# Patient Record
Sex: MALE | Race: BLACK OR AFRICAN AMERICAN | NOT HISPANIC OR LATINO | URBAN - METROPOLITAN AREA
[De-identification: names, ages, dates, MRNs, and addresses within clinical notes are randomized per-mention and may not be internally consistent; named-entity substitution may affect disease eponyms.]

---

## 2017-08-01 ENCOUNTER — OUTPATIENT (OUTPATIENT)
Dept: OUTPATIENT SERVICES | Facility: HOSPITAL | Age: 52
LOS: 1 days | End: 2017-08-01
Payer: MEDICAID

## 2017-08-03 DIAGNOSIS — R69 ILLNESS, UNSPECIFIED: ICD-10-CM

## 2017-10-01 PROCEDURE — G9001: CPT

## 2017-10-31 ENCOUNTER — INPATIENT (INPATIENT)
Facility: HOSPITAL | Age: 52
LOS: 3 days | Discharge: HOME | DRG: 638 | End: 2017-11-04
Attending: INTERNAL MEDICINE | Admitting: INTERNAL MEDICINE
Payer: MEDICAID

## 2017-10-31 VITALS
HEART RATE: 127 BPM | TEMPERATURE: 99 F | DIASTOLIC BLOOD PRESSURE: 79 MMHG | WEIGHT: 315 LBS | OXYGEN SATURATION: 98 % | RESPIRATION RATE: 18 BRPM | SYSTOLIC BLOOD PRESSURE: 161 MMHG

## 2017-10-31 DIAGNOSIS — E11.65 TYPE 2 DIABETES MELLITUS WITH HYPERGLYCEMIA: ICD-10-CM

## 2017-10-31 DIAGNOSIS — N17.9 ACUTE KIDNEY FAILURE, UNSPECIFIED: ICD-10-CM

## 2017-10-31 DIAGNOSIS — Z29.9 ENCOUNTER FOR PROPHYLACTIC MEASURES, UNSPECIFIED: ICD-10-CM

## 2017-10-31 DIAGNOSIS — E87.1 HYPO-OSMOLALITY AND HYPONATREMIA: ICD-10-CM

## 2017-10-31 DIAGNOSIS — Z98.890 OTHER SPECIFIED POSTPROCEDURAL STATES: Chronic | ICD-10-CM

## 2017-10-31 DIAGNOSIS — R00.0 TACHYCARDIA, UNSPECIFIED: ICD-10-CM

## 2017-10-31 DIAGNOSIS — R73.9 HYPERGLYCEMIA, UNSPECIFIED: ICD-10-CM

## 2017-10-31 DIAGNOSIS — R10.9 UNSPECIFIED ABDOMINAL PAIN: ICD-10-CM

## 2017-10-31 DIAGNOSIS — I10 ESSENTIAL (PRIMARY) HYPERTENSION: ICD-10-CM

## 2017-10-31 DIAGNOSIS — M10.9 GOUT, UNSPECIFIED: ICD-10-CM

## 2017-10-31 DIAGNOSIS — S99.919A UNSPECIFIED INJURY OF UNSPECIFIED ANKLE, INITIAL ENCOUNTER: Chronic | ICD-10-CM

## 2017-10-31 LAB
ACETONE SERPL-MCNC: NEGATIVE — SIGNIFICANT CHANGE UP
ALBUMIN SERPL ELPH-MCNC: 3.3 G/DL — SIGNIFICANT CHANGE UP (ref 3.3–5)
ALBUMIN SERPL ELPH-MCNC: 3.3 G/DL — SIGNIFICANT CHANGE UP (ref 3.3–5)
ALBUMIN SERPL ELPH-MCNC: 3.7 G/DL — SIGNIFICANT CHANGE UP (ref 3.3–5)
ALP SERPL-CCNC: 108 U/L — SIGNIFICANT CHANGE UP (ref 40–120)
ALP SERPL-CCNC: 83 U/L — SIGNIFICANT CHANGE UP (ref 40–120)
ALP SERPL-CCNC: 91 U/L — SIGNIFICANT CHANGE UP (ref 40–120)
ALT FLD-CCNC: 43 U/L RC — SIGNIFICANT CHANGE UP (ref 10–45)
ALT FLD-CCNC: 47 U/L RC — HIGH (ref 10–45)
ALT FLD-CCNC: 53 U/L RC — HIGH (ref 10–45)
ANION GAP SERPL CALC-SCNC: 16 MMOL/L — SIGNIFICANT CHANGE UP (ref 5–17)
ANION GAP SERPL CALC-SCNC: 17 MMOL/L — SIGNIFICANT CHANGE UP (ref 5–17)
ANION GAP SERPL CALC-SCNC: 20 MMOL/L — HIGH (ref 5–17)
APPEARANCE UR: CLEAR — SIGNIFICANT CHANGE UP
APTT BLD: 28.8 SEC — SIGNIFICANT CHANGE UP (ref 27.5–37.4)
AST SERPL-CCNC: 45 U/L — HIGH (ref 10–40)
AST SERPL-CCNC: 46 U/L — HIGH (ref 10–40)
AST SERPL-CCNC: 50 U/L — HIGH (ref 10–40)
B-OH-BUTYR SERPL-SCNC: 0.3 MMOL/L — SIGNIFICANT CHANGE UP
BASOPHILS # BLD AUTO: 0 K/UL — SIGNIFICANT CHANGE UP (ref 0–0.2)
BASOPHILS NFR BLD AUTO: 0.2 % — SIGNIFICANT CHANGE UP (ref 0–2)
BILIRUB SERPL-MCNC: 0.3 MG/DL — SIGNIFICANT CHANGE UP (ref 0.2–1.2)
BILIRUB SERPL-MCNC: 0.4 MG/DL — SIGNIFICANT CHANGE UP (ref 0.2–1.2)
BILIRUB SERPL-MCNC: 0.4 MG/DL — SIGNIFICANT CHANGE UP (ref 0.2–1.2)
BILIRUB UR-MCNC: NEGATIVE — SIGNIFICANT CHANGE UP
BUN SERPL-MCNC: 60 MG/DL — HIGH (ref 7–23)
BUN SERPL-MCNC: 62 MG/DL — HIGH (ref 7–23)
BUN SERPL-MCNC: 64 MG/DL — HIGH (ref 7–23)
CALCIUM SERPL-MCNC: 9 MG/DL — SIGNIFICANT CHANGE UP (ref 8.4–10.5)
CALCIUM SERPL-MCNC: 9.3 MG/DL — SIGNIFICANT CHANGE UP (ref 8.4–10.5)
CALCIUM SERPL-MCNC: 9.9 MG/DL — SIGNIFICANT CHANGE UP (ref 8.4–10.5)
CHLORIDE SERPL-SCNC: 85 MMOL/L — LOW (ref 96–108)
CHLORIDE SERPL-SCNC: 91 MMOL/L — LOW (ref 96–108)
CHLORIDE SERPL-SCNC: 94 MMOL/L — LOW (ref 96–108)
CK MB BLD-MCNC: 1.3 % — SIGNIFICANT CHANGE UP (ref 0–3.5)
CK MB CFR SERPL CALC: 2.3 NG/ML — SIGNIFICANT CHANGE UP (ref 0–6.7)
CK MB CFR SERPL CALC: 2.6 NG/ML — SIGNIFICANT CHANGE UP (ref 0–6.7)
CK SERPL-CCNC: 177 U/L — SIGNIFICANT CHANGE UP (ref 30–200)
CO2 SERPL-SCNC: 20 MMOL/L — LOW (ref 22–31)
CO2 SERPL-SCNC: 22 MMOL/L — SIGNIFICANT CHANGE UP (ref 22–31)
CO2 SERPL-SCNC: 23 MMOL/L — SIGNIFICANT CHANGE UP (ref 22–31)
COLOR SPEC: SIGNIFICANT CHANGE UP
CREAT SERPL-MCNC: 3.94 MG/DL — HIGH (ref 0.5–1.3)
CREAT SERPL-MCNC: 4.19 MG/DL — HIGH (ref 0.5–1.3)
CREAT SERPL-MCNC: 4.5 MG/DL — HIGH (ref 0.5–1.3)
DIFF PNL FLD: ABNORMAL
EOSINOPHIL # BLD AUTO: 0.2 K/UL — SIGNIFICANT CHANGE UP (ref 0–0.5)
EOSINOPHIL NFR BLD AUTO: 2.3 % — SIGNIFICANT CHANGE UP (ref 0–6)
GAS PNL BLDV: SIGNIFICANT CHANGE UP
GLUCOSE BLDC GLUCOMTR-MCNC: 454 MG/DL — CRITICAL HIGH (ref 70–99)
GLUCOSE BLDC GLUCOMTR-MCNC: 513 MG/DL — CRITICAL HIGH (ref 70–99)
GLUCOSE SERPL-MCNC: 498 MG/DL — CRITICAL HIGH (ref 70–99)
GLUCOSE SERPL-MCNC: 546 MG/DL — CRITICAL HIGH (ref 70–99)
GLUCOSE SERPL-MCNC: 715 MG/DL — CRITICAL HIGH (ref 70–99)
GLUCOSE UR QL: >1000
HBA1C BLD-MCNC: 15.2 % — HIGH (ref 4–5.6)
HCT VFR BLD CALC: 38 % — LOW (ref 39–50)
HGB BLD-MCNC: 12.5 G/DL — LOW (ref 13–17)
INR BLD: 1.07 RATIO — SIGNIFICANT CHANGE UP (ref 0.88–1.16)
KETONES UR-MCNC: NEGATIVE — SIGNIFICANT CHANGE UP
LEUKOCYTE ESTERASE UR-ACNC: NEGATIVE — SIGNIFICANT CHANGE UP
LIDOCAIN IGE QN: 124 U/L — HIGH (ref 7–60)
LYMPHOCYTES # BLD AUTO: 2.8 K/UL — SIGNIFICANT CHANGE UP (ref 1–3.3)
LYMPHOCYTES # BLD AUTO: 25.7 % — SIGNIFICANT CHANGE UP (ref 13–44)
MAGNESIUM SERPL-MCNC: 2.2 MG/DL — SIGNIFICANT CHANGE UP (ref 1.6–2.6)
MCHC RBC-ENTMCNC: 20.2 PG — LOW (ref 27–34)
MCHC RBC-ENTMCNC: 32.9 GM/DL — SIGNIFICANT CHANGE UP (ref 32–36)
MCV RBC AUTO: 61.4 FL — LOW (ref 80–100)
MONOCYTES # BLD AUTO: 0.7 K/UL — SIGNIFICANT CHANGE UP (ref 0–0.9)
MONOCYTES NFR BLD AUTO: 6.5 % — SIGNIFICANT CHANGE UP (ref 2–14)
NEUTROPHILS # BLD AUTO: 7 K/UL — SIGNIFICANT CHANGE UP (ref 1.8–7.4)
NEUTROPHILS NFR BLD AUTO: 65.3 % — SIGNIFICANT CHANGE UP (ref 43–77)
NITRITE UR-MCNC: NEGATIVE — SIGNIFICANT CHANGE UP
PH UR: 6 — SIGNIFICANT CHANGE UP (ref 5–8)
PHOSPHATE SERPL-MCNC: 4.6 MG/DL — HIGH (ref 2.5–4.5)
PLATELET # BLD AUTO: 273 K/UL — SIGNIFICANT CHANGE UP (ref 150–400)
POTASSIUM SERPL-MCNC: 4 MMOL/L — SIGNIFICANT CHANGE UP (ref 3.5–5.3)
POTASSIUM SERPL-MCNC: 4.3 MMOL/L — SIGNIFICANT CHANGE UP (ref 3.5–5.3)
POTASSIUM SERPL-MCNC: 4.4 MMOL/L — SIGNIFICANT CHANGE UP (ref 3.5–5.3)
POTASSIUM SERPL-SCNC: 4 MMOL/L — SIGNIFICANT CHANGE UP (ref 3.5–5.3)
POTASSIUM SERPL-SCNC: 4.3 MMOL/L — SIGNIFICANT CHANGE UP (ref 3.5–5.3)
POTASSIUM SERPL-SCNC: 4.4 MMOL/L — SIGNIFICANT CHANGE UP (ref 3.5–5.3)
PROT SERPL-MCNC: 6.9 G/DL — SIGNIFICANT CHANGE UP (ref 6–8.3)
PROT SERPL-MCNC: 6.9 G/DL — SIGNIFICANT CHANGE UP (ref 6–8.3)
PROT SERPL-MCNC: 8 G/DL — SIGNIFICANT CHANGE UP (ref 6–8.3)
PROT UR-MCNC: 100 MG/DL
PROTHROM AB SERPL-ACNC: 11.6 SEC — SIGNIFICANT CHANGE UP (ref 9.8–12.7)
RBC # BLD: 6.18 M/UL — HIGH (ref 4.2–5.8)
RBC # FLD: 14 % — SIGNIFICANT CHANGE UP (ref 10.3–14.5)
SODIUM SERPL-SCNC: 125 MMOL/L — LOW (ref 135–145)
SODIUM SERPL-SCNC: 129 MMOL/L — LOW (ref 135–145)
SODIUM SERPL-SCNC: 134 MMOL/L — LOW (ref 135–145)
SP GR SPEC: 1.01 — SIGNIFICANT CHANGE UP (ref 1.01–1.02)
TROPONIN T SERPL-MCNC: <0.01 NG/ML — SIGNIFICANT CHANGE UP (ref 0–0.06)
TROPONIN T SERPL-MCNC: <0.01 NG/ML — SIGNIFICANT CHANGE UP (ref 0–0.06)
TSH SERPL-MCNC: 3.58 UIU/ML — SIGNIFICANT CHANGE UP (ref 0.27–4.2)
UROBILINOGEN FLD QL: NEGATIVE — SIGNIFICANT CHANGE UP
WBC # BLD: 10.7 K/UL — HIGH (ref 3.8–10.5)
WBC # FLD AUTO: 10.7 K/UL — HIGH (ref 3.8–10.5)

## 2017-10-31 PROCEDURE — 99291 CRITICAL CARE FIRST HOUR: CPT | Mod: 25

## 2017-10-31 PROCEDURE — 99223 1ST HOSP IP/OBS HIGH 75: CPT

## 2017-10-31 PROCEDURE — 93010 ELECTROCARDIOGRAM REPORT: CPT

## 2017-10-31 PROCEDURE — 71010: CPT | Mod: 26

## 2017-10-31 RX ORDER — SODIUM CHLORIDE 9 MG/ML
1000 INJECTION, SOLUTION INTRAVENOUS ONCE
Qty: 0 | Refills: 0 | Status: COMPLETED | OUTPATIENT
Start: 2017-10-31 | End: 2017-10-31

## 2017-10-31 RX ORDER — DEXTROSE 50 % IN WATER 50 %
25 SYRINGE (ML) INTRAVENOUS ONCE
Qty: 0 | Refills: 0 | Status: DISCONTINUED | OUTPATIENT
Start: 2017-10-31 | End: 2017-11-04

## 2017-10-31 RX ORDER — SODIUM CHLORIDE 9 MG/ML
1000 INJECTION, SOLUTION INTRAVENOUS
Qty: 0 | Refills: 0 | Status: DISCONTINUED | OUTPATIENT
Start: 2017-10-31 | End: 2017-10-31

## 2017-10-31 RX ORDER — NIFEDIPINE 30 MG
30 TABLET, EXTENDED RELEASE 24 HR ORAL ONCE
Qty: 0 | Refills: 0 | Status: COMPLETED | OUTPATIENT
Start: 2017-10-31 | End: 2017-11-01

## 2017-10-31 RX ORDER — INSULIN GLARGINE 100 [IU]/ML
36 INJECTION, SOLUTION SUBCUTANEOUS ONCE
Qty: 0 | Refills: 0 | Status: COMPLETED | OUTPATIENT
Start: 2017-10-31 | End: 2017-10-31

## 2017-10-31 RX ORDER — HEPARIN SODIUM 5000 [USP'U]/ML
5000 INJECTION INTRAVENOUS; SUBCUTANEOUS EVERY 8 HOURS
Qty: 0 | Refills: 0 | Status: DISCONTINUED | OUTPATIENT
Start: 2017-10-31 | End: 2017-11-04

## 2017-10-31 RX ORDER — INSULIN HUMAN 100 [IU]/ML
8 INJECTION, SOLUTION SUBCUTANEOUS ONCE
Qty: 0 | Refills: 0 | Status: COMPLETED | OUTPATIENT
Start: 2017-10-31 | End: 2017-10-31

## 2017-10-31 RX ORDER — FEBUXOSTAT 40 MG/1
40 TABLET ORAL DAILY
Qty: 0 | Refills: 0 | Status: DISCONTINUED | OUTPATIENT
Start: 2017-10-31 | End: 2017-11-04

## 2017-10-31 RX ORDER — INSULIN LISPRO 100/ML
VIAL (ML) SUBCUTANEOUS AT BEDTIME
Qty: 0 | Refills: 0 | Status: DISCONTINUED | OUTPATIENT
Start: 2017-10-31 | End: 2017-11-01

## 2017-10-31 RX ORDER — SODIUM CHLORIDE 9 MG/ML
1000 INJECTION, SOLUTION INTRAVENOUS
Qty: 0 | Refills: 0 | Status: DISCONTINUED | OUTPATIENT
Start: 2017-10-31 | End: 2017-11-01

## 2017-10-31 RX ORDER — INSULIN LISPRO 100/ML
VIAL (ML) SUBCUTANEOUS
Qty: 0 | Refills: 0 | Status: DISCONTINUED | OUTPATIENT
Start: 2017-10-31 | End: 2017-11-01

## 2017-10-31 RX ORDER — GLUCAGON INJECTION, SOLUTION 0.5 MG/.1ML
1 INJECTION, SOLUTION SUBCUTANEOUS ONCE
Qty: 0 | Refills: 0 | Status: DISCONTINUED | OUTPATIENT
Start: 2017-10-31 | End: 2017-11-04

## 2017-10-31 RX ORDER — SODIUM CHLORIDE 9 MG/ML
1000 INJECTION, SOLUTION INTRAVENOUS
Qty: 0 | Refills: 0 | Status: DISCONTINUED | OUTPATIENT
Start: 2017-10-31 | End: 2017-11-04

## 2017-10-31 RX ORDER — INSULIN LISPRO 100/ML
12 VIAL (ML) SUBCUTANEOUS
Qty: 0 | Refills: 0 | Status: DISCONTINUED | OUTPATIENT
Start: 2017-10-31 | End: 2017-11-02

## 2017-10-31 RX ORDER — CALCITRIOL 0.5 UG/1
0.25 CAPSULE ORAL DAILY
Qty: 0 | Refills: 0 | Status: DISCONTINUED | OUTPATIENT
Start: 2017-10-31 | End: 2017-11-04

## 2017-10-31 RX ORDER — DEXTROSE 50 % IN WATER 50 %
12.5 SYRINGE (ML) INTRAVENOUS ONCE
Qty: 0 | Refills: 0 | Status: DISCONTINUED | OUTPATIENT
Start: 2017-10-31 | End: 2017-11-04

## 2017-10-31 RX ORDER — DEXTROSE 50 % IN WATER 50 %
1 SYRINGE (ML) INTRAVENOUS ONCE
Qty: 0 | Refills: 0 | Status: DISCONTINUED | OUTPATIENT
Start: 2017-10-31 | End: 2017-11-04

## 2017-10-31 RX ORDER — NIFEDIPINE 30 MG
60 TABLET, EXTENDED RELEASE 24 HR ORAL DAILY
Qty: 0 | Refills: 0 | Status: DISCONTINUED | OUTPATIENT
Start: 2017-10-31 | End: 2017-11-01

## 2017-10-31 RX ORDER — INSULIN GLARGINE 100 [IU]/ML
36 INJECTION, SOLUTION SUBCUTANEOUS AT BEDTIME
Qty: 0 | Refills: 0 | Status: DISCONTINUED | OUTPATIENT
Start: 2017-11-01 | End: 2017-11-01

## 2017-10-31 RX ADMIN — Medication 4: at 23:22

## 2017-10-31 RX ADMIN — SODIUM CHLORIDE 4000 MILLILITER(S): 9 INJECTION, SOLUTION INTRAVENOUS at 14:29

## 2017-10-31 RX ADMIN — SODIUM CHLORIDE 125 MILLILITER(S): 9 INJECTION, SOLUTION INTRAVENOUS at 23:22

## 2017-10-31 RX ADMIN — INSULIN GLARGINE 36 UNIT(S): 100 INJECTION, SOLUTION SUBCUTANEOUS at 17:08

## 2017-10-31 RX ADMIN — SODIUM CHLORIDE 4000 MILLILITER(S): 9 INJECTION, SOLUTION INTRAVENOUS at 16:32

## 2017-10-31 RX ADMIN — INSULIN HUMAN 8 UNIT(S): 100 INJECTION, SOLUTION SUBCUTANEOUS at 16:33

## 2017-10-31 RX ADMIN — HEPARIN SODIUM 5000 UNIT(S): 5000 INJECTION INTRAVENOUS; SUBCUTANEOUS at 23:23

## 2017-10-31 NOTE — ED PROVIDER NOTE - OBJECTIVE STATEMENT
52M pmh HTN, CKD p/w 1 month of fatigue, increased thirst, increased urination and abdominal discomfort.  Pt was seen by a cardiologist earlier today due to an elevated HR noticed by his kidney doctor (Dr. Neftaly Holden) last week.  Cardiologist (Dr. Peraza) noticed elevated HR again today in office and told patient to go to ED.  Pt states abdominal discomfort after eating and constantly feeling fatigued.  Denies chest pain, shortness of breath, nausea/vomiting/diarrhea, fever/chills, headache.   PMD Dr. Hoover

## 2017-10-31 NOTE — ED ADULT NURSE REASSESSMENT NOTE - NS ED NURSE REASSESS COMMENT FT1
Pt is in no current distress. Comfort and safety provided. Will continue to monitor.
Pt is in no current distress. Comfort and safety provided. Will continue to monitor.

## 2017-10-31 NOTE — H&P ADULT - NSHPSOCIALHISTORY_GEN_ALL_CORE
Social History:    Marital Status:  (  x )    (   ) Single    (   )    (  )   Occupation: former   Lives with: (  ) alone  ( x ) children   (x  ) spouse   (  ) parents  (  ) other    Substance Use (street drugs): ( x ) never used  (  ) other:  Tobacco Usage:  (   ) never smoked   ( x  ) former smoker   (   ) current smoker  ( 10   ) pack year  (        ) last cigarette date  Alcohol Usage: denies

## 2017-10-31 NOTE — H&P ADULT - PROBLEM SELECTOR PLAN 7
-likely in setting of dehydration from osmotic diuresis  -improved with IVF  -no CP, CE neg x 1, no overt ischemia  -check second set CE now

## 2017-10-31 NOTE — H&P ADULT - PROBLEM SELECTOR PLAN 2
CKD 4 now with likely acutely worsened GFR in setting of osmotic diuresis/dehydration 2/2 severe hyperglycemia  -s/p aggressive IVF rehydration in ER with 3 boluses of LR and now standing 125 cc/hr  -c/w 125 cc/hr LR x 12 hour and reassess in am  -Renal c/s called, awaiting eval/recs (Dr. Jp Desai)  -Avoid nephrotoxins, renally dose meds  -Currently lytes ok, not fluid overloaded, acidemia improved, and not uremic on exam; no emergent indication for HD at this time but will monitor closely.

## 2017-10-31 NOTE — H&P ADULT - PROBLEM SELECTOR PLAN 6
-LIpase mildly elevated but pt denies overt pain; relation to food concerning for ?pancreatitis  -check lipids/triglycerides  -mild tranaminitis otherwise normal LFT  -c/w ivf  -if pain continues consider further imaging once acute illness is addressed with better glycemic control

## 2017-10-31 NOTE — H&P ADULT - ASSESSMENT
52 M PMH HTN, CKD, gout, p/w few weeks of ongoing fatigue/thirst/frequent urination in setting of markedly elevated blood sugars, f/w newly diagnosed diabetes and mild DKA picture vs HONK now improving with insulin.

## 2017-10-31 NOTE — H&P ADULT - NSHPLABSRESULTS_GEN_ALL_CORE
Labs personally reviewed : mild leukocytosis (10.7), chronic stable anemia, serum glucose >600-700, sodium 125, ckd, CE neg x 1, bicarb 20, initial AG 20, lipase 124, lac 3.9, urine with glucose and some protein.     Imaging personally reviewed CXR showed no focal consolidation.    EKG personally reviewed sinus tachy 120s, with QW inf leads.

## 2017-10-31 NOTE — ED PROVIDER NOTE - PHYSICAL EXAMINATION
Attending MD Barclay: A & O x 3, appears dyspneic at times, morbidly obese, EOMI b/l, PERRL b/l; lungs CTAB, heart tachy with reg rhythm without murmur; abdomen soft NTND; extremities with no edema; affect appropriate. neuro exam non focal with no motor or sensory deficits.

## 2017-10-31 NOTE — H&P ADULT - HISTORY OF PRESENT ILLNESS
52 M PMH HTN, CKD, gout, p/w few weeks of ongoing fatigue with little activity.  +increased thirst, +increased urination, +mouth/skin dryness, +HA, +abd discomfort with any PO intake (specifies that he does not have overt pain, denies n/v/d, but states that is simply discomfort with food), also with +early satiety. Pt notes he had usually had good o/p f/u with his nephrologist but hadn't seen him for a few months.  Saw him last week, found with tachycardia to 120s.  Referred to cardiologists after labs drawn. At cards office, sinus tachycardia is confirmed; pt was notified his BS from earlier labs was in 600s and he was referred to ER.  Pt had not yet been able to get appointment with o/p endocrinologist. Saw a GI doctor for his abd "discomfort" but any workup was deferred until his elevated blood sugars was addressed.   He currently denies CP/SOB, f/c, denies n/v/d, denies back pain, denies dysuria/cough/uri sx.     VS:  98.7, 122, 109/86, 20, 100%RA.  Labs: mild leukocytosis (10.7), chronic stable anemia, serum glucose >600-700, sodium 125, ckd, CE neg x 1, bicarb 20, initial AG 20, lipase 124, lac 3.9, urine with glucose and some protein. CXR showed no focal consolidation. Given LR 1L bolus x 3, then 125 cc/hr, lantus 36 u, 8 units reg  insulin x 1.  After fluids, pts tachycardia improved to 90s-100s, BP improved, AG dec to 16, bicarb inc to 22.

## 2017-10-31 NOTE — ED ADULT NURSE NOTE - OBJECTIVE STATEMENT
52 y.o. male presents to the ED c/o high glucose reading and tachycardia at MD office. Dr. Barclay at bedside for evaluation. Pt is in no current distress. Comfort and safety provided. Will continue to monitor. 52 y.o. male presents to the ED c/o high glucose reading and tachycardia at MD office. Pt reports having polyuria and polydipsia. Pt also reports feeling fatigued and having dry mouth. Critically high value noted x2 upon pt's arrival to ED. Denies being diabetic, pain, CP, SOB, N/V/D, urinary/bowel complications, fever/chills. Dr. Barclay at bedside for evaluation. Pt is in no current distress. Comfort and safety provided. Will continue to monitor. 52 y.o. male presents to the ED c/o high glucose reading and tachycardia at MD office. Hx "25% function of kidneys." Pt reports having polyuria and polydipsia. Pt also reports feeling fatigued and having dry mouth. Critically high value noted x2 upon pt's arrival to ED. Denies being diabetic, pain, CP, SOB, N/V/D, urinary/bowel complications, fever/chills. Dr. Barclay at bedside for evaluation. Pt is in no current distress. Comfort and safety provided. Will continue to monitor. 52 y.o. male presents to the ED c/o high glucose reading and tachycardia at MD office. Hx "25% function of kidneys." Pt reports having polyuria and polydipsia. Pt also reports feeling fatigued and having dry mouth. Critically high value on glucose level noted x2 upon pt's arrival to ED. Denies being diabetic, pain, CP, SOB, N/V/D, urinary/bowel complications, fever/chills. Dr. Barclay at bedside for evaluation. Pt is in no current distress. Comfort and safety provided. Will continue to monitor.

## 2017-10-31 NOTE — H&P ADULT - PROBLEM SELECTOR PLAN 3
-prev relative hypotension on admission, now with IVF rehydration pt SBP creeping up slowly most recently in 150s  -c/w nifedipine 60 qd

## 2017-10-31 NOTE — ED PROVIDER NOTE - PROGRESS NOTE DETAILS
Attending MD Barclay: labs consistent with likely mild DKA, no acidemia however. IV fluids infusing, will give insulin reg bolus for now, endocrine consult to see how to proceed further in setting of mild DKA to see if can close AG without insulin gtt Endocrine (Dr. Anguiano) made recommendations based on body weight and kidney function:   36U lantus, 12U humalog with meals, and Low dose sliding scale  Endocrine will see patient tomorrow  - Katerina Coello DO

## 2017-10-31 NOTE — ED PROVIDER NOTE - ATTENDING CONTRIBUTION TO CARE
Attending MD Barclay:  I personally have seen and examined this patient.  Resident note reviewed and agree on plan of care and except where noted.  See HPI, PE, and MDM for details.

## 2017-10-31 NOTE — H&P ADULT - NSHPREVIEWOFSYSTEMS_GEN_ALL_CORE
REVIEW OF SYSTEMS:  CONSTITUTIONAL: +weakness. No fevers. No chills. No weight loss. dec appetite.  EYES: No visual changes. No eye pain.  ENT: No hearing difficulty. No vertigo. No dysphagia. No Sinusitis/rhinorrhea.  NECK: No pain. No stiffness/rigidity.  CARDIAC: No chest pain. No palpitations.  RESPIRATORY: No cough. No SOB. No hemoptysis.  GASTROINTESTINAL: No abdominal pain. No nausea. No vomiting. No hematemesis. No diarrhea. No constipation. No melena. No hematochezia.  GENITOURINARY: No dysuria. +frequency. No hesitancy. No hematuria.  NEUROLOGICAL: No numbness. No focal weakness. No incontinence. No headache.  BACK: No back pain.  EXTREMITIES: No lower extremity edema. Full ROM.  SKIN: No rashes. No itching. No other lesions.  PSYCHIATRIC: No depression. No anxiety. No SI/HI.  ALLERGIC: No lip swelling. No hives.  All other review of systems is negative unless indicated above.

## 2017-10-31 NOTE — ED PROVIDER NOTE - MEDICAL DECISION MAKING DETAILS
Attending MD Barclay: 52M with CKD, HTN presenting with fatigue, polyuria, polydipsia and dyspnea, outpatient labs notable for glucose of 600, concern for DKA. Will obtain labs, start IV fluids, reassess

## 2017-10-31 NOTE — H&P ADULT - NSHPPHYSICALEXAM_GEN_ALL_CORE
PHYSICAL EXAM:  GENERAL: mild distress 2/2 dehydration, well-groomed, well-developed  HEAD:  Atraumatic, Normocephalic  EYES: EOMI, PERRLA, conjunctiva and sclera clear.  Dry conjunctiva  ENMT: No tonsillar erythema, exudates, or enlargement; dry mucous membranes, Good dentition, No lesions  NECK: Supple, No JVD  CHEST/LUNG: Clear to percussion bilaterally upper lobes with some fine crackles at bases b/l ; No rales, rhonchi, wheezing, or rubs  HEART: tachy rate and regular rhythm; No murmurs, rubs, or gallops. no pitting edema b/l le  ABDOMEN: Soft, Nontender, mildly distended. Bowel sounds present  EXTREMITIES:  2+ Peripheral Pulses, No clubbing, cyanosis.   LYMPH: No lymphadenopathy noted  SKIN: No rashes or lesions.  dry skin diffusely.  NERVOUS SYSTEM:  Alert & Oriented X3, Good concentration; Motor Strength 5/5 B/L upper and lower extremities.

## 2017-10-31 NOTE — H&P ADULT - PROBLEM SELECTOR PROBLEM 2
Acute renal failure superimposed on stage 4 chronic kidney disease, unspecified acute renal failure type

## 2017-10-31 NOTE — H&P ADULT - NSHPATTENDINGPLANDISCUSS_GEN_ALL_CORE
ED NP, patient at bedside, Dr. Ascencio via telephone regarding consulting physicians and management.

## 2017-10-31 NOTE — H&P ADULT - ATTENDING COMMENTS
Case endorsed to ED NP    Care to be assumed by Dr. Ascencio in am Case endorsed to ED NP Chambers [phone] at 1030 pm    Care to be assumed by Dr. Ascencio in am

## 2017-10-31 NOTE — H&P ADULT - PROBLEM SELECTOR PLAN 1
-Previously unknown/undiagnosed; now with evidence of mild DKA vs. HHS  -a1c >15%  -Endo c/s called by ER, verbal recommendations documented in ER note; will c/w weight based recs of Lantus 36 qhs + Humalog 12 u premeal, check FSG qac/qhs and start Low dose HISS  -AG improved with fluids/insulin, most recent 16; last BMP ~ 5 pm can check repeat now with additional labs below  -diabetic education  -

## 2017-10-31 NOTE — H&P ADULT - NSHPOUTPATIENTPROVIDERS_GEN_ALL_CORE
PCP: Wilfredo Hoover ([phone])  Cards: Mame Peraza ([phone])  Renal: Neftaly Holden ([phone])

## 2017-11-01 DIAGNOSIS — N25.81 SECONDARY HYPERPARATHYROIDISM OF RENAL ORIGIN: ICD-10-CM

## 2017-11-01 DIAGNOSIS — N17.9 ACUTE KIDNEY FAILURE, UNSPECIFIED: ICD-10-CM

## 2017-11-01 DIAGNOSIS — I12.9 HYPERTENSIVE CHRONIC KIDNEY DISEASE WITH STAGE 1 THROUGH STAGE 4 CHRONIC KIDNEY DISEASE, OR UNSPECIFIED CHRONIC KIDNEY DISEASE: ICD-10-CM

## 2017-11-01 DIAGNOSIS — I10 ESSENTIAL (PRIMARY) HYPERTENSION: ICD-10-CM

## 2017-11-01 DIAGNOSIS — N18.4 CHRONIC KIDNEY DISEASE, STAGE 4 (SEVERE): ICD-10-CM

## 2017-11-01 DIAGNOSIS — E78.5 HYPERLIPIDEMIA, UNSPECIFIED: ICD-10-CM

## 2017-11-01 LAB
ALBUMIN SERPL ELPH-MCNC: 2.7 G/DL — LOW (ref 3.3–5)
ALP SERPL-CCNC: 75 U/L — SIGNIFICANT CHANGE UP (ref 40–120)
ALT FLD-CCNC: 45 U/L — SIGNIFICANT CHANGE UP (ref 10–45)
ANION GAP SERPL CALC-SCNC: 18 MMOL/L — HIGH (ref 5–17)
AST SERPL-CCNC: 57 U/L — HIGH (ref 10–40)
BASOPHILS # BLD AUTO: 0.03 K/UL — SIGNIFICANT CHANGE UP (ref 0–0.2)
BASOPHILS NFR BLD AUTO: 0.4 % — SIGNIFICANT CHANGE UP (ref 0–2)
BILIRUB SERPL-MCNC: 0.4 MG/DL — SIGNIFICANT CHANGE UP (ref 0.2–1.2)
BUN SERPL-MCNC: 60 MG/DL — HIGH (ref 7–23)
CALCIUM SERPL-MCNC: 9.1 MG/DL — SIGNIFICANT CHANGE UP (ref 8.4–10.5)
CHLORIDE SERPL-SCNC: 95 MMOL/L — LOW (ref 96–108)
CHOLEST SERPL-MCNC: 193 MG/DL — SIGNIFICANT CHANGE UP (ref 10–199)
CO2 SERPL-SCNC: 18 MMOL/L — LOW (ref 22–31)
CREAT SERPL-MCNC: 3.74 MG/DL — HIGH (ref 0.5–1.3)
EOSINOPHIL # BLD AUTO: 0.33 K/UL — SIGNIFICANT CHANGE UP (ref 0–0.5)
EOSINOPHIL NFR BLD AUTO: 4.9 % — SIGNIFICANT CHANGE UP (ref 0–6)
GLUCOSE BLDC GLUCOMTR-MCNC: 346 MG/DL — HIGH (ref 70–99)
GLUCOSE BLDC GLUCOMTR-MCNC: 350 MG/DL — HIGH (ref 70–99)
GLUCOSE BLDC GLUCOMTR-MCNC: 365 MG/DL — HIGH (ref 70–99)
GLUCOSE BLDC GLUCOMTR-MCNC: 395 MG/DL — HIGH (ref 70–99)
GLUCOSE BLDC GLUCOMTR-MCNC: 409 MG/DL — HIGH (ref 70–99)
GLUCOSE BLDC GLUCOMTR-MCNC: 432 MG/DL — HIGH (ref 70–99)
GLUCOSE BLDC GLUCOMTR-MCNC: 446 MG/DL — HIGH (ref 70–99)
GLUCOSE BLDC GLUCOMTR-MCNC: 551 MG/DL — CRITICAL HIGH (ref 70–99)
GLUCOSE BLDC GLUCOMTR-MCNC: 560 MG/DL — CRITICAL HIGH (ref 70–99)
GLUCOSE SERPL-MCNC: 458 MG/DL — CRITICAL HIGH (ref 70–99)
HCT VFR BLD CALC: 34.7 % — LOW (ref 39–50)
HDLC SERPL-MCNC: 25 MG/DL — LOW (ref 40–125)
HGB BLD-MCNC: 10.5 G/DL — LOW (ref 13–17)
IMM GRANULOCYTES NFR BLD AUTO: 0.3 % — SIGNIFICANT CHANGE UP (ref 0–1.5)
LIPID PNL WITH DIRECT LDL SERPL: SIGNIFICANT CHANGE UP
LYMPHOCYTES # BLD AUTO: 2.04 K/UL — SIGNIFICANT CHANGE UP (ref 1–3.3)
LYMPHOCYTES # BLD AUTO: 30.2 % — SIGNIFICANT CHANGE UP (ref 13–44)
MAGNESIUM SERPL-MCNC: 2.3 MG/DL — SIGNIFICANT CHANGE UP (ref 1.6–2.6)
MCHC RBC-ENTMCNC: 18.7 PG — LOW (ref 27–34)
MCHC RBC-ENTMCNC: 30.3 GM/DL — LOW (ref 32–36)
MCV RBC AUTO: 61.9 FL — LOW (ref 80–100)
MONOCYTES # BLD AUTO: 0.47 K/UL — SIGNIFICANT CHANGE UP (ref 0–0.9)
MONOCYTES NFR BLD AUTO: 7 % — SIGNIFICANT CHANGE UP (ref 2–14)
NEUTROPHILS # BLD AUTO: 3.86 K/UL — SIGNIFICANT CHANGE UP (ref 1.8–7.4)
NEUTROPHILS NFR BLD AUTO: 57.2 % — SIGNIFICANT CHANGE UP (ref 43–77)
PHOSPHATE SERPL-MCNC: 4.5 MG/DL — SIGNIFICANT CHANGE UP (ref 2.5–4.5)
PLATELET # BLD AUTO: 207 K/UL — SIGNIFICANT CHANGE UP (ref 150–400)
POTASSIUM SERPL-MCNC: 4.3 MMOL/L — SIGNIFICANT CHANGE UP (ref 3.5–5.3)
POTASSIUM SERPL-SCNC: 4.3 MMOL/L — SIGNIFICANT CHANGE UP (ref 3.5–5.3)
PROT SERPL-MCNC: 7.1 G/DL — SIGNIFICANT CHANGE UP (ref 6–8.3)
RBC # BLD: 5.61 M/UL — SIGNIFICANT CHANGE UP (ref 4.2–5.8)
RBC # FLD: 15.2 % — HIGH (ref 10.3–14.5)
SODIUM SERPL-SCNC: 131 MMOL/L — LOW (ref 135–145)
TOTAL CHOLESTEROL/HDL RATIO MEASUREMENT: 7.7 RATIO — SIGNIFICANT CHANGE UP (ref 3.4–9.6)
TRIGL SERPL-MCNC: 464 MG/DL — HIGH (ref 10–149)
WBC # BLD: 6.75 K/UL — SIGNIFICANT CHANGE UP (ref 3.8–10.5)
WBC # FLD AUTO: 6.75 K/UL — SIGNIFICANT CHANGE UP (ref 3.8–10.5)

## 2017-11-01 PROCEDURE — 99223 1ST HOSP IP/OBS HIGH 75: CPT

## 2017-11-01 PROCEDURE — 70450 CT HEAD/BRAIN W/O DYE: CPT | Mod: 26

## 2017-11-01 RX ORDER — ACETAMINOPHEN 500 MG
650 TABLET ORAL ONCE
Qty: 0 | Refills: 0 | Status: DISCONTINUED | OUTPATIENT
Start: 2017-11-01 | End: 2017-11-01

## 2017-11-01 RX ORDER — ACETAMINOPHEN 500 MG
325 TABLET ORAL ONCE
Qty: 0 | Refills: 0 | Status: COMPLETED | OUTPATIENT
Start: 2017-11-01 | End: 2017-11-01

## 2017-11-01 RX ORDER — SODIUM CHLORIDE 9 MG/ML
1000 INJECTION INTRAMUSCULAR; INTRAVENOUS; SUBCUTANEOUS
Qty: 0 | Refills: 0 | Status: DISCONTINUED | OUTPATIENT
Start: 2017-11-01 | End: 2017-11-02

## 2017-11-01 RX ORDER — INSULIN GLARGINE 100 [IU]/ML
40 INJECTION, SOLUTION SUBCUTANEOUS AT BEDTIME
Qty: 0 | Refills: 0 | Status: DISCONTINUED | OUTPATIENT
Start: 2017-11-01 | End: 2017-11-02

## 2017-11-01 RX ORDER — INSULIN LISPRO 100/ML
VIAL (ML) SUBCUTANEOUS AT BEDTIME
Qty: 0 | Refills: 0 | Status: DISCONTINUED | OUTPATIENT
Start: 2017-11-01 | End: 2017-11-04

## 2017-11-01 RX ORDER — INSULIN LISPRO 100/ML
7 VIAL (ML) SUBCUTANEOUS ONCE
Qty: 0 | Refills: 0 | Status: COMPLETED | OUTPATIENT
Start: 2017-11-01 | End: 2017-11-01

## 2017-11-01 RX ORDER — NIFEDIPINE 30 MG
1 TABLET, EXTENDED RELEASE 24 HR ORAL
Qty: 0 | Refills: 0 | COMMUNITY

## 2017-11-01 RX ORDER — FEBUXOSTAT 40 MG/1
1 TABLET ORAL
Qty: 0 | Refills: 0 | COMMUNITY

## 2017-11-01 RX ORDER — INSULIN LISPRO 100/ML
VIAL (ML) SUBCUTANEOUS
Qty: 0 | Refills: 0 | Status: DISCONTINUED | OUTPATIENT
Start: 2017-11-01 | End: 2017-11-04

## 2017-11-01 RX ADMIN — CALCITRIOL 0.25 MICROGRAM(S): 0.5 CAPSULE ORAL at 06:38

## 2017-11-01 RX ADMIN — Medication 12 UNIT(S): at 17:43

## 2017-11-01 RX ADMIN — HEPARIN SODIUM 5000 UNIT(S): 5000 INJECTION INTRAVENOUS; SUBCUTANEOUS at 06:39

## 2017-11-01 RX ADMIN — Medication 12 UNIT(S): at 09:35

## 2017-11-01 RX ADMIN — Medication: at 21:21

## 2017-11-01 RX ADMIN — HEPARIN SODIUM 5000 UNIT(S): 5000 INJECTION INTRAVENOUS; SUBCUTANEOUS at 21:16

## 2017-11-01 RX ADMIN — INSULIN GLARGINE 40 UNIT(S): 100 INJECTION, SOLUTION SUBCUTANEOUS at 21:14

## 2017-11-01 RX ADMIN — Medication 325 MILLIGRAM(S): at 20:50

## 2017-11-01 RX ADMIN — SODIUM CHLORIDE 80 MILLILITER(S): 9 INJECTION INTRAMUSCULAR; INTRAVENOUS; SUBCUTANEOUS at 15:02

## 2017-11-01 RX ADMIN — Medication 6: at 09:34

## 2017-11-01 RX ADMIN — Medication 30 MILLIGRAM(S): at 06:37

## 2017-11-01 RX ADMIN — Medication: at 17:45

## 2017-11-01 RX ADMIN — Medication 325 MILLIGRAM(S): at 01:50

## 2017-11-01 RX ADMIN — Medication 325 MILLIGRAM(S): at 01:22

## 2017-11-01 RX ADMIN — Medication 325 MILLIGRAM(S): at 19:50

## 2017-11-01 RX ADMIN — Medication 7 UNIT(S): at 01:22

## 2017-11-01 RX ADMIN — SODIUM CHLORIDE 80 MILLILITER(S): 9 INJECTION INTRAMUSCULAR; INTRAVENOUS; SUBCUTANEOUS at 21:14

## 2017-11-01 RX ADMIN — HEPARIN SODIUM 5000 UNIT(S): 5000 INJECTION INTRAVENOUS; SUBCUTANEOUS at 13:34

## 2017-11-01 RX ADMIN — SODIUM CHLORIDE 80 MILLILITER(S): 9 INJECTION INTRAMUSCULAR; INTRAVENOUS; SUBCUTANEOUS at 14:59

## 2017-11-01 RX ADMIN — Medication 12 UNIT(S): at 13:35

## 2017-11-01 NOTE — CONSULT NOTE ADULT - PROBLEM SELECTOR RECOMMENDATION 9
Newly dx DM2 w/ Hba1c 15.2.  Now on weight based basal/bolus insulin w/ FS's still uncontrolled.  -Would increase Lantus to 40u qhs  -Would increase Humalog to 14-14-14  -Moderate dose SS  -Cont. to monitor FS's TIDAC/qhs  -Once pt. on medicine floor, please get nutrition/ diabetic education consult.  -Please have the nurses instruct him on how to inject insulin and check FS's.  -Discharge regimen TBD

## 2017-11-01 NOTE — PROVIDER CONTACT NOTE (OTHER) - ASSESSMENT
pt resting in bed, no signs of distress
pt resting in bed. first blood glucose of 551, repeat of 560.

## 2017-11-01 NOTE — PROVIDER CONTACT NOTE (OTHER) - ACTION/TREATMENT ORDERED:
Give sliding scale insulin as ordered, 4 Units as per NP Samm
JOSELO Quijano notified. As per JOSELO Quijano, will order 5 of regular insulin to be given.

## 2017-11-01 NOTE — CONSULT NOTE ADULT - ASSESSMENT
52 year old male with history of hypertension presents with hyperglycemia with new diagnosis of DM. Nephrology consulted for elevated Scr.

## 2017-11-01 NOTE — CONSULT NOTE ADULT - PROBLEM SELECTOR RECOMMENDATION 3
BP controlled. Continue with nifedipine however patient reports taking medication as 30 mg twice daily. Monitor BP.

## 2017-11-01 NOTE — CONSULT NOTE ADULT - PROBLEM SELECTOR RECOMMENDATION 2
Patient with h/o CKD-4 (baseline GFR 25 ml/min) for which he follows with outpatient nephrologist. UA bland with proteinuria. Check spot urine TP/CR to quantify proteinuria. Will obtain records from outpatient nephrologist. Further CKD work up pending discussion with Dr. Holden. Monitor electrolytes.

## 2017-11-01 NOTE — PROGRESS NOTE ADULT - PROBLEM SELECTOR PLAN 1
as per endo, Newly dx DM2 w/ Hba1c 15.2.  Now on weight based basal/bolus insulin w/ FS's still uncontrolled.  -Would increase Lantus to 40u qhs  -Would increase Humalog to 14-14-14  -Moderate dose SS  -Cont. to monitor FS's TIDAC/qhs

## 2017-11-01 NOTE — CONSULT NOTE ADULT - PROBLEM SELECTOR RECOMMENDATION 9
Patient with SKY likely hemodynamically mediated secondary to hypovolemia given osmotic diuresis from hyperglycemia and poor PO intake PTA. Scr improving with IVF. Patient has not received 4L of LR. Would discontinue IVF and restart pending renal panel today (P). Would avoid LR given h/o advanced kidney failure and risk of hyperkalemia. Avoid nephrotoxins.

## 2017-11-01 NOTE — CONSULT NOTE ADULT - ATTENDING COMMENTS
[facility] NEPHROLOG[facility]  Franki Birto M.D.  Jp Bacon D.O.  Bushra Abel M.D.  Helen Marrufo, MSN, ANP-C    Telephone: [phone]  Facsimile: [phone]    [address] [place] [address]

## 2017-11-01 NOTE — CONSULT NOTE ADULT - SUBJECTIVE AND OBJECTIVE BOX
CHIEF COMPLAINT:Patient is a 52y old  Male who presents with a chief complaint of tachycardia, high blood sugar (31 Oct 2017 21:49)      HISTORY OF PRESENT ILLNESS:HPI:  52 M PMH HTN, CKD, gout, p/w few weeks of ongoing fatigue with little activity.  +increased thirst, +increased urination, +mouth/skin dryness, +HA, +abd discomfort with any PO intake (specifies that he does not have overt pain, denies n/v/d, but states that is simply discomfort with food), also with +early satiety. Pt notes he had usually had good o/p f/u with his nephrologist but hadn't seen him for a few months.  Saw him last week, found with tachycardia to 120s.  Referred to cardiologists (my partner Dr Peraza) after labs drawn. At his office, sinus tachycardia is confirmed; pt was notified his BS from earlier labs was in 600s and he was referred to ER.  Pt had not yet been able to get appointment with o/p endocrinologist. Saw a GI doctor for his abd "discomfort" but any workup was deferred until his elevated blood sugars was addressed.   He currently denies CP/SOB, f/c, denies n/v/d, denies back pain, denies dysuria/cough/uri sx.     VS:  98.7, 122, 109/86, 20, 100%RA.  Labs: mild leukocytosis (10.7), chronic stable anemia, serum glucose >600-700, sodium 125, ckd, CE neg x 1, bicarb 20, initial AG 20, lipase 124, lac 3.9, urine with glucose and some protein. CXR showed no focal consolidation. Given LR 1L bolus x 3, then 125 cc/hr, lantus 36 u, 8 units reg  insulin x 1.  After fluids, pts tachycardia improved to 90s-100s, BP improved, AG dec to 16, bicarb inc to 22. (31 Oct 2017 21:49)    Since admission his abd pain has improved significantly and feels better.       PAST MEDICAL & SURGICAL HISTORY:  Gout  Hypertension  Renal disease  H/O hernia repair  Injury of ankle and foot: surgically repaired  H/O shoulder surgery          MEDICATIONS:  heparin  Injectable 5000 Unit(s) SubCutaneous every 8 hours    dextrose 50% Injectable 12.5 Gram(s) IV Push once  dextrose 50% Injectable 25 Gram(s) IV Push once  dextrose 50% Injectable 25 Gram(s) IV Push once  dextrose Gel 1 Dose(s) Oral once PRN  febuxostat 40 milliGRAM(s) Oral daily  glucagon  Injectable 1 milliGRAM(s) IntraMuscular once PRN  insulin glargine Injectable (LANTUS) 40 Unit(s) SubCutaneous at bedtime  insulin lispro (HumaLOG) corrective regimen sliding scale   SubCutaneous three times a day before meals  insulin lispro (HumaLOG) corrective regimen sliding scale   SubCutaneous at bedtime  insulin lispro Injectable (HumaLOG) 12 Unit(s) SubCutaneous three times a day before meals    calcitriol   Capsule 0.25 MICROGram(s) Oral daily  dextrose 5%. 1000 milliLiter(s) IV Continuous <Continuous>  sodium chloride 0.9%. 1000 milliLiter(s) IV Continuous <Continuous>      FAMILY HISTORY:  Family history of MI (myocardial infarction) (Father)      Non-contributory    SOCIAL HISTORY:    not a smoker    Allergies    No Known Allergies    Intolerances    	  Review of Systems: REVIEW OF SYSTEMS:  	CONSTITUTIONAL: +weakness. No fevers. No chills. No weight loss. dec appetite.  	EYES: No visual changes. No eye pain.  	ENT: No hearing difficulty. No vertigo. No dysphagia. No Sinusitis/rhinorrhea.  	NECK: No pain. No stiffness/rigidity.  	CARDIAC: No chest pain. No palpitations.  	RESPIRATORY: No cough. No SOB. No hemoptysis.  	GASTROINTESTINAL: No abdominal pain. No nausea. No vomiting. No hematemesis. No diarrhea. No constipation. No melena. No hematochezia.  	GENITOURINARY: No dysuria. +frequency. No hesitancy. No hematuria.  	NEUROLOGICAL: No numbness. No focal weakness. No incontinence. No headache.  	BACK: No back pain.  	EXTREMITIES: No lower extremity edema. Full ROM.  	SKIN: No rashes. No itching. No other lesions.  	PSYCHIATRIC: No depression. No anxiety. No SI/HI.  	ALLERGIC: No lip swelling. No hives.  All other review of systems is negative unless indicated above.	    All other ROS negative    PHYSICAL EXAM:  T(C): 36.7 (11-01-17 @ 13:01), Max: 37.2 (10-31-17 @ 22:53)  HR: 109 (11-01-17 @ 13:01) (92 - 109)  BP: 141/86 (11-01-17 @ 13:01) (109/75 - 151/80)  RR: 18 (11-01-17 @ 13:01) (18 - 20)  SpO2: 97% (11-01-17 @ 13:01) (96% - 100%)  Wt(kg): --  I&O's Summary      Physical Exam: PHYSICAL EXAM:  	GENERAL: mild distress 2/2 dehydration, well-groomed, well-developed  	HEAD:  Atraumatic, Normocephalic  	EYES: EOMI, PERRLA, conjunctiva and sclera clear.  Dry conjunctiva  	ENMT: No tonsillar erythema, exudates, or enlargement; dry mucous membranes, Good dentition, No lesions  	NECK: Supple, No JVD  	CHEST/LUNG: Clear to percussion bilaterally upper lobes with some fine crackles at bases b/l ; No rales, rhonchi, wheezing, or rubs  	HEART: tachy rate and regular rhythm; No murmurs, rubs, or gallops. no pitting edema b/l le  	ABDOMEN: Soft, Nontender, mildly distended. Bowel sounds present  	EXTREMITIES:  2+ Peripheral Pulses, No clubbing, cyanosis.   	LYMPH: No lymphadenopathy noted  	SKIN: No rashes or lesions.  dry skin diffusely.  NERVOUS SYSTEM:  Alert & Oriented X3, Good concentration; Motor Strength 5/5 B/L upper and lower extremities.  	    ECG:  	Sinus tach, inferior wall q waves  RADIOLOGY: chest xray with clear lungs  	  	  CARDIAC MARKERS:  Troponin T, Serum: <0.01 ng/mL (10-31 @ 22:48)  Troponin T, Serum: <0.01 ng/mL (10-31 @ 14:36)                                  10.5   6.75  )-----------( 207      ( 01 Nov 2017 07:29 )             34.7     11-01    131<L>  |  95<L>  |  60<H>  ----------------------------<  458<HH>  4.3   |  18<L>  |  3.74<H>    Ca    9.1      01 Nov 2017 07:34  Phos  4.5     11-01  Mg     2.3     11-01    TPro  7.1  /  Alb  2.7<L>  /  TBili  0.4  /  DBili  x   /  AST  57<H>  /  ALT  45  /  AlkPhos  75  11-01    proBNP:   Lipid Profile:   HgA1c: Hemoglobin A1C, Whole Blood: 15.2 % (10-31 @ 17:08)    TSH:       Assesment/Plan:   52 M PMH HTN, CKD, gout, p/w few weeks of ongoing fatigue/SOB with little activity.  +increased thirst, +increased urination, +mouth/skin dryness, +HA, +abd discomfort with any PO intake found to have BS >600 with no DKA  1. HTN - Well controlled on Nifedipine 60mg daily (30mg BID)  - would benefit from ACE in the long run given diabetic and renal disease.     2. HLD - Initiate Lipitor 40mg for primary prevention    3. SOB - TTE to evaluate for systolic/diastolic HF  - off diuretics at this time and tolerating IVF well    4. DM - Endo follow up appreciated. Lipitor and ACE when safe from renal standpoint    5. CKD - appears at about baseline      Kevin Isbell DO [facility]  [facility]  [phone]
HPI:  52 M PMH HTN, CKD, gout, p/w few weeks of ongoing fatigue with little activity.  +increased thirst, +increased urination, +mouth/skin dryness, +HA, +abd discomfort with any PO intake (specifies that he does not have overt pain, denies n/v/d, but states that is simply discomfort with food), also with +early satiety. Pt notes he had usually had good o/p f/u with his nephrologist but hadn't seen him for a few months.  Saw him last week, found with tachycardia to 120s.  Referred to cardiologists after labs drawn. At cards office, sinus tachycardia is confirmed; pt was notified his BS from earlier labs was in 600s and he was referred to ER.  Pt had not yet been able to get appointment with o/p endocrinologist. Saw a GI doctor for his abd "discomfort" but any workup was deferred until his elevated blood sugars was addressed.   He currently denies CP/SOB, f/c, denies n/v/d, denies back pain, denies dysuria/cough/uri sx.     VS:  98.7, 122, 109/86, 20, 100%RA.  Labs: mild leukocytosis (10.7), chronic stable anemia, serum glucose >600-700, sodium 125, ckd, CE neg x 1, bicarb 20, initial AG 20, lipase 124, lac 3.9, urine with glucose and some protein. CXR showed no focal consolidation. Given LR 1L bolus x 3, then 125 cc/hr, lantus 36 u, 8 units reg  insulin x 1.  After fluids, pts tachycardia improved to 90s-100s, BP improved, AG dec to 16, bicarb inc to 22. (31 Oct 2017 21:49)    Endocrine history:   Pt. w/ no known h/o preDM. No FH of DM2.   States for the past month he has only been eating 1-2 meals a day because he would get abdominal discomfort whenever he eat. No associated N/V/D. Over the past mth, he also reports feeling more winded than usual.  Reports polyuria, polydipsia, dry mouth, and blurry vision. He had been drinking water, milk, and lemonade. Last saw optho > 1 year ago. He lost 4 lbs over the past few weeks. Over the past few years though he reports continuous weight gain which he had attributed to lack of exercise.   His diet consists of carbohydrates about 2x/ week. He has 4 kids at home so states there is always candy and sweets around.      PAST MEDICAL & SURGICAL HISTORY:  Gout  Hypertension  Renal disease  H/O hernia repair  Injury of ankle and foot: surgically repaired  H/O shoulder surgery      FAMILY HISTORY:  Family history of MI (myocardial infarction) (Father)      Social History:    Outpatient Medications:    MEDICATIONS  (STANDING):  calcitriol   Capsule 0.25 MICROGram(s) Oral daily  dextrose 5%. 1000 milliLiter(s) (50 mL/Hr) IV Continuous <Continuous>  dextrose 50% Injectable 12.5 Gram(s) IV Push once  dextrose 50% Injectable 25 Gram(s) IV Push once  dextrose 50% Injectable 25 Gram(s) IV Push once  febuxostat 40 milliGRAM(s) Oral daily  heparin  Injectable 5000 Unit(s) SubCutaneous every 8 hours  insulin glargine Injectable (LANTUS) 36 Unit(s) SubCutaneous at bedtime  insulin lispro (HumaLOG) corrective regimen sliding scale   SubCutaneous three times a day before meals  insulin lispro (HumaLOG) corrective regimen sliding scale   SubCutaneous at bedtime  insulin lispro Injectable (HumaLOG) 12 Unit(s) SubCutaneous three times a day before meals    MEDICATIONS  (PRN):  dextrose Gel 1 Dose(s) Oral once PRN Blood Glucose LESS THAN 70 milliGRAM(s)/deciliter  glucagon  Injectable 1 milliGRAM(s) IntraMuscular once PRN Glucose LESS THAN 70 milligrams/deciliter      Allergies    No Known Allergies    Intolerances      Review of Systems:  Constitutional: +fatigue  Eyes: + blurry vision  Neuro: No tremors  HEENT: No pain  Cardiovascular: No chest pain, palpitations  Respiratory: No SOB, no cough  GI: No nausea, vomiting, abdominal pain  : No dysuria  Skin: no rash  Psych: no depression  Endocrine: + polyuria, polydipsia  Hem/lymph: no swelling  Osteoporosis: no fractures        PHYSICAL EXAM:  VITALS: T(C): 36.9 (11-01-17 @ 06:33)  T(F): 98.4 (11-01-17 @ 06:33), Max: 98.9 (10-31-17 @ 22:53)  HR: 92 (11-01-17 @ 06:33) (92 - 130)  BP: 142/94 (11-01-17 @ 06:33) (109/75 - 161/79)  RR:  (18 - 20)  SpO2:  (96% - 100%)  Wt(kg): --  GENERAL: NAD, well-groomed, well-developed  EYES: No proptosis,  anicteric  HEENT:  Atraumatic, Normocephalic  RESPIRATORY: Clear to auscultation bilaterally; No rales, rhonchi, wheezing, or rubs  CARDIOVASCULAR: Regular rate and rhythm; no peripheral edema  GI: Soft, nontender, non distended, normal bowel sounds  SKIN: Dry, intact, No rashes or lesions  MUSCULOSKELETAL: Full range of motion,  NEURO: , no tremor  PSYCH: Alert and oriented x 3, normal affect, normal mood    POCT Blood Glucose.: 432 mg/dL (11-01-17 @ 09:23)  POCT Blood Glucose.: 395 mg/dL (11-01-17 @ 06:01)  POCT Blood Glucose.: 560 mg/dL (11-01-17 @ 01:10)  POCT Blood Glucose.: 551 mg/dL (11-01-17 @ 01:01)  POCT Blood Glucose.: 454 mg/dL (10-31-17 @ 23:00)  POCT Blood Glucose.: 513 mg/dL (10-31-17 @ 17:48)  POCT Blood Glucose.: 586 mg/dL (10-31-17 @ 16:26)  POCT Blood Glucose.: >600 mg/dL (10-31-17 @ 14:14)  POCT Blood Glucose.: >600 mg/dL (10-31-17 @ 14:14)  POCT Blood Glucose.: >600 mg/dL (10-31-17 @ 13:57)                            10.5   6.75  )-----------( 207      ( 01 Nov 2017 07:29 )             34.7       10-31    134<L>  |  94<L>  |  60<H>  ----------------------------<  498<HH>  4.3   |  23  |  3.94<H>    EGFR if : 19<L>  EGFR if non : 16<L>    Ca    9.0      10-31  Mg     2.2     10-31  Phos  4.6     10-31    TPro  6.9  /  Alb  3.3  /  TBili  0.4  /  DBili  x   /  AST  45<H>  /  ALT  43  /  AlkPhos  83  10-31      Thyroid Function Tests:  10-31 @ 16:23 TSH 3.58 FreeT4 -- T3 -- Anti TPO -- Anti Thyroglobulin Ab -- TSI --      Hemoglobin A1C, Whole Blood: 15.2 % <H> [4.0 - 5.6] (10-31-17 @ 17:08)      11-01 Chol 193 LDL Unable to calculate LDL Cholesterol --- Interpretive Comment (for adults 18 and over)  Optimal LDL Level may vary based on clinical situation  Below 70                  Ideal for people at very high risk of heart  disease  Below 100                Ideal for people at risk of heart disease  100 - 129                   [place]  130 - 159                   Borderline high  160 - 189                   High  190 and Above          Very high HDL 25<L> Trig 464<H>    Radiology:
[facility] NEPHROLOGY- CONSULTATION NOTE    52 year old male with history of below presents with hyperglycemia with new diagnosis of DM. Nephrology consulted for elevated Scr.    Patient states he has h/o proteinuria for approximately 30 years however has never been evaluated by nephrology until approximately one year ago at which point he began seeing Dr. Guero Holden. Patient states he was never advised to have a kidney biopsy but was told he may need RRT in the near future. Patient was last seen by his nephrologist last week at which point labs revealed hyperglycemia for which patient was advised to go to ER.    Patient states his baseline renal function is approximately 25%. Patient denies any h/o kidney stones, hepatitis B, C, HIV, IVDA, tattoos or prior PRBC transfusions. Patient denies any chronic NSAID use or herbal medication use. Patient denies any family h/o kidney disease.    REVIEW OF SYSTEMS:  Gen: no changes in weight  HEENT: no rhinorrhea, + polydipsia  Neck: no sore throat  Cards: no chest pain  Resp: no dyspnea  GI: no nausea or vomiting or diarrhea  : no dysuria or hematuria, + polyuria  Vascular: no LE edema  Derm: no rashes  Neuro: no numbness/tingling    No Known Allergies      Home Medications Reviewed  Hospital Medications:   MEDICATIONS  (STANDING):  calcitriol   Capsule 0.25 MICROGram(s) Oral daily  dextrose 5%. 1000 milliLiter(s) (50 mL/Hr) IV Continuous <Continuous>  dextrose 50% Injectable 12.5 Gram(s) IV Push once  dextrose 50% Injectable 25 Gram(s) IV Push once  dextrose 50% Injectable 25 Gram(s) IV Push once  febuxostat 40 milliGRAM(s) Oral daily  heparin  Injectable 5000 Unit(s) SubCutaneous every 8 hours  insulin glargine Injectable (LANTUS) 36 Unit(s) SubCutaneous at bedtime  insulin lispro (HumaLOG) corrective regimen sliding scale   SubCutaneous three times a day before meals  insulin lispro (HumaLOG) corrective regimen sliding scale   SubCutaneous at bedtime  insulin lispro Injectable (HumaLOG) 12 Unit(s) SubCutaneous three times a day before meals  NIFEdipine XL 60 milliGRAM(s) Oral daily      PAST MEDICAL & SURGICAL HISTORY:  Gout  Hypertension  Renal disease  H/O hernia repair  Injury of ankle and foot: surgically repaired  H/O shoulder surgery      FAMILY HISTORY:  Family history of MI (myocardial infarction) (Father)      SOCIAL HISTORY:  Denies toxic substance use     VITALS:  T(F): 98.4 (17 @ 06:33), Max: 98.9 (10-31-17 @ 22:53)  HR: 92 (17 @ 06:33)  BP: 142/94 (17 @ 06:33)  RR: 18 (17 @ 06:33)  SpO2: 98% (17 @ 06:33)  Wt(kg): --      Weight (kg): 154.7 (10-31 @ 13:59)    PHYSICAL EXAM:  Gen: NAD, calm  HEENT: MMM  Neck: no JVD  Cards: RRR, +S1/S2, no M/G/R  Resp: CTA B/L  GI: soft, NT/ND, NABS  : no CVA tenderness  Vascular: no LE edema B/L  Derm: no rashes  Neuro: non-focal    LABS:  10-31    134<L>  |  94<L>  |  60<H>  ----------------------------<  498<HH>  4.3   |  23  |  3.94<H>    Ca    9.0      31 Oct 2017 22:48  Phos  4.6     10-31  Mg     2.2     10-31    TPro  6.9  /  Alb  3.3  /  TBili  0.4  /  DBili      /  AST  45<H>  /  ALT  43  /  AlkPhos  83  10-31    Creatinine Trend: 3.94 <--, 4.19 <--, 4.50 <--                        12.5   10.7  )-----------( 273      ( 31 Oct 2017 14:36 )             38.0     Urine Studies:  Urinalysis Basic - ( 31 Oct 2017 17:01 )    Color: PL Yellow / Appearance: Clear / S.013 / pH:   Gluc:  / Ketone: Negative  / Bili: Negative / Urobili: Negative   Blood:  / Protein: 100 mg/dL / Nitrite: Negative   Leuk Esterase: Negative / RBC: 0-2 /HPF / WBC 0-2 /HPF   Sq Epi:  / Non Sq Epi:  / Bacteria

## 2017-11-01 NOTE — PROGRESS NOTE ADULT - SUBJECTIVE AND OBJECTIVE BOX
chief complaint : referred for high blood sugar         SUBJECTIVE / OVERNIGHT EVENTS: pt denies chest pain, shortness of breath, nausea,  v    MEDICATIONS  (STANDING):  calcitriol   Capsule 0.25 MICROGram(s) Oral daily  dextrose 5%. 1000 milliLiter(s) (50 mL/Hr) IV Continuous <Continuous>  dextrose 50% Injectable 12.5 Gram(s) IV Push once  dextrose 50% Injectable 25 Gram(s) IV Push once  dextrose 50% Injectable 25 Gram(s) IV Push once  febuxostat 40 milliGRAM(s) Oral daily  heparin  Injectable 5000 Unit(s) SubCutaneous every 8 hours  insulin glargine Injectable (LANTUS) 40 Unit(s) SubCutaneous at bedtime  insulin lispro (HumaLOG) corrective regimen sliding scale   SubCutaneous three times a day before meals  insulin lispro (HumaLOG) corrective regimen sliding scale   SubCutaneous at bedtime  insulin lispro Injectable (HumaLOG) 12 Unit(s) SubCutaneous three times a day before meals  sodium chloride 0.9%. 1000 milliLiter(s) (80 mL/Hr) IV Continuous <Continuous>    MEDICATIONS  (PRN):  dextrose Gel 1 Dose(s) Oral once PRN Blood Glucose LESS THAN 70 milliGRAM(s)/deciliter  glucagon  Injectable 1 milliGRAM(s) IntraMuscular once PRN Glucose LESS THAN 70 milligrams/deciliter    Vital Signs Last 24 Hrs  T(C): 37 (2017 21:55), Max: 37.3 (2017 17:04)  T(F): 98.6 (2017 21:55), Max: 99.1 (2017 17:04)  HR: 122 (2017 21:55) (86 - 122)  BP: 120/87 (2017 21:55) (109/75 - 164/88)  BP(mean): --  RR: 18 (2017 21:55) (18 - 18)  SpO2: 97% (2017 21:55) (96% - 98%)    Constitutional: No fever, fatigue  Skin: No rash.  Eyes: No recent vision problems or eye pain.  ENT: No congestion, ear pain, or sore throat.  Cardiovascular: No chest pain or palpation.  Respiratory: No cough, shortness of breath, congestion, or wheezing.  Gastrointestinal: No abdominal pain, nausea, vomiting, or diarrhea.  Genitourinary: No dysuria.  Musculoskeletal: No joint swelling.  Neurologic: No headache.    PHYSICAL EXAM:  GENERAL: NAD  EYES: EOMI, PERRLA  NECK: Supple, No JVD  CHEST/LUNG: dec breath sounds at bases   HEART:  S1 , S2 +  ABDOMEN: soft, bs+  EXTREMITIES: edema+   NEUROLOGY:alert awake oriented       LABS:      131<L>  |  95<L>  |  60<H>  ----------------------------<  458<HH>  4.3   |  18<L>  |  3.74<H>    Ca    9.1      2017 07:34  Phos  4.5       Mg     2.3         TPro  7.1  /  Alb  2.7<L>  /  TBili  0.4  /  DBili      /  AST  57<H>  /  ALT  45  /  AlkPhos  75      Creatinine Trend: 3.74 <--, 3.94 <--, 4.19 <--, 4.50 <--                        10.5   6.75  )-----------( 207      ( 2017 07:29 )             34.7     Urine Studies:  Urinalysis Basic - ( 31 Oct 2017 17:01 )    Color: PL Yellow / Appearance: Clear / S.013 / pH:   Gluc:  / Ketone: Negative  / Bili: Negative / Urobili: Negative   Blood:  / Protein: 100 mg/dL / Nitrite: Negative   Leuk Esterase: Negative / RBC: 0-2 /HPF / WBC 0-2 /HPF   Sq Epi:  / Non Sq Epi:  / Bacteria:         CARDIAC MARKERS ( 31 Oct 2017 22:48 )  x     / <0.01 ng/mL / 177 U/L / x     / 2.3 ng/mL  CARDIAC MARKERS ( 31 Oct 2017 14:36 )  x     / <0.01 ng/mL / x     / x     / 2.6 ng/mL        LIVER FUNCTIONS - ( 2017 07:34 )  Alb: 2.7 g/dL / Pro: 7.1 g/dL / ALK PHOS: 75 U/L / ALT: 45 U/L / AST: 57 U/L / GGT: x           PT/INR - ( 31 Oct 2017 14:36 )   PT: 11.6 sec;   INR: 1.07 ratio         PTT - ( 31 Oct 2017 14:36 )  PTT:28.8 sec

## 2017-11-02 LAB
24R-OH-CALCIDIOL SERPL-MCNC: 14.1 NG/ML — LOW (ref 30–80)
ANION GAP SERPL CALC-SCNC: 19 MMOL/L — HIGH (ref 5–17)
BUN SERPL-MCNC: 50 MG/DL — HIGH (ref 7–23)
CALCIUM SERPL-MCNC: 9.1 MG/DL — SIGNIFICANT CHANGE UP (ref 8.4–10.5)
CALCIUM SERPL-MCNC: 9.2 MG/DL — SIGNIFICANT CHANGE UP (ref 8.4–10.5)
CHLORIDE SERPL-SCNC: 100 MMOL/L — SIGNIFICANT CHANGE UP (ref 96–108)
CO2 SERPL-SCNC: 19 MMOL/L — LOW (ref 22–31)
CREAT ?TM UR-MCNC: 111 MG/DL — SIGNIFICANT CHANGE UP
CREAT SERPL-MCNC: 3.33 MG/DL — HIGH (ref 0.5–1.3)
GLUCOSE BLDC GLUCOMTR-MCNC: 207 MG/DL — HIGH (ref 70–99)
GLUCOSE BLDC GLUCOMTR-MCNC: 269 MG/DL — HIGH (ref 70–99)
GLUCOSE BLDC GLUCOMTR-MCNC: 296 MG/DL — HIGH (ref 70–99)
GLUCOSE BLDC GLUCOMTR-MCNC: 297 MG/DL — HIGH (ref 70–99)
GLUCOSE SERPL-MCNC: 271 MG/DL — HIGH (ref 70–99)
HCT VFR BLD CALC: 36 % — LOW (ref 39–50)
HGB BLD-MCNC: 11.4 G/DL — LOW (ref 13–17)
MCHC RBC-ENTMCNC: 19.7 PG — LOW (ref 27–34)
MCHC RBC-ENTMCNC: 31.7 GM/DL — LOW (ref 32–36)
MCV RBC AUTO: 62.1 FL — LOW (ref 80–100)
PHOSPHATE SERPL-MCNC: 3.1 MG/DL — SIGNIFICANT CHANGE UP (ref 2.5–4.5)
PLATELET # BLD AUTO: 228 K/UL — SIGNIFICANT CHANGE UP (ref 150–400)
POTASSIUM SERPL-MCNC: 3.7 MMOL/L — SIGNIFICANT CHANGE UP (ref 3.5–5.3)
POTASSIUM SERPL-SCNC: 3.7 MMOL/L — SIGNIFICANT CHANGE UP (ref 3.5–5.3)
PROT ?TM UR-MCNC: 253 MG/DL — HIGH (ref 0–12)
PROT/CREAT UR-RTO: 2.3 RATIO — HIGH (ref 0–0.2)
PTH-INTACT FLD-MCNC: 110 PG/ML — HIGH (ref 15–65)
RBC # BLD: 5.8 M/UL — SIGNIFICANT CHANGE UP (ref 4.2–5.8)
RBC # FLD: 13.6 % — SIGNIFICANT CHANGE UP (ref 10.3–14.5)
SODIUM SERPL-SCNC: 138 MMOL/L — SIGNIFICANT CHANGE UP (ref 135–145)
WBC # BLD: 8 K/UL — SIGNIFICANT CHANGE UP (ref 3.8–10.5)
WBC # FLD AUTO: 8 K/UL — SIGNIFICANT CHANGE UP (ref 3.8–10.5)

## 2017-11-02 PROCEDURE — 99232 SBSQ HOSP IP/OBS MODERATE 35: CPT | Mod: GC

## 2017-11-02 PROCEDURE — 93923 UPR/LXTR ART STDY 3+ LVLS: CPT | Mod: 26

## 2017-11-02 RX ORDER — INSULIN LISPRO 100/ML
14 VIAL (ML) SUBCUTANEOUS
Qty: 0 | Refills: 0 | Status: DISCONTINUED | OUTPATIENT
Start: 2017-11-02 | End: 2017-11-04

## 2017-11-02 RX ORDER — INSULIN GLARGINE 100 [IU]/ML
50 INJECTION, SOLUTION SUBCUTANEOUS AT BEDTIME
Qty: 0 | Refills: 0 | Status: DISCONTINUED | OUTPATIENT
Start: 2017-11-02 | End: 2017-11-04

## 2017-11-02 RX ORDER — NIFEDIPINE 30 MG
30 TABLET, EXTENDED RELEASE 24 HR ORAL EVERY 12 HOURS
Qty: 0 | Refills: 0 | Status: DISCONTINUED | OUTPATIENT
Start: 2017-11-02 | End: 2017-11-04

## 2017-11-02 RX ORDER — NIFEDIPINE 30 MG
60 TABLET, EXTENDED RELEASE 24 HR ORAL ONCE
Qty: 0 | Refills: 0 | Status: DISCONTINUED | OUTPATIENT
Start: 2017-11-02 | End: 2017-11-02

## 2017-11-02 RX ADMIN — Medication 2: at 12:36

## 2017-11-02 RX ADMIN — Medication 2: at 21:22

## 2017-11-02 RX ADMIN — INSULIN GLARGINE 50 UNIT(S): 100 INJECTION, SOLUTION SUBCUTANEOUS at 21:22

## 2017-11-02 RX ADMIN — Medication 2: at 08:31

## 2017-11-02 RX ADMIN — HEPARIN SODIUM 5000 UNIT(S): 5000 INJECTION INTRAVENOUS; SUBCUTANEOUS at 21:22

## 2017-11-02 RX ADMIN — CALCITRIOL 0.25 MICROGRAM(S): 0.5 CAPSULE ORAL at 12:48

## 2017-11-02 RX ADMIN — Medication 4: at 17:28

## 2017-11-02 RX ADMIN — HEPARIN SODIUM 5000 UNIT(S): 5000 INJECTION INTRAVENOUS; SUBCUTANEOUS at 14:38

## 2017-11-02 RX ADMIN — HEPARIN SODIUM 5000 UNIT(S): 5000 INJECTION INTRAVENOUS; SUBCUTANEOUS at 06:04

## 2017-11-02 RX ADMIN — Medication 12 UNIT(S): at 08:31

## 2017-11-02 RX ADMIN — Medication 14 UNIT(S): at 17:30

## 2017-11-02 RX ADMIN — Medication 12 UNIT(S): at 12:36

## 2017-11-02 RX ADMIN — Medication 30 MILLIGRAM(S): at 17:23

## 2017-11-02 NOTE — PROGRESS NOTE ADULT - PROBLEM SELECTOR PLAN 1
increase Lantus to 50U HS and increase humalog to 14 U TID plus moderate dose humalog correctional scale with meals and low dose correctional at bedtime. Discharge home on basal/bolus with diabetic supplies

## 2017-11-02 NOTE — PROGRESS NOTE ADULT - PROBLEM SELECTOR PLAN 1
While in hospital would increase Lantus to 48U HS and increase humalog to 14 U TID plus moderate dose humalog correctional scale with meals and low dose correctional at bedtime. Discharge home on basal/bolus with diabetic supplies. F/U at 5 [facility] [phone] While in hospital would increase Lantus to 50U HS and increase humalog to 14 U TID plus moderate dose humalog correctional scale with meals and low dose correctional at bedtime. Discharge home on basal/bolus with diabetic supplies. F/U at 5 [facility] [phone]

## 2017-11-02 NOTE — PROGRESS NOTE ADULT - SUBJECTIVE AND OBJECTIVE BOX
Chief Complaint: Evaluating this 52 yr M for newly diagnosed T2 DM    History: Patient continues to be hyperglycemic in 200s.    MEDICATIONS  (STANDING):  calcitriol   Capsule 0.25 MICROGram(s) Oral daily  dextrose 5%. 1000 milliLiter(s) (50 mL/Hr) IV Continuous <Continuous>  dextrose 50% Injectable 12.5 Gram(s) IV Push once  dextrose 50% Injectable 25 Gram(s) IV Push once  dextrose 50% Injectable 25 Gram(s) IV Push once  febuxostat 40 milliGRAM(s) Oral daily  heparin  Injectable 5000 Unit(s) SubCutaneous every 8 hours  insulin glargine Injectable (LANTUS) 40 Unit(s) SubCutaneous at bedtime  insulin lispro (HumaLOG) corrective regimen sliding scale   SubCutaneous three times a day before meals  insulin lispro (HumaLOG) corrective regimen sliding scale   SubCutaneous at bedtime  insulin lispro Injectable (HumaLOG) 12 Unit(s) SubCutaneous three times a day before meals  NIFEdipine XL 60 milliGRAM(s) Oral once    MEDICATIONS  (PRN):  dextrose Gel 1 Dose(s) Oral once PRN Blood Glucose LESS THAN 70 milliGRAM(s)/deciliter  glucagon  Injectable 1 milliGRAM(s) IntraMuscular once PRN Glucose LESS THAN 70 milligrams/deciliter      Allergies    No Known Allergies    Intolerances      Review of Systems:  Constitutional: No fever  Eyes: No blurry vision  Neuro: No tremors  HEENT: No pain  Cardiovascular: No chest pain, palpitations  Respiratory: No SOB, no cough  GI: No nausea, vomiting, abdominal pain  : No dysuria  Skin: no rash  Psych: no depression  Endocrine: +polyuria, polydipsia  Hem/lymph: no swelling  Osteoporosis: no fractures    ALL OTHER SYSTEMS REVIEWED AND NEGATIVE        PHYSICAL EXAM:  VITALS: T(C): 37.1 (11-02-17 @ 15:57)  T(F): 98.8 (11-02-17 @ 15:57), Max: 99.1 (11-01-17 @ 17:04)  HR: 114 (11-02-17 @ 15:57) (86 - 122)  BP: 137/84 (11-02-17 @ 15:57) (120/77 - 164/88)  RR:  (18 - 20)  SpO2:  (96% - 98%)  Wt(kg): --  GENERAL: NAD  EYES: No proptosis, no lid lag, anicteric  HEENT:  Atraumatic, Normocephalic, moist mucous membranes  THYROID: Normal size, no palpable nodules  RESPIRATORY: Clear to auscultation bilaterally; No rales, rhonchi, wheezing, or rubs  CARDIOVASCULAR: Regular rate and rhythm; No murmurs; no peripheral edema  GI: Soft, nontender, non distended, normal bowel sounds  SKIN: Dry, intact, No rashes or lesions  MUSCULOSKELETAL: Full range of motion, normal strength  NEURO: sensation intact, extraocular movements intact, no tremor, normal reflexes  PSYCH: Alert and oriented x 3, normal affect, normal mood      POCT Blood Glucose.: 296 mg/dL (11-02-17 @ 11:58) H12+2  POCT Blood Glucose.: 269 mg/dL (11-02-17 @ 08:03) H12+2  POCT Blood Glucose.: 350 mg/dL (11-01-17 @ 20:55) L40  POCT Blood Glucose.: 365 mg/dL (11-01-17 @ 17:01)H12+10  POCT Blood Glucose.: 346 mg/dL (11-01-17 @ 15:30)  POCT Blood Glucose.: 409 mg/dL (11-01-17 @ 13:12)  POCT Blood Glucose.: 446 mg/dL (11-01-17 @ 12:18)  POCT Blood Glucose.: 432 mg/dL (11-01-17 @ 09:23)  POCT Blood Glucose.: 395 mg/dL (11-01-17 @ 06:01)  POCT Blood Glucose.: 560 mg/dL (11-01-17 @ 01:10)  POCT Blood Glucose.: 551 mg/dL (11-01-17 @ 01:01)  POCT Blood Glucose.: 454 mg/dL (10-31-17 @ 23:00)  POCT Blood Glucose.: 513 mg/dL (10-31-17 @ 17:48)  POCT Blood Glucose.: 586 mg/dL (10-31-17 @ 16:26)  POCT Blood Glucose.: >600 mg/dL (10-31-17 @ 14:14)  POCT Blood Glucose.: >600 mg/dL (10-31-17 @ 14:14)  POCT Blood Glucose.: >600 mg/dL (10-31-17 @ 13:57)      11-02    138  |  100  |  50<H>  ----------------------------<  271<H>  3.7   |  19<L>  |  3.33<H>    EGFR if : 23<L>  EGFR if non : 20<L>    Ca    9.1      11-02  Mg     2.3     11-01  Phos  3.1     11-02    TPro  7.1  /  Alb  2.7<L>  /  TBili  0.4  /  DBili  x   /  AST  57<H>  /  ALT  45  /  AlkPhos  75  11-01          Thyroid Function Tests:  10-31 @ 16:23 TSH 3.58 FreeT4 -- T3 -- Anti TPO -- Anti Thyroglobulin Ab -- TSI --      Hemoglobin A1C, Whole Blood: 15.2 % <H> [4.0 - 5.6] (10-31-17 @ 17:08) Chief Complaint: Evaluating this 52 yr M for newly diagnosed T2 DM    History: Patient continues to be hyperglycemic in 200s.    MEDICATIONS  (STANDING):  calcitriol   Capsule 0.25 MICROGram(s) Oral daily  dextrose 5%. 1000 milliLiter(s) (50 mL/Hr) IV Continuous <Continuous>  dextrose 50% Injectable 12.5 Gram(s) IV Push once  dextrose 50% Injectable 25 Gram(s) IV Push once  dextrose 50% Injectable 25 Gram(s) IV Push once  febuxostat 40 milliGRAM(s) Oral daily  heparin  Injectable 5000 Unit(s) SubCutaneous every 8 hours  insulin glargine Injectable (LANTUS) 40 Unit(s) SubCutaneous at bedtime  insulin lispro (HumaLOG) corrective regimen sliding scale   SubCutaneous three times a day before meals  insulin lispro (HumaLOG) corrective regimen sliding scale   SubCutaneous at bedtime  insulin lispro Injectable (HumaLOG) 12 Unit(s) SubCutaneous three times a day before meals  NIFEdipine XL 60 milliGRAM(s) Oral once    MEDICATIONS  (PRN):  dextrose Gel 1 Dose(s) Oral once PRN Blood Glucose LESS THAN 70 milliGRAM(s)/deciliter  glucagon  Injectable 1 milliGRAM(s) IntraMuscular once PRN Glucose LESS THAN 70 milligrams/deciliter      Allergies    No Known Allergies    Intolerances      Review of Systems:  Constitutional: No fever  Eyes: No blurry vision  Neuro: No tremors  HEENT: No pain  Cardiovascular: No chest pain, palpitations  Respiratory: No SOB, no cough  GI: No nausea, vomiting, abdominal pain  : No dysuria  Skin: no rash  Psych: no depression  Endocrine: +polyuria, polydipsia  Hem/lymph: no swelling  Osteoporosis: no fractures    ALL OTHER SYSTEMS REVIEWED AND NEGATIVE        PHYSICAL EXAM:  VITALS: T(C): 37.1 (11-02-17 @ 15:57)  T(F): 98.8 (11-02-17 @ 15:57), Max: 99.1 (11-01-17 @ 17:04)  HR: 114 (11-02-17 @ 15:57) (86 - 122)  BP: 137/84 (11-02-17 @ 15:57) (120/77 - 164/88)  RR:  (18 - 20)  SpO2:  (96% - 98%)  Wt(kg): --  GENERAL: NAD  EYES: No proptosis, no lid lag, anicteric  HEENT:  Atraumatic, Normocephalic, moist mucous membranes  THYROID: Normal size, no palpable nodules  RESPIRATORY: Clear to auscultation bilaterally; No rales, rhonchi, wheezing, or rubs  CARDIOVASCULAR: Regular rate and rhythm; No murmurs; no peripheral edema  GI: Soft, nontender, non distended, normal bowel sounds  SKIN: Dry, intact, No rashes or lesions  MUSCULOSKELETAL: Full range of motion, normal strength  NEURO: sensation intact, extraocular movements intact, no tremor, normal reflexes  PSYCH: Alert and oriented x 3, normal affect, normal mood      POCT Blood Glucose.: 296 mg/dL (11-02-17 @ 11:58) H12+2  POCT Blood Glucose.: 269 mg/dL (11-02-17 @ 08:03) H12+2  POCT Blood Glucose.: 350 mg/dL (11-01-17 @ 20:55) L40 + H4  POCT Blood Glucose.: 365 mg/dL (11-01-17 @ 17:01)H12+10  POCT Blood Glucose.: 346 mg/dL (11-01-17 @ 15:30)  POCT Blood Glucose.: 409 mg/dL (11-01-17 @ 13:12)  POCT Blood Glucose.: 446 mg/dL (11-01-17 @ 12:18)  POCT Blood Glucose.: 432 mg/dL (11-01-17 @ 09:23)  POCT Blood Glucose.: 395 mg/dL (11-01-17 @ 06:01)  POCT Blood Glucose.: 560 mg/dL (11-01-17 @ 01:10)  POCT Blood Glucose.: 551 mg/dL (11-01-17 @ 01:01)  POCT Blood Glucose.: 454 mg/dL (10-31-17 @ 23:00)  POCT Blood Glucose.: 513 mg/dL (10-31-17 @ 17:48)  POCT Blood Glucose.: 586 mg/dL (10-31-17 @ 16:26)  POCT Blood Glucose.: >600 mg/dL (10-31-17 @ 14:14)  POCT Blood Glucose.: >600 mg/dL (10-31-17 @ 14:14)  POCT Blood Glucose.: >600 mg/dL (10-31-17 @ 13:57)      11-02    138  |  100  |  50<H>  ----------------------------<  271<H>  3.7   |  19<L>  |  3.33<H>    EGFR if : 23<L>  EGFR if non : 20<L>    Ca    9.1      11-02  Mg     2.3     11-01  Phos  3.1     11-02    TPro  7.1  /  Alb  2.7<L>  /  TBili  0.4  /  DBili  x   /  AST  57<H>  /  ALT  45  /  AlkPhos  75  11-01          Thyroid Function Tests:  10-31 @ 16:23 TSH 3.58 FreeT4 -- T3 -- Anti TPO -- Anti Thyroglobulin Ab -- TSI --      Hemoglobin A1C, Whole Blood: 15.2 % <H> [4.0 - 5.6] (10-31-17 @ 17:08)

## 2017-11-02 NOTE — PROGRESS NOTE ADULT - PROBLEM SELECTOR PLAN 1
Patient with SKY likely hemodynamically mediated secondary to hypovolemia given osmotic diuresis from hyperglycemia and poor PO intake PTA. Scr improving with IVF. Discontinue IVF given mild LE edema and elevated blood pressure. Avoid nephrotoxins.

## 2017-11-02 NOTE — PROGRESS NOTE ADULT - SUBJECTIVE AND OBJECTIVE BOX
[facility] NEPHROLOGY- PROGRESS NOTE    52 year old male with history of hypertension presents with hyperglycemia with new diagnosis of DM. Nephrology consulted for elevated Scr.    REVIEW OF SYSTEMS:  Gen: no changes in weight  Cards: no chest pain  Resp: no dyspnea  GI: no nausea or vomiting or diarrhea  Vascular: no LE edema    No Known Allergies      Hospital Medications: Medications reviewed    VITALS:  T(F): 98.8 (17 @ 15:57), Max: 99.1 (17 @ 17:04)  HR: 114 (17 @ 15:57)  BP: 137/84 (17 @ 15:57)  RR: 20 (17 15:57)  SpO2: 98% (17 @ 15:57)  Wt(kg): --  Height (cm): 175.26 (:)  Weight (kg): 157 (:)  BMI (kg/m2): 51.1 (:)  BSA (m2): 2.61 (:01)     @ 07:01  -   @ 07:00  --------------------------------------------------------  IN: 320 mL / OUT: 0 mL / NET: 320 mL     @ 07:01  -   @ 16:47  --------------------------------------------------------  IN: 600 mL / OUT: 0 mL / NET: 600 mL        PHYSICAL EXAM:    Gen: NAD, calm  Cards: RRR, +S1/S2, no M/G/R  Resp: CTA B/L  GI: soft, NT/ND, NABS  Vascular: trace LE edema B/L    LABS:      138  |  100  |  50<H>  ----------------------------<  271<H>  3.7   |  19<L>  |  3.33<H>    Ca    9.1      2017 09:50  Phos  3.1       Mg     2.3         TPro  7.1  /  Alb  2.7<L>  /  TBili  0.4  /  DBili      /  AST  57<H>  /  ALT  45  /  AlkPhos  75      Creatinine Trend: 3.33 <--, 3.74 <--, 3.94 <--, 4.19 <--, 4.50 <--                        11.4   8.0   )-----------( 228      ( 2017 09:50 )             36.0     Urine Studies:  Urinalysis Basic - ( 31 Oct 2017 17:01 )    Color: PL Yellow / Appearance: Clear / S.013 / pH:   Gluc:  / Ketone: Negative  / Bili: Negative / Urobili: Negative   Blood:  / Protein: 100 mg/dL / Nitrite: Negative   Leuk Esterase: Negative / RBC: 0-2 /HPF / WBC 0-2 /HPF   Sq Epi:  / Non Sq Epi:  / Bacteria:       Creatinine, Random Urine: 111 mg/dL ( @ 10:37)  Protein/Creatinine Ratio Calculation: 2.3 Ratio ( @ 10:37)

## 2017-11-02 NOTE — PROGRESS NOTE ADULT - SUBJECTIVE AND OBJECTIVE BOX
Patient is a 52y old  Male who presents with a chief complaint of tachycardia, high blood sugar (31 Oct 2017 21:49)      INTERVAL HISTORY: No chest pain, SOB, paralysis, fevers, vomiting  	  MEDICATIONS:  NIFEdipine XL 60 milliGRAM(s) Oral once        PHYSICAL EXAM:  T(C): 37.1 (11-02-17 @ 08:11), Max: 37.3 (11-01-17 @ 17:04)  HR: 93 (11-02-17 @ 08:11) (86 - 122)  BP: 156/99 (11-02-17 @ 12:40) (120/77 - 164/88)  RR: 20 (11-02-17 @ 08:11) (18 - 20)  SpO2: 96% (11-02-17 @ 08:11) (96% - 97%)  Wt(kg): --  I&O's Summary    01 Nov 2017 07:01  -  02 Nov 2017 07:00  --------------------------------------------------------  IN: 320 mL / OUT: 0 mL / NET: 320 mL    02 Nov 2017 07:01  -  02 Nov 2017 15:52  --------------------------------------------------------  IN: 600 mL / OUT: 0 mL / NET: 600 mL          Appearance: Normal	  HEENT:    PERRL, EOMI	  Lymphatic: No lymphadenopathy  Cardiovascular: Normal S1 S2, No JVD  Respiratory: Lungs clear to auscultation	  Psychiatry: Alert  Gastrointestinal:  Soft, Non-tender, + BS	  Skin: No rashes, No cyanosis  Extremities:  No edema of LE  Vascular: Peripheral pulses palpable  bilaterally      CARDIAC MARKERS:                                  11.4   8.0   )-----------( 228      ( 02 Nov 2017 09:50 )             36.0     11-02    138  |  100  |  50<H>  ----------------------------<  271<H>  3.7   |  19<L>  |  3.33<H>    Ca    9.1      02 Nov 2017 09:50  Phos  3.1     11-02  Mg     2.3     11-01    TPro  7.1  /  Alb  2.7<L>  /  TBili  0.4  /  DBili  x   /  AST  57<H>  /  ALT  45  /  AlkPhos  75  11-01    proBNP:   Lipid Profile:   HgA1c:   TSH:     Labs, imaging and telemetry personally reviewed      ASSESSMENT/PLAN: 	  52 M PMH HTN, CKD, gout, p/w few weeks of ongoing fatigue/SOB with little activity.  +increased thirst, +increased urination, +mouth/skin dryness, +HA, +abd discomfort with any PO intake found to have BS >600 with no DKA  1. HTN -Not well controlled on Nifedipine 60mg daily (30mg BID), will increase to 90mg daily  - would benefit from ACE in the long run given diabetic and renal disease.     2. HLD - Initiate Lipitor 40mg for primary prevention    3. SOB - TTE to evaluate for systolic/diastolic HF  - off diuretics at this time and tolerating IVF well    4. DM - Endo follow up appreciated. Lipitor and ACE when safe from renal standpoint    5. CKD - appears at about baseline    Discussed with primary team.         Kevin Isbell DO [facility]  Cardiovascular Medicine  [phone]

## 2017-11-02 NOTE — PROGRESS NOTE ADULT - SUBJECTIVE AND OBJECTIVE BOX
chief complaint : referred for high blood sugar         SUBJECTIVE / OVERNIGHT EVENTS: pt denies chest pain, shortness of breath, nausea,  v    MEDICATIONS  (STANDING):  calcitriol   Capsule 0.25 MICROGram(s) Oral daily  dextrose 5%. 1000 milliLiter(s) (50 mL/Hr) IV Continuous <Continuous>  dextrose 50% Injectable 12.5 Gram(s) IV Push once  dextrose 50% Injectable 25 Gram(s) IV Push once  dextrose 50% Injectable 25 Gram(s) IV Push once  febuxostat 40 milliGRAM(s) Oral daily  heparin  Injectable 5000 Unit(s) SubCutaneous every 8 hours  insulin glargine Injectable (LANTUS) 50 Unit(s) SubCutaneous at bedtime  insulin lispro (HumaLOG) corrective regimen sliding scale   SubCutaneous three times a day before meals  insulin lispro (HumaLOG) corrective regimen sliding scale   SubCutaneous at bedtime  insulin lispro Injectable (HumaLOG) 14 Unit(s) SubCutaneous three times a day before meals  NIFEdipine XL 30 milliGRAM(s) Oral every 12 hours    MEDICATIONS  (PRN):  dextrose Gel 1 Dose(s) Oral once PRN Blood Glucose LESS THAN 70 milliGRAM(s)/deciliter  glucagon  Injectable 1 milliGRAM(s) IntraMuscular once PRN Glucose LESS THAN 70 milligrams/deciliter      Vital Signs Last 24 Hrs  T(C): 37.1 (2017 21:16), Max: 37.1 (2017 08:11)  T(F): 98.7 (2017 21:16), Max: 98.8 (2017 15:57)  HR: 98 (2017 21:16) (93 - 122)  BP: 127/74 (2017 21:16) (120/87 - 156/99)  BP(mean): --  RR: 20 (2017 21:16) (18 - 20)  SpO2: 96% (2017 21:16) (96% - 98%)    Constitutional: No fever, fatigue  Skin: No rash.  Eyes: No recent vision problems or eye pain.  ENT: No congestion, ear pain, or sore throat.  Cardiovascular: No chest pain or palpation.  Respiratory: No cough, shortness of breath, congestion, or wheezing.  Gastrointestinal: No abdominal pain, nausea, vomiting, or diarrhea.  Genitourinary: No dysuria.  Musculoskeletal: No joint swelling.  Neurologic: No headache.    PHYSICAL EXAM:  GENERAL: NAD  EYES: EOMI, PERRLA  NECK: Supple, No JVD  CHEST/LUNG: dec breath sounds at bases   HEART:  S1 , S2 +  ABDOMEN: soft, bs+  EXTREMITIES: edema+   NEUROLOGY:alert awake oriented          LABS:      138  |  100  |  50<H>  ----------------------------<  271<H>  3.7   |  19<L>  |  3.33<H>    Ca    9.1      2017 09:50  Phos  3.1       Mg     2.3         TPro  7.1  /  Alb  2.7<L>  /  TBili  0.4  /  DBili      /  AST  57<H>  /  ALT  45  /  AlkPhos  75      Creatinine Trend: 3.33 <--, 3.74 <--, 3.94 <--, 4.19 <--, 4.50 <--                        11.4   8.0   )-----------( 228      ( 2017 09:50 )             36.0     Urine Studies:  Urinalysis Basic - ( 31 Oct 2017 17:01 )    Color: PL Yellow / Appearance: Clear / S.013 / pH:   Gluc:  / Ketone: Negative  / Bili: Negative / Urobili: Negative   Blood:  / Protein: 100 mg/dL / Nitrite: Negative   Leuk Esterase: Negative / RBC: 0-2 /HPF / WBC 0-2 /HPF   Sq Epi:  / Non Sq Epi:  / Bacteria:       Creatinine, Random Urine: 111 mg/dL ( @ 10:37)  Protein/Creatinine Ratio Calculation: 2.3 Ratio ( @ 10:37)    CARDIAC MARKERS ( 31 Oct 2017 22:48 )  x     / <0.01 ng/mL / 177 U/L / x     / 2.3 ng/mL        LIVER FUNCTIONS - ( 2017 07:34 )  Alb: 2.7 g/dL / Pro: 7.1 g/dL / ALK PHOS: 75 U/L / ALT: 45 U/L / AST: 57 U/L / GGT: x             PTT - ( 31 Oct 2017 14:36 )  PTT:28.8 sec

## 2017-11-03 ENCOUNTER — TRANSCRIPTION ENCOUNTER (OUTPATIENT)
Age: 52
End: 2017-11-03

## 2017-11-03 DIAGNOSIS — E87.2 ACIDOSIS: ICD-10-CM

## 2017-11-03 PROBLEM — M10.9 GOUT, UNSPECIFIED: Chronic | Status: ACTIVE | Noted: 2017-10-31

## 2017-11-03 PROBLEM — I10 ESSENTIAL (PRIMARY) HYPERTENSION: Chronic | Status: ACTIVE | Noted: 2017-10-31

## 2017-11-03 LAB
ANION GAP SERPL CALC-SCNC: 18 MMOL/L — HIGH (ref 5–17)
BUN SERPL-MCNC: 46 MG/DL — HIGH (ref 7–23)
CALCIUM SERPL-MCNC: 8.9 MG/DL — SIGNIFICANT CHANGE UP (ref 8.4–10.5)
CHLORIDE SERPL-SCNC: 102 MMOL/L — SIGNIFICANT CHANGE UP (ref 96–108)
CO2 SERPL-SCNC: 19 MMOL/L — LOW (ref 22–31)
CREAT SERPL-MCNC: 3.27 MG/DL — HIGH (ref 0.5–1.3)
GLUCOSE BLDC GLUCOMTR-MCNC: 247 MG/DL — HIGH (ref 70–99)
GLUCOSE BLDC GLUCOMTR-MCNC: 258 MG/DL — HIGH (ref 70–99)
GLUCOSE BLDC GLUCOMTR-MCNC: 268 MG/DL — HIGH (ref 70–99)
GLUCOSE BLDC GLUCOMTR-MCNC: 327 MG/DL — HIGH (ref 70–99)
GLUCOSE SERPL-MCNC: 258 MG/DL — HIGH (ref 70–99)
POTASSIUM SERPL-MCNC: 3.9 MMOL/L — SIGNIFICANT CHANGE UP (ref 3.5–5.3)
POTASSIUM SERPL-SCNC: 3.9 MMOL/L — SIGNIFICANT CHANGE UP (ref 3.5–5.3)
SODIUM SERPL-SCNC: 139 MMOL/L — SIGNIFICANT CHANGE UP (ref 135–145)

## 2017-11-03 RX ORDER — ERGOCALCIFEROL 1.25 MG/1
1 CAPSULE ORAL
Qty: 11 | Refills: 0
Start: 2017-11-03 | End: 2017-11-14

## 2017-11-03 RX ORDER — ERGOCALCIFEROL 1.25 MG/1
50000 CAPSULE ORAL
Qty: 0 | Refills: 0 | Status: DISCONTINUED | OUTPATIENT
Start: 2017-11-03 | End: 2017-11-04

## 2017-11-03 RX ORDER — SODIUM BICARBONATE 1 MEQ/ML
1 SYRINGE (ML) INTRAVENOUS
Qty: 60 | Refills: 0
Start: 2017-11-03 | End: 2017-12-03

## 2017-11-03 RX ORDER — INSULIN GLARGINE 100 [IU]/ML
50 INJECTION, SOLUTION SUBCUTANEOUS
Qty: 1 | Refills: 0
Start: 2017-11-03 | End: 2017-12-03

## 2017-11-03 RX ORDER — INSULIN GLULISINE 100 [IU]/ML
18 INJECTION, SOLUTION SUBCUTANEOUS
Qty: 1 | Refills: 0
Start: 2017-11-03 | End: 2017-12-03

## 2017-11-03 RX ORDER — SODIUM BICARBONATE 1 MEQ/ML
650 SYRINGE (ML) INTRAVENOUS
Qty: 0 | Refills: 0 | Status: DISCONTINUED | OUTPATIENT
Start: 2017-11-03 | End: 2017-11-04

## 2017-11-03 RX ORDER — INSULIN LISPRO 100/ML
18 VIAL (ML) SUBCUTANEOUS
Qty: 1 | Refills: 0 | OUTPATIENT
Start: 2017-11-03

## 2017-11-03 RX ADMIN — ERGOCALCIFEROL 50000 UNIT(S): 1.25 CAPSULE ORAL at 13:01

## 2017-11-03 RX ADMIN — Medication 6: at 17:21

## 2017-11-03 RX ADMIN — Medication 14 UNIT(S): at 08:40

## 2017-11-03 RX ADMIN — Medication 14 UNIT(S): at 17:21

## 2017-11-03 RX ADMIN — Medication 8: at 12:58

## 2017-11-03 RX ADMIN — Medication 30 MILLIGRAM(S): at 05:38

## 2017-11-03 RX ADMIN — HEPARIN SODIUM 5000 UNIT(S): 5000 INJECTION INTRAVENOUS; SUBCUTANEOUS at 13:02

## 2017-11-03 RX ADMIN — HEPARIN SODIUM 5000 UNIT(S): 5000 INJECTION INTRAVENOUS; SUBCUTANEOUS at 05:38

## 2017-11-03 RX ADMIN — Medication 650 MILLIGRAM(S): at 17:21

## 2017-11-03 RX ADMIN — FEBUXOSTAT 40 MILLIGRAM(S): 40 TABLET ORAL at 13:01

## 2017-11-03 RX ADMIN — INSULIN GLARGINE 50 UNIT(S): 100 INJECTION, SOLUTION SUBCUTANEOUS at 22:49

## 2017-11-03 RX ADMIN — CALCITRIOL 0.25 MICROGRAM(S): 0.5 CAPSULE ORAL at 13:01

## 2017-11-03 RX ADMIN — HEPARIN SODIUM 5000 UNIT(S): 5000 INJECTION INTRAVENOUS; SUBCUTANEOUS at 22:50

## 2017-11-03 RX ADMIN — Medication 2: at 22:50

## 2017-11-03 RX ADMIN — Medication 14 UNIT(S): at 12:58

## 2017-11-03 RX ADMIN — Medication 4: at 08:41

## 2017-11-03 RX ADMIN — Medication 30 MILLIGRAM(S): at 17:21

## 2017-11-03 NOTE — DISCHARGE NOTE ADULT - SECONDARY DIAGNOSIS.
Acute renal failure superimposed on stage 4 chronic kidney disease, unspecified acute renal failure type Essential hypertension Gout, unspecified cause, unspecified chronicity, unspecified site Hyperlipidemia, unspecified hyperlipidemia type

## 2017-11-03 NOTE — DIETITIAN INITIAL EVALUATION ADULT. - NS AS NUTRI INTERV ED CONTENT
Discussed diet education at length, w/ emphasis on which foods contain carbohydrates, portion sizes, portion control especially of carbohydrate rich foods, combining protein and carbohydrates at meal and snack times, and need for consistency in carbohydrate intake. Encouraged checking blood glucose levels and checking randomly at times 2 hours after the meal time to determine how different foods affect blood glucose levels. Discussed how to modify previous diet to better control blood glucose levels. Provided written material on: Type 2 DM Nutrition Therapy, Eating Out, Snacks, Low Blood Glucose Levels & Staying Healthy w/ DM. Also discussed Potassium and Phosphorus Content List. Discussed diet education at length, w/ emphasis on which foods contain carbohydrates, portion sizes, portion control especially of carbohydrate rich foods, combining protein and carbohydrates at meal and snack times, and need for consistency in carbohydrate intake. Encouraged checking blood glucose levels and checking randomly at times 2 hours after the meal time to determine how different foods affect blood glucose levels. Discussed how to modify previous diet to better control blood glucose levels. Provided written material on: Type 2 DM Nutrition Therapy, Eating Out, Snacks, Low Blood Glucose Levels & Staying Healthy w/ DM. Also discussed Potassium and Phosphorus Content List. Discussed Heart Healthy Nutrition Therapy as well.

## 2017-11-03 NOTE — PROGRESS NOTE ADULT - PROBLEM SELECTOR PLAN 1
Patient with SKY likely hemodynamically mediated secondary to hypovolemia given osmotic diuresis from hyperglycemia and poor PO intake PTA. Scr overall improved and likely near baseline. Avoid nephrotoxins.

## 2017-11-03 NOTE — DIETITIAN INITIAL EVALUATION ADULT. - OTHER INFO
pt seen for consult for education. pt reports he weighed about 341 pounds PTA and on admission was about 345 pounds. Reports NKFA. States no micronutrient coverage at home. pt reports not having any previous knowledge about how diet affects DM and never having diagnosis of pre-DM either. pt open to diet education, preferred for mostly written education. Able to verbalize some points and stated he will use the written material as his guide for when he is home.

## 2017-11-03 NOTE — PROGRESS NOTE ADULT - SUBJECTIVE AND OBJECTIVE BOX
chief complaint : referred for high blood sugar         SUBJECTIVE / OVERNIGHT EVENTS: pt denies chest pain, shortness of breath, nausea,  v    MEDICATIONS  (STANDING):  calcitriol   Capsule 0.25 MICROGram(s) Oral daily  dextrose 5%. 1000 milliLiter(s) (50 mL/Hr) IV Continuous <Continuous>  dextrose 50% Injectable 12.5 Gram(s) IV Push once  dextrose 50% Injectable 25 Gram(s) IV Push once  dextrose 50% Injectable 25 Gram(s) IV Push once  ergocalciferol 40303 Unit(s) Oral every week  febuxostat 40 milliGRAM(s) Oral daily  heparin  Injectable 5000 Unit(s) SubCutaneous every 8 hours  insulin glargine Injectable (LANTUS) 50 Unit(s) SubCutaneous at bedtime  insulin lispro (HumaLOG) corrective regimen sliding scale   SubCutaneous three times a day before meals  insulin lispro (HumaLOG) corrective regimen sliding scale   SubCutaneous at bedtime  insulin lispro Injectable (HumaLOG) 14 Unit(s) SubCutaneous three times a day before meals  NIFEdipine XL 30 milliGRAM(s) Oral every 12 hours  sodium bicarbonate 650 milliGRAM(s) Oral two times a day    MEDICATIONS  (PRN):  dextrose Gel 1 Dose(s) Oral once PRN Blood Glucose LESS THAN 70 milliGRAM(s)/deciliter  glucagon  Injectable 1 milliGRAM(s) IntraMuscular once PRN Glucose LESS THAN 70 milligrams/deciliter      Vital Signs Last 24 Hrs  T(C): 37.1 (2017 16:40), Max: 37.1 (2017 21:16)  T(F): 98.7 (2017 16:40), Max: 98.7 (2017 21:16)  HR: 95 (2017 16:40) (95 - 107)  BP: 129/89 (2017 16:40) (127/74 - 138/93)  BP(mean): --  RR: 18 (2017 16:40) (18 - 20)  SpO2: 98% (2017 16:40) (96% - 98%)    Constitutional: No fever, fatigue  Skin: No rash.  Eyes: No recent vision problems or eye pain.  ENT: No congestion, ear pain, or sore throat.  Cardiovascular: No chest pain or palpation.  Respiratory: No cough, shortness of breath, congestion, or wheezing.  Gastrointestinal: No abdominal pain, nausea, vomiting, or diarrhea.  Genitourinary: No dysuria.  Musculoskeletal: No joint swelling.  Neurologic: No headache.    PHYSICAL EXAM:  GENERAL: NAD  EYES: EOMI, PERRLA  NECK: Supple, No JVD  CHEST/LUNG: dec breath sounds at bases   HEART:  S1 , S2 +  ABDOMEN: soft, bs+  EXTREMITIES: edema+   NEUROLOGY:alert awake oriented     LABS:      139  |  102  |  46<H>  ----------------------------<  258<H>  3.9   |  19<L>  |  3.27<H>    Ca    8.9      2017 08:51  Phos  3.1           Creatinine Trend: 3.27 <--, 3.33 <--, 3.74 <--, 3.94 <--, 4.19 <--, 4.50 <--                        11.4   8.0   )-----------( 228      ( 2017 09:50 )             36.0     Urine Studies:  Urinalysis Basic - ( 31 Oct 2017 17:01 )    Color: PL Yellow / Appearance: Clear / S.013 / pH:   Gluc:  / Ketone: Negative  / Bili: Negative / Urobili: Negative   Blood:  / Protein: 100 mg/dL / Nitrite: Negative   Leuk Esterase: Negative / RBC: 0-2 /HPF / WBC 0-2 /HPF   Sq Epi:  / Non Sq Epi:  / Bacteria:       Creatinine, Random Urine: 111 mg/dL ( @ 10:37)  Protein/Creatinine Ratio Calculation: 2.3 Ratio ( @ 10:37)                LIVER FUNCTIONS - ( 2017 07:34 )  Alb: 2.7 g/dL / Pro: 7.1 g/dL / ALK PHOS: 75 U/L / ALT: 45 U/L / AST: 57 U/L / GGT: x             PTT - ( 31 Oct 2017 14:36 )  PTT:28.8 sec

## 2017-11-03 NOTE — DISCHARGE NOTE ADULT - INSTRUCTIONS
consistent carbohydrates, low sodium and cholesterol pt given discharge instructions diabetic teaching verbalises understanding of the same

## 2017-11-03 NOTE — DIETITIAN INITIAL EVALUATION ADULT. - NS FNS WEIGHT USED FOR CALC
346.1 pounds; IBW for prot needs/admission admission/346.1 pounds; IBW for prot needs; defer fluid needs to team at this time

## 2017-11-03 NOTE — DIETITIAN INITIAL EVALUATION ADULT. - ORAL INTAKE PTA
pt reports he was getting uncomfortable after eating for the last one month so he was not eating as much; reports prior to the last month he usually consumes 2-3 meals daily; usual intake: breakfast: eggs and cereal, at times toast and eggs; lunch: could be a sandwich or left overs; dinner: protein, vegetables, starch; reports he usually consumes water, milk and tea c meals, avoids sodas, drinks juice occasionally/fair

## 2017-11-03 NOTE — DIETITIAN INITIAL EVALUATION ADULT. - DIET TYPE
renal replacement pts:no protein restr,no conc K & phos, low sodium/consistent carbohydrate (no snacks)/DASH/TLC (sodium and cholesterol restricted diet)

## 2017-11-03 NOTE — PROGRESS NOTE ADULT - PROBLEM SELECTOR PLAN 7
-improving , likely sec to intravascular volume depletion

## 2017-11-03 NOTE — DISCHARGE NOTE ADULT - ADDITIONAL INSTRUCTIONS
follow up with your PMD  follow up with the endocrinologist at [address] [place], [place] [phone]- You have an appointment with Dr. Kenya Hoover on November 16th at 10:15am , If you are unable to make this appointment , please call to reschedule.   follow up with your Nephrologist

## 2017-11-03 NOTE — DISCHARGE NOTE ADULT - CARE PLAN
Principal Discharge DX:	Type 2 diabetes mellitus with hyperglycemia, without long-term current use of insulin  Goal:	hyperglycemia improving  Instructions for follow-up, activity and diet:	new diagnosis -   HgA1C this admission. HgbA1C- 15.2- 10/31/17   Make sure you get your HgA1c checked every three months.  If you take oral diabetes medications, check your blood glucose two times a day.  If you take insulin, check your blood glucose before meals and at bedtime.  It's important not to skip any meals.  Keep a log of your blood glucose results and always take it with you to your doctor appointments.  Keep a list of your current medications including injectables and over the counter medications and bring this medication list with you to all your doctor appointments.  If you have not seen your ophthalmologist this year call for appointment.  Check your feet daily for redness, sores, or openings. Do not self treat. If no improvement in two days call your primary care physician for an appointment.  Low blood sugar (hypoglycemia) is a blood sugar below 70mg/dl. Check your blood sugar if you feel signs/symptoms of hypoglycemia. If your blood sugar is below 70 take 15 grams of carbohydrates (ex 4 oz of apple juice, 3-4 glucose tablets, or 4-6 oz of regular soda) wait 15 minutes and repeat blood sugar to make sure it comes up above 70.  If your blood sugar is above 70 and you are due for a meal, have a meal.  If you are not due for a meal have a snack.  This snack helps keeps your blood sugar at a safe range.  Secondary Diagnosis:	Acute renal failure superimposed on stage 4 chronic kidney disease, unspecified acute renal failure type  Instructions for follow-up, activity and diet:	follow up with your Nephrologist and monitor your Cr  Secondary Diagnosis:	Essential hypertension  Instructions for follow-up, activity and diet:	Low salt diet  Activity as tolerated.  Take all medication as prescribed.  Follow up with your medical doctor for routine blood pressure monitoring at your next visit.  Notify your doctor if you have any of the following symptoms:   Dizziness, Lightheadedness, Blurry vision, Headache, Chest pain, Shortness of breath  Secondary Diagnosis:	Gout, unspecified cause, unspecified chronicity, unspecified site  Instructions for follow-up, activity and diet:	continue to take medications as prescribe  Secondary Diagnosis:	Hyperlipidemia, unspecified hyperlipidemia type  Instructions for follow-up, activity and diet:	Continue with your cholesterol medications. Eat a heart healthy diet that is low in saturated fats and salt, and includes whole grains, fruits, vegetables and lean protein; exercise regularly (consult with your physician or cardiologist first); maintain a heart healthy weight; if you smoke - quit (A resource to help you stop smoking is the [facility] Tobacco [facility] – phone number [phone].). Continue to follow with your primary physician or cardiologist.  Seek medical help for dizziness, Lightheadedness, Blurry vision, Headache, Chest pain, Shortness of breath

## 2017-11-03 NOTE — DIETITIAN INITIAL EVALUATION ADULT. - ADHERENCE
low sodium diet- reports he uses very little salt in cooking at home; does not eat out often; reports he has been told in the past by his Nephrologist to avoid foods such as bananas/fair

## 2017-11-03 NOTE — DISCHARGE NOTE ADULT - MEDICATION SUMMARY - MEDICATIONS TO STOP TAKING
I will STOP taking the medications listed below when I get home from the hospital:    Nifedical XL 60 mg oral tablet, extended release  -- 1 tab(s) by mouth once a day

## 2017-11-03 NOTE — DISCHARGE NOTE ADULT - PATIENT PORTAL LINK FT
“You can access the [facility] Patient Portal, offered by [facility], by registering with the following website: [email or web address]”

## 2017-11-03 NOTE — DIETITIAN INITIAL EVALUATION ADULT. - PERTINENT LABORATORY DATA
POCT glucose 11/2-3: 207-350; 11/1: Cholesterol 193,  HDL 25, 10/31: A1C 15.2%; 11/2: vitamin D-25-hydroxy 14.1, Cr 3.33

## 2017-11-03 NOTE — DISCHARGE NOTE ADULT - PLAN OF CARE
hyperglycemia improving new diagnosis -   HgA1C this admission. HgbA1C- 15.2- 10/31/17   Make sure you get your HgA1c checked every three months.  If you take oral diabetes medications, check your blood glucose two times a day.  If you take insulin, check your blood glucose before meals and at bedtime.  It's important not to skip any meals.  Keep a log of your blood glucose results and always take it with you to your doctor appointments.  Keep a list of your current medications including injectables and over the counter medications and bring this medication list with you to all your doctor appointments.  If you have not seen your ophthalmologist this year call for appointment.  Check your feet daily for redness, sores, or openings. Do not self treat. If no improvement in two days call your primary care physician for an appointment.  Low blood sugar (hypoglycemia) is a blood sugar below 70mg/dl. Check your blood sugar if you feel signs/symptoms of hypoglycemia. If your blood sugar is below 70 take 15 grams of carbohydrates (ex 4 oz of apple juice, 3-4 glucose tablets, or 4-6 oz of regular soda) wait 15 minutes and repeat blood sugar to make sure it comes up above 70.  If your blood sugar is above 70 and you are due for a meal, have a meal.  If you are not due for a meal have a snack.  This snack helps keeps your blood sugar at a safe range. follow up with your Nephrologist and monitor your Cr Low salt diet  Activity as tolerated.  Take all medication as prescribed.  Follow up with your medical doctor for routine blood pressure monitoring at your next visit.  Notify your doctor if you have any of the following symptoms:   Dizziness, Lightheadedness, Blurry vision, Headache, Chest pain, Shortness of breath continue to take medications as prescribe Continue with your cholesterol medications. Eat a heart healthy diet that is low in saturated fats and salt, and includes whole grains, fruits, vegetables and lean protein; exercise regularly (consult with your physician or cardiologist first); maintain a heart healthy weight; if you smoke - quit (A resource to help you stop smoking is the [facility] for Tobacco [facility] – phone number 990-899-9176.). Continue to follow with your primary physician or cardiologist.  Seek medical help for dizziness, Lightheadedness, Blurry vision, Headache, Chest pain, Shortness of breath

## 2017-11-03 NOTE — DISCHARGE NOTE ADULT - CARE PROVIDER_API CALL
Neftaly Holden (MD), Internal Medicine; Nephrology  [address] [address]  Phone: [phone]  Fax: [phone]    Sneha Waggoner,   Phone: [phone]  Fax: (   )    -    Kevin Isbell (DO), Cardiovascular Disease; Internal Medicine; Nuclear Cardiology  [address] [address]  Phone: [phone]  Fax: [phone]

## 2017-11-03 NOTE — DIETITIAN INITIAL EVALUATION ADULT. - FACTORS AFF FOOD INTAKE
pt reports in house he has been eating very well and has not experienced any "uncomfortableness" after eating; reports no chewing/swallowing issues

## 2017-11-03 NOTE — DISCHARGE NOTE ADULT - HOSPITAL COURSE
52 M PMH HTN, CKD, gout, p/w few weeks of ongoing fatigue/SOB with little activity.  + increased  thirst, +increased urination, +mouth/skin dryness, +HA, +abd discomfort with any PO intake found to have BS >600 with no DKA. bolus/basal insulin initiated  for correction of hyperglycemia   1. DM - Endo follow up appreciated. Lipitor and ACE when safe from renal standpoint  2.HTN -Not well controlled on Nifedipine 60mg daily (30mg BID), will increase to 90mg daily  3. HLD - Initiate Lipitor 40mg for primary prevention  4. SOB - TTE to evaluate for systolic/diastolic HF  - off diuretics at this time and tolerating IVF well, will f/u with Echo as outpatient   Pt stable for discharge home , will follow up with PMD , Endo, and Cards.       5. CKD - Patient with SKY likely hemodynamically mediated secondary to hypovolemia given osmotic diuresis from hyperglycemia and poor PO intake PTA. Scr improving with IVF. Discontinue IVF given mild LE edema and elevated blood pressure

## 2017-11-03 NOTE — PROGRESS NOTE ADULT - SUBJECTIVE AND OBJECTIVE BOX
Patient is a 52y old  Male who presents with a chief complaint of tachycardia, high blood sugar (03 Nov 2017 10:55)      INTERVAL HISTORY: No chest pain, SOB, paralysis, fevers, vomiting  	  MEDICATIONS:  NIFEdipine XL 30 milliGRAM(s) Oral every 12 hours        PHYSICAL EXAM:  T(C): 37.1 (11-03-17 @ 16:40), Max: 37.1 (11-02-17 @ 21:16)  HR: 95 (11-03-17 @ 16:40) (95 - 107)  BP: 129/89 (11-03-17 @ 16:40) (127/74 - 138/93)  RR: 18 (11-03-17 @ 16:40) (18 - 20)  SpO2: 98% (11-03-17 @ 16:40) (96% - 98%)  Wt(kg): --  I&O's Summary    02 Nov 2017 07:01  -  03 Nov 2017 07:00  --------------------------------------------------------  IN: 1040 mL / OUT: 0 mL / NET: 1040 mL    03 Nov 2017 07:01  -  03 Nov 2017 18:06  --------------------------------------------------------  IN: 1200 mL / OUT: 0 mL / NET: 1200 mL          Appearance: Normal	  HEENT:    PERRL, EOMI	  Lymphatic: No lymphadenopathy  Cardiovascular: Normal S1 S2, No JVD  Respiratory: Lungs clear to auscultation	  Psychiatry: Alert  Gastrointestinal:  Soft, Non-tender, + BS	  Skin: No rashes, No cyanosis  Extremities:  No edema of LE  Vascular: Peripheral pulses palpable  bilaterally      CARDIAC MARKERS:                                  11.4   8.0   )-----------( 228      ( 02 Nov 2017 09:50 )             36.0     11-03    139  |  102  |  46<H>  ----------------------------<  258<H>  3.9   |  19<L>  |  3.27<H>    Ca    8.9      03 Nov 2017 08:51  Phos  3.1     11-02      proBNP:   Lipid Profile:   HgA1c:   TSH:     Labs, imaging and telemetry personally reviewed      ASSESSMENT/PLAN: 	  52 M PMH HTN, CKD, gout, p/w few weeks of ongoing fatigue/SOB with little activity.  +increased thirst, +increased urination, +mouth/skin dryness, +HA, +abd discomfort with any PO intake found to have BS >600 with no DKA  1. HTN - Well controlled on Nifedipine 60mg (30mg BID),   - would benefit from ACE in the long run given diabetic and renal disease.     2. HLD - Initiate Lipitor 40mg for primary prevention    3. SOB - TTE to evaluate for systolic/diastolic HF - may be done as outpt  - off diuretics at this time and tolerating IVF well    4. DM - Endo follow up appreciated. Lipitor and ACE when safe from renal standpoint  - awaiting insurance approval for Lantus etc..     5. CKD - appears at about baseline    Discussed with primary team.         Kevin Isbell DO [facility]  Cardiovascular Medicine  [phone]

## 2017-11-03 NOTE — DISCHARGE NOTE ADULT - MEDICATION SUMMARY - MEDICATIONS TO TAKE
I will START or STAY ON the medications listed below when I get home from the hospital:    glucometer   -- Indication: For Type 2 diabetes mellitus with hyperglycemia, without long-term current use of insulin    glucose meter lancets   -- Indication: For Type 2 diabetes mellitus with hyperglycemia, without long-term current use of insulin    glucose test strips   -- Indication: For Type 2 diabetes mellitus with hyperglycemia, without long-term current use of insulin    alcohol pads   -- Indication: For Type 2 diabetes mellitus with hyperglycemia, without long-term current use of insulin    predniSONE 10 mg oral tablet  -- 1 tab(s) by mouth once a day, As Needed for gout flare-ups  -- Indication: For Gout, unspecified cause, unspecified chronicity, unspecified site    sodium bicarbonate 650 mg oral tablet  -- 1 tab(s) by mouth 2 times a day  -- Indication: For Metabolic acidosis    insulin glargine 100 units/mL subcutaneous solution  -- 50 unit(s) subcutaneous once a day (at bedtime)   -- Do not drink alcoholic beverages when taking this medication.  It is very important that you take or use this exactly as directed.  Do not skip doses or discontinue unless directed by your doctor.  Keep in refrigerator.  Do not freeze.    -- Indication: For Type 2 diabetes mellitus with hyperglycemia, without long-term current use of insulin    insulin lispro 100 units/mL subcutaneous solution  -- 18 unit(s) subcutaneous 3 times a day (before meals)   -- Indication: For Type 2 diabetes mellitus with hyperglycemia, without long-term current use of insulin    Uloric 80 mg oral tablet  -- 1 tab(s) by mouth once a day  -- Indication: For Gout, unspecified cause, unspecified chronicity, unspecified site    Nifedical XL 60 mg oral tablet, extended release  -- 0.5 tab(s) by mouth 2 times a day  -- Indication: For Hypertension, unspecified type    calcitriol 0.25 mcg oral capsule  -- 1 cap(s) by mouth once a day  -- Indication: For Supplement     Drisdol 50,000 intl units (1.25 mg) oral capsule  -- 1 cap(s) by mouth once a week  -- Indication: For Supplement I will START or STAY ON the medications listed below when I get home from the hospital:    glucometer   -- Indication: For Type 2 diabetes mellitus with hyperglycemia, without long-term current use of insulin    glucose meter lancets   -- Indication: For Type 2 diabetes mellitus with hyperglycemia, without long-term current use of insulin    glucose test strips   -- Indication: For Type 2 diabetes mellitus with hyperglycemia, without long-term current use of insulin    alcohol pads   -- Indication: For Type 2 diabetes mellitus with hyperglycemia, without long-term current use of insulin    BD NEEDLES  -- Indication: For Type 2 diabetes mellitus with hyperglycemia, without long-term current use of insulin    predniSONE 10 mg oral tablet  -- 1 tab(s) by mouth once a day, As Needed for gout flare-ups  -- Indication: For Gout, unspecified cause, unspecified chronicity, unspecified site    sodium bicarbonate 650 mg oral tablet  -- 1 tab(s) by mouth 2 times a day  -- Indication: For Metabolic acidosis    insulin glargine 100 units/mL subcutaneous solution  -- 50 unit(s) subcutaneous once a day (at bedtime)   -- Do not drink alcoholic beverages when taking this medication.  It is very important that you take or use this exactly as directed.  Do not skip doses or discontinue unless directed by your doctor.  Keep in refrigerator.  Do not freeze.    -- Indication: For Type 2 diabetes mellitus with hyperglycemia, without long-term current use of insulin    Apidra SoloStar Pen 100 units/mL subcutaneous solution  -- 18 unit(s) subcutaneous 3 times a day (before meals)   -- Check with your doctor before becoming pregnant.  Do not drink alcoholic beverages when taking this medication.  Keep in refrigerator.  Do not freeze.  Obtain medical advice before taking any non-prescription drugs as some may affect the action of this medication.    -- Indication: For Type 2 diabetes mellitus with hyperglycemia, without long-term current use of insulin    Uloric 80 mg oral tablet  -- 1 tab(s) by mouth once a day  -- Indication: For Gout, unspecified cause, unspecified chronicity, unspecified site    Nifedical XL 60 mg oral tablet, extended release  -- 0.5 tab(s) by mouth 2 times a day  -- Indication: For Hypertension, unspecified type    calcitriol 0.25 mcg oral capsule  -- 1 cap(s) by mouth once a day  -- Indication: For Supplement     Drisdol 50,000 intl units (1.25 mg) oral capsule  -- 1 cap(s) by mouth once a week  -- Indication: For Supplement I will START or STAY ON the medications listed below when I get home from the hospital:    glucometer   -- Indication: For Type 2 diabetes mellitus with hyperglycemia, without long-term current use of insulin    glucose meter lancets   -- Indication: For Type 2 diabetes mellitus with hyperglycemia, without long-term current use of insulin    glucose test strips   -- Indication: For Type 2 diabetes mellitus with hyperglycemia, without long-term current use of insulin    alcohol pads   -- Indication: For Type 2 diabetes mellitus with hyperglycemia, without long-term current use of insulin    BD NEEDLES  -- Indication: For Type 2 diabetes mellitus with hyperglycemia, without long-term current use of insulin    sodium bicarbonate 650 mg oral tablet  -- 1 tab(s) by mouth 2 times a day  -- Indication: For Metabolic acidosis    insulin glargine 100 units/mL subcutaneous solution  -- 50 unit(s) subcutaneous once a day (at bedtime)   -- Do not drink alcoholic beverages when taking this medication.  It is very important that you take or use this exactly as directed.  Do not skip doses or discontinue unless directed by your doctor.  Keep in refrigerator.  Do not freeze.    -- Indication: For Type 2 diabetes mellitus with hyperglycemia, without long-term current use of insulin    Apidra SoloStar Pen 100 units/mL subcutaneous solution  -- 18 unit(s) subcutaneous 3 times a day (before meals)   -- Check with your doctor before becoming pregnant.  Do not drink alcoholic beverages when taking this medication.  Keep in refrigerator.  Do not freeze.  Obtain medical advice before taking any non-prescription drugs as some may affect the action of this medication.    -- Indication: For Type 2 diabetes mellitus with hyperglycemia, without long-term current use of insulin    Uloric 80 mg oral tablet  -- 1 tab(s) by mouth once a day  -- Indication: For Gout, unspecified cause, unspecified chronicity, unspecified site    Nifedical XL 60 mg oral tablet, extended release  -- 0.5 tab(s) by mouth 2 times a day  -- Indication: For Hypertension, unspecified type    calcitriol 0.25 mcg oral capsule  -- 1 cap(s) by mouth once a day  -- Indication: For Supplement     Drisdol 50,000 intl units (1.25 mg) oral capsule  -- 1 cap(s) by mouth once a week  -- Indication: For Supplement I will START or STAY ON the medications listed below when I get home from the hospital:    glucometer   -- Indication: For Type 2 diabetes mellitus with hyperglycemia, without long-term current use of insulin    glucose meter lancets   -- Indication: For Type 2 diabetes mellitus with hyperglycemia, without long-term current use of insulin    glucose test strips   -- Indication: For Type 2 diabetes mellitus with hyperglycemia, without long-term current use of insulin    alcohol pads   -- Indication: For Type 2 diabetes mellitus with hyperglycemia, without long-term current use of insulin    BD NEEDLES  -- Indication: For Type 2 diabetes mellitus with hyperglycemia, without long-term current use of insulin    sodium bicarbonate 650 mg oral tablet  -- 1 tab(s) by mouth 2 times a day  -- Indication: For Metabolic acidosis    insulin glargine 100 units/mL subcutaneous solution  -- 50 unit(s) subcutaneous once a day (at bedtime)   -- Do not drink alcoholic beverages when taking this medication.  It is very important that you take or use this exactly as directed.  Do not skip doses or discontinue unless directed by your doctor.  Keep in refrigerator.  Do not freeze.    -- Indication: For Type 2 diabetes mellitus with hyperglycemia, without long-term current use of insulin    Apidra SoloStar Pen 100 units/mL subcutaneous solution  -- 18 unit(s) subcutaneous 3 times a day (before meals)   -- Check with your doctor before becoming pregnant.  Do not drink alcoholic beverages when taking this medication.  Keep in refrigerator.  Do not freeze.  Obtain medical advice before taking any non-prescription drugs as some may affect the action of this medication.    -- Indication: For Type 2 diabetes mellitus with hyperglycemia, without long-term current use of insulin    Uloric 80 mg oral tablet  -- 1 tab(s) by mouth once a day  -- Indication: For Gout, unspecified cause, unspecified chronicity, unspecified site    NIFEdipine 60 mg oral tablet, extended release  -- 1 tab(s) by mouth 2 times a day ( hold if BP<100, HR<60)  -- Indication: For Htn/tachycardia     calcitriol 0.25 mcg oral capsule  -- 1 cap(s) by mouth once a day  -- Indication: For Supplement     Drisdol 50,000 intl units (1.25 mg) oral capsule  -- 1 cap(s) by mouth once a week  -- Indication: For Supplement

## 2017-11-03 NOTE — PROGRESS NOTE ADULT - SUBJECTIVE AND OBJECTIVE BOX
[facility] NEPHROLOGY- PROGRESS NOTE    52 year old male with history of hypertension presents with hyperglycemia with new diagnosis of DM. Nephrology consulted for elevated Scr.    REVIEW OF SYSTEMS:  Gen: no changes in weight  Cards: no chest pain  Resp: no dyspnea  GI: no nausea or vomiting or diarrhea  Vascular: no LE edema    No Known Allergies      Hospital Medications: Medications reviewed      VITALS:  T(F): 98.6 (11-03-17 @ 08:13), Max: 98.8 (11-02-17 @ 15:57)  HR: 99 (11-03-17 @ 08:13)  BP: 138/93 (11-03-17 @ 08:13)  RR: 18 (11-03-17 @ 08:13)  SpO2: 97% (11-03-17 @ 08:13)  Wt(kg): --    11-02 @ 07:01  -  11-03 @ 07:00  --------------------------------------------------------  IN: 1040 mL / OUT: 0 mL / NET: 1040 mL      PHYSICAL EXAM:    Gen: NAD, calm  Cards: RRR, +S1/S2, no M/G/R  Resp: CTA B/L  GI: soft, NT/ND, NABS  Vascular: trace LE edema B/L    LABS:  11-03    139  |  102  |  46<H>  ----------------------------<  258<H>  3.9   |  19<L>  |  3.27<H>    Ca    8.9      03 Nov 2017 08:51  Phos  3.1     11-02      Creatinine Trend: 3.27 <--, 3.33 <--, 3.74 <--, 3.94 <--, 4.19 <--, 4.50 <--                        11.4   8.0   )-----------( 228      ( 02 Nov 2017 09:50 )             36.0

## 2017-11-03 NOTE — DISCHARGE NOTE ADULT - PROVIDER TOKENS
TOKEN:'[id number]',FREE:[LAST:[Sneha Waggoner],PHONE:[phone]],FAX:[(   )    -]],TOKEN:'[id number]'

## 2017-11-03 NOTE — DIETITIAN INITIAL EVALUATION ADULT. - ENERGY NEEDS
ht: 5'9", wt: 346.1pounds, BMI:51.1 kg/m2, IBW:160 pounds +/- 10%     pt admitted c tachycardia and elevated blood sugar, newly diagnosed c T2DM, Endocrine following.

## 2017-11-04 VITALS
RESPIRATION RATE: 18 BRPM | HEART RATE: 113 BPM | OXYGEN SATURATION: 97 % | SYSTOLIC BLOOD PRESSURE: 146 MMHG | DIASTOLIC BLOOD PRESSURE: 98 MMHG | TEMPERATURE: 99 F

## 2017-11-04 LAB
ANION GAP SERPL CALC-SCNC: 19 MMOL/L — HIGH (ref 5–17)
BUN SERPL-MCNC: 42 MG/DL — HIGH (ref 7–23)
CALCIUM SERPL-MCNC: 9.5 MG/DL — SIGNIFICANT CHANGE UP (ref 8.4–10.5)
CHLORIDE SERPL-SCNC: 106 MMOL/L — SIGNIFICANT CHANGE UP (ref 96–108)
CO2 SERPL-SCNC: 18 MMOL/L — LOW (ref 22–31)
CREAT SERPL-MCNC: 2.97 MG/DL — HIGH (ref 0.5–1.3)
GLUCOSE BLDC GLUCOMTR-MCNC: 216 MG/DL — HIGH (ref 70–99)
GLUCOSE BLDC GLUCOMTR-MCNC: 280 MG/DL — HIGH (ref 70–99)
GLUCOSE SERPL-MCNC: 201 MG/DL — HIGH (ref 70–99)
POTASSIUM SERPL-MCNC: 4.1 MMOL/L — SIGNIFICANT CHANGE UP (ref 3.5–5.3)
POTASSIUM SERPL-SCNC: 4.1 MMOL/L — SIGNIFICANT CHANGE UP (ref 3.5–5.3)
SODIUM SERPL-SCNC: 143 MMOL/L — SIGNIFICANT CHANGE UP (ref 135–145)

## 2017-11-04 PROCEDURE — 82553 CREATINE MB FRACTION: CPT

## 2017-11-04 PROCEDURE — 84443 ASSAY THYROID STIM HORMONE: CPT

## 2017-11-04 PROCEDURE — 70450 CT HEAD/BRAIN W/O DYE: CPT

## 2017-11-04 PROCEDURE — 96374 THER/PROPH/DIAG INJ IV PUSH: CPT

## 2017-11-04 PROCEDURE — 80053 COMPREHEN METABOLIC PANEL: CPT

## 2017-11-04 PROCEDURE — 93010 ELECTROCARDIOGRAM REPORT: CPT

## 2017-11-04 PROCEDURE — 84484 ASSAY OF TROPONIN QUANT: CPT

## 2017-11-04 PROCEDURE — 93923 UPR/LXTR ART STDY 3+ LVLS: CPT

## 2017-11-04 PROCEDURE — 83605 ASSAY OF LACTIC ACID: CPT

## 2017-11-04 PROCEDURE — 82009 KETONE BODYS QUAL: CPT

## 2017-11-04 PROCEDURE — 85027 COMPLETE CBC AUTOMATED: CPT

## 2017-11-04 PROCEDURE — 85730 THROMBOPLASTIN TIME PARTIAL: CPT

## 2017-11-04 PROCEDURE — 83036 HEMOGLOBIN GLYCOSYLATED A1C: CPT

## 2017-11-04 PROCEDURE — 80048 BASIC METABOLIC PNL TOTAL CA: CPT

## 2017-11-04 PROCEDURE — 85014 HEMATOCRIT: CPT

## 2017-11-04 PROCEDURE — 82550 ASSAY OF CK (CPK): CPT

## 2017-11-04 PROCEDURE — 83735 ASSAY OF MAGNESIUM: CPT

## 2017-11-04 PROCEDURE — 82306 VITAMIN D 25 HYDROXY: CPT

## 2017-11-04 PROCEDURE — 84295 ASSAY OF SERUM SODIUM: CPT

## 2017-11-04 PROCEDURE — 84156 ASSAY OF PROTEIN URINE: CPT

## 2017-11-04 PROCEDURE — 82803 BLOOD GASES ANY COMBINATION: CPT

## 2017-11-04 PROCEDURE — 84100 ASSAY OF PHOSPHORUS: CPT

## 2017-11-04 PROCEDURE — 85610 PROTHROMBIN TIME: CPT

## 2017-11-04 PROCEDURE — 83690 ASSAY OF LIPASE: CPT

## 2017-11-04 PROCEDURE — 82330 ASSAY OF CALCIUM: CPT

## 2017-11-04 PROCEDURE — 82962 GLUCOSE BLOOD TEST: CPT

## 2017-11-04 PROCEDURE — 93005 ELECTROCARDIOGRAM TRACING: CPT

## 2017-11-04 PROCEDURE — 99285 EMERGENCY DEPT VISIT HI MDM: CPT | Mod: 25

## 2017-11-04 PROCEDURE — 82947 ASSAY GLUCOSE BLOOD QUANT: CPT

## 2017-11-04 PROCEDURE — 82310 ASSAY OF CALCIUM: CPT

## 2017-11-04 PROCEDURE — 82435 ASSAY OF BLOOD CHLORIDE: CPT

## 2017-11-04 PROCEDURE — 84132 ASSAY OF SERUM POTASSIUM: CPT

## 2017-11-04 PROCEDURE — 81001 URINALYSIS AUTO W/SCOPE: CPT

## 2017-11-04 PROCEDURE — 80061 LIPID PANEL: CPT

## 2017-11-04 PROCEDURE — 83970 ASSAY OF PARATHORMONE: CPT

## 2017-11-04 PROCEDURE — 82010 KETONE BODYS QUAN: CPT

## 2017-11-04 PROCEDURE — 82565 ASSAY OF CREATININE: CPT

## 2017-11-04 PROCEDURE — 71045 X-RAY EXAM CHEST 1 VIEW: CPT

## 2017-11-04 RX ORDER — NIFEDIPINE 30 MG
0.5 TABLET, EXTENDED RELEASE 24 HR ORAL
Qty: 0 | Refills: 0 | COMMUNITY

## 2017-11-04 RX ORDER — NIFEDIPINE 30 MG
60 TABLET, EXTENDED RELEASE 24 HR ORAL
Qty: 0 | Refills: 0 | Status: DISCONTINUED | OUTPATIENT
Start: 2017-11-04 | End: 2017-11-04

## 2017-11-04 RX ORDER — NIFEDIPINE 30 MG
1 TABLET, EXTENDED RELEASE 24 HR ORAL
Qty: 60 | Refills: 0
Start: 2017-11-04 | End: 2017-12-04

## 2017-11-04 RX ORDER — NIFEDIPINE 30 MG
30 TABLET, EXTENDED RELEASE 24 HR ORAL ONCE
Qty: 0 | Refills: 0 | Status: COMPLETED | OUTPATIENT
Start: 2017-11-04 | End: 2017-11-04

## 2017-11-04 RX ADMIN — CALCITRIOL 0.25 MICROGRAM(S): 0.5 CAPSULE ORAL at 12:25

## 2017-11-04 RX ADMIN — Medication 650 MILLIGRAM(S): at 05:28

## 2017-11-04 RX ADMIN — FEBUXOSTAT 40 MILLIGRAM(S): 40 TABLET ORAL at 12:25

## 2017-11-04 RX ADMIN — Medication 30 MILLIGRAM(S): at 05:28

## 2017-11-04 RX ADMIN — HEPARIN SODIUM 5000 UNIT(S): 5000 INJECTION INTRAVENOUS; SUBCUTANEOUS at 14:42

## 2017-11-04 RX ADMIN — HEPARIN SODIUM 5000 UNIT(S): 5000 INJECTION INTRAVENOUS; SUBCUTANEOUS at 05:28

## 2017-11-04 RX ADMIN — Medication 4: at 08:30

## 2017-11-04 RX ADMIN — Medication 14 UNIT(S): at 08:31

## 2017-11-04 RX ADMIN — Medication 6: at 12:24

## 2017-11-04 RX ADMIN — Medication 14 UNIT(S): at 12:25

## 2017-11-04 RX ADMIN — Medication 30 MILLIGRAM(S): at 14:41

## 2017-11-04 NOTE — PROGRESS NOTE ADULT - PROBLEM SELECTOR PLAN 2
as per renal, CKD (chronic kidney disease), stage IV.  Recommendation: Patient with h/o CKD-4 (baseline GFR 25 ml/min) for which he follows with outpatient nephrologist. UA bland with proteinuria. Check spot urine TP/CR to quantify proteinuria. Will obtain records from outpatient nephrologist
Continue Nifedipine to maintain BP<140/90. Consider ACE if ok with nephrology given that kidney function is stable
Patient with h/o CKD-4 (baseline GFR 25 ml/min) for which he follows with outpatient nephrologist. UA bland with sub-nephrotic range proteinuria (spot urine TP/CR 2.1). Will consider low dose ACE-I once SKY resolved. Monitor electrolytes.
Patient with h/o CKD-4 (baseline GFR 25 ml/min) for which he follows with outpatient nephrologist. UA bland with sub-nephrotic range proteinuria (spot urine TP/CR 2.1). Would defer starting low dose ACE-ARB to outpatient nephrologist given advanced renal failure. Monitor electrolytes.
Patient with h/o CKD-4 (baseline GFR 25 ml/min) for which he follows with outpatient nephrologist. UA bland with sub-nephrotic range proteinuria (spot urine TP/CR 2.1). Would defer starting low dose ACE-ARB to outpatient nephrologist given advanced renal failure. Monitor electrolytes.
as per renal, CKD (chronic kidney disease), stage IV.  Recommendation: Patient with h/o CKD-4 (baseline GFR 25 ml/min) for which he follows with outpatient nephrologist. UA bland with proteinuria. Check spot urine TP/CR to quantify proteinuria. Will obtain records from outpatient nephrologist
as per renal, CKD (chronic kidney disease), stage IV.  Recommendation: Patient with h/o CKD-4 (baseline GFR 25 ml/min) for which he follows with outpatient nephrologist. UA bland with proteinuria. Check spot urine TP/CR to quantify proteinuria. Will obtain records from outpatient nephrologist

## 2017-11-04 NOTE — PROGRESS NOTE ADULT - SUBJECTIVE AND OBJECTIVE BOX
Patient is a 52y old  Male who presents with a chief complaint of tachycardia, high blood sugar (03 Nov 2017 10:55)      INTERVAL HISTORY: No chest pain, SOB, paralysis, fevers, vomiting  	  MEDICATIONS:  NIFEdipine XL 30 milliGRAM(s) Oral two times a day        PHYSICAL EXAM:  T(C): 37.1 (11-04-17 @ 15:16), Max: 37.2 (11-04-17 @ 14:18)  HR: 113 (11-04-17 @ 15:16) (86 - 121)  BP: 146/98 (11-04-17 @ 15:16) (134/88 - 146/98)  RR: 18 (11-04-17 @ 15:16) (18 - 18)  SpO2: 97% (11-04-17 @ 15:16) (95% - 97%)  Wt(kg): --  I&O's Summary    03 Nov 2017 07:01  -  04 Nov 2017 07:00  --------------------------------------------------------  IN: 1520 mL / OUT: 0 mL / NET: 1520 mL    04 Nov 2017 08:01  -  04 Nov 2017 22:55  --------------------------------------------------------  IN: 800 mL / OUT: 0 mL / NET: 800 mL          Appearance: Normal	  HEENT:    PERRL, EOMI	  Lymphatic: No lymphadenopathy  Cardiovascular: Normal S1 S2, No JVD  Respiratory: Lungs clear to auscultation	  Psychiatry: Alert  Gastrointestinal:  Soft, Non-tender, + BS	  Skin: No rashes, No cyanosis  Extremities:  No edema of LE  Vascular: Peripheral pulses palpable  bilaterally      CARDIAC MARKERS:      11-04    143  |  106  |  42<H>  ----------------------------<  201<H>  4.1   |  18<L>  |  2.97<H>    Ca    9.5      04 Nov 2017 08:44      Labs, imaging and telemetry personally reviewed      ASSESSMENT/PLAN: 	  52 M PMH HTN, CKD, gout, p/w few weeks of ongoing fatigue/SOB with little activity.  +increased thirst, +increased urination, +mouth/skin dryness, +HA, +abd discomfort with any PO intake found to have BS >600 with no DKA  1. HTN - Well controlled on Nifedipine 60mg (30mg BID),   - would benefit from ACE in the long run given diabetic and renal disease.     2. HLD - Initiate Lipitor 40mg for primary prevention    3. SOB - TTE to evaluate for systolic/diastolic HF - may be done as outpt with Dr Peraza  - off diuretics at this time and tolerating IVF well    4. DM - Endo follow up appreciated. Lipitor and ACE when safe from renal standpoint  - awaiting insurance approval for Lantus etc..     5. CKD - appears at about baseline    6. Outpt follow up with Dr Peraza in one week    Discussed with primary team.         Kevin Isbell DO [facility]  Cardiovascular Medicine  [phone]

## 2017-11-04 NOTE — PROGRESS NOTE ADULT - PROVIDER SPECIALTY LIST ADULT
Cardiology
Cardiology
Endocrinology
Internal Medicine
Internal Medicine
Nephrology
Cardiology
Internal Medicine

## 2017-11-04 NOTE — PROGRESS NOTE ADULT - PROBLEM SELECTOR PROBLEM 4
Gout, unspecified cause, unspecified chronicity, unspecified site
Secondary hyperparathyroidism of renal origin

## 2017-11-04 NOTE — PROGRESS NOTE ADULT - ASSESSMENT
52 M PMH HTN, CKD, gout, here w/ newly dx DM2 found to have A1C 15.2
52 M PMH HTN, CKD, gout, p/w few weeks of ongoing fatigue/thirst/frequent urination in setting of markedly elevated blood sugars, f/w newly diagnosed diabetes and mild DKA picture vs HONK now improving with insulin.
52 M PMH HTN, CKD, gout, p/w few weeks of ongoing fatigue/thirst/frequent urination in setting of markedly elevated blood sugars, f/w newly diagnosed diabetes and mild DKA picture vs HONK now improving with insulin.
52 year old male with history of hypertension presents with hyperglycemia with new diagnosis of DM. Nephrology consulted for elevated Scr.
52 M PMH HTN, CKD, gout, p/w few weeks of ongoing fatigue/thirst/frequent urination in setting of markedly elevated blood sugars, f/w newly diagnosed diabetes and mild DKA picture vs HONK now improving with insulin.

## 2017-11-04 NOTE — PROGRESS NOTE ADULT - PROBLEM SELECTOR PLAN 5
-Psuedohyponatremia in setting of hyperglycemia; corrected Na 140  -Monitor for now
Likely secondary to renal failure. Start sodium bicarbonate 650 mg twice daily to slow progression of CKD. Monitor serum CO2.
Likely secondary to renal failure. c/w sodium bicarbonate 650 mg twice daily to slow progression of CKD. Monitor serum CO2.
Secondary to pseudohyponatremia from hyperglycemia now resolved. Monitor serum sodium.

## 2017-11-04 NOTE — CHART NOTE - NSCHARTNOTEFT_GEN_A_CORE
51 y/o male with a/w newly dx DM2 now with tachycardia 135/81, sd076---MTK showed sinus tachy 112-- procardia 30mg x1 given and procardia increased to 60mg bid. However patient refused procardia dose increased until follow-up appointment Monday 11/6/17 with Nephrologist and Cardiologist. The above discussed with Dr. Ascencio.

## 2017-11-04 NOTE — PROGRESS NOTE ADULT - SUBJECTIVE AND OBJECTIVE BOX
[facility] NEPHROLOGY- PROGRESS NOTE    52 year old male with history of hypertension presents with hyperglycemia with new diagnosis of DM. Nephrology consulted for elevated Scr.    REVIEW OF SYSTEMS:  Gen: no changes in weight  Cards: no chest pain  Resp: no dyspnea  GI: no nausea or vomiting or diarrhea  Vascular: no LE edema    No Known Allergies      Hospital Medications: Medications reviewed    VITALS:  T(F): 98.9 (17 @ 14:18), Max: 98.9 (17 @ 14:18)  HR: 121 (17 @ 14:18)  BP: 135/81 (17 @ 14:18)  RR: 18 (17 @ 14:18)  SpO2: 95% (17 @ 14:18)  Wt(kg): --     @ 07:01  -   @ 07:00  --------------------------------------------------------  IN: 1520 mL / OUT: 0 mL / NET: 1520 mL     @ 08:01  -   @ 14:24  --------------------------------------------------------  IN: 800 mL / OUT: 0 mL / NET: 800 mL      PHYSICAL EXAM:    Gen: NAD, calm  Cards: RRR, +S1/S2, no M/G/R  Resp: CTA B/L  GI: soft, NT/ND, NABS  Vascular: trace LE edema B/L      LABS:      143  |  106  |  42<H>  ----------------------------<  201<H>  4.1   |  18<L>  |  2.97<H>    Ca    9.5      2017 08:44      Creatinine Trend: 2.97 <--, 3.27 <--, 3.33 <--, 3.74 <--, 3.94 <--, 4.19 <--, 4.50 <--    Urine Studies:  Urinalysis Basic - ( 31 Oct 2017 17:01 )    Color: PL Yellow / Appearance: Clear / S.013 / pH:   Gluc:  / Ketone: Negative  / Bili: Negative / Urobili: Negative   Blood:  / Protein: 100 mg/dL / Nitrite: Negative   Leuk Esterase: Negative / RBC: 0-2 /HPF / WBC 0-2 /HPF   Sq Epi:  / Non Sq Epi:  / Bacteria:       Creatinine, Random Urine: 111 mg/dL ( @ 10:37)  Protein/Creatinine Ratio Calculation: 2.3 Ratio ( @ 10:37)

## 2017-11-04 NOTE — PROGRESS NOTE ADULT - PROBLEM SELECTOR PROBLEM 3
Essential hypertension
Hyperlipidemia, unspecified hyperlipidemia type
Hypertensive kidney disease with chronic kidney disease, stage 1 through stage 4 or unspecified 

## 2017-11-04 NOTE — PROGRESS NOTE ADULT - PROBLEM SELECTOR PROBLEM 1
Acute kidney injury
Type 2 diabetes mellitus with hyperglycemia, without long-term current use of insulin

## 2017-11-04 NOTE — PROGRESS NOTE ADULT - PROBLEM SELECTOR PROBLEM 2
Acute renal failure superimposed on stage 4 chronic kidney disease, unspecified acute renal failure type
CKD (chronic kidney disease), stage IV
Hypertension, unspecified type

## 2017-11-04 NOTE — PROGRESS NOTE ADULT - PROBLEM SELECTOR PLAN 4
-c/w  uloric 40 qd which is renally dosed for gfr <30
Continue with calcitriol. F/U iPTH.
Secondary to low vitamin D levels. Ok to continue with calcitriol but would start ergocalciferol weekly. Monitor serum calcium and phosphorus.
Secondary to low vitamin D levels. c/w calcitriol & weekly ergocalciferol. Monitor serum calcium and phosphorus.

## 2017-11-04 NOTE — PROGRESS NOTE ADULT - ATTENDING COMMENTS
Agree with assessment and plan as above by Dr. Grewal. Reviewed all pertinent labs, glucose values, and imaging studies. Modifications made as indicated above.     Tyler Hope D.O  [phone]
[facility] NEPHROLOGY  Franki Brito M.D.  Jp Bacon D.O.  Bushra Abel M.D.  Helen Marrufo, MSN, ANP-C    Telephone: [phone]  Facsimile: [phone]    [address] [place] [address]
[facility] NEPHROLOGY  Franki Brito M.D.  Jp Bacon D.O.  Bushra Abel M.D.  Helen Marrufo, MSN, ANP-C    Telephone: [phone]  Facsimile: [phone]    [address] [place] [address]
[facility] NEPHROLOGY  Franki Brito M.D.  Jp Bacon D.O.  Bushra Abel M.D.  Helen Marrufo, MSN, ANP-C    Telephone: [phone]  Facsimile: [phone]    [address] [place] [address]

## 2017-11-16 ENCOUNTER — APPOINTMENT (OUTPATIENT)
Dept: ENDOCRINOLOGY | Facility: CLINIC | Age: 52
End: 2017-11-16
Payer: MEDICAID

## 2017-11-16 VITALS
WEIGHT: 315 LBS | HEIGHT: 69.5 IN | BODY MASS INDEX: 45.61 KG/M2 | SYSTOLIC BLOOD PRESSURE: 138 MMHG | OXYGEN SATURATION: 98 % | DIASTOLIC BLOOD PRESSURE: 80 MMHG | HEART RATE: 110 BPM

## 2017-11-16 DIAGNOSIS — Z80.9 FAMILY HISTORY OF MALIGNANT NEOPLASM, UNSPECIFIED: ICD-10-CM

## 2017-11-16 DIAGNOSIS — Z72.3 LACK OF PHYSICAL EXERCISE: ICD-10-CM

## 2017-11-16 DIAGNOSIS — Z84.89 FAMILY HISTORY OF OTHER SPECIFIED CONDITIONS: ICD-10-CM

## 2017-11-16 DIAGNOSIS — Z87.891 PERSONAL HISTORY OF NICOTINE DEPENDENCE: ICD-10-CM

## 2017-11-16 DIAGNOSIS — Z82.49 FAMILY HISTORY OF ISCHEMIC HEART DISEASE AND OTHER DISEASES OF THE CIRCULATORY SYSTEM: ICD-10-CM

## 2017-11-16 DIAGNOSIS — Z56.0 UNEMPLOYMENT, UNSPECIFIED: ICD-10-CM

## 2017-11-16 PROCEDURE — 99215 OFFICE O/P EST HI 40 MIN: CPT

## 2017-11-16 RX ORDER — INSULIN GLARGINE 100 [IU]/ML
100 INJECTION, SOLUTION SUBCUTANEOUS
Qty: 4 | Refills: 4 | Status: DISCONTINUED | COMMUNITY
End: 2017-11-16

## 2017-11-16 RX ORDER — CALCITRIOL 0.25 UG/1
0.25 CAPSULE, LIQUID FILLED ORAL
Refills: 0 | Status: ACTIVE | COMMUNITY

## 2017-11-16 RX ORDER — INSULIN GLULISINE 100 [IU]/ML
100 INJECTION, SOLUTION SUBCUTANEOUS
Refills: 0 | Status: DISCONTINUED | COMMUNITY
End: 2017-11-16

## 2017-11-16 SDOH — ECONOMIC STABILITY - INCOME SECURITY: UNEMPLOYMENT, UNSPECIFIED: Z56.0

## 2017-11-29 ENCOUNTER — APPOINTMENT (OUTPATIENT)
Dept: ENDOCRINOLOGY | Facility: CLINIC | Age: 52
End: 2017-11-29

## 2017-11-30 ENCOUNTER — RESULT CHARGE (OUTPATIENT)
Age: 52
End: 2017-11-30

## 2017-11-30 ENCOUNTER — APPOINTMENT (OUTPATIENT)
Dept: ENDOCRINOLOGY | Facility: CLINIC | Age: 52
End: 2017-11-30
Payer: MEDICAID

## 2017-11-30 VITALS — BODY MASS INDEX: 51.09 KG/M2 | WEIGHT: 315 LBS

## 2017-11-30 LAB — GLUCOSE BLDC GLUCOMTR-MCNC: 153

## 2017-11-30 PROCEDURE — 82962 GLUCOSE BLOOD TEST: CPT

## 2017-11-30 PROCEDURE — G0108 DIAB MANAGE TRN  PER INDIV: CPT

## 2017-11-30 RX ORDER — LANCETS 30 GAUGE
EACH MISCELLANEOUS
Qty: 1 | Refills: 3 | Status: ACTIVE | COMMUNITY
Start: 2017-11-30 | End: 1900-01-01

## 2017-11-30 RX ORDER — INSULIN GLARGINE 100 [IU]/ML
100 INJECTION, SOLUTION SUBCUTANEOUS
Qty: 1 | Refills: 5 | Status: DISCONTINUED | COMMUNITY
Start: 2017-11-16 | End: 2017-11-30

## 2017-12-01 ENCOUNTER — RX RENEWAL (OUTPATIENT)
Age: 52
End: 2017-12-01

## 2018-02-20 ENCOUNTER — RX RENEWAL (OUTPATIENT)
Age: 53
End: 2018-02-20

## 2018-03-01 ENCOUNTER — APPOINTMENT (OUTPATIENT)
Dept: ENDOCRINOLOGY | Facility: CLINIC | Age: 53
End: 2018-03-01

## 2018-05-29 ENCOUNTER — RX RENEWAL (OUTPATIENT)
Age: 53
End: 2018-05-29

## 2018-07-25 ENCOUNTER — RX RENEWAL (OUTPATIENT)
Age: 53
End: 2018-07-25

## 2018-10-15 ENCOUNTER — RX RENEWAL (OUTPATIENT)
Age: 53
End: 2018-10-15

## 2019-03-10 ENCOUNTER — RX RENEWAL (OUTPATIENT)
Age: 54
End: 2019-03-10

## 2019-03-20 ENCOUNTER — APPOINTMENT (OUTPATIENT)
Dept: CARDIOLOGY | Facility: CLINIC | Age: 54
End: 2019-03-20
Payer: MEDICAID

## 2019-03-20 VITALS
HEART RATE: 97 BPM | HEIGHT: 69 IN | SYSTOLIC BLOOD PRESSURE: 132 MMHG | DIASTOLIC BLOOD PRESSURE: 86 MMHG | WEIGHT: 315 LBS | BODY MASS INDEX: 46.65 KG/M2 | RESPIRATION RATE: 16 BRPM

## 2019-03-20 PROCEDURE — 93000 ELECTROCARDIOGRAM COMPLETE: CPT

## 2019-03-20 PROCEDURE — 99205 OFFICE O/P NEW HI 60 MIN: CPT

## 2019-03-20 NOTE — DISCUSSION/SUMMARY
[FreeTextEntry1] : 53 M HTN hyperlipidemia CKD Cr 4.4 DM obesity with SOB on exertion.\par CHECK ECHOCARDIOGRAM.\par CHECK NST TO ASSESS PERFUSION (limited ET).\par Continue meds for HTN.\par Continue statin.\par Condition deteriorating, see me after tests.

## 2019-03-20 NOTE — REASON FOR VISIT
[Initial Evaluation] : an initial evaluation of [Hyperlipidemia] : hyperlipidemia [Hypertension] : hypertension [FreeTextEntry1] : 53 M HTN hyperlipidemia obesity DM with SOB.

## 2019-03-20 NOTE — PHYSICAL EXAM
[General Appearance - Well Developed] : well developed [Well Groomed] : well groomed [Normal Appearance] : normal appearance [General Appearance - Well Nourished] : well nourished [General Appearance - In No Acute Distress] : no acute distress [No Deformities] : no deformities [Normal Conjunctiva] : the conjunctiva exhibited no abnormalities [No Oral Pallor] : no oral pallor [Eyelids - No Xanthelasma] : the eyelids demonstrated no xanthelasmas [Normal Oral Mucosa] : normal oral mucosa [No Oral Cyanosis] : no oral cyanosis [Normal Jugular Venous A Waves Present] : normal jugular venous A waves present [Normal Jugular Venous V Waves Present] : normal jugular venous V waves present [No Jugular Venous Oconnell A Waves] : no jugular venous oconnell A waves [Heart Sounds] : normal S1 and S2 [Heart Rate And Rhythm] : heart rate and rhythm were normal [Murmurs] : no murmurs present [Respiration, Rhythm And Depth] : normal respiratory rhythm and effort [Auscultation Breath Sounds / Voice Sounds] : lungs were clear to auscultation bilaterally [Exaggerated Use Of Accessory Muscles For Inspiration] : no accessory muscle use [Abdomen Soft] : soft [Abdomen Tenderness] : non-tender [Abdomen Mass (___ Cm)] : no abdominal mass palpated [Nail Clubbing] : no clubbing of the fingernails [Cyanosis, Localized] : no localized cyanosis [Petechial Hemorrhages (___cm)] : no petechial hemorrhages [Skin Color & Pigmentation] : normal skin color and pigmentation [] : no rash [No Venous Stasis] : no venous stasis [Skin Lesions] : no skin lesions [No Skin Ulcers] : no skin ulcer [No Xanthoma] : no  xanthoma was observed [Oriented To Time, Place, And Person] : oriented to person, place, and time [Affect] : the affect was normal [Mood] : the mood was normal [No Anxiety] : not feeling anxious

## 2019-03-20 NOTE — HISTORY OF PRESENT ILLNESS
[FreeTextEntry1] : 1. HTN: on medications, controlled. \par 2. Hyperlipidemia: on statin.\par 3. DM: on insulin.\par 4. SOB: progressive, moderately severe, exertional, relieved with rest. Symptoms have been ongoing for several months. SOB lasts a few minutes and is relieved with rest. He also has intermittent palpitations with dizziness especially with exertion. No Cp. ET is limited by foot pain, leg pain and decreased ET.

## 2019-03-20 NOTE — REVIEW OF SYSTEMS
[Shortness Of Breath] : shortness of breath [Dyspnea on exertion] : dyspnea during exertion [Palpitations] : palpitations [Chest Pain] : no chest pain [Negative] : Heme/Lymph

## 2019-04-15 ENCOUNTER — RX RENEWAL (OUTPATIENT)
Age: 54
End: 2019-04-15

## 2019-04-26 ENCOUNTER — APPOINTMENT (OUTPATIENT)
Dept: CARDIOLOGY | Facility: CLINIC | Age: 54
End: 2019-04-26

## 2019-05-14 ENCOUNTER — NON-APPOINTMENT (OUTPATIENT)
Age: 54
End: 2019-05-14

## 2019-05-14 ENCOUNTER — APPOINTMENT (OUTPATIENT)
Dept: OPHTHALMOLOGY | Facility: CLINIC | Age: 54
End: 2019-05-14
Payer: MEDICAID

## 2019-05-14 PROCEDURE — 92015 DETERMINE REFRACTIVE STATE: CPT

## 2019-05-14 PROCEDURE — 92004 COMPRE OPH EXAM NEW PT 1/>: CPT

## 2019-05-31 ENCOUNTER — APPOINTMENT (OUTPATIENT)
Dept: CV DIAGNOSITCS | Facility: HOSPITAL | Age: 54
End: 2019-05-31

## 2019-05-31 ENCOUNTER — OUTPATIENT (OUTPATIENT)
Dept: OUTPATIENT SERVICES | Facility: HOSPITAL | Age: 54
LOS: 1 days | End: 2019-05-31
Payer: COMMERCIAL

## 2019-05-31 DIAGNOSIS — S99.919A UNSPECIFIED INJURY OF UNSPECIFIED ANKLE, INITIAL ENCOUNTER: Chronic | ICD-10-CM

## 2019-05-31 DIAGNOSIS — I25.10 ATHEROSCLEROTIC HEART DISEASE OF NATIVE CORONARY ARTERY WITHOUT ANGINA PECTORIS: ICD-10-CM

## 2019-05-31 DIAGNOSIS — Z98.890 OTHER SPECIFIED POSTPROCEDURAL STATES: Chronic | ICD-10-CM

## 2019-05-31 PROCEDURE — 93306 TTE W/DOPPLER COMPLETE: CPT

## 2019-05-31 PROCEDURE — 93306 TTE W/DOPPLER COMPLETE: CPT | Mod: 26

## 2019-06-05 ENCOUNTER — APPOINTMENT (OUTPATIENT)
Dept: CV DIAGNOSITCS | Facility: HOSPITAL | Age: 54
End: 2019-06-05

## 2019-06-05 ENCOUNTER — APPOINTMENT (OUTPATIENT)
Dept: CARDIOLOGY | Facility: CLINIC | Age: 54
End: 2019-06-05
Payer: COMMERCIAL

## 2019-06-05 ENCOUNTER — APPOINTMENT (OUTPATIENT)
Dept: CV DIAGNOSTICS | Facility: HOSPITAL | Age: 54
End: 2019-06-05

## 2019-06-05 VITALS
BODY MASS INDEX: 46.65 KG/M2 | HEART RATE: 92 BPM | WEIGHT: 315 LBS | DIASTOLIC BLOOD PRESSURE: 60 MMHG | SYSTOLIC BLOOD PRESSURE: 98 MMHG | HEIGHT: 69 IN | RESPIRATION RATE: 12 BRPM

## 2019-06-05 DIAGNOSIS — Z87.898 PERSONAL HISTORY OF OTHER SPECIFIED CONDITIONS: ICD-10-CM

## 2019-06-05 PROCEDURE — 99214 OFFICE O/P EST MOD 30 MIN: CPT

## 2019-06-05 NOTE — PHYSICAL EXAM
[General Appearance - Well Developed] : well developed [Well Groomed] : well groomed [Normal Appearance] : normal appearance [General Appearance - Well Nourished] : well nourished [General Appearance - In No Acute Distress] : no acute distress [Normal Conjunctiva] : the conjunctiva exhibited no abnormalities [No Deformities] : no deformities [Eyelids - No Xanthelasma] : the eyelids demonstrated no xanthelasmas [Normal Oral Mucosa] : normal oral mucosa [No Oral Pallor] : no oral pallor [Normal Jugular Venous A Waves Present] : normal jugular venous A waves present [No Oral Cyanosis] : no oral cyanosis [Normal Jugular Venous V Waves Present] : normal jugular venous V waves present [No Jugular Venous Oconnell A Waves] : no jugular venous oconnell A waves [Respiration, Rhythm And Depth] : normal respiratory rhythm and effort [Auscultation Breath Sounds / Voice Sounds] : lungs were clear to auscultation bilaterally [Exaggerated Use Of Accessory Muscles For Inspiration] : no accessory muscle use [Heart Rate And Rhythm] : heart rate and rhythm were normal [Heart Sounds] : normal S1 and S2 [Abdomen Soft] : soft [Murmurs] : no murmurs present [Abdomen Tenderness] : non-tender [Abdomen Mass (___ Cm)] : no abdominal mass palpated [Nail Clubbing] : no clubbing of the fingernails [Cyanosis, Localized] : no localized cyanosis [Petechial Hemorrhages (___cm)] : no petechial hemorrhages [] : no ischemic changes [Skin Color & Pigmentation] : normal skin color and pigmentation [No Skin Ulcers] : no skin ulcer [Skin Lesions] : no skin lesions [No Venous Stasis] : no venous stasis [Oriented To Time, Place, And Person] : oriented to person, place, and time [No Xanthoma] : no  xanthoma was observed [Mood] : the mood was normal [Affect] : the affect was normal [No Anxiety] : not feeling anxious

## 2019-06-05 NOTE — DISCUSSION/SUMMARY
[FreeTextEntry1] : 54 M HTN hyperlipidemia DM obesity for f/u.\par Echocardiogram shows normal LV function and wall motion.\par EKG shows normal sinus rhythm.\par Continue medications for HTN and hyperlipidemia. \par Exercise and diet. \par FROM A CARDIOVASCULAR POINT OF VIEW, PATIENT IS CLEARED FOR WORK. ATTACHED ARE ECHOCARDIOGRAM AND EKG RESULTS.

## 2019-06-05 NOTE — REVIEW OF SYSTEMS
[Shortness Of Breath] : shortness of breath [Dyspnea on exertion] : dyspnea during exertion [Chest Pain] : no chest pain [Palpitations] : palpitations [Negative] : Heme/Lymph

## 2019-06-05 NOTE — HISTORY OF PRESENT ILLNESS
[FreeTextEntry1] : 1. HTN: on medications, controlled. \par 2. Hyperlipidemia: on statin.\par 3. DM: on insulin.\par 4. Echo: patient had echo in late 5/2019. Patient denies CP or SOB.

## 2019-09-14 NOTE — REASON FOR VISIT
chest pain/abdominal pain [Follow-Up - Clinic] : a clinic follow-up of [Dyspnea] : dyspnea [Hyperlipidemia] : hyperlipidemia [Hypertension] : hypertension [FreeTextEntry1] : 54 M HTN hyperlipidemia obesity DM with SOB.

## 2019-10-03 ENCOUNTER — RX RENEWAL (OUTPATIENT)
Age: 54
End: 2019-10-03

## 2019-10-31 ENCOUNTER — OUTPATIENT (OUTPATIENT)
Dept: OUTPATIENT SERVICES | Facility: HOSPITAL | Age: 54
LOS: 1 days | End: 2019-10-31

## 2019-10-31 VITALS
HEART RATE: 97 BPM | TEMPERATURE: 97 F | RESPIRATION RATE: 16 BRPM | SYSTOLIC BLOOD PRESSURE: 124 MMHG | DIASTOLIC BLOOD PRESSURE: 80 MMHG | HEIGHT: 69 IN | WEIGHT: 315 LBS | OXYGEN SATURATION: 98 %

## 2019-10-31 DIAGNOSIS — N18.6 END STAGE RENAL DISEASE: ICD-10-CM

## 2019-10-31 DIAGNOSIS — Z98.890 OTHER SPECIFIED POSTPROCEDURAL STATES: Chronic | ICD-10-CM

## 2019-10-31 DIAGNOSIS — N28.9 DISORDER OF KIDNEY AND URETER, UNSPECIFIED: ICD-10-CM

## 2019-10-31 DIAGNOSIS — S99.919A UNSPECIFIED INJURY OF UNSPECIFIED ANKLE, INITIAL ENCOUNTER: Chronic | ICD-10-CM

## 2019-10-31 DIAGNOSIS — E11.9 TYPE 2 DIABETES MELLITUS WITHOUT COMPLICATIONS: ICD-10-CM

## 2019-10-31 DIAGNOSIS — Z01.818 ENCOUNTER FOR OTHER PREPROCEDURAL EXAMINATION: ICD-10-CM

## 2019-10-31 DIAGNOSIS — G47.33 OBSTRUCTIVE SLEEP APNEA (ADULT) (PEDIATRIC): ICD-10-CM

## 2019-10-31 RX ORDER — CALCITRIOL 0.5 UG/1
1 CAPSULE ORAL
Qty: 0 | Refills: 0 | DISCHARGE

## 2019-10-31 RX ORDER — FEBUXOSTAT 40 MG/1
1 TABLET ORAL
Qty: 0 | Refills: 0 | DISCHARGE

## 2019-10-31 NOTE — H&P PST ADULT - NSICDXPASTMEDICALHX_GEN_ALL_CORE_FT
PAST MEDICAL HISTORY:  Diabetes mellitus     Gout     HLD (hyperlipidemia)     Hypertension     Obesity     BENNIE on CPAP     Renal disease ESRD

## 2019-10-31 NOTE — H&P PST ADULT - ASSESSMENT
53 yo male with history of morbid obesity, BENNIE, DM2 HTN, HLD, gout, ESRD presents to PST unit scheduled for laparoscopic placement of peritoneal dialysis catheter on 11/13/2019.

## 2019-10-31 NOTE — H&P PST ADULT - ATTENDING COMMENTS
placement of PD catheter for dialysis.  Patient explained that due to adhesions some times catheter may not be deployable.

## 2019-10-31 NOTE — H&P PST ADULT - NSICDXPROBLEM_GEN_ALL_CORE_FT
PROBLEM DIAGNOSES  Problem: Renal disease  Assessment and Plan: scheduled for laparoscopic placement of peritoneal dialysis catheter on 11/13/2019.  Verbal and written pre-op instructions provided to patient. Patient verbalized understanding.   patient given verbal and written instruction with teach back on chlorhexidine shampoo, and the patient verbalized understanding with return demonstration.   Patient will obtain medical clearance as per surgeons request-copy requested.     Problem: Type 2 diabetes mellitus  Assessment and Plan: Pt. instructed to reduce Levemir to 48 units night prior procedure. Accucheck DOS. OR booking notified of DM2     Problem: BENNIE on CPAP  Assessment and Plan: BENNIE-on CPAP -OR booking notified

## 2019-10-31 NOTE — H&P PST ADULT - NSICDXPASTSURGICALHX_GEN_ALL_CORE_FT
PAST SURGICAL HISTORY:  H/O hernia repair 30 years ago    H/O shoulder surgery     Injury of ankle and foot surgically repaired, 10 years ago

## 2019-10-31 NOTE — H&P PST ADULT - HISTORY OF PRESENT ILLNESS
53 yo male with history of morbid obesity, BENNIE, DM2 HTN, HLD, gout, ESRD presents to PST unit scheduled for laparoscopic placement of peritoneal dialysis catheter on 11/13/2019. He states he is preparing to receive dialysis at home.

## 2019-11-12 ENCOUNTER — TRANSCRIPTION ENCOUNTER (OUTPATIENT)
Age: 54
End: 2019-11-12

## 2019-11-12 RX ORDER — SODIUM CHLORIDE 9 MG/ML
1000 INJECTION INTRAMUSCULAR; INTRAVENOUS; SUBCUTANEOUS
Refills: 0 | Status: DISCONTINUED | OUTPATIENT
Start: 2019-11-13 | End: 2019-11-28

## 2019-11-12 NOTE — ASU PATIENT PROFILE, ADULT - PMH
Diabetes mellitus    Gout    HLD (hyperlipidemia)    Hypertension    Obesity    BENNIE on CPAP    Renal disease  ESRD

## 2019-11-12 NOTE — ASU PATIENT PROFILE, ADULT - PSH
H/O hernia repair  30 years ago  H/O shoulder surgery    Injury of ankle and foot  surgically repaired, 10 years ago

## 2019-11-13 ENCOUNTER — OUTPATIENT (OUTPATIENT)
Dept: OUTPATIENT SERVICES | Facility: HOSPITAL | Age: 54
LOS: 1 days | Discharge: HOME | End: 2019-11-13

## 2019-11-13 VITALS
HEART RATE: 82 BPM | OXYGEN SATURATION: 96 % | RESPIRATION RATE: 15 BRPM | TEMPERATURE: 99 F | WEIGHT: 315 LBS | DIASTOLIC BLOOD PRESSURE: 82 MMHG | SYSTOLIC BLOOD PRESSURE: 119 MMHG | HEIGHT: 70 IN

## 2019-11-13 VITALS
OXYGEN SATURATION: 98 % | HEART RATE: 88 BPM | SYSTOLIC BLOOD PRESSURE: 120 MMHG | DIASTOLIC BLOOD PRESSURE: 76 MMHG | RESPIRATION RATE: 13 BRPM

## 2019-11-13 DIAGNOSIS — N18.6 END STAGE RENAL DISEASE: ICD-10-CM

## 2019-11-13 DIAGNOSIS — Z98.890 OTHER SPECIFIED POSTPROCEDURAL STATES: Chronic | ICD-10-CM

## 2019-11-13 DIAGNOSIS — S99.919A UNSPECIFIED INJURY OF UNSPECIFIED ANKLE, INITIAL ENCOUNTER: Chronic | ICD-10-CM

## 2019-11-13 DIAGNOSIS — R09.89 OTHER SPECIFIED SYMPTOMS AND SIGNS INVOLVING THE CIRCULATORY AND RESPIRATORY SYSTEMS: ICD-10-CM

## 2019-11-13 RX ORDER — OXYCODONE HYDROCHLORIDE 5 MG/1
1 TABLET ORAL
Qty: 10 | Refills: 0
Start: 2019-11-13

## 2019-11-13 RX ORDER — OXYCODONE HYDROCHLORIDE 5 MG/1
5 TABLET ORAL EVERY 6 HOURS
Refills: 0 | Status: DISCONTINUED | OUTPATIENT
Start: 2019-11-13 | End: 2019-11-13

## 2019-11-13 NOTE — ASU DISCHARGE PLAN (ADULT/PEDIATRIC) - NURSING INSTRUCTIONS
You were given intravenous TORADOL for pain management at _2.10 pm. Please DO NOT take ibuprofen containing products such as MOTRIN or ADVIL, or any other NSAIDs (Non-Steroidal Anti-Inflammatory Drugs) such as ALEVE for the next 6 hours (until 8.10 pm You were given intravenous TYLENOL for pain management at 2.10 pm. Please DO NOT take any products containing TYLENOL or ACETAMINOPHEN, such as VICODIN, PERCOCET, EXCEDRIN, and any over-the-counter cold medication for the next 6 hours (until 8.10 pm). DO NOT TAKE MORE THAN 3000 MG OF TYLENOL in a 24 hour period.

## 2019-11-13 NOTE — ASU DISCHARGE PLAN (ADULT/PEDIATRIC) - ASU DC SPECIAL INSTRUCTIONSFT
Please follow up with Dr. Merritt in 2 weeks and make an appointment with your nephrologist to initiate peritoneal dialysis

## 2019-11-13 NOTE — BRIEF OPERATIVE NOTE - NSICDXBRIEFPROCEDURE_GEN_ALL_CORE_FT
PROCEDURES:  Laparoscopic insertion of peritoneal dialysis catheter 13-Nov-2019 14:37:45  Liliana Reza

## 2019-11-13 NOTE — ASU DISCHARGE PLAN (ADULT/PEDIATRIC) - CARE PROVIDER_API CALL
Daryl Merritt (MD)  Surgery  [address], [address]  [place] [address]  Phone: [phone]  Fax: [phone]  Follow Up Time:

## 2019-11-13 NOTE — ASU DISCHARGE PLAN (ADULT/PEDIATRIC) - CALL YOUR DOCTOR IF YOU HAVE ANY OF THE FOLLOWING:
Fever greater than (need to indicate Fahrenheit or Celsius)/Swelling that gets worse/Wound/Surgical Site with redness, or foul smelling discharge or pus/Pain not relieved by Medications

## 2019-12-10 ENCOUNTER — TRANSCRIPTION ENCOUNTER (OUTPATIENT)
Age: 54
End: 2019-12-10

## 2019-12-10 ENCOUNTER — OUTPATIENT (OUTPATIENT)
Dept: OUTPATIENT SERVICES | Facility: HOSPITAL | Age: 54
LOS: 1 days | End: 2019-12-10

## 2019-12-10 VITALS
RESPIRATION RATE: 14 BRPM | OXYGEN SATURATION: 98 % | HEART RATE: 84 BPM | TEMPERATURE: 98 F | SYSTOLIC BLOOD PRESSURE: 122 MMHG | DIASTOLIC BLOOD PRESSURE: 80 MMHG | HEIGHT: 70 IN | WEIGHT: 315 LBS

## 2019-12-10 DIAGNOSIS — N18.6 END STAGE RENAL DISEASE: ICD-10-CM

## 2019-12-10 DIAGNOSIS — S99.919A UNSPECIFIED INJURY OF UNSPECIFIED ANKLE, INITIAL ENCOUNTER: Chronic | ICD-10-CM

## 2019-12-10 DIAGNOSIS — I10 ESSENTIAL (PRIMARY) HYPERTENSION: ICD-10-CM

## 2019-12-10 DIAGNOSIS — Z98.890 OTHER SPECIFIED POSTPROCEDURAL STATES: Chronic | ICD-10-CM

## 2019-12-10 DIAGNOSIS — E11.9 TYPE 2 DIABETES MELLITUS WITHOUT COMPLICATIONS: ICD-10-CM

## 2019-12-10 DIAGNOSIS — G47.33 OBSTRUCTIVE SLEEP APNEA (ADULT) (PEDIATRIC): ICD-10-CM

## 2019-12-10 PROBLEM — E78.5 HYPERLIPIDEMIA, UNSPECIFIED: Chronic | Status: ACTIVE | Noted: 2019-10-31

## 2019-12-10 PROBLEM — E66.9 OBESITY, UNSPECIFIED: Chronic | Status: ACTIVE | Noted: 2019-10-31

## 2019-12-10 PROBLEM — N28.9 DISORDER OF KIDNEY AND URETER, UNSPECIFIED: Chronic | Status: ACTIVE | Noted: 2017-10-31

## 2019-12-10 LAB
ANION GAP SERPL CALC-SCNC: 15 MMO/L — HIGH (ref 7–14)
BUN SERPL-MCNC: 84 MG/DL — HIGH (ref 7–23)
CALCIUM SERPL-MCNC: 9.5 MG/DL — SIGNIFICANT CHANGE UP (ref 8.4–10.5)
CHLORIDE SERPL-SCNC: 109 MMOL/L — HIGH (ref 98–107)
CO2 SERPL-SCNC: 16 MMOL/L — LOW (ref 22–31)
CREAT SERPL-MCNC: 5.8 MG/DL — HIGH (ref 0.5–1.3)
GLUCOSE SERPL-MCNC: 95 MG/DL — SIGNIFICANT CHANGE UP (ref 70–99)
HBA1C BLD-MCNC: 6.4 % — HIGH (ref 4–5.6)
HCT VFR BLD CALC: 33.3 % — LOW (ref 39–50)
HGB BLD-MCNC: 9.9 G/DL — LOW (ref 13–17)
MCHC RBC-ENTMCNC: 17.9 PG — LOW (ref 27–34)
MCHC RBC-ENTMCNC: 29.7 % — LOW (ref 32–36)
MCV RBC AUTO: 60.3 FL — LOW (ref 80–100)
NRBC # FLD: 0 K/UL — SIGNIFICANT CHANGE UP (ref 0–0)
PLATELET # BLD AUTO: 409 K/UL — HIGH (ref 150–400)
PMV BLD: 9.9 FL — SIGNIFICANT CHANGE UP (ref 7–13)
POTASSIUM SERPL-MCNC: 4.5 MMOL/L — SIGNIFICANT CHANGE UP (ref 3.5–5.3)
POTASSIUM SERPL-SCNC: 4.5 MMOL/L — SIGNIFICANT CHANGE UP (ref 3.5–5.3)
RBC # BLD: 5.52 M/UL — SIGNIFICANT CHANGE UP (ref 4.2–5.8)
RBC # FLD: 18.2 % — HIGH (ref 10.3–14.5)
SODIUM SERPL-SCNC: 140 MMOL/L — SIGNIFICANT CHANGE UP (ref 135–145)
WBC # BLD: 14.19 K/UL — HIGH (ref 3.8–10.5)
WBC # FLD AUTO: 14.19 K/UL — HIGH (ref 3.8–10.5)

## 2019-12-10 RX ORDER — SODIUM CHLORIDE 9 MG/ML
1000 INJECTION INTRAMUSCULAR; INTRAVENOUS; SUBCUTANEOUS
Refills: 0 | Status: DISCONTINUED | OUTPATIENT
Start: 2019-12-11 | End: 2019-12-11

## 2019-12-10 RX ORDER — NIFEDIPINE 30 MG
1 TABLET, EXTENDED RELEASE 24 HR ORAL
Qty: 0 | Refills: 0 | DISCHARGE

## 2019-12-10 RX ORDER — CHOLECALCIFEROL (VITAMIN D3) 125 MCG
1 CAPSULE ORAL
Qty: 0 | Refills: 0 | DISCHARGE

## 2019-12-10 RX ORDER — INSULIN DETEMIR 100/ML (3)
60 INSULIN PEN (ML) SUBCUTANEOUS
Qty: 0 | Refills: 0 | DISCHARGE

## 2019-12-10 RX ORDER — ATORVASTATIN CALCIUM 80 MG/1
1 TABLET, FILM COATED ORAL
Qty: 0 | Refills: 0 | DISCHARGE

## 2019-12-10 RX ORDER — ASPIRIN/CALCIUM CARB/MAGNESIUM 324 MG
1 TABLET ORAL
Qty: 0 | Refills: 0 | DISCHARGE

## 2019-12-10 RX ORDER — INSULIN LISPRO 100/ML
18 VIAL (ML) SUBCUTANEOUS
Qty: 0 | Refills: 0 | DISCHARGE

## 2019-12-10 RX ORDER — CALCITRIOL 0.5 UG/1
1 CAPSULE ORAL
Qty: 0 | Refills: 0 | DISCHARGE

## 2019-12-10 RX ORDER — FEBUXOSTAT 40 MG/1
1 TABLET ORAL
Qty: 0 | Refills: 0 | DISCHARGE

## 2019-12-10 NOTE — H&P PST ADULT - NSICDXPROBLEM_GEN_ALL_CORE_FT
PROBLEM DIAGNOSES  Problem: End stage renal disease  Assessment and Plan: scheduled laparoscopic removal of old peritoneal diaylsis catheter, placement of new peritoneal dialysis catheter on 12/11/2019  Written & verbal preop instructions, gi prophylaxis & surgical soap given  Pt verbalized good understanding.  Teach back done on surgical soap instructions.  copy of recent echo in chart  K+on admit  case discussed with Dr Wade       Problem: Diabetes mellitus, type II  Assessment and Plan: Fs on admit  pt instructed to take levemir 48units on 12/10/19 and no humalog on morning of surgery    Problem: Obstructive sleep apnea  Assessment and Plan: Compliant with CPAP  OR booking notified     Problem: Hypertension  Assessment and Plan: continue medication per routine schedule PROBLEM DIAGNOSES  Problem: End stage renal disease  Assessment and Plan: scheduled laparoscopic removal of old peritoneal diaylsis catheter, placement of new peritoneal dialysis catheter on 12/11/2019  Written & verbal preop instructions, gi prophylaxis & surgical soap given  Pt verbalized good understanding.  Teach back done on surgical soap instructions.  copy of recent echo in chart  K+on admit  case discussed with Dr Wade       Problem: Diabetes mellitus, type II  Assessment and Plan: Fs on admit  pt instructed to take levemir 48units on 12/10/19 and no humalog on morning of surgery    Problem: Obstructive sleep apnea  Assessment and Plan: Compliant with CPAP  OR booking notified, spoke with Nydia    Problem: Hypertension  Assessment and Plan: continue medication per routine schedule

## 2019-12-10 NOTE — H&P PST ADULT - LYMPHATIC
posterior cervical L/anterior cervical L/anterior cervical R/supraclavicular R/supraclavicular L/posterior cervical R

## 2019-12-10 NOTE — H&P PST ADULT - HISTORY OF PRESENT ILLNESS
53 yo male with history of morbid obesity, BENNIE, DM2 HTN, HLD, gout, ESRD presents to PST unit scheduled for laparoscopic placement of peritoneal dialysis catheter on 11/13/2019. He states he is preparing to receive dialysis at home. 53 yo male with history of morbid obesity, BENNIE on CPAP, DM2 HTN, HLD, gout, ESRD presents to PST unit scheduled for laparoscopic placement of peritoneal dialysis catheter on 11/13/2019. He states he is preparing to receive dialysis at home.   Pt presents today for preop eval for scheduled laparoscopic removal of old peritoneal diaylsis catheter, placement of new peritoneal dialysis catheter on 12/11/2019.  Per pt catheter not functioning.

## 2019-12-10 NOTE — H&P PST ADULT - ATTENDING COMMENTS
Patient had placement of PD catheter on Nov 13th but Catheter is not funtioning and has white fluid comeing out of iy.  Contrast study shows a small pocket without any dispersion of contrast.  Plan for removal of left side PD and Placement of new one right side.  patient explained procedure including risks of bleeding and infection.  he understands and agrees for procedure.  If any infection then only removal and no new placement.

## 2019-12-11 ENCOUNTER — OUTPATIENT (OUTPATIENT)
Dept: OUTPATIENT SERVICES | Facility: HOSPITAL | Age: 54
LOS: 1 days | Discharge: HOME | End: 2019-12-11
Payer: COMMERCIAL

## 2019-12-11 VITALS
OXYGEN SATURATION: 96 % | WEIGHT: 315 LBS | DIASTOLIC BLOOD PRESSURE: 82 MMHG | TEMPERATURE: 99 F | SYSTOLIC BLOOD PRESSURE: 147 MMHG | HEIGHT: 70 IN | HEART RATE: 85 BPM | RESPIRATION RATE: 14 BRPM

## 2019-12-11 DIAGNOSIS — Z98.890 OTHER SPECIFIED POSTPROCEDURAL STATES: Chronic | ICD-10-CM

## 2019-12-11 DIAGNOSIS — S99.919A UNSPECIFIED INJURY OF UNSPECIFIED ANKLE, INITIAL ENCOUNTER: Chronic | ICD-10-CM

## 2019-12-11 DIAGNOSIS — N18.6 END STAGE RENAL DISEASE: ICD-10-CM

## 2019-12-11 LAB
ANION GAP SERPL CALC-SCNC: 17 MMO/L — HIGH (ref 7–14)
BUN SERPL-MCNC: 87 MG/DL — HIGH (ref 7–23)
CALCIUM SERPL-MCNC: 9.1 MG/DL — SIGNIFICANT CHANGE UP (ref 8.4–10.5)
CHLORIDE SERPL-SCNC: 109 MMOL/L — HIGH (ref 98–107)
CO2 SERPL-SCNC: 14 MMOL/L — LOW (ref 22–31)
CREAT SERPL-MCNC: 5.8 MG/DL — HIGH (ref 0.5–1.3)
GLUCOSE BLDC GLUCOMTR-MCNC: 123 MG/DL — HIGH (ref 70–99)
GLUCOSE BLDV-MCNC: 117 MG/DL — HIGH (ref 70–99)
GLUCOSE SERPL-MCNC: 165 MG/DL — HIGH (ref 70–99)
HCT VFR BLD CALC: 33.5 % — LOW (ref 39–50)
HGB BLD-MCNC: 10 G/DL — LOW (ref 13–17)
MAGNESIUM SERPL-MCNC: 1.9 MG/DL — SIGNIFICANT CHANGE UP (ref 1.6–2.6)
MCHC RBC-ENTMCNC: 18 PG — LOW (ref 27–34)
MCHC RBC-ENTMCNC: 29.9 % — LOW (ref 32–36)
MCV RBC AUTO: 60.3 FL — LOW (ref 80–100)
NRBC # FLD: 0 K/UL — SIGNIFICANT CHANGE UP (ref 0–0)
PHOSPHATE SERPL-MCNC: 6 MG/DL — HIGH (ref 2.5–4.5)
PLATELET # BLD AUTO: 402 K/UL — HIGH (ref 150–400)
PMV BLD: 9.7 FL — SIGNIFICANT CHANGE UP (ref 7–13)
POTASSIUM BLDV-SCNC: 4.3 MMOL/L — SIGNIFICANT CHANGE UP (ref 3.4–4.5)
POTASSIUM SERPL-MCNC: 4.9 MMOL/L — SIGNIFICANT CHANGE UP (ref 3.5–5.3)
POTASSIUM SERPL-SCNC: 4.9 MMOL/L — SIGNIFICANT CHANGE UP (ref 3.5–5.3)
RBC # BLD: 5.56 M/UL — SIGNIFICANT CHANGE UP (ref 4.2–5.8)
RBC # FLD: 18.5 % — HIGH (ref 10.3–14.5)
SODIUM SERPL-SCNC: 140 MMOL/L — SIGNIFICANT CHANGE UP (ref 135–145)
WBC # BLD: 21.98 K/UL — HIGH (ref 3.8–10.5)
WBC # FLD AUTO: 21.98 K/UL — HIGH (ref 3.8–10.5)

## 2019-12-11 PROCEDURE — 49320 DIAG LAPARO SEPARATE PROC: CPT

## 2019-12-11 RX ORDER — OXYCODONE HYDROCHLORIDE 5 MG/1
5 TABLET ORAL EVERY 4 HOURS
Refills: 0 | Status: DISCONTINUED | OUTPATIENT
Start: 2019-12-11 | End: 2019-12-11

## 2019-12-11 RX ORDER — ONDANSETRON 8 MG/1
4 TABLET, FILM COATED ORAL ONCE
Refills: 0 | Status: DISCONTINUED | OUTPATIENT
Start: 2019-12-11 | End: 2019-12-12

## 2019-12-11 RX ORDER — ACETAMINOPHEN 500 MG
650 TABLET ORAL EVERY 6 HOURS
Refills: 0 | Status: DISCONTINUED | OUTPATIENT
Start: 2019-12-11 | End: 2019-12-11

## 2019-12-11 RX ORDER — ACETAMINOPHEN 500 MG
650 TABLET ORAL EVERY 6 HOURS
Refills: 0 | Status: DISCONTINUED | OUTPATIENT
Start: 2019-12-11 | End: 2019-12-12

## 2019-12-11 RX ORDER — GLUCAGON INJECTION, SOLUTION 0.5 MG/.1ML
1 INJECTION, SOLUTION SUBCUTANEOUS ONCE
Refills: 0 | Status: DISCONTINUED | OUTPATIENT
Start: 2019-12-11 | End: 2019-12-11

## 2019-12-11 RX ORDER — ENOXAPARIN SODIUM 100 MG/ML
40 INJECTION SUBCUTANEOUS DAILY
Refills: 0 | Status: DISCONTINUED | OUTPATIENT
Start: 2019-12-11 | End: 2019-12-11

## 2019-12-11 RX ORDER — OXYCODONE HYDROCHLORIDE 5 MG/1
10 TABLET ORAL EVERY 4 HOURS
Refills: 0 | Status: DISCONTINUED | OUTPATIENT
Start: 2019-12-11 | End: 2019-12-11

## 2019-12-11 RX ORDER — OXYCODONE HYDROCHLORIDE 5 MG/1
10 TABLET ORAL EVERY 4 HOURS
Refills: 0 | Status: DISCONTINUED | OUTPATIENT
Start: 2019-12-11 | End: 2019-12-13

## 2019-12-11 RX ORDER — DEXTROSE 50 % IN WATER 50 %
25 SYRINGE (ML) INTRAVENOUS ONCE
Refills: 0 | Status: DISCONTINUED | OUTPATIENT
Start: 2019-12-11 | End: 2019-12-11

## 2019-12-11 RX ORDER — HYDROMORPHONE HYDROCHLORIDE 2 MG/ML
0.5 INJECTION INTRAMUSCULAR; INTRAVENOUS; SUBCUTANEOUS
Refills: 0 | Status: DISCONTINUED | OUTPATIENT
Start: 2019-12-11 | End: 2019-12-11

## 2019-12-11 RX ORDER — INSULIN LISPRO 100/ML
VIAL (ML) SUBCUTANEOUS
Refills: 0 | Status: DISCONTINUED | OUTPATIENT
Start: 2019-12-11 | End: 2019-12-28

## 2019-12-11 RX ORDER — ONDANSETRON 8 MG/1
4 TABLET, FILM COATED ORAL ONCE
Refills: 0 | Status: DISCONTINUED | OUTPATIENT
Start: 2019-12-11 | End: 2019-12-11

## 2019-12-11 RX ORDER — HYDROMORPHONE HYDROCHLORIDE 2 MG/ML
0.5 INJECTION INTRAMUSCULAR; INTRAVENOUS; SUBCUTANEOUS
Refills: 0 | Status: DISCONTINUED | OUTPATIENT
Start: 2019-12-11 | End: 2019-12-12

## 2019-12-11 RX ORDER — DEXTROSE 50 % IN WATER 50 %
15 SYRINGE (ML) INTRAVENOUS ONCE
Refills: 0 | Status: DISCONTINUED | OUTPATIENT
Start: 2019-12-11 | End: 2019-12-11

## 2019-12-11 RX ORDER — DEXTROSE 50 % IN WATER 50 %
12.5 SYRINGE (ML) INTRAVENOUS ONCE
Refills: 0 | Status: DISCONTINUED | OUTPATIENT
Start: 2019-12-11 | End: 2019-12-28

## 2019-12-11 RX ORDER — CEFAZOLIN SODIUM 1 G
1000 VIAL (EA) INJECTION EVERY 8 HOURS
Refills: 0 | Status: DISCONTINUED | OUTPATIENT
Start: 2019-12-11 | End: 2019-12-12

## 2019-12-11 RX ORDER — HYDROMORPHONE HYDROCHLORIDE 2 MG/ML
1 INJECTION INTRAMUSCULAR; INTRAVENOUS; SUBCUTANEOUS
Refills: 0 | Status: DISCONTINUED | OUTPATIENT
Start: 2019-12-11 | End: 2019-12-11

## 2019-12-11 RX ORDER — INFLUENZA VIRUS VACCINE 15; 15; 15; 15 UG/.5ML; UG/.5ML; UG/.5ML; UG/.5ML
0.5 SUSPENSION INTRAMUSCULAR ONCE
Refills: 0 | Status: DISCONTINUED | OUTPATIENT
Start: 2019-12-11 | End: 2019-12-28

## 2019-12-11 RX ORDER — SODIUM CHLORIDE 9 MG/ML
1000 INJECTION INTRAMUSCULAR; INTRAVENOUS; SUBCUTANEOUS
Refills: 0 | Status: DISCONTINUED | OUTPATIENT
Start: 2019-12-11 | End: 2019-12-12

## 2019-12-11 RX ORDER — SODIUM CHLORIDE 9 MG/ML
1000 INJECTION, SOLUTION INTRAVENOUS
Refills: 0 | Status: DISCONTINUED | OUTPATIENT
Start: 2019-12-11 | End: 2019-12-11

## 2019-12-11 RX ORDER — DEXTROSE 50 % IN WATER 50 %
12.5 SYRINGE (ML) INTRAVENOUS ONCE
Refills: 0 | Status: DISCONTINUED | OUTPATIENT
Start: 2019-12-11 | End: 2019-12-11

## 2019-12-11 RX ORDER — DEXTROSE 50 % IN WATER 50 %
25 SYRINGE (ML) INTRAVENOUS ONCE
Refills: 0 | Status: DISCONTINUED | OUTPATIENT
Start: 2019-12-11 | End: 2019-12-28

## 2019-12-11 RX ORDER — CEFAZOLIN SODIUM 1 G
1000 VIAL (EA) INJECTION EVERY 8 HOURS
Refills: 0 | Status: DISCONTINUED | OUTPATIENT
Start: 2019-12-11 | End: 2019-12-11

## 2019-12-11 RX ORDER — DEXTROSE 50 % IN WATER 50 %
15 SYRINGE (ML) INTRAVENOUS ONCE
Refills: 0 | Status: DISCONTINUED | OUTPATIENT
Start: 2019-12-11 | End: 2019-12-28

## 2019-12-11 RX ORDER — ENOXAPARIN SODIUM 100 MG/ML
30 INJECTION SUBCUTANEOUS DAILY
Refills: 0 | Status: DISCONTINUED | OUTPATIENT
Start: 2019-12-11 | End: 2019-12-12

## 2019-12-11 RX ORDER — SODIUM CHLORIDE 9 MG/ML
1000 INJECTION, SOLUTION INTRAVENOUS
Refills: 0 | Status: DISCONTINUED | OUTPATIENT
Start: 2019-12-11 | End: 2019-12-12

## 2019-12-11 RX ORDER — INSULIN LISPRO 100/ML
VIAL (ML) SUBCUTANEOUS
Refills: 0 | Status: DISCONTINUED | OUTPATIENT
Start: 2019-12-11 | End: 2019-12-11

## 2019-12-11 RX ORDER — HYDROMORPHONE HYDROCHLORIDE 2 MG/ML
1 INJECTION INTRAMUSCULAR; INTRAVENOUS; SUBCUTANEOUS
Refills: 0 | Status: DISCONTINUED | OUTPATIENT
Start: 2019-12-11 | End: 2019-12-12

## 2019-12-11 RX ORDER — GLUCAGON INJECTION, SOLUTION 0.5 MG/.1ML
1 INJECTION, SOLUTION SUBCUTANEOUS ONCE
Refills: 0 | Status: DISCONTINUED | OUTPATIENT
Start: 2019-12-11 | End: 2019-12-28

## 2019-12-11 RX ADMIN — SODIUM CHLORIDE 30 MILLILITER(S): 9 INJECTION INTRAMUSCULAR; INTRAVENOUS; SUBCUTANEOUS at 21:55

## 2019-12-11 RX ADMIN — HYDROMORPHONE HYDROCHLORIDE 1 MILLIGRAM(S): 2 INJECTION INTRAMUSCULAR; INTRAVENOUS; SUBCUTANEOUS at 17:12

## 2019-12-11 RX ADMIN — HYDROMORPHONE HYDROCHLORIDE 0.5 MILLIGRAM(S): 2 INJECTION INTRAMUSCULAR; INTRAVENOUS; SUBCUTANEOUS at 17:17

## 2019-12-11 RX ADMIN — HYDROMORPHONE HYDROCHLORIDE 1 MILLIGRAM(S): 2 INJECTION INTRAMUSCULAR; INTRAVENOUS; SUBCUTANEOUS at 16:55

## 2019-12-11 RX ADMIN — HYDROMORPHONE HYDROCHLORIDE 1 MILLIGRAM(S): 2 INJECTION INTRAMUSCULAR; INTRAVENOUS; SUBCUTANEOUS at 21:51

## 2019-12-11 RX ADMIN — HYDROMORPHONE HYDROCHLORIDE 1 MILLIGRAM(S): 2 INJECTION INTRAMUSCULAR; INTRAVENOUS; SUBCUTANEOUS at 22:16

## 2019-12-11 RX ADMIN — Medication 100 MILLIGRAM(S): at 21:55

## 2019-12-11 RX ADMIN — HYDROMORPHONE HYDROCHLORIDE 0.5 MILLIGRAM(S): 2 INJECTION INTRAMUSCULAR; INTRAVENOUS; SUBCUTANEOUS at 17:32

## 2019-12-11 NOTE — H&P ADULT - NSHPPHYSICALEXAM_GEN_ALL_CORE
Physical Exam:  Gen: NAD  LS: nml respiratory effort  Card: pulse regularly present  GI: abd soft, appropriately tender, port sites with dressings c/d/i, left abd PD catheter in place  Ext: warm

## 2019-12-11 NOTE — BRIEF OPERATIVE NOTE - NSICDXBRIEFPOSTOP_GEN_ALL_CORE_FT
POST-OP DIAGNOSIS:  Peritoneal adhesions without obstruction 11-Dec-2019 16:56:18  [place]  Phlegmon 11-Dec-2019 16:55:37 at peritoneal dialysis catheter tip site [place]  Disorder of peritoneal dialysis catheter 11-Dec-2019 16:55:17  [place]

## 2019-12-11 NOTE — H&P ADULT - HISTORY OF PRESENT ILLNESS
53yo M with hx of morbid obesity, BENNIE on CPAP, DM2 HTN, HLD, gout, ESRD on PD (nonfunctioning) presented 12/11 for scheduled laparoscopic removal of old peritoneal dialysis catheter and placement of new peritoneal dialysis catheter. Patient underwent laparoscopy and was found to have extensive adhesions requiring adhesiolysis. The PD catheter tip was found to be encased in phlegmon, was freed, and flushed freely afterward. Patient also had repair on enterotomy and was admitted for observation. 53yo M with hx of morbid obesity, BENNIE on CPAP, DM2 HTN, HLD, gout, ESRD on PD (nonfunctioning) presented 12/11 for scheduled laparoscopic removal of old peritoneal dialysis catheter and placement of new peritoneal dialysis catheter. Patient underwent laparoscopy and was found to have extensive adhesions requiring adhesiolysis. The PD catheter tip was found to be encased in phlegmon, was freed, and flushed freely afterward. Patient also had repair on desarosalization and was admitted for observation.

## 2019-12-11 NOTE — CHART NOTE - NSCHARTNOTEFT_GEN_A_CORE
SURGERY POST OP CHECK    Patient seen and examined in PACU s/p revision of peritoneal dialysis catheter and JOSHUA. No acute events since surgery. Pain well controlled. Denies N/V/D/F/C. No flatus or BM since surgery. AVSS. Abdominal exam: soft, appropriately tender, distended; dressings are clean, dry, intact and catheter is in place. Plan is to admit for overnight observation, continue lovenox for dvt prophylaxis, ancef, ISS, and multimodal pain control.    Surgery B team  [id number]

## 2019-12-11 NOTE — H&P ADULT - NSICDXPASTMEDICALHX_GEN_ALL_CORE_FT
H/O partial thyroidectomy  1997  H/O: hysterectomy
PAST MEDICAL HISTORY:  Diabetes mellitus     Gout     HLD (hyperlipidemia)     Hypertension     Obesity     BENNIE on CPAP     Renal disease ESRD

## 2019-12-11 NOTE — H&P ADULT - NSHPLABSRESULTS_GEN_ALL_CORE
12-10    140  |  109<H>  |  84<H>  ----------------------------<  95  4.5   |  16<L>  |  5.80<H>    Ca    9.5      10 Dec 2019 15:30            RECENT CULTURES:

## 2019-12-11 NOTE — BRIEF OPERATIVE NOTE - NSICDXBRIEFPROCEDURE_GEN_ALL_CORE_FT
PROCEDURES:  Repair, enterotomy 11-Dec-2019 16:54:45  [place]  Revision or removal, catheter, peritoneal 11-Dec-2019 16:54:07  [place]  Lysis of adhesions of peritoneum 11-Dec-2019 16:53:45  [place] PROCEDURES:  Revision or removal, catheter, peritoneal 11-Dec-2019 16:54:07  [place]  Lysis of adhesions of peritoneum 11-Dec-2019 16:53:45  [place]

## 2019-12-11 NOTE — BRIEF OPERATIVE NOTE - OPERATION/FINDINGS
Extensive adhesions encountered upon entering peritoneal cavity requiring extensive lysis of adhesions. PD catheter tip found to be encased in phlegmon against peritoneal sidewall. intraperitoneal portion of catheter freed and repair of small bowel enterotomy performed. The PD catheter was subsequently repositioned and found to flush freely. Extensive adhesions encountered upon entering peritoneal cavity requiring extensive lysis of adhesions. PD catheter tip found to be encased in phlegmon against peritoneal sidewall. intraperitoneal portion of catheter freed and repair of small bowel desarosalization performed. The PD catheter was subsequently repositioned and found to flush freely.

## 2019-12-11 NOTE — H&P ADULT - ASSESSMENT
55yo M with hx of morbid obesity, BENNIE on CPAP, DM2 HTN, HLD, gout, ESRD on PD (nonfunctioning) presented 12/11 for scheduled laparoscopic removal of old peritoneal dialysis catheter and placement of new peritoneal dialysis catheter. Now s/p laparoscopy, extensive lysis of adhesions, repair of enterotomy, and repositioning of PD catheter tip.     Plan  - Admit to Dr. Maher  - Pain control: acetaminophen 650mg PO q6h PRN, Oxy 5&10mg PO q4h PRN  - Consistent carb diet  - F/u with patient's wife for updated medication list and dosages  - DVT ppx: Lovenox  - Dispo: likely discharge 12/12 if tolerated diet and recovering well  - Discussed with Dr. Merritt and Dr. Maher 53yo M with hx of morbid obesity, BENNIE on CPAP, DM2 HTN, HLD, gout, ESRD on PD (nonfunctioning) presented 12/11 for scheduled laparoscopic removal of old peritoneal dialysis catheter and placement of new peritoneal dialysis catheter. Now s/p laparoscopy, extensive lysis of adhesions, repair of enterotomy, and repositioning of PD catheter tip.     Plan  - Admit to Dr. Maher  - Pain control: acetaminophen 650mg PO q6h PRN, Oxy 5&10mg PO q4h PRN  - Consistent carb diet  - Ancef IV antibiotic  - F/u with patient's wife for updated medication list and dosages  - DVT ppx: Lovenox  - Dispo: likely discharge 12/12 if tolerated diet and recovering well  - Discussed with Dr. Merritt and Dr. Maher 55yo M with hx of morbid obesity, BENNIE on CPAP, DM2 HTN, HLD, gout, ESRD on PD (nonfunctioning) presented 12/11 for scheduled laparoscopic removal of old peritoneal dialysis catheter and placement of new peritoneal dialysis catheter. Now s/p laparoscopy, extensive lysis of adhesions, repair of desarosalization, and repositioning of PD catheter tip.     Plan  - Admit to Dr. Maher  - Pain control: acetaminophen 650mg PO q6h PRN, Oxy 5&10mg PO q4h PRN  - Consistent carb diet  - Ancef IV antibiotic  - F/u with patient's wife for updated medication list and dosages  - DVT ppx: Lovenox  - Dispo: likely discharge 12/12 if tolerated diet and recovering well  - Discussed with Dr. Merritt and Dr. Maher

## 2019-12-12 ENCOUNTER — TRANSCRIPTION ENCOUNTER (OUTPATIENT)
Age: 54
End: 2019-12-12

## 2019-12-12 LAB
GLUCOSE BLDC GLUCOMTR-MCNC: 112 MG/DL — HIGH (ref 70–99)
GLUCOSE BLDC GLUCOMTR-MCNC: 114 MG/DL — HIGH (ref 70–99)
GLUCOSE BLDC GLUCOMTR-MCNC: 118 MG/DL — HIGH (ref 70–99)
GLUCOSE BLDC GLUCOMTR-MCNC: 119 MG/DL — HIGH (ref 70–99)

## 2019-12-12 RX ORDER — CALCITRIOL 0.5 UG/1
0.25 CAPSULE ORAL DAILY
Refills: 0 | Status: DISCONTINUED | OUTPATIENT
Start: 2019-12-12 | End: 2019-12-28

## 2019-12-12 RX ORDER — ATORVASTATIN CALCIUM 80 MG/1
40 TABLET, FILM COATED ORAL AT BEDTIME
Refills: 0 | Status: DISCONTINUED | OUTPATIENT
Start: 2019-12-12 | End: 2019-12-28

## 2019-12-12 RX ORDER — SODIUM BICARBONATE 1 MEQ/ML
1300 SYRINGE (ML) INTRAVENOUS
Refills: 0 | Status: DISCONTINUED | OUTPATIENT
Start: 2019-12-12 | End: 2019-12-13

## 2019-12-12 RX ORDER — NIFEDIPINE 30 MG
60 TABLET, EXTENDED RELEASE 24 HR ORAL DAILY
Refills: 0 | Status: DISCONTINUED | OUTPATIENT
Start: 2019-12-12 | End: 2019-12-28

## 2019-12-12 RX ORDER — CEFAZOLIN SODIUM 1 G
1000 VIAL (EA) INJECTION EVERY 24 HOURS
Refills: 0 | Status: DISCONTINUED | OUTPATIENT
Start: 2019-12-13 | End: 2019-12-28

## 2019-12-12 RX ORDER — HEPARIN SODIUM 5000 [USP'U]/ML
5000 INJECTION INTRAVENOUS; SUBCUTANEOUS EVERY 8 HOURS
Refills: 0 | Status: DISCONTINUED | OUTPATIENT
Start: 2019-12-12 | End: 2019-12-28

## 2019-12-12 RX ORDER — INSULIN LISPRO 100/ML
VIAL (ML) SUBCUTANEOUS AT BEDTIME
Refills: 0 | Status: DISCONTINUED | OUTPATIENT
Start: 2019-12-12 | End: 2019-12-28

## 2019-12-12 RX ORDER — HYDROMORPHONE HYDROCHLORIDE 2 MG/ML
0.5 INJECTION INTRAMUSCULAR; INTRAVENOUS; SUBCUTANEOUS ONCE
Refills: 0 | Status: DISCONTINUED | OUTPATIENT
Start: 2019-12-12 | End: 2019-12-12

## 2019-12-12 RX ORDER — ACETAMINOPHEN 500 MG
975 TABLET ORAL EVERY 6 HOURS
Refills: 0 | Status: DISCONTINUED | OUTPATIENT
Start: 2019-12-12 | End: 2019-12-28

## 2019-12-12 RX ORDER — ENOXAPARIN SODIUM 100 MG/ML
40 INJECTION SUBCUTANEOUS DAILY
Refills: 0 | Status: DISCONTINUED | OUTPATIENT
Start: 2019-12-12 | End: 2019-12-12

## 2019-12-12 RX ORDER — ACETAMINOPHEN 500 MG
2 TABLET ORAL
Qty: 0 | Refills: 0 | DISCHARGE
Start: 2019-12-12

## 2019-12-12 RX ORDER — NIFEDIPINE 30 MG
60 TABLET, EXTENDED RELEASE 24 HR ORAL DAILY
Refills: 0 | Status: DISCONTINUED | OUTPATIENT
Start: 2019-12-12 | End: 2019-12-12

## 2019-12-12 RX ORDER — ACETAMINOPHEN 500 MG
975 TABLET ORAL EVERY 6 HOURS
Refills: 0 | Status: DISCONTINUED | OUTPATIENT
Start: 2019-12-12 | End: 2019-12-12

## 2019-12-12 RX ADMIN — HYDROMORPHONE HYDROCHLORIDE 0.5 MILLIGRAM(S): 2 INJECTION INTRAMUSCULAR; INTRAVENOUS; SUBCUTANEOUS at 11:00

## 2019-12-12 RX ADMIN — OXYCODONE HYDROCHLORIDE 10 MILLIGRAM(S): 5 TABLET ORAL at 08:56

## 2019-12-12 RX ADMIN — OXYCODONE HYDROCHLORIDE 10 MILLIGRAM(S): 5 TABLET ORAL at 21:45

## 2019-12-12 RX ADMIN — OXYCODONE HYDROCHLORIDE 10 MILLIGRAM(S): 5 TABLET ORAL at 09:00

## 2019-12-12 RX ADMIN — OXYCODONE HYDROCHLORIDE 10 MILLIGRAM(S): 5 TABLET ORAL at 21:09

## 2019-12-12 RX ADMIN — OXYCODONE HYDROCHLORIDE 10 MILLIGRAM(S): 5 TABLET ORAL at 04:55

## 2019-12-12 RX ADMIN — OXYCODONE HYDROCHLORIDE 10 MILLIGRAM(S): 5 TABLET ORAL at 00:13

## 2019-12-12 RX ADMIN — OXYCODONE HYDROCHLORIDE 10 MILLIGRAM(S): 5 TABLET ORAL at 05:55

## 2019-12-12 RX ADMIN — Medication 975 MILLIGRAM(S): at 18:19

## 2019-12-12 RX ADMIN — Medication 100 MILLIGRAM(S): at 05:32

## 2019-12-12 RX ADMIN — OXYCODONE HYDROCHLORIDE 10 MILLIGRAM(S): 5 TABLET ORAL at 01:13

## 2019-12-12 RX ADMIN — HEPARIN SODIUM 5000 UNIT(S): 5000 INJECTION INTRAVENOUS; SUBCUTANEOUS at 13:34

## 2019-12-12 RX ADMIN — HYDROMORPHONE HYDROCHLORIDE 0.5 MILLIGRAM(S): 2 INJECTION INTRAMUSCULAR; INTRAVENOUS; SUBCUTANEOUS at 10:56

## 2019-12-12 RX ADMIN — Medication 1300 MILLIGRAM(S): at 18:42

## 2019-12-12 RX ADMIN — HEPARIN SODIUM 5000 UNIT(S): 5000 INJECTION INTRAVENOUS; SUBCUTANEOUS at 21:13

## 2019-12-12 NOTE — DISCHARGE NOTE PROVIDER - PROVIDER TOKENS
PROVIDER:[TOKEN:[[id number]]],PROVIDER:[TOKEN:[[id number]]] PROVIDER:[TOKEN:[[id number]]],PROVIDER:[TOKEN:[[id number]]],PROVIDER:[TOKEN:[[id number]]]

## 2019-12-12 NOTE — DISCHARGE NOTE PROVIDER - NSDCFUADDAPPT_GEN_ALL_CORE_FT
- Follow-up with your Dialysis Center to begin training/education for managing your new catheter.     - Follow-up with Dr. Merritt (vascular surgeon) within 1 -2 weeks following discharge.   - Follow-up with Dr. Casiano (general surgeon) within 1 -2 weeks following discharge. - Follow-up with Dr. Neftaly Holden (nephrologist) on 12/17/19 at [place] dialysis center where PD catheter will be flushed as per weekly protocol.    - Follow-up with your Dialysis Center to begin training/education for managing your new catheter.     - Follow-up with Dr. Merritt (vascular surgeon) within 1 -2 weeks following discharge.   - Follow-up with Dr. Casiano (general surgeon) within 1 -2 weeks following discharge.

## 2019-12-12 NOTE — DISCHARGE NOTE PROVIDER - CARE PROVIDER_API CALL
Daryl Merritt (MD)  Surgery  [address], [address]  [place] [address]  Phone: [phone]  Fax: [phone]  Follow Up Time:     Yasmani Maher)  Surgery; Surgical Critical Care  [address]  [place] [address]  Phone: [phone]  Fax: [phone]  Follow Up Time: Daryl Merritt (MD)  Surgery  [address], [address]  [place] [address]  Phone: [phone]  Fax: [phone]  Follow Up Time:     Yasmani Maher)  Surgery; Surgical Critical Care  [address]  [place] [address]  Phone: [phone]  Fax: [phone]  Follow Up Time:     Neftaly Holden)  Internal Medicine; Nephrology  [address] [place] [address]  Phone: [phone]  Fax: [phone]  Follow Up Time:

## 2019-12-12 NOTE — CONSULT NOTE ADULT - ASSESSMENT
54 y Male with history of below presents with non-functioning PD catheter. Nephrology consulted for h/o CKD-5.    1) CKD-5: With PD catheter repositioning as per surgery. I had a conversation today with patient's outpatient nephrologist (Dr. Guero Holden) who states he will see patient on 12/17/19 at [facility] where PD catheter will be flushed as per weekly protocol. Patient to be initiated on PD as per outpatient nephrologist within the next few weeks. No renal objection to discharge (hospital papers faxed to [facility] by HD ).    2) HTN with CKD: BP controlled. Monitor off of medications.    3) Anemia of renal disease: Hb acceptable. Outpatient MORALES.    4) Hyperphosphatemia: Phosphorus uncontrolled. Change diet to renal diet. Monitor serum calcium and phosphorus.    5) Metabolic acidosis: Would start sodium bicarbonate 1300 mg twice daily and continue on discharge with plans to discontinue medication after PD initiated. Monitor serum CO2.

## 2019-12-12 NOTE — DISCHARGE NOTE PROVIDER - NSDCCPCAREPLAN_GEN_ALL_CORE_FT
PRINCIPAL DISCHARGE DIAGNOSIS  Diagnosis: Peritoneal dialysis catheter dysfunction  Assessment and Plan of Treatment: WOUND CARE:  Please keep incisions clean and dry. Please do not Scrub or rub incisions. Do not use lotion or powder on incisions.   BATHING: Please do not submerge wound underwater. You may shower and/or sponge bathe.  ACTIVITY: No heavy lifting or straining. Otherwise, you may return to your usual level of physical activity. If you are taking narcotic pain medication (such as Percocet) DO NOT drive a car, operate machinery or make important decisions.  DIET: Return to your usual diet.  NOTIFY YOUR SURGEON IF: You have any bleeding that does not stop, any pus draining from your wound(s), any fever (over 100.4 F) or chills, persistent nausea/vomiting, persistent diarrhea, or if your pain is not controlled on your discharge pain medications.  FOLLOW-UP: Please follow up with your primary care physician in one week regarding your hospitalization. Please follow-up with your surgeon, within 7 days following discharge- please call to schedule an appointment. PRINCIPAL DISCHARGE DIAGNOSIS  Diagnosis: Peritoneal dialysis catheter dysfunction  Assessment and Plan of Treatment: WOUND CARE:  Please keep incisions clean and dry. Please do not Scrub or rub incisions. Do not use lotion or powder on incisions.   BATHING: Please do not submerge wound underwater. You may shower and/or sponge bathe.  ACTIVITY: No heavy lifting or straining. Otherwise, you may return to your usual level of physical activity. If you are taking narcotic pain medication (such as Percocet) DO NOT drive a car, operate machinery or make important decisions.  DIET: Return to your usual diet.  NOTIFY YOUR SURGEON IF: You have any bleeding that does not stop, any pus draining from your wound(s), any fever (over 100.4 F) or chills, persistent nausea/vomiting, persistent diarrhea, or if your pain is not controlled on your discharge pain medications.  FOLLOW-UP: Please follow up with your primary care physician in one week regarding your hospitalization. Please follow-up with your surgeon, within 7 days following discharge- please call to schedule an appointment.      SECONDARY DISCHARGE DIAGNOSES  Diagnosis: Metabolic acidosis  Assessment and Plan of Treatment: - Continue taking your new medication (sodium bicarbonate) until your nephrologist follow-up appointment.

## 2019-12-12 NOTE — DISCHARGE NOTE PROVIDER - HOSPITAL COURSE
53yo M with hx of morbid obesity, BENNIE on CPAP, DM2 HTN, HLD, gout, ESRD on PD (nonfunctioning) presented 12/11 for scheduled laparoscopic removal of old peritoneal dialysis catheter and placement of new peritoneal dialysis catheter. Patient underwent laparoscopy and was found to have extensive adhesions requiring adhesiolysis. The PD catheter tip was found to be encased in phlegmon, was freed, and flushed freely afterward. Patient also had repair of bowel enterotomy and was admitted for observation.         Pt currently ambulating, voiding, tolerating a regular diet, with pain well controlled on PO pain meds. Patient is stable for discharge home to follow up as an outpatient, instructed to call to schedule appointment. 55yo M with hx of morbid obesity, BENNIE on CPAP, DM2 HTN, HLD, gout, ESRD on PD (nonfunctioning) presented 12/11 for scheduled laparoscopic removal of old peritoneal dialysis catheter and placement of new peritoneal dialysis catheter. Patient underwent laparoscopy and was found to have extensive adhesions requiring adhesiolysis. The PD catheter tip was found to be encased in phlegmon, was freed, and flushed freely afterward. Patient also had repair of bowel enterotomy and was admitted for observation.         Nephrology was consulted while admitted; recommended starting bicarbonate until dialysis begins.         Pt currently ambulating, voiding, tolerating a regular diet, with pain well controlled on PO pain meds. Patient is stable for discharge home to follow up as an outpatient, instructed to call to schedule appointment.             istop #: [id number]

## 2019-12-12 NOTE — CONSULT NOTE ADULT - SUBJECTIVE AND OBJECTIVE BOX
[facility] NEPHROLOGY- CONSULTATION NOTE    54 y Male with history of below presents with non-functioning PD catheter. Nephrology consulted for h/o CKD-5.    Patient follows with Dr. Guero Holden as an outpatient for h/o CKD-5 for which PD catheter was placed last month with plans to initiate PD this month. PD catheter however was non-functional for which patient was referred back to surgery and underwent PD catheter repositioning as PD catheter tip was found to be covered by a phlegmon.     REVIEW OF SYSTEMS:  Gen: no changes in weight, + fever  HEENT: no rhinorrhea  Neck: no sore throat  Cards: no chest pain  Resp: no dyspnea  GI: no nausea or vomiting or diarrhea  : no dysuria or hematuria  Vascular: no LE edema  Derm: no rashes  Neuro: no numbness/tingling    No Known Allergies      Home Medications Reviewed  Hospital Medications:   MEDICATIONS  (STANDING):  acetaminophen   Tablet .. 975 milliGRAM(s) Oral every 6 hours  dextrose 50% Injectable 12.5 Gram(s) IV Push once  dextrose 50% Injectable 25 Gram(s) IV Push once  dextrose 50% Injectable 25 Gram(s) IV Push once  heparin  Injectable 5000 Unit(s) SubCutaneous every 8 hours  influenza   Vaccine 0.5 milliLiter(s) IntraMuscular once  insulin lispro (HumaLOG) corrective regimen sliding scale   SubCutaneous three times a day before meals      PAST MEDICAL & SURGICAL HISTORY:  BENNIE on CPAP  Obesity  HLD (hyperlipidemia)  Diabetes mellitus  Gout  Hypertension  Renal disease: ESRD  H/O hernia repair: 30 years ago  Injury of ankle and foot: surgically repaired, 10 years ago  H/O shoulder surgery      FAMILY HISTORY:  Family history of MI (myocardial infarction) (Father)      SOCIAL HISTORY:  Denies toxic substance use     VITALS:  T(F): 101 (12-12-19 @ 14:00), Max: 101 (12-12-19 @ 14:00)  HR: 77 (12-12-19 @ 14:00)  BP: 146/46 (12-12-19 @ 14:00)  RR: 18 (12-12-19 @ 14:00)  SpO2: 97% (12-12-19 @ 14:00)  Wt(kg): --    12-11 @ 07:01  -  12-12 @ 07:00  --------------------------------------------------------  IN: 975 mL / OUT: 1300 mL / NET: -325 mL    12-12 @ 07:01  -  12-12 @ 17:24  --------------------------------------------------------  IN: 500 mL / OUT: 350 mL / NET: 150 mL      Height (cm): 177.8 (12-11 @ 22:14)  Weight (kg): 146.5 (12-11 @ 22:14)  BMI (kg/m2): 46.3 (12-11 @ 22:14)  BSA (m2): 2.56 (12-11 @ 22:14)    PHYSICAL EXAM:  Gen: NAD, calm, Obese  HEENT: MMM  Neck: no JVD  Cards: RRR, +S1/S2, no M/G/R  Resp: CTA B/L  GI: soft, NT/ND, NABS, + PD catheter with blood soaked guaze   : no CVA tenderness  Vascular: no LE edema B/L  Derm: no rashes  Neuro: non-focal    LABS:  12-11    140  |  109<H>  |  87<H>  ----------------------------<  165<H>  4.9   |  14<L>  |  5.80<H>    Ca    9.1      11 Dec 2019 17:25  Phos  6.0     12-11  Mg     1.9     12-11      Creatinine Trend: 5.80 <--, 5.80 <--                        10.0   21.98 )-----------( 402      ( 11 Dec 2019 17:25 )             33.5

## 2019-12-12 NOTE — DISCHARGE NOTE PROVIDER - NSDCMRMEDTOKEN_GEN_ALL_CORE_FT
acetaminophen 500 mg oral capsule: 2 cap(s) orally every 6 hours  aspirin 81 mg oral tablet: 1 tab(s) orally once a day  atorvastatin 40 mg oral tablet: 1 tab(s) orally once a day  calcitriol 0.25 mcg oral capsule: 1 cap(s) orally once a day  febuxostat 40 mg oral tablet: 1 tab(s) orally once a day  HumaLOG 100 units/mL subcutaneous solution: 18 unit(s) subcutaneous 3 times a day  Levemir 100 units/mL subcutaneous solution: 60 unit(s) subcutaneous once a day (at bedtime)  NIFEdipine 60 mg oral tablet, extended release: 1 tab(s) orally once a day  Vascepa: 2 gram(s) orally once a day acetaminophen 500 mg oral capsule: 2 cap(s) orally every 6 hours  aspirin 81 mg oral tablet: 1 tab(s) orally once a day  atorvastatin 40 mg oral tablet: 1 tab(s) orally once a day  calcitriol 0.25 mcg oral capsule: 1 cap(s) orally once a day  febuxostat 40 mg oral tablet: 1 tab(s) orally once a day  HumaLOG 100 units/mL subcutaneous solution: 18 unit(s) subcutaneous 3 times a day  Levemir 100 units/mL subcutaneous solution: 60 unit(s) subcutaneous once a day (at bedtime)  NIFEdipine 60 mg oral tablet, extended release: 1 tab(s) orally once a day  oxyCODONE 5 mg oral tablet: 1 tab(s) orally every 4 hours, As needed, Moderate Pain (4 - 6) MDD:6  sodium bicarbonate 650 mg oral tablet: 2 tab(s) orally every 12 hours  Vascepa: 2 gram(s) orally once a day

## 2019-12-12 NOTE — DISCHARGE NOTE PROVIDER - CARE PROVIDERS DIRECT ADDRESSES
,DirectAddress_Unknown,[email or web address] ,DirectAddress_Unknown,[email or web address],DirectAddress_Unknown

## 2019-12-12 NOTE — PROGRESS NOTE ADULT - ASSESSMENT
Pt is a 55 yo M s/p PD catheter revision and JOSHUA on 12/11, kept overnight for monitoring     - will ensure nephrology f/u is in place  - pain control  - ADAT  - DVT ppx    #[id number]

## 2019-12-12 NOTE — PROGRESS NOTE ADULT - SUBJECTIVE AND OBJECTIVE BOX
B Team Surgery Progress Note    Subjective: seen on rounds, tolerated procedure without issue, overnight with some pain concerns but well-controlled on medications    Exam:    Neuro: Alert  GA: well-appearing  Pulm: breathing comfortably  GI: non-distended, soft, appropriately ttp, incisions c/d/i, catheter in place    Vital Signs Last 24 Hrs  T(C): 36.8 (12 Dec 2019 04:56), Max: 36.8 (11 Dec 2019 22:14)  T(F): 98.2 (12 Dec 2019 04:56), Max: 98.2 (11 Dec 2019 22:14)  HR: 95 (12 Dec 2019 04:56) (73 - 98)  BP: 144/75 (12 Dec 2019 04:56) (116/80 - 144/75)  BP(mean): 94 (11 Dec 2019 20:00) (81 - 94)  RR: 14 (12 Dec 2019 04:56) (12 - 23)  SpO2: 98% (12 Dec 2019 04:56) (96% - 100%)    I&O's Detail    11 Dec 2019 07:01  -  12 Dec 2019 07:00  --------------------------------------------------------  IN:    Oral Fluid: 840 mL    sodium chloride 0.9%: 90 mL    sodium chloride 0.9%: 45 mL  Total IN: 975 mL    OUT:    Voided: 1300 mL  Total OUT: 1300 mL    Total NET: -325 mL      MEDICATIONS  (STANDING):  acetaminophen    Suspension .. 975 milliGRAM(s) Oral every 6 hours  ceFAZolin   IVPB 1000 milliGRAM(s) IV Intermittent every 8 hours  dextrose 50% Injectable 12.5 Gram(s) IV Push once  dextrose 50% Injectable 25 Gram(s) IV Push once  dextrose 50% Injectable 25 Gram(s) IV Push once  heparin  Injectable 5000 Unit(s) SubCutaneous every 8 hours  influenza   Vaccine 0.5 milliLiter(s) IntraMuscular once  insulin lispro (HumaLOG) corrective regimen sliding scale   SubCutaneous three times a day before meals    MEDICATIONS  (PRN):  dextrose 40% Gel 15 Gram(s) Oral once PRN Blood Glucose LESS THAN 70 milliGRAM(s)/deciliter  glucagon  Injectable 1 milliGRAM(s) IntraMuscular once PRN Glucose LESS THAN 70 milligrams/deciliter  oxyCODONE    IR 5 milliGRAM(s) Oral every 4 hours PRN Moderate Pain (4 - 6)  oxyCODONE    IR 10 milliGRAM(s) Oral every 4 hours PRN Severe Pain (7 - 10)      LABS:                        10.0   21.98 )-----------( 402      ( 11 Dec 2019 17:25 )             33.5     12-11    140  |  109<H>  |  87<H>  ----------------------------<  165<H>  4.9   |  14<L>  |  5.80<H>    Ca    9.1      11 Dec 2019 17:25  Phos  6.0     12-11  Mg     1.9     12-11

## 2019-12-13 ENCOUNTER — TRANSCRIPTION ENCOUNTER (OUTPATIENT)
Age: 54
End: 2019-12-13

## 2019-12-13 VITALS
DIASTOLIC BLOOD PRESSURE: 99 MMHG | TEMPERATURE: 99 F | OXYGEN SATURATION: 96 % | HEART RATE: 104 BPM | RESPIRATION RATE: 18 BRPM | SYSTOLIC BLOOD PRESSURE: 155 MMHG

## 2019-12-13 LAB — GLUCOSE BLDC GLUCOMTR-MCNC: 107 MG/DL — HIGH (ref 70–99)

## 2019-12-13 RX ORDER — SODIUM BICARBONATE 1 MEQ/ML
2 SYRINGE (ML) INTRAVENOUS
Qty: 56 | Refills: 0
Start: 2019-12-13 | End: 2019-12-26

## 2019-12-13 RX ORDER — OXYCODONE HYDROCHLORIDE 5 MG/1
1 TABLET ORAL
Qty: 12 | Refills: 0
Start: 2019-12-13 | End: 2019-12-14

## 2019-12-13 RX ORDER — SODIUM BICARBONATE 1 MEQ/ML
1300 SYRINGE (ML) INTRAVENOUS EVERY 12 HOURS
Refills: 0 | Status: DISCONTINUED | OUTPATIENT
Start: 2019-12-13 | End: 2019-12-28

## 2019-12-13 RX ADMIN — Medication 100 MILLIGRAM(S): at 06:46

## 2019-12-13 RX ADMIN — Medication 975 MILLIGRAM(S): at 06:51

## 2019-12-13 RX ADMIN — Medication 60 MILLIGRAM(S): at 06:51

## 2019-12-13 RX ADMIN — Medication 1300 MILLIGRAM(S): at 06:51

## 2019-12-13 RX ADMIN — OXYCODONE HYDROCHLORIDE 10 MILLIGRAM(S): 5 TABLET ORAL at 10:11

## 2019-12-13 RX ADMIN — Medication 975 MILLIGRAM(S): at 00:26

## 2019-12-13 RX ADMIN — Medication 975 MILLIGRAM(S): at 07:15

## 2019-12-13 RX ADMIN — HEPARIN SODIUM 5000 UNIT(S): 5000 INJECTION INTRAVENOUS; SUBCUTANEOUS at 06:51

## 2019-12-13 RX ADMIN — Medication 975 MILLIGRAM(S): at 01:15

## 2019-12-13 NOTE — DISCHARGE NOTE NURSING/CASE MANAGEMENT/SOCIAL WORK - PATIENT PORTAL LINK FT
You can access the FollowMyHealth Patient Portal offered by [facility] by registering at the following website: [email or web address]. By joining [facility]’s FollowMyHealth portal, you will also be able to view your health information using other applications (apps) compatible with our system.

## 2019-12-13 NOTE — PROGRESS NOTE ADULT - SUBJECTIVE AND OBJECTIVE BOX
B Team Surgery Progress Note    Subjective: seen on rounds, overnight with pain well-controlled on medications, febrile yesterday afternoon but attributed to manipulation of phlegmon during surgery, tolerating diet and wants to go home.     Exam:    Neuro: Alert  GA: well-appearing  Pulm: breathing comfortably  GI: non-distended, soft, appropriately minimally ttp, incisions c/d/i, catheter in place    Vital Signs Last 24 Hrs  T(C): 37.3 (13 Dec 2019 06:44), Max: 38.3 (12 Dec 2019 14:00)  T(F): 99.2 (13 Dec 2019 06:44), Max: 101 (12 Dec 2019 14:00)  HR: 94 (13 Dec 2019 06:44) (77 - 110)  BP: 152/89 (13 Dec 2019 06:44) (119/69 - 152/89)  BP(mean): --  RR: 18 (13 Dec 2019 06:44) (17 - 20)  SpO2: 100% (13 Dec 2019 06:44) (96% - 100%)    I&O's Detail    12 Dec 2019 07:01  -  13 Dec 2019 07:00  --------------------------------------------------------  IN:    Oral Fluid: 700 mL  Total IN: 700 mL    OUT:    Voided: 550 mL  Total OUT: 550 mL    Total NET: 150 mL      MEDICATIONS  (STANDING):  acetaminophen   Tablet .. 975 milliGRAM(s) Oral every 6 hours  atorvastatin 40 milliGRAM(s) Oral at bedtime  calcitriol   Capsule 0.25 MICROGram(s) Oral daily  ceFAZolin   IVPB 1000 milliGRAM(s) IV Intermittent every 24 hours  dextrose 50% Injectable 12.5 Gram(s) IV Push once  dextrose 50% Injectable 25 Gram(s) IV Push once  dextrose 50% Injectable 25 Gram(s) IV Push once  heparin  Injectable 5000 Unit(s) SubCutaneous every 8 hours  influenza   Vaccine 0.5 milliLiter(s) IntraMuscular once  insulin lispro (HumaLOG) corrective regimen sliding scale   SubCutaneous three times a day before meals  insulin lispro (HumaLOG) corrective regimen sliding scale   SubCutaneous at bedtime  NIFEdipine XL 60 milliGRAM(s) Oral daily  sodium bicarbonate 1300 milliGRAM(s) Oral two times a day    MEDICATIONS  (PRN):  dextrose 40% Gel 15 Gram(s) Oral once PRN Blood Glucose LESS THAN 70 milliGRAM(s)/deciliter  glucagon  Injectable 1 milliGRAM(s) IntraMuscular once PRN Glucose LESS THAN 70 milligrams/deciliter  oxyCODONE    IR 5 milliGRAM(s) Oral every 4 hours PRN Moderate Pain (4 - 6)  oxyCODONE    IR 10 milliGRAM(s) Oral every 4 hours PRN Severe Pain (7 - 10)      LABS:                        10.0   21.98 )-----------( 402      ( 11 Dec 2019 17:25 )             33.5     12-11    140  |  109<H>  |  87<H>  ----------------------------<  165<H>  4.9   |  14<L>  |  5.80<H>    Ca    9.1      11 Dec 2019 17:25  Phos  6.0     12-11  Mg     1.9     12-11

## 2019-12-13 NOTE — PROGRESS NOTE ADULT - ATTENDING COMMENTS
[facility] NEPHROLOGY  Franki Brito M.D.  Jp Bacon D.O.  Bushra Abel M.D.  Helen Marrufo, MSN, ANP-C    Telephone: [phone]  Facsimile: [phone]    [address] [place] [address]

## 2019-12-13 NOTE — PROGRESS NOTE ADULT - SUBJECTIVE AND OBJECTIVE BOX
[facility] NEPHROLOGY- PROGRESS NOTE    54 y Male with history of below presents with non-functioning PD catheter. Nephrology consulted for h/o CKD-5.    REVIEW OF SYSTEMS:  Gen: no changes in weight, no further fevers  Cards: no chest pain  Resp: no dyspnea  GI: no nausea or vomiting or diarrhea  Vascular: no LE edema    No Known Allergies      Hospital Medications: Medications reviewed    VITALS:  T(F): 98.7 (12-13-19 @ 10:27), Max: 101 (12-12-19 @ 14:00)  HR: 104 (12-13-19 @ 10:27)  BP: 155/99 (12-13-19 @ 10:27)  RR: 18 (12-13-19 @ 10:27)  SpO2: 96% (12-13-19 @ 10:27)  Wt(kg): --  Height (cm): 177.8 (12-11 @ 22:14), 177.8 (12-10 @ 16:01)  Weight (kg): 146.5 (12-11 @ 22:14), 146.5 (12-10 @ 16:01)  BMI (kg/m2): 46.3 (12-11 @ 22:14), 46.3 (12-10 @ 16:01)  BSA (m2): 2.56 (12-11 @ 22:14), 2.56 (12-10 @ 16:01)    12-12 @ 07:01  -  12-13 @ 07:00  --------------------------------------------------------  IN: 700 mL / OUT: 550 mL / NET: 150 mL    12-13 @ 07:01  -  12-13 @ 13:21  --------------------------------------------------------  IN: 250 mL / OUT: 0 mL / NET: 250 mL        PHYSICAL EXAM:    Gen: NAD, calm, obese  Cards: RRR, +S1/S2, no M/G/R  Resp: CTA B/L  GI: soft, NT/ND, NABS  Vascular: no LE edema B/L, + PD catheter intact    LABS:  12-11    140  |  109<H>  |  87<H>  ----------------------------<  165<H>  4.9   |  14<L>  |  5.80<H>    Ca    9.1      11 Dec 2019 17:25  Phos  6.0     12-11  Mg     1.9     12-11      Creatinine Trend: 5.80 <--, 5.80 <--                        10.0   21.98 )-----------( 402      ( 11 Dec 2019 17:25 )             33.5     Urine Studies:        RADIOLOGY & ADDITIONAL STUDIES:

## 2019-12-13 NOTE — PROGRESS NOTE ADULT - REASON FOR ADMISSION
Observation s/p lysis of adhesions

## 2019-12-13 NOTE — PROGRESS NOTE ADULT - ASSESSMENT
Pt is a 53 yo M s/p PD catheter revision and JOSHUA on 12/11, kept for monitoring and postop fever, doing well    - will ensure nephrology f/u is in place  - pain control  - regular diet  - DVT ppx  - dispo home    #[id number]

## 2019-12-13 NOTE — DISCHARGE NOTE NURSING/CASE MANAGEMENT/SOCIAL WORK - NSDCFUADDAPPT_GEN_ALL_CORE_FT
- Follow-up with Dr. Neftaly Holden (nephrologist) on 12/17/19 at [place] dialysis center where PD catheter will be flushed as per weekly protocol.    - Follow-up with your Dialysis Center to begin training/education for managing your new catheter.     - Follow-up with Dr. Merritt (vascular surgeon) within 1 -2 weeks following discharge.   - Follow-up with Dr. Casiano (general surgeon) within 1 -2 weeks following discharge.

## 2020-01-03 ENCOUNTER — TRANSCRIPTION ENCOUNTER (OUTPATIENT)
Age: 55
End: 2020-01-03

## 2020-01-03 ENCOUNTER — INPATIENT (INPATIENT)
Facility: HOSPITAL | Age: 55
LOS: 2 days | Discharge: HOME | End: 2020-01-06
Attending: INTERNAL MEDICINE | Admitting: INTERNAL MEDICINE
Payer: COMMERCIAL

## 2020-01-03 VITALS
HEART RATE: 130 BPM | SYSTOLIC BLOOD PRESSURE: 143 MMHG | RESPIRATION RATE: 16 BRPM | TEMPERATURE: 100 F | OXYGEN SATURATION: 100 % | DIASTOLIC BLOOD PRESSURE: 78 MMHG

## 2020-01-03 DIAGNOSIS — T85.71XA INFECTION AND INFLAMMATORY REACTION DUE TO PERITONEAL DIALYSIS CATHETER, INITIAL ENCOUNTER: ICD-10-CM

## 2020-01-03 DIAGNOSIS — Z98.890 OTHER SPECIFIED POSTPROCEDURAL STATES: Chronic | ICD-10-CM

## 2020-01-03 DIAGNOSIS — S99.919A UNSPECIFIED INJURY OF UNSPECIFIED ANKLE, INITIAL ENCOUNTER: Chronic | ICD-10-CM

## 2020-01-03 LAB
ALBUMIN SERPL ELPH-MCNC: 3.5 G/DL — SIGNIFICANT CHANGE UP (ref 3.3–5)
ALP SERPL-CCNC: 64 U/L — SIGNIFICANT CHANGE UP (ref 40–120)
ALT FLD-CCNC: 25 U/L — SIGNIFICANT CHANGE UP (ref 4–41)
ANION GAP SERPL CALC-SCNC: 16 MMO/L — HIGH (ref 7–14)
APPEARANCE UR: SIGNIFICANT CHANGE UP
APTT BLD: 30.8 SEC — SIGNIFICANT CHANGE UP (ref 27.5–36.3)
AST SERPL-CCNC: 39 U/L — SIGNIFICANT CHANGE UP (ref 4–40)
BACTERIA # UR AUTO: SIGNIFICANT CHANGE UP
BASE EXCESS BLDV CALC-SCNC: -2.4 MMOL/L — SIGNIFICANT CHANGE UP
BASE EXCESS BLDV CALC-SCNC: -3 MMOL/L — SIGNIFICANT CHANGE UP
BASOPHILS # BLD AUTO: 0.03 K/UL — SIGNIFICANT CHANGE UP (ref 0–0.2)
BASOPHILS NFR BLD AUTO: 0.2 % — SIGNIFICANT CHANGE UP (ref 0–2)
BILIRUB SERPL-MCNC: 0.5 MG/DL — SIGNIFICANT CHANGE UP (ref 0.2–1.2)
BILIRUB UR-MCNC: NEGATIVE — SIGNIFICANT CHANGE UP
BLD GP AB SCN SERPL QL: NEGATIVE — SIGNIFICANT CHANGE UP
BLOOD GAS VENOUS - CREATININE: 5.29 MG/DL — HIGH (ref 0.5–1.3)
BLOOD GAS VENOUS - CREATININE: 5.35 MG/DL — HIGH (ref 0.5–1.3)
BLOOD GAS VENOUS - FIO2: 21 — SIGNIFICANT CHANGE UP
BLOOD UR QL VISUAL: NEGATIVE — SIGNIFICANT CHANGE UP
BUN SERPL-MCNC: 61 MG/DL — HIGH (ref 7–23)
CALCIUM SERPL-MCNC: 9.5 MG/DL — SIGNIFICANT CHANGE UP (ref 8.4–10.5)
CHLORIDE BLDV-SCNC: 109 MMOL/L — HIGH (ref 96–108)
CHLORIDE BLDV-SCNC: 109 MMOL/L — HIGH (ref 96–108)
CHLORIDE SERPL-SCNC: 102 MMOL/L — SIGNIFICANT CHANGE UP (ref 98–107)
CO2 SERPL-SCNC: 19 MMOL/L — LOW (ref 22–31)
COLOR SPEC: YELLOW — SIGNIFICANT CHANGE UP
CREAT SERPL-MCNC: 5.58 MG/DL — HIGH (ref 0.5–1.3)
EOSINOPHIL # BLD AUTO: 0.21 K/UL — SIGNIFICANT CHANGE UP (ref 0–0.5)
EOSINOPHIL NFR BLD AUTO: 1.3 % — SIGNIFICANT CHANGE UP (ref 0–6)
GAS PNL BLDV: 136 MMOL/L — SIGNIFICANT CHANGE UP (ref 136–146)
GAS PNL BLDV: 139 MMOL/L — SIGNIFICANT CHANGE UP (ref 136–146)
GLUCOSE BLDV-MCNC: 112 MG/DL — HIGH (ref 70–99)
GLUCOSE BLDV-MCNC: 112 MG/DL — HIGH (ref 70–99)
GLUCOSE SERPL-MCNC: 109 MG/DL — HIGH (ref 70–99)
GLUCOSE UR-MCNC: NEGATIVE — SIGNIFICANT CHANGE UP
HCO3 BLDV-SCNC: 21 MMOL/L — SIGNIFICANT CHANGE UP (ref 20–27)
HCO3 BLDV-SCNC: 23 MMOL/L — SIGNIFICANT CHANGE UP (ref 20–27)
HCT VFR BLD CALC: 31 % — LOW (ref 39–50)
HCT VFR BLDV CALC: 26.9 % — LOW (ref 39–51)
HCT VFR BLDV CALC: 29.4 % — LOW (ref 39–51)
HGB BLD-MCNC: 9 G/DL — LOW (ref 13–17)
HGB BLDV-MCNC: 8.7 G/DL — LOW (ref 13–17)
HGB BLDV-MCNC: 9.5 G/DL — LOW (ref 13–17)
HYALINE CASTS # UR AUTO: NEGATIVE — SIGNIFICANT CHANGE UP
IMM GRANULOCYTES NFR BLD AUTO: 0.6 % — SIGNIFICANT CHANGE UP (ref 0–1.5)
INR BLD: 1.18 — HIGH (ref 0.88–1.17)
KETONES UR-MCNC: NEGATIVE — SIGNIFICANT CHANGE UP
LACTATE BLDV-MCNC: 1.1 MMOL/L — SIGNIFICANT CHANGE UP (ref 0.5–2)
LACTATE BLDV-MCNC: 2.6 MMOL/L — HIGH (ref 0.5–2)
LEUKOCYTE ESTERASE UR-ACNC: NEGATIVE — SIGNIFICANT CHANGE UP
LYMPHOCYTES # BLD AUTO: 1.53 K/UL — SIGNIFICANT CHANGE UP (ref 1–3.3)
LYMPHOCYTES # BLD AUTO: 9.7 % — LOW (ref 13–44)
MCHC RBC-ENTMCNC: 17.2 PG — LOW (ref 27–34)
MCHC RBC-ENTMCNC: 29 % — LOW (ref 32–36)
MCV RBC AUTO: 59.3 FL — LOW (ref 80–100)
MONOCYTES # BLD AUTO: 1.31 K/UL — HIGH (ref 0–0.9)
MONOCYTES NFR BLD AUTO: 8.3 % — SIGNIFICANT CHANGE UP (ref 2–14)
NEUTROPHILS # BLD AUTO: 12.58 K/UL — HIGH (ref 1.8–7.4)
NEUTROPHILS NFR BLD AUTO: 79.9 % — HIGH (ref 43–77)
NITRITE UR-MCNC: NEGATIVE — SIGNIFICANT CHANGE UP
NRBC # FLD: 0 K/UL — SIGNIFICANT CHANGE UP (ref 0–0)
PCO2 BLDV: 31 MMHG — LOW (ref 41–51)
PCO2 BLDV: 33 MMHG — LOW (ref 41–51)
PH BLDV: 7.42 PH — SIGNIFICANT CHANGE UP (ref 7.32–7.43)
PH BLDV: 7.45 PH — HIGH (ref 7.32–7.43)
PH UR: 6 — SIGNIFICANT CHANGE UP (ref 5–8)
PLATELET # BLD AUTO: 368 K/UL — SIGNIFICANT CHANGE UP (ref 150–400)
PMV BLD: 11.2 FL — SIGNIFICANT CHANGE UP (ref 7–13)
PO2 BLDV: 28 MMHG — LOW (ref 35–40)
PO2 BLDV: 60 MMHG — HIGH (ref 35–40)
POTASSIUM BLDV-SCNC: 4.7 MMOL/L — HIGH (ref 3.4–4.5)
POTASSIUM BLDV-SCNC: 5.2 MMOL/L — HIGH (ref 3.4–4.5)
POTASSIUM SERPL-MCNC: 4.4 MMOL/L — SIGNIFICANT CHANGE UP (ref 3.5–5.3)
POTASSIUM SERPL-SCNC: 4.4 MMOL/L — SIGNIFICANT CHANGE UP (ref 3.5–5.3)
PROT SERPL-MCNC: 8.5 G/DL — HIGH (ref 6–8.3)
PROT UR-MCNC: 300 — HIGH
PROTHROM AB SERPL-ACNC: 13.5 SEC — HIGH (ref 9.8–13.1)
RBC # BLD: 5.23 M/UL — SIGNIFICANT CHANGE UP (ref 4.2–5.8)
RBC # FLD: 16.9 % — HIGH (ref 10.3–14.5)
RBC CASTS # UR COMP ASSIST: SIGNIFICANT CHANGE UP (ref 0–?)
RH IG SCN BLD-IMP: POSITIVE — SIGNIFICANT CHANGE UP
SAO2 % BLDV: 45.1 % — LOW (ref 60–85)
SAO2 % BLDV: 90.2 % — HIGH (ref 60–85)
SODIUM SERPL-SCNC: 137 MMOL/L — SIGNIFICANT CHANGE UP (ref 135–145)
SP GR SPEC: 1.02 — SIGNIFICANT CHANGE UP (ref 1–1.04)
SQUAMOUS # UR AUTO: SIGNIFICANT CHANGE UP
UROBILINOGEN FLD QL: NORMAL — SIGNIFICANT CHANGE UP
WBC # BLD: 15.75 K/UL — HIGH (ref 3.8–10.5)
WBC # FLD AUTO: 15.75 K/UL — HIGH (ref 3.8–10.5)
WBC UR QL: SIGNIFICANT CHANGE UP (ref 0–?)

## 2020-01-03 PROCEDURE — 74176 CT ABD & PELVIS W/O CONTRAST: CPT | Mod: 26

## 2020-01-03 PROCEDURE — 74018 RADEX ABDOMEN 1 VIEW: CPT | Mod: 26

## 2020-01-03 PROCEDURE — 99221 1ST HOSP IP/OBS SF/LOW 40: CPT | Mod: GC,57

## 2020-01-03 PROCEDURE — 71045 X-RAY EXAM CHEST 1 VIEW: CPT | Mod: 26

## 2020-01-03 RX ORDER — DEXTROSE 50 % IN WATER 50 %
15 SYRINGE (ML) INTRAVENOUS ONCE
Refills: 0 | Status: DISCONTINUED | OUTPATIENT
Start: 2020-01-03 | End: 2020-01-04

## 2020-01-03 RX ORDER — OXYCODONE HYDROCHLORIDE 5 MG/1
5 TABLET ORAL ONCE
Refills: 0 | Status: DISCONTINUED | OUTPATIENT
Start: 2020-01-03 | End: 2020-01-03

## 2020-01-03 RX ORDER — NIFEDIPINE 30 MG
60 TABLET, EXTENDED RELEASE 24 HR ORAL DAILY
Refills: 0 | Status: DISCONTINUED | OUTPATIENT
Start: 2020-01-03 | End: 2020-01-03

## 2020-01-03 RX ORDER — INSULIN DETEMIR 100/ML (3)
30 INSULIN PEN (ML) SUBCUTANEOUS ONCE
Refills: 0 | Status: DISCONTINUED | OUTPATIENT
Start: 2020-01-03 | End: 2020-01-04

## 2020-01-03 RX ORDER — CALCITRIOL 0.5 UG/1
0.25 CAPSULE ORAL DAILY
Refills: 0 | Status: DISCONTINUED | OUTPATIENT
Start: 2020-01-03 | End: 2020-01-06

## 2020-01-03 RX ORDER — HEPARIN SODIUM 5000 [USP'U]/ML
5000 INJECTION INTRAVENOUS; SUBCUTANEOUS EVERY 8 HOURS
Refills: 0 | Status: DISCONTINUED | OUTPATIENT
Start: 2020-01-03 | End: 2020-01-06

## 2020-01-03 RX ORDER — DEXTROSE 50 % IN WATER 50 %
25 SYRINGE (ML) INTRAVENOUS ONCE
Refills: 0 | Status: DISCONTINUED | OUTPATIENT
Start: 2020-01-03 | End: 2020-01-04

## 2020-01-03 RX ORDER — PIPERACILLIN AND TAZOBACTAM 4; .5 G/20ML; G/20ML
3.38 INJECTION, POWDER, LYOPHILIZED, FOR SOLUTION INTRAVENOUS EVERY 12 HOURS
Refills: 0 | Status: COMPLETED | OUTPATIENT
Start: 2020-01-03 | End: 2020-01-05

## 2020-01-03 RX ORDER — DEXTROSE 50 % IN WATER 50 %
12.5 SYRINGE (ML) INTRAVENOUS ONCE
Refills: 0 | Status: DISCONTINUED | OUTPATIENT
Start: 2020-01-03 | End: 2020-01-06

## 2020-01-03 RX ORDER — ASPIRIN/CALCIUM CARB/MAGNESIUM 324 MG
81 TABLET ORAL DAILY
Refills: 0 | Status: DISCONTINUED | OUTPATIENT
Start: 2020-01-03 | End: 2020-01-06

## 2020-01-03 RX ORDER — SODIUM CHLORIDE 9 MG/ML
1000 INJECTION, SOLUTION INTRAVENOUS
Refills: 0 | Status: DISCONTINUED | OUTPATIENT
Start: 2020-01-03 | End: 2020-01-04

## 2020-01-03 RX ORDER — GLUCAGON INJECTION, SOLUTION 0.5 MG/.1ML
1 INJECTION, SOLUTION SUBCUTANEOUS ONCE
Refills: 0 | Status: DISCONTINUED | OUTPATIENT
Start: 2020-01-03 | End: 2020-01-06

## 2020-01-03 RX ORDER — SODIUM BICARBONATE 1 MEQ/ML
650 SYRINGE (ML) INTRAVENOUS EVERY 12 HOURS
Refills: 0 | Status: DISCONTINUED | OUTPATIENT
Start: 2020-01-03 | End: 2020-01-06

## 2020-01-03 RX ORDER — ATORVASTATIN CALCIUM 80 MG/1
40 TABLET, FILM COATED ORAL AT BEDTIME
Refills: 0 | Status: DISCONTINUED | OUTPATIENT
Start: 2020-01-03 | End: 2020-01-06

## 2020-01-03 RX ORDER — PIPERACILLIN AND TAZOBACTAM 4; .5 G/20ML; G/20ML
3.38 INJECTION, POWDER, LYOPHILIZED, FOR SOLUTION INTRAVENOUS ONCE
Refills: 0 | Status: COMPLETED | OUTPATIENT
Start: 2020-01-03 | End: 2020-01-03

## 2020-01-03 RX ORDER — SODIUM CHLORIDE 9 MG/ML
1000 INJECTION, SOLUTION INTRAVENOUS ONCE
Refills: 0 | Status: COMPLETED | OUTPATIENT
Start: 2020-01-03 | End: 2020-01-03

## 2020-01-03 RX ORDER — NIFEDIPINE 30 MG
60 TABLET, EXTENDED RELEASE 24 HR ORAL DAILY
Refills: 0 | Status: DISCONTINUED | OUTPATIENT
Start: 2020-01-03 | End: 2020-01-06

## 2020-01-03 RX ORDER — ACETAMINOPHEN 500 MG
650 TABLET ORAL ONCE
Refills: 0 | Status: COMPLETED | OUTPATIENT
Start: 2020-01-03 | End: 2020-01-03

## 2020-01-03 RX ORDER — SODIUM CHLORIDE 9 MG/ML
1000 INJECTION, SOLUTION INTRAVENOUS
Refills: 0 | Status: DISCONTINUED | OUTPATIENT
Start: 2020-01-03 | End: 2020-01-06

## 2020-01-03 RX ORDER — VANCOMYCIN HCL 1 G
1000 VIAL (EA) INTRAVENOUS ONCE
Refills: 0 | Status: DISCONTINUED | OUTPATIENT
Start: 2020-01-03 | End: 2020-01-03

## 2020-01-03 RX ORDER — ACETAMINOPHEN 500 MG
975 TABLET ORAL ONCE
Refills: 0 | Status: COMPLETED | OUTPATIENT
Start: 2020-01-03 | End: 2020-01-03

## 2020-01-03 RX ORDER — PIPERACILLIN AND TAZOBACTAM 4; .5 G/20ML; G/20ML
3.38 INJECTION, POWDER, LYOPHILIZED, FOR SOLUTION INTRAVENOUS ONCE
Refills: 0 | Status: DISCONTINUED | OUTPATIENT
Start: 2020-01-03 | End: 2020-01-03

## 2020-01-03 RX ORDER — ATORVASTATIN CALCIUM 80 MG/1
40 TABLET, FILM COATED ORAL DAILY
Refills: 0 | Status: DISCONTINUED | OUTPATIENT
Start: 2020-01-03 | End: 2020-01-03

## 2020-01-03 RX ADMIN — OXYCODONE HYDROCHLORIDE 5 MILLIGRAM(S): 5 TABLET ORAL at 21:45

## 2020-01-03 RX ADMIN — Medication 650 MILLIGRAM(S): at 23:16

## 2020-01-03 RX ADMIN — OXYCODONE HYDROCHLORIDE 5 MILLIGRAM(S): 5 TABLET ORAL at 16:51

## 2020-01-03 RX ADMIN — Medication 975 MILLIGRAM(S): at 21:45

## 2020-01-03 RX ADMIN — SODIUM CHLORIDE 1000 MILLILITER(S): 9 INJECTION, SOLUTION INTRAVENOUS at 16:41

## 2020-01-03 RX ADMIN — Medication 975 MILLIGRAM(S): at 16:52

## 2020-01-03 RX ADMIN — PIPERACILLIN AND TAZOBACTAM 200 GRAM(S): 4; .5 INJECTION, POWDER, LYOPHILIZED, FOR SOLUTION INTRAVENOUS at 22:41

## 2020-01-03 RX ADMIN — SODIUM CHLORIDE 125 MILLILITER(S): 9 INJECTION, SOLUTION INTRAVENOUS at 23:17

## 2020-01-03 NOTE — ED ADULT TRIAGE NOTE - CHIEF COMPLAINT QUOTE
A&OX3 c/o abdominal pain and fever, sent in by MD Merritt for possible peritoneal cathter infection, pt sent to have catheter removed, pt reports pus coming out of site, PMH BENNIE, hld,

## 2020-01-03 NOTE — H&P ADULT - ATTENDING COMMENTS
I saw and examined the patient. I was physically present for the key portions of the evaluation and management (E/M) service provided.  I agree with the above history, physical, and plan which I have reviewed and edited where appropriate.    Will need removal of PD catheter in OR  Discussed with Dr. René Abbott MD  Acute and Critical Care Surgery    The Acute Care Surgery (B Team) Attending Group Practice:  Dr. Norma Moise, Dr. Yasmani Maher, Dr. Georgi Abbott, and Dr. Julian Kidd    Urgent issues - spectra [id number] or [id number]  Nonurgent issues - [phone]  Patient appointments or after hours - [phone]

## 2020-01-03 NOTE — H&P ADULT - HISTORY OF PRESENT ILLNESS
Patient is a 54y old  Male who presents with a chief complaint of "need peritoneal dialysis catheter removed" (03 Jan 2020 23:23)      HPI: 53yo M with hx of morbid obesity, BENNIE on CPAP, DM2 HTN, HLD, gout, ESRD on PD (nonfunctioning) presented from clinic for removal of peritoneal dialysis catheter which has been irritated, tender, and erythematous with concern for infection. Patient had PT catheter placed with Dr. Merritt ~1 sachi ago and has issue with irritation and abdominal phlegmon since. It has been used twice but the drainage appears pustulous. Patient is extremely tender at site of catheter and it is most irritating when he moves.    Patient denies fevers/chills, denies lightheadedness/dizziness, denies SOB/chest pain, denies nausea/vomiting, denies constipation/diarrhea.

## 2020-01-03 NOTE — ED PROVIDER NOTE - NS ED ROS FT
CONSTITUTIONAL: +FC  Eyes: No vision changes  Gastrointestinal: refer to HPI  Genitourinary: no dysuria, no hematuria  SKIN: no rashes.  NEURO: no headache, no weakness or numbness  PSYCHIATRIC: no known mental health issues.

## 2020-01-03 NOTE — ED ADULT NURSE NOTE - OBJECTIVE STATEMENT
Pt presents to room 19, A&Ox3, ambulatory at baseline without assistance, here for evaluation of weakness, fever, and tachycardia Sent in for r/o infection to peritoneal dialysis site. denies any chest pain, sob, dizziness, nausea, or vomiting. IV established in right forearm with a 20g, labs drawn and sent, call bell in reach, side rails up, bed in locked position, md evaluation in progress, will continue to monitor.

## 2020-01-03 NOTE — CONSULT NOTE ADULT - SUBJECTIVE AND OBJECTIVE BOX
NEPHROLOGY - NSN    Patient seen and examined.    HPI:  54 y Male with history of below presents with non-functioning PD catheter. Nephrology consulted for h/o CKD-5.    Patient follows with Dr. Guero Holden as an outpatient for h/o CKD-5 for which PD catheter was placed last month with plans to initiate PD this month. PD catheter however was non-functional for which patient was referred back to surgery.  He went and evidently there was pus at the site.  In the ER there was no visible pus noted.  He states that he has had one liter of PD infusion in the past and every time the pt has a syringe full of fluid infused, it causes intense pain  The patient has never had a renal biopsy in the past.  He states that his blood sugars is better now and he is losing wt (on purpose).  Hx of HTN as well         PAST MEDICAL & SURGICAL HISTORY:  BENNIE on CPAP  Obesity  HLD (hyperlipidemia)  Diabetes mellitus  Gout  Hypertension  Renal disease: ESRD  H/O hernia repair: 30 years ago  Injury of ankle and foot: surgically repaired, 10 years ago  H/O shoulder surgery      MEDICATIONS  (STANDING):      Allergies    No Known Allergies    Intolerances        SOCIAL HISTORY:  Denies alcohol abuse, drug abuse or tobacco usage.     FAMILY HISTORY:  Family history of MI (myocardial infarction)      VITALS:  T(C): 38.7 (01-03-20 @ 16:52), Max: 38.7 (01-03-20 @ 16:52)  HR: 111 (01-03-20 @ 16:39) (111 - 130)  BP: 126/90 (01-03-20 @ 16:39) (126/90 - 143/78)  RR: 18 (01-03-20 @ 16:39) (16 - 18)  SpO2: 100% (01-03-20 @ 16:39) (100% - 100%)    REVIEW OF SYSTEMS:Denies chest pain, SOB, focal weakness, hematuria or dysuria.  All other pertinent systems are reviewed and are negative.    PHYSICAL EXAM:  Constitutional: NAD; obese  HEENT: EOMI  Neck:  No JVD, supple   Respiratory: reduced breath sounds   Cardiovascular: S1 and S2, RRR  Gastrointestinal: + BS, soft, NT, ND + PD   Extremities: No peripheral edema, + peripheral pulses  Neurological: A/O x 3, CN2-12 intact  Psychiatric: Normal mood, normal affect  : No Figueroa  Skin: No rashes, C/D/I  Access: Not applicable    I and O's:        LABS:                        9.0    15.75 )-----------( 368      ( 03 Jan 2020 15:55 )             31.0     01-03    137  |  102  |  61<H>  ----------------------------<  109<H>  4.4   |  19<L>  |  5.58<H>    Ca    9.5      03 Jan 2020 15:55    TPro  8.5<H>  /  Alb  3.5  /  TBili  0.5  /  DBili  x   /  AST  39  /  ALT  25  /  AlkPhos  64  01-03         RADIOLOGY & ADDITIONAL STUDIES:  < from: Xray Abdomen 1 View PORTABLE -Urgent (01.03.20 @ 17:28) >    ******PRELIMINARY REPORT******    ******PRELIMINARY REPORT******            EXAM:  XR ABDOMEN PORTABLE URGENT 1V        PROCEDURE DATE:  Mike  3 2020     ******PRELIMINARY REPORT******    ******PRELIMINARY REPORT******            INTERPRETATION:  unremarkable bowel gas pattern            ******PRELIMINARY REPORT******    ******PRELIMINARY REPORT******          SAM AUGUSTINE M.D., RADIOLOGY RESIDENT                        < end of copied text >    ASSESSMENT    RECOMMEND    Lala Nunez NP  [facility]  [phone]

## 2020-01-03 NOTE — H&P ADULT - PROBLEM SELECTOR PLAN 1
- Patient consented and booked for OR   - NPO p MN, IV fluid  - DVT PPx: subQ heparin/venodynes  - IV Abx: Zosyn until surgery  - Blood culture and PD cather culture prior to Abx  - BGlucose q 6 hrs when NPO, 1/2 dose of long-acting tonight  - Patient seen and discussed with Attending Dr. Abbott

## 2020-01-03 NOTE — ED PROVIDER NOTE - OBJECTIVE STATEMENT
55yo M with hx of morbid obesity, BENNIE on CPAP, DM2 HTN, HLD, gout, ESRD now attempting to start PD sent in by Nephrology due to concern for infected PD    PD placed on 12/11, went to o/p nephrology appt today and was sent to ED due to concern for infected PD catheter. Has not started dialysis yet due to issues with PD. +Subjective fevers at home, +abd pain (3 days of pain making it "Excruciating to move"  Still uraintes    PCP: Dr. Guero Merritt Nephrologist 55yo M with hx of morbid obesity, BENNIE on CPAP, DM2 HTN, HLD, gout, ESRD now attempting to start PD sent in by Nephrology due to concern for infected PD    PD placed on 12/11, went to o/p nephrology appt today and was sent to ED due to concern for infected PD catheter. Has not started dialysis yet due to issues with PD. +Subjective fevers at home, +abd pain (3 days of pain making it "Excruciating to move"  Still urinates    Nephrologist: Dr. Guero Nielson @ [place]   PCP: Dr. Merritt

## 2020-01-03 NOTE — ED ADULT NURSE REASSESSMENT NOTE - NS ED NURSE REASSESS COMMENT FT1
patient still waiting for dialysis nurse to come to ED to get fluid cultures done. called HD and was told that the nurse will notify ED when they are on their way to ED to obtain PD catheter fluid cultures. surgery called and notified about delay in administering antibiotics. patient made aware and explained the delay .

## 2020-01-03 NOTE — ED PROVIDER NOTE - PROGRESS NOTE DETAILS
Reji PGY-2:  Advised to hold off on antibiotics until culture is obtained from PD , dr. Palmer states will be here shortly to draw cultures Reji PGY-2:  Dr. Palmer states will be here in 15 minutes

## 2020-01-03 NOTE — H&P ADULT - NSHPPHYSICALEXAM_GEN_ALL_CORE
NAD  EOMI  airway patent  +S1S2 no mrg  CTA b/l  soft, obese, diffusely tender, worse over PD catheter site, minimal erythema  no le edema  normal mood and affect  spont moving all extremities

## 2020-01-03 NOTE — ED ADULT NURSE REASSESSMENT NOTE - NS ED NURSE REASSESS COMMENT FT1
patient alert ox3 came in c/o abdominal pain . h/o peritoneal catheter infection. patient now awaiting on dialysis nurse to obtain peritoneal catheter fluid cultures . as per MD patient need peritoneal fluid culture prior to administering antibiotics. will continue to monitor.

## 2020-01-03 NOTE — CONSULT NOTE ADULT - ASSESSMENT
54 yo male c hx of morbid obesity HTN DM CKD stage 4-5 pw pain and pus that was elicited from the PD cathater.  He has not started HD as of yet   Personally he has had some many issues with the catheter and his body habitus will make PD quite challanging    1 Renal -SP 1 liter of LR.  He still urinates.  Not in heart failure     2ID-Broad specturm abx but cx and gram stain of the PD fluid firist;  ID consult please; The PD nurse was called earlier to access     3Surgery-Surgeon to assess the catheter.  Again I think PD will be challanging for him     Will speak with Dr El Holden

## 2020-01-03 NOTE — H&P ADULT - NSHPLABSRESULTS_GEN_ALL_CORE
CBC (01-03 @ 15:55)                              9.0<L>                         15.75<H>  )----------------(  368        79.9<H>% Neutrophils, 9.7<L>% Lymphocytes, ANC: 12.58<H>                              31.0<L>                BMP (01-03 @ 15:55)             137     |  102     |  61<H> 		Ca++ --      Ca 9.5                ---------------------------------( 109<H>		Mg --                 4.4     |  19<L>   |  5.58<H>			Ph --        LFTs (01-03 @ 15:55)      TPro 8.5<H> / Alb 3.5 / TBili 0.5 / DBili -- / AST 39 / ALT 25 / AlkPhos 64    Coags (01-03 @ 15:55)  aPTT 30.8 / INR 1.18<H> / PT 13.5<H>    ABG (01-03 @ 18:40)      /  /  /  /  / %     Lactate:   1.1  ABG (01-03 @ 15:30)      /  /  /  /  / %     Lactate:   2.6<H>    VBG (01-03 @ 18:40)     7.45<H> / 31<L> / 60<H> / 23 / -2.4 / 90.2<H>%  VBG (01-03 @ 15:30)     7.42 / 33<L> / 28<L> / 21 / -3.0 / 45.1<L>%

## 2020-01-03 NOTE — ED PROVIDER NOTE - PHYSICAL EXAMINATION
General: NAD  HEENT: pupils equal and reactive, normal external ears bilaterally   Cardiac: RRR, no MRG appreciated  Resp: lungs clear to auscultation bilaterally, symmetric chest wall rise  Abd: Abdomen diffusely TTP , PD tubing in place, no erythema around PD exit site, no puss appreciated   : no CVA tenderness  Neuro: Moving all extremities  Skin:  normal color for race

## 2020-01-03 NOTE — ED PROVIDER NOTE - ATTENDING CONTRIBUTION TO CARE
I performed a face-to-face evaluation of the patient and performed a history and physical examination. I agree with the history and physical examination.    Candelario: Infected peritoneal dialysis catheter infection. Febrile. Cultures. Abx. Admit.

## 2020-01-03 NOTE — H&P ADULT - ASSESSMENT
55yo M with hx of morbid obesity, BENNIE on CPAP, DM2 HTN, HLD, gout, ESRD on PD (nonfunctioning) presented from clinic for removal of peritoneal dialysis catheter.

## 2020-01-04 ENCOUNTER — RESULT REVIEW (OUTPATIENT)
Age: 55
End: 2020-01-04

## 2020-01-04 LAB
ANION GAP SERPL CALC-SCNC: 16 MMO/L — HIGH (ref 7–14)
APTT BLD: 31.2 SEC — SIGNIFICANT CHANGE UP (ref 27.5–36.3)
BLD GP AB SCN SERPL QL: NEGATIVE — SIGNIFICANT CHANGE UP
BUN SERPL-MCNC: 65 MG/DL — HIGH (ref 7–23)
CALCIUM SERPL-MCNC: 9.1 MG/DL — SIGNIFICANT CHANGE UP (ref 8.4–10.5)
CHLORIDE SERPL-SCNC: 104 MMOL/L — SIGNIFICANT CHANGE UP (ref 98–107)
CO2 SERPL-SCNC: 17 MMOL/L — LOW (ref 22–31)
CREAT SERPL-MCNC: 5.54 MG/DL — HIGH (ref 0.5–1.3)
GLUCOSE BLDC GLUCOMTR-MCNC: 123 MG/DL — HIGH (ref 70–99)
GLUCOSE BLDC GLUCOMTR-MCNC: 127 MG/DL — HIGH (ref 70–99)
GLUCOSE BLDC GLUCOMTR-MCNC: 128 MG/DL — HIGH (ref 70–99)
GLUCOSE BLDC GLUCOMTR-MCNC: 98 MG/DL — SIGNIFICANT CHANGE UP (ref 70–99)
GLUCOSE SERPL-MCNC: 112 MG/DL — HIGH (ref 70–99)
GRAM STN FLD: SIGNIFICANT CHANGE UP
GRAM STN WND: SIGNIFICANT CHANGE UP
HBA1C BLD-MCNC: 6.1 % — HIGH (ref 4–5.6)
HBV SURFACE AG SER-ACNC: NEGATIVE — SIGNIFICANT CHANGE UP
HCT VFR BLD CALC: 27.7 % — LOW (ref 39–50)
HCV AB S/CO SERPL IA: 0.19 S/CO — SIGNIFICANT CHANGE UP (ref 0–0.99)
HCV AB SERPL-IMP: SIGNIFICANT CHANGE UP
HGB BLD-MCNC: 8.2 G/DL — LOW (ref 13–17)
INR BLD: 1.18 — HIGH (ref 0.88–1.17)
MAGNESIUM SERPL-MCNC: 1.9 MG/DL — SIGNIFICANT CHANGE UP (ref 1.6–2.6)
MCHC RBC-ENTMCNC: 17.4 PG — LOW (ref 27–34)
MCHC RBC-ENTMCNC: 29.6 % — LOW (ref 32–36)
MCV RBC AUTO: 58.7 FL — LOW (ref 80–100)
NRBC # FLD: 0 K/UL — SIGNIFICANT CHANGE UP (ref 0–0)
PHOSPHATE SERPL-MCNC: 4.6 MG/DL — HIGH (ref 2.5–4.5)
PLATELET # BLD AUTO: 298 K/UL — SIGNIFICANT CHANGE UP (ref 150–400)
PMV BLD: 11 FL — SIGNIFICANT CHANGE UP (ref 7–13)
POTASSIUM SERPL-MCNC: 4.7 MMOL/L — SIGNIFICANT CHANGE UP (ref 3.5–5.3)
POTASSIUM SERPL-SCNC: 4.7 MMOL/L — SIGNIFICANT CHANGE UP (ref 3.5–5.3)
PROTHROM AB SERPL-ACNC: 13.5 SEC — HIGH (ref 9.8–13.1)
RBC # BLD: 4.72 M/UL — SIGNIFICANT CHANGE UP (ref 4.2–5.8)
RBC # FLD: 16.7 % — HIGH (ref 10.3–14.5)
RH IG SCN BLD-IMP: POSITIVE — SIGNIFICANT CHANGE UP
SODIUM SERPL-SCNC: 137 MMOL/L — SIGNIFICANT CHANGE UP (ref 135–145)
SPECIMEN SOURCE: SIGNIFICANT CHANGE UP
WBC # BLD: 12.75 K/UL — HIGH (ref 3.8–10.5)
WBC # FLD AUTO: 12.75 K/UL — HIGH (ref 3.8–10.5)

## 2020-01-04 PROCEDURE — 49422 REMOVE TUNNELED IP CATH: CPT | Mod: GC

## 2020-01-04 PROCEDURE — 88300 SURGICAL PATH GROSS: CPT | Mod: 26

## 2020-01-04 PROCEDURE — 99232 SBSQ HOSP IP/OBS MODERATE 35: CPT | Mod: GC,57

## 2020-01-04 PROCEDURE — 93010 ELECTROCARDIOGRAM REPORT: CPT

## 2020-01-04 PROCEDURE — 71045 X-RAY EXAM CHEST 1 VIEW: CPT | Mod: 26

## 2020-01-04 RX ORDER — ACETAMINOPHEN 500 MG
1000 TABLET ORAL ONCE
Refills: 0 | Status: DISCONTINUED | OUTPATIENT
Start: 2020-01-04 | End: 2020-01-04

## 2020-01-04 RX ORDER — ACETAMINOPHEN 500 MG
1000 TABLET ORAL ONCE
Refills: 0 | Status: COMPLETED | OUTPATIENT
Start: 2020-01-04 | End: 2020-01-04

## 2020-01-04 RX ORDER — INSULIN DETEMIR 100/ML (3)
60 INSULIN PEN (ML) SUBCUTANEOUS AT BEDTIME
Refills: 0 | Status: DISCONTINUED | OUTPATIENT
Start: 2020-01-04 | End: 2020-01-06

## 2020-01-04 RX ORDER — SODIUM CHLORIDE 9 MG/ML
1000 INJECTION, SOLUTION INTRAVENOUS
Refills: 0 | Status: DISCONTINUED | OUTPATIENT
Start: 2020-01-04 | End: 2020-01-05

## 2020-01-04 RX ORDER — ACETAMINOPHEN 500 MG
650 TABLET ORAL EVERY 6 HOURS
Refills: 0 | Status: DISCONTINUED | OUTPATIENT
Start: 2020-01-04 | End: 2020-01-06

## 2020-01-04 RX ORDER — BENZOCAINE AND MENTHOL 5; 1 G/100ML; G/100ML
1 LIQUID ORAL EVERY 6 HOURS
Refills: 0 | Status: DISCONTINUED | OUTPATIENT
Start: 2020-01-04 | End: 2020-01-06

## 2020-01-04 RX ORDER — ACETAMINOPHEN 500 MG
650 TABLET ORAL ONCE
Refills: 0 | Status: COMPLETED | OUTPATIENT
Start: 2020-01-04 | End: 2020-01-04

## 2020-01-04 RX ORDER — OXYCODONE HYDROCHLORIDE 5 MG/1
5 TABLET ORAL EVERY 4 HOURS
Refills: 0 | Status: DISCONTINUED | OUTPATIENT
Start: 2020-01-04 | End: 2020-01-06

## 2020-01-04 RX ORDER — ACETAMINOPHEN 500 MG
1000 TABLET ORAL ONCE
Refills: 0 | Status: COMPLETED | OUTPATIENT
Start: 2020-01-05 | End: 2020-01-05

## 2020-01-04 RX ORDER — INSULIN LISPRO 100/ML
VIAL (ML) SUBCUTANEOUS EVERY 6 HOURS
Refills: 0 | Status: DISCONTINUED | OUTPATIENT
Start: 2020-01-04 | End: 2020-01-04

## 2020-01-04 RX ORDER — ACETAMINOPHEN 500 MG
325 TABLET ORAL ONCE
Refills: 0 | Status: COMPLETED | OUTPATIENT
Start: 2020-01-04 | End: 2020-01-04

## 2020-01-04 RX ORDER — INSULIN LISPRO 100/ML
18 VIAL (ML) SUBCUTANEOUS
Refills: 0 | Status: DISCONTINUED | OUTPATIENT
Start: 2020-01-04 | End: 2020-01-06

## 2020-01-04 RX ADMIN — Medication 260 MILLIGRAM(S): at 05:22

## 2020-01-04 RX ADMIN — Medication 18 UNIT(S): at 12:33

## 2020-01-04 RX ADMIN — SODIUM CHLORIDE 50 MILLILITER(S): 9 INJECTION, SOLUTION INTRAVENOUS at 12:34

## 2020-01-04 RX ADMIN — OXYCODONE HYDROCHLORIDE 5 MILLIGRAM(S): 5 TABLET ORAL at 21:38

## 2020-01-04 RX ADMIN — HEPARIN SODIUM 5000 UNIT(S): 5000 INJECTION INTRAVENOUS; SUBCUTANEOUS at 13:47

## 2020-01-04 RX ADMIN — PIPERACILLIN AND TAZOBACTAM 25 GRAM(S): 4; .5 INJECTION, POWDER, LYOPHILIZED, FOR SOLUTION INTRAVENOUS at 17:36

## 2020-01-04 RX ADMIN — HEPARIN SODIUM 5000 UNIT(S): 5000 INJECTION INTRAVENOUS; SUBCUTANEOUS at 05:19

## 2020-01-04 RX ADMIN — Medication 81 MILLIGRAM(S): at 13:47

## 2020-01-04 RX ADMIN — CALCITRIOL 0.25 MICROGRAM(S): 0.5 CAPSULE ORAL at 13:48

## 2020-01-04 RX ADMIN — Medication 18 UNIT(S): at 17:36

## 2020-01-04 RX ADMIN — ATORVASTATIN CALCIUM 40 MILLIGRAM(S): 80 TABLET, FILM COATED ORAL at 21:38

## 2020-01-04 RX ADMIN — Medication 325 MILLIGRAM(S): at 17:35

## 2020-01-04 RX ADMIN — Medication 400 MILLIGRAM(S): at 23:16

## 2020-01-04 RX ADMIN — HEPARIN SODIUM 5000 UNIT(S): 5000 INJECTION INTRAVENOUS; SUBCUTANEOUS at 21:38

## 2020-01-04 RX ADMIN — OXYCODONE HYDROCHLORIDE 5 MILLIGRAM(S): 5 TABLET ORAL at 22:30

## 2020-01-04 RX ADMIN — Medication 650 MILLIGRAM(S): at 05:19

## 2020-01-04 RX ADMIN — Medication 325 MILLIGRAM(S): at 18:05

## 2020-01-04 RX ADMIN — Medication 60 UNIT(S): at 21:38

## 2020-01-04 RX ADMIN — SODIUM CHLORIDE 50 MILLILITER(S): 9 INJECTION, SOLUTION INTRAVENOUS at 21:47

## 2020-01-04 RX ADMIN — Medication 650 MILLIGRAM(S): at 05:52

## 2020-01-04 RX ADMIN — PIPERACILLIN AND TAZOBACTAM 25 GRAM(S): 4; .5 INJECTION, POWDER, LYOPHILIZED, FOR SOLUTION INTRAVENOUS at 05:19

## 2020-01-04 RX ADMIN — Medication 650 MILLIGRAM(S): at 17:36

## 2020-01-04 RX ADMIN — Medication 60 MILLIGRAM(S): at 05:19

## 2020-01-04 NOTE — CONSULT NOTE ADULT - SUBJECTIVE AND OBJECTIVE BOX
GENERAL SURGERY CONSULT NOTE  --------------------------------------------------------------------------------------------    HPI:  Patient is a 54y old  Male who presents with a chief complaint of "need peritoneal dialysis catheter removed" (2020 23:23)      HPI: 53yo M with hx of morbid obesity, BENNIE on CPAP, DM2 HTN, HLD, gout, ESRD on PD (nonfunctioning) presented from Dr. Merritt's clinic for removal of peritoneal dialysis catheter which has been irritated, tender, and erythematous with concern for infection. Patient had PT catheter placed with Dr. Merritt ~1 sachi ago and has issue with irritation and abdominal phlegmon since. It has been used twice but the drainage appears pustulous. Patient is extremely tender at site of catheter and it is most irritating when he moves.    Patient denies fevers/chills, denies lightheadedness/dizziness, denies SOB/chest pain, denies nausea/vomiting, denies constipation/diarrhea. (2020 23:23)    ROS: 10-system review is otherwise negative except HPI above.      PAST MEDICAL & SURGICAL HISTORY:  BENNIE on CPAP  Obesity  HLD (hyperlipidemia)  Diabetes mellitus  Gout  Hypertension  Renal disease: ESRD  H/O hernia repair: 30 years ago  Injury of ankle and foot: surgically repaired, 10 years ago  H/O shoulder surgery    FAMILY HISTORY:  Family history of MI (myocardial infarction)  [] Family history not pertinent as reviewed with the patient and family      CURRENT MEDICATIONS  MEDICATIONS (STANDING): aspirin enteric coated 81 milliGRAM(s) Oral daily  atorvastatin 40 milliGRAM(s) Oral at bedtime  calcitriol   Capsule 0.25 MICROGram(s) Oral daily  dextrose 5%. 1000 milliLiter(s) IV Continuous <Continuous>  dextrose 50% Injectable 12.5 Gram(s) IV Push once  heparin  Injectable 5000 Unit(s) SubCutaneous every 8 hours  insulin detemir injectable (LEVEMIR) 60 Unit(s) SubCutaneous at bedtime  insulin lispro (HumaLOG) corrective regimen sliding scale   SubCutaneous three times a day before meals  insulin lispro Injectable (HumaLOG) 18 Unit(s) SubCutaneous three times a day with meals  NIFEdipine XL 60 milliGRAM(s) Oral daily  piperacillin/tazobactam IVPB.. 3.375 Gram(s) IV Intermittent every 12 hours  sodium bicarbonate 650 milliGRAM(s) Oral every 12 hours    MEDICATIONS (PRN):acetaminophen   Tablet .. 650 milliGRAM(s) Oral every 6 hours PRN Mild Pain (1 - 3)  glucagon  Injectable 1 milliGRAM(s) IntraMuscular once PRN Glucose LESS THAN 70 milligrams/deciliter  oxyCODONE    IR 5 milliGRAM(s) Oral every 4 hours PRN Moderate Pain (4 - 6)    --------------------------------------------------------------------------------------------    Vitals: Within Normal LImits    Height (cm): 177.8 ( @ 08:11)  Weight (kg): 149.2 ( @ 08:11)  BMI (kg/m2): 47.2 ( @ 08:11)  BSA (m2): 2.58 ( @ 08:11)    PHYSICAL EXAM:  General: NAD, Lying in bed with mild discomfort  Neuro: A+Ox3  HEENT: NC/AT, EOMI  Neck: Soft, supple  Cardio: RRR, nml S1/S2  Resp: Good effort, CTA b/l  Thorax: No chest wall tenderness  Breast: No lesions/masses, no drainage  GI/Abd: Soft, obese, diffusely tender worse over PD cath site, ND, no rebound/guarding, no masses palpated, minimal erythema around cath site, old surgical scars  Vascular: All 4 extremities warm.  Skin: Intact, no breakdown  Lymphatic/Nodes: No palpable lymphadenopathy  Musculoskeletal: All 4 extremities moving spontaneously, no limitations  --------------------------------------------------------------------------------------------    LABS    Pending:      --------------------------------------------------------------------------------------------    MICROBIOLOGY  Urinalysis ( @ 19:15):     Color: YELLOW / Appearance: Lt TURBID / S.018 / pH: 6.0 / Gluc: NEGATIVE / Ketones: NEGATIVE / Bili: NEGATIVE / Urobili: NORMAL / Protein :300<H> / Nitrites: NEGATIVE / Leuk.Est: NEGATIVE / RBC: 0-2 / WBC: 3-5 / Sq Epi: OCC / Non Sq Epi:  / Bacteria SMALL       -> ABDOMEN Culture ( @ 09:39)       GPCCL^Gram Pos Cocci In Clusters  QUANTITY OF BACTERIA SEEN: MANY (4+)  WBC^White Blood Cells  QNTY CELLS IN GRAM STAIN: MANY (4+)      MODERATE  STAU^Staphylococcus aureus  NG    -> ASCITES FLUID Culture ( @ 22:53)       WBC^White Blood Cells  QNTY CELLS IN GRAM STAIN: FEW (2+)  GPCPR^Gram Pos Cocci in Pairs  QUANTITY OF BACTERIA SEEN: MANY (4+)      MANY  STAU^Staphylococcus aureus  GNRID^Gram Neg Gregory To Be Identified  NG    -> URINE MIDSTREAM Culture ( @ 20:45)     NG    NG  NG    -> BLOOD PERIPHERAL Culture ( @ 17:57)     NG    NG  NG      --------------------------------------------------------------------------------------------    ASSESSMENT: Patient is a 54y old m with ***    PLAN:   - admit patient to Dr. Abbott for removal of PD catheter in OR tomorrow  - blood cultures, PD cath culture  - IV abx  - BG monitoring  - DVT ppx  - NPO after midnight  - Patient seen/examined with attending.  - Plan discussed with Attending, Dr. Merritt

## 2020-01-04 NOTE — PATIENT PROFILE ADULT - SURGICAL SITE DESCRIPTION
old healed abdominal lap sites x4, steri strips still visible on lap site x1, LLQ Dialysis catheter incision site old

## 2020-01-04 NOTE — PRE-OP CHECKLIST - VERIFY SURGICAL SITE/SIDE WITH PATIENT
Post Operative Visit    [facility] - [facility]  [address] and Vascular Surgery   Mellisa Castro MD    [address], [address]  [facility]  Phone: [phone]  Fax: [phone]    Ashlee Clemente   YOB: 1955    Date of Visit:  12/31/2018    Bradley Dunham MD    Subjective:     Ashlee Clemente presents for a six week follow-up s/p laparoscopic right hemicolectomy for a T1N0 ascending colon CA. Overall she has been doing well since her operation. She has been eating/drinking without difficulty. She has not had any nausea and vomiting. Her bowels are becoming much more solid, and will rarely have diarrhea. There has been complete resolution of her pain. She denies any fevers or chills. Pain Score:   0 - No pain    Allergies   Allergen Reactions    Pcn [Penicillins] Shortness Of Breath    Spironolactone Rash    Coreg [Carvedilol]      Side effects:  SOB, palpitations    Lisinopril Other (See Comments)     cough     Outpatient Prescriptions Marked as Taking for the 12/31/18 encounter (Office Visit) with Shayan Rossi MD   Medication Sig Dispense Refill    glipiZIDE (GLUCOTROL) 5 MG tablet Take 1 tablet by mouth 2 times daily (before meals) 180 tablet 0    potassium chloride (KLOR-CON M) 20 MEQ TBCR extended release tablet Take 1 tablet by mouth daily Take one tablet po daily 30 tablet 3    furosemide (LASIX) 40 MG tablet Take 1 tablet by mouth 2 times daily 60 tablet 3    cetirizine (ZYRTEC ALLERGY) 10 MG tablet Take 10 mg by mouth daily      metFORMIN (GLUCOPHAGE) 1000 MG tablet TAKE 1 TABLET BY MOUTH TWO TIMES A DAY WITH MEALS 180 tablet 0    atorvastatin (LIPITOR) 20 MG tablet TAKE 1 TABLET BY MOUTH ONE TIME A DAY  90 tablet 0    metolazone (ZAROXOLYN) 2.5 MG tablet Take 2.5mg po  weekly 30 tablet 0       Vitals:    12/31/18 0859   BP: (!) 177/98   Pulse: 109   Weight: 252 lb (114.3 kg)     Body mass index is 44.64 kg/m².
done
done/abdomen area

## 2020-01-04 NOTE — BRIEF OPERATIVE NOTE - NSICDXBRIEFPREOP_GEN_ALL_CORE_FT
PRE-OP DIAGNOSIS:  Peritoneal dialysis catheter dysfunction 04-Jan-2020 09:55:43 infected Jovanni Gifford

## 2020-01-04 NOTE — CHART NOTE - NSCHARTNOTEFT_GEN_A_CORE
Surgery Preop Note    Patient is a 54y old  Male who presents with a chief complaint of removal of peritoneal dialysis catheter removal (2020 23:23)    Diagnosis:  Infection associated with peritoneal dialysis catheter  Procedure: Removal of Peritoneal Dialysis catheter  Surgeon: Dr. Marshall                          9.0    15.75 )-----------( 368      ( 2020 15:55 )             31.0         137  |  102  |  61<H>  ----------------------------<  109<H>  4.4   |  19<L>  |  5.58<H>    Ca    9.5      2020 15:55    TPro  8.5<H>  /  Alb  3.5  /  TBili  0.5  /  DBili  x   /  AST  39  /  ALT  25  /  AlkPhos  64      PT/INR - ( 2020 15:55 )   PT: 13.5 SEC;   INR: 1.18          PTT - ( 2020 15:55 )  PTT:30.8 SEC  ABO Interpretation: O ( @ 15:55)    Urinalysis Basic - ( 2020 19:15 )    Color: YELLOW / Appearance: Lt TURBID / S.018 / pH: 6.0  Gluc: NEGATIVE / Ketone: NEGATIVE  / Bili: NEGATIVE / Urobili: NORMAL   Blood: NEGATIVE / Protein: 300 / Nitrite: NEGATIVE   Leuk Esterase: NEGATIVE / RBC: 0-2 / WBC 3-5   Sq Epi: OCC / Non Sq Epi: x / Bacteria: SMALL      Chest X RAY: Left basilar lobe opacity may suggest atelectasis  EKG: Pending     MEDICATIONS  (STANDING):  aspirin enteric coated 81 milliGRAM(s) Oral daily  atorvastatin 40 milliGRAM(s) Oral at bedtime  calcitriol   Capsule 0.25 MICROGram(s) Oral daily  dextrose 5%. 1000 milliLiter(s) (50 mL/Hr) IV Continuous <Continuous>  dextrose 50% Injectable 12.5 Gram(s) IV Push once  dextrose 50% Injectable 25 Gram(s) IV Push once  dextrose 50% Injectable 25 Gram(s) IV Push once  heparin  Injectable 5000 Unit(s) SubCutaneous every 8 hours  insulin detemir injectable (LEVEMIR) 30 Unit(s) SubCutaneous once  lactated ringers. 1000 milliLiter(s) (125 mL/Hr) IV Continuous <Continuous>  NIFEdipine XL 60 milliGRAM(s) Oral daily  piperacillin/tazobactam IVPB.. 3.375 Gram(s) IV Intermittent every 12 hours  sodium bicarbonate 650 milliGRAM(s) Oral every 12 hours    MEDICATIONS  (PRN):  dextrose 40% Gel 15 Gram(s) Oral once PRN Blood Glucose LESS THAN 70 milliGRAM(s)/deciliter  glucagon  Injectable 1 milliGRAM(s) IntraMuscular once PRN Glucose LESS THAN 70 milligrams/deciliter      Assessment & Plan:  54y Male with   NPO, IVF  Pain Control  DVT prophylaxis    Megan Frye DO PGY1

## 2020-01-04 NOTE — PROGRESS NOTE ADULT - ATTENDING COMMENTS
Mr. Holley has signs and symptoms of infected PD catheter, and I suspect that the infection is advanced. I have discussed the options with the patient and reviewed both non-operative and operative treatment methods.  I have reviewed the risks and benefits of both approaches, and have discussed the possible complications associated with the surgical procedure.  He is aware that there is a risk of infection or abscess formation after surgery and that there may be the need for additional surgery in the future.  I have recommended that we proceed with removal of PD catheter. He has given consent to proceed with the procedure.

## 2020-01-04 NOTE — PROGRESS NOTE ADULT - SUBJECTIVE AND OBJECTIVE BOX
Patient seen and examined in bed; no new events.  NAD.  S/p PD catheter.      REVIEW OF SYSTEMS:  As per HPI, otherwise 8 full 10 ROS were unremarkable    MEDICATIONS  (STANDING):  acetaminophen   Tablet .. 325 milliGRAM(s) Oral every 4 hours  aspirin enteric coated 81 milliGRAM(s) Oral daily  atorvastatin 40 milliGRAM(s) Oral at bedtime  calcitriol   Capsule 0.25 MICROGram(s) Oral daily  dextrose 5% + sodium chloride 0.45%. 1000 milliLiter(s) (50 mL/Hr) IV Continuous <Continuous>  dextrose 5%. 1000 milliLiter(s) (50 mL/Hr) IV Continuous <Continuous>  dextrose 50% Injectable 12.5 Gram(s) IV Push once  heparin  Injectable 5000 Unit(s) SubCutaneous every 8 hours  insulin detemir injectable (LEVEMIR) 60 Unit(s) SubCutaneous at bedtime  insulin lispro Injectable (HumaLOG) 18 Unit(s) SubCutaneous three times a day with meals  NIFEdipine XL 60 milliGRAM(s) Oral daily  piperacillin/tazobactam IVPB.. 3.375 Gram(s) IV Intermittent every 12 hours  sodium bicarbonate 650 milliGRAM(s) Oral every 12 hours      VITAL:  T(C): , Max: 38.7 (20 @ 16:52)  T(F): , Max: 101.7 (20 @ 16:52)  HR: 98 (20 @ 15:20)  BP: 141/77 (20 @ 15:20)  BP(mean): 89 (20 @ 10:15)  RR: 17 (20 @ 15:20)  SpO2: 96% (20 @ 15:20)  Wt(kg): --    I and O's:     07:01  -   @ 07:00  --------------------------------------------------------  IN: 865 mL / OUT: 0 mL / NET: 865 mL     07:01  -   @ 16:16  --------------------------------------------------------  IN: 520 mL / OUT: 300 mL / NET: 220 mL      Height (cm): 177.8 ( @ 08:11)  Weight (kg): 149.2 ( @ 08:11)  BMI (kg/m2): 47.2 ( @ 08:11)  BSA (m2): 2.58 ( @ 08:11)    PHYSICAL EXAM:    Constitutional: NAD  HEENT: PERRLA, EOMI,  MMM  Neck: No LAD, No JVD  Respiratory: CTAB  Cardiovascular: S1 and S2  Gastrointestinal: BS+, soft, NT/ND  Extremities: No peripheral edema  Neurological: A/O x 3, no focal deficits  Psychiatric: Normal mood, normal affect  : No Figueroa  Skin: No rashes  Access: Not applicable    LABS:                        8.2    12.75 )-----------( 298      ( 2020 05:50 )             27.7     01-    137  |  104  |  65<H>  ----------------------------<  112<H>  4.7   |  17<L>  |  5.54<H>    Ca    9.1      2020 05:50  Phos  4.6     -04  Mg     1.9     -    TPro  8.5<H>  /  Alb  3.5  /  TBili  0.5  /  DBili  x   /  AST  39  /  ALT  25  /  AlkPhos  64  01-03      Urine Studies:  Urinalysis Basic - ( 2020 19:15 )    Color: YELLOW / Appearance: Lt TURBID / S.018 / pH: 6.0  Gluc: NEGATIVE / Ketone: NEGATIVE  / Bili: NEGATIVE / Urobili: NORMAL   Blood: NEGATIVE / Protein: 300 / Nitrite: NEGATIVE   Leuk Esterase: NEGATIVE / RBC: 0-2 / WBC 3-5   Sq Epi: OCC / Non Sq Epi: x / Bacteria: SMALL        Assessment and Recommendation:   · Assessment	  52 yo male c hx of morbid obesity HTN DM CKD stage 4-5 pw pain and pus that was elicited from the PD cathater.  He has not started HD as of yet   Personally he has had some many issues with the catheter and his body habitus will make PD quite challanging    1 Renal -No objection to continue current IVFs for now; would look to d/c once appetite improves.  He still urinates.  Not in heart failure.  Azotemia same from yesterday; overall improved     2ID-remains on IV Zosyn    3Surgery-S/p catheter removal POD 0

## 2020-01-04 NOTE — PROGRESS NOTE ADULT - ASSESSMENT
5yo M with hx of morbid obesity, BENNIE on CPAP, DM2 HTN, HLD, gout, ESRD on PD (nonfunctioning) presented from clinic for removal of peritoneal dialysis catheter which has been irritated, tender, and erythematous with concern for infection.    - OR for removal of PD catheter  - Zosyn  - Pain control  - IVF

## 2020-01-04 NOTE — BRIEF OPERATIVE NOTE - OPERATION/FINDINGS
removal of peritoneal dialysis catheter     findings: infected tract, copious amount of pus in the tract, sent for culture

## 2020-01-04 NOTE — CHART NOTE - NSCHARTNOTEFT_GEN_A_CORE
STATUS POST:  removal of PD catheter     POST OPERATIVE DAY #: 0    SUBJECTIVE: Pt seen at bedside, patient states has pain at incision site, but that is only complaints. denies N/V, chest pain or SOB      Vital Signs Last 24 Hrs  T(C): 36.7 (2020 09:35), Max: 38.7 (2020 16:52)  T(F): 98.1 (2020 09:35), Max: 101.7 (2020 16:52)  HR: 93 (2020 10:15) (73 - 130)  BP: 130/78 (2020 10:15) (118/89 - 159/95)  BP(mean): 89 (2020 10:15) (83 - 95)  RR: 16 (2020 10:15) (12 - 21)  SpO2: 91% (2020 10:15) (91% - 100%)  I&O's Summary    2020 07:  -  2020 07:00  --------------------------------------------------------  IN: 865 mL / OUT: 0 mL / NET: 865 mL      -  2020 13:32  --------------------------------------------------------  IN: 200 mL / OUT: 0 mL / NET: 200 mL      I&O's Detail    2020 07:  -  2020 07:00  --------------------------------------------------------  IN:    dextrose 5% + sodium chloride 0.45%.: 200 mL    IV PiggyBack: 165 mL    lactated ringers.: 500 mL  Total IN: 865 mL    OUT:  Total OUT: 0 mL    Total NET: 865 mL      2020 07:01  -  2020 13:32  --------------------------------------------------------  IN:    dextrose 5% + sodium chloride 0.45%.: 200 mL  Total IN: 200 mL    OUT:  Total OUT: 0 mL    Total NET: 200 mL          MEDICATIONS  (STANDING):  aspirin enteric coated 81 milliGRAM(s) Oral daily  atorvastatin 40 milliGRAM(s) Oral at bedtime  calcitriol   Capsule 0.25 MICROGram(s) Oral daily  dextrose 5% + sodium chloride 0.45%. 1000 milliLiter(s) (50 mL/Hr) IV Continuous <Continuous>  dextrose 5%. 1000 milliLiter(s) (50 mL/Hr) IV Continuous <Continuous>  dextrose 50% Injectable 12.5 Gram(s) IV Push once  heparin  Injectable 5000 Unit(s) SubCutaneous every 8 hours  insulin detemir injectable (LEVEMIR) 60 Unit(s) SubCutaneous at bedtime  insulin lispro Injectable (HumaLOG) 18 Unit(s) SubCutaneous three times a day with meals  NIFEdipine XL 60 milliGRAM(s) Oral daily  piperacillin/tazobactam IVPB.. 3.375 Gram(s) IV Intermittent every 12 hours  sodium bicarbonate 650 milliGRAM(s) Oral every 12 hours    MEDICATIONS  (PRN):  acetaminophen   Tablet .. 650 milliGRAM(s) Oral every 6 hours PRN Mild Pain (1 - 3)  benzocaine 15 mG/menthol 3.6 mG (Sugar-Free) Lozenge 1 Lozenge Oral every 6 hours PRN Sore Throat  glucagon  Injectable 1 milliGRAM(s) IntraMuscular once PRN Glucose LESS THAN 70 milligrams/deciliter      LABS:                        8.2    12.75 )-----------( 298      ( 2020 05:50 )             27.7     01-04    137  |  104  |  65<H>  ----------------------------<  112<H>  4.7   |  17<L>  |  5.54<H>    Ca    9.1      2020 05:50  Phos  4.6     01-04  Mg     1.9     01-04    TPro  8.5<H>  /  Alb  3.5  /  TBili  0.5  /  DBili  x   /  AST  39  /  ALT  25  /  AlkPhos  64  -03    PT/INR - ( 2020 05:50 )   PT: 13.5 SEC;   INR: 1.18          PTT - ( 2020 05:50 )  PTT:31.2 SEC  Urinalysis Basic - ( 2020 19:15 )    Color: YELLOW / Appearance: Lt TURBID / S.018 / pH: 6.0  Gluc: NEGATIVE / Ketone: NEGATIVE  / Bili: NEGATIVE / Urobili: NORMAL   Blood: NEGATIVE / Protein: 300 / Nitrite: NEGATIVE   Leuk Esterase: NEGATIVE / RBC: 0-2 / WBC 3-5   Sq Epi: OCC / Non Sq Epi: x / Bacteria: SMALL        RADIOLOGY & ADDITIONAL STUDIES:    Physical Exam:  General: NAD, resting comfortably  HEENT: NC/AT, EOMI, normal hearing, no oral lesions, no LAD, neck supple  Pulmonary: normal resp effort, CTA-B  Cardiovascular: NSR, no murmurs  Abdominal: soft, non distended, dressing tinged with serous fluid,   Extremities: WWP, normal strength, no clubbing/cyanosis/edema  Neuro: A/O x 3, CNs II-XII grossly intact, normal sensation, no focal deficits  Pulses: palpable distal pulses    A/P: 54y Male s/p   - Regular diet  - follow up cultures from OR  - OOB  - Pain medication  - DVT ppx

## 2020-01-04 NOTE — PROGRESS NOTE ADULT - SUBJECTIVE AND OBJECTIVE BOX
SURGERY DAILY PROGRESS NOTE:       SUBJECTIVE/ROS: Patient examined at bedside. Dialysis catheter has only been used twice, patient is extremely tender at site of catheter   Denies nausea, vomiting, chest pain, shortness of breath         MEDICATIONS  (STANDING):  aspirin enteric coated 81 milliGRAM(s) Oral daily  atorvastatin 40 milliGRAM(s) Oral at bedtime  calcitriol   Capsule 0.25 MICROGram(s) Oral daily  dextrose 5% + sodium chloride 0.45%. 1000 milliLiter(s) (50 mL/Hr) IV Continuous <Continuous>  dextrose 5%. 1000 milliLiter(s) (50 mL/Hr) IV Continuous <Continuous>  dextrose 50% Injectable 12.5 Gram(s) IV Push once  dextrose 50% Injectable 25 Gram(s) IV Push once  dextrose 50% Injectable 25 Gram(s) IV Push once  heparin  Injectable 5000 Unit(s) SubCutaneous every 8 hours  insulin detemir injectable (LEVEMIR) 30 Unit(s) SubCutaneous once  insulin lispro (HumaLOG) corrective regimen sliding scale   SubCutaneous every 6 hours  NIFEdipine XL 60 milliGRAM(s) Oral daily  piperacillin/tazobactam IVPB.. 3.375 Gram(s) IV Intermittent every 12 hours  sodium bicarbonate 650 milliGRAM(s) Oral every 12 hours    MEDICATIONS  (PRN):  dextrose 40% Gel 15 Gram(s) Oral once PRN Blood Glucose LESS THAN 70 milliGRAM(s)/deciliter  glucagon  Injectable 1 milliGRAM(s) IntraMuscular once PRN Glucose LESS THAN 70 milligrams/deciliter      OBJECTIVE:    Vital Signs Last 24 Hrs  T(C): 36.9 (2020 08:11), Max: 38.7 (2020 16:52)  T(F): 98.4 (2020 08:11), Max: 101.7 (2020 16:52)  HR: 78 (2020 08:11) (73 - 130)  BP: 134/80 (2020 08:11) (124/70 - 159/95)  BP(mean): --  RR: 16 (2020 08:11) (16 - 21)  SpO2: 97% (2020 08:11) (97% - 100%)        I&O's Detail    2020 07:01  -  2020 07:00  --------------------------------------------------------  IN:    dextrose 5% + sodium chloride 0.45%.: 200 mL    IV PiggyBack: 165 mL    lactated ringers.: 500 mL  Total IN: 865 mL    OUT:  Total OUT: 0 mL    Total NET: 865 mL          Daily Height in cm: 177.8 (2020 08:11)    Daily Weight in k (2020 01:23)    LABS:                        8.2    12.75 )-----------( 298      ( 2020 05:50 )             27.7     01-04    137  |  104  |  65<H>  ----------------------------<  112<H>  4.7   |  17<L>  |  5.54<H>    Ca    9.1      2020 05:50  Phos  4.6     01-04  Mg     1.9     01-04    TPro  8.5<H>  /  Alb  3.5  /  TBili  0.5  /  DBili  x   /  AST  39  /  ALT  25  /  AlkPhos  64  01-03    PT/INR - ( 2020 05:50 )   PT: 13.5 SEC;   INR: 1.18          PTT - ( 2020 05:50 )  PTT:31.2 SEC  Urinalysis Basic - ( 2020 19:15 )    Color: YELLOW / Appearance: Lt TURBID / S.018 / pH: 6.0  Gluc: NEGATIVE / Ketone: NEGATIVE  / Bili: NEGATIVE / Urobili: NORMAL   Blood: NEGATIVE / Protein: 300 / Nitrite: NEGATIVE   Leuk Esterase: NEGATIVE / RBC: 0-2 / WBC 3-5   Sq Epi: OCC / Non Sq Epi: x / Bacteria: SMALL                    PHYSICAL EXAM:  Constitutional: well developed, well nourished, NAD  Eyes: anicteric  ENMT: normal facies, symmetric  Respiratory: Breathing comfortably    Gastrointestinal: abdomen soft, diffusely tender over PD cath site, minimal erythema    Extremities: FROM, warm  Neurological: intact, non-focal  Psychiatric: oriented x 3; appropriate

## 2020-01-05 LAB
ANION GAP SERPL CALC-SCNC: 15 MMO/L — HIGH (ref 7–14)
BACTERIA UR CULT: SIGNIFICANT CHANGE UP
BUN SERPL-MCNC: 58 MG/DL — HIGH (ref 7–23)
CALCIUM SERPL-MCNC: 9.2 MG/DL — SIGNIFICANT CHANGE UP (ref 8.4–10.5)
CHLORIDE SERPL-SCNC: 103 MMOL/L — SIGNIFICANT CHANGE UP (ref 98–107)
CO2 SERPL-SCNC: 20 MMOL/L — LOW (ref 22–31)
CREAT SERPL-MCNC: 5.71 MG/DL — HIGH (ref 0.5–1.3)
GLUCOSE BLDC GLUCOMTR-MCNC: 100 MG/DL — HIGH (ref 70–99)
GLUCOSE BLDC GLUCOMTR-MCNC: 108 MG/DL — HIGH (ref 70–99)
GLUCOSE BLDC GLUCOMTR-MCNC: 124 MG/DL — HIGH (ref 70–99)
GLUCOSE BLDC GLUCOMTR-MCNC: 140 MG/DL — HIGH (ref 70–99)
GLUCOSE SERPL-MCNC: 111 MG/DL — HIGH (ref 70–99)
HCT VFR BLD CALC: 27.9 % — LOW (ref 39–50)
HGB BLD-MCNC: 8.2 G/DL — LOW (ref 13–17)
MAGNESIUM SERPL-MCNC: 2 MG/DL — SIGNIFICANT CHANGE UP (ref 1.6–2.6)
MCHC RBC-ENTMCNC: 17.5 PG — LOW (ref 27–34)
MCHC RBC-ENTMCNC: 29.4 % — LOW (ref 32–36)
MCV RBC AUTO: 59.5 FL — LOW (ref 80–100)
NRBC # FLD: 0 K/UL — SIGNIFICANT CHANGE UP (ref 0–0)
PHOSPHATE SERPL-MCNC: 4.3 MG/DL — SIGNIFICANT CHANGE UP (ref 2.5–4.5)
PLATELET # BLD AUTO: 323 K/UL — SIGNIFICANT CHANGE UP (ref 150–400)
PMV BLD: 11.4 FL — SIGNIFICANT CHANGE UP (ref 7–13)
POTASSIUM SERPL-MCNC: 4.1 MMOL/L — SIGNIFICANT CHANGE UP (ref 3.5–5.3)
POTASSIUM SERPL-SCNC: 4.1 MMOL/L — SIGNIFICANT CHANGE UP (ref 3.5–5.3)
RBC # BLD: 4.69 M/UL — SIGNIFICANT CHANGE UP (ref 4.2–5.8)
RBC # FLD: 16.8 % — HIGH (ref 10.3–14.5)
SODIUM SERPL-SCNC: 138 MMOL/L — SIGNIFICANT CHANGE UP (ref 135–145)
SPECIMEN SOURCE: SIGNIFICANT CHANGE UP
WBC # BLD: 13.24 K/UL — HIGH (ref 3.8–10.5)
WBC # FLD AUTO: 13.24 K/UL — HIGH (ref 3.8–10.5)

## 2020-01-05 PROCEDURE — 99232 SBSQ HOSP IP/OBS MODERATE 35: CPT | Mod: GC

## 2020-01-05 RX ORDER — PIPERACILLIN AND TAZOBACTAM 4; .5 G/20ML; G/20ML
3.38 INJECTION, POWDER, LYOPHILIZED, FOR SOLUTION INTRAVENOUS EVERY 12 HOURS
Refills: 0 | Status: DISCONTINUED | OUTPATIENT
Start: 2020-01-06 | End: 2020-01-06

## 2020-01-05 RX ORDER — ACETAMINOPHEN 500 MG
1000 TABLET ORAL ONCE
Refills: 0 | Status: COMPLETED | OUTPATIENT
Start: 2020-01-05 | End: 2020-01-05

## 2020-01-05 RX ORDER — INSULIN LISPRO 100/ML
VIAL (ML) SUBCUTANEOUS
Refills: 0 | Status: DISCONTINUED | OUTPATIENT
Start: 2020-01-05 | End: 2020-01-06

## 2020-01-05 RX ADMIN — Medication 400 MILLIGRAM(S): at 13:49

## 2020-01-05 RX ADMIN — CALCITRIOL 0.25 MICROGRAM(S): 0.5 CAPSULE ORAL at 11:13

## 2020-01-05 RX ADMIN — Medication 81 MILLIGRAM(S): at 11:13

## 2020-01-05 RX ADMIN — BENZOCAINE AND MENTHOL 1 LOZENGE: 5; 1 LIQUID ORAL at 11:13

## 2020-01-05 RX ADMIN — Medication 60 MILLIGRAM(S): at 06:34

## 2020-01-05 RX ADMIN — HEPARIN SODIUM 5000 UNIT(S): 5000 INJECTION INTRAVENOUS; SUBCUTANEOUS at 22:48

## 2020-01-05 RX ADMIN — Medication 650 MILLIGRAM(S): at 17:48

## 2020-01-05 RX ADMIN — Medication 400 MILLIGRAM(S): at 07:35

## 2020-01-05 RX ADMIN — Medication 1000 MILLIGRAM(S): at 14:19

## 2020-01-05 RX ADMIN — HEPARIN SODIUM 5000 UNIT(S): 5000 INJECTION INTRAVENOUS; SUBCUTANEOUS at 13:48

## 2020-01-05 RX ADMIN — PIPERACILLIN AND TAZOBACTAM 25 GRAM(S): 4; .5 INJECTION, POWDER, LYOPHILIZED, FOR SOLUTION INTRAVENOUS at 17:48

## 2020-01-05 RX ADMIN — OXYCODONE HYDROCHLORIDE 5 MILLIGRAM(S): 5 TABLET ORAL at 06:35

## 2020-01-05 RX ADMIN — HEPARIN SODIUM 5000 UNIT(S): 5000 INJECTION INTRAVENOUS; SUBCUTANEOUS at 06:34

## 2020-01-05 RX ADMIN — Medication 60 UNIT(S): at 22:48

## 2020-01-05 RX ADMIN — PIPERACILLIN AND TAZOBACTAM 25 GRAM(S): 4; .5 INJECTION, POWDER, LYOPHILIZED, FOR SOLUTION INTRAVENOUS at 07:36

## 2020-01-05 RX ADMIN — Medication 650 MILLIGRAM(S): at 06:34

## 2020-01-05 RX ADMIN — ATORVASTATIN CALCIUM 40 MILLIGRAM(S): 80 TABLET, FILM COATED ORAL at 22:48

## 2020-01-05 RX ADMIN — SODIUM CHLORIDE 50 MILLILITER(S): 9 INJECTION, SOLUTION INTRAVENOUS at 11:14

## 2020-01-05 NOTE — PROGRESS NOTE ADULT - ATTENDING COMMENTS
Mr. Holley was examined on am rounds. Overall pain improved. Febrile overnight. abdominal wound packing replaced. Minimal drainage. Plan to remain on antibiotics until four days post-operative. Will plan for daily dressing changes, follow-up fever work-up and OR cultures. VNS for home dressing changes.

## 2020-01-05 NOTE — PROGRESS NOTE ADULT - SUBJECTIVE AND OBJECTIVE BOX
SURGERY DAILY PROGRESS NOTE:       SUBJECTIVE/ROS: Patient examined at bedside. Claims to have abdominal pain at the side of the PD catheter. Awaiting results of blood cultures  Denies nausea, vomiting, chest pain, shortness of breath         MEDICATIONS  (STANDING):  acetaminophen  IVPB .. 1000 milliGRAM(s) IV Intermittent once  aspirin enteric coated 81 milliGRAM(s) Oral daily  atorvastatin 40 milliGRAM(s) Oral at bedtime  calcitriol   Capsule 0.25 MICROGram(s) Oral daily  dextrose 5% + sodium chloride 0.45%. 1000 milliLiter(s) (50 mL/Hr) IV Continuous <Continuous>  dextrose 5%. 1000 milliLiter(s) (50 mL/Hr) IV Continuous <Continuous>  dextrose 50% Injectable 12.5 Gram(s) IV Push once  heparin  Injectable 5000 Unit(s) SubCutaneous every 8 hours  insulin detemir injectable (LEVEMIR) 60 Unit(s) SubCutaneous at bedtime  insulin lispro (HumaLOG) corrective regimen sliding scale   SubCutaneous three times a day before meals  insulin lispro Injectable (HumaLOG) 18 Unit(s) SubCutaneous three times a day with meals  NIFEdipine XL 60 milliGRAM(s) Oral daily  piperacillin/tazobactam IVPB.. 3.375 Gram(s) IV Intermittent every 12 hours  sodium bicarbonate 650 milliGRAM(s) Oral every 12 hours    MEDICATIONS  (PRN):  acetaminophen   Tablet .. 650 milliGRAM(s) Oral every 6 hours PRN Mild Pain (1 - 3)  benzocaine 15 mG/menthol 3.6 mG (Sugar-Free) Lozenge 1 Lozenge Oral every 6 hours PRN Sore Throat  glucagon  Injectable 1 milliGRAM(s) IntraMuscular once PRN Glucose LESS THAN 70 milligrams/deciliter  oxyCODONE    IR 5 milliGRAM(s) Oral every 4 hours PRN Moderate Pain (4 - 6)      OBJECTIVE:    Vital Signs Last 24 Hrs  T(C): 36.9 (2020 09:37), Max: 38.8 (2020 19:09)  T(F): 98.4 (2020 09:37), Max: 101.9 (2020 19:09)  HR: 98 (2020 09:37) (86 - 99)  BP: 124/83 (2020 09:37) (99/63 - 141/77)  BP(mean): 91 (2020 10:30) (88 - 91)  RR: 18 (2020 09:37) (11 - 19)  SpO2: 97% (2020 09:37) (90% - 98%)        I&O's Detail    2020 07:01  -  2020 07:00  --------------------------------------------------------  IN:    dextrose 5% + sodium chloride 0.45%.: 1100 mL    IV PiggyBack: 300 mL    Oral Fluid: 480 mL  Total IN: 1880 mL    OUT:    Voided: 1275 mL  Total OUT: 1275 mL    Total NET: 605 mL          Daily     Daily     LABS:                        8.2    13.24 )-----------( 323      ( 2020 07:25 )             27.9     01-05    138  |  103  |  58<H>  ----------------------------<  111<H>  4.1   |  20<L>  |  5.71<H>    Ca    9.2      2020 07:25  Phos  4.3     01-05  Mg     2.0     01-05    TPro  8.5<H>  /  Alb  3.5  /  TBili  0.5  /  DBili  x   /  AST  39  /  ALT  25  /  AlkPhos  64  01-03    PT/INR - ( 2020 05:50 )   PT: 13.5 SEC;   INR: 1.18          PTT - ( 2020 05:50 )  PTT:31.2 SEC  Urinalysis Basic - ( 2020 19:15 )    Color: YELLOW / Appearance: Lt TURBID / S.018 / pH: 6.0  Gluc: NEGATIVE / Ketone: NEGATIVE  / Bili: NEGATIVE / Urobili: NORMAL   Blood: NEGATIVE / Protein: 300 / Nitrite: NEGATIVE   Leuk Esterase: NEGATIVE / RBC: 0-2 / WBC 3-5   Sq Epi: OCC / Non Sq Epi: x / Bacteria: SMALL                    PHYSICAL EXAM:  Constitutional: well developed, well nourished, NAD  Eyes: anicteric  ENMT: normal facies, symmetric  Respiratory: Breathing comfortably    Gastrointestinal: abdomen soft, tender to palpation around incision site, dressing saturated with serosanguinous fluid  Extremities: FROM, warm  Neurological: intact, non-focal  Psychiatric: oriented x 3; appropriate

## 2020-01-05 NOTE — PROGRESS NOTE ADULT - ASSESSMENT
53yo M with hx of morbid obesity, BENNIE on CPAP, DM2 HTN, HLD, gout, ESRD on PD (nonfunctioning) presented from clinic for removal of peritoneal dialysis catheter which has been irritated, tender, and erythematous with concern for infection.    - F/ u blood culture results  - Daily dressing changes   - Zosyn  - Pain control  - IVF

## 2020-01-05 NOTE — PROGRESS NOTE ADULT - SUBJECTIVE AND OBJECTIVE BOX
Patient seen and examined in bed; no new events overnight.  NAD.    REVIEW OF SYSTEMS:  As per HPI, otherwise 8 full 10 ROS were unremarkable    MEDICATIONS  (STANDING):  aspirin enteric coated 81 milliGRAM(s) Oral daily  atorvastatin 40 milliGRAM(s) Oral at bedtime  calcitriol   Capsule 0.25 MICROGram(s) Oral daily  dextrose 5%. 1000 milliLiter(s) (50 mL/Hr) IV Continuous <Continuous>  dextrose 50% Injectable 12.5 Gram(s) IV Push once  heparin  Injectable 5000 Unit(s) SubCutaneous every 8 hours  insulin detemir injectable (LEVEMIR) 60 Unit(s) SubCutaneous at bedtime  insulin lispro (HumaLOG) corrective regimen sliding scale   SubCutaneous three times a day before meals  insulin lispro Injectable (HumaLOG) 18 Unit(s) SubCutaneous three times a day with meals  NIFEdipine XL 60 milliGRAM(s) Oral daily  piperacillin/tazobactam IVPB.. 3.375 Gram(s) IV Intermittent every 12 hours  sodium bicarbonate 650 milliGRAM(s) Oral every 12 hours      VITAL:  T(C): , Max: 38.8 (20 @ 19:09)  T(F): , Max: 101.9 (20 @ 19:09)  HR: 97 (20 @ 13:27)  BP: 116/74 (20 @ 13:27)  BP(mean): --  RR: 16 (20 @ 13:27)  SpO2: 98% (20 @ 13:27)  Wt(kg): --    I and O's:     @ 07:  -   @ 07:00  --------------------------------------------------------  IN: 1880 mL / OUT: 1275 mL / NET: 605 mL     @ 07:01  -   @ 15:24  --------------------------------------------------------  IN: 700 mL / OUT: 800 mL / NET: -100 mL          PHYSICAL EXAM:    Constitutional: NAD  HEENT: PERRLA, EOMI,  MMM  Neck: No LAD, No JVD  Respiratory: CTAB  Cardiovascular: S1 and S2  Gastrointestinal: BS+, soft, NT/ND  Extremities: + peripheral edema  Neurological: A/O x 3, no focal deficits  Psychiatric: Normal mood, normal affect  : No Figueroa  Skin: No rashes  Access: Not applicable    LABS:                        8.2    13.24 )-----------( 323      ( 2020 07:25 )             27.9     01-05    138  |  103  |  58<H>  ----------------------------<  111<H>  4.1   |  20<L>  |  5.71<H>    Ca    9.2      2020 07:25  Phos  4.3     01-05  Mg     2.0     01-05    TPro  8.5<H>  /  Alb  3.5  /  TBili  0.5  /  DBili  x   /  AST  39  /  ALT  25  /  AlkPhos  64  01-03      Urine Studies:  Urinalysis Basic - ( 2020 19:15 )    Color: YELLOW / Appearance: Lt TURBID / S.018 / pH: 6.0  Gluc: NEGATIVE / Ketone: NEGATIVE  / Bili: NEGATIVE / Urobili: NORMAL   Blood: NEGATIVE / Protein: 300 / Nitrite: NEGATIVE   Leuk Esterase: NEGATIVE / RBC: 0-2 / WBC 3-5   Sq Epi: OCC / Non Sq Epi: x / Bacteria: SMALL      Assessment and Recommendation:   · Assessment	  54 yo male c hx of morbid obesity HTN DM CKD stage 4-5 pw pain and pus that was elicited from the PD cathater.  He has not started HD as of yet   Personally he has had some many issues with the catheter and his body habitus will make PD quite challanging.  He is now s/p PD catheter removal    1 Renal -Azotemia slowly rising; lytes acceptable.  Patient still urinating.  Patient is not in heart failure.  Patient reports he wants to discuss with his PCP who is also his primary nephrologist to obtain his opinion regarding moving forward with HD vs. PD.    2ID-remains on IV Zosyn    3Surgery-S/p catheter removal POD 1; pain management per primary team

## 2020-01-06 ENCOUNTER — TRANSCRIPTION ENCOUNTER (OUTPATIENT)
Age: 55
End: 2020-01-06

## 2020-01-06 VITALS
TEMPERATURE: 98 F | HEART RATE: 93 BPM | SYSTOLIC BLOOD PRESSURE: 123 MMHG | DIASTOLIC BLOOD PRESSURE: 76 MMHG | RESPIRATION RATE: 18 BRPM | OXYGEN SATURATION: 97 %

## 2020-01-06 LAB
-  AMIKACIN: SIGNIFICANT CHANGE UP
-  AMPICILLIN/SULBACTAM: SIGNIFICANT CHANGE UP
-  AMPICILLIN: SIGNIFICANT CHANGE UP
-  AZTREONAM: SIGNIFICANT CHANGE UP
-  CEFAZOLIN: SIGNIFICANT CHANGE UP
-  CEFAZOLIN: SIGNIFICANT CHANGE UP
-  CEFEPIME: SIGNIFICANT CHANGE UP
-  CEFOXITIN: SIGNIFICANT CHANGE UP
-  CEFTAZIDIME: SIGNIFICANT CHANGE UP
-  CEFTRIAXONE: SIGNIFICANT CHANGE UP
-  CIPROFLOXACIN: SIGNIFICANT CHANGE UP
-  CIPROFLOXACIN: SIGNIFICANT CHANGE UP
-  CLINDAMYCIN: SIGNIFICANT CHANGE UP
-  CLINDAMYCIN: SIGNIFICANT CHANGE UP
-  ERTAPENEM: SIGNIFICANT CHANGE UP
-  ERYTHROMYCIN: SIGNIFICANT CHANGE UP
-  ERYTHROMYCIN: SIGNIFICANT CHANGE UP
-  GENTAMICIN: SIGNIFICANT CHANGE UP
-  IMIPENEM: SIGNIFICANT CHANGE UP
-  LEVOFLOXACIN: SIGNIFICANT CHANGE UP
-  MEROPENEM: SIGNIFICANT CHANGE UP
-  MOXIFLOXACIN(AEROBIC): SIGNIFICANT CHANGE UP
-  MOXIFLOXACIN(AEROBIC): SIGNIFICANT CHANGE UP
-  OXACILLIN: SIGNIFICANT CHANGE UP
-  OXACILLIN: SIGNIFICANT CHANGE UP
-  PENICILLIN: SIGNIFICANT CHANGE UP
-  PENICILLIN: SIGNIFICANT CHANGE UP
-  PIPERACILLIN/TAZOBACTAM: SIGNIFICANT CHANGE UP
-  RIFAMPIN.: SIGNIFICANT CHANGE UP
-  RIFAMPIN.: SIGNIFICANT CHANGE UP
-  TETRACYCLINE: SIGNIFICANT CHANGE UP
-  TETRACYCLINE: SIGNIFICANT CHANGE UP
-  TIGECYCLINE: SIGNIFICANT CHANGE UP
-  TOBRAMYCIN: SIGNIFICANT CHANGE UP
-  TRIMETHOPRIM/SULFAMETHOXAZOLE: SIGNIFICANT CHANGE UP
-  VANCOMYCIN: SIGNIFICANT CHANGE UP
-  VANCOMYCIN: SIGNIFICANT CHANGE UP
ANION GAP SERPL CALC-SCNC: 14 MMO/L — SIGNIFICANT CHANGE UP (ref 7–14)
BACTERIA FLD CULT: SIGNIFICANT CHANGE UP
BUN SERPL-MCNC: 59 MG/DL — HIGH (ref 7–23)
CALCIUM SERPL-MCNC: 9.3 MG/DL — SIGNIFICANT CHANGE UP (ref 8.4–10.5)
CHLORIDE SERPL-SCNC: 105 MMOL/L — SIGNIFICANT CHANGE UP (ref 98–107)
CO2 SERPL-SCNC: 19 MMOL/L — LOW (ref 22–31)
CREAT SERPL-MCNC: 6.18 MG/DL — HIGH (ref 0.5–1.3)
CULTURE - SURGICAL SITE: SIGNIFICANT CHANGE UP
GLUCOSE BLDC GLUCOMTR-MCNC: 106 MG/DL — HIGH (ref 70–99)
GLUCOSE BLDC GLUCOMTR-MCNC: 247 MG/DL — HIGH (ref 70–99)
GLUCOSE BLDC GLUCOMTR-MCNC: 98 MG/DL — SIGNIFICANT CHANGE UP (ref 70–99)
GLUCOSE SERPL-MCNC: 89 MG/DL — SIGNIFICANT CHANGE UP (ref 70–99)
GRAM STN FLD: SIGNIFICANT CHANGE UP
GRAM STN WND: SIGNIFICANT CHANGE UP
HCT VFR BLD CALC: 27.3 % — LOW (ref 39–50)
HGB BLD-MCNC: 8 G/DL — LOW (ref 13–17)
MAGNESIUM SERPL-MCNC: 2 MG/DL — SIGNIFICANT CHANGE UP (ref 1.6–2.6)
MCHC RBC-ENTMCNC: 17.6 PG — LOW (ref 27–34)
MCHC RBC-ENTMCNC: 29.3 % — LOW (ref 32–36)
MCV RBC AUTO: 60 FL — LOW (ref 80–100)
METHOD TYPE: SIGNIFICANT CHANGE UP
NRBC # FLD: 0 K/UL — SIGNIFICANT CHANGE UP (ref 0–0)
ORGANISM # SPEC MICROSCOPIC CNT: SIGNIFICANT CHANGE UP
PHOSPHATE SERPL-MCNC: 4.7 MG/DL — HIGH (ref 2.5–4.5)
PLATELET # BLD AUTO: 290 K/UL — SIGNIFICANT CHANGE UP (ref 150–400)
PMV BLD: SIGNIFICANT CHANGE UP FL (ref 7–13)
POTASSIUM SERPL-MCNC: 4.7 MMOL/L — SIGNIFICANT CHANGE UP (ref 3.5–5.3)
POTASSIUM SERPL-SCNC: 4.7 MMOL/L — SIGNIFICANT CHANGE UP (ref 3.5–5.3)
RBC # BLD: 4.55 M/UL — SIGNIFICANT CHANGE UP (ref 4.2–5.8)
RBC # FLD: 16.8 % — HIGH (ref 10.3–14.5)
SODIUM SERPL-SCNC: 138 MMOL/L — SIGNIFICANT CHANGE UP (ref 135–145)
WBC # BLD: 11.57 K/UL — HIGH (ref 3.8–10.5)
WBC # FLD AUTO: 11.57 K/UL — HIGH (ref 3.8–10.5)

## 2020-01-06 PROCEDURE — 99238 HOSP IP/OBS DSCHRG MGMT 30/<: CPT

## 2020-01-06 PROCEDURE — 99222 1ST HOSP IP/OBS MODERATE 55: CPT | Mod: GC

## 2020-01-06 RX ORDER — CEPHALEXIN 500 MG
1 CAPSULE ORAL
Qty: 12 | Refills: 0
Start: 2020-01-06 | End: 2020-01-17

## 2020-01-06 RX ORDER — OXYCODONE HYDROCHLORIDE 5 MG/1
1 TABLET ORAL
Qty: 12 | Refills: 0
Start: 2020-01-06 | End: 2020-01-07

## 2020-01-06 RX ADMIN — Medication 18 UNIT(S): at 08:56

## 2020-01-06 RX ADMIN — PIPERACILLIN AND TAZOBACTAM 25 GRAM(S): 4; .5 INJECTION, POWDER, LYOPHILIZED, FOR SOLUTION INTRAVENOUS at 18:12

## 2020-01-06 RX ADMIN — OXYCODONE HYDROCHLORIDE 5 MILLIGRAM(S): 5 TABLET ORAL at 06:40

## 2020-01-06 RX ADMIN — HEPARIN SODIUM 5000 UNIT(S): 5000 INJECTION INTRAVENOUS; SUBCUTANEOUS at 06:10

## 2020-01-06 RX ADMIN — OXYCODONE HYDROCHLORIDE 5 MILLIGRAM(S): 5 TABLET ORAL at 06:10

## 2020-01-06 RX ADMIN — Medication 2: at 08:56

## 2020-01-06 RX ADMIN — Medication 60 MILLIGRAM(S): at 06:10

## 2020-01-06 RX ADMIN — Medication 650 MILLIGRAM(S): at 18:11

## 2020-01-06 RX ADMIN — Medication 18 UNIT(S): at 13:05

## 2020-01-06 RX ADMIN — Medication 18 UNIT(S): at 18:11

## 2020-01-06 RX ADMIN — CALCITRIOL 0.25 MICROGRAM(S): 0.5 CAPSULE ORAL at 13:05

## 2020-01-06 RX ADMIN — HEPARIN SODIUM 5000 UNIT(S): 5000 INJECTION INTRAVENOUS; SUBCUTANEOUS at 13:04

## 2020-01-06 RX ADMIN — PIPERACILLIN AND TAZOBACTAM 25 GRAM(S): 4; .5 INJECTION, POWDER, LYOPHILIZED, FOR SOLUTION INTRAVENOUS at 06:10

## 2020-01-06 RX ADMIN — Medication 650 MILLIGRAM(S): at 06:12

## 2020-01-06 RX ADMIN — Medication 81 MILLIGRAM(S): at 13:05

## 2020-01-06 NOTE — DISCHARGE NOTE PROVIDER - NSDCMRMEDTOKEN_GEN_ALL_CORE_FT
acetaminophen 500 mg oral capsule: 2 cap(s) orally every 6 hours  aspirin 81 mg oral tablet: 1 tab(s) orally once a day  atorvastatin 40 mg oral tablet: 1 tab(s) orally once a day  calcitriol 0.25 mcg oral capsule: 1 cap(s) orally once a day  febuxostat 40 mg oral tablet: 1 tab(s) orally once a day  HumaLOG 100 units/mL subcutaneous solution: 18 unit(s) subcutaneous 3 times a day  Levemir 100 units/mL subcutaneous solution: 60 unit(s) subcutaneous once a day (at bedtime)  NIFEdipine 60 mg oral tablet, extended release: 1 tab(s) orally once a day  oxyCODONE 5 mg oral tablet: 1 tab(s) orally every 4 hours, As needed, Moderate Pain (4 - 6) MDD:6  sodium bicarbonate 650 mg oral tablet: 2 tab(s) orally every 12 hours  Vascepa: 2 gram(s) orally once a day acetaminophen 500 mg oral capsule: 2 cap(s) orally every 6 hours  aspirin 81 mg oral tablet: 1 tab(s) orally once a day  atorvastatin 40 mg oral tablet: 1 tab(s) orally once a day  calcitriol 0.25 mcg oral capsule: 1 cap(s) orally once a day  febuxostat 40 mg oral tablet: 1 tab(s) orally once a day  HumaLOG 100 units/mL subcutaneous solution: 18 unit(s) subcutaneous 3 times a day  Keflex 250 mg oral capsule: 1 cap(s) orally once a day MDD:1  Levemir 100 units/mL subcutaneous solution: 60 unit(s) subcutaneous once a day (at bedtime)  NIFEdipine 60 mg oral tablet, extended release: 1 tab(s) orally once a day  oxyCODONE 5 mg oral tablet: 1 tab(s) orally every 4 hours, As needed, Moderate Pain (4 - 6) MDD:6  sodium bicarbonate 650 mg oral tablet: 2 tab(s) orally every 12 hours  Vascepa: 2 gram(s) orally once a day

## 2020-01-06 NOTE — DISCHARGE NOTE NURSING/CASE MANAGEMENT/SOCIAL WORK - PATIENT PORTAL LINK FT
You can access the FollowMyHealth Patient Portal offered by [facility] by registering at the following website: [email or web address]. By joining [employer]’s FollowMyHealth portal, you will also be able to view your health information using other applications (apps) compatible with our system.

## 2020-01-06 NOTE — CONSULT NOTE ADULT - ATTENDING COMMENTS
54< with infected PD catheter with polymicrobial infection, s/p removal  -MSSA and klebsiella in cx  -better  -keflex 250 mg po daily x 12 more days  -on zosyn x 2 days postop already  -local wound care per surgery team   -advised flu shot on DC    Damian Vera  Attending Physician   Division of Infectious Disease  Pager #[phone]  After 5pm/weekend or no response, call #[phone]

## 2020-01-06 NOTE — PROGRESS NOTE ADULT - ASSESSMENT
54 yo male c hx of morbid obesity HTN DM CKD stage 4-5 pw pain and pus that was elicited from the PD cathater.  He has not started HD as of yet    He is now s/p PD catheter removal    1 Renal -Azotemia slowly rising; lytes acceptable.  Patient still urinating.  Patient is not in heart failure.  Patient reports he wants to discuss with his PCP who is also his primary nephrologist to obtain his opinion regarding moving forward with HD vs. PD.    2ID-remains on IV Zosyn and switch to PO abx     3Surgery-S/p catheter removal POD 3; pain management per primary team      Will dw Dr Neftaly Holden

## 2020-01-06 NOTE — PROGRESS NOTE ADULT - REASON FOR ADMISSION
removal of peritoneal dialysis catheter removal

## 2020-01-06 NOTE — DISCHARGE NOTE PROVIDER - NSDCACTIVITY_GEN_ALL_CORE
No heavy lifting/straining/Return to Work/School allowed/Showering allowed/Stairs allowed/Walking - Outdoors allowed/Walking - Indoors allowed

## 2020-01-06 NOTE — CONSULT NOTE ADULT - ASSESSMENT
55 y/o M with hx of morbid obesity, BENNIE on CPAP, DM2 HTN, HLD, gout, ESRD on PD (nonfunctioning) presented from clinic for removal of peritoneal dialysis catheter, s/p removal on 1/4. ID c/s for antibiotic regimen.    #Infected PD catheter  Now s/p removal on 1/4. Pt w/ localized pain at site of catheter   Fever last 101.5 on 1/4 21:00, downtrending leukocytosis   On Zosyn 3.375 q8 since 1/3  BCx from 1/3 NGTD  Ascites 1/3 ---> Staph aureus, GNR to be determined   Wound Cx from OR 1/4 ---> Staph aureus     Recommendations:       NOTE INCOMPLETE, TO BE D/W ID ATTENDING DR. CLARK 53 y/o M with hx of morbid obesity, BENNIE on CPAP, DM2 HTN, HLD, gout, ESRD on PD (nonfunctioning) presented from clinic for removal of peritoneal dialysis catheter, s/p removal on 1/4. ID c/s for antibiotic regimen.    #Infected PD catheter  Now s/p removal on 1/4. Pt w/ localized pain at site of catheter   Fever last 101.5 on 1/4 21:00, downtrending leukocytosis   On Zosyn 3.375 q8 since 1/3  BCx from 1/3 NGTD  Ascites 1/3 ---> Staph aureus, GNR to be determined   Wound Cx from OR 1/4 ---> Staph aureus     Recommendations:   Can switch to Keflex 250 q24 for 12 more days to complete 2 weeks of abx    D/w Surgery Team B resident

## 2020-01-06 NOTE — PROGRESS NOTE ADULT - SUBJECTIVE AND OBJECTIVE BOX
NEPHROLOGY-[facility] [phone]        Patient seen and examined in bed.   He was in good spirits         MEDICATIONS  (STANDING):  aspirin enteric coated 81 milliGRAM(s) Oral daily  atorvastatin 40 milliGRAM(s) Oral at bedtime  calcitriol   Capsule 0.25 MICROGram(s) Oral daily  dextrose 5%. 1000 milliLiter(s) (50 mL/Hr) IV Continuous <Continuous>  dextrose 50% Injectable 12.5 Gram(s) IV Push once  heparin  Injectable 5000 Unit(s) SubCutaneous every 8 hours  insulin detemir injectable (LEVEMIR) 60 Unit(s) SubCutaneous at bedtime  insulin lispro (HumaLOG) corrective regimen sliding scale   SubCutaneous three times a day before meals  insulin lispro Injectable (HumaLOG) 18 Unit(s) SubCutaneous three times a day with meals  NIFEdipine XL 60 milliGRAM(s) Oral daily  piperacillin/tazobactam IVPB.. 3.375 Gram(s) IV Intermittent every 12 hours  sodium bicarbonate 650 milliGRAM(s) Oral every 12 hours      VITAL:  T(C): , Max: 37.7 (01-05-20 @ 22:50)  T(F): , Max: 99.8 (01-05-20 @ 22:50)  HR: 93 (01-06-20 @ 14:15)  BP: 123/76 (01-06-20 @ 14:15)  BP(mean): --  RR: 18 (01-06-20 @ 14:15)  SpO2: 97% (01-06-20 @ 14:15)  Wt(kg): --    I and O's:    01-05 @ 07:01  -  01-06 @ 07:00  --------------------------------------------------------  IN: 2150 mL / OUT: 2300 mL / NET: -150 mL    01-06 @ 07:01  -  01-06 @ 17:14  --------------------------------------------------------  IN: 0 mL / OUT: 400 mL / NET: -400 mL          PHYSICAL EXAM:    Constitutional: NAD  Neck:  No JVD  Respiratory: CTAB/L  Cardiovascular: S1 and S2  Gastrointestinal: BS+, soft, NT/ND  Extremities: No peripheral edema  Neurological: A/O x 3, no focal deficits  Psychiatric: Normal mood, normal affect  : No Figueroa  Skin: No rashes  Access: Not applicable    LABS:                        8.0    11.57 )-----------( 290      ( 06 Jan 2020 05:40 )             27.3     01-06    138  |  105  |  59<H>  ----------------------------<  89  4.7   |  19<L>  |  6.18<H>    Ca    9.3      06 Jan 2020 05:40  Phos  4.7     01-06  Mg     2.0     01-06            Urine Studies:          RADIOLOGY & ADDITIONAL STUDIES:

## 2020-01-06 NOTE — DISCHARGE NOTE NURSING/CASE MANAGEMENT/SOCIAL WORK - NSSCNAMETXT_GEN_ALL_CORE
[facility]. Initial visit will be day after discharge home. A nurse will call prior to home visit

## 2020-01-06 NOTE — DISCHARGE NOTE NURSING/CASE MANAGEMENT/SOCIAL WORK - NSDCPEWEB_GEN_ALL_CORE
[facility] website --- [email or web address] website --- [email or web address]

## 2020-01-06 NOTE — PROGRESS NOTE ADULT - SUBJECTIVE AND OBJECTIVE BOX
S: Patient doing well. Eager to be discharged and discussion future dialysis options with his PCP. No issues overnight, still running fevers.    O:  T(C): 37.1 (01-06-20 @ 06:33), Max: 37.7 (01-05-20 @ 22:50)  HR: 93 (01-06-20 @ 07:09) (88 - 119)  BP: 153/89 (01-06-20 @ 06:33) (116/70 - 153/89)  RR: 19 (01-06-20 @ 06:33) (16 - 22)  SpO2: 98% (01-06-20 @ 07:09) (95% - 99%)  CAPILLARY BLOOD GLUCOSE      POCT Blood Glucose.: 140 mg/dL (05 Jan 2020 22:35)  POCT Blood Glucose.: 108 mg/dL (05 Jan 2020 17:47)  POCT Blood Glucose.: 100 mg/dL (05 Jan 2020 12:20)  POCT Blood Glucose.: 124 mg/dL (05 Jan 2020 08:39)    NAD  EOMI  airway patent  +S1S2 no mrg  CTA b/l  soft, appropriately tender, incisions covered and serosanguinous drainage noted  no le edema  normal mood and affect  spont moving all extremities    A:  Patient is PO#2 PD catheter removal, remains febrile with culture sensitivities pending. Patient continues to pee well equilibrate electrolytes w/ innate kidney function    P:  -f/u cultures, tailor antibiotic regimen  -tylenol prn for fevers  -patient can follow-up outpatient with his pcp/nephrologist for future dialysis planning. No need to see Dr. Merritt unless instructed to for more dialysis access options.   -pain control  -care per primary team  -reconsult vascular (Dr. Merritt) as needed  #[id number]

## 2020-01-06 NOTE — DISCHARGE NOTE PROVIDER - HOSPITAL COURSE
55yo M with hx of morbid obesity, BENNIE on CPAP, DM2 HTN, HLD, gout, ESRD on PD (nonfunctioning) presented to ED from clinic for removal of peritoneal dialysis catheter which has been irritated, tender, and erythematous with concern for infection. Patient had PD catheter placed with Dr. Merritt ~1 month ago and has issue with irritation and abdominal phlegmon since. It has been used twice but the drainage appears purulent.Pt was started on IV abx         Pt went to OR on 1/4 for removal of peritoneal dialysis catheter. Pt tolerated procedure well. His diet was advanced as tolerated.             *******    At this time, pt is tolerating a regular diet, ambulating and voiding.  Pt has been deemed stable for discharge at this time by attending 55yo M with hx of morbid obesity, BENNIE on CPAP, DM2 HTN, HLD, gout, ESRD on PD (nonfunctioning) presented to ED from clinic for removal of peritoneal dialysis catheter which has been irritated, tender, and erythematous with concern for infection. Patient had PD catheter placed with Dr. Merritt ~1 month ago and has issue with irritation and abdominal phlegmon since. It has been used twice but the drainage appears purulent.Pt was started on IV abx         Pt went to OR on 1/4 for removal of peritoneal dialysis catheter. Pt tolerated procedure well. His diet was advanced as tolerated.     On 1/6 patient's cultures grew S. aureus that was sensitive to Keflex.             At this time, pt is tolerating a regular diet, ambulating and voiding.  Pt has been deemed stable for discharge with 12 days of Keflex at this time by attending

## 2020-01-06 NOTE — PROGRESS NOTE ADULT - SUBJECTIVE AND OBJECTIVE BOX
SURGERY DAILY PROGRESS NOTE:       SUBJECTIVE/ROS: Patient examined at bedside. Continues to complain of pain at incision. Denies abdominal pain, N/V, fever, chills, SOB, chest pain.       MEDICATIONS  (STANDING):  acetaminophen  IVPB .. 1000 milliGRAM(s) IV Intermittent once  aspirin enteric coated 81 milliGRAM(s) Oral daily  atorvastatin 40 milliGRAM(s) Oral at bedtime  calcitriol   Capsule 0.25 MICROGram(s) Oral daily  dextrose 5% + sodium chloride 0.45%. 1000 milliLiter(s) (50 mL/Hr) IV Continuous <Continuous>  dextrose 5%. 1000 milliLiter(s) (50 mL/Hr) IV Continuous <Continuous>  dextrose 50% Injectable 12.5 Gram(s) IV Push once  heparin  Injectable 5000 Unit(s) SubCutaneous every 8 hours  insulin detemir injectable (LEVEMIR) 60 Unit(s) SubCutaneous at bedtime  insulin lispro (HumaLOG) corrective regimen sliding scale   SubCutaneous three times a day before meals  insulin lispro Injectable (HumaLOG) 18 Unit(s) SubCutaneous three times a day with meals  NIFEdipine XL 60 milliGRAM(s) Oral daily  piperacillin/tazobactam IVPB.. 3.375 Gram(s) IV Intermittent every 12 hours  sodium bicarbonate 650 milliGRAM(s) Oral every 12 hours    MEDICATIONS  (PRN):  acetaminophen   Tablet .. 650 milliGRAM(s) Oral every 6 hours PRN Mild Pain (1 - 3)  benzocaine 15 mG/menthol 3.6 mG (Sugar-Free) Lozenge 1 Lozenge Oral every 6 hours PRN Sore Throat  glucagon  Injectable 1 milliGRAM(s) IntraMuscular once PRN Glucose LESS THAN 70 milligrams/deciliter  oxyCODONE    IR 5 milliGRAM(s) Oral every 4 hours PRN Moderate Pain (4 - 6)      OBJECTIVE:    Vital Signs Last 24 Hrs  T(C): 37.1 (01-06-20 @ 06:33), Max: 37.7 (01-05-20 @ 22:50)  HR: 93 (01-06-20 @ 07:09) (88 - 119)  BP: 153/89 (01-06-20 @ 06:33) (116/70 - 153/89)  ABP: --  ABP(mean): --  RR: 19 (01-06-20 @ 06:33) (16 - 22)  SpO2: 98% (01-06-20 @ 07:09) (95% - 99%)  Wt(kg): --  CVP(mm Hg): --      01-05 @ 07:01  -  01-06 @ 07:00  --------------------------------------------------------  IN:    dextrose 5% + sodium chloride 0.45%.: 200 mL    IV PiggyBack: 200 mL    Oral Fluid: 1750 mL  Total IN: 2150 mL    OUT:    Voided: 2300 mL  Total OUT: 2300 mL    Total NET: -150 mL        PHYSICAL EXAM:  Constitutional: well developed, well nourished, NAD  Neuro: A&O x3, non focal   Eyes: anicteric  Respiratory: Breathing comfortably    Abdomen: obese, soft, tender to palpation around incision site, incision packed with 1/2 inch packing.   Extremities: FROM, warm          LABS:    CBC (01-06 @ 05:40)                              8.0<L>                         11.57<H>  )----------------(  290        --    % Neutrophils, --    % Lymphocytes, ANC: --                                  27.3<L>  CBC (01-05 @ 07:25)                              8.2<L>                         13.24<H>  )----------------(  323        --    % Neutrophils, --    % Lymphocytes, ANC: --                                  27.9<L>    BMP (01-06 @ 05:40)             138     |  105     |  59<H> 		Ca++ --      Ca 9.3                ---------------------------------( 89    		Mg 2.0                4.7     |  19<L>   |  6.18<H>			Ph 4.7<H>  BMP (01-05 @ 07:25)             138     |  103     |  58<H> 		Ca++ --      Ca 9.2                ---------------------------------( 111<H>		Mg 2.0                4.1     |  20<L>   |  5.71<H>			Ph 4.3

## 2020-01-06 NOTE — DISCHARGE NOTE PROVIDER - CARE PROVIDER_API CALL
Silvio [place] (MD)  Surgery  Critical Care  [address], [address] [place] [address]  Phone: [phone]  Fax: [phone]  Follow Up Time:

## 2020-01-06 NOTE — DISCHARGE NOTE NURSING/CASE MANAGEMENT/SOCIAL WORK - NSDCPEEMAIL_GEN_ALL_CORE
[facility] email [email or web address]

## 2020-01-06 NOTE — DISCHARGE NOTE PROVIDER - NSDCFUADDINST_GEN_ALL_CORE_FT
Please return to ED or visit Dr. [place] earlier if you experience persistent nausea/vomiting/ abdominal pain not controlled with medication/ bleeding/purulent drainage.    Daily packing with .5in sterile packing. Cover with dry gauze.    No heavy lifting (no more than 2-10lbs) or contact sports for 2 weeks.    Patient can shower- let soapy water run on incision- pat dry don't scrub. Please return to ED or visit Dr. [place] earlier if you experience persistent nausea/vomiting/ abdominal pain not controlled with medication/ bleeding/purulent drainage.    Daily packing with .5in sterile packing. Cover with dry gauze and paper tape.    No heavy lifting (no more than 2-10lbs) or contact sports for 2 weeks.    Patient can shower- let soapy water run on incision- pat dry don't scrub.

## 2020-01-06 NOTE — PROGRESS NOTE ADULT - ASSESSMENT
55yo M with hx of morbid obesity, BENNIE on CPAP, DM2 HTN, HLD, gout, ESRD on PD (nonfunctioning) presented from clinic for infected  peritoneal dialysis catheter s/p removal on 1/4    -continue regular diet  -afebrile x24hrs  -OR cultures from 1/4 prelim w/ staph aureus--> await final/sensitivities   -Continue Abx with Zosyn   - Daily dressing changes   - Pain control    [id number]

## 2020-01-06 NOTE — DISCHARGE NOTE PROVIDER - NSDCCPCAREPLAN_GEN_ALL_CORE_FT
PRINCIPAL DISCHARGE DIAGNOSIS  Diagnosis: Infection associated with peritoneal dialysis catheter, initial encounter  Assessment and Plan of Treatment: WOUND CARE:  Please keep incisions clean and dry. Please do not Scrub or rub incisions. Do not use lotion or powder on incisions.   BATHING: You may shower and/or sponge bathe. You may use warm soapy water in the shower and rinse, pat dry.  ACTIVITY: No heavy lifting or straining. Otherwise, you may return to your usual level of physical activity. If you are taking narcotic pain medication DO NOT drive a car, operate machinery or make important decisions.  DIET: Return to your usual diet.  NOTIFY YOUR SURGEON IF: You have any bleeding that does not stop, any pus draining from your wound(s), increased pain at surgical site, any fever (over 100.4 F) persistent nausea/vomiting, or if your pain is not controlled on your discharge pain medications.  Please follow up with your primary care physician in 1-2 weeks regarding your hospitalization.

## 2020-01-06 NOTE — CONSULT NOTE ADULT - SUBJECTIVE AND OBJECTIVE BOX
ID Initial Consult Note   Resident: Glen Abel, PGY-1  Pager: [phone]    HPI:  Patient is a 54y old  Male who presents with a chief complaint of "need peritoneal dialysis catheter removed" (03 Jan 2020 23:23)      HPI: 53yo M with hx of morbid obesity, BENNIE on CPAP, DM2 HTN, HLD, gout, ESRD on PD (nonfunctioning) presented from clinic for removal of peritoneal dialysis catheter which has been irritated, tender, and erythematous with concern for infection. Patient had PT catheter placed with Dr. Merritt ~1 sachi ago and has issue with irritation and abdominal phlegmon since. It has been used twice but the drainage appears pustulous. Patient is extremely tender at site of catheter and it is most irritating when he moves.    Patient denies fevers/chills, denies lightheadedness/dizziness, denies SOB/chest pain, denies nausea/vomiting, denies constipation/diarrhea. (03 Jan 2020 23:23)      PAST MEDICAL & SURGICAL HISTORY:  BENNIE on CPAP  Obesity  HLD (hyperlipidemia)  Diabetes mellitus  Gout  Hypertension  Renal disease: ESRD  H/O hernia repair: 30 years ago  Injury of ankle and foot: surgically repaired, 10 years ago  H/O shoulder surgery      Allergies  No Known Allergies        ANTIMICROBIALS:  piperacillin/tazobactam IVPB.. 3.375 every 12 hours      OTHER MEDS: MEDICATIONS  (STANDING):  acetaminophen   Tablet .. 650 every 6 hours PRN  aspirin enteric coated 81 daily  atorvastatin 40 at bedtime  dextrose 50% Injectable 12.5 once  glucagon  Injectable 1 once PRN  heparin  Injectable 5000 every 8 hours  insulin detemir injectable (LEVEMIR) 60 at bedtime  insulin lispro (HumaLOG) corrective regimen sliding scale  three times a day before meals  insulin lispro Injectable (HumaLOG) 18 three times a day with meals  NIFEdipine XL 60 daily  oxyCODONE    IR 5 every 4 hours PRN      SOCIAL HISTORY: [ ] etoh [ ] tobacco [ ] former smoker [ ] IVDU    FAMILY HISTORY:  Family history of MI (myocardial infarction)      REVIEW OF SYSTEMS  [  ] ROS unobtainable because:    [  ] All other systems negative except as noted below:	    Constitutional:  [ ] fever [ ] chills  [ ] weight loss  [ ] weakness  Skin:  [ ] rash [ ] phlebitis	  Eyes: [ ] icterus [ ] pain  [ ] discharge	  ENMT: [ ] sore throat  [ ] thrush [ ] ulcers [ ] exudates  Respiratory: [ ] dyspnea [ ] hemoptysis [ ] cough [ ] sputum	  Cardiovascular:  [ ] chest pain [ ] palpitations [ ] edema	  Gastrointestinal:  [ ] nausea [ ] vomiting [ ] diarrhea [ ] constipation [ ] pain	  Genitourinary:  [ ] dysuria [ ] frequency [ ] hematuria [ ] discharge [ ] flank pain  [ ] incontinence  Musculoskeletal:  [ ] myalgias [ ] arthralgias [ ] arthritis  [ ] back pain  Neurological:  [ ] headache [ ] seizures  [ ] confusion/altered mental status  Psychiatric:  [ ] anxiety [ ] depression	  Hematology/Lymphatics:  [ ] lymphadenopathy  Endocrine:  [ ] adrenal [ ] thyroid  Allergic/Immunologic:	 [ ] transplant [ ] seasonal    Vital Signs Last 24 Hrs  T(F): 98.3 (01-06-20 @ 10:22), Max: 101.9 (01-04-20 @ 19:09)    Vital Signs Last 24 Hrs  HR: 100 (01-06-20 @ 10:22) (88 - 119)  BP: 111/65 (01-06-20 @ 10:22) (111/65 - 153/89)  RR: 19 (01-06-20 @ 10:22)  SpO2: 97% (01-06-20 @ 10:22) (95% - 99%)  Wt(kg): --    PHYSICAL EXAM:  General: [ ] non-toxic  HEAD/EYES: [ ] PERRL [ ] white sclera [ ] icterus  ENT:  [ ] normal [ ] supple [ ] thrush [ ] pharyngeal exudate  Cardiovascular:   [ ] murmur [ ] normal [ ] PPM/AICD  Respiratory:  [ ] clear to ausculation bilaterally  GI:  [ ] soft, non-tender, normal bowel sounds  :  [ ] hyde [ ] no CVA tenderness   Musculoskeletal:  [ ] no synovitis  Neurologic:  [ ] non-focal exam   Skin:  [ ] no rash  Lymph: [ ] no lymphadenopathy  Psychiatric:  [ ] appropriate affect [ ] alert & oriented  Lines:  [ ] no phlebitis [ ] central line                                8.0    11.57 )-----------( 290      ( 06 Jan 2020 05:40 )             27.3       01-06    138  |  105  |  59<H>  ----------------------------<  89  4.7   |  19<L>  |  6.18<H>    Ca    9.3      06 Jan 2020 05:40  Phos  4.7     01-06  Mg     2.0     01-06            MICROBIOLOGY:  ABDOMEN  01-04-20 --  --  --      ASCITES FLUID  01-03-20 --  --    WBC^White Blood Cells  QNTY CELLS IN GRAM STAIN: FEW (2+)  GPCPR^Gram Pos Cocci in Pairs  QUANTITY OF BACTERIA SEEN: MANY (4+)      URINE MIDSTREAM  01-03-20 --  --  --      BLOOD PERIPHERAL  01-03-20 --  --  --              v            RADIOLOGY:    Pager: [phone] ID Initial Consult Note   Resident: Glen [place], PGY-3  Pager: [phone]    HPI:  Patient is a 54y old  Male who presents with a chief complaint of "need peritoneal dialysis catheter removed" (03 Jan 2020 23:23)      HPI: 55yo M with hx of morbid obesity, BENNIE on CPAP, DM2 HTN, HLD, gout, ESRD on PD (nonfunctioning) presented from clinic for removal of peritoneal dialysis catheter which has been irritated, tender, and erythematous with concern for infection. Patient had PT catheter placed with Dr. Merritt ~1 sachi ago and has issue with irritation and abdominal phlegmon since. It has been used twice but the drainage appears pustulous. Patient is extremely tender at site of catheter and it is most irritating when he moves.    Patient denies fevers/chills, denies lightheadedness/dizziness, denies SOB/chest pain, denies nausea/vomiting, denies constipation/diarrhea. (03 Jan 2020 23:23    Pt started on Zosyn, PD catheter removed on 1/4. ID consulted for antibiotic regimen.       PAST MEDICAL & SURGICAL HISTORY:  BENNIE on CPAP  Obesity  HLD (hyperlipidemia)  Diabetes mellitus  Gout  Hypertension  Renal disease: ESRD  H/O hernia repair: 30 years ago  Injury of ankle and foot: surgically repaired, 10 years ago  H/O shoulder surgery      Allergies  No Known Allergies        ANTIMICROBIALS:  piperacillin/tazobactam IVPB.. 3.375 every 12 hours      OTHER MEDS: MEDICATIONS  (STANDING):  acetaminophen   Tablet .. 650 every 6 hours PRN  aspirin enteric coated 81 daily  atorvastatin 40 at bedtime  dextrose 50% Injectable 12.5 once  glucagon  Injectable 1 once PRN  heparin  Injectable 5000 every 8 hours  insulin detemir injectable (LEVEMIR) 60 at bedtime  insulin lispro (HumaLOG) corrective regimen sliding scale  three times a day before meals  insulin lispro Injectable (HumaLOG) 18 three times a day with meals  NIFEdipine XL 60 daily  oxyCODONE    IR 5 every 4 hours PRN      SOCIAL HISTORY: [ ] etoh [ ] tobacco [ ] former smoker [ ] IVDU    FAMILY HISTORY:  Family history of MI (myocardial infarction)      REVIEW OF SYSTEMS  [  ] ROS unobtainable because:    [X] All other systems negative except as noted below:	    Constitutional:  [ ] fever [ ] chills  [ ] weight loss  [ ] weakness  Skin:  [ ] rash [ ] phlebitis	  Eyes: [ ] icterus [ ] pain  [ ] discharge	  ENMT: [ ] sore throat  [ ] thrush [ ] ulcers [ ] exudates  Respiratory: [ ] dyspnea [ ] hemoptysis [ ] cough [ ] sputum	  Cardiovascular:  [ ] chest pain [ ] palpitations [ ] edema	  Gastrointestinal:  [ ] nausea [ ] vomiting [ ] diarrhea [ ] constipation [ ] pain [X] abdominal pain	  Genitourinary:  [ ] dysuria [ ] frequency [ ] hematuria [ ] discharge [ ] flank pain  [ ] incontinence  Musculoskeletal:  [ ] myalgias [ ] arthralgias [ ] arthritis  [ ] back pain  Neurological:  [ ] headache [ ] seizures  [ ] confusion/altered mental status  Psychiatric:  [ ] anxiety [ ] depression	  Hematology/Lymphatics:  [ ] lymphadenopathy  Endocrine:  [ ] adrenal [ ] thyroid  Allergic/Immunologic:	 [ ] transplant [ ] seasonal    Vital Signs Last 24 Hrs  T(F): 98.3 (01-06-20 @ 10:22), Max: 101.9 (01-04-20 @ 19:09)    Vital Signs Last 24 Hrs  HR: 100 (01-06-20 @ 10:22) (88 - 119)  BP: 111/65 (01-06-20 @ 10:22) (111/65 - 153/89)  RR: 19 (01-06-20 @ 10:22)  SpO2: 97% (01-06-20 @ 10:22) (95% - 99%)  Wt(kg): --    PHYSICAL EXAM:  GENERAL: NAD, well-developed  HEAD:  Atraumatic, Normocephalic  EYES: EOMI, PERRLA, conjunctiva and sclera clear  NECK: Supple, No JVD  CHEST/LUNG: Clear to auscultation bilaterally; No wheeze  HEART: Regular rate and rhythm; No murmurs, rubs, or gallops  ABDOMEN:  Large, soft, nondistended, LLQ tenderness directly over site of prior PD cath. dressing c/d/i.   EXTREMITIES:  2+ Peripheral Pulses, No clubbing, cyanosis, or edema  PSYCH: AAOx3  NEUROLOGY: non-focal  SKIN: No rashes or lesions                                8.0    11.57 )-----------( 290      ( 06 Jan 2020 05:40 )             27.3       01-06    138  |  105  |  59<H>  ----------------------------<  89  4.7   |  19<L>  |  6.18<H>    Ca    9.3      06 Jan 2020 05:40  Phos  4.7     01-06  Mg     2.0     01-06            MICROBIOLOGY:  ABDOMEN  01-04-20 --  --  --      ASCITES FLUID  01-03-20 --  --    WBC^White Blood Cells  QNTY CELLS IN GRAM STAIN: FEW (2+)  GPCPR^Gram Pos Cocci in Pairs  QUANTITY OF BACTERIA SEEN: MANY (4+)      URINE MIDSTREAM  01-03-20 --  --  --      BLOOD PERIPHERAL  01-03-20 --  --  --              v            RADIOLOGY:    Pager: [phone]

## 2020-01-07 LAB — SPECIMEN SOURCE: SIGNIFICANT CHANGE UP

## 2020-01-08 LAB
BACTERIA BLD CULT: SIGNIFICANT CHANGE UP
BACTERIA BLD CULT: SIGNIFICANT CHANGE UP

## 2020-02-05 LAB — FUNGUS SPEC QL CULT: SIGNIFICANT CHANGE UP

## 2020-06-03 NOTE — ASU PATIENT PROFILE, ADULT - BLOOD TRANSFUSION, PREVIOUS, PROFILE
[facility] -   Obstetrics & Gynecology  Progress Note    Patient Name: Roger Juarez  MRN: [id number]  Admission Date: 6/2/2020  Primary Care Provider: Primary Doctor No  Principal Problem: Pre-eclampsia, postpartum    Subjective:     HPI:  Sent from office for elevated blood pressure    Interval History:   Pt doing well.  Reports no issues this AM.  Mild headache is resolving.  Denies vision changes.    Scheduled Meds:   ibuprofen  600 mg Oral Q6H    NIFEdipine  30 mg Oral Daily     Continuous Infusions:   lactated ringers 75 mL/hr at 06/02/20 1734    magnesium sulfate in water 2 g/hr (06/02/20 1800)     PRN Meds:butalbital-acetaminophen-caffeine -40 mg, calcium gluconate, hydrALAZINE, HYDROcodone-acetaminophen, labetalol, labetalol, labetalol    Review of patient's allergies indicates:  No Known Allergies    Objective:     Vital Signs (Most Recent):  Temp: 98 °F (36.7 °C) (06/03/20 0800)  Pulse: 90 (06/03/20 0800)  Resp: 20 (06/03/20 0800)  BP: (!) 148/84 (06/03/20 0907)  SpO2: 100 % (06/03/20 0800) Vital Signs (24h Range):  Temp:  [98 °F (36.7 °C)-98.3 °F (36.8 °C)] 98 °F (36.7 °C)  Pulse:  [67-97] 90  Resp:  [18-20] 20  SpO2:  [98 %-100 %] 100 %  BP: (100-165)/(60-99) 148/84        There is no height or weight on file to calculate BMI.  Patient's last menstrual period was 09/15/2019 (exact date).    I&O (Last 24H):    Intake/Output Summary (Last 24 hours) at 6/3/2020 0909  Last data filed at 6/3/2020 0800  Gross per 24 hour   Intake 250 ml   Output 4100 ml   Net -3850 ml       Physical Exam:   Constitutional: She is oriented to person, place, and time. She appears well-developed and well-nourished. No distress.       Cardiovascular: Normal rate, regular rhythm and normal heart sounds.     Pulmonary/Chest: Effort normal and breath sounds normal.        Abdominal: Soft. Bowel sounds are normal. She exhibits abdominal incision (dressing in place). She exhibits no distension. There is  no tenderness.             Musculoskeletal: Normal range of motion and moves all extremeties. She exhibits no edema or tenderness.       Neurological: She is alert and oriented to person, place, and time.    Skin: Skin is warm and dry.    Psychiatric: She has a normal mood and affect. Her behavior is normal. Thought content normal.       Laboratory:  I have personallly reviewed all pertinent lab results from the last 24 hours.    Diagnostic Results:  Labs: Reviewed    Assessment/Plan:     * Pre-eclampsia, postpartum  HD # 2  Pt doing well.  BP remain mildly elevated on Procardia but stable.  Plan to complete Mg today and observe BP.  Will consider discharge tomorrow AM if BP remains well controlled.        Juan José Felix MD  Obstetrics & Gynecology  [facility] -    no

## 2020-08-12 ENCOUNTER — INPATIENT (INPATIENT)
Facility: HOSPITAL | Age: 55
LOS: 1 days | Discharge: HOME | End: 2020-08-14
Attending: HOSPITALIST | Admitting: HOSPITALIST
Payer: COMMERCIAL

## 2020-08-12 VITALS
SYSTOLIC BLOOD PRESSURE: 135 MMHG | RESPIRATION RATE: 20 BRPM | DIASTOLIC BLOOD PRESSURE: 74 MMHG | HEART RATE: 113 BPM | OXYGEN SATURATION: 97 % | TEMPERATURE: 99 F

## 2020-08-12 DIAGNOSIS — Z98.890 OTHER SPECIFIED POSTPROCEDURAL STATES: Chronic | ICD-10-CM

## 2020-08-12 DIAGNOSIS — S99.919A UNSPECIFIED INJURY OF UNSPECIFIED ANKLE, INITIAL ENCOUNTER: Chronic | ICD-10-CM

## 2020-08-12 LAB
ALBUMIN SERPL ELPH-MCNC: 3.5 G/DL — SIGNIFICANT CHANGE UP (ref 3.3–5)
ALP SERPL-CCNC: 57 U/L — SIGNIFICANT CHANGE UP (ref 40–120)
ALT FLD-CCNC: 10 U/L — SIGNIFICANT CHANGE UP (ref 4–41)
ANION GAP SERPL CALC-SCNC: 17 MMO/L — HIGH (ref 7–14)
ANISOCYTOSIS BLD QL: SIGNIFICANT CHANGE UP
AST SERPL-CCNC: 16 U/L — SIGNIFICANT CHANGE UP (ref 4–40)
BASOPHILS # BLD AUTO: 0.05 K/UL — SIGNIFICANT CHANGE UP (ref 0–0.2)
BASOPHILS NFR BLD AUTO: 0.4 % — SIGNIFICANT CHANGE UP (ref 0–2)
BASOPHILS NFR SPEC: 0.9 % — SIGNIFICANT CHANGE UP (ref 0–2)
BILIRUB SERPL-MCNC: 0.2 MG/DL — SIGNIFICANT CHANGE UP (ref 0.2–1.2)
BLASTS # FLD: 0 % — SIGNIFICANT CHANGE UP (ref 0–0)
BUN SERPL-MCNC: 60 MG/DL — HIGH (ref 7–23)
CALCIUM SERPL-MCNC: 9.3 MG/DL — SIGNIFICANT CHANGE UP (ref 8.4–10.5)
CHLORIDE SERPL-SCNC: 103 MMOL/L — SIGNIFICANT CHANGE UP (ref 98–107)
CO2 SERPL-SCNC: 21 MMOL/L — LOW (ref 22–31)
CREAT SERPL-MCNC: 6.14 MG/DL — HIGH (ref 0.5–1.3)
DACRYOCYTES BLD QL SMEAR: SLIGHT — SIGNIFICANT CHANGE UP
ELLIPTOCYTES BLD QL SMEAR: SIGNIFICANT CHANGE UP
EOSINOPHIL # BLD AUTO: 0.32 K/UL — SIGNIFICANT CHANGE UP (ref 0–0.5)
EOSINOPHIL NFR BLD AUTO: 2.5 % — SIGNIFICANT CHANGE UP (ref 0–6)
EOSINOPHIL NFR FLD: 2.6 % — SIGNIFICANT CHANGE UP (ref 0–6)
GIANT PLATELETS BLD QL SMEAR: PRESENT — SIGNIFICANT CHANGE UP
GLUCOSE SERPL-MCNC: 113 MG/DL — HIGH (ref 70–99)
HCT VFR BLD CALC: 33.1 % — LOW (ref 39–50)
HGB BLD-MCNC: 9.4 G/DL — LOW (ref 13–17)
HYPOCHROMIA BLD QL: SIGNIFICANT CHANGE UP
IMM GRANULOCYTES NFR BLD AUTO: 0.5 % — SIGNIFICANT CHANGE UP (ref 0–1.5)
LYMPHOCYTES # BLD AUTO: 16.5 % — SIGNIFICANT CHANGE UP (ref 13–44)
LYMPHOCYTES # BLD AUTO: 2.09 K/UL — SIGNIFICANT CHANGE UP (ref 1–3.3)
LYMPHOCYTES NFR SPEC AUTO: 16.5 % — SIGNIFICANT CHANGE UP (ref 13–44)
MCHC RBC-ENTMCNC: 17.6 PG — LOW (ref 27–34)
MCHC RBC-ENTMCNC: 28.4 % — LOW (ref 32–36)
MCV RBC AUTO: 62 FL — LOW (ref 80–100)
METAMYELOCYTES # FLD: 0 % — SIGNIFICANT CHANGE UP (ref 0–1)
MICROCYTES BLD QL: SIGNIFICANT CHANGE UP
MONOCYTES # BLD AUTO: 0.93 K/UL — HIGH (ref 0–0.9)
MONOCYTES NFR BLD AUTO: 7.4 % — SIGNIFICANT CHANGE UP (ref 2–14)
MONOCYTES NFR BLD: 7 % — SIGNIFICANT CHANGE UP (ref 2–9)
MYELOCYTES NFR BLD: 0 % — SIGNIFICANT CHANGE UP (ref 0–0)
NEUTROPHIL AB SER-ACNC: 71.3 % — SIGNIFICANT CHANGE UP (ref 43–77)
NEUTROPHILS # BLD AUTO: 9.18 K/UL — HIGH (ref 1.8–7.4)
NEUTROPHILS NFR BLD AUTO: 72.7 % — SIGNIFICANT CHANGE UP (ref 43–77)
NEUTS BAND # BLD: 0 % — SIGNIFICANT CHANGE UP (ref 0–6)
NRBC # FLD: 0 K/UL — SIGNIFICANT CHANGE UP (ref 0–0)
OTHER - HEMATOLOGY %: 0 — SIGNIFICANT CHANGE UP
OVALOCYTES BLD QL SMEAR: SLIGHT — SIGNIFICANT CHANGE UP
PLATELET # BLD AUTO: 263 K/UL — SIGNIFICANT CHANGE UP (ref 150–400)
PLATELET COUNT - ESTIMATE: NORMAL — SIGNIFICANT CHANGE UP
PMV BLD: 10.1 FL — SIGNIFICANT CHANGE UP (ref 7–13)
POIKILOCYTOSIS BLD QL AUTO: SIGNIFICANT CHANGE UP
POLYCHROMASIA BLD QL SMEAR: SIGNIFICANT CHANGE UP
POTASSIUM SERPL-MCNC: 4.8 MMOL/L — SIGNIFICANT CHANGE UP (ref 3.5–5.3)
POTASSIUM SERPL-SCNC: 4.8 MMOL/L — SIGNIFICANT CHANGE UP (ref 3.5–5.3)
PROMYELOCYTES # FLD: 0 % — SIGNIFICANT CHANGE UP (ref 0–0)
PROT SERPL-MCNC: 7.3 G/DL — SIGNIFICANT CHANGE UP (ref 6–8.3)
RBC # BLD: 5.34 M/UL — SIGNIFICANT CHANGE UP (ref 4.2–5.8)
RBC # FLD: 17.1 % — HIGH (ref 10.3–14.5)
SARS-COV-2 RNA SPEC QL NAA+PROBE: SIGNIFICANT CHANGE UP
SCHISTOCYTES BLD QL AUTO: SLIGHT — SIGNIFICANT CHANGE UP
SODIUM SERPL-SCNC: 141 MMOL/L — SIGNIFICANT CHANGE UP (ref 135–145)
VARIANT LYMPHS # BLD: 1.7 % — SIGNIFICANT CHANGE UP
WBC # BLD: 12.63 K/UL — HIGH (ref 3.8–10.5)
WBC # FLD AUTO: 12.63 K/UL — HIGH (ref 3.8–10.5)

## 2020-08-12 PROCEDURE — 36410 VNPNXR 3YR/> PHY/QHP DX/THER: CPT

## 2020-08-12 PROCEDURE — 99220: CPT | Mod: 25

## 2020-08-12 PROCEDURE — 76937 US GUIDE VASCULAR ACCESS: CPT | Mod: 26

## 2020-08-12 RX ORDER — INSULIN DETEMIR 100/ML (3)
60 INSULIN PEN (ML) SUBCUTANEOUS AT BEDTIME
Refills: 0 | Status: DISCONTINUED | OUTPATIENT
Start: 2020-08-12 | End: 2020-08-14

## 2020-08-12 RX ORDER — INSULIN LISPRO 100/ML
18 VIAL (ML) SUBCUTANEOUS
Refills: 0 | Status: DISCONTINUED | OUTPATIENT
Start: 2020-08-13 | End: 2020-08-14

## 2020-08-12 RX ORDER — ATORVASTATIN CALCIUM 80 MG/1
40 TABLET, FILM COATED ORAL DAILY
Refills: 0 | Status: DISCONTINUED | OUTPATIENT
Start: 2020-08-13 | End: 2020-08-13

## 2020-08-12 RX ORDER — AMPICILLIN SODIUM AND SULBACTAM SODIUM 250; 125 MG/ML; MG/ML
INJECTION, POWDER, FOR SUSPENSION INTRAMUSCULAR; INTRAVENOUS
Refills: 0 | Status: DISCONTINUED | OUTPATIENT
Start: 2020-08-12 | End: 2020-08-12

## 2020-08-12 RX ORDER — CHOLECALCIFEROL (VITAMIN D3) 125 MCG
2000 CAPSULE ORAL DAILY
Refills: 0 | Status: DISCONTINUED | OUTPATIENT
Start: 2020-08-13 | End: 2020-08-14

## 2020-08-12 RX ORDER — DEXTROSE 50 % IN WATER 50 %
12.5 SYRINGE (ML) INTRAVENOUS ONCE
Refills: 0 | Status: DISCONTINUED | OUTPATIENT
Start: 2020-08-12 | End: 2020-08-14

## 2020-08-12 RX ORDER — INSULIN LISPRO 100/ML
18 VIAL (ML) SUBCUTANEOUS
Refills: 0 | Status: DISCONTINUED | OUTPATIENT
Start: 2020-08-12 | End: 2020-08-14

## 2020-08-12 RX ORDER — DEXTROSE 50 % IN WATER 50 %
25 SYRINGE (ML) INTRAVENOUS ONCE
Refills: 0 | Status: DISCONTINUED | OUTPATIENT
Start: 2020-08-12 | End: 2020-08-14

## 2020-08-12 RX ORDER — SODIUM CHLORIDE 9 MG/ML
1000 INJECTION, SOLUTION INTRAVENOUS
Refills: 0 | Status: DISCONTINUED | OUTPATIENT
Start: 2020-08-12 | End: 2020-08-13

## 2020-08-12 RX ORDER — DEXTROSE 50 % IN WATER 50 %
15 SYRINGE (ML) INTRAVENOUS ONCE
Refills: 0 | Status: DISCONTINUED | OUTPATIENT
Start: 2020-08-12 | End: 2020-08-14

## 2020-08-12 RX ORDER — AMPICILLIN SODIUM AND SULBACTAM SODIUM 250; 125 MG/ML; MG/ML
3 INJECTION, POWDER, FOR SUSPENSION INTRAMUSCULAR; INTRAVENOUS ONCE
Refills: 0 | Status: COMPLETED | OUTPATIENT
Start: 2020-08-12 | End: 2020-08-12

## 2020-08-12 RX ORDER — GLUCAGON INJECTION, SOLUTION 0.5 MG/.1ML
1 INJECTION, SOLUTION SUBCUTANEOUS ONCE
Refills: 0 | Status: DISCONTINUED | OUTPATIENT
Start: 2020-08-12 | End: 2020-08-13

## 2020-08-12 RX ORDER — CALCITRIOL 0.5 UG/1
0.25 CAPSULE ORAL DAILY
Refills: 0 | Status: DISCONTINUED | OUTPATIENT
Start: 2020-08-12 | End: 2020-08-14

## 2020-08-12 RX ORDER — NIFEDIPINE 30 MG
60 TABLET, EXTENDED RELEASE 24 HR ORAL DAILY
Refills: 0 | Status: DISCONTINUED | OUTPATIENT
Start: 2020-08-13 | End: 2020-08-14

## 2020-08-12 RX ORDER — AMPICILLIN SODIUM AND SULBACTAM SODIUM 250; 125 MG/ML; MG/ML
3 INJECTION, POWDER, FOR SUSPENSION INTRAMUSCULAR; INTRAVENOUS EVERY 12 HOURS
Refills: 0 | Status: DISCONTINUED | OUTPATIENT
Start: 2020-08-12 | End: 2020-08-12

## 2020-08-12 RX ADMIN — Medication 18 UNIT(S): at 18:22

## 2020-08-12 RX ADMIN — Medication 100 MILLIGRAM(S): at 17:51

## 2020-08-12 RX ADMIN — Medication 60 UNIT(S): at 22:05

## 2020-08-12 RX ADMIN — AMPICILLIN SODIUM AND SULBACTAM SODIUM 200 GRAM(S): 250; 125 INJECTION, POWDER, FOR SUSPENSION INTRAMUSCULAR; INTRAVENOUS at 13:53

## 2020-08-12 NOTE — ED CDU PROVIDER INITIAL DAY NOTE - MEDICAL DECISION MAKING DETAILS
55 year old male with PMH of ESRD(not yet on HD), DM and HTN presents to the ED complaining of right lower molar pain for 3 weeks. Pt states he had been on Amoxicillin for one week, the infection persisted, then was placed on Clindamycin which he completed a full course and infection persisted. Plan is CDU for Iv antibiotics and dental/OMFS eval. Pt is stable, no stridor, plan is AM dental re-eval and overnight abx. Pt is currently stable, no fever or systemic sx.

## 2020-08-12 NOTE — PROGRESS NOTE ADULT - SUBJECTIVE AND OBJECTIVE BOX
Patient is a 55y old  Male who presents with a chief complaint of right side facial swelling with pain.     HPI: Patient reports that right sided pain started three weeks ago. Patient went to the dentist two weeks ago and was informed that he had an infection and needed to get a tooth removed. Patient was unable to eat and had difficulty swallowing. Patient was given amoxicillin and noted that swelling did not improve. Patient went to the oral surgeon one week ago and got #32 removed and was prescribed clindamycin 300 mg q6h because infection did not respond to amoxicillin. Patient reported that the pain decreased and he was able to eat after extraction. Patient noted that the swelling increased for 3 days after extraction and was "traveling towards the middle of his neck". Patient was informed to take clindamycin 300 mg q3h. Patient reported no improvement and returned to the oral surgeon yesterday. Oral surgeon was able to express purulence and informed the patient to go to the emergency department. Patient reports that last night he had a sore throat. Patient reports that the swelling has not increased since 3 days ago. Patient reports no difficulty swallowing, breathing or vomiting.       PAST MEDICAL & SURGICAL HISTORY:  BENNIE on CPAP  Obesity  HLD (hyperlipidemia)  Diabetes mellitus  Gout  Hypertension  Renal disease: ESRD  H/O hernia repair: 30 years ago  Injury of ankle and foot: surgically repaired, 10 years ago  H/O shoulder surgery      MEDICATIONS  (STANDING):  ampicillin/sulbactam  IVPB      ampicillin/sulbactam  IVPB 3 Gram(s) IV Intermittent once  ampicillin/sulbactam  IVPB 3 Gram(s) IV Intermittent every 12 hours    MEDICATIONS  (PRN):      Allergies    No Known Allergies    Intolerances        FAMILY HISTORY:  Family history of MI (myocardial infarction)    *Last Dental Visit: 8/11/20 for follow up with oral surgeon.     Vital Signs Last 24 Hrs  T(C): 37.1 (12 Aug 2020 08:33), Max: 37.1 (12 Aug 2020 08:33)  T(F): 98.8 (12 Aug 2020 08:33), Max: 98.8 (12 Aug 2020 08:33)  HR: 113 (12 Aug 2020 08:33) (113 - 113)  BP: 135/74 (12 Aug 2020 08:33) (135/74 - 135/74)  BP(mean): --  RR: 20 (12 Aug 2020 08:33) (20 - 20)  SpO2: 97% (12 Aug 2020 08:33) (97% - 97%)    EOE:  TMJ ( -  ) clicks                    ( -   ) pops                    ( -   ) crepitus             Mandible FROM             Facial bones and MOM grossly intact             ( -  ) trismus             ( +  ) LAD: right sided submandibular and submental LAD             ( +  ) swelling: right sided submandibular and submental swelling             ( +  ) asymmetry: due to right sided swelling              ( +  ) palpation: sensitivity to palpation in areas of swelling             ( -  ) SOB             ( -  ) dysphagia             ( -  ) LOC  Body of mandible not palpable in area of #28,#29,#30    IOE:  Secondary dentition with multiple missing teeth, grossly intact. Patient has maxillary partial denture.   #32 socket healing well.            hard/soft palate:  ( -  ) palatal torus           tongue/FOM WNL           labial/buccal mucosa WNL           ( -  ) percussion           ( +  ) palpation: sensitivity to swelling in area of #32. Consistent with 1 week post-extraction healing.            ( -  ) swelling   No purulence appreciated.   Dentition present: Secondary dentition with multiple missing teeth, grossly intact. Patient has maxillary partial denture.   Mobility: -     Radiographs: Panoramic radiograph taken and evaluated. Noted multiple missing teeth, fixed prosthesis and caries present. Extraction socket in area of #32.     ASSESSMENT: Right sided facial and neck swelling following dental extraction #32    PROCEDURE:  Limited oral evaluation. Panoramic radiograph taken and evaluated. Noted multiple missing teeth, fixed prosthesis and caries present. Extraction socket in area of #32.  Extraction socket healing well. Consulted with OMFS.  Informed patient that swelling is cellulitic in nature and that patient should be placed on IV antibiotics and be monitored overnight. Informed patient that due to patient's history of kidney failure, patient should not recieve CT with contrast. Answered questions. Patient understands.     RECOMMENDATIONS:   1) Recommended IV Unasyn and be monitored overnight.  2) Dental F/U tomorrow morning and outpatient follow up.   3) If any difficulty swallowing/breathing, fever occur, page dental.     Gretchen Ward DDS #[id number]  Oral surgeon consulted: Dr. Babatunde DDS

## 2020-08-12 NOTE — ED PROVIDER NOTE - CLINICAL SUMMARY MEDICAL DECISION MAKING FREE TEXT BOX
55 year old male with PMH of ESRD(not yet on HD), DM and HTN presents to the ED complaining of right lower molar pain for 3 weeks. HD stable, afebrile, no clinical evidence of respiratory compromise. Impression: Dental abscess, failed outpatient antibiotic therapy, no clinical evidence suggestive of Irving angina at this time. Plan for dental consult, monitor and reassess.

## 2020-08-12 NOTE — ED PROVIDER NOTE - ATTENDING CONTRIBUTION TO CARE
I performed a history and physical exam of the patient and discussed their management with the PA. I reviewed the PA's note and agree with the documented findings and plan of care. I have edited as appropriate. My medical decision making and observations are found above. I performed a history and physical exam of the patient and discussed their management with the PA. I reviewed the PA's note and agree with the documented findings and plan of care. I have edited as appropriate. My medical decision making and observations are found above. to be observed in CDU for iv abx, further eval by oomfs, no airway compromise. pt hd stable.

## 2020-08-12 NOTE — ED CDU PROVIDER INITIAL DAY NOTE - OBJECTIVE STATEMENT
55 year old male with PMH of ESRD(not yet on HD), DM and HTN presents to the ED complaining of right lower molar pain for 3 weeks. Pt states he had been on Amoxicillin for one week, the infection persisted, then was placed on Clindamycin which he completed a full course and infection persisted. He saw an oral surgeon yesterday who told him to come to the ED for further eval. Pt denies stridor, muffled voice, dysphonia, neck pain, chest pain, dyspnea, fever and chills. Pt denies any other complaints. Plan while in CDU is to give IV ABX and have AM dental/OMFS reassessment. Pt denies acute pain, no change in voice or difficulty swallowing. Pt denies any other sx or acute complaints.

## 2020-08-12 NOTE — ED PROVIDER NOTE - OBJECTIVE STATEMENT
55 year old male with PMH of ESRD(not yet on HD), DM and HTN presents to the ED complaining of right lower molar pain for 3 weeks. Pt states he had been on Amoxicillin for one week, the infection persisted, then was placed on Clindamycin which he completed a full course and infection persisted. He saw an oral surgeon yesterday who told him to come to the ED for further eval. Pt denies stridor, muffled voice, dysphonia, neck pain, chest pain, dyspnea, fever and chills. Pt denies any other complaints.

## 2020-08-12 NOTE — ED PROVIDER NOTE - ENMT NEGATIVE STATEMENT, MLM
Ears: no ear pain and no hearing problems.Nose: no nasal congestion and no nasal drainage.Mouth/Throat:+dental pain, no dysphagia, no hoarseness and no throat pain.Neck: no lumps, no pain, no stiffness and no swollen glands.

## 2020-08-12 NOTE — ED ADULT TRIAGE NOTE - CHIEF COMPLAINT QUOTE
pt w hx of HTN, DM, ESRD ( right AV fistula inserted in January 2020, has not began dialysis yet) reports right sided tooth pain x 3 weeks. pt was seen by dentist and started on ABX 2 weeks ago. pt has had swelling to right mandible x 1 week. pt states dentist sent pt to ED for nonresolving abscess.

## 2020-08-13 DIAGNOSIS — E66.9 OBESITY, UNSPECIFIED: ICD-10-CM

## 2020-08-13 DIAGNOSIS — Z29.9 ENCOUNTER FOR PROPHYLACTIC MEASURES, UNSPECIFIED: ICD-10-CM

## 2020-08-13 DIAGNOSIS — S31.109A UNSPECIFIED OPEN WOUND OF ABDOMINAL WALL, UNSPECIFIED QUADRANT WITHOUT PENETRATION INTO PERITONEAL CAVITY, INITIAL ENCOUNTER: ICD-10-CM

## 2020-08-13 DIAGNOSIS — Z02.9 ENCOUNTER FOR ADMINISTRATIVE EXAMINATIONS, UNSPECIFIED: ICD-10-CM

## 2020-08-13 DIAGNOSIS — K04.7 PERIAPICAL ABSCESS WITHOUT SINUS: ICD-10-CM

## 2020-08-13 DIAGNOSIS — E11.9 TYPE 2 DIABETES MELLITUS WITHOUT COMPLICATIONS: ICD-10-CM

## 2020-08-13 DIAGNOSIS — I10 ESSENTIAL (PRIMARY) HYPERTENSION: ICD-10-CM

## 2020-08-13 DIAGNOSIS — N18.6 END STAGE RENAL DISEASE: ICD-10-CM

## 2020-08-13 LAB — HBA1C BLD-MCNC: 6.5 % — HIGH (ref 4–5.6)

## 2020-08-13 PROCEDURE — 99217: CPT

## 2020-08-13 PROCEDURE — 99223 1ST HOSP IP/OBS HIGH 75: CPT | Mod: GC

## 2020-08-13 RX ORDER — ASPIRIN/CALCIUM CARB/MAGNESIUM 324 MG
1 TABLET ORAL
Qty: 0 | Refills: 0 | DISCHARGE

## 2020-08-13 RX ORDER — SODIUM ZIRCONIUM CYCLOSILICATE 10 G/10G
10 POWDER, FOR SUSPENSION ORAL DAILY
Refills: 0 | Status: DISCONTINUED | OUTPATIENT
Start: 2020-08-13 | End: 2020-08-14

## 2020-08-13 RX ORDER — CHLORHEXIDINE GLUCONATE 213 G/1000ML
1 SOLUTION TOPICAL
Refills: 0 | Status: DISCONTINUED | OUTPATIENT
Start: 2020-08-13 | End: 2020-08-14

## 2020-08-13 RX ORDER — ACETAMINOPHEN 500 MG
975 TABLET ORAL EVERY 6 HOURS
Refills: 0 | Status: DISCONTINUED | OUTPATIENT
Start: 2020-08-13 | End: 2020-08-14

## 2020-08-13 RX ORDER — HEPARIN SODIUM 5000 [USP'U]/ML
5000 INJECTION INTRAVENOUS; SUBCUTANEOUS EVERY 8 HOURS
Refills: 0 | Status: DISCONTINUED | OUTPATIENT
Start: 2020-08-13 | End: 2020-08-14

## 2020-08-13 RX ORDER — ICOSAPENT ETHYL 500 MG/1
2 CAPSULE, LIQUID FILLED ORAL
Qty: 0 | Refills: 0 | DISCHARGE

## 2020-08-13 RX ORDER — CHLORHEXIDINE GLUCONATE 213 G/1000ML
1 SOLUTION TOPICAL DAILY
Refills: 0 | Status: DISCONTINUED | OUTPATIENT
Start: 2020-08-13 | End: 2020-08-13

## 2020-08-13 RX ORDER — OXYCODONE HYDROCHLORIDE 5 MG/1
5 TABLET ORAL ONCE
Refills: 0 | Status: DISCONTINUED | OUTPATIENT
Start: 2020-08-13 | End: 2020-08-13

## 2020-08-13 RX ORDER — ATORVASTATIN CALCIUM 80 MG/1
40 TABLET, FILM COATED ORAL AT BEDTIME
Refills: 0 | Status: DISCONTINUED | OUTPATIENT
Start: 2020-08-13 | End: 2020-08-14

## 2020-08-13 RX ORDER — OXYCODONE AND ACETAMINOPHEN 5; 325 MG/1; MG/1
1 TABLET ORAL ONCE
Refills: 0 | Status: DISCONTINUED | OUTPATIENT
Start: 2020-08-13 | End: 2020-08-13

## 2020-08-13 RX ADMIN — CALCITRIOL 0.25 MICROGRAM(S): 0.5 CAPSULE ORAL at 12:38

## 2020-08-13 RX ADMIN — Medication 975 MILLIGRAM(S): at 04:48

## 2020-08-13 RX ADMIN — HEPARIN SODIUM 5000 UNIT(S): 5000 INJECTION INTRAVENOUS; SUBCUTANEOUS at 21:23

## 2020-08-13 RX ADMIN — SODIUM ZIRCONIUM CYCLOSILICATE 10 GRAM(S): 10 POWDER, FOR SUSPENSION ORAL at 14:59

## 2020-08-13 RX ADMIN — CHLORHEXIDINE GLUCONATE 1 APPLICATION(S): 213 SOLUTION TOPICAL at 21:23

## 2020-08-13 RX ADMIN — Medication 18 UNIT(S): at 12:44

## 2020-08-13 RX ADMIN — Medication 18 UNIT(S): at 18:37

## 2020-08-13 RX ADMIN — Medication 60 UNIT(S): at 22:24

## 2020-08-13 RX ADMIN — Medication 18 UNIT(S): at 09:30

## 2020-08-13 RX ADMIN — Medication 60 MILLIGRAM(S): at 12:37

## 2020-08-13 RX ADMIN — Medication 100 MILLIGRAM(S): at 10:09

## 2020-08-13 RX ADMIN — OXYCODONE HYDROCHLORIDE 5 MILLIGRAM(S): 5 TABLET ORAL at 20:12

## 2020-08-13 RX ADMIN — ATORVASTATIN CALCIUM 40 MILLIGRAM(S): 80 TABLET, FILM COATED ORAL at 21:23

## 2020-08-13 RX ADMIN — Medication 100 MILLIGRAM(S): at 01:43

## 2020-08-13 RX ADMIN — OXYCODONE AND ACETAMINOPHEN 1 TABLET(S): 5; 325 TABLET ORAL at 10:30

## 2020-08-13 RX ADMIN — Medication 100 MILLIGRAM(S): at 19:28

## 2020-08-13 RX ADMIN — OXYCODONE HYDROCHLORIDE 5 MILLIGRAM(S): 5 TABLET ORAL at 21:00

## 2020-08-13 RX ADMIN — Medication 975 MILLIGRAM(S): at 03:48

## 2020-08-13 RX ADMIN — Medication 2000 UNIT(S): at 12:37

## 2020-08-13 RX ADMIN — OXYCODONE AND ACETAMINOPHEN 1 TABLET(S): 5; 325 TABLET ORAL at 09:31

## 2020-08-13 RX ADMIN — HEPARIN SODIUM 5000 UNIT(S): 5000 INJECTION INTRAVENOUS; SUBCUTANEOUS at 14:58

## 2020-08-13 NOTE — ED CDU PROVIDER SUBSEQUENT DAY NOTE - ATTENDING CONTRIBUTION TO CARE
Federico: I have seen and examined the patient face to face, have reviewed and addended the HPI, PE and a/p as necessary.    55 year old male with PMH of ESRD(not yet on HD), DM and HTN a/w dental pain s/p extraction and antibiotics x 2 weeks now here with worsening pain and swelling.  Pt noted to have persistent pain on r posterior molar. Pain noted to be 4/10.      GEN - NAD; well appearing; A+O x3; non-toxic appearing. HEENT no fluctuance, tender to palpation overlying posterior R lower molars. No trismus.  CARD -s1s2, RRR, no M,G,R; PULM - CTA b/l, symmetric breath sounds; ABD -  +BS, ND, NT, soft, no guarding, no rebound, no masses; BACK - no CVA tenderness, Normal  spine; EXT - symmetric pulses, 2+ dp, capillary refill < 2 seconds, no cyanosis, no edema; NEURO - no focal neuro deficits, no slurred speech    54 yo M a/w dental pain, likely facial cellulitis, will re-eval with dental.  Possible admission for IV antibiotics.

## 2020-08-13 NOTE — ED CDU PROVIDER DISPOSITION NOTE - ATTENDING CONTRIBUTION TO CARE
Federico: I have seen and examined the patient face to face, have reviewed and addended the HPI, PE and a/p as necessary.

## 2020-08-13 NOTE — PHARMACOTHERAPY INTERVENTION NOTE - COMMENTS
8/13/2020: MEDHX-   MEDS VERIFIED WITH [facility]  AND [facility]  PER [facility] PATIENT'S DENTIST STATED PATIENT KEEPS ASKING FOR PERCOCET AND REFUSES TO WRITE RX FOR PATIENT/PHARMACY DID NOT DISPENSED PERCOCET

## 2020-08-13 NOTE — H&P ADULT - PROBLEM SELECTOR PLAN 5
PD insertion site still open, but non-draining. Patient performs packing and dressing changes himself.   - Wound care consult  - daily dressing changes

## 2020-08-13 NOTE — ED CDU PROVIDER SUBSEQUENT DAY NOTE - HISTORY
55 year old male with PMH of ESRD(not yet on HD), DM and HTN presents to the ED complaining of right lower molar pain for 3 weeks. Pt states he had been on Amoxicillin for one week, the infection persisted, then was placed on Clindamycin which he completed a full course and infection persisted. Plan is CDU for Iv antibiotics and dental/OMFS eval. Pt is stable, no stridor, plan is AM dental re-eval and overnight abx. Pt is well appearing. no acute complaints. Pain controlled. Awaiting dental and OMFS evaluation. Continue IV abx.

## 2020-08-13 NOTE — H&P ADULT - PROBLEM SELECTOR PLAN 1
- Failed PO amoxicillin and clindamycin with worsening symptoms and purulent drainage.  - s/p dental extraction and drainage  - As per OMFS, no additional drainage needed at this time.  - c/w IV clindamycin  - Tylenol prn for pain, oxycodone for severe pain

## 2020-08-13 NOTE — ED CDU PROVIDER DISPOSITION NOTE - CLINICAL COURSE
56 Y/O M w/ PMHX of ESRD, HTN, DM presents to ER for dental pain. PT evaluated by dental, given course of unasyn and clindamycin. No improvement, recommend additional abx and pain management. Will admit to Medicine

## 2020-08-13 NOTE — CHART NOTE - NSCHARTNOTEFT_GEN_A_CORE
This report was requested by: Supriya Sterling | Reference #: [id number]    Patient Name: Jhonny Gomez Date: 1965  Address: [address] [place] [address] [place], [place] [address]Hzg: Male  Rx Written	Rx Dispensed	Drug	Quantity	Days Supply	Prescriber Name	Payment Method	Dispenser  08/06/2020	08/07/2020	oxycodone-acetaminophen 5-325 mg tablet	10	2	Bienvenido Cantor DDS	Insurance	[facility] #[id number]  07/28/2020	07/28/2020	oxycodone-acetaminophen 5-325 mg tablet	10	2	Bienvenido Cantor DDS	Insurance	[facility] #[id number]  03/17/2020	03/17/2020	oxycodone-acetaminophen 5-325 mg tablet	15	5	[facility]	Insurance	[facility] #[id number]  01/06/2020	01/08/2020	oxycodone hcl 5 mg tablet	12	2	[facility]	Other	[facility] #[id number]  12/13/2019	12/13/2019	oxycodone hcl 5 mg tablet	12	2	Adilia Bond	Other	[facility] #[id number]  11/29/2019	12/03/2019	oxycodone-acetaminophen 2.5-325 mg tablet	12	3	Daryl Merritt MD	Other	[facility] #[id number]  11/13/2019	11/13/2019	oxycodone hcl 5 mg tablet	10	2	[facility]	Other	[facility] #[id number]

## 2020-08-13 NOTE — H&P ADULT - NSHPLABSRESULTS_GEN_ALL_CORE
08-12    141  |  103  |  60<H>  ----------------------------<  113<H>  4.8   |  21<L>  |  6.14<H>    Ca    9.3      12 Aug 2020 13:45    TPro  7.3  /  Alb  3.5  /  TBili  0.2  /  DBili  x   /  AST  16  /  ALT  10  /  AlkPhos  57  08-12                          9.4    12.63 )-----------( 263      ( 12 Aug 2020 13:45 )             33.1     CAPILLARY BLOOD GLUCOSE      POCT Blood Glucose.: 93 mg/dL (13 Aug 2020 12:26)  POCT Blood Glucose.: 132 mg/dL (13 Aug 2020 09:10)  POCT Blood Glucose.: 137 mg/dL (12 Aug 2020 22:03)  POCT Blood Glucose.: 122 mg/dL (12 Aug 2020 17:36) 08-12    141  |  103  |  60<H>  ----------------------------<  113<H>  4.8   |  21<L>  |  6.14<H>    Ca    9.3      12 Aug 2020 13:45    TPro  7.3  /  Alb  3.5  /  TBili  0.2  /  DBili  x   /  AST  16  /  ALT  10  /  AlkPhos  57  08-12                          9.4    12.63 )-----------( 263      ( 12 Aug 2020 13:45 )             33.1     CAPILLARY BLOOD GLUCOSE  POCT Blood Glucose.: 93 mg/dL (13 Aug 2020 12:26)  POCT Blood Glucose.: 132 mg/dL (13 Aug 2020 09:10)  POCT Blood Glucose.: 137 mg/dL (12 Aug 2020 22:03)  POCT Blood Glucose.: 122 mg/dL (12 Aug 2020 17:36)

## 2020-08-13 NOTE — H&P ADULT - HISTORY OF PRESENT ILLNESS
55 y M with hx of ESRD (not on dialysis yet, AVF in R upper arm done in Dec 2019), DM, and HTN presenting with R sided facial swelling and pain. Patient stated that he started having a R sided toothache starting around 3 weeks ago. Pain progressively worsened and around 1 week after initial onset, patient visited his dentist and received an Xray which revealed an infection in his tooth. Dentist sent patient home with amoxicillin to take for 1 week. A week later, patient returned to dentist where his bridge and infected tooth was removed. He was started on clindamycin for 1 week. 2 days after extraction, patient experienced increasing swelling which spread to his mandible and neck, patient's clindamycin dose was doubled. Patient was found to have an abscess which was drained 6 days ago. Patient continued to have persistent symptoms and was still draining pus, which prompted his visit to the ED. In the ED, patient was given IV Unasyn, but was switched over to IV clindamycin due to his CKD. Pain is controlled with tylenol + codeine and Percocet, as per patient. 55 y M with hx of ESRD (not on dialysis yet but received peritoneal dialysis in Dec 2019, AVF in R upper arm done in Jan 2019), DM, and HTN presenting with R sided facial swelling and pain. Patient stated that he started having a R sided toothache starting around 3 weeks ago. Pain progressively worsened and around 1 week after initial onset, patient visited his dentist and received an Xray which revealed an infection in his tooth. Dentist sent patient home with amoxicillin to take for 1 week. A week later, patient returned to dentist where his bridge and infected tooth was removed. He was started on clindamycin for 1 week. 2 days after extraction, patient experienced increasing swelling which spread to his mandible and neck, patient's clindamycin dose was doubled. Patient was found to have an abscess which was drained 6 days ago. Patient continued to have persistent symptoms and was still draining pus, which prompted his visit to the ED. In the ED, patient was given IV Unasyn, but was switched over to IV clindamycin due to his CKD. Pain is controlled with tylenol + codeine and Percocet, as per patient. Patient was scheduled for his first dialysis session tomorrow outpatient. Patient denies any fevers, chills, nausea, vomiting, abdominal pain or constipation. Patient endorses some pain in his neck and pain when swallowing. Patient has a wound on his abdomen from his previous peritoneal dialysis site that he manages and performs dressing changes.

## 2020-08-13 NOTE — H&P ADULT - ATTENDING COMMENTS
Patient seen and examined, care d/w residents.     56 yo M morbidly obese with DM2 on insulin, HTN, CKD 5 not yet on HD (fxnal LUE AVF) presented s/p 3 weeks ago with molar pain tooth was infected s/p 1 week of amoxicillin and then extraction #32 changed to clinda then progressive pain and facial swelling, now s/p dental eval monitored on IV abx x 24 hours and dental recommending admission for further abx.   Pt states swelling is about the same, tolerating diet, pain is overall better (reports improved 4d prior to admission).    - c/w IV abx, await dental f/u re: when they think site is improved enough for dc home as there is no plan for further I&D per their notes, initially had recommended OMFS eval but then retracted that recommendation   - c/w insulin for DM2, monitor closely may need lower doses given diet here may vary from home  - for CKD5 labs appear stable from Jan 2020, no hyperK and no s/s of volume overload, still making urine, reports was planned to start HD in coming days

## 2020-08-13 NOTE — CHART NOTE - NSCHARTNOTEFT_GEN_A_CORE
Patient is scheduled for 60Units Levemir. BG is running low for this dose. Lets give 20 units tonight instead. Please Advise for symptoms of hypoglycemia.     Provider to RN order given.     Available for any concerns thank you for paging.    Caroline

## 2020-08-13 NOTE — PROGRESS NOTE ADULT - SUBJECTIVE AND OBJECTIVE BOX
Patient is a 55y old  Male who presents with a chief complaint of right side facial swelling with pain.     Patient was given IV antibiotics over night and monitored in CDU.     HPI: Patient reports that right sided pain started three weeks ago. Patient went to the dentist two weeks ago and was informed that he had an infection and needed to get a tooth removed. Patient was unable to eat and had difficulty swallowing. Patient was given amoxicillin and noted that swelling did not improve. Patient went to the oral surgeon one week ago and got #32 removed and was prescribed clindamycin 300 mg q6h because infection did not respond to amoxicillin. Patient reported that the pain decreased and he was able to eat after extraction. Patient noted that the swelling increased for 3 days after extraction and was "traveling towards the middle of his neck". Patient was informed to take clindamycin 300 mg q3h. Patient reported no improvement and returned to the oral surgeon yesterday. Oral surgeon was able to express purulence and informed the patient to go to the emergency department. Patient reports that last night he had a sore throat. Patient reports that the swelling has not increased since 3 days ago. Patient reports no difficulty swallowing, breathing or vomiting. (copied from progress note from 8/12/20)      PAST MEDICAL & SURGICAL HISTORY:  BENNIE on CPAP  Obesity  HLD (hyperlipidemia)  Diabetes mellitus  Gout  Hypertension  Renal disease: ESRD  H/O hernia repair: 30 years ago  Injury of ankle and foot: surgically repaired, 10 years ago  H/O shoulder surgery      MEDICATIONS  (STANDING):  ampicillin/sulbactam  IVPB      ampicillin/sulbactam  IVPB 3 Gram(s) IV Intermittent once  ampicillin/sulbactam  IVPB 3 Gram(s) IV Intermittent every 12 hours    MEDICATIONS  (PRN):      Allergies    No Known Allergies    Intolerances        FAMILY HISTORY:  Family history of MI (myocardial infarction)    Last Dental Visit: 8/11/20 for follow up with oral surgeon.     Vital Signs Last 24 Hrs  T(C): 37.1 (12 Aug 2020 08:33), Max: 37.1 (12 Aug 2020 08:33)  T(F): 98.8 (12 Aug 2020 08:33), Max: 98.8 (12 Aug 2020 08:33)  HR: 113 (12 Aug 2020 08:33) (113 - 113)  BP: 135/74 (12 Aug 2020 08:33) (135/74 - 135/74)  BP(mean): --  RR: 20 (12 Aug 2020 08:33) (20 - 20)  SpO2: 97% (12 Aug 2020 08:33) (97% - 97%)    EOE:  TMJ ( -  ) clicks                    ( -   ) pops                    ( -   ) crepitus             Mandible FROM             Facial bones and MOM grossly intact             ( -  ) trismus             ( +  ) LAD: right sided submandibular and submental LAD             ( +  ) swelling: right sided submandibular and submental swelling             ( +  ) asymmetry: due to right sided swelling              ( +  ) palpation: reduced sensitivity to palpation on submandibular and submental area              ( -  ) SOB             ( -  ) dysphagia             ( -  ) LOC  Body of mandible not palpable in area of #28,#29,#30  Reduced swelling and decrease in tenderness in right sided facial swelling.     IOE:  Secondary dentition with multiple missing teeth, grossly intact. Patient has maxillary partial denture.   #32 socket healing well.            hard/soft palate:  ( -  ) palatal torus           tongue/FOM WNL           labial/buccal mucosa WNL           ( -  ) percussion           ( - ) palpation           ( -  ) swelling   No purulence appreciated.   Dentition present: Secondary dentition with multiple missing teeth, grossly intact. Patient has maxillary partial denture.   Mobility: -     Radiographs: Panoramic radiograph taken and evaluated. Noted multiple missing teeth, fixed prosthesis and caries present. Extraction socket in area of #32.     ASSESSMENT: Right sided facial and neck swelling following dental extraction #32. Right sided submandibular and submental swelling has improved.     PROCEDURE:  Limited oral evaluation. Will consult with Oral Surgery to evaluate.    RECOMMENDATIONS:   1) Consult with Oral Surgery to evaluate   2) If any difficulty swallowing/breathing, fever occur, page dental.     Gretchen Ward DDS #[id number]  Oral surgeon consulted: Patient is a 55y old  Male who presents with a chief complaint of right side facial swelling with pain.     Patient was given IV antibiotics over night and monitored in CDU.     HPI: Patient reports that right sided pain started three weeks ago. Patient went to the dentist two weeks ago and was informed that he had an infection and needed to get a tooth removed. Patient was unable to eat and had difficulty swallowing. Patient was given amoxicillin and noted that swelling did not improve. Patient went to the oral surgeon one week ago and got #32 removed and was prescribed clindamycin 300 mg q6h because infection did not respond to amoxicillin. Patient reported that the pain decreased and he was able to eat after extraction. Patient noted that the swelling increased for 3 days after extraction and was "traveling towards the middle of his neck". Patient was informed to take clindamycin 300 mg q3h. Patient reported no improvement and returned to the oral surgeon yesterday. Oral surgeon was able to express purulence and informed the patient to go to the emergency department. Patient reports that last night he had a sore throat. Patient reports that the swelling has not increased since 3 days ago. Patient reports no difficulty swallowing, breathing or vomiting. (copied from progress note from 8/12/20)      PAST MEDICAL & SURGICAL HISTORY:  BENNIE on CPAP  Obesity  HLD (hyperlipidemia)  Diabetes mellitus  Gout  Hypertension  Renal disease: ESRD  H/O hernia repair: 30 years ago  Injury of ankle and foot: surgically repaired, 10 years ago  H/O shoulder surgery      MEDICATIONS  (STANDING):  ampicillin/sulbactam  IVPB      ampicillin/sulbactam  IVPB 3 Gram(s) IV Intermittent once  ampicillin/sulbactam  IVPB 3 Gram(s) IV Intermittent every 12 hours    MEDICATIONS  (PRN):      Allergies    No Known Allergies    Intolerances        FAMILY HISTORY:  Family history of MI (myocardial infarction)    Last Dental Visit: 8/11/20 for follow up with oral surgeon.     Vital Signs Last 24 Hrs  T(C): 37.1 (12 Aug 2020 08:33), Max: 37.1 (12 Aug 2020 08:33)  T(F): 98.8 (12 Aug 2020 08:33), Max: 98.8 (12 Aug 2020 08:33)  HR: 113 (12 Aug 2020 08:33) (113 - 113)  BP: 135/74 (12 Aug 2020 08:33) (135/74 - 135/74)  BP(mean): --  RR: 20 (12 Aug 2020 08:33) (20 - 20)  SpO2: 97% (12 Aug 2020 08:33) (97% - 97%)    EOE:  TMJ ( -  ) clicks                    ( -   ) pops                    ( -   ) crepitus             Mandible FROM             Facial bones and MOM grossly intact             ( -  ) trismus             ( +  ) LAD: right sided submandibular and submental LAD             ( +  ) swelling: right sided submandibular and submental swelling             ( +  ) asymmetry: due to right sided swelling              ( +  ) palpation: reduced sensitivity to palpation on submandibular and submental area              ( -  ) SOB             ( -  ) dysphagia             ( -  ) LOC  Body of mandible not palpable in area of #28,#29,#30  Reduced swelling and decrease in tenderness in right sided facial swelling.     IOE:  Secondary dentition with multiple missing teeth, grossly intact. Patient has maxillary partial denture.   #32 socket healing well.            hard/soft palate:  ( -  ) palatal torus           tongue/FOM WNL           labial/buccal mucosa WNL           ( -  ) percussion           ( - ) palpation           ( -  ) swelling   No purulence appreciated.   Dentition present: Secondary dentition with multiple missing teeth, grossly intact. Patient has maxillary partial denture.   Mobility: -     Radiographs: Panoramic radiograph taken and evaluated. Noted multiple missing teeth, fixed prosthesis and caries present. Extraction socket in area of #32.     ASSESSMENT: Right sided facial and neck swelling following dental extraction #32. Right sided submandibular and submental swelling has improved.   Source of infection was removed prior to admission to ED.     PROCEDURE:  Limited oral evaluation. Noted slight right sided improvement. Spoke with oral surgery and verified plan to continue I/V antibiotics and re-eval tomorrow morning.     RECOMMENDATIONS:   1)  Continue I/V antibiotics and re-eval with Dental team tomorrow morning.   2) If any difficulty swallowing/breathing, fever occur, page dental.     Gretchen Ward DDS #[id number]

## 2020-08-13 NOTE — CHART NOTE - NSCHARTNOTEFT_GEN_A_CORE
Given oxy 5 IR for pain. Tylenol not managing pain per RN and patient received a dental procedure today.    Chlorhexidine switched from once daily to BID.    Patient does not need MRSA swab as he is not getting inpatient dialysis. Discussed with Adis Novoa MD day team and shared decision making used with RN team for management.     [place]

## 2020-08-13 NOTE — H&P ADULT - NSHPPHYSICALEXAM_GEN_ALL_CORE
PHYSICAL EXAM:  GENERAL: NAD, well-groomed, well-developed  HEAD:  Atraumatic, Normocephalic  EYES: EOMI, PERRLA, conjunctiva and sclera clear  ENMT: R facial and mandibular swelling, pain on palpation. Moist mucus membranes  NECK: Supple, No JVD, Normal thyroid  CHEST/LUNG: Clear to percussion bilaterally; No rales, rhonchi, wheezing, or rubs  HEART: Regular rate and rhythm; No murmurs, rubs, or gallops  ABDOMEN: Soft, Nontender, Nondistended; Bowel sounds present  VASCULAR:  2+ Peripheral Pulses, No clubbing, cyanosis, or edema  LYMPH: No lymphadenopathy noted  SKIN: No rashes or lesions  NERVOUS SYSTEM:  Alert & Oriented X3, Good concentration; Motor Strength 5/5 B/L upper and lower extremities; DTRs 2+ intact and symmetric T(C): 36.9 (08-13-20 @ 10:14), Max: 37.2 (08-12-20 @ 21:25)  HR: 90 (08-13-20 @ 10:14) (72 - 98)  BP: 145/79 (08-13-20 @ 10:14) (126/83 - 148/85)  RR: 18 (08-13-20 @ 10:14) (16 - 18)  SpO2: 99% (08-13-20 @ 10:14) (97% - 100%)    PHYSICAL EXAM:  GENERAL: NAD, well-groomed, well-developed  HEAD:  Atraumatic, Normocephalic  EYES: EOMI, PERRLA, conjunctiva and sclera clear  ENMT: R facial and mandibular swelling, pain on palpation. Moist mucus membranes  NECK: Supple, No JVD, Normal thyroid  CHEST/LUNG: Clear to percussion bilaterally; No rales, rhonchi, wheezing, or rubs  HEART: Regular rate and rhythm; No murmurs, rubs, or gallops  ABDOMEN: Soft, Nontender, Nondistended; Bowel sounds present  VASCULAR:  2+ Peripheral Pulses, No clubbing, cyanosis, or edema  LYMPH: No lymphadenopathy noted  SKIN: No rashes or lesions  NERVOUS SYSTEM:  Alert & Oriented X3, Good concentration; Motor Strength 5/5 B/L upper and lower extremities; DTRs 2+ intact and symmetric

## 2020-08-13 NOTE — H&P ADULT - ASSESSMENT
55 y M with hx of ESRD (not on dialysis yet but received peritoneal dialysis in Dec 2019, AVF in R upper arm done in Jan 2019), DM, and HTN presenting with R sided facial swelling and pain. Patient is s/p tooth extraction and PO amoxicillin and clindamycin. Patient still experiences swelling, pain, and purulent drainage from tooth extraction site. WBC elevated. Now on IV clindamycin. 55 y M with CKD V (not yet on dialysis, s/p PD cath Dec 2019 removed 2/2 infection, AVF in R upper arm done in Jan 2019), DM 2 on insulin, and HTN presenting with R sided facial swelling and pain. Patient is s/p tooth extraction and PO amoxicillin and clindamycin as OP and presented with continued facial swelling s/p dental eval recommending continued IV abx.

## 2020-08-13 NOTE — H&P ADULT - PROBLEM SELECTOR PLAN 2
- Patient received peritoneal dialysis in Dec 2019 but catheter site became infected and catheter was removed. AVF was done in Jan 2019 and patient to start dialysis outpatient tomorrow.  - Contacted patient's nephrologist Dr. Holden. Plan was to start dialysis tomorrow due to patient's rising K levels. Suggested to start patient on Lokelma and will f/u with plan to start dialysis sometime next week.   - Trend K levels, BUN, Cr.  - c/w cholecalciferol and calcitriol  - start Lokelma 10g PO daily

## 2020-08-13 NOTE — H&P ADULT - NSHPREVIEWOFSYSTEMS_GEN_ALL_CORE
REVIEW OF SYSTEMS:  CONSTITUTIONAL: No fever, chills, night sweats, or fatigue  EYES: No eye pain, visual disturbances, or discharge  ENMT:  R sided facial pain and swelling. No difficulty hearing, tinnitus, vertigo.  NECK: R sided neck pain and pain with swallowing.  RESPIRATORY: No cough, wheezing, or hemoptysis; No shortness of breath  CARDIOVASCULAR: No chest pain, palpitations, dizziness, or leg swelling  GASTROINTESTINAL: No abdominal or epigastric pain. No nausea, vomiting, or hematemesis; No diarrhea or constipation. No melena or hematochezia.  GENITOURINARY: No dysuria, frequency, hematuria, or incontinence  NEUROLOGICAL: No headaches, memory loss, loss of strength, numbness, or tremors  SKIN: No itching, burning, rashes, or lesions   LYMPH NODES: No enlarged glands  ENDOCRINE: No heat or cold intolerance; No hair loss  MUSCULOSKELETAL: No joint pain or swelling; No muscle, back, or extremity pain  PSYCHIATRIC: No depression, anxiety, mood swings, or difficulty sleeping  HEME/LYMPH: No easy bruising, or bleeding gums  ALLERY AND IMMUNOLOGIC: No hives or eczema

## 2020-08-14 ENCOUNTER — TRANSCRIPTION ENCOUNTER (OUTPATIENT)
Age: 55
End: 2020-08-14

## 2020-08-14 VITALS — WEIGHT: 165.79 LBS

## 2020-08-14 LAB
ALBUMIN SERPL ELPH-MCNC: 3.3 G/DL — SIGNIFICANT CHANGE UP (ref 3.3–5)
ALP SERPL-CCNC: 54 U/L — SIGNIFICANT CHANGE UP (ref 40–120)
ALT FLD-CCNC: 9 U/L — SIGNIFICANT CHANGE UP (ref 4–41)
ANION GAP SERPL CALC-SCNC: 18 MMO/L — HIGH (ref 7–14)
AST SERPL-CCNC: 14 U/L — SIGNIFICANT CHANGE UP (ref 4–40)
BASOPHILS # BLD AUTO: 0.04 K/UL — SIGNIFICANT CHANGE UP (ref 0–0.2)
BASOPHILS NFR BLD AUTO: 0.5 % — SIGNIFICANT CHANGE UP (ref 0–2)
BILIRUB SERPL-MCNC: 0.2 MG/DL — SIGNIFICANT CHANGE UP (ref 0.2–1.2)
BUN SERPL-MCNC: 69 MG/DL — HIGH (ref 7–23)
CALCIUM SERPL-MCNC: 8.9 MG/DL — SIGNIFICANT CHANGE UP (ref 8.4–10.5)
CHLORIDE SERPL-SCNC: 104 MMOL/L — SIGNIFICANT CHANGE UP (ref 98–107)
CO2 SERPL-SCNC: 20 MMOL/L — LOW (ref 22–31)
CREAT SERPL-MCNC: 5.84 MG/DL — HIGH (ref 0.5–1.3)
EOSINOPHIL # BLD AUTO: 0.54 K/UL — HIGH (ref 0–0.5)
EOSINOPHIL NFR BLD AUTO: 6.4 % — HIGH (ref 0–6)
GLUCOSE BLDC GLUCOMTR-MCNC: 123 MG/DL — HIGH (ref 70–99)
GLUCOSE SERPL-MCNC: 96 MG/DL — SIGNIFICANT CHANGE UP (ref 70–99)
HCT VFR BLD CALC: 29.8 % — LOW (ref 39–50)
HGB BLD-MCNC: 9 G/DL — LOW (ref 13–17)
IMM GRANULOCYTES NFR BLD AUTO: 0.4 % — SIGNIFICANT CHANGE UP (ref 0–1.5)
LYMPHOCYTES # BLD AUTO: 2.49 K/UL — SIGNIFICANT CHANGE UP (ref 1–3.3)
LYMPHOCYTES # BLD AUTO: 29.3 % — SIGNIFICANT CHANGE UP (ref 13–44)
MAGNESIUM SERPL-MCNC: 2.3 MG/DL — SIGNIFICANT CHANGE UP (ref 1.6–2.6)
MCHC RBC-ENTMCNC: 18.5 PG — LOW (ref 27–34)
MCHC RBC-ENTMCNC: 30.2 % — LOW (ref 32–36)
MCV RBC AUTO: 61.3 FL — LOW (ref 80–100)
MONOCYTES # BLD AUTO: 0.76 K/UL — SIGNIFICANT CHANGE UP (ref 0–0.9)
MONOCYTES NFR BLD AUTO: 8.9 % — SIGNIFICANT CHANGE UP (ref 2–14)
NEUTROPHILS # BLD AUTO: 4.64 K/UL — SIGNIFICANT CHANGE UP (ref 1.8–7.4)
NEUTROPHILS NFR BLD AUTO: 54.5 % — SIGNIFICANT CHANGE UP (ref 43–77)
NRBC # FLD: 0 K/UL — SIGNIFICANT CHANGE UP (ref 0–0)
PHOSPHATE SERPL-MCNC: 6 MG/DL — HIGH (ref 2.5–4.5)
PLATELET # BLD AUTO: 272 K/UL — SIGNIFICANT CHANGE UP (ref 150–400)
PMV BLD: 10.5 FL — SIGNIFICANT CHANGE UP (ref 7–13)
POTASSIUM SERPL-MCNC: 4.4 MMOL/L — SIGNIFICANT CHANGE UP (ref 3.5–5.3)
POTASSIUM SERPL-SCNC: 4.4 MMOL/L — SIGNIFICANT CHANGE UP (ref 3.5–5.3)
PROT SERPL-MCNC: 6.7 G/DL — SIGNIFICANT CHANGE UP (ref 6–8.3)
RBC # BLD: 4.86 M/UL — SIGNIFICANT CHANGE UP (ref 4.2–5.8)
RBC # FLD: 16.9 % — HIGH (ref 10.3–14.5)
SARS-COV-2 IGG SERPL QL IA: NEGATIVE — SIGNIFICANT CHANGE UP
SARS-COV-2 IGM SERPL IA-ACNC: <0.1 INDEX — SIGNIFICANT CHANGE UP
SODIUM SERPL-SCNC: 142 MMOL/L — SIGNIFICANT CHANGE UP (ref 135–145)
WBC # BLD: 8.5 K/UL — SIGNIFICANT CHANGE UP (ref 3.8–10.5)
WBC # FLD AUTO: 8.5 K/UL — SIGNIFICANT CHANGE UP (ref 3.8–10.5)

## 2020-08-14 PROCEDURE — 99239 HOSP IP/OBS DSCHRG MGMT >30: CPT | Mod: GC

## 2020-08-14 RX ORDER — CHOLECALCIFEROL (VITAMIN D3) 125 MCG
2000 CAPSULE ORAL
Qty: 0 | Refills: 0 | DISCHARGE
Start: 2020-08-14

## 2020-08-14 RX ADMIN — Medication 18 UNIT(S): at 13:10

## 2020-08-14 RX ADMIN — Medication 18 UNIT(S): at 08:59

## 2020-08-14 RX ADMIN — Medication 100 MILLIGRAM(S): at 01:59

## 2020-08-14 RX ADMIN — Medication 100 MILLIGRAM(S): at 10:33

## 2020-08-14 RX ADMIN — SODIUM ZIRCONIUM CYCLOSILICATE 10 GRAM(S): 10 POWDER, FOR SUSPENSION ORAL at 13:13

## 2020-08-14 RX ADMIN — Medication 2000 UNIT(S): at 12:11

## 2020-08-14 RX ADMIN — Medication 975 MILLIGRAM(S): at 00:00

## 2020-08-14 RX ADMIN — Medication 60 MILLIGRAM(S): at 06:04

## 2020-08-14 RX ADMIN — Medication 975 MILLIGRAM(S): at 01:59

## 2020-08-14 RX ADMIN — CHLORHEXIDINE GLUCONATE 1 APPLICATION(S): 213 SOLUTION TOPICAL at 06:04

## 2020-08-14 RX ADMIN — CALCITRIOL 0.25 MICROGRAM(S): 0.5 CAPSULE ORAL at 12:11

## 2020-08-14 NOTE — DISCHARGE NOTE PROVIDER - NSDCMRMEDTOKEN_GEN_ALL_CORE_FT
Aspirin Enteric Coated 81 mg oral delayed release tablet: 1 tab(s) orally once a day  atorvastatin 40 mg oral tablet: 1 tab(s) orally once a day  calcitriol 0.25 mcg oral capsule: 1 cap(s) orally once a day  cholecalciferol oral tablet: 2000 unit(s) orally once a day  febuxostat 40 mg oral tablet: 1 tab(s) orally once a day  HumaLOG 100 units/mL subcutaneous solution: 18 unit(s) subcutaneous 3 times a day  Levemir 100 units/mL subcutaneous solution: 60 unit(s) subcutaneous once a day (at bedtime)  NIFEdipine 60 mg oral tablet, extended release: 1 tab(s) orally once a day  Vascepa 1 g oral capsule: 2 cap(s) orally once a day Aspirin Enteric Coated 81 mg oral delayed release tablet: 1 tab(s) orally once a day  atorvastatin 40 mg oral tablet: 1 tab(s) orally once a day  calcitriol 0.25 mcg oral capsule: 1 cap(s) orally once a day  cholecalciferol oral tablet: 2000 unit(s) orally once a day  clindamycin 300 mg oral capsule: 1 cap(s) orally every 8 hours   febuxostat 40 mg oral tablet: 1 tab(s) orally once a day  HumaLOG 100 units/mL subcutaneous solution: 18 unit(s) subcutaneous 3 times a day  Levemir 100 units/mL subcutaneous solution: 60 unit(s) subcutaneous once a day (at bedtime)  NIFEdipine 60 mg oral tablet, extended release: 1 tab(s) orally once a day  Vascepa 1 g oral capsule: 2 cap(s) orally once a day

## 2020-08-14 NOTE — DIETITIAN INITIAL EVALUATION ADULT. - PERTINENT MEDS FT
MEDICATIONS  (STANDING):  atorvastatin 40 milliGRAM(s) Oral at bedtime  calcitriol   Capsule 0.25 MICROGram(s) Oral daily  chlorhexidine 4% Liquid 1 Application(s) Topical two times a day  cholecalciferol 2000 Unit(s) Oral daily  clindamycin IVPB 900 milliGRAM(s) IV Intermittent every 8 hours  clindamycin IVPB      dextrose 50% Injectable 12.5 Gram(s) IV Push once  dextrose 50% Injectable 25 Gram(s) IV Push once  dextrose 50% Injectable 25 Gram(s) IV Push once  heparin   Injectable 5000 Unit(s) SubCutaneous every 8 hours  insulin detemir injectable (LEVEMIR) 60 Unit(s) SubCutaneous at bedtime  insulin lispro Injectable (HumaLOG) 18 Unit(s) SubCutaneous before dinner  insulin lispro Injectable (HumaLOG) 18 Unit(s) SubCutaneous before breakfast  insulin lispro Injectable (HumaLOG) 18 Unit(s) SubCutaneous before lunch  NIFEdipine XL 60 milliGRAM(s) Oral daily  sodium zirconium cyclosilicate 10 Gram(s) Oral daily

## 2020-08-14 NOTE — DIETITIAN INITIAL EVALUATION ADULT. - PHYSICAL APPEARANCE
obese/other (specify) No noted edema or pressure injuries  Per chart, +LUQ wound (from previous peritoneal dialysis site).

## 2020-08-14 NOTE — ADVANCED PRACTICE NURSE CONSULT - ASSESSMENT
A&Ox4 ,OOB ad aissatou.    Left abdomen: nonhealing wound s/p peritoneal dialysis catheter removal: opening measures 0.3cmx0.3cmx3.5cm: unable to visualize wound base secondary to narrow opening. The wound base is friable with gentle assessment of depth and cleansing. Wound edges are rolled with maceration. Periwound skin with no induration, no increased warmth, no erythema. Moderate amount of serosanguinous drainage, no odor. Unable to express drainage with palpation in periwound skin. Goal of care: fill depth, wick moisture, decrease/control bioburden of chronic wound, protect periwound skin.  Discussed management with patient and follow up care recommendations. Patient reports self care at home and verbalized understanding of topical recommendations.

## 2020-08-14 NOTE — PROGRESS NOTE ADULT - SUBJECTIVE AND OBJECTIVE BOX
PROGRESS NOTE:   Authored by Adis Novoa MD  PGY-1, Internal Medicine  Pager [facility] [phone], [id number]     Patient is a 55y old  Male who presents with a chief complaint of Right sided facial swelling and pain (13 Aug 2020 12:39)      SUBJECTIVE / OVERNIGHT EVENTS: Patient's fingersticks have been low 100s overnight, 20U of levemir given instead of 60U. Patient refused heparin subq because it was "giving him headache and stomach ache." Patient received oxycodone overnight for pain. Pain was well controlled and pt had no complaints this AM.     ADDITIONAL REVIEW OF SYSTEMS: Denies fevers, chills, n/v.    MEDICATIONS  (STANDING):  atorvastatin 40 milliGRAM(s) Oral at bedtime  calcitriol   Capsule 0.25 MICROGram(s) Oral daily  chlorhexidine 4% Liquid 1 Application(s) Topical two times a day  cholecalciferol 2000 Unit(s) Oral daily  clindamycin IVPB 900 milliGRAM(s) IV Intermittent every 8 hours  clindamycin IVPB      dextrose 50% Injectable 12.5 Gram(s) IV Push once  dextrose 50% Injectable 25 Gram(s) IV Push once  dextrose 50% Injectable 25 Gram(s) IV Push once  heparin   Injectable 5000 Unit(s) SubCutaneous every 8 hours  insulin detemir injectable (LEVEMIR) 60 Unit(s) SubCutaneous at bedtime  insulin lispro Injectable (HumaLOG) 18 Unit(s) SubCutaneous before dinner  insulin lispro Injectable (HumaLOG) 18 Unit(s) SubCutaneous before breakfast  insulin lispro Injectable (HumaLOG) 18 Unit(s) SubCutaneous before lunch  NIFEdipine XL 60 milliGRAM(s) Oral daily  sodium zirconium cyclosilicate 10 Gram(s) Oral daily    MEDICATIONS  (PRN):  acetaminophen   Tablet .. 975 milliGRAM(s) Oral every 6 hours PRN Moderate Pain (4 - 6)  dextrose 40% Gel 15 Gram(s) Oral once PRN Blood Glucose LESS THAN 70 milliGRAM(s)/deciliter      CAPILLARY BLOOD GLUCOSE      POCT Blood Glucose.: 117 mg/dL (14 Aug 2020 08:54)  POCT Blood Glucose.: 105 mg/dL (14 Aug 2020 02:00)  POCT Blood Glucose.: 96 mg/dL (13 Aug 2020 22:05)  POCT Blood Glucose.: 104 mg/dL (13 Aug 2020 18:08)  POCT Blood Glucose.: 93 mg/dL (13 Aug 2020 12:26)    I&O's Summary      PHYSICAL EXAM:  Vital Signs Last 24 Hrs  T(C): 37.2 (14 Aug 2020 06:02), Max: 37.2 (14 Aug 2020 06:02)  T(F): 98.9 (14 Aug 2020 06:02), Max: 98.9 (14 Aug 2020 06:02)  HR: 91 (14 Aug 2020 06:02) (86 - 95)  BP: 141/89 (14 Aug 2020 06:02) (127/81 - 146/87)  BP(mean): --  RR: 18 (14 Aug 2020 06:02) (16 - 18)  SpO2: 98% (14 Aug 2020 06:02) (95% - 99%)    CONSTITUTIONAL: NAD, lying in bed comfortably  RESPIRATORY: Normal respiratory effort; CTABL  CARDIOVASCULAR: Regular rate and rhythm, normal S1 and S2, no murmur/rub/gallop  ABDOMEN: Soft, nondistended, nontender to palpation, normoactive bowel sounds, no rebound/guarding  MUSCLOSKELETAL: no joint swelling or tenderness to palpation, FROM all extremities  NEURO: AAOx3 to person, place, and time, full and equal strength all extremities   EXTREMITIES: no pedal edema    LABS:                        9.0    8.50  )-----------( 272      ( 14 Aug 2020 06:45 )             29.8     08-14    142  |  104  |  69<H>  ----------------------------<  96  4.4   |  20<L>  |  5.84<H>    Ca    8.9      14 Aug 2020 06:45  Phos  6.0     08-14  Mg     2.3     08-14    TPro  6.7  /  Alb  3.3  /  TBili  0.2  /  DBili  x   /  AST  14  /  ALT  9   /  AlkPhos  54  08-14

## 2020-08-14 NOTE — DIETITIAN INITIAL EVALUATION ADULT. - PERTINENT LABORATORY DATA
08-14 Na142 mmol/L Glu 96 mg/dL K+ 4.4 mmol/L Cr  5.84 mg/dL<H> BUN 69 mg/dL<H> 08-14 Phos 6.0 mg/dL<H> 08-14 Alb 3.3 g/dL  HbA1c 6.5%    CAPILLARY BLOOD GLUCOSE      POCT Blood Glucose.: 117 mg/dL (14 Aug 2020 08:54)  POCT Blood Glucose.: 105 mg/dL (14 Aug 2020 02:00)  POCT Blood Glucose.: 96 mg/dL (13 Aug 2020 22:05)  POCT Blood Glucose.: 104 mg/dL (13 Aug 2020 18:08)  POCT Blood Glucose.: 93 mg/dL (13 Aug 2020 12:26)

## 2020-08-14 NOTE — DIETITIAN INITIAL EVALUATION ADULT. - OTHER INFO
Pt. observed with 100% PO intake of breakfast & he endorses good appetite.  Denies food allergies, nausea/vomiting/diarrhea/constipation, or issues with chewing/swallowing.  Also denies any significant weight changes PTA, however Pt. would not specify usual body weight despite RDN inquiring.     Pt. pending initiation of HD.  Is able to teach back limited prior knowledge of suggested renal diet modifications, specifically potassium restriction.  RDN provided verbal & printed renal diet education as it relates to HD.  Emphasized K+, phosphorus, sodium and fluid restrictions, as well as importance of increasing protein intake.  Informed Pt. outpatient dialysis RDN will likely follow with him to review/reinforce therapeutic diet.  Pt. does not voice any questions @ this time and seems somewhat disinterested with nutrition instruction.

## 2020-08-14 NOTE — DISCHARGE NOTE PROVIDER - HOSPITAL COURSE
55 year old male with hx of ESRD, (not on dialysis yet but received peritoneal dialysis in Dec 2019, AVF in R upper arm done in Jan 2019), DM, and HTN presenting with R sided facial swelling and pain on 8/13/20. Patient has been seeing his dentist and had recently had his molar #32 extracted. Patient began experiencing increased swelling and purulent discharge from dental extraction site despite being on PO amoxicillin and clindamycin. Patient was admitted for possible dental abscess and was seen and evaluated by the dental service. IV clindamycin was started but no drainage was recommended at the time. Patient's symptoms improved with antibiotics and pain management. Patient was re-evaluated by the dental service who recommended switching to PO antibiotics and outpatient follow up. Patient was medically optimized for discharge on 8/14/20 with a course of PO clindamycin ______. Patient to follow up with his dentist for further management. 55 year old male with hx of ESRD, (not on dialysis yet but received peritoneal dialysis in Dec 2019, AVF in R upper arm done in Jan 2019), DM, and HTN presenting with R sided facial swelling and pain on 8/13/20. Patient has been seeing his dentist and had recently had his molar #32 extracted. Patient began experiencing increased swelling and purulent discharge from dental extraction site despite being on PO amoxicillin and clindamycin. Patient was admitted for possible dental abscess and was seen and evaluated by the dental service. IV clindamycin was started but no drainage was recommended at the time. Patient's symptoms improved with antibiotics and pain management. Patient was re-evaluated by the dental service who recommended switching to PO antibiotics and outpatient follow up. Patient was medically optimized for discharge on 8/14/20 with a course of PO clindamycin 300mg TID. Patient to follow up with his dentist for further management of dental infection. Patient to also follow up with nephrologist in regards to starting dialysis. 55 year old male with hx of ESRD, (not on dialysis yet) w/ AVF in R upper arm done in Jan 2019), DM, and HTN who p/w R sided facial swelling and pain on 8/13/20 2/2 dental infection. Pt was experiencing swelling and purulent discharge from dental extraction site despite being on PO amoxicillin and clindamycin outpatient. He was seen by dental medicine and received IV clindamycin w/ no drainage recommended at this time. Patient's symptoms improved with antibiotics and pain management. Patient was re-evaluated by the dental service who recommended switching to PO antibiotics and outpatient follow up.         Patient was medically optimized for discharge on 8/14/20 with a course of PO clindamycin 300mg TID for total 7 day course. Pt to follow up with his dentist for further management of dental infection. Dr. Cooley was notified of pt's admission and will follow outpatient. Patient to also follow up with nephrologist in regards to starting dialysis next week w/ Dr. Holden.

## 2020-08-14 NOTE — ADVANCED PRACTICE NURSE CONSULT - RECOMMEDATIONS
Recommend follow up care at [facility] ([phone], [address] [place] [address]).     Abdomen: Cleanse with NS, pat dry. Apply Liquid barrier film to periwound skin. Lightly pack depth with Aquacel AG hydrofiber leaving 2 inches above skin level to wick moisture, cover with foam with border. Change daily.    Please call wound care service line is further assistance is needed ([phone]).

## 2020-08-14 NOTE — DISCHARGE NOTE PROVIDER - CARE PROVIDER_API CALL
Neftaly Holden)  Internal Medicine; Nephrology  [address] [address]  Phone: [phone]  Fax: [phone]  Established Patient  Follow Up Time: 1 week    KRIS LEAVITT  [id number]  [address] [facility] [address]  [place] [address]  Phone: [phone]  Fax: [phone]  Established Patient  Follow Up Time: 1 week

## 2020-08-14 NOTE — DIETITIAN INITIAL EVALUATION ADULT. - ETIOLOGY
[facility] Smokers Quitline [phone] ([phone]) Lack of prior in depth nutrition/diet education as it relates to renal dysfunction, now with requirement for HD. Prolonged excessive energy intake and physical inactivity.

## 2020-08-14 NOTE — PROGRESS NOTE ADULT - REASON FOR ADMISSION
Right sided facial swelling and pain
Right Side Facial Swelling
Right Sided facial swelling
Right sided facial swelling and pain

## 2020-08-14 NOTE — PROGRESS NOTE ADULT - ASSESSMENT
55 y M with CKD V (not yet on dialysis, s/p PD cath Dec 2019 removed 2/2 infection, AVF in R upper arm done in Jan 2019), DM 2 on insulin, and HTN presenting with R sided facial swelling and pain. Patient is s/p tooth extraction and PO amoxicillin and clindamycin as OP and presented with continued facial swelling s/p dental eval recommending continued IV abx.

## 2020-08-14 NOTE — DISCHARGE NOTE PROVIDER - NSDCCPCAREPLAN_GEN_ALL_CORE_FT
PRINCIPAL DISCHARGE DIAGNOSIS  Diagnosis: Dental infection  Assessment and Plan of Treatment: You came to the hospital because you were experiencing facial swelling and pain. You were found to have a dental infection after your dental extraction. You were admitted to the hospital for intravenous antibiotics because oral antibiotics have failed to control your symptoms. In the hospital, you were started on intravenous clindamycin to help with your dental infection. Your symptoms improved and this antibiotic and there was no indication for drainage at this time. You are being discharged on oral clindamycin to continue for ___days. Please follow up with your dentist for further management. Please come back to the emergency room if you experience increased swelling, pain, or persistent fevers. Please also come back if you start experiencing persistent diarrhea, since the antibiotics can cause this. PRINCIPAL DISCHARGE DIAGNOSIS  Diagnosis: Dental infection  Assessment and Plan of Treatment: You came to the hospital because you were experiencing facial swelling and pain. You were found to have a dental infection after your dental extraction. You were admitted to the hospital for intravenous antibiotics because oral antibiotics have failed to control your symptoms. In the hospital, you were started on intravenous clindamycin to help with your dental infection. Your symptoms improved and this antibiotic and there was no indication for drainage at this time. You are being discharged on oral clindamycin 300mg, three times a day, to continue for 6 days. Please follow up with your dentist for further management. Please come back to the emergency room if you experience increased swelling, pain, or persistent fevers. Please also come back if you start experiencing persistent diarrhea, since the antibiotics can cause this. PRINCIPAL DISCHARGE DIAGNOSIS  Diagnosis: Dental infection  Assessment and Plan of Treatment: You came to the hospital because you were experiencing facial swelling and pain. You were found to have a dental infection after your dental extraction. You were admitted to the hospital for intravenous antibiotics because oral antibiotics have failed to control your symptoms. In the hospital, you were started on intravenous clindamycin to help with your dental infection. Your symptoms improved and this antibiotic and there was no indication for drainage at this time. You are being discharged on oral clindamycin 300mg, three times a day, to continue for 5 days. Please follow up with your dentist for further management. Please come back to the emergency room if you experience increased swelling, pain, or persistent fevers. Please also come back if you start experiencing persistent diarrhea, since the antibiotics can cause this. PRINCIPAL DISCHARGE DIAGNOSIS  Diagnosis: Dental infection  Assessment and Plan of Treatment: You came to the hospital because you were experiencing facial swelling and pain. You were found to have a dental infection after your dental extraction. You were admitted to the hospital for intravenous antibiotics because oral antibiotics have failed to control your symptoms. In the hospital, you were started on intravenous clindamycin to help with your dental infection. Your symptoms improved and this antibiotic and there was no indication for drainage at this time. You are being discharged on oral clindamycin 300mg, three times a day, to continue for 5 days (08/19/20). Please follow up with your dentist for further management. Please come back to the emergency room if you experience increased swelling, pain, or persistent fevers. Please also come back if you start experiencing persistent diarrhea, since the antibiotics can cause this.

## 2020-08-14 NOTE — DISCHARGE NOTE PROVIDER - PROVIDER TOKENS
PROVIDER:[TOKEN:[[id number]],FOLLOWUP:[1 week],ESTABLISHEDPATIENT:[T]],PROVIDER:[TOKEN:[[id number]],FOLLOWUP:[1 week],ESTABLISHEDPATIENT:[T]]

## 2020-08-14 NOTE — DISCHARGE NOTE NURSING/CASE MANAGEMENT/SOCIAL WORK - PATIENT PORTAL LINK FT
You can access the FollowMyHealth Patient Portal offered by [facility] by registering at the following website: [email or web address]. By joining [employer]’s FollowMyHealth portal, you will also be able to view your health information using other applications (apps) compatible with our system.

## 2020-08-14 NOTE — PROGRESS NOTE ADULT - SUBJECTIVE AND OBJECTIVE BOX
Patient is a 55y old  Male who presents with a chief complaint of right side facial swelling with pain.     Patient was admitted and given IV antibiotics over night and monitored. Beside evaluation done by dental team.     HPI: Patient reports that right sided pain started three weeks ago. Patient went to the dentist two weeks ago and was informed that he had an infection and needed to get a tooth removed. Patient was unable to eat and had difficulty swallowing. Patient was given amoxicillin and noted that swelling did not improve. Patient went to the oral surgeon one week ago and got #32 removed and was prescribed clindamycin 300 mg q6h because infection did not respond to amoxicillin. Patient reported that the pain decreased and he was able to eat after extraction. Patient noted that the swelling increased for 3 days after extraction and was "traveling towards the middle of his neck". Patient was informed to take clindamycin 300 mg q3h. Patient reported no improvement and returned to the oral surgeon yesterday. Oral surgeon was able to express purulence and informed the patient to go to the emergency department. Patient reports that last night he had a sore throat. Patient reports that the swelling has not increased since 3 days ago. Patient reports no difficulty swallowing, breathing or vomiting. (copied from progress note from 8/12/20)      PAST MEDICAL & SURGICAL HISTORY:  BENNIE on CPAP  Obesity  HLD (hyperlipidemia)  Diabetes mellitus  Gout  Hypertension  Renal disease: ESRD  H/O hernia repair: 30 years ago  Injury of ankle and foot: surgically repaired, 10 years ago  H/O shoulder surgery      MEDICATIONS  (STANDING):  ampicillin/sulbactam  IVPB      ampicillin/sulbactam  IVPB 3 Gram(s) IV Intermittent once  ampicillin/sulbactam  IVPB 3 Gram(s) IV Intermittent every 12 hours    MEDICATIONS  (PRN):      Allergies    No Known Allergies    Intolerances        FAMILY HISTORY:  Family history of MI (myocardial infarction)    Last Dental Visit: 8/11/20 for follow up with oral surgeon.     Vital Signs Last 24 Hrs  T(C): 37.1 (12 Aug 2020 08:33), Max: 37.1 (12 Aug 2020 08:33)  T(F): 98.8 (12 Aug 2020 08:33), Max: 98.8 (12 Aug 2020 08:33)  HR: 113 (12 Aug 2020 08:33) (113 - 113)  BP: 135/74 (12 Aug 2020 08:33) (135/74 - 135/74)  BP(mean): --  RR: 20 (12 Aug 2020 08:33) (20 - 20)  SpO2: 97% (12 Aug 2020 08:33) (97% - 97%)    EOE:  TMJ ( -  ) clicks                    ( -   ) pops                    ( -   ) crepitus             Mandible FROM             Facial bones and MOM grossly intact             ( -  ) trismus             ( +  ) LAD: right sided submandibular              ( +  ) swelling: right sided submandibular              ( +  ) asymmetry: due to right sided swelling              ( +  ) palpation: reduced sensitivity to palpation on submandibular region              ( -  ) SOB             ( -  ) dysphagia             ( -  ) LOC  Body of mandible not palpable in area of #28,#29,#30  Reduced swelling and decrease in tenderness in right sided facial swelling.     IOE:  Secondary dentition with multiple missing teeth, grossly intact. Patient has maxillary partial denture.   #32 socket healing well.            hard/soft palate:  ( -  ) palatal torus           tongue/FOM WNL           labial/buccal mucosa WNL           ( -  ) percussion           ( + ) palpation: sensitivity upon palpation of extraction socket in area or #32 consistent with 1 week post-op.            ( -  ) swelling   No purulence appreciated.   Dentition present: Secondary dentition with multiple missing teeth, grossly intact. Patient has maxillary partial denture.   Mobility: -    ASSESSMENT: Right sided facial and neck swelling following dental extraction #32. Right sided submandibular swelling has improved since two days ago. Decreased tenderness upon palpation   Source of infection was removed prior to admission to ED.     PROCEDURE:  Limited oral evaluation. Noted right sided swelling improvement. Patient reports slight discomfort but reduced pain on the right side. No discomfort swallowing or breathing. Patient reports that he no longer has a sore throat.     RECOMMENDATIONS:   1) Switch to PO antibiotics as per Med team.   2) Follow up with outpatient oral surgeon or [facility] for evaluation 1 week post discharge.   2) If any difficulty swallowing/breathing, fever occur, page dental.     Gretchen Ward DDS #[id number] Patient is a 55y old  Male who presents with a chief complaint of right side facial swelling with pain.     Patient was admitted and given IV antibiotics over night and monitored. Beside evaluation done by dental team.     HPI: Patient reports that right sided pain started three weeks ago. Patient went to the dentist two weeks ago and was informed that he had an infection and needed to get a tooth removed. Patient was unable to eat and had difficulty swallowing. Patient was given amoxicillin and noted that swelling did not improve. Patient went to the oral surgeon one week ago and got #32 removed and was prescribed clindamycin 300 mg q6h because infection did not respond to amoxicillin. Patient reported that the pain decreased and he was able to eat after extraction. Patient noted that the swelling increased for 3 days after extraction and was "traveling towards the middle of his neck". Patient was informed to take clindamycin 300 mg q3h. Patient reported no improvement and returned to the oral surgeon yesterday. Oral surgeon was able to express purulence and informed the patient to go to the emergency department. Patient reports that last night he had a sore throat. Patient reports that the swelling has not increased since 3 days ago. Patient reports no difficulty swallowing, breathing or vomiting. (copied from progress note from 8/12/20)      PAST MEDICAL & SURGICAL HISTORY:  BENNIE on CPAP  Obesity  HLD (hyperlipidemia)  Diabetes mellitus  Gout  Hypertension  Renal disease: ESRD  H/O hernia repair: 30 years ago  Injury of ankle and foot: surgically repaired, 10 years ago  H/O shoulder surgery      MEDICATIONS  (STANDING):  ampicillin/sulbactam  IVPB      ampicillin/sulbactam  IVPB 3 Gram(s) IV Intermittent once  ampicillin/sulbactam  IVPB 3 Gram(s) IV Intermittent every 12 hours    MEDICATIONS  (PRN):      Allergies    No Known Allergies    Intolerances        FAMILY HISTORY:  Family history of MI (myocardial infarction)    Last Dental Visit: 8/11/20 for follow up with oral surgeon.     Vital Signs Last 24 Hrs  T(C): 37.1 (12 Aug 2020 08:33), Max: 37.1 (12 Aug 2020 08:33)  T(F): 98.8 (12 Aug 2020 08:33), Max: 98.8 (12 Aug 2020 08:33)  HR: 113 (12 Aug 2020 08:33) (113 - 113)  BP: 135/74 (12 Aug 2020 08:33) (135/74 - 135/74)  BP(mean): --  RR: 20 (12 Aug 2020 08:33) (20 - 20)  SpO2: 97% (12 Aug 2020 08:33) (97% - 97%)    EOE:  TMJ ( -  ) clicks                    ( -   ) pops                    ( -   ) crepitus             Mandible FROM             Facial bones and MOM grossly intact             ( -  ) trismus             ( +  ) LAD: right sided submandibular              ( +  ) swelling: right sided submandibular              ( +  ) asymmetry: due to right sided swelling              ( +  ) palpation: reduced sensitivity to palpation on submandibular region              ( -  ) SOB             ( -  ) dysphagia             ( -  ) LOC  Body of mandible not palpable in area of #28,#29,#30  Reduced swelling and decrease in tenderness in right sided facial swelling.     IOE:  Secondary dentition with multiple missing teeth, grossly intact. Patient has maxillary partial denture.   #32 socket healing well.            hard/soft palate:  ( -  ) palatal torus           tongue/FOM WNL           labial/buccal mucosa WNL           ( -  ) percussion           ( + ) palpation: sensitivity upon palpation of extraction socket in area or #32 consistent with 1 week post-op.            ( -  ) swelling   No purulence appreciated.   Dentition present: Secondary dentition with multiple missing teeth, grossly intact. Patient has maxillary partial denture.   Mobility: -    ASSESSMENT: Right sided facial and neck swelling following dental extraction #32. Right sided submandibular swelling has improved since two days ago. Decreased tenderness upon palpation   Source of infection was removed prior to admission to ED.     PROCEDURE:  Limited oral evaluation. Noted right sided swelling improvement. Patient reports slight discomfort but reduced pain on the right side. No discomfort swallowing or breathing. Patient reports that he no longer has a sore throat.     RECOMMENDATIONS:   1) Switch to PO antibiotics as per Med team.   2) Follow up with outpatient oral surgeon 1 week post discharge.   2) If any difficulty swallowing/breathing, fever occur, page dental.     Gretchen Ward DDS #[id number]

## 2020-08-14 NOTE — PROGRESS NOTE ADULT - ATTENDING COMMENTS
Patient seen and examined, care d/w residents.     56 yo M morbidly obese with DM2 on insulin, HTN, CKD 5 not yet on HD (fxnal LUE AVF) presented s/p 3 weeks ago with molar pain tooth was infected s/p 1 week of amoxicillin and then extraction #32 changed to clinda then progressive pain and facial swelling, now s/p dental eval monitored on IV abx x 24 hours and dental recommending admission for further abx.   Pt states swelling is about the same, tolerating diet, pain is overall better (reports improved 4d prior to admission).      # Tooth infection s/p extraction: s/p IV abx, dental notes improvement in eval since initial presentation, pt also feels swelling is less, pain is minimal, tolerating PO, to c/w clinda for 5 more days on dc  # DM2: c/w insulin for DM2, monitor closely may need lower doses given diet here may vary from home  # CKD5: labs appear stable from Jan 2020, no hyperK and no s/s of volume overload, still making urine, reports was planned to start HD in coming days, will f/u with OP MD  # PD catheter wound: since 12/2019, no s/s/ of infxn, c/w packing and local wound care, OP wound care f/u    Dispo home, daughter will   dispo time 32 min

## 2020-08-14 NOTE — DIETITIAN INITIAL EVALUATION ADULT. - SIGNS/SYMPTOMS
Pt. unable to teach back previous understanding of therapeutic [renal] diet modifications. Physical appearance with BMI=50.1 kg/m^2 (class III/extreme obesity)

## 2020-08-14 NOTE — DISCHARGE NOTE PROVIDER - NSDCFUADDAPPT_GEN_ALL_CORE_FT
- Please follow up and schedule an appointment with Dr. Holden within a week to start dialysis.   - Please schedule an appointment with your dentist for further management of your dental infection.

## 2020-08-14 NOTE — ADVANCED PRACTICE NURSE CONSULT - REASON FOR CONSULT
Patient seen on skin care rounds after wound care referral received for assessment of skin impairment and recommendations of topical management. Chart reviewed: Serum WBC 12.63, Micheal 21, BMI 50.1kg/m2, patient interviewed: reports wound on abdomen since December, he provides self care at home with 1/4 inch packing, covered with gauze and secure with paper tape and changes it once a day. He reports that the wound has not improved and he was going to his PMD to be referred to a wound care specialist next week.  Patient H/O ESRD (not on dialysis yet but received peritoneal dialysis in Dec 2019, AVF in R upper arm done in Jan 2019), DM, and HTN presenting with R sided facial swelling and pain. Patient stated that he started having a R sided toothache starting around 3 weeks ago. Pain progressively worsened and around 1 week after initial onset, patient visited his dentist and received an Xray which revealed an infection in his tooth. Dentist sent patient home with amoxicillin to take for 1 week. A week later, patient returned to dentist where his bridge and infected tooth was removed. He was started on clindamycin for 1 week. 2 days after extraction, patient experienced increasing swelling which spread to his mandible and neck, patient's clindamycin dose was doubled. Patient was found to have an abscess which was drained 6 days ago. Patient continued to have persistent symptoms and was still draining pus, which prompted his visit to the ED. In the ED, patient was given IV Unasyn, but was switched over to IV clindamycin due to his CKD.

## 2021-01-27 NOTE — DIETITIAN INITIAL EVALUATION ADULT. - CONTINUE CURRENT NUTRITION CARE PLAN
Detail Level: Detailed
Quality 110: Preventive Care And Screening: Influenza Immunization: Influenza Immunization Administered during Influenza season
Renal, consistent carbohydrate w evening snack diet./yes

## 2021-04-06 ENCOUNTER — OUTPATIENT (OUTPATIENT)
Dept: OUTPATIENT SERVICES | Facility: HOSPITAL | Age: 56
LOS: 1 days | End: 2021-04-06
Payer: COMMERCIAL

## 2021-04-06 ENCOUNTER — RESULT REVIEW (OUTPATIENT)
Age: 56
End: 2021-04-06

## 2021-04-06 VITALS
RESPIRATION RATE: 18 BRPM | HEART RATE: 104 BPM | HEIGHT: 68 IN | TEMPERATURE: 99 F | SYSTOLIC BLOOD PRESSURE: 141 MMHG | OXYGEN SATURATION: 98 % | WEIGHT: 315 LBS | DIASTOLIC BLOOD PRESSURE: 82 MMHG

## 2021-04-06 DIAGNOSIS — N18.6 END STAGE RENAL DISEASE: ICD-10-CM

## 2021-04-06 DIAGNOSIS — Z98.890 OTHER SPECIFIED POSTPROCEDURAL STATES: Chronic | ICD-10-CM

## 2021-04-06 DIAGNOSIS — Z99.2 DEPENDENCE ON RENAL DIALYSIS: Chronic | ICD-10-CM

## 2021-04-06 DIAGNOSIS — S99.919A UNSPECIFIED INJURY OF UNSPECIFIED ANKLE, INITIAL ENCOUNTER: Chronic | ICD-10-CM

## 2021-04-06 DIAGNOSIS — I10 ESSENTIAL (PRIMARY) HYPERTENSION: ICD-10-CM

## 2021-04-06 DIAGNOSIS — R00.0 TACHYCARDIA, UNSPECIFIED: ICD-10-CM

## 2021-04-06 DIAGNOSIS — Z01.818 ENCOUNTER FOR OTHER PREPROCEDURAL EXAMINATION: ICD-10-CM

## 2021-04-06 DIAGNOSIS — Z29.9 ENCOUNTER FOR PROPHYLACTIC MEASURES, UNSPECIFIED: ICD-10-CM

## 2021-04-06 LAB
ANION GAP SERPL CALC-SCNC: 13 MMOL/L — SIGNIFICANT CHANGE UP (ref 5–17)
ANISOCYTOSIS BLD QL: SLIGHT — SIGNIFICANT CHANGE UP
BUN SERPL-MCNC: 35 MG/DL — HIGH (ref 7–18)
CALCIUM SERPL-MCNC: 9.6 MG/DL — SIGNIFICANT CHANGE UP (ref 8.4–10.5)
CHLORIDE SERPL-SCNC: 103 MMOL/L — SIGNIFICANT CHANGE UP (ref 96–108)
CO2 SERPL-SCNC: 24 MMOL/L — SIGNIFICANT CHANGE UP (ref 22–31)
CREAT SERPL-MCNC: 6.82 MG/DL — HIGH (ref 0.5–1.3)
ELLIPTOCYTES BLD QL SMEAR: SLIGHT — SIGNIFICANT CHANGE UP
GLUCOSE SERPL-MCNC: 105 MG/DL — HIGH (ref 70–99)
HCT VFR BLD CALC: 35.3 % — LOW (ref 39–50)
HGB BLD-MCNC: 10.5 G/DL — LOW (ref 13–17)
HYPOCHROMIA BLD QL: SLIGHT — SIGNIFICANT CHANGE UP
MANUAL SMEAR VERIFICATION: SIGNIFICANT CHANGE UP
MCHC RBC-ENTMCNC: 20.2 PG — LOW (ref 27–34)
MCHC RBC-ENTMCNC: 29.7 GM/DL — LOW (ref 32–36)
MCV RBC AUTO: 67.9 FL — LOW (ref 80–100)
MICROCYTES BLD QL: SLIGHT — SIGNIFICANT CHANGE UP
NRBC # BLD: 0 /100 WBCS — SIGNIFICANT CHANGE UP (ref 0–0)
OVALOCYTES BLD QL SMEAR: SLIGHT — SIGNIFICANT CHANGE UP
PLAT MORPH BLD: NORMAL — SIGNIFICANT CHANGE UP
PLATELET # BLD AUTO: 256 K/UL — SIGNIFICANT CHANGE UP (ref 150–400)
POIKILOCYTOSIS BLD QL AUTO: SLIGHT — SIGNIFICANT CHANGE UP
POTASSIUM SERPL-MCNC: 4.2 MMOL/L — SIGNIFICANT CHANGE UP (ref 3.5–5.3)
POTASSIUM SERPL-SCNC: 4.2 MMOL/L — SIGNIFICANT CHANGE UP (ref 3.5–5.3)
RBC # BLD: 5.2 M/UL — SIGNIFICANT CHANGE UP (ref 4.2–5.8)
RBC # FLD: 19.2 % — HIGH (ref 10.3–14.5)
RBC BLD AUTO: ABNORMAL
SCHISTOCYTES BLD QL AUTO: SLIGHT — SIGNIFICANT CHANGE UP
SICKLE CELLS BLD QL SMEAR: SLIGHT — SIGNIFICANT CHANGE UP
SODIUM SERPL-SCNC: 140 MMOL/L — SIGNIFICANT CHANGE UP (ref 135–145)
SPHEROCYTES BLD QL SMEAR: SLIGHT — SIGNIFICANT CHANGE UP
TARGETS BLD QL SMEAR: SLIGHT — SIGNIFICANT CHANGE UP
WBC # BLD: 7.7 K/UL — SIGNIFICANT CHANGE UP (ref 3.8–10.5)
WBC # FLD AUTO: 7.7 K/UL — SIGNIFICANT CHANGE UP (ref 3.8–10.5)

## 2021-04-06 PROCEDURE — 71045 X-RAY EXAM CHEST 1 VIEW: CPT | Mod: 26

## 2021-04-06 PROCEDURE — 93005 ELECTROCARDIOGRAM TRACING: CPT

## 2021-04-06 PROCEDURE — 71045 X-RAY EXAM CHEST 1 VIEW: CPT

## 2021-04-06 RX ORDER — ICOSAPENT ETHYL 500 MG/1
2 CAPSULE, LIQUID FILLED ORAL
Qty: 0 | Refills: 0 | DISCHARGE

## 2021-04-06 RX ORDER — INSULIN LISPRO 100/ML
18 VIAL (ML) SUBCUTANEOUS
Qty: 0 | Refills: 0 | DISCHARGE

## 2021-04-06 NOTE — H&P PST ADULT - NSICDXPASTMEDICALHX_GEN_ALL_CORE_FT
PAST MEDICAL HISTORY:  Diabetes mellitus     Gout     Heart rate fast     HLD (hyperlipidemia)     Hypertension     Obesity     BENNIE on CPAP     Renal disease ESRD

## 2021-04-06 NOTE — PROVIDER CONTACT NOTE (CRITICAL VALUE NOTIFICATION) - BACKGROUND
CXR done for ESRD patient, as per protocol  Patient has history of Sleep apnea, denies h/o tobacco, denies other lung diseases

## 2021-04-06 NOTE — H&P PST ADULT - HISTORY OF PRESENT ILLNESS
57 yo male with history of BENNIE on CPAP, DM, HTN, HLD, Obesity, ESRD on HD M/W/F via left permacath, Gout, reports the above.  He is scheduled for : Right Arm Arteriovenous Graft Placement, on 4/13/21

## 2021-04-06 NOTE — H&P PST ADULT - NSICDXPASTSURGICALHX_GEN_ALL_CORE_FT
PAST SURGICAL HISTORY:  Arteriovenous graft removed Now there is a wound suction in left arm    H/O hernia repair 30 years ago    H/O shoulder surgery     Injury of ankle and foot surgically repaired, 10 years ago    Peritoneal dialysis catheter in situ Was inserted, and removed

## 2021-04-06 NOTE — PROVIDER CONTACT NOTE (CRITICAL VALUE NOTIFICATION) - ACTION/TREATMENT ORDERED:
CXR report faxed to patient provider, followed by a phone call.   is not working today. Will call back tomorrow

## 2021-04-07 PROBLEM — R00.0 TACHYCARDIA, UNSPECIFIED: Chronic | Status: ACTIVE | Noted: 2021-04-06

## 2021-04-07 LAB
MRSA PCR RESULT.: SIGNIFICANT CHANGE UP
S AUREUS DNA NOSE QL NAA+PROBE: SIGNIFICANT CHANGE UP

## 2021-04-10 ENCOUNTER — APPOINTMENT (OUTPATIENT)
Dept: DISASTER EMERGENCY | Facility: CLINIC | Age: 56
End: 2021-04-10

## 2021-04-11 LAB — SARS-COV-2 N GENE NPH QL NAA+PROBE: NOT DETECTED

## 2021-04-12 ENCOUNTER — TRANSCRIPTION ENCOUNTER (OUTPATIENT)
Age: 56
End: 2021-04-12

## 2021-04-13 ENCOUNTER — OUTPATIENT (OUTPATIENT)
Dept: OUTPATIENT SERVICES | Facility: HOSPITAL | Age: 56
LOS: 1 days | End: 2021-04-13
Payer: COMMERCIAL

## 2021-04-13 VITALS
TEMPERATURE: 98 F | HEART RATE: 92 BPM | RESPIRATION RATE: 16 BRPM | DIASTOLIC BLOOD PRESSURE: 68 MMHG | SYSTOLIC BLOOD PRESSURE: 106 MMHG | OXYGEN SATURATION: 99 %

## 2021-04-13 VITALS
SYSTOLIC BLOOD PRESSURE: 108 MMHG | DIASTOLIC BLOOD PRESSURE: 70 MMHG | HEART RATE: 105 BPM | WEIGHT: 315 LBS | RESPIRATION RATE: 18 BRPM | HEIGHT: 68 IN | OXYGEN SATURATION: 99 % | TEMPERATURE: 99 F

## 2021-04-13 DIAGNOSIS — Z98.890 OTHER SPECIFIED POSTPROCEDURAL STATES: Chronic | ICD-10-CM

## 2021-04-13 DIAGNOSIS — S99.919A UNSPECIFIED INJURY OF UNSPECIFIED ANKLE, INITIAL ENCOUNTER: Chronic | ICD-10-CM

## 2021-04-13 DIAGNOSIS — Z99.2 DEPENDENCE ON RENAL DIALYSIS: Chronic | ICD-10-CM

## 2021-04-13 DIAGNOSIS — N18.6 END STAGE RENAL DISEASE: ICD-10-CM

## 2021-04-13 DIAGNOSIS — Z01.818 ENCOUNTER FOR OTHER PREPROCEDURAL EXAMINATION: ICD-10-CM

## 2021-04-13 LAB
ANION GAP SERPL CALC-SCNC: 12 MMOL/L — SIGNIFICANT CHANGE UP (ref 5–17)
BUN SERPL-MCNC: 37 MG/DL — HIGH (ref 7–18)
CALCIUM SERPL-MCNC: 9.4 MG/DL — SIGNIFICANT CHANGE UP (ref 8.4–10.5)
CHLORIDE SERPL-SCNC: 104 MMOL/L — SIGNIFICANT CHANGE UP (ref 96–108)
CO2 SERPL-SCNC: 23 MMOL/L — SIGNIFICANT CHANGE UP (ref 22–31)
CREAT SERPL-MCNC: 8.01 MG/DL — HIGH (ref 0.5–1.3)
GLUCOSE BLDC GLUCOMTR-MCNC: 130 MG/DL — HIGH (ref 70–99)
GLUCOSE BLDC GLUCOMTR-MCNC: 97 MG/DL — SIGNIFICANT CHANGE UP (ref 70–99)
GLUCOSE SERPL-MCNC: 118 MG/DL — HIGH (ref 70–99)
POTASSIUM SERPL-MCNC: 4.1 MMOL/L — SIGNIFICANT CHANGE UP (ref 3.5–5.3)
POTASSIUM SERPL-SCNC: 4.1 MMOL/L — SIGNIFICANT CHANGE UP (ref 3.5–5.3)
SODIUM SERPL-SCNC: 139 MMOL/L — SIGNIFICANT CHANGE UP (ref 135–145)

## 2021-04-13 PROCEDURE — 36830 ARTERY-VEIN NONAUTOGRAFT: CPT

## 2021-04-13 PROCEDURE — 80048 BASIC METABOLIC PNL TOTAL CA: CPT

## 2021-04-13 PROCEDURE — C1889: CPT

## 2021-04-13 PROCEDURE — 36415 COLL VENOUS BLD VENIPUNCTURE: CPT

## 2021-04-13 PROCEDURE — 82962 GLUCOSE BLOOD TEST: CPT

## 2021-04-13 PROCEDURE — C1768: CPT

## 2021-04-13 RX ORDER — ONDANSETRON 8 MG/1
4 TABLET, FILM COATED ORAL ONCE
Refills: 0 | Status: DISCONTINUED | OUTPATIENT
Start: 2021-04-13 | End: 2021-04-13

## 2021-04-13 RX ORDER — HYDROMORPHONE HYDROCHLORIDE 2 MG/ML
0.5 INJECTION INTRAMUSCULAR; INTRAVENOUS; SUBCUTANEOUS
Refills: 0 | Status: DISCONTINUED | OUTPATIENT
Start: 2021-04-13 | End: 2021-04-13

## 2021-04-13 RX ORDER — CHLORHEXIDINE GLUCONATE 213 G/1000ML
1 SOLUTION TOPICAL ONCE
Refills: 0 | Status: COMPLETED | OUTPATIENT
Start: 2021-04-13 | End: 2021-04-13

## 2021-04-13 RX ORDER — SODIUM CHLORIDE 9 MG/ML
3 INJECTION INTRAMUSCULAR; INTRAVENOUS; SUBCUTANEOUS EVERY 8 HOURS
Refills: 0 | Status: DISCONTINUED | OUTPATIENT
Start: 2021-04-13 | End: 2021-04-13

## 2021-04-13 RX ADMIN — CHLORHEXIDINE GLUCONATE 1 APPLICATION(S): 213 SOLUTION TOPICAL at 11:14

## 2021-04-13 RX ADMIN — SODIUM CHLORIDE 3 MILLILITER(S): 9 INJECTION INTRAMUSCULAR; INTRAVENOUS; SUBCUTANEOUS at 11:14

## 2021-04-13 NOTE — ASU DISCHARGE PLAN (ADULT/PEDIATRIC) - ASU DC SPECIAL INSTRUCTIONSFT
-  Use Percocet ( 1 tab every 6 hours ) as needed for moderate-sever pain . DO NOT use Tylenol while using percocet   - Schedule follow up appointment as indicated below  - see above for dressing instruction

## 2021-04-13 NOTE — CONSULT NOTE ADULT - TIME BILLING
- Review of records, telemetry, vital signs and daily labs.   - General and cardiovascular physical examination.  - Generation of cardiovascular treatment plan.  - Coordination of care.    Patient was seen and examined by me on 04/13/2021,interim events noted,labs and radiology studies reviewed.  True Romero MD,FACC.  [address] [place], [place] [id number].  [phone]

## 2021-04-13 NOTE — CONSULT NOTE ADULT - SUBJECTIVE AND OBJECTIVE BOX
DATE OF SERVICE:  2021  Patient was seen,examined and evaluated  by me.ER evaluation, Labs and Hospital course was reviewed,  HPI/CHIEF COMPLAINT:55 yo male with history of BENNIE on CPAP, DM, HTN, HLD, Obesity, ESRD on HD M/W/F via left permacath, Gout, reports the above.  He is scheduled for : Right Arm Arteriovenous Graft Placement, on 21,no exertional chest pain though refers to exertional dyspnea    PAST MEDICAL & SURGICAL HISTORY: Renal disease ESRD  Hypertension  Gout  Diabetes mellitus  HLD (hyperlipidemia)  Obesity  BENNIE on CPAP  Heart rate fast  H/O shoulder surgery  Injury of ankle and foot surgically repaired, 10 years ago  H/O hernia repair 30 years ago  Peritoneal dialysis catheter in situ Was inserted, and removed  Arteriovenous graft removed Now there is a wound suction in left arm    MEDICATIONS  (STANDING): · 	cholecalciferol oral tablet: Last Dose Taken:  , 2000 unit(s) orally once a day · 	febuxostat 40 mg oral tablet: Last Dose Taken:  , 1 tab(s) orally once a day · 	calcitriol 0.25 mcg oral capsule: Last Dose Taken:  , 1 cap(s) orally once a day · 	NIFEdipine 60 mg oral tablet, extended release: Last Dose Taken:  , 1 tab(s) orally once a day · 	atorvastatin 40 mg oral tablet: Last Dose Taken:  , 1 tab(s) orally once a day · 	Levemir 100 units/mL subcutaneous solution: Last Dose Taken:  , 60 unit(s) subcutaneous once a day (at bedtime) · 	Aspirin Enteric Coated 81 mg oral delayed release tablet: Last Dose Taken:  , 1 tab(s) orally once a day · 	Vascepa 1 g oral capsule: 2 cap(s) orally once (at bedtime) · 	clopidogrel 75 mg oral tablet: 1 tab(s) orally once a day · 	HumaLOG KwikPen 100 units/mL injectable solution: 15 unit(s) injectable 3 times a day · 	Levemir FlexPen 100 units/mL subcutaneous solution: 60 unit(s) subcutaneous once (at bedtime) · 	Multiple Vitamins oral tablet: 1 tab(s) orally once a day   FAMILY HISTORY: Family history of hypertension , in  mother   No family history of premature coronary artery disease or sudden cardiac death  SOCIAL HISTORY: Smoking-[ ] Active  [x ] Former [ ] Non Smoker Alcohol-[x ] Denies [ ] Social [ ] Daily Ilicit Drug use-[x ] Denies [ ] Active user  REVIEW OF SYSTEMS: Constitutional: [ ] fever, [ ]weight loss, [x ]fatigue  Activity [ ] Bedbound,[x ] Ambulates [x ] Unassisted[ ] Cane/Walker [ ] Assistence. Effort tolerance:[ ] Excellent [ ] Good [x ] Fair [ ] Poor [ ] Eyes: [ ] visual changes Respiratory: [x ]shortness of breath;  [ ] cough, [ ]wheezing, [ ]chills, [ ]hemoptysis Cardiovascular: [ ] chest pain, [ ]palpitations, [ ]dizziness,  [ ]leg swelling[ ]orthopnea [ ]PND Gastrointestinal: [ ] abdominal pain, [ ]nausea, [ ]vomiting,  [ ]diarrhea,[ ]constipation Genitourinary: [ ] dysuria, [ ] hematuria Neurologic: [ ] headaches [ ] tremors[ ] weakness Skin: [ ] itching, [ ]burning, [ ] rashes Endocrine: [ ] heat or cold intolerance Musculoskeletal: [ ] joint pain or swelling; [ ] muscle, back, or extremity pain Psychiatric: [ ] depression, [ ]anxiety, [ ]mood swings, or [ ]difficulty sleeping Hematologic: [ ] easy bruising, [ ] bleeding gums    [ x] All others negative	 [ ] Unable to obtain  Vital Signs Last 24 Hrs · Temp (F)	98.6 Degrees F · Temp (C)	37 Degrees C · Heart Rate	  104 /min · Respiration Rate (breaths/min)	18 /min · BP Systolic	141 mm Hg · BP Diastolic	82 mm Hg · Blood Pressure - Method	electronic · BP Noninvasive Mean	101 mm Hg · SpO2 (%)	98 % · O2 Delivery/Oxygen Delivery Method	room air   PHYSICAL EXAM: General: No acute distress BMI-30 HEENT: EOMI, PERRL[ ] Icteric Neck: Supple, No JVD Lungs: Equal air entry bilaterally; [ ] Rales [ ] Rhonchi [ ] Wheezing Heart: Regular rate and rhythm;[x ] Murmurs-   2/6 [x ] Systolic [ ] Diastolic [ ] Radiation,No rubs, or gallops Abdomen: Nontender, bowel sounds present Extremities: No clubbing, cyanosis, or edema[ ] Calf tenderness Nervous system:  Alert & Oriented X3, no focal deficits Psychiatric: Normal affect Skin: No rashes or lesions   LABS:  Complete Blood Count (21 @ 10:15)  Nucleated RBC: 0 /100 WBCs  WBC Count: 7.70 K/uL  RBC Count: 5.20 M/uL  Hemoglobin: 10.5 g/dL  Hematocrit: 35.3 %  Platelet Count - Automated: 256 K/uL   Basic Metabolic Panel (21 @ 10:15)  Sodium, Serum: 140 mmol/L  Potassium, Serum: 4.2 mmol/L  Chloride, Serum: 103 mmol/L  Carbon Dioxide, Serum: 24 mmol/L  Anion Gap, Serum: 13 mmol/L  Blood Urea Nitrogen, Serum: 35 mg/dL  Creatinine, Serum: 6.82 mg/dL   Creatinine Trend: 6.82<--    RADIOLOGY:  XR CHEST AP OR PA 1V  FINDINGS: Heart size appears within normal limits. No hilar or superior mediastinal abnormalities are identified. A dual-lumen central venous catheter is identified, the distal tip overlying the expected region of the right atrium. There is a 1.5 cm nodule identified within the right perihilar region; further evaluation with noncontrast CT examination of the chest recommended. No evidence for focal infiltrate or lobar consolidation. No pleural effusion. No pneumothorax. No mediastinal shift. Stent graft material is identified overlying the left brachial/left axillary region.  IMPRESSION: 1.5 cm nodule identified within the right perihilar region. CT chest evaluation recommended.    ECG [my interpretation]:Sinus rhythm at 95 BPm No St T wave abnormalities  ECHO:Normal LV Systolic Function EF 64% Mild LVH No significant valvular abnormlities

## 2021-04-13 NOTE — ASU PATIENT PROFILE, ADULT - HEALTHCARE INFORMATION NEEDED, PROFILE
patient encouraged to ask questions and verbalize fears/concerns as patient encouraged to ask questions and verbalize fears/concerns as needed.  taught verbal pain scale

## 2021-04-13 NOTE — ASU PREOP CHECKLIST - 1.
Patient has left upper arm, inner aspect skin breakdown/ulcer, stage 2 covered buy gauze dressing Patient has left upper arm, inner aspect skin breakdown/ulcer, stage 2 covered buy gauze dressing with suture say open to air at side of dressing.

## 2021-04-13 NOTE — ASU DISCHARGE PLAN (ADULT/PEDIATRIC) - CARE PROVIDER_API CALL
Joseph Cole S  SURGERY  [address] [facility], [address]  [place] [address]  Phone: [phone]  Fax: [phone]  Follow Up Time: 2 weeks

## 2021-04-13 NOTE — BRIEF OPERATIVE NOTE - OPERATION/FINDINGS
to be completed Left brachio-axillary fistula with graft interposition . good thrill and dooplerable distal pulse. Left brachio-axillary fistula with graft interposition . good thrill and dooplerable distal pulse.  Removal of non absorbable stiches from the LUE

## 2021-04-13 NOTE — CONSULT NOTE ADULT - ASSESSMENT
55 yo male with history of BENNIE on CPAP, DM, HTN, HLD, Obesity, ESRD on HD M/W/F via left permacath, Gout, reports the above.  He is scheduled for : Right Arm Arteriovenous Graft Placement, on 4/13/21    PROBLEM DIAGNOSES  Problem: End stage renal disease  Assessment and Plan: Right Arm Arteriovenous Graft Placement  Patient's Revised Cardiac Risk Index (RCRI) score is 1 (3.9 % risk) . Patient is at intermediate  risk of  adverse perioperative cardiac events undergoing intermediate  risk surgery. No further cardiac testing is needed prior to patient's surgery.     Problem: Hypertension  Stable

## 2021-10-31 ENCOUNTER — INPATIENT (INPATIENT)
Facility: HOSPITAL | Age: 56
LOS: 2 days | Discharge: HOME | DRG: 915 | End: 2021-11-03
Attending: INTERNAL MEDICINE | Admitting: INTERNAL MEDICINE
Payer: COMMERCIAL

## 2021-10-31 VITALS
OXYGEN SATURATION: 97 % | RESPIRATION RATE: 20 BRPM | DIASTOLIC BLOOD PRESSURE: 67 MMHG | WEIGHT: 315 LBS | TEMPERATURE: 100 F | HEIGHT: 68 IN | HEART RATE: 120 BPM | SYSTOLIC BLOOD PRESSURE: 104 MMHG

## 2021-10-31 DIAGNOSIS — T78.3XXA ANGIONEUROTIC EDEMA, INITIAL ENCOUNTER: ICD-10-CM

## 2021-10-31 DIAGNOSIS — Z98.890 OTHER SPECIFIED POSTPROCEDURAL STATES: Chronic | ICD-10-CM

## 2021-10-31 DIAGNOSIS — S99.919A UNSPECIFIED INJURY OF UNSPECIFIED ANKLE, INITIAL ENCOUNTER: Chronic | ICD-10-CM

## 2021-10-31 DIAGNOSIS — Z99.2 DEPENDENCE ON RENAL DIALYSIS: Chronic | ICD-10-CM

## 2021-10-31 LAB
ALBUMIN SERPL ELPH-MCNC: 4.1 G/DL — SIGNIFICANT CHANGE UP (ref 3.3–5)
ALP SERPL-CCNC: 51 U/L — SIGNIFICANT CHANGE UP (ref 40–120)
ALT FLD-CCNC: 10 U/L — SIGNIFICANT CHANGE UP (ref 10–45)
ANION GAP SERPL CALC-SCNC: 18 MMOL/L — HIGH (ref 5–17)
ANISOCYTOSIS BLD QL: SIGNIFICANT CHANGE UP
APPEARANCE UR: CLEAR — SIGNIFICANT CHANGE UP
AST SERPL-CCNC: 23 U/L — SIGNIFICANT CHANGE UP (ref 10–40)
BACTERIA # UR AUTO: NEGATIVE — SIGNIFICANT CHANGE UP
BASE EXCESS BLDV CALC-SCNC: 5.5 MMOL/L — HIGH (ref -2–2)
BASOPHILS # BLD AUTO: 0 K/UL — SIGNIFICANT CHANGE UP (ref 0–0.2)
BASOPHILS NFR BLD AUTO: 0 % — SIGNIFICANT CHANGE UP (ref 0–2)
BILIRUB SERPL-MCNC: 0.5 MG/DL — SIGNIFICANT CHANGE UP (ref 0.2–1.2)
BILIRUB UR-MCNC: NEGATIVE — SIGNIFICANT CHANGE UP
BUN SERPL-MCNC: 48 MG/DL — HIGH (ref 7–23)
CA-I SERPL-SCNC: 1.19 MMOL/L — SIGNIFICANT CHANGE UP (ref 1.15–1.33)
CALCIUM SERPL-MCNC: 9.6 MG/DL — SIGNIFICANT CHANGE UP (ref 8.4–10.5)
CHLORIDE BLDV-SCNC: 100 MMOL/L — SIGNIFICANT CHANGE UP (ref 96–108)
CHLORIDE SERPL-SCNC: 97 MMOL/L — SIGNIFICANT CHANGE UP (ref 96–108)
CO2 BLDV-SCNC: 33 MMOL/L — HIGH (ref 22–26)
CO2 SERPL-SCNC: 25 MMOL/L — SIGNIFICANT CHANGE UP (ref 22–31)
COLOR SPEC: YELLOW — SIGNIFICANT CHANGE UP
CREAT SERPL-MCNC: 10.17 MG/DL — HIGH (ref 0.5–1.3)
DIFF PNL FLD: NEGATIVE — SIGNIFICANT CHANGE UP
EOSINOPHIL # BLD AUTO: 1.14 K/UL — HIGH (ref 0–0.5)
EOSINOPHIL NFR BLD AUTO: 10.5 % — HIGH (ref 0–6)
EPI CELLS # UR: 1 /HPF — SIGNIFICANT CHANGE UP
GAS PNL BLDV: 138 MMOL/L — SIGNIFICANT CHANGE UP (ref 136–145)
GAS PNL BLDV: SIGNIFICANT CHANGE UP
GAS PNL BLDV: SIGNIFICANT CHANGE UP
GLUCOSE BLDC GLUCOMTR-MCNC: 184 MG/DL — HIGH (ref 70–99)
GLUCOSE BLDV-MCNC: 94 MG/DL — SIGNIFICANT CHANGE UP (ref 70–99)
GLUCOSE SERPL-MCNC: 110 MG/DL — HIGH (ref 70–99)
GLUCOSE UR QL: NEGATIVE — SIGNIFICANT CHANGE UP
HCO3 BLDV-SCNC: 31 MMOL/L — HIGH (ref 22–29)
HCT VFR BLD CALC: 34.1 % — LOW (ref 39–50)
HCT VFR BLDA CALC: 34 % — LOW (ref 39–51)
HGB BLD CALC-MCNC: 11.3 G/DL — LOW (ref 12.6–17.4)
HGB BLD-MCNC: 10.5 G/DL — LOW (ref 13–17)
HYALINE CASTS # UR AUTO: 0 /LPF — SIGNIFICANT CHANGE UP (ref 0–2)
HYPOCHROMIA BLD QL: SLIGHT — SIGNIFICANT CHANGE UP
KETONES UR-MCNC: NEGATIVE — SIGNIFICANT CHANGE UP
LACTATE BLDV-MCNC: 1.5 MMOL/L — SIGNIFICANT CHANGE UP (ref 0.7–2)
LEUKOCYTE ESTERASE UR-ACNC: NEGATIVE — SIGNIFICANT CHANGE UP
LYMPHOCYTES # BLD AUTO: 1.81 K/UL — SIGNIFICANT CHANGE UP (ref 1–3.3)
LYMPHOCYTES # BLD AUTO: 16.7 % — SIGNIFICANT CHANGE UP (ref 13–44)
MACROCYTES BLD QL: SLIGHT — SIGNIFICANT CHANGE UP
MANUAL SMEAR VERIFICATION: SIGNIFICANT CHANGE UP
MCHC RBC-ENTMCNC: 20.1 PG — LOW (ref 27–34)
MCHC RBC-ENTMCNC: 30.8 GM/DL — LOW (ref 32–36)
MCV RBC AUTO: 65.2 FL — LOW (ref 80–100)
MICROCYTES BLD QL: SLIGHT — SIGNIFICANT CHANGE UP
MONOCYTES # BLD AUTO: 0.86 K/UL — SIGNIFICANT CHANGE UP (ref 0–0.9)
MONOCYTES NFR BLD AUTO: 7.9 % — SIGNIFICANT CHANGE UP (ref 2–14)
NEUTROPHILS # BLD AUTO: 7.03 K/UL — SIGNIFICANT CHANGE UP (ref 1.8–7.4)
NEUTROPHILS NFR BLD AUTO: 64.9 % — SIGNIFICANT CHANGE UP (ref 43–77)
NITRITE UR-MCNC: NEGATIVE — SIGNIFICANT CHANGE UP
NRBC # BLD: 2 /100 — HIGH (ref 0–0)
PCO2 BLDV: 49 MMHG — SIGNIFICANT CHANGE UP (ref 42–55)
PH BLDV: 7.41 — SIGNIFICANT CHANGE UP (ref 7.32–7.43)
PH UR: 8.5 — HIGH (ref 5–8)
PLAT MORPH BLD: NORMAL — SIGNIFICANT CHANGE UP
PLATELET # BLD AUTO: 184 K/UL — SIGNIFICANT CHANGE UP (ref 150–400)
PO2 BLDV: 25 MMHG — SIGNIFICANT CHANGE UP (ref 25–45)
POIKILOCYTOSIS BLD QL AUTO: SLIGHT — SIGNIFICANT CHANGE UP
POLYCHROMASIA BLD QL SMEAR: SLIGHT — SIGNIFICANT CHANGE UP
POTASSIUM BLDV-SCNC: 4.4 MMOL/L — SIGNIFICANT CHANGE UP (ref 3.5–5.1)
POTASSIUM SERPL-MCNC: 4 MMOL/L — SIGNIFICANT CHANGE UP (ref 3.5–5.3)
POTASSIUM SERPL-SCNC: 4 MMOL/L — SIGNIFICANT CHANGE UP (ref 3.5–5.3)
PROT SERPL-MCNC: 7.4 G/DL — SIGNIFICANT CHANGE UP (ref 6–8.3)
PROT UR-MCNC: ABNORMAL
RAPID RVP RESULT: SIGNIFICANT CHANGE UP
RBC # BLD: 5.23 M/UL — SIGNIFICANT CHANGE UP (ref 4.2–5.8)
RBC # FLD: 19.1 % — HIGH (ref 10.3–14.5)
RBC BLD AUTO: ABNORMAL
RBC CASTS # UR COMP ASSIST: 0 /HPF — SIGNIFICANT CHANGE UP (ref 0–4)
SAO2 % BLDV: 33.8 % — LOW (ref 67–88)
SARS-COV-2 RNA SPEC QL NAA+PROBE: SIGNIFICANT CHANGE UP
SCHISTOCYTES BLD QL AUTO: SLIGHT — SIGNIFICANT CHANGE UP
SODIUM SERPL-SCNC: 140 MMOL/L — SIGNIFICANT CHANGE UP (ref 135–145)
SP GR SPEC: 1.02 — SIGNIFICANT CHANGE UP (ref 1.01–1.02)
SPHEROCYTES BLD QL SMEAR: SLIGHT — SIGNIFICANT CHANGE UP
TARGETS BLD QL SMEAR: SIGNIFICANT CHANGE UP
UROBILINOGEN FLD QL: NEGATIVE — SIGNIFICANT CHANGE UP
WBC # BLD: 10.83 K/UL — HIGH (ref 3.8–10.5)
WBC # FLD AUTO: 10.83 K/UL — HIGH (ref 3.8–10.5)
WBC UR QL: 0 /HPF — SIGNIFICANT CHANGE UP (ref 0–5)

## 2021-10-31 PROCEDURE — 99285 EMERGENCY DEPT VISIT HI MDM: CPT | Mod: GC

## 2021-10-31 PROCEDURE — 71045 X-RAY EXAM CHEST 1 VIEW: CPT | Mod: 26

## 2021-10-31 PROCEDURE — 93010 ELECTROCARDIOGRAM REPORT: CPT | Mod: GC

## 2021-10-31 RX ORDER — CLOPIDOGREL BISULFATE 75 MG/1
75 TABLET, FILM COATED ORAL DAILY
Refills: 0 | Status: DISCONTINUED | OUTPATIENT
Start: 2021-10-31 | End: 2021-11-03

## 2021-10-31 RX ORDER — ATORVASTATIN CALCIUM 80 MG/1
40 TABLET, FILM COATED ORAL AT BEDTIME
Refills: 0 | Status: DISCONTINUED | OUTPATIENT
Start: 2021-10-31 | End: 2021-11-03

## 2021-10-31 RX ORDER — PIPERACILLIN AND TAZOBACTAM 4; .5 G/20ML; G/20ML
3.38 INJECTION, POWDER, LYOPHILIZED, FOR SOLUTION INTRAVENOUS EVERY 12 HOURS
Refills: 0 | Status: DISCONTINUED | OUTPATIENT
Start: 2021-10-31 | End: 2021-11-01

## 2021-10-31 RX ORDER — ACETAMINOPHEN 500 MG
650 TABLET ORAL ONCE
Refills: 0 | Status: COMPLETED | OUTPATIENT
Start: 2021-10-31 | End: 2021-10-31

## 2021-10-31 RX ORDER — ASPIRIN/CALCIUM CARB/MAGNESIUM 324 MG
81 TABLET ORAL DAILY
Refills: 0 | Status: DISCONTINUED | OUTPATIENT
Start: 2021-10-31 | End: 2021-11-03

## 2021-10-31 RX ORDER — DEXTROSE 50 % IN WATER 50 %
25 SYRINGE (ML) INTRAVENOUS ONCE
Refills: 0 | Status: DISCONTINUED | OUTPATIENT
Start: 2021-10-31 | End: 2021-11-03

## 2021-10-31 RX ORDER — NIFEDIPINE 30 MG
1 TABLET, EXTENDED RELEASE 24 HR ORAL
Qty: 0 | Refills: 0 | DISCHARGE

## 2021-10-31 RX ORDER — INSULIN LISPRO 100/ML
VIAL (ML) SUBCUTANEOUS
Refills: 0 | Status: DISCONTINUED | OUTPATIENT
Start: 2021-10-31 | End: 2021-11-03

## 2021-10-31 RX ORDER — GLUCAGON INJECTION, SOLUTION 0.5 MG/.1ML
1 INJECTION, SOLUTION SUBCUTANEOUS ONCE
Refills: 0 | Status: DISCONTINUED | OUTPATIENT
Start: 2021-10-31 | End: 2021-11-03

## 2021-10-31 RX ORDER — SODIUM CHLORIDE 9 MG/ML
1000 INJECTION, SOLUTION INTRAVENOUS
Refills: 0 | Status: DISCONTINUED | OUTPATIENT
Start: 2021-10-31 | End: 2021-11-03

## 2021-10-31 RX ORDER — INSULIN LISPRO 100/ML
VIAL (ML) SUBCUTANEOUS AT BEDTIME
Refills: 0 | Status: DISCONTINUED | OUTPATIENT
Start: 2021-10-31 | End: 2021-11-03

## 2021-10-31 RX ORDER — DEXTROSE 50 % IN WATER 50 %
15 SYRINGE (ML) INTRAVENOUS ONCE
Refills: 0 | Status: DISCONTINUED | OUTPATIENT
Start: 2021-10-31 | End: 2021-11-03

## 2021-10-31 RX ORDER — DEXAMETHASONE 0.5 MG/5ML
4 ELIXIR ORAL ONCE
Refills: 0 | Status: COMPLETED | OUTPATIENT
Start: 2021-10-31 | End: 2021-10-31

## 2021-10-31 RX ORDER — SODIUM CHLORIDE 9 MG/ML
500 INJECTION, SOLUTION INTRAVENOUS ONCE
Refills: 0 | Status: COMPLETED | OUTPATIENT
Start: 2021-10-31 | End: 2021-10-31

## 2021-10-31 RX ORDER — ACETAMINOPHEN 500 MG
325 TABLET ORAL ONCE
Refills: 0 | Status: COMPLETED | OUTPATIENT
Start: 2021-10-31 | End: 2021-10-31

## 2021-10-31 RX ORDER — INSULIN GLARGINE 100 [IU]/ML
50 INJECTION, SOLUTION SUBCUTANEOUS AT BEDTIME
Refills: 0 | Status: DISCONTINUED | OUTPATIENT
Start: 2021-10-31 | End: 2021-11-03

## 2021-10-31 RX ORDER — ACETAMINOPHEN 500 MG
375 TABLET ORAL ONCE
Refills: 0 | Status: DISCONTINUED | OUTPATIENT
Start: 2021-10-31 | End: 2021-10-31

## 2021-10-31 RX ORDER — FAMOTIDINE 10 MG/ML
20 INJECTION INTRAVENOUS ONCE
Refills: 0 | Status: COMPLETED | OUTPATIENT
Start: 2021-10-31 | End: 2021-10-31

## 2021-10-31 RX ORDER — PIPERACILLIN AND TAZOBACTAM 4; .5 G/20ML; G/20ML
3.38 INJECTION, POWDER, LYOPHILIZED, FOR SOLUTION INTRAVENOUS ONCE
Refills: 0 | Status: COMPLETED | OUTPATIENT
Start: 2021-10-31 | End: 2021-10-31

## 2021-10-31 RX ORDER — DIPHENHYDRAMINE HCL 50 MG
25 CAPSULE ORAL ONCE
Refills: 0 | Status: COMPLETED | OUTPATIENT
Start: 2021-10-31 | End: 2021-10-31

## 2021-10-31 RX ORDER — INSULIN LISPRO 100/ML
10 VIAL (ML) SUBCUTANEOUS
Refills: 0 | Status: DISCONTINUED | OUTPATIENT
Start: 2021-10-31 | End: 2021-11-03

## 2021-10-31 RX ORDER — INSULIN DETEMIR 100/ML (3)
60 INSULIN PEN (ML) SUBCUTANEOUS
Qty: 0 | Refills: 0 | DISCHARGE

## 2021-10-31 RX ORDER — DEXTROSE 50 % IN WATER 50 %
12.5 SYRINGE (ML) INTRAVENOUS ONCE
Refills: 0 | Status: DISCONTINUED | OUTPATIENT
Start: 2021-10-31 | End: 2021-11-03

## 2021-10-31 RX ORDER — DIPHENHYDRAMINE HCL 50 MG
25 CAPSULE ORAL EVERY 6 HOURS
Refills: 0 | Status: DISCONTINUED | OUTPATIENT
Start: 2021-10-31 | End: 2021-11-03

## 2021-10-31 RX ADMIN — Medication 4 MILLIGRAM(S): at 10:37

## 2021-10-31 RX ADMIN — Medication 650 MILLIGRAM(S): at 12:35

## 2021-10-31 RX ADMIN — Medication 25 MILLIGRAM(S): at 23:50

## 2021-10-31 RX ADMIN — PIPERACILLIN AND TAZOBACTAM 200 GRAM(S): 4; .5 INJECTION, POWDER, LYOPHILIZED, FOR SOLUTION INTRAVENOUS at 15:19

## 2021-10-31 RX ADMIN — SODIUM CHLORIDE 500 MILLILITER(S): 9 INJECTION, SOLUTION INTRAVENOUS at 12:36

## 2021-10-31 RX ADMIN — FAMOTIDINE 20 MILLIGRAM(S): 10 INJECTION INTRAVENOUS at 10:38

## 2021-10-31 RX ADMIN — INSULIN GLARGINE 50 UNIT(S): 100 INJECTION, SOLUTION SUBCUTANEOUS at 21:59

## 2021-10-31 RX ADMIN — Medication 25 MILLIGRAM(S): at 10:37

## 2021-10-31 RX ADMIN — PIPERACILLIN AND TAZOBACTAM 25 GRAM(S): 4; .5 INJECTION, POWDER, LYOPHILIZED, FOR SOLUTION INTRAVENOUS at 23:50

## 2021-10-31 RX ADMIN — ATORVASTATIN CALCIUM 40 MILLIGRAM(S): 80 TABLET, FILM COATED ORAL at 21:59

## 2021-10-31 RX ADMIN — Medication 325 MILLIGRAM(S): at 14:15

## 2021-10-31 NOTE — ED PROVIDER NOTE - CLINICAL SUMMARY MEDICAL DECISION MAKING FREE TEXT BOX
56M p/w angioedema.  Not on ACE-i, no sxs of anaphylaxis.  VSS.  Will obtain labs, administer pepcid, benadryl, steroids, reassess, and dispo pending results.

## 2021-10-31 NOTE — H&P ADULT - ASSESSMENT
55 yo male esrd on hd, dm, htn, chol, obesity, jamil, here with angioedema, and fever    angioedema  prednisone 30 daily  benadryl bid  monitor resp status  allergy eval    fever  empiric abx  f/u cult  id consult    esrd  hd as per renal    dm  iss  monitor fs    cont other home meds      dvt ppx      Advanced care planning was discussed with patient and family.  Advanced care planning forms were reviewed and discussed as appropriate.  Differential diagnosis and plan of care discussed with patient after the evaluation.   Pain assessed and judicious use of narcotics when appropriate was discussed.  Importance of Fall prevention discussed.  Counseling on Smoking and Alcohol cessation was offered when appropriate.  Counseling on Diet, exercise, and medication compliance was done.       Approx 60 minutes spent.

## 2021-10-31 NOTE — ED ADULT NURSE NOTE - NSIMPLEMENTINTERV_GEN_ALL_ED
Implemented All Universal Safety Interventions:  [place] to call system. Call bell, personal items and telephone within reach. Instruct patient to call for assistance. Room bathroom lighting operational. Non-slip footwear when patient is off stretcher. Physically safe environment: no spills, clutter or unnecessary equipment. Stretcher in lowest position, wheels locked, appropriate side rails in place.

## 2021-10-31 NOTE — ED PROVIDER NOTE - PROGRESS NOTE DETAILS
[facility] PGY3 - Rectal temp 100.4.  CBC significant for leukocytosis and eosinophilia.  Will obtain sepsis w/u, administer tylenol.  Will admit given recent graft manipulation.

## 2021-10-31 NOTE — ED PROVIDER NOTE - ATTENDING CONTRIBUTION TO CARE
Agree with above except noted:     ESRD (MWF), p.w perioral swelling s/p IV contrast injection 3days ago at outpatient vascular office for clotted graft. swelling not improved no tongue swelling or new medication. denied diarrhea, rash, chest pain or shortness of breath. last dialysis 3 days ago and due tomorrow. good thrill at fistula site. no active bleeding, no tongue swelling on exam and tolerating secretion. no voice changes. possible allergic reaction from contrast, no sign airway compromise at this time. will treat empirically. patient is tachycardiac but denied fever at home or any obvious infection symptoms. will r.o infection due to comorbidity. judicious ivf. rectal temp. if febrile will admit due to fever in the setting of recent fistula repair.

## 2021-10-31 NOTE — H&P ADULT - NSHPPHYSICALEXAM_GEN_ALL_CORE
Vital Signs Last 24 Hrs  T(C): 37.1 (31 Oct 2021 18:02), Max: 38 (31 Oct 2021 11:40)  T(F): 98.7 (31 Oct 2021 18:02), Max: 100.4 (31 Oct 2021 11:40)  HR: 80 (31 Oct 2021 20:15) (80 - 120)  BP: 110/73 (31 Oct 2021 20:15) (103/53 - 125/57)  BP(mean): 86 (31 Oct 2021 20:15) (65 - 94)  RR: 22 (31 Oct 2021 20:15) (16 - 22)  SpO2: 98% (31 Oct 2021 20:15) (96% - 100%)    PHYSICAL EXAM:  GENERAL: NAD, well-developed  HEAD:  Atraumatic, Normocephalic. +swollen lips  EYES: EOMI, PERRLA, conjunctiva and sclera clear  NECK: Supple, No JVD  CHEST/LUNG: Clear to auscultation bilaterally; No wheeze  HEART: Regular rate and rhythm; No murmurs, rubs, or gallops  ABDOMEN: Soft, Nontender, Nondistended; Bowel sounds present  EXTREMITIES:  2+ Peripheral Pulses, No clubbing, cyanosis, or edema  PSYCH: AAOx3  NEUROLOGY: non-focal  SKIN: No rashes or lesions

## 2021-10-31 NOTE — ED PROVIDER NOTE - CARE PLAN
1 Principal Discharge DX:	Angioedema  Secondary Diagnosis:	Systemic inflammatory response syndrome (SIRS)

## 2021-10-31 NOTE — ED ADULT TRIAGE NOTE - CHIEF COMPLAINT QUOTE
swelling of mouth/throat after graft surgery on rt arm on friday, denies difficulty breathing, sob, has been taking benadryl without relief

## 2021-10-31 NOTE — ED ADULT NURSE REASSESSMENT NOTE - NS ED NURSE REASSESS COMMENT FT1
Report received from Cecilia GERMAN RN. Pt AAOx4, NAD, resp nonlabored, skin warm/dry, resting comfortably in bed with call bell at bedside. Pt states he is not having difficulty breathing at this time. Swelling noted to pt lips, no drooling or swelling noted to throat. Pt speaking in complete sentences. Pt denies headache, dizziness, chest pain, palpitations, SOB, abd pain, n/v/d, urinary symptoms, chills at this time. Pt awaiting bed. Safety maintained.

## 2021-10-31 NOTE — ED PROVIDER NOTE - SEPSIS ADVANCED STAGE HEART KIDNEY FAILURE
----- Message from Ivonne Gill sent at 6/19/2020 12:37 PM EDT -----  Regarding: Dr. Elisabeth Gore Message/Vendor Calls    Caller's first and last name: Kimberley Gonzalez      Reason for call: Pt stating she has not received the paperwork to have her labs done. Pt would like forms mailed to her PO Box Address on file.   Pt also had questions regarding if labs could be taken at her PCP office or if it needed to be completed at [facility] required yes/no and why: Yes      Best contact number(s): [phone]      Details to clarify the request:      Ivonne Gill Advanced stage kidney disease GFR < 30 ml/min

## 2021-10-31 NOTE — ED ADULT TRIAGE NOTE - ARRIVAL FROM
RN reviewed discharge instructions with patient. Patient verbalized understanding. Time allotted for questions. A&O at time of discharge. VSS. Patient ambulatory off unit.
Home

## 2021-10-31 NOTE — ED PROVIDER NOTE - NS ED ROS FT
General: denies fever, chills, weight loss/weight gain.  HENT: +lip swelling, throat closing; denies nasal congestion, sore throat, rhinorrhea, ear pain.  Eyes: denies visual changes, blurred vision, eye discharge, eye redness.  Neck: denies neck pain, neck swelling.  CV: denies chest pain, palpitations.  Resp: denies difficulty breathing, cough.  Abdominal: denies nausea, vomiting, diarrhea, abdominal pain, blood in stool, dark stool.  MSK: denies muscle aches, bony pain, leg pain, leg swelling.  Neuro: denies headaches, numbness, tingling, dizziness, lightheadedness.  Skin: denies rashes, cuts, bruises.  Hematologic: denies unexplained bruises.

## 2021-10-31 NOTE — H&P ADULT - HISTORY OF PRESENT ILLNESS
56M w/ PMHx of ESRD on HD (M, W, F), HTN p/w lip swelling.  PT. had HD on Friday and noticed that his dialysis graft was clotted.  Went to his vascular surgeon later that day and the clot was removed.  Started noticing lip swelling w/ throat closing since.  No CP, SOB, abd pain, n/v/d, pruritus, rash, hives.  PT. has been taking ATC benadryl w/o relief.

## 2021-10-31 NOTE — ED PROVIDER NOTE - OBJECTIVE STATEMENT
56M w/ PMHx of ESRD on HD (M, W, F), HTN p/w lip swelling.  PT. had HD on Friday and noticed that his dialysis graft was clotted.  Went to his vascular surgeon later that day and the clot was removed.  Started noticing lip swelling w/ throat closing since.  No CP, SOB, abd pain, n/v/d, pruritus, rash, hives.  PT. has been taking ATC benadryl w/o relief. 56M w/ PMHx of ESRD on HD (M, W, F), HTN p/w lip swelling.  PT. had HD on Friday and noticed that his dialysis graft was clotted.  Went to his vascular surgeon later that day and the clot was removed.  Started noticing lip swelling w/ throat closing since.  No CP, SOB, abd pain, n/v/d, pruritus, rash, hives.  PT. has been taking ATC benadryl w/o relief.    PCP - Dr. Holden

## 2021-10-31 NOTE — ED PROVIDER NOTE - PHYSICAL EXAMINATION
GENERAL: Patient awake alert NAD.  HEENT: NC/AT, Moist mucous membranes, no tongue swelling, lip swelling noted.  LUNGS: CTAB, no wheezes or crackles.  CARDIAC: tachycardia, no m/r/g.  ABDOMEN: Soft, NT, ND, No rebound, guarding.  EXT: No edema. No calf tenderness.  MSK: No pain with movement, no deformities.  NEURO: A&Ox3. Moving all extremities.  SKIN: Warm and dry. No rash.  PSYCH: Normal affect.

## 2021-10-31 NOTE — ED ADULT NURSE NOTE - OBJECTIVE STATEMENT
Pt is a 56 year old male who presents to ED with swelling of mouth/throat after graft surgery on rt arm on friday, denies difficulty breathing or sob, has been taking benadryl without relief.    angioedema noted. ASttending at bedside.  PT VSS.  Report given to [place] area

## 2021-11-01 DIAGNOSIS — T78.3XXA ANGIONEUROTIC EDEMA, INITIAL ENCOUNTER: ICD-10-CM

## 2021-11-01 LAB
A1C WITH ESTIMATED AVERAGE GLUCOSE RESULT: 5.9 % — HIGH (ref 4–5.6)
ANION GAP SERPL CALC-SCNC: 23 MMOL/L — HIGH (ref 5–17)
BUN SERPL-MCNC: 65 MG/DL — HIGH (ref 7–23)
CALCIUM SERPL-MCNC: 9.8 MG/DL — SIGNIFICANT CHANGE UP (ref 8.4–10.5)
CHLORIDE SERPL-SCNC: 97 MMOL/L — SIGNIFICANT CHANGE UP (ref 96–108)
CO2 SERPL-SCNC: 20 MMOL/L — LOW (ref 22–31)
COVID-19 SPIKE DOMAIN AB INTERP: POSITIVE
COVID-19 SPIKE DOMAIN ANTIBODY RESULT: >250 U/ML — HIGH
CREAT SERPL-MCNC: 11.63 MG/DL — HIGH (ref 0.5–1.3)
CULTURE RESULTS: SIGNIFICANT CHANGE UP
ESTIMATED AVERAGE GLUCOSE: 123 MG/DL — HIGH (ref 68–114)
GLUCOSE BLDC GLUCOMTR-MCNC: 102 MG/DL — HIGH (ref 70–99)
GLUCOSE BLDC GLUCOMTR-MCNC: 108 MG/DL — HIGH (ref 70–99)
GLUCOSE BLDC GLUCOMTR-MCNC: 119 MG/DL — HIGH (ref 70–99)
GLUCOSE BLDC GLUCOMTR-MCNC: 148 MG/DL — HIGH (ref 70–99)
GLUCOSE SERPL-MCNC: 105 MG/DL — HIGH (ref 70–99)
HCT VFR BLD CALC: 33.1 % — LOW (ref 39–50)
HGB BLD-MCNC: 10.2 G/DL — LOW (ref 13–17)
MCHC RBC-ENTMCNC: 20.2 PG — LOW (ref 27–34)
MCHC RBC-ENTMCNC: 30.8 GM/DL — LOW (ref 32–36)
MCV RBC AUTO: 65.5 FL — LOW (ref 80–100)
NRBC # BLD: 0 /100 WBCS — SIGNIFICANT CHANGE UP (ref 0–0)
PLATELET # BLD AUTO: 247 K/UL — SIGNIFICANT CHANGE UP (ref 150–400)
POTASSIUM SERPL-MCNC: 4.7 MMOL/L — SIGNIFICANT CHANGE UP (ref 3.5–5.3)
POTASSIUM SERPL-SCNC: 4.7 MMOL/L — SIGNIFICANT CHANGE UP (ref 3.5–5.3)
RBC # BLD: 5.05 M/UL — SIGNIFICANT CHANGE UP (ref 4.2–5.8)
RBC # FLD: 19.3 % — HIGH (ref 10.3–14.5)
SARS-COV-2 IGG+IGM SERPL QL IA: >250 U/ML — HIGH
SARS-COV-2 IGG+IGM SERPL QL IA: POSITIVE
SODIUM SERPL-SCNC: 140 MMOL/L — SIGNIFICANT CHANGE UP (ref 135–145)
SPECIMEN SOURCE: SIGNIFICANT CHANGE UP
WBC # BLD: 12.59 K/UL — HIGH (ref 3.8–10.5)
WBC # FLD AUTO: 12.59 K/UL — HIGH (ref 3.8–10.5)

## 2021-11-01 PROCEDURE — 99222 1ST HOSP IP/OBS MODERATE 55: CPT

## 2021-11-01 PROCEDURE — 99223 1ST HOSP IP/OBS HIGH 75: CPT

## 2021-11-01 RX ORDER — ACETAMINOPHEN 500 MG
650 TABLET ORAL ONCE
Refills: 0 | Status: COMPLETED | OUTPATIENT
Start: 2021-11-01 | End: 2021-11-01

## 2021-11-01 RX ORDER — HYDROMORPHONE HYDROCHLORIDE 2 MG/ML
0.5 INJECTION INTRAMUSCULAR; INTRAVENOUS; SUBCUTANEOUS ONCE
Refills: 0 | Status: DISCONTINUED | OUTPATIENT
Start: 2021-11-01 | End: 2021-11-01

## 2021-11-01 RX ORDER — HEPARIN SODIUM 5000 [USP'U]/ML
5000 INJECTION INTRAVENOUS; SUBCUTANEOUS ONCE
Refills: 0 | Status: COMPLETED | OUTPATIENT
Start: 2021-11-01 | End: 2021-11-01

## 2021-11-01 RX ORDER — DIPHENHYDRAMINE HCL 50 MG
50 CAPSULE ORAL ONCE
Refills: 0 | Status: COMPLETED | OUTPATIENT
Start: 2021-11-02 | End: 2021-11-02

## 2021-11-01 RX ORDER — ACETAMINOPHEN 500 MG
1000 TABLET ORAL ONCE
Refills: 0 | Status: DISCONTINUED | OUTPATIENT
Start: 2021-11-01 | End: 2021-11-01

## 2021-11-01 RX ORDER — HEPARIN SODIUM 5000 [USP'U]/ML
2500 INJECTION INTRAVENOUS; SUBCUTANEOUS ONCE
Refills: 0 | Status: COMPLETED | OUTPATIENT
Start: 2021-11-01 | End: 2021-11-01

## 2021-11-01 RX ORDER — MORPHINE SULFATE 50 MG/1
1 CAPSULE, EXTENDED RELEASE ORAL ONCE
Refills: 0 | Status: DISCONTINUED | OUTPATIENT
Start: 2021-11-01 | End: 2021-11-01

## 2021-11-01 RX ADMIN — HEPARIN SODIUM 2500 UNIT(S): 5000 INJECTION INTRAVENOUS; SUBCUTANEOUS at 15:51

## 2021-11-01 RX ADMIN — Medication 32 MILLIGRAM(S): at 22:17

## 2021-11-01 RX ADMIN — Medication 650 MILLIGRAM(S): at 00:33

## 2021-11-01 RX ADMIN — MORPHINE SULFATE 1 MILLIGRAM(S): 50 CAPSULE, EXTENDED RELEASE ORAL at 04:53

## 2021-11-01 RX ADMIN — Medication 10 UNIT(S): at 12:17

## 2021-11-01 RX ADMIN — ATORVASTATIN CALCIUM 40 MILLIGRAM(S): 80 TABLET, FILM COATED ORAL at 22:17

## 2021-11-01 RX ADMIN — Medication 650 MILLIGRAM(S): at 01:03

## 2021-11-01 RX ADMIN — MORPHINE SULFATE 1 MILLIGRAM(S): 50 CAPSULE, EXTENDED RELEASE ORAL at 05:23

## 2021-11-01 RX ADMIN — HYDROMORPHONE HYDROCHLORIDE 0.5 MILLIGRAM(S): 2 INJECTION INTRAMUSCULAR; INTRAVENOUS; SUBCUTANEOUS at 09:29

## 2021-11-01 RX ADMIN — Medication 10 UNIT(S): at 19:42

## 2021-11-01 RX ADMIN — Medication 25 MILLIGRAM(S): at 22:17

## 2021-11-01 RX ADMIN — HEPARIN SODIUM 5000 UNIT(S): 5000 INJECTION INTRAVENOUS; SUBCUTANEOUS at 14:30

## 2021-11-01 RX ADMIN — INSULIN GLARGINE 50 UNIT(S): 100 INJECTION, SOLUTION SUBCUTANEOUS at 22:17

## 2021-11-01 RX ADMIN — CLOPIDOGREL BISULFATE 75 MILLIGRAM(S): 75 TABLET, FILM COATED ORAL at 12:18

## 2021-11-01 RX ADMIN — Medication 81 MILLIGRAM(S): at 12:18

## 2021-11-01 NOTE — CONSULT NOTE ADULT - SUBJECTIVE AND OBJECTIVE BOX
Corrie Hoover - [phone]    Patient is a 56y old  Male who presents with a chief complaint of rash    HPI:   56M with HTN, Obesity, chol, BENNIE on CPAP, ESRD on HD (M, W, F) initially via LUE AVG that clotted and complicated by infection.  Excised 3/2021.  Now on HD via RUE AVG that after HD, hypotension 10/29.  Clotted.  Went for clot removal on 10/29.  Received contrast dye and antibiotics.  During the procedure, he developed lip itching.  By the time he got home, it was worse.  Told to take benadryl.  Did not help.  Came to [facility] yesterday.  Minimally elevated WBC with considerable eosinophilia.  Febrile 100.4  Started on antibiotics.      ID asked to help management.    prior hospital charts reviewed [  ]  primary team notes reviewed [x  ]  other consultant notes reviewed [  ]    PAST MEDICAL & SURGICAL HISTORY:  ESRD on HD for 14 months  AVG LUE infected and excised 3/2021  New AVG RUE  Hypertension  Gout  Diabetes mellitus  HLD (hyperlipidemia)  Obesity  BENNIE on CPAP  H/O shoulder surgery  Injury of ankle and foot surgically repaired, 10 years ago  H/O hernia repair 30 years ago    Allergies  No Known Allergies    ANTIMICROBIALS:  piperacillin/tazobactam IVPB.. 3.375 every 12 hours (10/31-)    MEDICATIONS  (STANDING):  aspirin enteric coated 81 daily  atorvastatin 40 at bedtime  clopidogrel Tablet 75 daily  insulin glargine Injectable (LANTUS) 50 at bedtime  insulin lispro (ADMELOG) corrective regimen sliding scale  three times a day before meals  insulin lispro (ADMELOG) corrective regimen sliding scale  at bedtime  insulin lispro Injectable (ADMELOG) 10 three times a day before meals  predniSONE   Tablet 30 daily    SOCIAL HISTORY:  denies tobacco, etoh    FAMILY HISTORY:  Family history of hypertension mother    REVIEW OF SYSTEMS  [  ] ROS unobtainable because:    [x  ] All other systems negative except as noted below:	    Constitutional:  [x ] fever [ ] chills  [ ] weight loss  [ ] weakness  Skin:  [ x] rash [ ] phlebitis	  Eyes: [ ] icterus [ ] pain  [ ] discharge	  ENMT: [ ] sore throat  [ ] thrush [ ] ulcers [ ] exudates  Respiratory: [ ] dyspnea [ ] hemoptysis [ ] cough [ ] sputum	  Cardiovascular:  [ ] chest pain [ ] palpitations [ ] edema	  Gastrointestinal:  [ ] nausea [ ] vomiting [ ] diarrhea [ ] constipation [ ] pain	  Genitourinary:  [ ] dysuria [ ] frequency [ ] hematuria [ ] discharge [ ] flank pain  [ ] incontinence  Musculoskeletal:  [ ] myalgias [ ] arthralgias [ ] arthritis  [ ] back pain  Neurological:  [ ] headache [ ] seizures  [ ] confusion/altered mental status  Psychiatric:  [ ] anxiety [ ] depression	  Hematology/Lymphatics:  [ ] lymphadenopathy  Endocrine:  [ ] adrenal [ ] thyroid  Allergic/Immunologic:	 [ ] transplant [ ] seasonal    Vital Signs Last 24 Hrs  T(F): 98.8 (21 @ 10:16), Max: 100.4 (10-31-21 @ 11:40)  Vital Signs Last 24 Hrs  HR: 96 (21 @ 10:16) (80 - 110)  BP: 146/84 (21 @ 10:16) (103/53 - 146/84)  RR: 18 (21 @ 10:16)  SpO2: 98% (21 @ 10:16) (96% - 100%)  Wt(kg): --    PHYSICAL EXAM:  Constitutional: non-toxic  HEAD/EYES: anicteric, no conjunctival injection  ENT:  supple, no thrush  Cardiovascular:   normal S1, S2, distant heart sounds  Respiratory:  clear BS bilaterally  GI:  soft, non-tender, normal bowel sounds  :  no hyde  Musculoskeletal:  no synovitis  Neurologic: awake and alert, normal strength, no focal findings  Skin:  oral uler and lip involvement, rash b/l elbow extensor surface  Heme/Onc: no lymphadenopathy   Psychiatric:  awake, alert, appropriate mood    Auto Eosinophil %: 10.5 % (10-31-21 @ 09:50)  Auto Eosinophil %: 6.4 % (20 @ 06:45)  Auto Eosinophil %: 2.5 % (20 @ 13:45)  Auto Eosinophil %: 1.3 % (20 @ 15:55)                        10.2   12.59 )-----------( 247      ( 2021 06:04 )             33.1    140  |  97  |  65<H>  ----------------------------<  105<H>  4.7   |  20<L>  |  11.63<H>    Ca    9.8      2021 06:05    TPro  7.4  /  Alb  4.1  /  TBili  0.5  /  DBili  x   /  AST  23  /  ALT  10  /  AlkPhos  51  10-31    Urinalysis Basic - ( 31 Oct 2021 13:23 )  Color: Yellow / Appearance: Clear / S.018 / pH: x  Gluc: x / Ketone: Negative  / Bili: Negative / Urobili: Negative   Blood: x / Protein: 100 mg/dL / Nitrite: Negative   Leuk Esterase: Negative / RBC: 0 /hpf / WBC 0 /HPF   Sq Epi: x / Non Sq Epi: 1 /hpf / Bacteria: Negative    MICROBIOLOGY:  BC and UC pending    Rapid RVP Result: NotDetec (10-31 @ 12:43)    RADIOLOGY:  imaging below personally reviewed and agree with findings    Xray Chest 1 View- PORTABLE-Urgent (Xray Chest 1 View- PORTABLE-Urgent .) (10.31.21 @ 14:41) >  IMPRESSION:  Clear lungs.     Corrie Hoover - [phone]    Patient is a 56y old  Male who presents with a chief complaint of rash    HPI:   56M with HTN, Obesity, chol, BENNIE on CPAP, ESRD on HD (M, W, F) initially via LUE AVG that clotted and complicated by infection.  Excised 3/2021.  Now on HD via RUE AVG that after HD, hypotension 10/29.  Clotted.  Went for clot removal on 10/29.  Received contrast dye and antibiotics (versed, fentanyl, cefazolin, heparin and contrast).  During the procedure, he developed lip itching.  By the time he got home, it was worse.  Told to take benadryl.  Did not help.  Came to [facility] yesterday.  Minimally elevated WBC with considerable eosinophilia.  Febrile 100.4  Started on antibiotics.      ID asked to help management.    prior hospital charts reviewed [  ]  primary team notes reviewed [x  ]  other consultant notes reviewed [  ]    PAST MEDICAL & SURGICAL HISTORY:  ESRD on HD for 14 months  AVG LUE infected and excised 3/2021  New AVG RUE  Hypertension  Gout  Diabetes mellitus  HLD (hyperlipidemia)  Obesity  BENNIE on CPAP  H/O shoulder surgery  Injury of ankle and foot surgically repaired, 10 years ago  H/O hernia repair 30 years ago    Allergies  No Known Allergies    ANTIMICROBIALS:  piperacillin/tazobactam IVPB.. 3.375 every 12 hours (10/31-)    MEDICATIONS  (STANDING):  aspirin enteric coated 81 daily  atorvastatin 40 at bedtime  clopidogrel Tablet 75 daily  insulin glargine Injectable (LANTUS) 50 at bedtime  insulin lispro (ADMELOG) corrective regimen sliding scale  three times a day before meals  insulin lispro (ADMELOG) corrective regimen sliding scale  at bedtime  insulin lispro Injectable (ADMELOG) 10 three times a day before meals  predniSONE   Tablet 30 daily    SOCIAL HISTORY:  denies tobacco, etoh    FAMILY HISTORY:  Family history of hypertension mother    REVIEW OF SYSTEMS  [  ] ROS unobtainable because:    [x  ] All other systems negative except as noted below:	    Constitutional:  [x ] fever [ ] chills  [ ] weight loss  [ ] weakness  Skin:  [ x] rash [ ] phlebitis	  Eyes: [ ] icterus [ ] pain  [ ] discharge	  ENMT: [ ] sore throat  [ ] thrush [ ] ulcers [ ] exudates  Respiratory: [ ] dyspnea [ ] hemoptysis [ ] cough [ ] sputum	  Cardiovascular:  [ ] chest pain [ ] palpitations [ ] edema	  Gastrointestinal:  [ ] nausea [ ] vomiting [ ] diarrhea [ ] constipation [ ] pain	  Genitourinary:  [ ] dysuria [ ] frequency [ ] hematuria [ ] discharge [ ] flank pain  [ ] incontinence  Musculoskeletal:  [ ] myalgias [ ] arthralgias [ ] arthritis  [ ] back pain  Neurological:  [ ] headache [ ] seizures  [ ] confusion/altered mental status  Psychiatric:  [ ] anxiety [ ] depression	  Hematology/Lymphatics:  [ ] lymphadenopathy  Endocrine:  [ ] adrenal [ ] thyroid  Allergic/Immunologic:	 [ ] transplant [ ] seasonal    Vital Signs Last 24 Hrs  T(F): 98.8 (21 @ 10:16), Max: 100.4 (10-31-21 @ 11:40)  Vital Signs Last 24 Hrs  HR: 96 (21 @ 10:16) (80 - 110)  BP: 146/84 (21 @ 10:16) (103/53 - 146/84)  RR: 18 (21 @ 10:16)  SpO2: 98% (21 @ 10:16) (96% - 100%)  Wt(kg): --    PHYSICAL EXAM:  Constitutional: non-toxic  HEAD/EYES: anicteric, no conjunctival injection  ENT:  supple, no thrush  Cardiovascular:   normal S1, S2, distant heart sounds  Respiratory:  clear BS bilaterally  GI:  soft, non-tender, normal bowel sounds  :  no hyde  Musculoskeletal:  no synovitis  Neurologic: awake and alert, normal strength, no focal findings  Skin:  oral uler and lip involvement, rash b/l elbow extensor surface  Heme/Onc: no lymphadenopathy   Psychiatric:  awake, alert, appropriate mood    Auto Eosinophil %: 10.5 % (10-31-21 @ 09:50)  Auto Eosinophil %: 6.4 % (20 @ 06:45)  Auto Eosinophil %: 2.5 % (20 @ 13:45)  Auto Eosinophil %: 1.3 % (20 @ 15:55)                        10.2   12.59 )-----------( 247      ( 2021 06:04 )             33.1    140  |  97  |  65<H>  ----------------------------<  105<H>  4.7   |  20<L>  |  11.63<H>    Ca    9.8      2021 06:05    TPro  7.4  /  Alb  4.1  /  TBili  0.5  /  DBili  x   /  AST  23  /  ALT  10  /  AlkPhos  51  10-31    Urinalysis Basic - ( 31 Oct 2021 13:23 )  Color: Yellow / Appearance: Clear / S.018 / pH: x  Gluc: x / Ketone: Negative  / Bili: Negative / Urobili: Negative   Blood: x / Protein: 100 mg/dL / Nitrite: Negative   Leuk Esterase: Negative / RBC: 0 /hpf / WBC 0 /HPF   Sq Epi: x / Non Sq Epi: 1 /hpf / Bacteria: Negative    MICROBIOLOGY:  BC and UC pending    Rapid RVP Result: NotDetec (10-31 @ 12:43)    RADIOLOGY:  imaging below personally reviewed and agree with findings    Xray Chest 1 View- PORTABLE-Urgent (Xray Chest 1 View- PORTABLE-Urgent .) (10.31.21 @ 14:41) >  IMPRESSION:  Clear lungs.

## 2021-11-01 NOTE — CONSULT NOTE ADULT - SUBJECTIVE AND OBJECTIVE BOX
HPI:  57 y/o man with HTN, Obesity, BENNIE on CPAP, ESRD on HD, admitted with lip and tongue swelling with concern for angioedema.     Dermatology consulted for lip and throat swelling x 3 days. Patient reportedly with clot in AV graft on 10/29 requiring thrombectomy. Chelo-operatively received contrast dye as well as medications, including Versed, fentanyl, Keflex, heparin. Patient describes lip itching after procedure as well as rectal pruritus. Currently endorsing pain inside of mouth. Denies any history of herpes. +Sore throat.     PAST MEDICAL & SURGICAL HISTORY:  Renal disease  ESRD    Hypertension    Gout    Diabetes mellitus    HLD (hyperlipidemia)    Obesity    BENNIE on CPAP    Heart rate fast    H/O shoulder surgery    Injury of ankle and foot  surgically repaired, 10 years ago    H/O hernia repair  30 years ago    Peritoneal dialysis catheter in situ  Was inserted, and removed    Arteriovenous graft removed  Now there is a wound suction in left arm    REVIEW OF SYSTEMS:  General: no fevers/chills; no lethargy  Skin/Breast: see HPI  Ophthalmologic: no eye pain or changes in vision  ENT: no changes in hearing, +odynophagia  Respiratory: no SOB or cough  Cardiovascular: no palpitations or chest pain  Gastrointestinal: no abdominal pain or blood in stool  Genitourinary: no dysuria or frequency  Musculoskeletal: no joint pains or weakness  Neurological: no weakness, numbness, or tingling    MEDICATIONS  (STANDING):  aspirin enteric coated 81 milliGRAM(s) Oral daily  atorvastatin 40 milliGRAM(s) Oral at bedtime  clopidogrel Tablet 75 milliGRAM(s) Oral daily  dextrose 40% Gel 15 Gram(s) Oral once  dextrose 5%. 1000 milliLiter(s) (50 mL/Hr) IV Continuous <Continuous>  dextrose 5%. 1000 milliLiter(s) (100 mL/Hr) IV Continuous <Continuous>  dextrose 50% Injectable 25 Gram(s) IV Push once  dextrose 50% Injectable 12.5 Gram(s) IV Push once  dextrose 50% Injectable 25 Gram(s) IV Push once  glucagon  Injectable 1 milliGRAM(s) IntraMuscular once  insulin glargine Injectable (LANTUS) 50 Unit(s) SubCutaneous at bedtime  insulin lispro (ADMELOG) corrective regimen sliding scale   SubCutaneous three times a day before meals  insulin lispro (ADMELOG) corrective regimen sliding scale   SubCutaneous at bedtime  insulin lispro Injectable (ADMELOG) 10 Unit(s) SubCutaneous three times a day before meals  methylPREDNISolone 32 milliGRAM(s) Oral once  predniSONE   Tablet 30 milliGRAM(s) Oral daily    MEDICATIONS  (PRN):  diphenhydrAMINE 25 milliGRAM(s) Oral every 6 hours PRN Rash and/or Itching      Allergies    No Known Allergies    Intolerances        SOCIAL HISTORY:  Denies relevant SH    FAMILY HISTORY:  Family history of hypertension  , in  mother      Vital Signs Last 24 Hrs  T(C): 36.8 (2021 17:22), Max: 37.1 (2021 10:16)  T(F): 98.2 (2021 17:22), Max: 98.8 (2021 10:16)  HR: 110 (:) (80 - 110)  BP: 138/78 (:) (105/62 - 146/84)  BP(mean): 86 (31 Oct 2021 20:15) (65 - 86)  RR: 18 (:) (18 - 22)  SpO2: 100% (:) (96% - 100%)    PHYSICAL EXAM:  The patient was AOx3, well nourished, and in NAD.  There was no visible LAD.  Conjunctiva were non-injected.  There was no clubbing or edema of extremities.   The scalp, hair, face, eyebrows, lips, OP, neck, chest, back, extremities x4, and nails were examined.  There was no hyperhidrosis or bromhidrosis.     Of note on skin exam:   - white discrete oral plaques on dorsum of tongue, underneath tongue, buccal mucosa  - crusted ulcer on left lower vermillion lip       LABS:                        10.2   12.59 )-----------( 247      ( 2021 06:04 )             33.1     11-    140  |  97  |  65<H>  ----------------------------<  105<H>  4.7   |  20<L>  |  11.63<H>    Ca    9.8      2021 06:05    TPro  7.4  /  Alb  4.1  /  TBili  0.5  /  DBili  x   /  AST  23  /  ALT  10  /  AlkPhos  51  10      Urinalysis Basic - ( 31 Oct 2021 13:23 )    Color: Yellow / Appearance: Clear / S.018 / pH: x  Gluc: x / Ketone: Negative  / Bili: Negative / Urobili: Negative   Blood: x / Protein: 100 mg/dL / Nitrite: Negative   Leuk Esterase: Negative / RBC: 0 /hpf / WBC 0 /HPF   Sq Epi: x / Non Sq Epi: 1 /hpf / Bacteria: Negative

## 2021-11-01 NOTE — CONSULT NOTE ADULT - PROBLEM SELECTOR RECOMMENDATION 9
- FOE: Airway patent, no foreign body visualized. No glottic/supraglottic edema. Vocal cords mobile with good contact b/l.   - Pain meds prn.   - PPI.   - Consider CT of Neck with soft Tissues.   - Call ENT with questions.     # [id number]  ENT PA.

## 2021-11-01 NOTE — CONSULT NOTE ADULT - SUBJECTIVE AND OBJECTIVE BOX
CC: Angioedema, throat closing sensation.    HPI:  55 YO M with PMH of ESRD on HD (M, W, F), HTN p/w lip swelling.  PT. had HD on Friday and noticed that his dialysis graft was clotted.  Went to his vascular surgeon later that day and the clot was removed.  Started noticing lip swelling w/ throat closing since. ENT was consulted for evaluation. Per RN, pt was c/o oral pain and tongue feeling heavy. During evaluation, pt was c/o pain around the neck anteriorly. Denies having an episode like this in the past. No allergies to medications/ foods.  Pt denies SOB, Stridor, Hoarseness, SpO2> 94% on RA. No drooling, muffled sounds, hoarseness, able to handle secretions w/o difficulty. Also denies fever/chills, N/V, Rash, hives, pruritus, URI, cough, dysphagia/Odynophagia, recent travel/sick contacts.    PAST MEDICAL & SURGICAL HISTORY:  Renal disease  ESRD  Hypertension  Gout  Diabetes mellitus  HLD (hyperlipidemia)  Obesity  BENNIE on CPAP  Heart rate fast  H/O shoulder surgery  Injury of ankle and foot surgically repaired, 10 years ago  H/O hernia repair 30 years ago  Peritoneal dialysis catheter in situ Was inserted, and removed  Arteriovenous graft removed Now there is a wound suction in left arm    Allergies  No Known Allergies    Intolerances    MEDICATIONS  (STANDING):  aspirin enteric coated 81 milliGRAM(s) Oral daily  atorvastatin 40 milliGRAM(s) Oral at bedtime  clopidogrel Tablet 75 milliGRAM(s) Oral daily  dextrose 40% Gel 15 Gram(s) Oral once  dextrose 5%. 1000 milliLiter(s) (50 mL/Hr) IV Continuous <Continuous>  dextrose 5%. 1000 milliLiter(s) (100 mL/Hr) IV Continuous <Continuous>  dextrose 50% Injectable 25 Gram(s) IV Push once  dextrose 50% Injectable 12.5 Gram(s) IV Push once  dextrose 50% Injectable 25 Gram(s) IV Push once  glucagon  Injectable 1 milliGRAM(s) IntraMuscular once  insulin glargine Injectable (LANTUS) 50 Unit(s) SubCutaneous at bedtime  insulin lispro (ADMELOG) corrective regimen sliding scale   SubCutaneous three times a day before meals  insulin lispro (ADMELOG) corrective regimen sliding scale   SubCutaneous at bedtime  insulin lispro Injectable (ADMELOG) 10 Unit(s) SubCutaneous three times a day before meals  piperacillin/tazobactam IVPB.. 3.375 Gram(s) IV Intermittent every 12 hours  predniSONE   Tablet 30 milliGRAM(s) Oral daily    MEDICATIONS  (PRN):  diphenhydrAMINE 25 milliGRAM(s) Oral every 6 hours PRN Rash and/or Itching    Social History:   non smoker (31 Oct 2021 20:54)    Family history:   Family history of hypertension  , in  mother    ROS:   ENT: all negative except as noted in HPI.  CV: denies palpitations.  Pulm: denies SOB, cough, hemoptysis.  GI: denies change in appetite indigestion, n/v.  : denies pertinent urinary symptoms, urgency.  Neuro: denies numbness/tingling, loss of sensation.  Psych: denies anxiety.  MS: denies muscle weakness, instability.  Heme: denies easy bruising or bleeding.  Endo: denies heat/cold intolerance, excessive sweating.  Vascular: denies LE edema.    Vital Signs Last 24 Hrs  T(C): 36.7 (2021 04:49), Max: 38 (31 Oct 2021 11:40)  T(F): 98.1 (2021 04:49), Max: 100.4 (31 Oct 2021 11:40)  HR: 87 (2021 04:49) (80 - 120)  BP: 113/76 (2021 04:49) (103/53 - 125/57)  BP(mean): 86 (31 Oct 2021 20:15) (65 - 94)  RR: 20 (2021 04:49) (16 - 22)  SpO2: 97% (2021 04:49) (96% - 100%).                        10.5   10.83 )-----------( 184      ( 31 Oct 2021 09:50 )             34.1    10  140  |  97  |  48<H>  ----------------------------<  110<H>  4.0   |  25  |  10.17<H>    Ca    9.6      31 Oct 2021 09:50  TPro  7.4  /  Alb  4.1  /  TBili  0.5  /  DBili  x   /  AST  23  /  ALT  10  /  AlkPhos  51  10-31     PHYSICAL EXAM:  Gen: NAD, On RA.   Skin: No rashes, bruises, or lesions  Head: Normocephalic, Atraumatic  Face: no edema, erythema, or fluctuance. Parotid glands soft without mass  Eyes: no scleral injection  Nose: Nares bilaterally patent, no discharge  Mouth: B/L Lip swelling, No Stridor / Drooling / Trismus.  Mucosa moist, tongue/uvula midline, oropharynx clear.   Neck: Flat, supple, no lymphadenopathy, trachea midline, no masses.  Lymphatic: No lymphadenopathy.  Resp: breathing easily, no stridor.  CV: no peripheral edema/cyanosis.  GI: nondistended .  Peripheral vascular: no JVD or edema.  Neuro: facial nerve intact, no facial droop.    Fiberoptic Indirect laryngoscopy:  (Scope #2 used)  Reason for Laryngoscopy: Angioedema.   Nasopharynx, oropharynx, and hypopharynx clear, no bleeding. Tongue base, posterior pharyngeal wall, vallecula, epiglottis, and subglottis appear normal. No erythema, edema, pooling of secretions, masses or lesions. Airway patent, no foreign body visualized. No glottic/supraglottic edema. True vocal cords, arytenoids, vestibular folds, ventricles, pyriform sinuses, and aryepiglottic folds appear normal bilaterally. Vocal cords mobile with good contact b/l.     IMAGING/ADDITIONAL STUDIES:   CXR:  FINDINGS:   The lungs are clear. No pleural effusion or pneumothorax.  Heart size is poorly evaluated on this view. Partially imaged left axillary stent.  No acute bony pathology.    IMPRESSION:   Clear lungs.

## 2021-11-01 NOTE — CONSULT NOTE ADULT - ASSESSMENT
Mr. Holley is a 56 year old man with HTN, Obesity, BENNIE on CPAP, ESRD on HD, admitted with angioedema with allergy consulted for concern angioedema vs drug reaction. Mucositis rash with eosinphilia is concerning for drug induced rash and eosinophilia (DRESS). Eosinophilia was documented in Aug 2020 as well, but there are no lab results in the interim to determine its progress.  Mr. Holley is a 56 year old man with HTN, Obesity, BENNIE on CPAP, ESRD on HD, admitted with angioedema with allergy consulted for concern angioedema vs drug reaction. Mucositis rash with eosinophilia is concerning for drug induced rash and eosinophilia (DRESS). Eosinophilia was documented in Aug 2020 as well, but there are no lab results in the interim to determine its progress, so it is unclear whether or not the eosinophilia is long-standing or acute. This could be     Need to obtain     Recommendations:  - RVP  -  Mr. Holley is a 56 year old man with HTN, Obesity, BENNIE on CPAP, ESRD on HD, admitted with angioedema with allergy consulted for concern angioedema vs drug reaction. However, upon examination of the patient, he has extensive mucositis present on lips, tongue, hard palate, and buccal mucosa. Lip and perioral swelling are likely secondary to the mucositis. Underlying cause is, however, unclear; the differential diagnosis is very broad. Would recommend ruling out infectious causes first, including mycoplasma and herpetic infections. Mckeon-Gurvinder syndrome typically starts with a febrile prodrome and involves blistering skin. Mucositis can be the first symptoms, and is seen in about 90% of patients. Eosinophilia, which this patient has, is not commonly seen in SJS. The timeline is also not consistent with SJS- onset of symptoms within 3 hours, when SJS typically takes 48 hours - 2 weeks to present.    This patient has eosinophilia, with eosinophils at 1140. History of lab values shows eosinophilia dating to Aug 2020 (540), however, we do not have time points in between, and it is unclear whether this is two acute episodes, or if this is chronic. Either way, it is difficult to tie the eosinophilia in with the rash, especially as this is not consistent with drug reaction with eosinophilia and systemic symptoms (DRESS). In DRESS, reactions usually start 2 weeks after initiation of causative agent, there is evidence of liver injury, which this patient does not have. As mentioned in the HPI, the only medication change was the addition of a blood thinner (clopidogrel?). While this medication has been associated with SJS and DRESS, the picture is not clear. If other testing is negative and there is no improvement, can consider discontinuing clopidogrel and substituting an alternative. Although this is not consistent with an allergic reaction, if desired, can pursue allergy testing as outpatient.    Recommendations:  - Can consider oral pathology to biopsy the lesion  - P    Thank you for this consult. Please do not hesitate to reach out to our service if you have any further questions or concerns.  *Note  not finalized until attending signature attached  Bernie Aburto MD, MPH  Fellow, Division of Allergy and Immunology  [facility] at [address], [address]  [place] [address]  Tel: [phone] -  Mr. Holley is a 56 year old man with HTN, Obesity, BENNIE on CPAP, ESRD on HD, admitted with angioedema with allergy consulted for concern angioedema vs drug reaction. However, upon examination of the patient, he has extensive mucositis present on lips, tongue, hard palate, and buccal mucosa. Lip and perioral swelling are likely secondary to the mucositis. Underlying cause is, however, unclear; the differential diagnosis is very broad. Would recommend ruling out infectious causes first, including mycoplasma and herpetic infections. Mckeon-Gurvinder syndrome typically starts with a febrile prodrome and involves blistering skin. Mucositis can be the first symptoms, and is seen in about 90% of patients. Eosinophilia, which this patient has, is not commonly seen in SJS. The timeline is also not consistent with SJS- onset of symptoms within 3 hours, when SJS typically takes 48 hours - 2 weeks to present.    This patient has eosinophilia, with eosinophils at 1140. History of lab values shows eosinophilia dating to Aug 2020 (540), however, we do not have time points in between, and it is unclear whether this is two acute episodes, or if this is chronic. Either way, it is difficult to tie the eosinophilia in with the rash, especially as this is not consistent with drug reaction with eosinophilia and systemic symptoms (DRESS). In DRESS, reactions usually start 2 weeks after initiation of causative agent, there is evidence of liver injury, which this patient does not have. As mentioned in the HPI, the only medication change was the addition of a blood thinner (clopidogrel?). While this medication has been associated with SJS and DRESS, the picture is not clear. If other testing is negative and there is no improvement, can consider discontinuing clopidogrel and substituting an alternative. Although this is not consistent with an allergic reaction, if desired, can pursue allergy testing as outpatient.    Recommendations:  - Continue supportive care, including topical morphine or magic mouthwash (benadryl, maalox, and lidocaine)  - Consider oral pathology to biopsy the lesion  - RVP to look for viral causes      Thank you for this consult. Please do not hesitate to reach out to our service if you have any further questions or concerns.  *Note  not finalized until attending signature attached  Bernie Aburto MD, MPH  Fellow, Division of Allergy and Immunology  [facility] at [facility]/[address]  [place] [address]  Tel: [phone] -  Mr. Holley is a 56 year old man with HTN, Obesity, BENNIE on CPAP, ESRD on HD, admitted with angioedema with allergy consulted for concern angioedema vs drug reaction. However, upon examination of the patient, he has extensive mucositis present on lips, tongue, hard palate, and buccal mucosa. Lip and perioral swelling are likely secondary to the mucositis. Underlying cause is, however, unclear; the differential diagnosis is very broad. Would recommend ruling out infectious causes first, including mycoplasma and herpetic infections. Mckeon-Gurvinder syndrome typically starts with a febrile prodrome and involves blistering skin. Mucositis can be the first symptoms, and is seen in about 90% of patients. Eosinophilia, which this patient has, is not commonly seen in SJS. The timeline is also not consistent with SJS- onset of symptoms within 3 hours, when SJS typically takes 48 hours - 2 weeks to present.    This patient has eosinophilia, with eosinophils at 1140. History of lab values shows eosinophilia dating to Aug 2020 (540), however, we do not have time points in between, and it is unclear whether this is two acute episodes, or if this is chronic. Either way, it is difficult to tie the eosinophilia in with the rash, especially as this is not consistent with drug reaction with eosinophilia and systemic symptoms (DRESS). In DRESS, reactions usually start 2 weeks after initiation of causative agent, there is evidence of liver injury, which this patient does not have. As mentioned in the HPI, the only medication change was the addition of a blood thinner (clopidogrel?). While this medication has been associated with SJS and DRESS, the picture is not clear. If other testing is negative and there is no improvement, can consider discontinuing clopidogrel and substituting an alternative. Although this is not consistent with an allergic reaction, if desired, can pursue allergy testing as outpatient.    Recommendations:  - Continue supportive care, including topical morphine or magic mouthwash (benadryl, maalox, and lidocaine)  - Recommend oral pathology evaluation  - RVP to look for viral causes  - Mycoplasma IgG, IgM; will need repeat convalescent titers in 4 weeks to support causality  - obtain records of recent CBC with dif to determine the onset of eosinophilia  - need records of medications used during clot removal procedure 10/29/21  - monitor peripheral  eosinophilia      Thank you for this consult. Please do not hesitate to reach out to our service if you have any further questions or concerns.  *Note  not finalized until attending signature attached  Bernie Aburto MD, MPH  Fellow, Division of Allergy and Immunology  [facility] at [address], [address]  [place] [address]  Tel: [phone] -

## 2021-11-01 NOTE — CONSULT NOTE ADULT - ATTENDING COMMENTS
56 year old male with HTN, Obesity, BENNIE on CPAP, ESRD on HD, presents with mucositis, eosinophilia low grade -fever (100.4). Infectious etiology (mycoplasma, Herpes, VZV0 is on the top of dif. diagnosis for mucositis but patient reports the onset of symptoms during clot removal from the graft on 10/2921 when he felt lip tingling and subsequently developed mouth pain that same day. There are no cutaneous lesions and, although noted in the chart, patient denies rectal pruritis or any other mucosal lesions.  Mucositis is typically not an IgE-mediated process, therefore relationship to the procedure and medications used during the procedure is unclear.   Duration and acuity  of eosinophilia is unclear: the only available records show mild peripheral eosinophilia 8/14/20 with AEC- 540; 8/12/20- 320.  - Agree with supportive care and plan as above.  - Isolated mucositis is an unusual adverse drug effect and is not common with clopidogrel, but since there is temporary relationship between starting the new drug and mucostis, consider stopping it especially if no infectious ethology is identified.

## 2021-11-01 NOTE — PROGRESS NOTE ADULT - SUBJECTIVE AND OBJECTIVE BOX
LORRAINE RUTH  56y Male  MRN:[id number]    Patient is a 56y old  Male who presents with a chief complaint of fever, rash (2021 11:15)    HPI:   56M w/ PMHx of ESRD on HD (M, W, F), HTN p/w lip swelling.  PT. had HD on Friday and noticed that his dialysis graft was clotted.  Went to his vascular surgeon later that day and the clot was removed.  Started noticing lip swelling w/ throat closing since.  No CP, SOB, abd pain, n/v/d, pruritus, rash, hives.  PT. has been taking ATC benadryl w/o relief. (31 Oct 2021 20:54)      Patient seen and evaluated at bedside. No acute events overnight except as noted.    Interval HPI: c/o throat pain     PAST MEDICAL & SURGICAL HISTORY:  Renal disease  ESRD    Hypertension    Gout    Diabetes mellitus    HLD (hyperlipidemia)    Obesity    BENNIE on CPAP    Heart rate fast    H/O shoulder surgery    Injury of ankle and foot  surgically repaired, 10 years ago    H/O hernia repair  30 years ago    Peritoneal dialysis catheter in situ  Was inserted, and removed    Arteriovenous graft removed  Now there is a wound suction in left arm        REVIEW OF SYSTEMS:  as per hpi     VITALS:  Vital Signs Last 24 Hrs  T(C): 37.1 (2021 10:16), Max: 37.1 (31 Oct 2021 18:02)  T(F): 98.8 (2021 10:16), Max: 98.8 (2021 10:16)  HR: 96 (2021 10:16) (80 - 110)  BP: 146/84 (2021 10:16) (105/62 - 146/84)  BP(mean): 86 (31 Oct 2021 20:15) (65 - 94)  RR: 18 (2021 10:16) (18 - 22)  SpO2: 98% (2021 10:16) (96% - 100%)  CAPILLARY BLOOD GLUCOSE      POCT Blood Glucose.: 148 mg/dL (2021 11:56)  POCT Blood Glucose.: 108 mg/dL (2021 08:53)  POCT Blood Glucose.: 184 mg/dL (31 Oct 2021 21:09)    I&O's Summary      PHYSICAL EXAM:  GENERAL: NAD, well-developed  HEAD:  Atraumatic, Normocephalic  EYES: EOMI, PERRLA, conjunctiva and sclera clear  NECK: Supple, No JVD  CHEST/LUNG: Clear to auscultation bilaterally; No wheeze  HEART: S1, S2; No murmurs, rubs, or gallops  ABDOMEN: Soft, Nontender, Nondistended; Bowel sounds present  EXTREMITIES:  2+ Peripheral Pulses, No clubbing, cyanosis, or edema  PSYCH: Normal affect  NEUROLOGY: AAOX3; non-focal  SKIN: No rashes or lesions    Consultant(s) Notes Reviewed:  [x ] YES  [ ] NO  Care Discussed with Consultants/Other Providers [ x] YES  [ ] NO    MEDS:  MEDICATIONS  (STANDING):  aspirin enteric coated 81 milliGRAM(s) Oral daily  atorvastatin 40 milliGRAM(s) Oral at bedtime  clopidogrel Tablet 75 milliGRAM(s) Oral daily  dextrose 40% Gel 15 Gram(s) Oral once  dextrose 5%. 1000 milliLiter(s) (50 mL/Hr) IV Continuous <Continuous>  dextrose 5%. 1000 milliLiter(s) (100 mL/Hr) IV Continuous <Continuous>  dextrose 50% Injectable 25 Gram(s) IV Push once  dextrose 50% Injectable 12.5 Gram(s) IV Push once  dextrose 50% Injectable 25 Gram(s) IV Push once  glucagon  Injectable 1 milliGRAM(s) IntraMuscular once  heparin   Injectable 5000 Unit(s) IV Push once  heparin   Injectable 2500 Unit(s) Dialysis. once  insulin glargine Injectable (LANTUS) 50 Unit(s) SubCutaneous at bedtime  insulin lispro (ADMELOG) corrective regimen sliding scale   SubCutaneous three times a day before meals  insulin lispro (ADMELOG) corrective regimen sliding scale   SubCutaneous at bedtime  insulin lispro Injectable (ADMELOG) 10 Unit(s) SubCutaneous three times a day before meals  methylPREDNISolone 32 milliGRAM(s) Oral once  predniSONE   Tablet 30 milliGRAM(s) Oral daily    MEDICATIONS  (PRN):  diphenhydrAMINE 25 milliGRAM(s) Oral every 6 hours PRN Rash and/or Itching    ALLERGIES:  No Known Allergies      LABS:                        10.2   12.59 )-----------( 247      ( 2021 06:04 )             33.1     11-01    140  |  97  |  65<H>  ----------------------------<  105<H>  4.7   |  20<L>  |  11.63<H>    Ca    9.8      2021 06:05    TPro  7.4  /  Alb  4.1  /  TBili  0.5  /  DBili  x   /  AST  23  /  ALT  10  /  AlkPhos  51  10-31          LIVER FUNCTIONS - ( 31 Oct 2021 09:50 )  Alb: 4.1 g/dL / Pro: 7.4 g/dL / ALK PHOS: 51 U/L / ALT: 10 U/L / AST: 23 U/L / GGT: x           Urinalysis Basic - ( 31 Oct 2021 13:23 )    Color: Yellow / Appearance: Clear / S.018 / pH: x  Gluc: x / Ketone: Negative  / Bili: Negative / Urobili: Negative   Blood: x / Protein: 100 mg/dL / Nitrite: Negative   Leuk Esterase: Negative / RBC: 0 /hpf / WBC 0 /HPF   Sq Epi: x / Non Sq Epi: 1 /hpf / Bacteria: Negative         Lipid panel:   cultures: Culture Results:   <10,000 CFU/mL Normal Urogenital Corrie (10-31 @ 16:59)  Culture Results:   No growth to date. (10-31 @ 14:29)  Culture Results:   No growth to date. (10-31 @ 14:29)

## 2021-11-01 NOTE — CONSULT NOTE ADULT - ASSESSMENT
55 YO M with PMH of ESRD on HD (M, W, F), HTN p/w lip swelling.  PT. had HD on Friday and noticed that his dialysis graft was clotted.  Went to his vascular surgeon later that day and the clot was removed.  Started noticing lip swelling w/ throat closing since.  Pt denies SOB, Stridor, Hoarseness, SpO2> 94% on RA. No drooling, muffled sounds, hoarseness, able to handle secretions w/o difficulty. FOE at bedside showed, Airway patent, no foreign body visualized. No glottic/supraglottic edema. Vocal cords mobile with good contact b/l.

## 2021-11-01 NOTE — CHART NOTE - NSCHARTNOTEFT_GEN_A_CORE
Notified by RN that patient continues to c/o mouth pain despite receiving Tylenol earlier  Patient seen and evaluated at bedside c/o of tongue feeling heavier and mouth pain  Patient without respiratory distress,  speaking in complete sentences ,no c/o SOB, chest pain, palpitations    Vital Signs Last 24 Hrs  T(C): 36.7 (31 Oct 2021 21:00), Max: 38 (31 Oct 2021 11:40)  T(F): 98 (31 Oct 2021 21:00), Max: 100.4 (31 Oct 2021 11:40)  HR: 96 (31 Oct 2021 21:00) (80 - 120)  BP: 105/62 (31 Oct 2021 21:00) (103/53 - 125/57)  BP(mean): 86 (31 Oct 2021 20:15) (65 - 94)  RR: 20 (31 Oct 2021 21:00) (16 - 22)  SpO2: 97% (31 Oct 2021 21:00) (96% - 100%)    Patient admitted with angioedema, now with c/o mouth pain and tongue feeling heavy with swelling  ENT consult called for further evaluation  Morphine 1 mg IV x1 for mouth pain  F/u with Attending in [facility]  Department of Medicine  [id number] Notified by RN that patient continues to c/o mouth pain despite receiving Tylenol earlier  Patient seen and evaluated at bedside c/o of tongue feeling heavier and mouth pain  Patient without respiratory distress,  speaking in complete sentences ,no c/o SOB, chest pain, palpitations    Vital Signs Last 24 Hrs  T(C): 36.7 (31 Oct 2021 21:00), Max: 38 (31 Oct 2021 11:40)  T(F): 98 (31 Oct 2021 21:00), Max: 100.4 (31 Oct 2021 11:40)  HR: 96 (31 Oct 2021 21:00) (80 - 120)  BP: 105/62 (31 Oct 2021 21:00) (103/53 - 125/57)  BP(mean): 86 (31 Oct 2021 20:15) (65 - 94)  RR: 20 (31 Oct 2021 21:00) (16 - 22)  SpO2: 97% (31 Oct 2021 21:00) (96% - 100%)    Patient admitted with angioedema, now with c/o mouth pain and tongue feeling heavy with swelling  ENT consult called for further evaluation  Morphine 1 mg IV x1 for mouth pain  Continue with Prednisone, Benadryl and Pepcid   F/u with Attending in [facility] Hurtado [facility]  Department of Medicine  [id number] Notified by RN that patient continues to c/o mouth pain despite receiving Tylenol earlier  Patient seen and evaluated at bedside c/o of tongue feeling heavier and mouth pain  Patient without respiratory distress,  speaking in complete sentences ,no c/o SOB, chest pain, palpitations    Vital Signs Last 24 Hrs  T(C): 36.7 (31 Oct 2021 21:00), Max: 38 (31 Oct 2021 11:40)  T(F): 98 (31 Oct 2021 21:00), Max: 100.4 (31 Oct 2021 11:40)  HR: 96 (31 Oct 2021 21:00) (80 - 120)  BP: 105/62 (31 Oct 2021 21:00) (103/53 - 125/57)  BP(mean): 86 (31 Oct 2021 20:15) (65 - 94)  RR: 20 (31 Oct 2021 21:00) (16 - 22)  SpO2: 97% (31 Oct 2021 21:00) (96% - 100%)    PHYSICAL EXAM:  GENERAL; NAD  MOUTH: noted bilateral lip swelling. No tongue swelling noted   NECK: Supple, No JVD  CHEST/LUNG: Clear to auscultation bilaterally; No wheeze  HEART: Regular rate and rhythm; No murmurs, rubs, or gallops      HPI:      56M w/ PMHx of ESRD on HD (M, W, F), HTN p/w lip swelling.  PT. had HD on Friday and noticed that his dialysis graft was clotted.  Went to his vascular surgeon later that day and the clot was removed.  Started noticing lip swelling w/ throat closing since. Patient admitted with angioedema,  currently on steroid and benadryl .Now with c/o mouth pain and tongue feeling heavy     Impression: Angioedema   >ENT consult called for further evaluation  >Morphine 1 mg IV x1 for mouth pain  >Continue with Prednisone, Benadryl and Pepcid   > Continue to monitor airay  >F/u with Attending in [facility] Tinsley-C  Department of Medicine  [id number] Notified by RN that patient continues to c/o mouth pain despite receiving Tylenol earlier  Patient seen and evaluated at bedside c/o of tongue feeling heavier and mouth pain  Patient without respiratory distress,  speaking in complete sentences ,no c/o SOB, chest pain, palpitations    Vital Signs Last 24 Hrs  T(C): 36.7 (31 Oct 2021 21:00), Max: 38 (31 Oct 2021 11:40)  T(F): 98 (31 Oct 2021 21:00), Max: 100.4 (31 Oct 2021 11:40)  HR: 96 (31 Oct 2021 21:00) (80 - 120)  BP: 105/62 (31 Oct 2021 21:00) (103/53 - 125/57)  BP(mean): 86 (31 Oct 2021 20:15) (65 - 94)  RR: 20 (31 Oct 2021 21:00) (16 - 22)  SpO2: 97% (31 Oct 2021 21:00) (96% - 100%)    PHYSICAL EXAM:  GENERAL; NAD  MOUTH: noted bilateral lip swelling. No tongue swelling noted   NECK: Supple, No JVD  CHEST/LUNG: Clear to auscultation bilaterally; No wheeze  HEART: Regular rate and rhythm; No murmurs, rubs, or gallops      HPI:      56M w/ PMHx of ESRD on HD (M, W, F), HTN p/w lip swelling.  PT. had HD on Friday and noticed that his dialysis graft was clotted.  Went to his vascular surgeon later that day and the clot was removed.  Started noticing lip swelling w/ throat closing since. Patient admitted with angioedema,  currently on steroid and benadryl .Now with c/o mouth pain and tongue feeling heavy     Impression: Angioedema   >ENT consult called for further evaluation  >Morphine 1 mg IV x1 for mouth pain  >Continue with Prednisone, Benadryl and Pepcid   > Continue to monitor airway and respiratory status   >F/u with Attending in [facility] TinsleyC  Department of Medicine  [id number]

## 2021-11-01 NOTE — CONSULT NOTE ADULT - CONSULT REASON
fever
Angioedema, throat closing sensation.
Concern allergic reaction
Dialysis evaluation
lip/tongue swelling

## 2021-11-01 NOTE — CONSULT NOTE ADULT - ASSESSMENT
56M with HTN, Obesity, chol, BENNIE on CPAP, ESRD on HD (M, W, F) initially via LUE AVG that clotted and complicated by infection requiring excision 3/2021.  Now on HD via RUE AVG that after HD, hypotension 10/29.  Clotted.  Went for clot removal on 10/29.  Received contrast dye and antibiotics (versed, fentanyl, cefazolin, heparin and contrast).  During the procedure, he developed lip itching.  By the time he got home, it was worse.  Told to take benadryl.  Did not help.  Came to [facility] yesterday.  Exam with oral lesions and rectal pruritis.  Concerning for SJS.  No clear infection    Fever, eosinophilia  - all suggestive of allergic reaction  - given concern for SJS, would consult either allergy/immunology or dermatology  - would also stop zosyn    I have discussed plan of care as detailed above with NP [id number]

## 2021-11-01 NOTE — CONSULT NOTE ADULT - ATTENDING COMMENTS
Exam notable for erosions involving hard/soft palate, buccal mucosa and lip-- findings that are not consistent with angioedema or type I hypersensitivity response. Presence of erosions on vermillion lip raises suspicion for HSV infection (primary gingivostomatitis)-- however HSV PCR is negative. Could also consider a reactive infectious mucocutaneous eruption, however rapid RVP was negative ruling out common culprits such as mycoplasma and parainfluenza. Could consider obtaining CMV and EBV serologies to definitively rule this out. Other possible diagnoses include erythema multiforme (most commonly triggered by HSV)-- therefore despite negative HSV testing could still consider empirically treating with valtrex (renally dosed). In the absence of obvious infection ok to continue with steroids. Although onset of erosions occurred shortly after receiving IV contrast, this is unlikely to be related, although would consider obtaining urine and serum iodine levels and mucocutaneous iododerma has rarely been associated with oral lesions. Moreover, drug hypersensitivity rash such as DIHS/DRESS (to clopidogrel) also seems unlikely in the absence of fever, systemic symptoms and rash on the body. Although the patient had eosinophilia on admission, as per AI he has demonstrated eosinophilia in the past. May be worthwhile to trend CBC with diff. Exam notable for erosions involving hard/soft palate, buccal mucosa and lip-- findings that are not consistent with angioedema or type I hypersensitivity response. Presence of erosions on vermillion lip raises suspicion for HSV infection (primary gingivostomatitis)-- however HSV PCR is negative. Could also consider a reactive infectious mucocutaneous eruption, however rapid RVP was negative ruling out common culprits such as mycoplasma and parainfluenza. Could consider obtaining CMV and EBV serologies to definitively rule this out. Other possible diagnoses include erythema multiforme (most commonly triggered by HSV)-- therefore despite negative HSV testing could still consider empirically treating with valtrex (renally dosed). In the absence of obvious infection ok to continue with steroids. Although onset of erosions occurred shortly after receiving IV contrast, this is unlikely to be related, although would consider obtaining urine and serum iodine levels and mucocutaneous iododerma has rarely been associated with oral lesions. Moreover, drug hypersensitivity rash such as DIHS/DRESS (to clopidogrel) also seems unlikely in the absence of  systemic symptoms and rash elsewhere on the body, although worth consideration if workup otherwise negative given presence of fever on presentation. Although the patient had eosinophilia on admission, as per AI he has demonstrated eosinophilia in the past. Would trend CBC with diff and CMP while admitted.

## 2021-11-01 NOTE — CONSULT NOTE ADULT - ASSESSMENT
Painful oral mucosal ulcers, most likely due to primary herpetic gingivostomatitis.  Orofacial herpes simplex virus (HSV) infections are common and can present with intraoral vesicles, erosions, or ulcers. It is spread by direct contact with the lesions. Primary herpetic gingivostomatitis is an acute infection of the oral mucous membranes by HSV that results from initial exposure to the virus. Most primary exposures (approximately 90%) are subclinical and asymptomatic. Patients experience a flu-like illness with fever, loss of appetite, malaise, and lymphadenopathy. Painful mouth sores and a sore throat develop, and difficulty eating and swallowing places the patients at risk for dehydration. The systemic and oral signs and symptoms develop within days of each other.  - fu HSV PCR swab   - recommend empiric treatment for primary herpetic gingivostomatitis (renal dosing). However, patient would like to defer treatment until results from HSV PCR swab have returned    The patient's chart was reviewed in addition to being seen and examined at bedside with the dermatology attending Dr. Eva Churchill.  Please page [phone] for further related questions (please leave 10 digit call back number because we cover several facilities).    Samantha Taylor MD  Resident Physician, PGY3  [facility]

## 2021-11-01 NOTE — CONSULT NOTE ADULT - SUBJECTIVE AND OBJECTIVE BOX
Patient is a 56y old  Male who presents with a chief complaint of fever, rash (2021 11:15)    HPI:   56M w/ PMHx of ESRD on HD (M, W, F), HTN p/w lip swelling.  PT. had HD on 10/29/21 and noticed that his dialysis graft was clotted.  Went to his vascular surgeon later that day and the clot was removed. Received contrast dye and antibiotics (versed, fentanyl, cefazolin, heparin and contrast). During procedure, he noticed lip litching, which worsened over the course of the day. At home he also developed sensation of throat closing. Tried to manage with benadryl, but eventually came to ED.  No CP, SOB, abd pain, n/v/d, pruritus, rash, hives.  He was admitted for angioedema.    Symptoms managed with benadryl 25mg q6h prn, prednisone 30mg daily.       Allergies    No Known Allergies    Intolerances      MEDICATIONS  (STANDING):  aspirin enteric coated 81 milliGRAM(s) Oral daily  atorvastatin 40 milliGRAM(s) Oral at bedtime  clopidogrel Tablet 75 milliGRAM(s) Oral daily  dextrose 40% Gel 15 Gram(s) Oral once  dextrose 5%. 1000 milliLiter(s) (50 mL/Hr) IV Continuous <Continuous>  dextrose 5%. 1000 milliLiter(s) (100 mL/Hr) IV Continuous <Continuous>  dextrose 50% Injectable 25 Gram(s) IV Push once  dextrose 50% Injectable 12.5 Gram(s) IV Push once  dextrose 50% Injectable 25 Gram(s) IV Push once  glucagon  Injectable 1 milliGRAM(s) IntraMuscular once  heparin   Injectable 5000 Unit(s) IV Push once  heparin   Injectable 2500 Unit(s) Dialysis. once  insulin glargine Injectable (LANTUS) 50 Unit(s) SubCutaneous at bedtime  insulin lispro (ADMELOG) corrective regimen sliding scale   SubCutaneous three times a day before meals  insulin lispro (ADMELOG) corrective regimen sliding scale   SubCutaneous at bedtime  insulin lispro Injectable (ADMELOG) 10 Unit(s) SubCutaneous three times a day before meals  methylPREDNISolone 32 milliGRAM(s) Oral once  predniSONE   Tablet 30 milliGRAM(s) Oral daily    MEDICATIONS  (PRN):  diphenhydrAMINE 25 milliGRAM(s) Oral every 6 hours PRN Rash and/or Itching      PAST MEDICAL & SURGICAL HISTORY:  Renal disease  ESRD    Hypertension    Gout    Diabetes mellitus    HLD (hyperlipidemia)    Obesity    BENNIE on CPAP    Heart rate fast    H/O shoulder surgery    Injury of ankle and foot  surgically repaired, 10 years ago    H/O hernia repair  30 years ago    Peritoneal dialysis catheter in situ  Was inserted, and removed    Arteriovenous graft removed  Now there is a wound suction in left arm        REVIEW OF SYSTEMS  All review of systems negative, except for those marked:  General:		  Eyes:			  ENT:			  Pulmonary:		  Cardiac:		  Gastrointestinal:	  Renal/Urologic:		  Musculoskeletal:	  Skin:		  Neurologic:		  Psychiatric:		  Endocrine:		  Hematologic:		  Allergy/Immune:	    SOCIAL/ENVIRONMENTAL HISTORY:  Family Lives:  Education Level:  Tobacco	[] No	[] Yes:  Alcohol		[] No	[] Yes:    FAMILY HISTORY:  Family history of hypertension  , in  mother      Allergies		[] No	[] Yes:   Miscarriages		[] No	[] Yes:   Autoimmune		[] No	[] Yes:   Asthma			[] No	[] Yes:  Still Births		[] No	[] Yes:  Sinusitis		[] No	[] Yes:   Fetal Demise		[] No	[] Yes:   Immune Deficiency	[] No	[] Yes:   Other:			[] No	[] Yes:    Vital Signs Last 24 Hrs  T(C): 37.1 (2021 10:16), Max: 37.1 (31 Oct 2021 18:02)  T(F): 98.8 (2021 10:16), Max: 98.8 (2021 10:16)  HR: 96 (2021 10:16) (80 - 110)  BP: 146/84 (2021 10:16) (105/62 - 146/84)  BP(mean): 86 (31 Oct 2021 20:15) (65 - 94)  RR: 18 (2021 10:16) (18 - 22)  SpO2: 98% (2021 10:16) (96% - 100%)    PHYSICAL EXAM  All physical exam findings normal, except for those marked:  General:	alert, well developed/well nourished, no acute distress  Eyes      no conjunctival injection, no discharge, no photophobia, intact                 extraocular movements, sclera not icteric, no suborbital bogginess  ENT:    normal tympanic membranes; external ear normal, normal nasal mucosa  .		and turbinates without discharge, no pharyngeal erythema or exudates, no  .		cobblestoning, normal tongue and lips, normal tonsils   Neck    supple, full range of motion  Lymph Nodes	normal size and consistency, non-tender  Cardiovascular	regular rate and rhythm; Normal S1, S2; No murmur  Respiratory	good air movement bilaterally, no wheezing or crackles,  no retractions  Abdominal	soft; ND, NT, no hepatosplenomegaly, + bowel sounds  		normal external genitalia, no rash  Skin		skin intact and not indurated; no rash, no desquamation  Neurologic	alert, appropriate for age, cranial nerves II-XII grossly normal  Musculoskeletal	 no joint swelling, erythema, or tenderness; full range of motion  .			with no contractures; no muscle tenderness; no clubbing; no cyanosis;  .			no edema    Lab Results:                        10.2   12.59 )-----------( 247      ( 2021 06:04 )             33.1     11    140  |  97  |  65<H>  ----------------------------<  105<H>  4.7   |  20<L>  |  11.63<H>    Ca    9.8      2021 06:05    TPro  7.4  /  Alb  4.1  /  TBili  0.5  /  DBili  x   /  AST  23  /  ALT  10  /  AlkPhos  51  10-31    LIVER FUNCTIONS - ( 31 Oct 2021 09:50 )  Alb: 4.1 g/dL / Pro: 7.4 g/dL / ALK PHOS: 51 U/L / ALT: 10 U/L / AST: 23 U/L / GGT: x             Urinalysis Basic - ( 31 Oct 2021 13:23 )    Color: Yellow / Appearance: Clear / S.018 / pH: x  Gluc: x / Ketone: Negative  / Bili: Negative / Urobili: Negative   Blood: x / Protein: 100 mg/dL / Nitrite: Negative   Leuk Esterase: Negative / RBC: 0 /hpf / WBC 0 /HPF   Sq Epi: x / Non Sq Epi: 1 /hpf / Bacteria: Negative        IMAGING STUDIES:      Patient is a 56y old  Male who presents with a chief complaint of fever, rash (2021 11:15)    HPI:   56M w/ PMHx of ESRD on HD (M, W, F), HTN p/w lip swelling.  PT. had HD on 10/29/21 and noticed that his dialysis graft was clotted.  Went to his vascular surgeon later that day and the clot was removed around 3:30pm. Received contrast dye and antibiotics (versed, fentanyl, cefazolin, heparin and contrast). During procedure, he noticed lip itching, which worsened over the course of the day. When he arrived home, around 5-6pm, symptoms worsened, he developed sensation of throat closing, and lip swelling. Lips started to peel and he had mouth pain that made it difficult to eat. Tried to manage with benadryl, but eventually came to ED.  No CP, SOB, abd pain, n/v/d, pruritus, rash, hives.  He was admitted for angioedema.     Since admission, he has received benadryl 25mg q6h prn, prednisone, and one dose of pepcid. He says his symptoms have not improved.     He denies any prior episodes of facial swelling, no episodes of hives or flushing.     Allergies    No Known Allergies    Intolerances      MEDICATIONS  (STANDING):  aspirin enteric coated 81 milliGRAM(s) Oral daily  atorvastatin 40 milliGRAM(s) Oral at bedtime  clopidogrel Tablet 75 milliGRAM(s) Oral daily  dextrose 40% Gel 15 Gram(s) Oral once  dextrose 5%. 1000 milliLiter(s) (50 mL/Hr) IV Continuous <Continuous>  dextrose 5%. 1000 milliLiter(s) (100 mL/Hr) IV Continuous <Continuous>  dextrose 50% Injectable 25 Gram(s) IV Push once  dextrose 50% Injectable 12.5 Gram(s) IV Push once  dextrose 50% Injectable 25 Gram(s) IV Push once  glucagon  Injectable 1 milliGRAM(s) IntraMuscular once  heparin   Injectable 5000 Unit(s) IV Push once  heparin   Injectable 2500 Unit(s) Dialysis. once  insulin glargine Injectable (LANTUS) 50 Unit(s) SubCutaneous at bedtime  insulin lispro (ADMELOG) corrective regimen sliding scale   SubCutaneous three times a day before meals  insulin lispro (ADMELOG) corrective regimen sliding scale   SubCutaneous at bedtime  insulin lispro Injectable (ADMELOG) 10 Unit(s) SubCutaneous three times a day before meals  methylPREDNISolone 32 milliGRAM(s) Oral once  predniSONE   Tablet 30 milliGRAM(s) Oral daily    MEDICATIONS  (PRN):  diphenhydrAMINE 25 milliGRAM(s) Oral every 6 hours PRN Rash and/or Itching      PAST MEDICAL & SURGICAL HISTORY:  Renal disease  ESRD    Hypertension    Gout    Diabetes mellitus    HLD (hyperlipidemia)    Obesity    BENNIE on CPAP    Heart rate fast    H/O shoulder surgery    Injury of ankle and foot  surgically repaired, 10 years ago    H/O hernia repair  30 years ago    Peritoneal dialysis catheter in situ  Was inserted, and removed    Arteriovenous graft removed  Now there is a wound suction in left arm        REVIEW OF SYSTEMS  All review of systems negative, except for those marked:  General:		  Eyes:			  ENT:			  Pulmonary:		  Cardiac:		  Gastrointestinal:	  Renal/Urologic:		  Musculoskeletal:	  Skin:		  Neurologic:		  Psychiatric:		  Endocrine:		  Hematologic:		  Allergy/Immune:	        FAMILY HISTORY:  Family history of hypertension  , in  mother      Allergies		[x] No	[] Yes:   Asthma			[x] No	[] Yes:  Immune Deficiency	[x] No	[] Yes:   Other:			[] No	[] Yes:    Vital Signs Last 24 Hrs  T(C): 37.1 (2021 10:16), Max: 37.1 (31 Oct 2021 18:02)  T(F): 98.8 (2021 10:16), Max: 98.8 (2021 10:16)  HR: 96 (2021 10:16) (80 - 110)  BP: 146/84 (2021 10:16) (105/62 - 146/84)  BP(mean): 86 (31 Oct 2021 20:15) (65 - 94)  RR: 18 (2021 10:16) (18 - 22)  SpO2: 98% (2021 10:16) (96% - 100%)    PHYSICAL EXAM  All physical exam findings normal, except for those marked:  General:	alert, well developed/well nourished, no acute distress  Eyes      no conjunctival injection, no discharge, no photophobia, intact                 extraocular movements, sclera not icteric, no suborbital bogginess  ENT:     Perioral fullness, peeling and desquamating oral mucosa on hard palate and cheeks  Neck    supple, full range of motion  Cardiovascular	regular rate and rhythm; Normal S1, S2; No murmur  Respiratory	good air movement bilaterally, no wheezing or crackles,  no retractions  Abdominal	soft; ND, NT    Skin		skin intact and not indurated; no rash, no desquamation  Neurologic	alert, appropriate for age, cranial nerves II-XII grossly normal  Musculoskeletal	 no joint swelling, erythema, or tenderness; full range of motion  .			with no contractures; no muscle tenderness; no clubbing; no cyanosis;  .			no edema    Lab Results:                        10.2   12.59 )-----------( 247      ( 2021 06:04 )             33.1         140  |  97  |  65<H>  ----------------------------<  105<H>  4.7   |  20<L>  |  11.63<H>    Ca    9.8      2021 06:05    TPro  7.4  /  Alb  4.1  /  TBili  0.5  /  DBili  x   /  AST  23  /  ALT  10  /  AlkPhos  51  10-31    LIVER FUNCTIONS - ( 31 Oct 2021 09:50 )  Alb: 4.1 g/dL / Pro: 7.4 g/dL / ALK PHOS: 51 U/L / ALT: 10 U/L / AST: 23 U/L / GGT: x             Urinalysis Basic - ( 31 Oct 2021 13:23 )    Color: Yellow / Appearance: Clear / S.018 / pH: x  Gluc: x / Ketone: Negative  / Bili: Negative / Urobili: Negative   Blood: x / Protein: 100 mg/dL / Nitrite: Negative   Leuk Esterase: Negative / RBC: 0 /hpf / WBC 0 /HPF   Sq Epi: x / Non Sq Epi: 1 /hpf / Bacteria: Negative        IMAGING STUDIES:      Patient is a 56y old  Male who presents with a chief complaint of fever, rash (2021 11:15)    HPI:   56M w/ PMHx of ESRD on HD (M, W, F), HTN p/w lip swelling.  PT. had HD on 10/29/21 and noticed that his dialysis graft was clotted.  Went to his vascular surgeon later that day and the clot was removed around 3:30pm. Received contrast dye and antibiotics (versed, fentanyl, cefazolin, heparin and contrast). During procedure, he noticed lip itching, which worsened over the course of the day. When he arrived home, around 5-6pm, symptoms worsened, he developed sensation of throat closing, and lip swelling. Lips started to peel and he had mouth pain that made it difficult to eat. Tried to manage with benadryl, but eventually came to ED.  No CP, SOB, abd pain, n/v/d, pruritus, rash, hives.  He was admitted for angioedema. Only medication change in the past 6 weeks include addition of a blood thinner (clopidogrel?).    Since admission, he has received benadryl 25mg q6h prn, prednisone, and one dose of pepcid. He says his symptoms have not improved.     He denies any prior episodes of facial swelling, no episodes of hives or flushing.     Allergies    No Known Allergies    Intolerances      MEDICATIONS  (STANDING):  aspirin enteric coated 81 milliGRAM(s) Oral daily  atorvastatin 40 milliGRAM(s) Oral at bedtime  clopidogrel Tablet 75 milliGRAM(s) Oral daily  dextrose 40% Gel 15 Gram(s) Oral once  dextrose 5%. 1000 milliLiter(s) (50 mL/Hr) IV Continuous <Continuous>  dextrose 5%. 1000 milliLiter(s) (100 mL/Hr) IV Continuous <Continuous>  dextrose 50% Injectable 25 Gram(s) IV Push once  dextrose 50% Injectable 12.5 Gram(s) IV Push once  dextrose 50% Injectable 25 Gram(s) IV Push once  glucagon  Injectable 1 milliGRAM(s) IntraMuscular once  heparin   Injectable 5000 Unit(s) IV Push once  heparin   Injectable 2500 Unit(s) Dialysis. once  insulin glargine Injectable (LANTUS) 50 Unit(s) SubCutaneous at bedtime  insulin lispro (ADMELOG) corrective regimen sliding scale   SubCutaneous three times a day before meals  insulin lispro (ADMELOG) corrective regimen sliding scale   SubCutaneous at bedtime  insulin lispro Injectable (ADMELOG) 10 Unit(s) SubCutaneous three times a day before meals  methylPREDNISolone 32 milliGRAM(s) Oral once  predniSONE   Tablet 30 milliGRAM(s) Oral daily    MEDICATIONS  (PRN):  diphenhydrAMINE 25 milliGRAM(s) Oral every 6 hours PRN Rash and/or Itching      PAST MEDICAL & SURGICAL HISTORY:  Renal disease  ESRD    Hypertension    Gout    Diabetes mellitus    HLD (hyperlipidemia)    Obesity    BENNIE on CPAP    Heart rate fast    H/O shoulder surgery    Injury of ankle and foot  surgically repaired, 10 years ago    H/O hernia repair  30 years ago    Peritoneal dialysis catheter in situ  Was inserted, and removed    Arteriovenous graft removed  Now there is a wound suction in left arm        REVIEW OF SYSTEMS  All review of systems negative, except for those marked:  General:		  Eyes:			  ENT:			  Pulmonary:		  Cardiac:		  Gastrointestinal:	  Renal/Urologic:		  Musculoskeletal:	  Skin:		  Neurologic:		  Psychiatric:		  Endocrine:		  Hematologic:		  Allergy/Immune:	        FAMILY HISTORY:  Family history of hypertension  , in  mother      Allergies		[x] No	[] Yes:   Asthma			[x] No	[] Yes:  Immune Deficiency	[x] No	[] Yes:   Other:			[] No	[] Yes:    Vital Signs Last 24 Hrs  T(C): 37.1 (2021 10:16), Max: 37.1 (31 Oct 2021 18:02)  T(F): 98.8 (2021 10:16), Max: 98.8 (2021 10:16)  HR: 96 (2021 10:16) (80 - 110)  BP: 146/84 (2021 10:16) (105/62 - 146/84)  BP(mean): 86 (31 Oct 2021 20:15) (65 - 94)  RR: 18 (2021 10:16) (18 - 22)  SpO2: 98% (2021 10:16) (96% - 100%)    PHYSICAL EXAM  All physical exam findings normal, except for those marked:  General:	alert, well developed/well nourished, no acute distress  Eyes      no conjunctival injection, no discharge, no photophobia, intact                 extraocular movements, sclera not icteric  ENT:     Perioral fullness, desquamation on oral mucosa on hard palate, buccal mucosa, and lower lip, with white plaques on tongue  Neck    supple, full range of motion  Cardiovascular	regular rate and rhythm; Normal S1, S2; No murmur  Respiratory	good air movement bilaterally, no wheezing or crackles,  no retractions  Abdominal	soft; ND, NT    Skin		skin intact and not indurated; no rash, desquamation on lower lip  Neurologic	alert, appropriate for age, cranial nerves II-XII grossly normal  Musculoskeletal	 no joint swelling, erythema, or tenderness; full range of motion  .			with no contractures; no muscle tenderness; no clubbing; no cyanosis;  .			no edema    Lab Results:                        10.2   12.59 )-----------( 247      ( 2021 06:04 )             33.1     11    140  |  97  |  65<H>  ----------------------------<  105<H>  4.7   |  20<L>  |  11.63<H>    Ca    9.8      2021 06:05    TPro  7.4  /  Alb  4.1  /  TBili  0.5  /  DBili  x   /  AST  23  /  ALT  10  /  AlkPhos  51  10-    LIVER FUNCTIONS - ( 31 Oct 2021 09:50 )  Alb: 4.1 g/dL / Pro: 7.4 g/dL / ALK PHOS: 51 U/L / ALT: 10 U/L / AST: 23 U/L / GGT: x             Urinalysis Basic - ( 31 Oct 2021 13:23 )    Color: Yellow / Appearance: Clear / S.018 / pH: x  Gluc: x / Ketone: Negative  / Bili: Negative / Urobili: Negative   Blood: x / Protein: 100 mg/dL / Nitrite: Negative   Leuk Esterase: Negative / RBC: 0 /hpf / WBC 0 /HPF   Sq Epi: x / Non Sq Epi: 1 /hpf / Bacteria: Negative        IMAGING STUDIES:

## 2021-11-02 ENCOUNTER — NON-APPOINTMENT (OUTPATIENT)
Age: 56
End: 2021-11-02

## 2021-11-02 LAB
ANION GAP SERPL CALC-SCNC: 18 MMOL/L — HIGH (ref 5–17)
BUN SERPL-MCNC: 53 MG/DL — HIGH (ref 7–23)
CALCIUM SERPL-MCNC: 9.6 MG/DL — SIGNIFICANT CHANGE UP (ref 8.4–10.5)
CHLORIDE SERPL-SCNC: 95 MMOL/L — LOW (ref 96–108)
CO2 SERPL-SCNC: 22 MMOL/L — SIGNIFICANT CHANGE UP (ref 22–31)
CREAT SERPL-MCNC: 9.73 MG/DL — HIGH (ref 0.5–1.3)
GLUCOSE BLDC GLUCOMTR-MCNC: 131 MG/DL — HIGH (ref 70–99)
GLUCOSE BLDC GLUCOMTR-MCNC: 143 MG/DL — HIGH (ref 70–99)
GLUCOSE BLDC GLUCOMTR-MCNC: 161 MG/DL — HIGH (ref 70–99)
GLUCOSE BLDC GLUCOMTR-MCNC: 196 MG/DL — HIGH (ref 70–99)
GLUCOSE BLDC GLUCOMTR-MCNC: 207 MG/DL — HIGH (ref 70–99)
GLUCOSE SERPL-MCNC: 151 MG/DL — HIGH (ref 70–99)
HCT VFR BLD CALC: 34.7 % — LOW (ref 39–50)
HGB BLD-MCNC: 10.7 G/DL — LOW (ref 13–17)
HSV+VZV DNA SPEC QL NAA+PROBE: SIGNIFICANT CHANGE UP
MCHC RBC-ENTMCNC: 20.2 PG — LOW (ref 27–34)
MCHC RBC-ENTMCNC: 30.8 GM/DL — LOW (ref 32–36)
MCV RBC AUTO: 65.5 FL — LOW (ref 80–100)
NRBC # BLD: 0 /100 WBCS — SIGNIFICANT CHANGE UP (ref 0–0)
PLATELET # BLD AUTO: 229 K/UL — SIGNIFICANT CHANGE UP (ref 150–400)
POTASSIUM SERPL-MCNC: 4.8 MMOL/L — SIGNIFICANT CHANGE UP (ref 3.5–5.3)
POTASSIUM SERPL-SCNC: 4.8 MMOL/L — SIGNIFICANT CHANGE UP (ref 3.5–5.3)
RBC # BLD: 5.3 M/UL — SIGNIFICANT CHANGE UP (ref 4.2–5.8)
RBC # FLD: 19.5 % — HIGH (ref 10.3–14.5)
SODIUM SERPL-SCNC: 135 MMOL/L — SIGNIFICANT CHANGE UP (ref 135–145)
SPECIMEN SOURCE: SIGNIFICANT CHANGE UP
WBC # BLD: 10.77 K/UL — HIGH (ref 3.8–10.5)
WBC # FLD AUTO: 10.77 K/UL — HIGH (ref 3.8–10.5)

## 2021-11-02 PROCEDURE — 99232 SBSQ HOSP IP/OBS MODERATE 35: CPT

## 2021-11-02 PROCEDURE — 70491 CT SOFT TISSUE NECK W/DYE: CPT | Mod: 26

## 2021-11-02 RX ORDER — HEPARIN SODIUM 5000 [USP'U]/ML
2500 INJECTION INTRAVENOUS; SUBCUTANEOUS ONCE
Refills: 0 | Status: COMPLETED | OUTPATIENT
Start: 2021-11-03 | End: 2021-11-03

## 2021-11-02 RX ORDER — DIPHENHYDRAMINE HYDROCHLORIDE AND LIDOCAINE HYDROCHLORIDE AND ALUMINUM HYDROXIDE AND MAGNESIUM HYDRO
30 KIT ONCE
Refills: 0 | Status: COMPLETED | OUTPATIENT
Start: 2021-11-02 | End: 2021-11-02

## 2021-11-02 RX ORDER — HEPARIN SODIUM 5000 [USP'U]/ML
5000 INJECTION INTRAVENOUS; SUBCUTANEOUS ONCE
Refills: 0 | Status: COMPLETED | OUTPATIENT
Start: 2021-11-03 | End: 2021-11-03

## 2021-11-02 RX ADMIN — Medication 50 MILLIGRAM(S): at 07:13

## 2021-11-02 RX ADMIN — Medication 10 UNIT(S): at 08:58

## 2021-11-02 RX ADMIN — Medication 10 UNIT(S): at 17:27

## 2021-11-02 RX ADMIN — Medication 30 MILLIGRAM(S): at 06:09

## 2021-11-02 RX ADMIN — DIPHENHYDRAMINE HYDROCHLORIDE AND LIDOCAINE HYDROCHLORIDE AND ALUMINUM HYDROXIDE AND MAGNESIUM HYDRO 30 MILLILITER(S): KIT at 22:15

## 2021-11-02 RX ADMIN — INSULIN GLARGINE 50 UNIT(S): 100 INJECTION, SOLUTION SUBCUTANEOUS at 21:35

## 2021-11-02 RX ADMIN — Medication 81 MILLIGRAM(S): at 12:12

## 2021-11-02 RX ADMIN — Medication 10 UNIT(S): at 12:36

## 2021-11-02 RX ADMIN — CLOPIDOGREL BISULFATE 75 MILLIGRAM(S): 75 TABLET, FILM COATED ORAL at 12:12

## 2021-11-02 RX ADMIN — ATORVASTATIN CALCIUM 40 MILLIGRAM(S): 80 TABLET, FILM COATED ORAL at 21:35

## 2021-11-02 RX ADMIN — Medication 2: at 17:27

## 2021-11-02 RX ADMIN — Medication 32 MILLIGRAM(S): at 06:09

## 2021-11-02 NOTE — PROGRESS NOTE ADULT - SUBJECTIVE AND OBJECTIVE BOX
LORRAINE RUTH  56y Male  MRN:[id number]    Patient is a 56y old  Male who presents with a chief complaint of fever, rash (01 Nov 2021 11:15)    HPI:   56M w/ PMHx of ESRD on HD (M, W, F), HTN p/w lip swelling.  PT. had HD on Friday and noticed that his dialysis graft was clotted.  Went to his vascular surgeon later that day and the clot was removed.  Started noticing lip swelling w/ throat closing since.  No CP, SOB, abd pain, n/v/d, pruritus, rash, hives.  PT. has been taking ATC benadryl w/o relief. (31 Oct 2021 20:54)      Patient seen and evaluated at bedside. No acute events overnight except as noted.    Interval HPI: feels better     PAST MEDICAL & SURGICAL HISTORY:  Renal disease  ESRD    Hypertension    Gout    Diabetes mellitus    HLD (hyperlipidemia)    Obesity    BENNIE on CPAP    Heart rate fast    H/O shoulder surgery    Injury of ankle and foot  surgically repaired, 10 years ago    H/O hernia repair  30 years ago    Peritoneal dialysis catheter in situ  Was inserted, and removed    Arteriovenous graft removed  Now there is a wound suction in left arm        REVIEW OF SYSTEMS:  as per hpi     VITALS:   Vital Signs Last 24 Hrs  T(C): 36.8 (02 Nov 2021 12:42), Max: 37.1 (01 Nov 2021 19:47)  T(F): 98.2 (02 Nov 2021 12:42), Max: 98.8 (01 Nov 2021 19:47)  HR: 96 (02 Nov 2021 12:42) (96 - 117)  BP: 122/76 (02 Nov 2021 12:42) (108/77 - 138/78)  BP(mean): --  RR: 18 (02 Nov 2021 12:42) (18 - 18)  SpO2: 94% (02 Nov 2021 12:42) (94% - 100%)      PHYSICAL EXAM:  GENERAL: NAD, well-developed  HEAD:  Atraumatic, Normocephalic  EYES: EOMI, PERRLA, conjunctiva and sclera clear  NECK: Supple, No JVD  CHEST/LUNG: Clear to auscultation bilaterally; No wheeze  HEART: S1, S2; No murmurs, rubs, or gallops  ABDOMEN: Soft, Nontender, Nondistended; Bowel sounds present  EXTREMITIES:  2+ Peripheral Pulses, No clubbing, cyanosis, or edema  PSYCH: Normal affect  NEUROLOGY: AAOX3; non-focal  SKIN: No rashes or lesions    Consultant(s) Notes Reviewed:  [x ] YES  [ ] NO  Care Discussed with Consultants/Other Providers [ x] YES  [ ] NO    MEDS:   MEDICATIONS  (STANDING):  aspirin enteric coated 81 milliGRAM(s) Oral daily  atorvastatin 40 milliGRAM(s) Oral at bedtime  clopidogrel Tablet 75 milliGRAM(s) Oral daily  dextrose 40% Gel 15 Gram(s) Oral once  dextrose 5%. 1000 milliLiter(s) (50 mL/Hr) IV Continuous <Continuous>  dextrose 5%. 1000 milliLiter(s) (100 mL/Hr) IV Continuous <Continuous>  dextrose 50% Injectable 25 Gram(s) IV Push once  dextrose 50% Injectable 12.5 Gram(s) IV Push once  dextrose 50% Injectable 25 Gram(s) IV Push once  glucagon  Injectable 1 milliGRAM(s) IntraMuscular once  insulin glargine Injectable (LANTUS) 50 Unit(s) SubCutaneous at bedtime  insulin lispro (ADMELOG) corrective regimen sliding scale   SubCutaneous three times a day before meals  insulin lispro (ADMELOG) corrective regimen sliding scale   SubCutaneous at bedtime  insulin lispro Injectable (ADMELOG) 10 Unit(s) SubCutaneous three times a day before meals  predniSONE   Tablet 30 milliGRAM(s) Oral daily    MEDICATIONS  (PRN):  diphenhydrAMINE 25 milliGRAM(s) Oral every 6 hours PRN Rash and/or Itching      ALLERGIES:  No Known Allergies      LABS:                                                 10.7   10.77 )-----------( 229      ( 02 Nov 2021 10:02 )             34.7   11-02    135  |  95<L>  |  53<H>  ----------------------------<  151<H>  4.8   |  22  |  9.73<H>    Ca    9.6      02 Nov 2021 10:02           Lipid panel:   cultures: Culture Results:   <10,000 CFU/mL Normal Urogenital Corrie (10-31 @ 16:59)  Culture Results:   No growth to date. (10-31 @ 14:29)  Culture Results:   No growth to date. (10-31 @ 14:29)       < from: CT Neck Soft Tissue w/ IV Cont (11.02.21 @ 08:25) >  IMPRESSION:    Mild enlargement of the lips with adjacent inflammatory changes consistent with residual edema. A tunnel uvula and epiglottis are normal. There is no airway compression. No enlarged cervical lymph nodes.      < end of copied text >

## 2021-11-02 NOTE — PROGRESS NOTE ADULT - SUBJECTIVE AND OBJECTIVE BOX
Patient is a 56y old  Male who presents with a chief complaint of rash    f/u rash    Interval History/ROS:  no fever.  rash about the same though was bleeding this morning.  throat pain.  no n/v/d.  seen by A/I and dermatology.  Remainder of ROS otherwise negative.    PAST MEDICAL & SURGICAL HISTORY:  ESRD on HD for 14 months  AVG LUE infected and excised 3/2021  New AVG RUE  Hypertension  Gout  Diabetes mellitus  HLD (hyperlipidemia)  Obesity  BENNIE on CPAP  H/O shoulder surgery  Injury of ankle and foot surgically repaired, 10 years ago  H/O hernia repair 30 years ago    Allergies  No Known Allergies    ANTIMICROBIALS:  piperacillin/tazobactam IVPB.. 3.375 every 12 hours (10/31-)  none active    MEDICATIONS  (STANDING):  aspirin enteric coated 81 daily  atorvastatin 40 at bedtime  clopidogrel Tablet 75 daily  insulin glargine Injectable (LANTUS) 50 at bedtime  insulin lispro (ADMELOG) corrective regimen sliding scale  three times a day before meals  insulin lispro (ADMELOG) corrective regimen sliding scale  at bedtime  insulin lispro Injectable (ADMELOG) 10 three times a day before meals  predniSONE   Tablet 30 daily    Vital Signs Last 24 Hrs  T(F): 98.4 (21 @ 04:57), Max: 98.8 (21 @ 19:47)  HR: 108 (21 @ 04:57)  BP: 125/86 (21 @ 04:57)  RR: 18 (21 @ 04:57)  SpO2: 96% (21 @ 04:57) (96% - 100%)  Wt(kg): --    PHYSICAL EXAM:  Constitutional: non-toxic  HEAD/EYES: anicteric, no conjunctival injection  ENT:  supple, no thrush but oral ulceration/sloughing and lips  Cardiovascular:   normal S1, S2, distant heart sounds  Respiratory:  clear BS bilaterally  GI:  soft, non-tender, normal bowel sounds  :  no hyde  Musculoskeletal:  no synovitis  Neurologic: awake and alert, normal strength, no focal findings  Skin:  oral uler and lip involvement, rash b/l elbow extensor surface  Psychiatric:  awake, alert, appropriate mood                        10.2   12.59 )-----------( 247      ( 2021 06:04 )             33.1     140  |  97  |  65  ----------------------------<  105  4.7   |  20  |  11.63  Ca    9.8      2021 06:05    Auto Eosinophil %: 10.5 % (10-31-21 @ 09:50)  Auto Eosinophil %: 6.4 % (20 @ 06:45)  Auto Eosinophil %: 2.5 % (20 @ 13:45)  Auto Eosinophil %: 1.3 % (20 @ 15:55)    Urinalysis Basic - ( 31 Oct 2021 13:23 )  Color: Yellow / Appearance: Clear / S.018 / pH: x  Gluc: x / Ketone: Negative  / Bili: Negative / Urobili: Negative   Blood: x / Protein: 100 mg/dL / Nitrite: Negative   Leuk Esterase: Negative / RBC: 0 /hpf / WBC 0 /HPF   Sq Epi: x / Non Sq Epi: 1 /hpf / Bacteria: Negative    MICROBIOLOGY:  Culture - Urine (collected 10-31-21 @ 16:59)  Source: Clean Catch Clean Catch (Midstream)  Final Report (21 @ 13:31):    <10,000 CFU/mL Normal Urogenital Corrie    Culture - Blood (collected 10-31-21 @ 14:29)  Source: .Blood Blood-Peripheral  Preliminary Report (21 @ 15:02):    No growth to date.    Culture - Blood (collected 10-31-21 @ 14:29)  Source: .Blood Blood-Peripheral  Preliminary Report (21 @ 15:02):    No growth to date.  SARS-CoV-2: [facility]: This Respiratory Panel uses polymerase chain reaction (PCR) to detect for adenovirus; coronavirus (HKU1, NL63, 229E, OC43); human metapneumovirus (hMPV); human enterovirus/rhinovirus (Entero/RV); influenza A; influenza A/H1; influenza A/H3; influenza A/H1-2009; influenza B; parainfluenza viruses 1, 2, 3, 4; respiratory syncytial virus; Mycoplasma pneumoniae; Chlamydophila pneumoniae; and SARS-CoV-2.   RADIOLOGY:  imaging below personally reviewed and agree with findings    Xray Chest 1 View- PORTABLE-Urgent (Xray Chest 1 View- PORTABLE-Urgent .) (10.31.21 @ 14:41) >  IMPRESSION:  Clear lungs.

## 2021-11-03 ENCOUNTER — TRANSCRIPTION ENCOUNTER (OUTPATIENT)
Age: 56
End: 2021-11-03

## 2021-11-03 VITALS
OXYGEN SATURATION: 95 % | TEMPERATURE: 99 F | DIASTOLIC BLOOD PRESSURE: 71 MMHG | RESPIRATION RATE: 18 BRPM | HEART RATE: 126 BPM | SYSTOLIC BLOOD PRESSURE: 110 MMHG

## 2021-11-03 LAB
ANION GAP SERPL CALC-SCNC: 20 MMOL/L — HIGH (ref 5–17)
BUN SERPL-MCNC: 82 MG/DL — HIGH (ref 7–23)
CALCIUM SERPL-MCNC: 9.2 MG/DL — SIGNIFICANT CHANGE UP (ref 8.4–10.5)
CHLORIDE SERPL-SCNC: 98 MMOL/L — SIGNIFICANT CHANGE UP (ref 96–108)
CO2 SERPL-SCNC: 20 MMOL/L — LOW (ref 22–31)
CREAT SERPL-MCNC: 11.59 MG/DL — HIGH (ref 0.5–1.3)
GLUCOSE BLDC GLUCOMTR-MCNC: 145 MG/DL — HIGH (ref 70–99)
GLUCOSE BLDC GLUCOMTR-MCNC: 159 MG/DL — HIGH (ref 70–99)
GLUCOSE SERPL-MCNC: 103 MG/DL — HIGH (ref 70–99)
HCT VFR BLD CALC: 35.2 % — LOW (ref 39–50)
HGB BLD-MCNC: 10.6 G/DL — LOW (ref 13–17)
MCHC RBC-ENTMCNC: 20 PG — LOW (ref 27–34)
MCHC RBC-ENTMCNC: 30.1 GM/DL — LOW (ref 32–36)
MCV RBC AUTO: 66.4 FL — LOW (ref 80–100)
NRBC # BLD: 0 /100 WBCS — SIGNIFICANT CHANGE UP (ref 0–0)
PLATELET # BLD AUTO: 239 K/UL — SIGNIFICANT CHANGE UP (ref 150–400)
POTASSIUM SERPL-MCNC: 4.1 MMOL/L — SIGNIFICANT CHANGE UP (ref 3.5–5.3)
POTASSIUM SERPL-SCNC: 4.1 MMOL/L — SIGNIFICANT CHANGE UP (ref 3.5–5.3)
RBC # BLD: 5.3 M/UL — SIGNIFICANT CHANGE UP (ref 4.2–5.8)
RBC # FLD: 19.5 % — HIGH (ref 10.3–14.5)
SODIUM SERPL-SCNC: 138 MMOL/L — SIGNIFICANT CHANGE UP (ref 135–145)
WBC # BLD: 12.93 K/UL — HIGH (ref 3.8–10.5)
WBC # FLD AUTO: 12.93 K/UL — HIGH (ref 3.8–10.5)

## 2021-11-03 PROCEDURE — 85027 COMPLETE CBC AUTOMATED: CPT

## 2021-11-03 PROCEDURE — 96374 THER/PROPH/DIAG INJ IV PUSH: CPT

## 2021-11-03 PROCEDURE — 87529 HSV DNA AMP PROBE: CPT

## 2021-11-03 PROCEDURE — 85018 HEMOGLOBIN: CPT

## 2021-11-03 PROCEDURE — 99231 SBSQ HOSP IP/OBS SF/LOW 25: CPT

## 2021-11-03 PROCEDURE — 83036 HEMOGLOBIN GLYCOSYLATED A1C: CPT

## 2021-11-03 PROCEDURE — 87086 URINE CULTURE/COLONY COUNT: CPT

## 2021-11-03 PROCEDURE — 93005 ELECTROCARDIOGRAM TRACING: CPT

## 2021-11-03 PROCEDURE — 82962 GLUCOSE BLOOD TEST: CPT

## 2021-11-03 PROCEDURE — 84295 ASSAY OF SERUM SODIUM: CPT

## 2021-11-03 PROCEDURE — 82947 ASSAY GLUCOSE BLOOD QUANT: CPT

## 2021-11-03 PROCEDURE — 36415 COLL VENOUS BLD VENIPUNCTURE: CPT

## 2021-11-03 PROCEDURE — 87040 BLOOD CULTURE FOR BACTERIA: CPT

## 2021-11-03 PROCEDURE — 85014 HEMATOCRIT: CPT

## 2021-11-03 PROCEDURE — 81001 URINALYSIS AUTO W/SCOPE: CPT

## 2021-11-03 PROCEDURE — 86769 SARS-COV-2 COVID-19 ANTIBODY: CPT

## 2021-11-03 PROCEDURE — 80053 COMPREHEN METABOLIC PANEL: CPT

## 2021-11-03 PROCEDURE — 96375 TX/PRO/DX INJ NEW DRUG ADDON: CPT

## 2021-11-03 PROCEDURE — 82435 ASSAY OF BLOOD CHLORIDE: CPT

## 2021-11-03 PROCEDURE — 82565 ASSAY OF CREATININE: CPT

## 2021-11-03 PROCEDURE — 71045 X-RAY EXAM CHEST 1 VIEW: CPT

## 2021-11-03 PROCEDURE — 99261: CPT

## 2021-11-03 PROCEDURE — 83605 ASSAY OF LACTIC ACID: CPT

## 2021-11-03 PROCEDURE — 70491 CT SOFT TISSUE NECK W/DYE: CPT

## 2021-11-03 PROCEDURE — 82330 ASSAY OF CALCIUM: CPT

## 2021-11-03 PROCEDURE — 85025 COMPLETE CBC W/AUTO DIFF WBC: CPT

## 2021-11-03 PROCEDURE — 80048 BASIC METABOLIC PNL TOTAL CA: CPT

## 2021-11-03 PROCEDURE — 84132 ASSAY OF SERUM POTASSIUM: CPT

## 2021-11-03 PROCEDURE — 87798 DETECT AGENT NOS DNA AMP: CPT

## 2021-11-03 PROCEDURE — 0225U NFCT DS DNA&RNA 21 SARSCOV2: CPT

## 2021-11-03 PROCEDURE — 82803 BLOOD GASES ANY COMBINATION: CPT

## 2021-11-03 PROCEDURE — 99285 EMERGENCY DEPT VISIT HI MDM: CPT | Mod: 25

## 2021-11-03 RX ORDER — DIPHENHYDRAMINE HCL 50 MG
1 CAPSULE ORAL
Qty: 12 | Refills: 0
Start: 2021-11-03 | End: 2021-11-05

## 2021-11-03 RX ORDER — DIPHENHYDRAMINE HYDROCHLORIDE AND LIDOCAINE HYDROCHLORIDE AND ALUMINUM HYDROXIDE AND MAGNESIUM HYDRO
10 KIT
Qty: 250 | Refills: 0
Start: 2021-11-03

## 2021-11-03 RX ORDER — DIPHENHYDRAMINE HCL 50 MG
10 CAPSULE ORAL
Qty: 0 | Refills: 0 | DISCHARGE

## 2021-11-03 RX ORDER — DIPHENHYDRAMINE HYDROCHLORIDE AND LIDOCAINE HYDROCHLORIDE AND ALUMINUM HYDROXIDE AND MAGNESIUM HYDRO
10 KIT EVERY 4 HOURS
Refills: 0 | Status: DISCONTINUED | OUTPATIENT
Start: 2021-11-03 | End: 2021-11-03

## 2021-11-03 RX ADMIN — Medication 81 MILLIGRAM(S): at 13:03

## 2021-11-03 RX ADMIN — HEPARIN SODIUM 2500 UNIT(S): 5000 INJECTION INTRAVENOUS; SUBCUTANEOUS at 10:33

## 2021-11-03 RX ADMIN — HEPARIN SODIUM 5000 UNIT(S): 5000 INJECTION INTRAVENOUS; SUBCUTANEOUS at 08:44

## 2021-11-03 RX ADMIN — Medication 1: at 07:36

## 2021-11-03 RX ADMIN — Medication 10 UNIT(S): at 13:00

## 2021-11-03 RX ADMIN — DIPHENHYDRAMINE HYDROCHLORIDE AND LIDOCAINE HYDROCHLORIDE AND ALUMINUM HYDROXIDE AND MAGNESIUM HYDRO 10 MILLILITER(S): KIT at 15:33

## 2021-11-03 RX ADMIN — CLOPIDOGREL BISULFATE 75 MILLIGRAM(S): 75 TABLET, FILM COATED ORAL at 13:03

## 2021-11-03 RX ADMIN — Medication 30 MILLIGRAM(S): at 05:16

## 2021-11-03 RX ADMIN — Medication 10 UNIT(S): at 07:34

## 2021-11-03 NOTE — DISCHARGE NOTE PROVIDER - NSDCCPCAREPLAN_GEN_ALL_CORE_FT
PRINCIPAL DISCHARGE DIAGNOSIS  Diagnosis: Angioedema  Assessment and Plan of Treatment: Likely Alllergy to IV contrast/medication given during clot removal procedure. Continue steroids, prednisone 30mg daily for 3 days. Follow up with Allergy/immunology . You will need repeat titers in 4 weeks.      SECONDARY DISCHARGE DIAGNOSES  Diagnosis: Systemic inflammatory response syndrome (SIRS)  Assessment and Plan of Treatment: Follow up with allergy/immunology    Diagnosis: ESRD on dialysis  Assessment and Plan of Treatment: HD as scheduled. M/W/F    Diagnosis: Mouth sore  Assessment and Plan of Treatment: Continue mouth care- follow up with PMD, allergy/immunology

## 2021-11-03 NOTE — PROGRESS NOTE ADULT - SUBJECTIVE AND OBJECTIVE BOX
Patient is a 56y old  Male who presents with a chief complaint of rash    f/u rash    Interval History/ROS:  seen at HD.  still having pain in his mouth.  no n/v.  no fever.  difficulty eating due to pain.   Remainder of ROS otherwise negative.    PAST MEDICAL & SURGICAL HISTORY:  ESRD on HD for 14 months  AVG LUE infected and excised 3/2021  New AVG RUE  Hypertension  Gout  Diabetes mellitus  HLD (hyperlipidemia)  Obesity  BENNIE on CPAP  H/O shoulder surgery  Injury of ankle and foot surgically repaired, 10 years ago  H/O hernia repair 30 years ago    Allergies  No Known Allergies    ANTIMICROBIALS:  none active    MEDICATIONS  (STANDING):  aspirin enteric coated 81 daily  atorvastatin 40 at bedtime  clopidogrel Tablet 75 daily  heparin   Injectable 2500 once  insulin glargine Injectable (LANTUS) 50 at bedtime  insulin lispro (ADMELOG) corrective regimen sliding scale  three times a day before meals  insulin lispro (ADMELOG) corrective regimen sliding scale  at bedtime  insulin lispro Injectable (ADMELOG) 10 three times a day before meals  predniSONE   Tablet 30 daily    Vital Signs Last 24 Hrs  T(F): 98.1 (21 @ 04:52), Max: 98.6 (21 @ 21:11)  HR: 98 (21 @ 04:52)  BP: 118/74 (21 @ 04:52)  RR: 18 (21 @ 04:52)  SpO2: 96% (21 @ 04:52) (94% - 96%)    PHYSICAL EXAM:  Constitutional: non-toxic  HEAD/EYES: anicteric  ENT:  supple, no thrush but oral ulceration/sloughing and lips better today  Cardiovascular:   normal S1, S2, distant heart sounds  Respiratory:  clear BS bilaterally  GI:  soft, non-tender, normal bowel sounds  :  no hyde  Musculoskeletal:  no synovitis  Neurologic: awake and alert, normal strength, no focal findings  Skin:  oral ulcer and lip involvement, rash b/l elbow extensor surface  Psychiatric:  awake, alert, appropriate mood                             10.6   12.93 )-----------( 239      ( 2021 06:23 )             35.2     138  |  98  |  82  ----------------------------<  103  4.1   |  20  |  11.59  Ca    9.2      2021 06:23    Auto Eosinophil %: 10.5 % (10-31-21 @ 09:50)  Auto Eosinophil %: 6.4 % (20 @ 06:45)  Auto Eosinophil %: 2.5 % (20 @ 13:45)  Auto Eosinophil %: 1.3 % (20 @ 15:55)    Urinalysis Basic - ( 31 Oct 2021 13:23 )  Color: Yellow / Appearance: Clear / S.018 / pH: x  Gluc: x / Ketone: Negative  / Bili: Negative / Urobili: Negative   Blood: x / Protein: 100 mg/dL / Nitrite: Negative   Leuk Esterase: Negative / RBC: 0 /hpf / WBC 0 /HPF   Sq Epi: x / Non Sq Epi: 1 /hpf / Bacteria: Negative    MICROBIOLOGY:  2021 Herpes Simplex Virus 1/2  VZV PCR Result: [facility]    Culture - Urine (collected 10-31-21 @ 16:59)  Source: Clean Catch Clean Catch (Midstream)  Final Report (21 @ 13:31):    <10,000 CFU/mL Normal Urogenital Corrie    Culture - Blood (collected 10-31-21 @ 14:29)  Source: .Blood Blood-Peripheral  Preliminary Report (21 @ 15:02):    No growth to date.    Culture - Blood (collected 10-31-21 @ 14:29)  Source: .Blood Blood-Peripheral  Preliminary Report (21 @ 15:02):    No growth to date.  SARS-CoV-2: [facility]: This Respiratory Panel uses polymerase chain reaction (PCR) to detect for adenovirus; coronavirus (HKU1, NL63, 229E, OC43); human metapneumovirus (hMPV); human enterovirus/rhinovirus (Entero/RV); influenza A; influenza A/H1; influenza A/H3; influenza A/H1-2009; influenza B; parainfluenza viruses 1, 2, 3, 4; respiratory syncytial virus; Mycoplasma pneumoniae; Chlamydophila pneumoniae; and SARS-CoV-2.   RADIOLOGY:  imaging below personally reviewed and agree with findings    Xray Chest 1 View- PORTABLE-Urgent (Xray Chest 1 View- PORTABLE-Urgent .) (10.31.21 @ 14:41) >  IMPRESSION:  Clear lungs.

## 2021-11-03 NOTE — DISCHARGE NOTE PROVIDER - HOSPITAL COURSE
56M with HTN, Obesity, chol, BENNIE on CPAP, ESRD on HD (M, W, F) initially via LUE AVG that clotted and complicated by infection requiring excision 3/2021.  Now on HD via RUE AVG that after HD, hypotension 10/29, AVG clotted.  Went for clot removal on 10/29.  Received contrast dye and antibiotics (versed, fentanyl, cefazolin, heparin and contrast).  During the procedure, he developed lip itching.  By the time he got home, it was worse.  Told to take benadryl.  Did not help.  Came to [facility] yesterday.      Dx : Angioedema- s/p Decadron/ Benadryl/ Pepcid in ED          Fever - s/p Zosyn, ID recs to stop           ESRD- HD  ( M W F )     Allergy Immunology, Derm , ENT and ID have seen patient. Prednisone initiated. Symptoms improved.   Discharged home to follow up with allergy immunology

## 2021-11-03 NOTE — PROGRESS NOTE ADULT - PROVIDER SPECIALTY LIST ADULT
Internal Medicine
Internal Medicine
Nephrology
Nephrology
Infectious Disease
Infectious Disease
Internal Medicine

## 2021-11-03 NOTE — PROGRESS NOTE ADULT - SUBJECTIVE AND OBJECTIVE BOX
LORRAINE RUTH  56y Male  MRN:[id number]    Patient is a 56y old  Male who presents with a chief complaint of fever, rash (01 Nov 2021 11:15)    HPI:   56M w/ PMHx of ESRD on HD (M, W, F), HTN p/w lip swelling.  PT. had HD on Friday and noticed that his dialysis graft was clotted.  Went to his vascular surgeon later that day and the clot was removed.  Started noticing lip swelling w/ throat closing since.  No CP, SOB, abd pain, n/v/d, pruritus, rash, hives.  PT. has been taking ATC benadryl w/o relief. (31 Oct 2021 20:54)      Patient seen and evaluated at bedside. No acute events overnight except as noted.    Interval HPI: feels better     PAST MEDICAL & SURGICAL HISTORY:  Renal disease  ESRD    Hypertension    Gout    Diabetes mellitus    HLD (hyperlipidemia)    Obesity    BENNIE on CPAP    Heart rate fast    H/O shoulder surgery    Injury of ankle and foot  surgically repaired, 10 years ago    H/O hernia repair  30 years ago    Peritoneal dialysis catheter in situ  Was inserted, and removed    Arteriovenous graft removed  Now there is a wound suction in left arm        REVIEW OF SYSTEMS:  as per hpi     VITALS:   Vital Signs Last 24 Hrs  T(C): 36.4 (03 Nov 2021 12:00), Max: 37 (02 Nov 2021 21:11)  T(F): 97.5 (03 Nov 2021 12:00), Max: 98.6 (02 Nov 2021 21:11)  HR: 89 (03 Nov 2021 12:00) (89 - 104)  BP: 124/69 (03 Nov 2021 12:00) (118/74 - 130/74)  BP(mean): --  RR: 18 (03 Nov 2021 12:00) (18 - 18)  SpO2: 97% (03 Nov 2021 12:00) (95% - 97%)      PHYSICAL EXAM:  GENERAL: NAD, well-developed  HEAD:  Atraumatic, Normocephalic  EYES: EOMI, PERRLA, conjunctiva and sclera clear  NECK: Supple, No JVD  CHEST/LUNG: Clear to auscultation bilaterally; No wheeze  HEART: S1, S2; No murmurs, rubs, or gallops  ABDOMEN: Soft, Nontender, Nondistended; Bowel sounds present  EXTREMITIES:  2+ Peripheral Pulses, No clubbing, cyanosis, or edema  PSYCH: Normal affect  NEUROLOGY: AAOX3; non-focal  SKIN: No rashes or lesions    Consultant(s) Notes Reviewed:  [x ] YES  [ ] NO  Care Discussed with Consultants/Other Providers [ x] YES  [ ] NO    MEDS:   MEDICATIONS  (STANDING):  aspirin enteric coated 81 milliGRAM(s) Oral daily  atorvastatin 40 milliGRAM(s) Oral at bedtime  clopidogrel Tablet 75 milliGRAM(s) Oral daily  dextrose 40% Gel 15 Gram(s) Oral once  dextrose 5%. 1000 milliLiter(s) (50 mL/Hr) IV Continuous <Continuous>  dextrose 5%. 1000 milliLiter(s) (100 mL/Hr) IV Continuous <Continuous>  dextrose 50% Injectable 25 Gram(s) IV Push once  dextrose 50% Injectable 12.5 Gram(s) IV Push once  dextrose 50% Injectable 25 Gram(s) IV Push once  glucagon  Injectable 1 milliGRAM(s) IntraMuscular once  insulin glargine Injectable (LANTUS) 50 Unit(s) SubCutaneous at bedtime  insulin lispro (ADMELOG) corrective regimen sliding scale   SubCutaneous three times a day before meals  insulin lispro (ADMELOG) corrective regimen sliding scale   SubCutaneous at bedtime  insulin lispro Injectable (ADMELOG) 10 Unit(s) SubCutaneous three times a day before meals  predniSONE   Tablet 30 milliGRAM(s) Oral daily    MEDICATIONS  (PRN):  diphenhydrAMINE 25 milliGRAM(s) Oral every 6 hours PRN Rash and/or Itching      ALLERGIES:  No Known Allergies      LABS:                                               10.6   12.93 )-----------( 239      ( 03 Nov 2021 06:23 )             35.2   11-03    138  |  98  |  82<H>  ----------------------------<  103<H>  4.1   |  20<L>  |  11.59<H>    Ca    9.2      03 Nov 2021 06:23             Lipid panel:   cultures: Culture Results:   <10,000 CFU/mL Normal Urogenital Corrie (10-31 @ 16:59)  Culture Results:   No growth to date. (10-31 @ 14:29)  Culture Results:   No growth to date. (10-31 @ 14:29)       < from: CT Neck Soft Tissue w/ IV Cont (11.02.21 @ 08:25) >  IMPRESSION:    Mild enlargement of the lips with adjacent inflammatory changes consistent with residual edema. A tunnel uvula and epiglottis are normal. There is no airway compression. No enlarged cervical lymph nodes.      < end of copied text >

## 2021-11-03 NOTE — PROGRESS NOTE ADULT - ASSESSMENT
56M with HTN, Obesity, chol, BENNIE on CPAP, ESRD on HD (M, W, F) initially via LUE AVG that clotted and complicated by infection requiring excision 3/2021.  Now on HD via RUE AVG that after HD, hypotension 10/29.  Clotted.  Went for clot removal on 10/29.  Received contrast dye and antibiotics (versed, fentanyl, cefazolin, heparin and contrast).  During the procedure, he developed lip itching.  By the time he got home, it was worse.  Told to take benadryl.  Did not help.  Came to [facility] yesterday.  Exam with oral lesions and rectal pruritis.  Appreciate dermatology and allergy/immunology.  SJS unlikely given timing of exposure of medications and onset of s/sx.  RVP was negative for mycoplasma.  HSV also testing.  No definite infection    Fever, eosinophilia  - all suggestive of allergic reaction  - on prednison  - f/u dermatology  - monitor off antibiotics  - f/u HSV PCR        
56M with HTN, Obesity, chol, BENNIE on CPAP, ESRD on HD (M, W, F) initially via LUE AVG that clotted and complicated by infection requiring excision 3/2021.  Now on HD via RUE AVG that after HD, hypotension 10/29.  Clotted.  Went for clot removal on 10/29.  Received contrast dye and antibiotics (versed, fentanyl, cefazolin, heparin and contrast).  During the procedure, he developed lip itching.  By the time he got home, it was worse.  Told to take benadryl.  Did not help.  Came to [facility] yesterday.  Exam with oral lesions and rectal pruritis.  Appreciate dermatology and allergy/immunology.  SJS unlikely given timing of exposure of medications and onset of s/sx.  RVP was negative for mycoplasma.  HSV also negative.  No definite infection    Fever, eosinophilia  - all suggestive of allergic reaction  - on prednisone  - slow improvement  - f/u dermatology  - monitor off antibiotics  - HSV PCR (-)    Please call Infectious Diseases if there is a change in status.  Thank you.  [phone].        
57 yo male esrd on hd, dm, htn, chol, obesity, jamil, here with angioedema, and fever    angioedema  prednisone 30 daily  benadryl bid  monitor resp status  allergy eval apprec  derm eval appred.   f/u hsv  ent consult apprec  CT noted    fever  id consult apprec  watch off abx    esrd  hd as per renal    dm  iss  monitor fs    cont other home meds      dvt ppx      Advanced care planning was discussed with patient and family.  Advanced care planning forms were reviewed and discussed as appropriate.  Differential diagnosis and plan of care discussed with patient after the evaluation.   Pain assessed and judicious use of narcotics when appropriate was discussed.  Importance of Fall prevention discussed.  Counseling on Smoking and Alcohol cessation was offered when appropriate.  Counseling on Diet, exercise, and medication compliance was done.       Approx 60 minutes spent.
55 yo male esrd on hd, dm, htn, chol, obesity, jamil, here with angioedema, and fever    angioedema  prednisone 30 daily  benadryl bid  monitor resp status  allergy eval apprec  derm eval appred.    hsv neg  ent consult apprec  CT noted    fever  id consult apprec  watch off abx    esrd  hd as per renal    dm  iss  monitor fs    cont other home meds      dvt ppx    dc planning home today  outpt allergy f/u       Advanced care planning was discussed with patient and family.  Advanced care planning forms were reviewed and discussed as appropriate.  Differential diagnosis and plan of care discussed with patient after the evaluation.   Pain assessed and judicious use of narcotics when appropriate was discussed.  Importance of Fall prevention discussed.  Counseling on Smoking and Alcohol cessation was offered when appropriate.  Counseling on Diet, exercise, and medication compliance was done.       Approx 60 minutes spent.
55 yo male esrd on hd, dm, htn, chol, obesity, jamil, here with angioedema, and fever    angioedema  prednisone 30 daily  benadryl bid  monitor resp status  allergy eval called  derm eval called  ent consult apprec  will check CT neck soft tissue    fever  id consult apprec  watch off abx    esrd  hd as per renal    dm  iss  monitor fs    cont other home meds      dvt ppx      Advanced care planning was discussed with patient and family.  Advanced care planning forms were reviewed and discussed as appropriate.  Differential diagnosis and plan of care discussed with patient after the evaluation.   Pain assessed and judicious use of narcotics when appropriate was discussed.  Importance of Fall prevention discussed.  Counseling on Smoking and Alcohol cessation was offered when appropriate.  Counseling on Diet, exercise, and medication compliance was done.       Approx 60 minutes spent.
  IMPRESSION: 56M w/ HTN, DM2, BENNIE, and ESRD-HD MWF, 10/31/21 p/w allergic reaction s/p AVF thrombectomy 10/29/2;1    (1)Renal - ESRD - HD MWF - HD today   (2)Access - functioning at present, s/p thrombectomy 10/29/21  (3)Allergy - on standing oral Prednisone at present - clinically improved - s/p CT I+ yesterday; indicated for HD early today rather than later given  contrast exposure     RECOMMEND:  (1)HD today   (2)No renal objection to discharge after HD today, if no further complications ensue    Dianne Ward NP-C  [facility]  [phone]

## 2021-11-03 NOTE — DISCHARGE NOTE PROVIDER - CARE PROVIDERS DIRECT ADDRESSES
,DirectAddress_Unknown,[email or web address] ,DirectAddress_Unknown,[email or web address],DirectAddress_Unknown

## 2021-11-03 NOTE — CHART NOTE - NSCHARTNOTEFT_GEN_A_CORE
Medically cleared for discharge by Dr. Cazares. Pt states he has slow improvement in oral symptoms. Requesting mouth wash for discharge . Continue3 steroids for 3 more days as per Dr. Cazares. Follow up with PMD and Allergy/immunology

## 2021-11-03 NOTE — PROGRESS NOTE ADULT - SUBJECTIVE AND OBJECTIVE BOX
NEPHROLOGY     Patient seen and examined.    MEDICATIONS  (STANDING):  aspirin enteric coated 81 milliGRAM(s) Oral daily  atorvastatin 40 milliGRAM(s) Oral at bedtime  clopidogrel Tablet 75 milliGRAM(s) Oral daily  dextrose 40% Gel 15 Gram(s) Oral once  dextrose 5%. 1000 milliLiter(s) (50 mL/Hr) IV Continuous <Continuous>  dextrose 5%. 1000 milliLiter(s) (100 mL/Hr) IV Continuous <Continuous>  dextrose 50% Injectable 25 Gram(s) IV Push once  dextrose 50% Injectable 12.5 Gram(s) IV Push once  dextrose 50% Injectable 25 Gram(s) IV Push once  glucagon  Injectable 1 milliGRAM(s) IntraMuscular once  insulin glargine Injectable (LANTUS) 50 Unit(s) SubCutaneous at bedtime  insulin lispro (ADMELOG) corrective regimen sliding scale   SubCutaneous three times a day before meals  insulin lispro (ADMELOG) corrective regimen sliding scale   SubCutaneous at bedtime  insulin lispro Injectable (ADMELOG) 10 Unit(s) SubCutaneous three times a day before meals  predniSONE   Tablet 30 milliGRAM(s) Oral daily    VITALS:  T(C): , Max: 37 (11-02-21 @ 21:11)  T(F): , Max: 98.6 (11-02-21 @ 21:11)  HR: 95 (11-03-21 @ 08:55)  BP: 130/74 (11-03-21 @ 08:55)  RR: 18 (11-03-21 @ 08:55)  SpO2: 95% (11-03-21 @ 08:55)    I and O's:    11-02 @ 07:01  -  11-03 @ 07:00  --------------------------------------------------------  IN: 360 mL / OUT: 0 mL / NET: 360 mL    11-03 @ 07:01  -  11-03 @ 11:33  --------------------------------------------------------  IN: 120 mL / OUT: 0 mL / NET: 120 mL    PHYSICAL EXAM:  Constitutional: NAD, Alert  HEENT: NCAT, MMM  Neck: Supple, No JVD  Respiratory: CTA-b/l  Cardiovascular: RRR s1s2, no m/r/g  Gastrointestinal: BS+, soft, NT/ND  Extremities: No peripheral edema b/l  Neurological: no focal deficits; strength grossly intact  Back: no CVAT b/l  Skin: No rashes, no nevi  Access: RUE AVG (+)thrill    LABS:                        10.6   12.93 )-----------( 239      ( 03 Nov 2021 06:23 )             35.2     11-03    138  |  98  |  82<H>  ----------------------------<  103<H>  4.1   |  20<L>  |  11.59<H>    Ca    9.2      03 Nov 2021 06:23      RADIOLOGY & ADDITIONAL STUDIES:    EXAM:  CT NECK SOFT TISSUE IC                            PROCEDURE DATE:  11/02/2021      INTERPRETATION:  CLINICAL INFORMATION: Lip and tongue swelling following contrast injections during recent thrombectomy.    COMPARISON: None available.    EXAMINATION: 90 cc intravenous Omnipaque 350 contrast was administered, 10 cc contrast was discarded. Axial thin section images with coronal and sagittal reformatted series were performed.    FINDINGS:    There is mild soft tissue swelling and inflammatory changes in the adjacent fat overlying the left. Is consistent with residual angioedema. The tongue, uvula and epiglottis are normal. There is no airway compression.    Nonspecific small subcentimeter lymph nodes are present in the submental, submandibular, jugular chain, and spinal accessory chain regions. No enlarged or necrotic lymph nodes are visualized.    The salivary glands are normal.    The thyroid gland is normal.    The paranasal sinuses are well-aerated. The mastoid air cells and middle ear cavities are well-aerated.    No focal intracranial abnormalities are noted within the field-of-view.    No vascular abnormality is identified.    The upper lungs are clear.    The visualized osseous structures are unremarkable.    IMPRESSION:    Mild enlargement of the lips with adjacent inflammatory changes consistent with residual edema. A tunnel uvula and epiglottis are normal. There is no airway compression. No enlarged cervical lymph nodes.      Dr. Thibodeaux discussed these findings with JOSELO Mccullough on 11/2/2021 9:31 AM .    --- End of Report ---              YOLANDE THIBODEAUX MD; Resident Radiology  This document has been electronically signed.  SAM THAO MD; Attending Radiologist  This document has been electronically signed.Nov 2 2021 11:36AM   NEPHROLOGY     Patient seen and examined while on HD. Pt feeling ok, though still with pain in his mouth, denies sob, in no acute distress.     MEDICATIONS  (STANDING):  aspirin enteric coated 81 milliGRAM(s) Oral daily  atorvastatin 40 milliGRAM(s) Oral at bedtime  clopidogrel Tablet 75 milliGRAM(s) Oral daily  dextrose 40% Gel 15 Gram(s) Oral once  dextrose 5%. 1000 milliLiter(s) (50 mL/Hr) IV Continuous <Continuous>  dextrose 5%. 1000 milliLiter(s) (100 mL/Hr) IV Continuous <Continuous>  dextrose 50% Injectable 25 Gram(s) IV Push once  dextrose 50% Injectable 12.5 Gram(s) IV Push once  dextrose 50% Injectable 25 Gram(s) IV Push once  glucagon  Injectable 1 milliGRAM(s) IntraMuscular once  insulin glargine Injectable (LANTUS) 50 Unit(s) SubCutaneous at bedtime  insulin lispro (ADMELOG) corrective regimen sliding scale   SubCutaneous three times a day before meals  insulin lispro (ADMELOG) corrective regimen sliding scale   SubCutaneous at bedtime  insulin lispro Injectable (ADMELOG) 10 Unit(s) SubCutaneous three times a day before meals  predniSONE   Tablet 30 milliGRAM(s) Oral daily    VITALS:  T(C): , Max: 37 (11-02-21 @ 21:11)  T(F): , Max: 98.6 (11-02-21 @ 21:11)  HR: 95 (11-03-21 @ 08:55)  BP: 130/74 (11-03-21 @ 08:55)  RR: 18 (11-03-21 @ 08:55)  SpO2: 95% (11-03-21 @ 08:55)    I and O's:    11-02 @ 07:01  -  11-03 @ 07:00  --------------------------------------------------------  IN: 360 mL / OUT: 0 mL / NET: 360 mL    11-03 @ 07:01  -  11-03 @ 11:33  --------------------------------------------------------  IN: 120 mL / OUT: 0 mL / NET: 120 mL    PHYSICAL EXAM:  Constitutional: NAD, Alert  HEENT: NCAT, MMM  Neck: Supple, No JVD  Respiratory: CTA-b/l  Cardiovascular: RRR s1s2, no m/r/g  Gastrointestinal: BS+, soft, NT/ND  Extremities: No peripheral edema b/l  Neurological: no focal deficits; strength grossly intact  Back: no CVAT b/l  Skin: No rashes, no nevi  Access: GABRIEL AVG (accessed)     LABS:                        10.6   12.93 )-----------( 239      ( 03 Nov 2021 06:23 )             35.2     11-03    138  |  98  |  82<H>  ----------------------------<  103<H>  4.1   |  20<L>  |  11.59<H>    Ca    9.2      03 Nov 2021 06:23      RADIOLOGY & ADDITIONAL STUDIES:    EXAM:  CT NECK SOFT TISSUE IC                            PROCEDURE DATE:  11/02/2021      INTERPRETATION:  CLINICAL INFORMATION: Lip and tongue swelling following contrast injections during recent thrombectomy.    COMPARISON: None available.    EXAMINATION: 90 cc intravenous Omnipaque 350 contrast was administered, 10 cc contrast was discarded. Axial thin section images with coronal and sagittal reformatted series were performed.    FINDINGS:    There is mild soft tissue swelling and inflammatory changes in the adjacent fat overlying the left. Is consistent with residual angioedema. The tongue, uvula and epiglottis are normal. There is no airway compression.    Nonspecific small subcentimeter lymph nodes are present in the submental, submandibular, jugular chain, and spinal accessory chain regions. No enlarged or necrotic lymph nodes are visualized.    The salivary glands are normal.    The thyroid gland is normal.    The paranasal sinuses are well-aerated. The mastoid air cells and middle ear cavities are well-aerated.    No focal intracranial abnormalities are noted within the field-of-view.    No vascular abnormality is identified.    The upper lungs are clear.    The visualized osseous structures are unremarkable.    IMPRESSION:    Mild enlargement of the lips with adjacent inflammatory changes consistent with residual edema. A tunnel uvula and epiglottis are normal. There is no airway compression. No enlarged cervical lymph nodes.      Dr. Thibodeaux discussed these findings with JOSELO Mccullough on 11/2/2021 9:31 AM .    --- End of Report ---    YOLANDE THIBODEAUX MD; Resident Radiology  This document has been electronically signed.  SAM THAO MD; Attending Radiologist  This document has been electronically signed.Nov 2 2021 11:36AM

## 2021-11-03 NOTE — DISCHARGE NOTE PROVIDER - PROVIDER TOKENS
PROVIDER:[TOKEN:[[id number]]],PROVIDER:[TOKEN:[[id number]]] PROVIDER:[TOKEN:[[id number]]],PROVIDER:[TOKEN:[[id number]]],FREE:[LAST:[bay],FIRST:[william],PHONE:[(   )    -],FAX:[(   )    -],ADDRESS:[Nephrology]]

## 2021-11-03 NOTE — DISCHARGE NOTE PROVIDER - NSDCMRMEDTOKEN_GEN_ALL_CORE_FT
Aspirin Enteric Coated 81 mg oral delayed release tablet: 1 tab(s) orally once a day  atorvastatin 40 mg oral tablet: 1 tab(s) orally once a day  Benadryl Allergy 12.5 mg/5 mL oral liquid: 10 milliliter(s) orally every 6 hours    **NOTE: As per patient, taken the past few days for an allergic reaction  calcitriol 0.25 mcg oral capsule: 1 cap(s) orally once a day  clopidogrel 75 mg oral tablet: 1 tab(s) orally once a day  dipyridamole 75 mg oral tablet: 1 tab(s) orally 3 times a day  febuxostat 40 mg oral tablet: 1 tab(s) orally once a day  HumaLOG KwikPen 100 units/mL injectable solution: 15 unit(s) injectable 3 times a day  Levemir 100 units/mL subcutaneous solution: 60 unit(s) subcutaneous once a day (at bedtime)  Multiple Vitamins oral tablet: 1 tab(s) orally once a day  Vascepa 1 g oral capsule: 2 cap(s) orally once (at bedtime)  Vitamin D3 50 mcg (2000 intl units) oral tablet: 1 tab(s) orally once a day   Aspirin Enteric Coated 81 mg oral delayed release tablet: 1 tab(s) orally once a day  atorvastatin 40 mg oral tablet: 1 tab(s) orally once a day  calcitriol 0.25 mcg oral capsule: 1 cap(s) orally once a day  clopidogrel 75 mg oral tablet: 1 tab(s) orally once a day  diphenhydrAMINE 25 mg oral capsule: 1 cap(s) orally every 6 hours, As needed, Rash and/or Itching  dipyridamole 75 mg oral tablet: 1 tab(s) orally 3 times a day  febuxostat 40 mg oral tablet: 1 tab(s) orally once a day  HumaLOG KwikPen 100 units/mL injectable solution: 15 unit(s) injectable 3 times a day  Levemir 100 units/mL subcutaneous solution: 60 unit(s) subcutaneous once a day (at bedtime)  Multiple Vitamins oral tablet: 1 tab(s) orally once a day  predniSONE 10 mg oral tablet: 3 tab(s) orally once a day  Vascepa 1 g oral capsule: 2 cap(s) orally once (at bedtime)  Vitamin D3 50 mcg (2000 intl units) oral tablet: 1 tab(s) orally once a day   Aspirin Enteric Coated 81 mg oral delayed release tablet: 1 tab(s) orally once a day  atorvastatin 40 mg oral tablet: 1 tab(s) orally once a day  calcitriol 0.25 mcg oral capsule: 1 cap(s) orally once a day  clopidogrel 75 mg oral tablet: 1 tab(s) orally once a day  diphenhydrAMINE 25 mg oral capsule: 1 cap(s) orally every 6 hours, As needed, Rash and/or Itching  dipyridamole 75 mg oral tablet: 1 tab(s) orally 3 times a day  febuxostat 40 mg oral tablet: 1 tab(s) orally once a day  FIRST Mouthwash BLM mucous membrane suspension: 10 milliliter(s) mucous membrane every 6 hours, As Needed  HumaLOG KwikPen 100 units/mL injectable solution: 15 unit(s) injectable 3 times a day  Levemir 100 units/mL subcutaneous solution: 60 unit(s) subcutaneous once a day (at bedtime)  Multiple Vitamins oral tablet: 1 tab(s) orally once a day  predniSONE 10 mg oral tablet: 3 tab(s) orally once a day  Vascepa 1 g oral capsule: 2 cap(s) orally once (at bedtime)  Vitamin D3 50 mcg (2000 intl units) oral tablet: 1 tab(s) orally once a day

## 2021-11-03 NOTE — DISCHARGE NOTE NURSING/CASE MANAGEMENT/SOCIAL WORK - PATIENT PORTAL LINK FT
You can access the FollowMyHealth Patient Portal offered by [facility] by registering at the following website: [email or web address]. By joining [facility]’s FollowMyHealth portal, you will also be able to view your health information using other applications (apps) compatible with our system.

## 2021-11-03 NOTE — DISCHARGE NOTE PROVIDER - CARE PROVIDER_API CALL
MELLISSA CASTELLON  Pediatric Allergy  Phone: [phone]  Fax: [phone]  Follow Up Time:     David Tamayo (MD)  Internal Medicine; Nephrology  [address] [address]  Phone: [phone]  Fax: [phone]  Follow Up Time:    MELLISSA CASTELLON  Pediatric Allergy  Phone: [phone]  Fax: [phone]  Follow Up Time:     David Tamayo)  Internal Medicine; Nephrology  [address] [address]  Phone: [phone]  Fax: [phone]  Follow Up Time:     william niño  Nephrology  Phone: (   )    -  Fax: (   )    -  Follow Up Time:

## 2021-11-04 ENCOUNTER — NON-APPOINTMENT (OUTPATIENT)
Age: 56
End: 2021-11-04

## 2021-11-05 LAB
CULTURE RESULTS: SIGNIFICANT CHANGE UP
CULTURE RESULTS: SIGNIFICANT CHANGE UP
SPECIMEN SOURCE: SIGNIFICANT CHANGE UP
SPECIMEN SOURCE: SIGNIFICANT CHANGE UP

## 2022-02-11 NOTE — PATIENT PROFILE ADULT - NSPROREFERSVCHOMEDIABETES_GEN_A_NUR
Occupational Therapy   Treatment    Name: Emeka Caballero  MRN: [id number]  Admitting Diagnosis:  Spondylolisthesis of lumbosacral region  3 Days Post-Op    Recommendations:     Discharge Recommendations: home health OT  Discharge Equipment Recommendations:  walker, rolling,bedside commode  Barriers to discharge:  None    Assessment:     Emeka Caballero is a 46 y.o. male with a medical diagnosis of Spondylolisthesis of lumbosacral region.  He presents with good participation and motivation. Pt with improved overall mobility with transfers, functional mobility, & ADL's on this date.  Goals updated. Performance deficits affecting function are weakness,impaired endurance,impaired self care skills,impaired functional mobilty,impaired balance,decreased upper extremity function,decreased lower extremity function,pain,orthopedic precautions.     Rehab Prognosis:  Good; patient would benefit from acute skilled OT services to address these deficits and reach maximum level of function.       Plan:     Patient to be seen 4 x/week to address the above listed problems via self-care/home management,therapeutic activities,therapeutic exercises,neuromuscular re-education  · Plan of Care Expires: 03/11/22  · Plan of Care Reviewed with: patient    Subjective   Pt reported that he was doing better today.  Pain/Comfort:  · Pain Rating 1:  (did not report)  · Pain Rating Post-Intervention 1:  (did not report)    Objective:     Communicated with: RN prior to session.  Patient found up in chair with wound vac,peripheral IV,hemovac (no family present; TLSO not donned) upon OT entry to room.    General Precautions: Standard, fall   Orthopedic Precautions:spinal precautions   Braces: TLSO     Occupational Performance:     Bed Mobility:    · Patient completed Sit to Supine with minimum assistance     Functional Mobility/Transfers:  · Patient completed Sit <> Stand Transfer with minimum assistance  with  rolling walker  (very  minimal (A) provided from bedside chair)  · Functional Mobility: SBA-CGA with functional mobility in room using RW from chair to door & back to bed    Activities of Daily Living:  · Upper Body Dressing: total assistance donning brace while seated in chair & doffing brace while seated EOB  · Lower Body Dressing: moderate assistance donning socks seated in chair with education on figure 4 position for LE dressing      AMPAC 6 Click ADL: 15    Treatment & Education:  Provided education to pt on importance of wearing TLSO when OOB as per MD orders (as pt found in chair with no brace donned), notified RN, pt verbalized understanding.  Provided review of spinal precautions during all activities.  Provided education on safe use of RW during functional mobility & placement of body in relation to RW during mobility.  Pt verbalized understanding.  Pt had no further questions & when asked whether there were any concerns pt reported none.      Patient left supine with all lines intact, call button in reach and RN notifiedEducation:      GOALS:   Multidisciplinary Problems     Occupational Therapy Goals        Problem: Occupational Therapy Goal    Goal Priority Disciplines Outcome Interventions   Occupational Therapy Goal     OT, PT/OT Ongoing, Progressing    Description: Goals to be met by: 2/19     Patient will increase functional independence with ADLs by performing:    UE Dressing with Minimal Assistance.  LE Dressing with Stand by Assistance.  Grooming while standing with Stand-by Assistance.  Toileting from bedside commode with Minimal Assistance for hygiene and clothing management.   Rolling to Bilateral with Stand-by Assistance.   Supine to sit with Stand-by Assistance.  Step transfer with Stand-by Assistance using RW                     Time Tracking:     OT Date of Treatment: 02/11/22  OT Start Time: 1032  OT Stop Time: 1055  OT Total Time (min): 23 min    Billable Minutes:Self Care/Home Management 13  Therapeutic  Activity 10    OT/MASON: OT          2/11/2022     no

## 2022-03-07 NOTE — PATIENT PROFILE ADULT - CENTRAL VENOUS CATHETER/PICC LINE
How Severe Is Your Acne?: moderate Is This A New Presentation, Or A Follow-Up?: Acne What Type Of Note Output Would You Prefer (Optional)?: Bullet Format Additional Comments (Use Complete Sentences): Patient currently washing with Neutrogena. no

## 2022-04-08 NOTE — H&P ADULT - NSHPPOADEEPVENOUSTHROMB_GEN_A_CORE
Assessment/Plan:  1  Primary localized osteoarthritis of left knee  XR knee 3 vw right non injury    XR knee 3 vw left non injury   2  S/P total knee replacement using cement, right  XR knee 3 vw right non injury    XR knee 3 vw left non injury     Scribe Attestation    I,:  Bassam Fernandez am acting as a scribe while in the presence of the attending physician :       I,:  Trace Mcadams, DO personally performed the services described in this documentation    as scribed in my presence :         Penelope Reid is a pleasant 49-year-old male who presents to the office today for a follow-up evaluation of his left knee osteoarthritis  Patient and I engaged in a conversation in regards to his left knee  Patient does not have any significant left knee pain today  He would like to explore surgical options in the future of a left total knee arthroplasty, however he wishes to return to work at this time in to hold off on surgery  He may return to all activities with no restrictions or limitations  He was provided with a work note stating this  I did offer him a corticosteroid injection but he declined as he has no significant pain today  We did discuss his BMI is now 40 and he should continue to monitor his weight as he would not be a candidate for surgical intervention if his BMI increases  Patient was in good understanding of this  All questions were answered today in the office  I will see him back on as-needed basis when he is ready to discuss surgery further    Subjective:  Left knee osteoarthritis    Patient ID: Sofie Pierce is a 62 y o  male  HPI   Patient is a 49-year-old male presents the office today for follow-up evaluation of his left knee  Patient does have a known history of left knee osteoarthritis  He has previously scheduled to undergo surgery of left total knee arthroplasty  He was unable to do this due to his insurance    Patient notes today he has no significant pain about the left knee would not like to discuss surgery at this time  He would recently a new job and would like to return to working at this point  He denies any new injuries  He is also 15 months s/p right total knee arthroplasty and is very happy with the results of surgery  Review of Systems   Constitutional: Negative for chills, fever and unexpected weight change  HENT: Negative for hearing loss, nosebleeds and sore throat  Eyes: Negative for pain, redness and visual disturbance  Respiratory: Negative for cough, shortness of breath and wheezing  Cardiovascular: Negative for chest pain, palpitations and leg swelling  Gastrointestinal: Negative for abdominal pain, nausea and vomiting  Endocrine: Negative for polydipsia and polyuria  Genitourinary: Negative for dysuria and hematuria  Musculoskeletal: Positive for arthralgias  Negative for joint swelling and myalgias  Skin: Negative for rash and wound  Neurological: Negative for dizziness, numbness and headaches  Psychiatric/Behavioral: Negative for agitation, decreased concentration and suicidal ideas           Past Medical History:   Diagnosis Date    Ankle edema 2015    Arthritis     Benign essential hypertension     COVID-19     2020    Obesity     Organic impotence     Seasonal allergies        Past Surgical History:   Procedure Laterality Date    JOINT REPLACEMENT Right 2021    right knee    SD TOTAL KNEE ARTHROPLASTY Right 2021    Procedure: ARTHROPLASTY KNEE TOTAL;  Surgeon: Trace Mcadams DO;  Location: [facility];  Service: Orthopedics   [facility] MSK PROCEDURE  2020       Family History   Problem Relation Age of Onset    Alzheimer's disease Mother     Cancer Mother        Social History     Occupational History    Not on file   Tobacco Use    Smoking status: Former Smoker     Quit date: 1970     Years since quittin 6    Smokeless tobacco: Never Used   Vaping Use    Vaping Use: Never used   Substance and Sexual Activity    Alcohol use: Never     Alcohol/week: 0 0 standard drinks    Drug use: Never    Sexual activity: Not on file         Current Outpatient Medications:     amLODIPine (NORVASC) 10 mg tablet, Take 1 tablet (10 mg total) by mouth daily (Patient not taking: Reported on 4/8/2022 ), Disp: 90 tablet, Rfl: 1    ascorbic Acid (VITAMIN C) 500 MG CPCR, Take 1 capsule (500 mg total) by mouth 2 (two) times a day, Disp: 60 capsule, Rfl: 0    benzonatate (TESSALON) 200 MG capsule, Take 1 capsule (200 mg total) by mouth 3 (three) times a day as needed for cough (Patient not taking: Reported on 4/8/2022 ), Disp: 30 capsule, Rfl: 0    cholecalciferol (VITAMIN D3) 25 mcg (1,000 units) tablet, TAKE 1 TABLET BY MOUTH EVERY DAY (Patient not taking: Reported on 4/8/2022), Disp: 90 tablet, Rfl: 1    ferrous sulfate 324 (65 Fe) mg, TAKE 1 TABLET (324 MG TOTAL) BY MOUTH DAILY BEFORE BREAKFAST (Patient not taking: Reported on 4/8/2022 ), Disp: 90 tablet, Rfl: 1    fluticasone-salmeterol (Wixela Inhub) 500-50 mcg/dose inhaler, Inhale 1 puff 2 (two) times a day Rinse mouth after use  (Patient not taking: Reported on 4/8/2022 ), Disp: 1 Inhaler, Rfl: 1    folic acid (FOLVITE) 1 mg tablet, TAKE 1 TABLET BY MOUTH EVERY DAY (Patient not taking: Reported on 4/8/2022), Disp: 30 tablet, Rfl: 0    zinc sulfate (ZINCATE) 220 mg capsule, Take 1 capsule (220 mg total) by mouth daily (Patient not taking: Reported on 4/8/2022 ), Disp: 30 capsule, Rfl: 0    No Known Allergies    Objective:  Vitals:    04/08/22 0802   BP: 140/90   Pulse: 72       Body mass index is 40 01 kg/m²  Left Knee Exam     Muscle Strength   The patient has normal left knee strength  Tenderness   The patient is experiencing tenderness in the medial joint line, MCL, medial retinaculum and patella      Range of Motion   Extension:  5 abnormal   Flexion:  110 normal     Tests   Efren:  Medial - negative Lateral - negative  Varus: negative Valgus: negative  Drawer:  Anterior - negative     Posterior - negative  Patellar apprehension: negative    Other   Erythema: absent  Scars: absent  Sensation: normal  Pulse: present  Swelling: none  Effusion: effusion (scant) present    Comments:   5 degree valgus deformity passively correctable in neutral   thigh and calf soft nontender   collateral ligaments stable at 0, 30, 90   ambulates with antalgic gait on the left and no assistive device   grossly distally neurovascular intact          Observations   Left Knee   Positive for effusion (scant)  Physical Exam  Vitals reviewed  Constitutional:       Appearance: He is well-developed  HENT:      Head: Normocephalic and atraumatic  Eyes:      Conjunctiva/sclera: Conjunctivae normal    Cardiovascular:      Rate and Rhythm: Normal rate  Pulses: Normal pulses  Pulmonary:      Effort: Pulmonary effort is normal    Musculoskeletal:      Cervical back: Neck supple  Left knee: Effusion (scant) present  Instability Tests: Medial Efren test negative and lateral Efren test negative  Skin:     General: Skin is warm and dry  Neurological:      Mental Status: He is alert and oriented to person, place, and time  Psychiatric:         Behavior: Behavior normal          I have personally reviewed pertinent films in PACS  X-rays of the bilateral knees obtained on 4/8/22 demonstrate well-aligned right total knee arthroplasty prosthesis with no signs of loosening or failure  Left knee demonstrates severe end-stage osteoarthritis with no signs of acute fracture or lytic/blastic lesion  no

## 2022-05-02 NOTE — H&P ADULT - NSHPPOAPULMEMBOLUS_GEN_A_CORE
[facility] Progress Note      2022    Juana Lynn    :  1957    MRN:  [id number]    Referring MD: Emeli Laureano MD  [address] [facility],  [address]    Subjective: Feels great. Eating well. Denies any bleeding     ECOG PS:  (1) Restricted in physically strenuous activity, ambulatory and able to do work of light nature    KPS: 80% Normal activity with effort; some signs or symptoms of disease    Isolation:  None     Medications    Scheduled Meds:    Continuous Infusions:    PRN Meds:. ROS:  As noted above, otherwise remainder of 10-point ROS negative      Physical Exam:     Vital Signs:  /75   Pulse 97   Temp 97.7 °F (36.5 °C) (Oral)   Resp 18   Wt 248 lb 14.4 oz (112.9 kg)   SpO2 99%   BMI 31.94 kg/m²     Weight:    Wt Readings from Last 3 Encounters:   22 248 lb 14.4 oz (112.9 kg)   22 251 lb 12.3 oz (114.2 kg)   22 248 lb 14.4 oz (112.9 kg)       General: Awake, alert and oriented.   HEENT: normocephalic, alopecia, PERRL, no scleral erythema or icterus, Oral mucosa moist and intact, throat clear  NECK: supple without palpable adenopathy  BACK: Straight negative CVAT  SKIN: warm dry and intact without lesions rashes or masses  CHEST: CTA bilaterally without use of accessory muscles  CV: Normal S1 S2, RRR, no MRG  ABD: NT ND normoactive BS, no palpable masses or hepatosplenomegaly  EXTREMITIES: BLL 1+ edema slightly improved today, denies calf tenderness  NEURO: CN II - XII grossly intact  CATHETER: Right Tunneled HD catheter    Laboratory Data:  CBC:   Recent Labs     22  0913 22  0858   WBC 0.9* 1.0*   HGB 7.3* 7.0*   HCT 21.3* 20.5*   MCV 98.9 99.1   PLT 23* 33*     BMP/Mag:  Recent Labs     22  0913 22  0858    141   K 3.8 4.1    110   CO2 20* 21   PHOS 2.9 3.4   BUN 40* 43*   CREATININE 2.9* 3.1*   MG 1.60* 1.70*     LIVP:   Recent Labs     22  0913 22  0858   AST 17 13*   ALT 13 11 BILIDIR <0.2 <0.2   BILITOT 0.6 0.6   ALKPHOS 67 68     Coags:   Recent Labs     05/02/22  0858   PROTIME 12.5   INR 1.10   APTT 47.0*     Uric Acid   Recent Labs     04/30/22  0913 05/02/22  0858   LABURIC 8.0* 7.6*     Lab Results   Component Value Date    CRP <3.0 05/02/2022    CRP <3.0 04/30/2022    CRP <3.0 04/28/2022     Lab Results   Component Value Date    FERRITIN 1,407.0 (H) 05/02/2022    FERRITIN 1,492.0 (H) 04/30/2022    FERRITIN 1,723.0 (H) 04/28/2022       PROBLEM LIST:            1. [place] Chain Multiple Myeloma   2. ESRD on HD   3. GERD  4. HTN  5. Hypokalemia   6. Subdural Hematoma (1/2022)  7. Seizures (1/2022)      TREATMENT:            1. CyBorD (started 10/30/18)  2. Revlimid / Roshan Tippah / Dex on clinical trial (started 2/1/19)  3. Carfilzomib / Pomalidomide / Dex x 6 cycles (Relapse #1 - started 8/26/19 - 2/2020)  4. Cytoxan Mobilization w/ 25% dose reduction (2/17/20)  5. Melphalan 140mg/m2 & Autologous SCT 3/12/20  6. Kyprolis/Ebony/Dex (6/30/21-7/28/21)- progression  7. Velcade/Ebony/Dex (5/18/21-8/3/21)- progression  8. HD - CTX               - Cycle 1 9/27/21              - Cycle 2 10/18/21              - Cycle 3 11/8/21- stopped d/t severe fatigue  9. Bendamustine/Velcade/Dex 12/06/21  10. Velcade only 2/11/22  11. Fludarabine/Cytoxan followed by CAR-T infusion 3/21/22  12. Stem Cell Boost 4/18/22    ASSESSMENT AND PLAN:            1. [place] Light Chain Multiple Myeloma:  Currently in [facility]  - BM biopsy: 1/12/22.  20% plasma cells . Elizabeth   p53 positive 79.5% of cells  - 1/28/22 Serum KFLC 1662.71, Lambda 3.05   K/L ratio 545.15  - 2/24/22 Serum KFLC 1919.12, Lambda 2.54, K/L ratio 755.56  - 3/10/22 Serum KFLC 2040.00, Lambda 3.54, K/L ratio 576.27  - 4/18/22 Serum KFLC 3230.60, Lambda 6.64, K/L ratio 486.54  - s/p Fludarabine and Cytoxan 3/16/22-3/18/22  - Post CAR-T infusion staging:  PET/CT, BM bx with Flow, FISH, CG, MM  - Post Stem Cell Boost 4/18/22    Lymphodepleting Chemotherapy:  Fludarabine and cyclophosphamide   Disease Status at time of Infusion:  Progressive disease  CAR-T Infusion Date:  3/21/22  CAR-T Product:  Abkenneth  Batch ID Number:  [id number]     Day + 40    Day + 13 Stem Cell Boost 4/18/22     2. CRS / Neuro: Grade 1 CRS 3/22. No issues at this time  CRS Grade: 1, fever only 3/22  - S/p toci 800mg 3/22/22  - Monitor CRP and Ferrtin closely  Lab Results   Component Value Date    CRP <3.0 05/02/2022    CRP <3.0 04/30/2022    CRP <3.0 04/28/2022     Lab Results   Component Value Date    FERRITIN 1,407.0 (H) 05/02/2022    FERRITIN 1,492.0 (H) 04/30/2022    FERRITIN 1,723.0 (H) 04/28/2022     ICANS Grade:  0  - Neuro checks w/ CARTOX 10-point assessment Q4hrs  ICE Score:  CAR-T Score: 10    3. ID: Afebrile  - Cont Levaquin, Diflucan, Valtrex ppx     4. Heme:  Pancytopenia d/t recent chemotherapy   - Transfuse for Hgb < 7 and Platelets < 16G (recent subdural)  - Plt transfusion today   - PRA sent (4/7/22): Positive (Will receive Matched Crossed Plts): Type O compatible, will receive Type O moving forward 4/18/22  - Cont Granix every other day  - Plt transfusion today      5. ESRD/Metabolic: SCr: 2.3 (8/39/39), Mild Hyperglycemia, HypoPhos & HypoMg  ESRD:  - Off HD currently. Followed by Dr. Nelia Kaufman  - Baseline Cr 2.1-2.6  -  IVFs: PRN   - Cont Mag-Ox 400 mg BID 4/7/22  TLS:  No evidence, cont allopurinol and daily TLS labs   - Replace potassium and magnesium per PRN orders  - Given persistent/worsened peripheral edema, instructed him to take lasix 40 mg PO x1 on 4/2/22: Much Improved   - Continue to monitor his kidney function on daily basis. 6. Nutrition:  Appetite and oral intake is good. - Cont low microbial diet   - Follow closely with dietary     7. Neuro:  Recent seizure/subdural hematoma (1/2/022)  - Cont Keppra 500 mg PO BID indefinitely  - Keep platelets >16,944      - Disposition:  At this point, he has no adverse effects of CAR-T infusion and is recovered with delayed count recovery s/p stem cell boost. There is concern, however, that his myeloma may be relapsing. At this point, we will send him back to [facility] for close f/u with Dr. Denice Rick since he is recovered from CAR-T toxicities aside from cytopenias, which can be somewhat chronic and can be managed at [facility]. Dung Buckner, APRN - CNP     Jesus Israel.  Lily Valentine, [address] [place] [address] no

## 2022-07-14 NOTE — PROGRESS NOTE ADULT - SUBJECTIVE AND OBJECTIVE BOX
Overnight events noted      VITAL:  T(C): , Max: 37.1 (21 @ 10:16)  T(F): , Max: 98.8 (21 @ 10:16)  HR: 108 (21 @ 04:57)  BP: 125/86 (21 @ 04:57)  BP(mean): --  RR: 18 (21 @ 04:57)  SpO2: 96% (21 @ 04:57)      PHYSICAL EXAM:  Constitutional: NAD, Alert  HEENT: NCAT, MMM  Neck: Supple, No JVD  Respiratory: CTA-b/l  Cardiovascular: RRR s1s2, no m/r/g  Gastrointestinal: BS+, soft, NT/ND  Extremities: No peripheral edema b/l  Neurological: no focal deficits; strength grossly intact  Back: no CVAT b/l  Skin: No rashes, no nevi  Access: RUE AVG (+)thrill    LABS:                        10.2   12.59 )-----------( 247      ( 2021 06:04 )             33.1     Na(140)/K(4.7)/Cl(97)/HCO3(20)/BUN(65)/Cr(11.63)Glu(105)/Ca(9.8)/Mg(--)/PO4(--)     @ 06:05  Na(140)/K(4.0)/Cl(97)/HCO3(25)/BUN(48)/Cr(10.17)Glu(110)/Ca(9.6)/Mg(--)/PO4(--)    10-31 @ 09:50    Urinalysis Basic - ( 31 Oct 2021 13:23 )  Color: Yellow / Appearance: Clear / S.018 / pH: x  Gluc: x / Ketone: Negative  / Bili: Negative / Urobili: Negative   Blood: x / Protein: 100 mg/dL / Nitrite: Negative   Leuk Esterase: Negative / RBC: 0 /hpf / WBC 0 /HPF   Sq Epi: x / Non Sq Epi: 1 /hpf / Bacteria: Negative      IMPRESSION: 56M w/ HTN, DM2, BENNIE, and ESRD-HD MWF, 10/31/21 p/w allergic reaction s/p AVF thrombectomy 10/29/2;1    (1)Renal - ESRD - HD MWF - last dialyzed yesterday; due for next HD tomorrow  (2)Access - functioning at present, s/p thrombectomy 10/29/21  (3)Allergy - on standing oral Prednisone at present - clinically improved     RECOMMEND:  (1)D/C planning per primary team  (2)Next HD tomorrow - inpatient vs outpatient        David Tamayo MD  [facility]  Office: [phone]  Cell: [phone]       No pain, no sob at present      VITAL:  T(C): , Max: 37.1 (21 @ 10:16)  T(F): , Max: 98.8 (21 @ 10:16)  HR: 108 (21 @ 04:57)  BP: 125/86 (21 @ 04:57)  BP(mean): --  RR: 18 (21 @ 04:57)  SpO2: 96% (21 @ 04:57)      PHYSICAL EXAM:  Constitutional: NAD, Alert  HEENT: NCAT, MMM  Neck: Supple, No JVD  Respiratory: CTA-b/l  Cardiovascular: RRR s1s2, no m/r/g  Gastrointestinal: BS+, soft, NT/ND  Extremities: No peripheral edema b/l  Neurological: no focal deficits; strength grossly intact  Back: no CVAT b/l  Skin: No rashes, no nevi  Access: RUE AVG (+)thrill    LABS:                        10.2   12.59 )-----------( 247      ( 2021 06:04 )             33.1     Na(140)/K(4.7)/Cl(97)/HCO3(20)/BUN(65)/Cr(11.63)Glu(105)/Ca(9.8)/Mg(--)/PO4(--)     @ 06:05  Na(140)/K(4.0)/Cl(97)/HCO3(25)/BUN(48)/Cr(10.17)Glu(110)/Ca(9.6)/Mg(--)/PO4(--)    10-31 @ 09:50    Urinalysis Basic - ( 31 Oct 2021 13:23 )  Color: Yellow / Appearance: Clear / S.018 / pH: x  Gluc: x / Ketone: Negative  / Bili: Negative / Urobili: Negative   Blood: x / Protein: 100 mg/dL / Nitrite: Negative   Leuk Esterase: Negative / RBC: 0 /hpf / WBC 0 /HPF   Sq Epi: x / Non Sq Epi: 1 /hpf / Bacteria: Negative      IMPRESSION: 56M w/ HTN, DM2, BENNIE, and ESRD-HD MWF, 10/31/21 p/w allergic reaction s/p AVF thrombectomy 10/29/2;1    (1)Renal - ESRD - HD MWF - last dialyzed yesterday; due for next HD tomorrow  (2)Access - functioning at present, s/p thrombectomy 10/29/21  (3)Allergy - on standing oral Prednisone at present - clinically improved - s/p CT I+ this a.m.; indicated for HD early tomorrow (rather than late tomorrow) given the contrast exposure    RECOMMEND:  (1)Next HD early tomorrow   (2)No renal objection to discharge after HD tomorrow, if no further complications ensue      David Tamayo MD  [facility]  Office: [phone]  Cell: [phone]       No

## 2022-10-06 NOTE — ED PROVIDER NOTE - PRINCIPAL DIAGNOSIS
Patient states she wants to go to sleep and does not want to wake up. Patient states she was just discharged from [facility] and cannot cope at home alone.
Dental abscess

## 2022-10-11 NOTE — ASU PATIENT PROFILE, ADULT - HARM RISK FACTORS
EGD/EUS Procedure Note    Patient: Jack Bell  : 1969  Acct#:     Procedure:   Esophagogastroduodenoscopy with biopsy  EUS     Date:  10/11/2022     Surgeon:  Pineda Bass MD,     Referring Physician:  Claudia Carroll, APRN - CNP      Preoperative Diagnosis:    GE junction submucosal nodule seen on recent EGD and CT scan  Epigastric pain/dyspepsia      Anesthesia:    MAC anesthesia      Consent:  The patient or their legal guardian has signed an informed consent, and is aware of the potential risks, benefits, alternatives, and potential complications of the above Hyde esophagus procedure. These include, but are not limited to hemorrhage, bleeding, post procedural pain, perforation, phlebitis, aspiration, hypotension, hypoxia, cardiovascular events such as arryhthmia, and possibly death. Description of Procedure: The patient was then taken to the endoscopy suite, placed in the left lateral decubitus position and the above IV sedation was administrered. The Olympus video endoscope was placed through the patient's oropharynx without difficulty to the extent of the 2nd portion of the duodenum. The patient tolerated the procedure well and was taken to the post anesthesia care unit in good condition. Complications: None  EBL: none      EGD Findings:  Esophagus:  Visualization of the esophagus demonstrated small islands of salmon-colored mucosa seen near the GE junction suggestive of Hyde's esophagus. GE junction at 40 cm  Stomach: Just beyond the GE junction in the gastric cardia, a 2 cm submucosal nodule seen , biopsies were obtained. The scope was then advanced into the stomach. Bile-stained secretions seen in the stomach which was suctioned. Mild erythema seen in the antrum. Pylorus was normal..  Duodenum: Visualization of the duodenal bulb and the second portion of the duodenum demonstrated normal mucosa.   Ampulla was normal.    EUS Findings:  Exam performed with radial and linear Olympus echoendoscope   AP window and subcarinal window was unremarkable  The celiac takeoff was unremarkable  Submucosal lesion seen beyond GE junction in the gastric cardia was anechoic on exam with some debris, arising from submucosal/third layer, measured 2.5 cm x 1.2 cm. 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 Maurice Recinos as scheduled  Continue with PPI  Continue with MiraLAX/bowel regimen for chronic constipation          Media MD [place]  [address]  [phone] no

## 2022-10-12 ENCOUNTER — INPATIENT (INPATIENT)
Facility: HOSPITAL | Age: 57
LOS: 1 days | Discharge: HOME | DRG: 640 | End: 2022-10-14
Attending: INTERNAL MEDICINE | Admitting: INTERNAL MEDICINE
Payer: MEDICARE

## 2022-10-12 VITALS
HEIGHT: 69 IN | TEMPERATURE: 98 F | OXYGEN SATURATION: 96 % | WEIGHT: 315 LBS | HEART RATE: 116 BPM | RESPIRATION RATE: 20 BRPM | SYSTOLIC BLOOD PRESSURE: 103 MMHG | DIASTOLIC BLOOD PRESSURE: 58 MMHG

## 2022-10-12 DIAGNOSIS — R06.02 SHORTNESS OF BREATH: ICD-10-CM

## 2022-10-12 DIAGNOSIS — U07.1 COVID-19: ICD-10-CM

## 2022-10-12 DIAGNOSIS — E78.5 HYPERLIPIDEMIA, UNSPECIFIED: ICD-10-CM

## 2022-10-12 DIAGNOSIS — Z98.890 OTHER SPECIFIED POSTPROCEDURAL STATES: Chronic | ICD-10-CM

## 2022-10-12 DIAGNOSIS — I10 ESSENTIAL (PRIMARY) HYPERTENSION: ICD-10-CM

## 2022-10-12 DIAGNOSIS — E87.5 HYPERKALEMIA: ICD-10-CM

## 2022-10-12 DIAGNOSIS — N28.9 DISORDER OF KIDNEY AND URETER, UNSPECIFIED: ICD-10-CM

## 2022-10-12 DIAGNOSIS — S99.919A UNSPECIFIED INJURY OF UNSPECIFIED ANKLE, INITIAL ENCOUNTER: Chronic | ICD-10-CM

## 2022-10-12 DIAGNOSIS — Z99.2 DEPENDENCE ON RENAL DIALYSIS: Chronic | ICD-10-CM

## 2022-10-12 DIAGNOSIS — E11.9 TYPE 2 DIABETES MELLITUS WITHOUT COMPLICATIONS: ICD-10-CM

## 2022-10-12 LAB
ACANTHOCYTES BLD QL SMEAR: SLIGHT — SIGNIFICANT CHANGE UP
ALBUMIN SERPL ELPH-MCNC: 4 G/DL — SIGNIFICANT CHANGE UP (ref 3.3–5)
ALP SERPL-CCNC: 38 U/L — LOW (ref 40–120)
ALT FLD-CCNC: 8 U/L — LOW (ref 10–45)
ANION GAP SERPL CALC-SCNC: 20 MMOL/L — HIGH (ref 5–17)
ANISOCYTOSIS BLD QL: SLIGHT — SIGNIFICANT CHANGE UP
APTT BLD: 31.8 SEC — SIGNIFICANT CHANGE UP (ref 27.5–35.5)
AST SERPL-CCNC: 21 U/L — SIGNIFICANT CHANGE UP (ref 10–40)
BASE EXCESS BLDV CALC-SCNC: -4.7 MMOL/L — LOW (ref -2–3)
BASOPHILS # BLD AUTO: 0 K/UL — SIGNIFICANT CHANGE UP (ref 0–0.2)
BASOPHILS NFR BLD AUTO: 0 % — SIGNIFICANT CHANGE UP (ref 0–2)
BILIRUB SERPL-MCNC: 0.4 MG/DL — SIGNIFICANT CHANGE UP (ref 0.2–1.2)
BUN SERPL-MCNC: 81 MG/DL — HIGH (ref 7–23)
BURR CELLS BLD QL SMEAR: PRESENT — SIGNIFICANT CHANGE UP
CA-I SERPL-SCNC: 1.12 MMOL/L — LOW (ref 1.15–1.33)
CALCIUM SERPL-MCNC: 9.1 MG/DL — SIGNIFICANT CHANGE UP (ref 8.4–10.5)
CHLORIDE BLDV-SCNC: 103 MMOL/L — SIGNIFICANT CHANGE UP (ref 96–108)
CHLORIDE SERPL-SCNC: 100 MMOL/L — SIGNIFICANT CHANGE UP (ref 96–108)
CO2 BLDV-SCNC: 22 MMOL/L — SIGNIFICANT CHANGE UP (ref 22–26)
CO2 SERPL-SCNC: 18 MMOL/L — LOW (ref 22–31)
CREAT SERPL-MCNC: 15.65 MG/DL — HIGH (ref 0.5–1.3)
DACRYOCYTES BLD QL SMEAR: SLIGHT — SIGNIFICANT CHANGE UP
EGFR: 3 ML/MIN/1.73M2 — LOW
ELLIPTOCYTES BLD QL SMEAR: SLIGHT — SIGNIFICANT CHANGE UP
EOSINOPHIL # BLD AUTO: 0.05 K/UL — SIGNIFICANT CHANGE UP (ref 0–0.5)
EOSINOPHIL NFR BLD AUTO: 0.9 % — SIGNIFICANT CHANGE UP (ref 0–6)
GAS PNL BLDV: 135 MMOL/L — LOW (ref 136–145)
GAS PNL BLDV: SIGNIFICANT CHANGE UP
GLUCOSE BLDC GLUCOMTR-MCNC: 100 MG/DL — HIGH (ref 70–99)
GLUCOSE BLDC GLUCOMTR-MCNC: 81 MG/DL — SIGNIFICANT CHANGE UP (ref 70–99)
GLUCOSE BLDC GLUCOMTR-MCNC: 87 MG/DL — SIGNIFICANT CHANGE UP (ref 70–99)
GLUCOSE BLDV-MCNC: 87 MG/DL — SIGNIFICANT CHANGE UP (ref 70–99)
GLUCOSE SERPL-MCNC: 92 MG/DL — SIGNIFICANT CHANGE UP (ref 70–99)
HCO3 BLDV-SCNC: 21 MMOL/L — LOW (ref 22–29)
HCT VFR BLD CALC: 32.8 % — LOW (ref 39–50)
HCT VFR BLDA CALC: 32 % — LOW (ref 39–51)
HGB BLD CALC-MCNC: 10.5 G/DL — LOW (ref 12.6–17.4)
HGB BLD-MCNC: 10 G/DL — LOW (ref 13–17)
INR BLD: 1.05 RATIO — SIGNIFICANT CHANGE UP (ref 0.88–1.16)
LACTATE BLDV-MCNC: 1.6 MMOL/L — SIGNIFICANT CHANGE UP (ref 0.5–2)
LYMPHOCYTES # BLD AUTO: 0.72 K/UL — LOW (ref 1–3.3)
LYMPHOCYTES # BLD AUTO: 12.1 % — LOW (ref 13–44)
MANUAL SMEAR VERIFICATION: SIGNIFICANT CHANGE UP
MCHC RBC-ENTMCNC: 21.2 PG — LOW (ref 27–34)
MCHC RBC-ENTMCNC: 30.5 GM/DL — LOW (ref 32–36)
MCV RBC AUTO: 69.6 FL — LOW (ref 80–100)
MICROCYTES BLD QL: SLIGHT — SIGNIFICANT CHANGE UP
MONOCYTES # BLD AUTO: 0.46 K/UL — SIGNIFICANT CHANGE UP (ref 0–0.9)
MONOCYTES NFR BLD AUTO: 7.7 % — SIGNIFICANT CHANGE UP (ref 2–14)
NEUTROPHILS # BLD AUTO: 4.73 K/UL — SIGNIFICANT CHANGE UP (ref 1.8–7.4)
NEUTROPHILS NFR BLD AUTO: 77.6 % — HIGH (ref 43–77)
NEUTS BAND # BLD: 1.7 % — SIGNIFICANT CHANGE UP (ref 0–8)
NRBC # BLD: 4 /100 — HIGH (ref 0–0)
NT-PROBNP SERPL-SCNC: 1799 PG/ML — HIGH (ref 0–300)
OVALOCYTES BLD QL SMEAR: SLIGHT — SIGNIFICANT CHANGE UP
PCO2 BLDV: 41 MMHG — LOW (ref 42–55)
PH BLDV: 7.32 — SIGNIFICANT CHANGE UP (ref 7.32–7.43)
PLAT MORPH BLD: NORMAL — SIGNIFICANT CHANGE UP
PLATELET # BLD AUTO: 194 K/UL — SIGNIFICANT CHANGE UP (ref 150–400)
PO2 BLDV: 32 MMHG — SIGNIFICANT CHANGE UP (ref 25–45)
POIKILOCYTOSIS BLD QL AUTO: SIGNIFICANT CHANGE UP
POLYCHROMASIA BLD QL SMEAR: SLIGHT — SIGNIFICANT CHANGE UP
POTASSIUM BLDV-SCNC: 5.8 MMOL/L — HIGH (ref 3.5–5.1)
POTASSIUM SERPL-MCNC: 5.5 MMOL/L — HIGH (ref 3.5–5.3)
POTASSIUM SERPL-SCNC: 5.5 MMOL/L — HIGH (ref 3.5–5.3)
PROT SERPL-MCNC: 7.6 G/DL — SIGNIFICANT CHANGE UP (ref 6–8.3)
PROTHROM AB SERPL-ACNC: 12.2 SEC — SIGNIFICANT CHANGE UP (ref 10.5–13.4)
RAPID RVP RESULT: DETECTED
RBC # BLD: 4.71 M/UL — SIGNIFICANT CHANGE UP (ref 4.2–5.8)
RBC # FLD: 17.8 % — HIGH (ref 10.3–14.5)
RBC BLD AUTO: ABNORMAL
SAO2 % BLDV: 45.9 % — LOW (ref 67–88)
SARS-COV-2 RNA SPEC QL NAA+PROBE: DETECTED
SCHISTOCYTES BLD QL AUTO: SLIGHT — SIGNIFICANT CHANGE UP
SODIUM SERPL-SCNC: 138 MMOL/L — SIGNIFICANT CHANGE UP (ref 135–145)
TARGETS BLD QL SMEAR: SLIGHT — SIGNIFICANT CHANGE UP
TROPONIN T, HIGH SENSITIVITY RESULT: 42 NG/L — SIGNIFICANT CHANGE UP (ref 0–51)
WBC # BLD: 5.96 K/UL — SIGNIFICANT CHANGE UP (ref 3.8–10.5)
WBC # FLD AUTO: 5.96 K/UL — SIGNIFICANT CHANGE UP (ref 3.8–10.5)

## 2022-10-12 PROCEDURE — 99222 1ST HOSP IP/OBS MODERATE 55: CPT

## 2022-10-12 PROCEDURE — 99284 EMERGENCY DEPT VISIT MOD MDM: CPT

## 2022-10-12 PROCEDURE — 71045 X-RAY EXAM CHEST 1 VIEW: CPT | Mod: 26

## 2022-10-12 RX ORDER — SODIUM CHLORIDE 9 MG/ML
250 INJECTION INTRAMUSCULAR; INTRAVENOUS; SUBCUTANEOUS ONCE
Refills: 0 | Status: COMPLETED | OUTPATIENT
Start: 2022-10-12 | End: 2022-10-12

## 2022-10-12 RX ORDER — ERYTHROPOIETIN 10000 [IU]/ML
10000 INJECTION, SOLUTION INTRAVENOUS; SUBCUTANEOUS ONCE
Refills: 0 | Status: COMPLETED | OUTPATIENT
Start: 2022-10-12 | End: 2022-10-12

## 2022-10-12 RX ORDER — DEXTROSE 50 % IN WATER 50 %
12.5 SYRINGE (ML) INTRAVENOUS ONCE
Refills: 0 | Status: DISCONTINUED | OUTPATIENT
Start: 2022-10-12 | End: 2022-10-14

## 2022-10-12 RX ORDER — SODIUM CHLORIDE 9 MG/ML
1000 INJECTION, SOLUTION INTRAVENOUS
Refills: 0 | Status: DISCONTINUED | OUTPATIENT
Start: 2022-10-12 | End: 2022-10-14

## 2022-10-12 RX ORDER — INSULIN LISPRO 100/ML
15 VIAL (ML) SUBCUTANEOUS
Qty: 0 | Refills: 0 | DISCHARGE

## 2022-10-12 RX ORDER — SODIUM ZIRCONIUM CYCLOSILICATE 10 G/10G
10 POWDER, FOR SUSPENSION ORAL ONCE
Refills: 0 | Status: COMPLETED | OUTPATIENT
Start: 2022-10-12 | End: 2022-10-12

## 2022-10-12 RX ORDER — DEXTROSE 50 % IN WATER 50 %
25 SYRINGE (ML) INTRAVENOUS ONCE
Refills: 0 | Status: DISCONTINUED | OUTPATIENT
Start: 2022-10-12 | End: 2022-10-14

## 2022-10-12 RX ORDER — DEXTROSE 50 % IN WATER 50 %
15 SYRINGE (ML) INTRAVENOUS ONCE
Refills: 0 | Status: DISCONTINUED | OUTPATIENT
Start: 2022-10-12 | End: 2022-10-14

## 2022-10-12 RX ORDER — ACETAMINOPHEN 500 MG
650 TABLET ORAL EVERY 6 HOURS
Refills: 0 | Status: DISCONTINUED | OUTPATIENT
Start: 2022-10-12 | End: 2022-10-14

## 2022-10-12 RX ORDER — INSULIN DETEMIR 100/ML (3)
45 INSULIN PEN (ML) SUBCUTANEOUS AT BEDTIME
Refills: 0 | Status: DISCONTINUED | OUTPATIENT
Start: 2022-10-12 | End: 2022-10-13

## 2022-10-12 RX ORDER — CLOPIDOGREL BISULFATE 75 MG/1
75 TABLET, FILM COATED ORAL DAILY
Refills: 0 | Status: DISCONTINUED | OUTPATIENT
Start: 2022-10-12 | End: 2022-10-13

## 2022-10-12 RX ORDER — CEFEPIME 1 G/1
2000 INJECTION, POWDER, FOR SOLUTION INTRAMUSCULAR; INTRAVENOUS ONCE
Refills: 0 | Status: COMPLETED | OUTPATIENT
Start: 2022-10-12 | End: 2022-10-12

## 2022-10-12 RX ORDER — VANCOMYCIN HCL 1 G
1000 VIAL (EA) INTRAVENOUS ONCE
Refills: 0 | Status: COMPLETED | OUTPATIENT
Start: 2022-10-12 | End: 2022-10-12

## 2022-10-12 RX ORDER — ASPIRIN/CALCIUM CARB/MAGNESIUM 324 MG
81 TABLET ORAL DAILY
Refills: 0 | Status: DISCONTINUED | OUTPATIENT
Start: 2022-10-12 | End: 2022-10-13

## 2022-10-12 RX ORDER — ACETAMINOPHEN 500 MG
650 TABLET ORAL ONCE
Refills: 0 | Status: COMPLETED | OUTPATIENT
Start: 2022-10-12 | End: 2022-10-12

## 2022-10-12 RX ORDER — ACETAMINOPHEN 500 MG
975 TABLET ORAL ONCE
Refills: 0 | Status: COMPLETED | OUTPATIENT
Start: 2022-10-12 | End: 2022-10-12

## 2022-10-12 RX ORDER — GLUCAGON INJECTION, SOLUTION 0.5 MG/.1ML
1 INJECTION, SOLUTION SUBCUTANEOUS ONCE
Refills: 0 | Status: DISCONTINUED | OUTPATIENT
Start: 2022-10-12 | End: 2022-10-14

## 2022-10-12 RX ORDER — INSULIN LISPRO 100/ML
VIAL (ML) SUBCUTANEOUS
Refills: 0 | Status: DISCONTINUED | OUTPATIENT
Start: 2022-10-12 | End: 2022-10-14

## 2022-10-12 RX ADMIN — Medication 1000 MILLIGRAM(S): at 09:29

## 2022-10-12 RX ADMIN — SODIUM CHLORIDE 250 MILLILITER(S): 9 INJECTION INTRAMUSCULAR; INTRAVENOUS; SUBCUTANEOUS at 07:17

## 2022-10-12 RX ADMIN — Medication 650 MILLIGRAM(S): at 19:16

## 2022-10-12 RX ADMIN — Medication 650 MILLIGRAM(S): at 11:58

## 2022-10-12 RX ADMIN — SODIUM ZIRCONIUM CYCLOSILICATE 10 GRAM(S): 10 POWDER, FOR SUSPENSION ORAL at 11:46

## 2022-10-12 RX ADMIN — Medication 650 MILLIGRAM(S): at 08:45

## 2022-10-12 RX ADMIN — CEFEPIME 2000 MILLIGRAM(S): 1 INJECTION, POWDER, FOR SOLUTION INTRAMUSCULAR; INTRAVENOUS at 08:21

## 2022-10-12 RX ADMIN — CEFEPIME 100 MILLIGRAM(S): 1 INJECTION, POWDER, FOR SOLUTION INTRAMUSCULAR; INTRAVENOUS at 07:42

## 2022-10-12 RX ADMIN — SODIUM CHLORIDE 250 MILLILITER(S): 9 INJECTION INTRAMUSCULAR; INTRAVENOUS; SUBCUTANEOUS at 08:07

## 2022-10-12 RX ADMIN — Medication 650 MILLIGRAM(S): at 19:46

## 2022-10-12 RX ADMIN — ERYTHROPOIETIN 10000 UNIT(S): 10000 INJECTION, SOLUTION INTRAVENOUS; SUBCUTANEOUS at 21:51

## 2022-10-12 RX ADMIN — Medication 250 MILLIGRAM(S): at 08:16

## 2022-10-12 NOTE — ED ADULT NURSE NOTE - OBJECTIVE STATEMENT
56 y/o Male presents to ED from home with c/o SOB, recent positive COVID test. PMH HTN. gout, HLD, DM, kidney failure (on DIalysis) pt states he usually gets dialysis mon, wed, friday. Was told not to come to dialysis because of recent positive covid test. Pt endorses SOB upon exertion and at rest, back pain and general body weakness/ fatigue. Denies N/V/D, dizziness, chest pain, HA, numbness, tingling. A&Ox3, airway patent with spontaneous unlabored breathing, equal B/L radial pulses, skin color normal for race. Pt placed on continuous cardiac monitor, IV inserted labs drawn and sent. Safety maintained bed in lowest position and locked.

## 2022-10-12 NOTE — CONSULT NOTE ADULT - PROBLEM SELECTOR RECOMMENDATION 9
likely 2/2 to missed dialysis    - usually MWF, has not had dialysis since Friday   plan for HD today  nephro following

## 2022-10-12 NOTE — ED PROVIDER NOTE - OBJECTIVE STATEMENT
57-year-old male with a past medical history of HTN, obesity, BENNIE on CPAP, ESRD on HD (Monday, Wednesday Friday) presents to the ED with shortness of breath.  Patient endorses missing 2 dialysis sessions, 1 today and 1 on Monday secondary to COVID infection.  Patient endorses nayana COVID on his full dialysis session on Friday last week.  Patient endorses associated orthopnea, and shortness of breath, and dyspnea on exertion.  Patient also endorses fatigue.  He denies any fevers, chills, nausea, vomiting, diarrhea.  No other sick contacts at home.  Up-to-date on vaccinations.  Compliant with all his medications.  He denies any other symptoms at bedside. He endorses making urine

## 2022-10-12 NOTE — H&P ADULT - NSHPPHYSICALEXAM_GEN_ALL_CORE
Vital Signs Last 24 Hrs  T(C): 37.5 (12 Oct 2022 08:07), Max: 37.8 (12 Oct 2022 06:07)  T(F): 99.5 (12 Oct 2022 08:07), Max: 100.1 (12 Oct 2022 06:07)  HR: 90 (12 Oct 2022 08:07) (90 - 116)  BP: 110/69 (12 Oct 2022 08:10) (98/54 - 110/69)  BP(mean): 82 (12 Oct 2022 08:10) (63 - 82)  RR: 16 (12 Oct 2022 08:07) (16 - 20)  SpO2: 98% (12 Oct 2022 08:07) (96% - 99%)    Parameters below as of 12 Oct 2022 08:07  Patient On (Oxygen Delivery Method): room air Vital Signs Last 24 Hrs  T(C): 37.5 (12 Oct 2022 08:07), Max: 37.8 (12 Oct 2022 06:07)  T(F): 99.5 (12 Oct 2022 08:07), Max: 100.1 (12 Oct 2022 06:07)  HR: 90 (12 Oct 2022 08:07) (90 - 116)  BP: 110/69 (12 Oct 2022 08:10) (98/54 - 110/69)  BP(mean): 82 (12 Oct 2022 08:10) (63 - 82)  RR: 16 (12 Oct 2022 08:07) (16 - 20)  SpO2: 98% (12 Oct 2022 08:07) (96% - 99%)    Parameters below as of 12 Oct 2022 08:07  Patient On (Oxygen Delivery Method): room air      PHYSICAL EXAM:  GENERAL: NAD, well-developed  HEAD:  Atraumatic, Normocephalic  EYES: EOMI, PERRLA, conjunctiva and sclera clear  NECK: Supple, No JVD  CHEST/LUNG: Clear to auscultation bilaterally; No wheeze  HEART: Regular rate and rhythm; No murmurs, rubs, or gallops  ABDOMEN: Soft, Nontender, Nondistended; Bowel sounds present  EXTREMITIES:  2+ Peripheral Pulses, No clubbing, cyanosis, or edema  PSYCH: AAOx3  NEUROLOGY: non-focal  SKIN: No rashes or lesions

## 2022-10-12 NOTE — ED PROVIDER NOTE - PHYSICAL EXAMINATION
GENERAL: no acute distress, ill appearing, elevated BMI  HEAD: normocephalic, atraumatic  HEENT: normal conjunctiva, oral mucosa moist, neck supple  CARDIAC: tachycardiac  PULM: breathing comfortably  GI: abdomen nondistended, soft, nontender, no guarding or rebound tenderness  : no CVA tenderness, no suprapubic tenderness  NEURO: alert and oriented x 3, normal speech  MSK: no visible deformities, no peripheral edema, calf tenderness/redness/swelling  SKIN: no visible rashes, dry, well-perfused  PSYCH: appropriate mood and affect

## 2022-10-12 NOTE — H&P ADULT - ASSESSMENT
58 yo male h/o ESRD on HD, htn, obesity, BENNIE on cpap, here with sob     SOB 2/2 fluid overload 2/2 missed dialysis sessions  renal consulted  plan for HD today    COVID  not hypoxic  supportive care  ID consult f/u    HTN  bp is soft  not on meds at home  monitor closely  cards f/u    DM  iss  monitor fs    cont other home meds      dvt ppx    Advanced care planning was discussed with patient and family.  Advanced care planning forms were reviewed and discussed as appropriate.  Differential diagnosis and plan of care discussed with patient after the evaluation.   Pain assessed and judicious use of narcotics when appropriate was discussed.  Importance of Fall prevention discussed.  Counseling on Smoking and Alcohol cessation was offered when appropriate.  Counseling on Diet, exercise, and medication compliance was done.   Approx 30 minutes spent.

## 2022-10-12 NOTE — ED ADULT NURSE NOTE - NS ED NOTE ABUSE RESPONSE YN
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

## 2022-10-12 NOTE — CONSULT NOTE ADULT - ASSESSMENT
57-year-old man with history of hypertension, hyperlipidemia, obstructive sleep apnea on CPAP, type 2 diabetes on insulin therapy, end-stage renal disease on hemodialysis, presenting with shortness of breath in the setting of missing dialysis.  Also with COVID-19 infection.  Endocrinology consulted for management of type 2 diabetes.    1. Type 2 DM  A1C:A1C with Estimated Average Glucose Result: 5.9 % (11-01-21 @ 08:45)  Home regimen: Levemir 60 units at bedtime, Huamlog 18 units tid with meals   Endocrionlogist: None  EGFR: eGFR: 3 mL/min/1.73m2 (10-12-22 @ 06:37)  Discussed with patient the importance of Carb consistent diet and exercise as tolerated.    Recommend nutritional consultation  Recommend DM education through RN staff (see physical to RN order)  Discussed glycemic goal of a1c <7% to prevent microvascular complications of diabetes mellitus and reduce the risk of macrovascular complications.   Recommend annual podiatry and ophthalmology follow up.   Glucose goal of 80-180mg/dl while in patient.   Recommend Lantus 50 units at bedtime  Recommend MDSS qac/hs now as he has very poor appetite, almost eating nothing.     Discharge recommendation/considerations:  Insulin therapy, basal bolus, dosage to be determine    2. HTN  BP goal 130/80  Managed with renal team and HD.     3. HLD  LDL goal <70mg/ld  Continue with statin therapy since he is already on it.    57-year-old man with history of hypertension, hyperlipidemia, obstructive sleep apnea on CPAP, type 2 diabetes on insulin therapy, end-stage renal disease on hemodialysis, presenting with shortness of breath in the setting of missing dialysis.  Also with COVID-19 infection.  Endocrinology consulted for management of type 2 diabetes.    1. Type 2 DM  A1C:A1C with Estimated Average Glucose Result: 5.9 % (11-01-21 @ 08:45)  Home regimen: Levemir 60 units at bedtime, Huamlog 18 units tid with meals   Endocrionlogist: None  EGFR: eGFR: 3 mL/min/1.73m2 (10-12-22 @ 06:37)  Discussed with patient the importance of Carb consistent diet and exercise as tolerated.    Recommend nutritional consultation  Recommend DM education through RN staff (see physical to RN order)  Discussed glycemic goal of a1c <7% to prevent microvascular complications of diabetes mellitus and reduce the risk of macrovascular complications.   Recommend annual podiatry and ophthalmology follow up.   Glucose goal of 80-180mg/dl while in patient.   Recommend Levemir 45 units at bedtime  Recommend MDSS qac/hs now as he has very poor appetite, almost eating nothing.     Discharge recommendation/considerations:  Insulin therapy, basal bolus, dosage to be determine    2. HTN  BP goal 130/80  Managed with renal team and HD.     3. HLD  LDL goal <70mg/ld  Continue with statin therapy since he is already on it.

## 2022-10-12 NOTE — CONSULT NOTE ADULT - SUBJECTIVE AND OBJECTIVE BOX
CHIEF COMPLAINT:  Weakness    HISTORY OF PRESENT ILLNESS:  57-year-old male with a past medical history of HTN, obesity, BENNIE on CPAP, ESRD on HD (Monday, ) presents to the ED with shortness of breath.  Patient endorses missing 2 dialysis sessions, 1 today and 1 on Monday secondary to COVID infection.  Patient endorses nayana COVID on his full dialysis session on Friday last week.  Patient endorses associated orthopnea, and shortness of breath, and dyspnea on exertion.  Patient also endorses fatigue.  He denies any fevers, chills, nausea, vomiting, diarrhea.  No other sick contacts at home.  Up-to-date on vaccinations.  Compliant with all his medications.  He denies any other symptoms at bedside. He endorses making urine.    Cardiology consulted for cardiac management.  As per pt he has not seen a cardiologist since 2019 but last time he saw the cardiologist he was told everything was ok.  Currently pt denies chest pain, SOB, palpitations.     PAST MEDICAL & SURGICAL HISTORY:  Renal disease  ESRD    Hypertension    Gout    Diabetes mellitus    HLD (hyperlipidemia)    Obesity    BENNIE on CPAP    Heart rate fast    H/O shoulder surgery    Injury of ankle and foot  surgically repaired, 10 years ago    H/O hernia repair  30 years ago    Peritoneal dialysis catheter in situ  Was inserted, and removed    Arteriovenous graft removed  Now there is a wound suction in left arm    MEDICATIONS:    dextrose 50% Injectable 25 Gram(s) IV Push once  dextrose 50% Injectable 12.5 Gram(s) IV Push once  dextrose 50% Injectable 25 Gram(s) IV Push once  dextrose Oral Gel 15 Gram(s) Oral once PRN  glucagon  Injectable 1 milliGRAM(s) IntraMuscular once  insulin lispro (ADMELOG) corrective regimen sliding scale   SubCutaneous three times a day before meals    dextrose 5%. 1000 milliLiter(s) IV Continuous <Continuous>  dextrose 5%. 1000 milliLiter(s) IV Continuous <Continuous>  epoetin isra-epbx (RETACRIT) Injectable 99304 Unit(s) IV Push Once    FAMILY HISTORY:  Family history of hypertension  in  mother    SOCIAL HISTORY:    [ ] Non-smoker  [ ] Smoker  [ ] Alcohol    Allergies    IV Contrast (Unknown)    Intolerances    REVIEW OF SYSTEMS:  CONSTITUTIONAL: No fever, weight loss, + fatigue  EYES: No eye pain, visual disturbances, or discharge  ENMT:  No difficulty hearing, tinnitus, vertigo; No sinus or throat pain  NECK: No pain or stiffness  RESPIRATORY: No cough, wheezing, chills or hemoptysis; No Shortness of Breath  CARDIOVASCULAR: No chest pain, palpitations, passing out, dizziness, or leg swelling  GASTROINTESTINAL: No abdominal or epigastric pain. No nausea, vomiting, or hematemesis; No diarrhea or constipation. No melena or hematochezia.  GENITOURINARY: No dysuria, frequency, hematuria, or incontinence  NEUROLOGICAL: No headaches, memory loss, loss of strength, numbness, or tremors  SKIN: No itching, burning, rashes, or lesions   LYMPH Nodes: No enlarged glands  ENDOCRINE: No heat or cold intolerance; No hair loss  MUSCULOSKELETAL: No joint pain or swelling; No muscle, back, or extremity pain  PSYCHIATRIC: No depression, anxiety, mood swings, or difficulty sleeping  HEME/LYMPH: No easy bruising, or bleeding gums  ALLERY AND IMMUNOLOGIC: No hives or eczema	    [ ] All others negative	  [ ] Unable to obtain    PHYSICAL EXAM:  T(C): 37 (10-12-22 @ 11:58), Max: 37.8 (10-12-22 @ 06:07)  HR: 86 (10-12-22 @ 11:58) (86 - 116)  BP: 111/67 (10-12-22 @ 11:58) (98/54 - 111/67)  RR: 19 (10-12-22 @ 11:58) (16 - 20)  SpO2: 100% (10-12-22 @ 11:58) (96% - 100%)  Wt(kg): --  I&O's Summary    Appearance: NAD, obese  HEENT: Normal oral mucosa, PERRL, EOMI	  Lymphatic: No lymphadenopathy  Cardiovascular: Normal S1 S2, No JVD, No murmurs, No edema  Respiratory: Lungs clear to auscultation	  Psychiatry: A & O x 3, Mood & affect appropriate  Gastrointestinal: Soft, Non-tender, + BS	  Skin: No rashes, No ecchymoses, No cyanosis - RUE fistula	  Neurologic: Non-focal  Extremities: Normal range of motion, No clubbing, cyanosis or edema  Vascular: Peripheral pulses palpable 2+ bilaterally    TELEMETRY: 	    ECG:  	  RADIOLOGY: < from: Xray Chest 1 View- PORTABLE-Urgent (10.12.22 @ 06:35) >  ACC: [id number] EXAM:  XR CHEST PORTABLE URGENT 1V                          PROCEDURE DATE:  10/12/2022      INTERPRETATION:  CLINICAL INDICATION: Sepsis.    EXAM: Frontal radiograph of the chest.    COMPARISON: Chest radiograph from 10/31/2021.    FINDINGS:  The lungs are clear.  There is no pleural effusion or pneumothorax.  The heart size is not well evaluated on this projection.  The visualized osseous structures demonstrate no acute pathology.    IMPRESSION:  Clear lungs.    --- End of Report ---    < end of copied text >    OTHER: 	  	  LABS:	 	    CARDIAC MARKERS:  Troponin T, High Sensitivity Result: 42: Specimen not hemolyzed                       10.0  5.96  )-----------( 194      ( 12 Oct 2022 06:37 )             32.8     10-12    138  |  100  |  81<H>  ----------------------------<  92  5.5<H>   |  18<L>  |  15.65<H>    Ca    9.1      12 Oct 2022 06:37    TPro  7.6  /  Alb  4.0  /  TBili  0.4  /  DBili  x   /  AST  21  /  ALT  8<L>  /  AlkPhos  38<L>  10-12    proBNP: Serum Pro-Brain Natriuretic Peptide: 1799 pg/mL (10-12 @ 06:37)    Lipid Profile:   HgA1c:   TSH:

## 2022-10-12 NOTE — CONSULT NOTE ADULT - SUBJECTIVE AND OBJECTIVE BOX
HPI:   Patient is a 57y male with hx HTN, Obesity, chol, BENNIE on CPAP, ESRD on HD (M, W, F) presented to the ED with dyspnea after missing 2 dialysis sessions. He had sore throat for about a week as well and now tested + COVID. No other sick contacts and up-to-date on vaccinations. He still makes descent amount of urine. He denies any fevers or chills, no GI or  c/o. He had low grade fever in ER.   REVIEW OF SYSTEMS:  All other review of systems negative (Comprehensive ROS)    PAST MEDICAL & SURGICAL HISTORY:  Renal disease  ESRD      Hypertension      Gout      Diabetes mellitus      HLD (hyperlipidemia)      Obesity      BENNIE on CPAP      Heart rate fast      H/O shoulder surgery      Injury of ankle and foot  surgically repaired, 10 years ago      H/O hernia repair  30 years ago      Peritoneal dialysis catheter in situ  Was inserted, and removed      Arteriovenous graft removed  Now there is a wound suction in left arm          Allergies    IV Contrast (Unknown)    Intolerances        Antimicrobials Day #      Other Medications:  aspirin enteric coated 81 milliGRAM(s) Oral daily  clopidogrel Tablet 75 milliGRAM(s) Oral daily  dextrose 5%. 1000 milliLiter(s) IV Continuous <Continuous>  dextrose 5%. 1000 milliLiter(s) IV Continuous <Continuous>  dextrose 50% Injectable 25 Gram(s) IV Push once  dextrose 50% Injectable 12.5 Gram(s) IV Push once  dextrose 50% Injectable 25 Gram(s) IV Push once  dextrose Oral Gel 15 Gram(s) Oral once PRN  epoetin isra-epbx (RETACRIT) Injectable 28012 Unit(s) IV Push Once  glucagon  Injectable 1 milliGRAM(s) IntraMuscular once  insulin lispro (ADMELOG) corrective regimen sliding scale   SubCutaneous three times a day before meals      FAMILY HISTORY:  Family history of hypertension  , in  mother        SOCIAL HISTORY:  Smoking:     ETOH:     Drug Use:     Single     T(F): 98.4 (10-12-22 @ 15:07), Max: 100.1 (10-12-22 @ 06:07)  HR: 94 (10-12-22 @ 15:07)  BP: 123/73 (10-12-22 @ 15:07)  RR: 18 (10-12-22 @ 15:07)  SpO2: 98% (10-12-22 @ 15:07)  Wt(kg): --    PHYSICAL EXAM:  General: alert, no acute distress  Eyes:  anicteric, no conjunctival injection, no discharge  Oropharynx: no lesions or injection 	  Neck: supple, without adenopathy  Lungs: clear to auscultation  Heart: regular rate and rhythm; no murmur, rubs or gallops  Abdomen: soft, nondistended, nontender, without mass or organomegaly  Skin: no lesions, R arm fistula  Extremities: no clubbing, cyanosis, or edema  Neurologic: alert, oriented, moves all extremities    LAB RESULTS:                        10.0   5.96  )-----------( 194      ( 12 Oct 2022 06:37 )             32.8     10    138  |  100  |  81<H>  ----------------------------<  92  5.5<H>   |  18<L>  |  15.65<H>    Ca    9.1      12 Oct 2022 06:37    TPro  7.6  /  Alb  4.0  /  TBili  0.4  /  DBili  x   /  AST  21  /  ALT  8<L>  /  AlkPhos  38<L>  10-12    LIVER FUNCTIONS - ( 12 Oct 2022 06:37 )  Alb: 4.0 g/dL / Pro: 7.6 g/dL / ALK PHOS: 38 U/L / ALT: 8 U/L / AST: 21 U/L / GGT: x               MICROBIOLOGY REVIEWED:    RADIOLOGY REVIEWED:  < from: Xray Chest 1 View- PORTABLE-Urgent (10.12.22 @ 06:35) >  IMPRESSION:  Clear lungs.    --- End of Report --    < end of copied text >

## 2022-10-12 NOTE — CONSULT NOTE ADULT - SUBJECTIVE AND OBJECTIVE BOX
Patient is a 57y old  Male who presents with a chief complaint of     HPI:   57-year-old male with a past medical history of HTN, obesity, BENNIE on CPAP, ESRD on HD (Monday, ) presents to the ED with shortness of breath.  Patient endorses missing 2 dialysis sessions, 1 today and 1 on Monday secondary to COVID infection.  Patient endorses nayana COVID on his full dialysis session on Friday last week.  Patient endorses associated orthopnea, and shortness of breath, and dyspnea on exertion.  Patient also endorses fatigue.  He denies any fevers, chills, nausea, vomiting, diarrhea.  No other sick contacts at home.  Up-to-date on vaccinations.  Compliant with all his medications.  He denies any other symptoms at bedside. He endorses making urine      PAST MEDICAL & SURGICAL HISTORY:  Renal disease  ESRD  Hypertension  Gout  Diabetes mellitus  HLD (hyperlipidemia)  Obesity  BENNIE on CPAP  Heart rate fast  H/O shoulder surgery  Injury of ankle and foot  surgically repaired, 10 years ago  H/O hernia repair  30 years ago  Peritoneal dialysis catheter in situ  Was inserted, and removed  Arteriovenous graft removed  Now there is a wound suction in left arm      MEDICATIONS  (STANDING):  epoetin isra-epbx (RETACRIT) Injectable 34294 Unit(s) IV Push Once      Allergies    IV Contrast (Unknown)    Intolerances        SOCIAL HISTORY:  Denies ETOh,Smoking,     FAMILY HISTORY:  Family history of hypertension  , in  mother        REVIEW OF SYSTEMS:  CONSTITUTIONAL: No weakness, fevers or chills  EYES/ENT: No visual changes;  No vertigo or throat pain   NECK: No pain or stiffness  RESPIRATORY: No cough, wheezing, hemoptysis; No shortness of breath  CARDIOVASCULAR: No chest pain or palpitations  GASTROINTESTINAL: No abdominal or epigastric pain. No nausea, vomiting, or hematemesis; No diarrhea or constipation. No melena or hematochezia.  GENITOURINARY: No dysuria, frequency or hematuria  NEUROLOGICAL: No numbness or weakness  SKIN: No itching, burning, rashes, or lesions   All other review of systems is negative unless indicated above.    VITAL:  T(C): , Max: 37.8 (10-12-22 @ 06:07)  T(F): , Max: 100.1 (10-12-22 @ 06:07)  HR: 90 (10-12-22 @ 08:07)  BP: 110/69 (10-12-22 @ 08:10)  BP(mean): 82 (10-12-22 @ 08:10)  RR: 16 (10-12-22 @ 08:07)  SpO2: 98% (10-12-22 @ 08:07)  Wt(kg): --    PHYSICAL EXAM:  Constitutional: NAD, Alert  HEENT: NCAT, MMM  Neck: Supple, No JVD  Respiratory: CTA-b/l  Cardiovascular: RRR s1s2, no m/r/g  Gastrointestinal: BS+, soft, NT/ND  Extremities: No peripheral edema b/l  Neurological: no focal deficits; strength grossly intact  Back: no CVAT b/l  Skin: No rashes, no nevi  Access: RUE AVG (+)thrill    LABS:                        10.0   5.96  )-----------( 194      ( 12 Oct 2022 06:37 )             32.8     Na(138)/K(5.5)/Cl(100)/HCO3(18)/BUN(81)/Cr(15.65)Glu(92)/Ca(9.1)/Mg(--)/PO4(--)    10-12 @ 06:37            IMAGING:    ASSESSMENT:   57-year-old male with a past medical history of HTN, obesity, BENNIE on CPAP, ESRD on HD (Monday, ) presents to the ED with shortness of breath, missed dialysis on Monday      (1)Renal -  ESRD-HD - plan for HD today   2)CVS; BP soft stable   (3)Anemia - hb 10.1 likely esrd   (4)ID --covid positive       RECOMMENDATIONS  (1)HD  MWF --- HD  today  - 2kg UF as able - Retacrit with HD,   (2)renal diet  3) all new medications dose for dialysis.                  Thank you for involving [facility] in this patient's care.    With warm regards    Deann Chilel  [facility]  Office: [phone]

## 2022-10-12 NOTE — ED PROVIDER NOTE - CLINICAL SUMMARY MEDICAL DECISION MAKING FREE TEXT BOX
57-year-old male with a past medical history of HTN, obesity, BENNIE, ESRD on dialysis presents to the ED with shortness of breath orthopnea and fatigue.  Patient missed 2 dialysis sessions at this time.  Afebrile at home.  Sick contacts at home.  Initial vitals notable for tachycardia and soft blood pressures with systolics in the 90s.  The rest of the vitals otherwise nonactionable.  Will trial small 250 cc bolus.  Physical exam as noted above.  Elevated BMI in no acute respiratory distress.  Ill-appearing.  Differential diagnosis includes but not limited to volume overload secondary to missed dialysis session versus metabolic derangements versus viral syndrome versus multiple acid-base disorders.  Patient missed dialysis session secondary to COVID infection.  Order labs, EKG, meds, imaging, and reassess.  Nephrology consulted and aware.

## 2022-10-12 NOTE — CONSULT NOTE ADULT - PROBLEM SELECTOR RECOMMENDATION 3
BPs soft in ED    - was on anti-HTN meds prior to dialysis, since receiving HD he does not take anymore anti-HTN meds  continue to monitor on no meds

## 2022-10-12 NOTE — CONSULT NOTE ADULT - SUBJECTIVE AND OBJECTIVE BOX
HPI:  57-year-old male with a past medical history of HTN, obesity, BENNIE on CPAP, ESRD on HD (Monday, ) presents to the ED with shortness of breath.  Patient endorses missing 2 dialysis sessions, 1 today and 1 on Monday secondary to COVID infection.  Patient endorses nayana COVID on his full dialysis session on Friday last week.  Patient endorses associated orthopnea, and shortness of breath, and dyspnea on exertion.  Patient also endorses fatigue.  He denies any fevers, chills, nausea, vomiting, diarrhea.  No other sick contacts at home.  Up-to-date on vaccinations.  Compliant with all his medications.  He denies any other symptoms at bedside. He endorses making urine (12 Oct 2022 11:19)      Patient is a 57-year-old man with history of hypertension, obesity, obstructive sleep apnea on CPAP, type 2 diabetes, end-stage renal disease on hemodialysis, presenting to the ED with shortness of breath.  Patient missed dialysis yesterday secondary to recent COVID-19 infection.  Endocrinology consulted for diabetes management.  Patient was diagnosed with type 2 diabetes for 5 to 10 years.  Complicated by end-stage kidney disease.  Patient denies any history of diabetic retinopathy.  Denies any history of diabetic neuropathy.  He denies any history of CAD, CHF or CVA in the past.  Follows up with primary care doctor Dr. El Holden MD, who is also his nephrologist and helps him with diabetic regimen.  He takes Levemir 60 units at bedtime, Humalog 18 units 3 times daily with meals.  Reports adherence to his DM regimens at all times.  Reports that he has very poor appetite recently secondary to the COVID-19 infection.  But he tries to follow a carb consistent diet as well in the past.    Patient takes atorvastatin 40 mg once daily for hyperlipidemia.              PAST MEDICAL & SURGICAL HISTORY:  Renal disease  ESRD  Hypertension  Gout  Diabetes mellitus  HLD (hyperlipidemia)  Obesity  BENNIE on CPAP  Heart rate fast  H/O shoulder surgery  Injury of ankle and foot  surgically repaired, 10 years ago  H/O hernia repair  30 years ago  Peritoneal dialysis catheter in situ  Was inserted, and removed  Arteriovenous graft removed  Now there is a wound suction in left arm      FAMILY HISTORY:  Family history of hypertension  , in  mother      Social History:    Home Medications:  Aspirin Enteric Coated 81 mg oral delayed release tablet: 1 tab(s) orally once a day (12 Oct 2022 12:36)  atorvastatin 40 mg oral tablet: 1 tab(s) orally once a day (12 Oct 2022 12:36)  calcitriol 0.25 mcg oral capsule: 1 cap(s) orally once a day (12 Oct 2022 12:36)  clobetasol 0.05% topical shampoo: Apply topically to affected area 3 times a week (12 Oct 2022 12:35)  clopidogrel 75 mg oral tablet: 1 tab(s) orally once a day (12 Oct 2022 12:36)  dipyridamole 75 mg oral tablet: 1 tab(s) orally 3 times a day (12 Oct 2022 12:36)  Emla 2.5%-2.5% topical cream: Apply topically to affected area once, As Needed (12 Oct 2022 12:35)  febuxostat 40 mg oral tablet: 1 tab(s) orally once a day (12 Oct 2022 12:36)  HumaLOG KwikPen 100 units/mL injectable solution: 18 unit(s) injectable 3 times a day (12 Oct 2022 12:36)  Levemir 100 units/mL subcutaneous solution: 60 unit(s) subcutaneous once a day (at bedtime) (12 Oct 2022 12:36)  Multiple Vitamins oral tablet: 1 tab(s) orally once a day (12 Oct 2022 12:36)  predniSONE 10 mg oral tablet: 1 tab(s) orally once a day (12 Oct 2022 12:36)  sevelamer carbonate 800 mg oral tablet: 2 tab(s) orally 3 times a day (with meals) (12 Oct 2022 12:35)  tacrolimus 0.1% topical ointment: Apply topically to affected area 2 times a day (12 Oct 2022 12:35)  Vascepa 1 g oral capsule: 2 cap(s) orally once (at bedtime)    Note:No record with pharmacy (12 Oct 2022 12:34)  Vitamin D3 50 mcg (2000 intl units) oral tablet: 1 tab(s) orally once a day (12 Oct 2022 12:36)        MEDICATIONS  (STANDING):  aspirin enteric coated 81 milliGRAM(s) Oral daily  clopidogrel Tablet 75 milliGRAM(s) Oral daily  dextrose 5%. 1000 milliLiter(s) (100 mL/Hr) IV Continuous <Continuous>  dextrose 5%. 1000 milliLiter(s) (50 mL/Hr) IV Continuous <Continuous>  dextrose 50% Injectable 25 Gram(s) IV Push once  dextrose 50% Injectable 12.5 Gram(s) IV Push once  dextrose 50% Injectable 25 Gram(s) IV Push once  epoetin isra-epbx (RETACRIT) Injectable 62915 Unit(s) IV Push Once  glucagon  Injectable 1 milliGRAM(s) IntraMuscular once  insulin lispro (ADMELOG) corrective regimen sliding scale   SubCutaneous three times a day before meals    MEDICATIONS  (PRN):  acetaminophen     Tablet .. 650 milliGRAM(s) Oral every 6 hours PRN Temp greater or equal to 38C (100.4F)  dextrose Oral Gel 15 Gram(s) Oral once PRN Blood Glucose LESS THAN 70 milliGRAM(s)/deciliter      Allergies    IV Contrast (Unknown)    Intolerances      Review of Systems:  Constitutional: No fever  Eyes: No blurry vision  Neuro: No tremors  HEENT: No pain  Cardiovascular: No chest pain, palpitations  Respiratory: No SOB, no cough  GI: No nausea, vomiting, abdominal pain  : No dysuria  Skin: no rash  Psych: no depression  Endocrine: no polyuria, polydipsia  Hem/lymph: no swelling  Osteoporosis: no fractures    ALL OTHER SYSTEMS REVIEWED AND NEGATIVE    UNABLE TO OBTAIN    PHYSICAL EXAM:  -----------------------------  VITALS: T(C): 37.2 (10-12-22 @ 17:11)  T(F): 99 (10-12-22 @ 17:11), Max: 100.1 (10-12-22 @ 06:07)  HR: 94 (10-12-22 @ 15:07) (86 - 116)  BP: 123/73 (10-12-22 @ 15:07) (98/54 - 123/73)  RR:  (16 - 20)  SpO2:  (96% - 100%)  Wt(kg): --  GENERAL: NAD, well-groomed, well-developed  EYES: No proptosis, no lid lag, anicteric  HEENT:  Atraumatic, Normocephalic, moist mucous membranes  THYROID: Normal size, no palpable nodules  RESPIRATORY: Clear to auscultation bilaterally; No rales, rhonchi, wheezing, or rubs  CARDIOVASCULAR: Regular rate and rhythm; No murmurs; no peripheral edema  GI: Soft, nontender, non distended, normal bowel sounds  SKIN: Dry, intact, No rashes or lesions  MUSCULOSKELETAL: Full range of motion, normal strength  NEURO: sensation intact, extraocular movements intact, no tremor, normal reflexes  PSYCH: Alert and oriented x 3, normal affect, normal mood  CUSHING'S SIGNS: no striae    POCT Blood Glucose.: 81 mg/dL (10-12-22 @ 17:06)  POCT Blood Glucose.: 87 mg/dL (10-12-22 @ 12:07)  POCT Blood Glucose.: 99 mg/dL (10-12-22 @ 06:27)                            10.0   5.96  )-----------( 194      ( 12 Oct 2022 06:37 )             32.8       10-12    138  |  100  |  81<H>  ----------------------------<  92  5.5<H>   |  18<L>  |  15.65<H>    eGFR: 3<L>    Ca    9.1      10-12    TPro  7.6  /  Alb  4.0  /  TBili  0.4  /  DBili  x   /  AST  21  /  ALT  8<L>  /  AlkPhos  38<L>  10-12      Thyroid Function Tests:      A1C with Estimated Average Glucose Result: 5.9 % (21 @ 08:45)          Radiology:   ------------------------

## 2022-10-12 NOTE — ED PROVIDER NOTE - ATTENDING CONTRIBUTION TO CARE
MD Yo:  patient seen and evaluated personally.   I agree with the History & Physical,  Impression & Plan other than what was detailed in my note.  MD Yo  57-year-old male with a past medical history of HTN, obesity, BENNIE on CPAP, ESRD on HD (Monday, Wednesday Friday) presenting to ed w/ cc of sob, lightheadedness

## 2022-10-12 NOTE — CONSULT NOTE ADULT - ASSESSMENT
57y male with hx HTN, Obesity, chol, BENNIE on CPAP, ESRD on HD (M, W, F) presented to the ED with dyspnea after missing 2 dialysis sessions. He had sore throat for about a week as well and now tested + COVID. No other sick contacts and up-to-date on vaccinations. He still makes descent amount of urine. He denies any fevers or chills, no GI or  c/o. He had low grade fever in ER.   SOB likely due to missed HD sessions  He is not hypoxic and I offered him short course of RDV, but pt refused    PLAN:  Symptomatic care for COVID  No indications for abx at present  monitor WBC and fevers trend  HD as per renal, hopefully his dyspnea will improve with HD

## 2022-10-12 NOTE — ED PROVIDER NOTE - PROGRESS NOTE DETAILS
Patient seen at bedside in NAD.  VSS.  Patient resting comfortably without complaints. Labs remarkable for k of 5.5, no EKG changes.  Will admit to medicine for dialysis. -Franki Camarillo PA-C

## 2022-10-12 NOTE — ED PROVIDER NOTE - CARE PLAN
Principal Discharge DX:	Hyperkalemia  Secondary Diagnosis:	Shortness of breath  Secondary Diagnosis:	COVID-19   1

## 2022-10-12 NOTE — CONSULT NOTE ADULT - ASSESSMENT
57-year-old male with a past medical history of HTN, obesity, BENNIE on CPAP, ESRD on HD (Monday, Wednesday Friday) presents to the ED with shortness of breath.

## 2022-10-13 LAB
A1C WITH ESTIMATED AVERAGE GLUCOSE RESULT: 5.9 % — HIGH (ref 4–5.6)
ALBUMIN SERPL ELPH-MCNC: 3.6 G/DL — SIGNIFICANT CHANGE UP (ref 3.3–5)
ALP SERPL-CCNC: 35 U/L — LOW (ref 40–120)
ALT FLD-CCNC: 13 U/L — SIGNIFICANT CHANGE UP (ref 10–45)
ANION GAP SERPL CALC-SCNC: 19 MMOL/L — HIGH (ref 5–17)
AST SERPL-CCNC: 27 U/L — SIGNIFICANT CHANGE UP (ref 10–40)
BILIRUB DIRECT SERPL-MCNC: 0.1 MG/DL — SIGNIFICANT CHANGE UP (ref 0–0.3)
BILIRUB INDIRECT FLD-MCNC: 0.3 MG/DL — SIGNIFICANT CHANGE UP (ref 0.2–1)
BILIRUB SERPL-MCNC: 0.4 MG/DL — SIGNIFICANT CHANGE UP (ref 0.2–1.2)
BUN SERPL-MCNC: 50 MG/DL — HIGH (ref 7–23)
CALCIUM SERPL-MCNC: 8.6 MG/DL — SIGNIFICANT CHANGE UP (ref 8.4–10.5)
CHLORIDE SERPL-SCNC: 96 MMOL/L — SIGNIFICANT CHANGE UP (ref 96–108)
CO2 SERPL-SCNC: 21 MMOL/L — LOW (ref 22–31)
CREAT SERPL-MCNC: 10.64 MG/DL — HIGH (ref 0.5–1.3)
CREAT SERPL-MCNC: 11.46 MG/DL — HIGH (ref 0.5–1.3)
EGFR: 5 ML/MIN/1.73M2 — LOW
EGFR: 5 ML/MIN/1.73M2 — LOW
ESTIMATED AVERAGE GLUCOSE: 123 MG/DL — HIGH (ref 68–114)
GLUCOSE BLDC GLUCOMTR-MCNC: 108 MG/DL — HIGH (ref 70–99)
GLUCOSE BLDC GLUCOMTR-MCNC: 134 MG/DL — HIGH (ref 70–99)
GLUCOSE BLDC GLUCOMTR-MCNC: 155 MG/DL — HIGH (ref 70–99)
GLUCOSE BLDC GLUCOMTR-MCNC: 97 MG/DL — SIGNIFICANT CHANGE UP (ref 70–99)
GLUCOSE SERPL-MCNC: 78 MG/DL — SIGNIFICANT CHANGE UP (ref 70–99)
HCT VFR BLD CALC: 29.2 % — LOW (ref 39–50)
HGB BLD-MCNC: 9.3 G/DL — LOW (ref 13–17)
INR BLD: 1.09 RATIO — SIGNIFICANT CHANGE UP (ref 0.88–1.16)
MCHC RBC-ENTMCNC: 21.3 PG — LOW (ref 27–34)
MCHC RBC-ENTMCNC: 31.8 GM/DL — LOW (ref 32–36)
MCV RBC AUTO: 67 FL — LOW (ref 80–100)
NRBC # BLD: 0 /100 WBCS — SIGNIFICANT CHANGE UP (ref 0–0)
PLATELET # BLD AUTO: 159 K/UL — SIGNIFICANT CHANGE UP (ref 150–400)
POTASSIUM SERPL-MCNC: 4.4 MMOL/L — SIGNIFICANT CHANGE UP (ref 3.5–5.3)
POTASSIUM SERPL-SCNC: 4.4 MMOL/L — SIGNIFICANT CHANGE UP (ref 3.5–5.3)
PROT SERPL-MCNC: 7 G/DL — SIGNIFICANT CHANGE UP (ref 6–8.3)
PROTHROM AB SERPL-ACNC: 12.7 SEC — SIGNIFICANT CHANGE UP (ref 10.5–13.4)
RBC # BLD: 4.36 M/UL — SIGNIFICANT CHANGE UP (ref 4.2–5.8)
RBC # FLD: 17.8 % — HIGH (ref 10.3–14.5)
SODIUM SERPL-SCNC: 136 MMOL/L — SIGNIFICANT CHANGE UP (ref 135–145)
WBC # BLD: 5.33 K/UL — SIGNIFICANT CHANGE UP (ref 3.8–10.5)
WBC # FLD AUTO: 5.33 K/UL — SIGNIFICANT CHANGE UP (ref 3.8–10.5)

## 2022-10-13 RX ORDER — REMDESIVIR 5 MG/ML
100 INJECTION INTRAVENOUS EVERY 24 HOURS
Refills: 0 | Status: DISCONTINUED | OUTPATIENT
Start: 2022-10-13 | End: 2022-10-13

## 2022-10-13 RX ORDER — REMDESIVIR 5 MG/ML
200 INJECTION INTRAVENOUS EVERY 24 HOURS
Refills: 0 | Status: COMPLETED | OUTPATIENT
Start: 2022-10-13 | End: 2022-10-13

## 2022-10-13 RX ORDER — CALCITRIOL 0.5 UG/1
0.25 CAPSULE ORAL DAILY
Refills: 0 | Status: DISCONTINUED | OUTPATIENT
Start: 2022-10-13 | End: 2022-10-14

## 2022-10-13 RX ORDER — CLOPIDOGREL BISULFATE 75 MG/1
1 TABLET, FILM COATED ORAL
Qty: 0 | Refills: 0 | DISCHARGE

## 2022-10-13 RX ORDER — ASPIRIN/CALCIUM CARB/MAGNESIUM 324 MG
1 TABLET ORAL
Qty: 0 | Refills: 0 | DISCHARGE

## 2022-10-13 RX ORDER — CHOLECALCIFEROL (VITAMIN D3) 125 MCG
2000 CAPSULE ORAL DAILY
Refills: 0 | Status: DISCONTINUED | OUTPATIENT
Start: 2022-10-13 | End: 2022-10-13

## 2022-10-13 RX ORDER — REMDESIVIR 5 MG/ML
INJECTION INTRAVENOUS
Refills: 0 | Status: DISCONTINUED | OUTPATIENT
Start: 2022-10-14 | End: 2022-10-14

## 2022-10-13 RX ORDER — INSULIN DETEMIR 100/ML (3)
20 INSULIN PEN (ML) SUBCUTANEOUS AT BEDTIME
Refills: 0 | Status: DISCONTINUED | OUTPATIENT
Start: 2022-10-13 | End: 2022-10-14

## 2022-10-13 RX ORDER — CHLORHEXIDINE GLUCONATE 213 G/1000ML
1 SOLUTION TOPICAL DAILY
Refills: 0 | Status: DISCONTINUED | OUTPATIENT
Start: 2022-10-13 | End: 2022-10-14

## 2022-10-13 RX ORDER — SEVELAMER CARBONATE 2400 MG/1
800 POWDER, FOR SUSPENSION ORAL
Refills: 0 | Status: DISCONTINUED | OUTPATIENT
Start: 2022-10-13 | End: 2022-10-14

## 2022-10-13 RX ORDER — ASPIRIN/CALCIUM CARB/MAGNESIUM 324 MG
325 TABLET ORAL DAILY
Refills: 0 | Status: DISCONTINUED | OUTPATIENT
Start: 2022-10-14 | End: 2022-10-14

## 2022-10-13 RX ORDER — ATORVASTATIN CALCIUM 80 MG/1
40 TABLET, FILM COATED ORAL AT BEDTIME
Refills: 0 | Status: DISCONTINUED | OUTPATIENT
Start: 2022-10-13 | End: 2022-10-14

## 2022-10-13 RX ORDER — INSULIN LISPRO 100/ML
18 VIAL (ML) SUBCUTANEOUS
Qty: 0 | Refills: 0 | DISCHARGE

## 2022-10-13 RX ORDER — LIDOCAINE AND PRILOCAINE CREAM 25; 25 MG/G; MG/G
1 CREAM TOPICAL
Qty: 0 | Refills: 0 | DISCHARGE

## 2022-10-13 RX ORDER — REMDESIVIR 5 MG/ML
100 INJECTION INTRAVENOUS EVERY 24 HOURS
Refills: 0 | Status: DISCONTINUED | OUTPATIENT
Start: 2022-10-14 | End: 2022-10-14

## 2022-10-13 RX ORDER — HEPARIN SODIUM 5000 [USP'U]/ML
5000 INJECTION INTRAVENOUS; SUBCUTANEOUS EVERY 8 HOURS
Refills: 0 | Status: DISCONTINUED | OUTPATIENT
Start: 2022-10-13 | End: 2022-10-14

## 2022-10-13 RX ORDER — DIPHENHYDRAMINE HYDROCHLORIDE AND LIDOCAINE HYDROCHLORIDE AND ALUMINUM HYDROXIDE AND MAGNESIUM HYDRO
10 KIT EVERY 6 HOURS
Refills: 0 | Status: DISCONTINUED | OUTPATIENT
Start: 2022-10-13 | End: 2022-10-14

## 2022-10-13 RX ORDER — SEVELAMER CARBONATE 2400 MG/1
2 POWDER, FOR SUSPENSION ORAL
Qty: 0 | Refills: 0 | DISCHARGE

## 2022-10-13 RX ORDER — FEBUXOSTAT 40 MG/1
40 TABLET ORAL DAILY
Refills: 0 | Status: DISCONTINUED | OUTPATIENT
Start: 2022-10-13 | End: 2022-10-14

## 2022-10-13 RX ORDER — DEXAMETHASONE 0.5 MG/5ML
6 ELIXIR ORAL DAILY
Refills: 0 | Status: DISCONTINUED | OUTPATIENT
Start: 2022-10-13 | End: 2022-10-14

## 2022-10-13 RX ORDER — DIPYRIDAMOLE 50 MG
1 TABLET ORAL
Qty: 0 | Refills: 0 | DISCHARGE

## 2022-10-13 RX ADMIN — Medication 6 MILLIGRAM(S): at 13:09

## 2022-10-13 RX ADMIN — Medication 81 MILLIGRAM(S): at 13:10

## 2022-10-13 RX ADMIN — HEPARIN SODIUM 5000 UNIT(S): 5000 INJECTION INTRAVENOUS; SUBCUTANEOUS at 22:07

## 2022-10-13 RX ADMIN — ATORVASTATIN CALCIUM 40 MILLIGRAM(S): 80 TABLET, FILM COATED ORAL at 22:08

## 2022-10-13 RX ADMIN — Medication 650 MILLIGRAM(S): at 01:29

## 2022-10-13 RX ADMIN — SEVELAMER CARBONATE 800 MILLIGRAM(S): 2400 POWDER, FOR SUSPENSION ORAL at 18:21

## 2022-10-13 RX ADMIN — Medication 650 MILLIGRAM(S): at 00:56

## 2022-10-13 RX ADMIN — Medication 20 UNIT(S): at 22:18

## 2022-10-13 RX ADMIN — HEPARIN SODIUM 5000 UNIT(S): 5000 INJECTION INTRAVENOUS; SUBCUTANEOUS at 13:10

## 2022-10-13 RX ADMIN — REMDESIVIR 200 MILLIGRAM(S): 5 INJECTION INTRAVENOUS at 13:10

## 2022-10-13 RX ADMIN — CLOPIDOGREL BISULFATE 75 MILLIGRAM(S): 75 TABLET, FILM COATED ORAL at 13:10

## 2022-10-13 NOTE — CONSULT NOTE ADULT - SUBJECTIVE AND OBJECTIVE BOX
PULMONARY CONSULT  Eddie Wooten MD  [phone]    Initial HPI on admission:  HPI:  57-year-old male with a past medical history of HTN, obesity, BENNIE on CPAP, ESRD on HD (Monday, ) presents to the ED with shortness of breath.  Patient endorses missing 2 dialysis sessions, 1 today and 1 on Monday secondary to COVID infection.  Patient endorses nayana COVID on his full dialysis session on Friday last week.  Patient endorses associated orthopnea, and shortness of breath, and dyspnea on exertion.  Patient also endorses fatigue.  He denies any fevers, chills, nausea, vomiting, diarrhea.  No other sick contacts at home.  Up-to-date on vaccinations.  Compliant with all his medications.  He denies any other symptoms at bedside. He endorses making urine      PAST MEDICAL & SURGICAL HISTORY:  Renal disease  ESRD      Hypertension      Gout      Diabetes mellitus      HLD (hyperlipidemia)      Obesity      BENNIE on CPAP      Heart rate fast      H/O shoulder surgery      Injury of ankle and foot  surgically repaired, 10 years ago      H/O hernia repair  30 years ago      Peritoneal dialysis catheter in situ  Was inserted, and removed      Arteriovenous graft removed  Now there is a wound suction in left arm    FAMILY HISTORY:  Family history of hypertension  , in  mother     Social History:  · Substance use	No     Tobacco Screening:  · Core Measure Site	No       Review of Systems:  Other Review of Systems: All other review of systems negative, except as noted in HPI      Allergies and Intolerances:        Allergies:  	IV Contrast: Drug, Unknown    Medications:  MEDICATIONS  (STANDING):  aspirin enteric coated 81 milliGRAM(s) Oral daily  clopidogrel Tablet 75 milliGRAM(s) Oral daily  dexAMETHasone  Injectable 6 milliGRAM(s) IV Push daily  dextrose 5%. 1000 milliLiter(s) (100 mL/Hr) IV Continuous <Continuous>  dextrose 5%. 1000 milliLiter(s) (50 mL/Hr) IV Continuous <Continuous>  dextrose 50% Injectable 25 Gram(s) IV Push once  dextrose 50% Injectable 12.5 Gram(s) IV Push once  dextrose 50% Injectable 25 Gram(s) IV Push once  glucagon  Injectable 1 milliGRAM(s) IntraMuscular once  heparin   Injectable 5000 Unit(s) SubCutaneous every 8 hours  insulin detemir injectable (LEVEMIR) 45 Unit(s) SubCutaneous at bedtime  insulin lispro (ADMELOG) corrective regimen sliding scale   SubCutaneous three times a day before meals  remdesivir  IVPB   IV Intermittent   remdesivir  IVPB 200 milliGRAM(s) IV Intermittent every 24 hours    MEDICATIONS  (PRN):  acetaminophen     Tablet .. 650 milliGRAM(s) Oral every 6 hours PRN Temp greater or equal to 38C (100.4F)  dextrose Oral Gel 15 Gram(s) Oral once PRN Blood Glucose LESS THAN 70 milliGRAM(s)/deciliter    Vital Signs Last 24 Hrs  T(C): 37.4 (13 Oct 2022 04:26), Max: 38.3 (13 Oct 2022 00:42)  T(F): 99.4 (13 Oct 2022 04:26), Max: 101 (13 Oct 2022 00:42)  HR: 96 (13 Oct 2022 04:) (89 - 101)  BP: 106/63 (13 Oct 2022 04:) (106/63 - 145/81)  BP(mean): --  RR: 18 (13 Oct 2022 04:26) (18 - 20)  SpO2: 93% (13 Oct 2022 04:) (93% - 100%)    Parameters below as of 13 Oct 2022 04:26  Patient On (Oxygen Delivery Method): room air    LABS:                        9.3    5.33  )-----------( 159      ( 13 Oct 2022 07:23 )             29.2     10    136  |  96  |  50<H>  ----------------------------<  78  4.4   |  21<L>  |  10.64<H>    Ca    8.6      13 Oct 2022 07:23    TPro  7.6  /  Alb  4.0  /  TBili  0.4  /  DBili  x   /  AST  21  /  ALT  8<L>  /  AlkPhos  38<L>  10-12      PT/INR - ( 12 Oct 2022 06:37 )   PT: 12.2 sec;   INR: 1.05 ratio         PTT - ( 12 Oct 2022 06:37 )  PTT:31.8 sec      Serum Pro-Brain Natriuretic Peptide: 1799 pg/mL (10-12-22 @ 06:37)      CULTURES:  Culture Results:   No growth to date. (10-12 @ 06:30)  Culture Results:   No growth to date. (10-12 @ 06:15)      Physical Examination:    General: No acute distress.      HEENT: Pupils equal, reactive to light.  Symmetric.    PULM: Clear to auscultation bilaterally, no significant sputum production    CVS: Regular rate and rhythm, no murmurs, rubs, or gallops    ABD: Soft, nondistended, nontender, normoactive bowel sounds, no masses    EXT: No edema, nontender    SKIN: Warm and well perfused, no rashes noted.    NEURO: Alert, oriented, interactive, nonfocal    RADIOLOGY REVIEWED PERSONALLY  CXR:    PROCEDURE DATE:  10/12/2022        INTERPRETATION:  CLINICAL INDICATION: Sepsis.    EXAM: Frontal radiograph of the chest.    COMPARISON: Chest radiograph from 10/31/2021.    FINDINGS:  The lungs are clear.  There is no pleural effusion or pneumothorax.  The heart size is not well evaluated on this projection.  The visualized osseous structures demonstrate no acute pathology.    IMPRESSION:  Clear lungs.    TTE:    Patient name: LORRAINE RUTH  YOB: 1965   Age: 54 (M)   MR#: [id number]  Study Date: 2019  Location: O/PSonographer: Dianne Hinojosa RDCS  Study quality: Technically fair  Referring Physician: YESENIA CAMPBELL MD  Blood Pressure: 120/79 mmHg  Height: 175 cm  Weight: 162 kg  BSA: 2.7 m2  ------------------------------------------------------------------------  PROCEDURE: Transthoracic echocardiogram with 2-D, M-Mode  and complete spectral and color flow Doppler.  INDICATION: Shortnessof breath (R06.02)  ------------------------------------------------------------------------  Dimensions:    Normal Values:  LA:     2.9    2.0 - 4.0 cm  Ao:     3.7    2.0 - 3.8 cm  SEPTUM: 1.6    0.6 - 1.2 cm  PWT:    1.1    0.6 - 1.1 cm  LVIDd:  4.2    3.0 - 5.6 cm  LVIDs:         1.8 - 4.0 cm  Derived variables:  LVMI: 80 g/m2  RWT: 0.52  EF (Taylor Rule): 52 %  ------------------------------------------------------------------------  Observations:  Mitral Valve: Normal mitral valve. Minimalmitral  regurgitation.  Aortic Valve/Aorta: Normal trileaflet aortic valve.  Aortic Root: 3.7 cm.  Left Atrium: Normal left atrium.  Left Ventricle: Endocardium not well visualized; grossly  normal left ventricular systolic function. Increased  relative wall thickness with normal left ventricular mass  index, consistent with concentric left ventricular  remodeling.  Right Heart: Normal right atrium. The right ventricle is  not well visualized; grossly normal right ventricular  systolic function. Normal tricuspid valve. Normal pulmonic  valve.  Pericardium/Pleura: Normal pericardium with no pericardial  effusion.  Hemodynamic: Estimated right atrial pressure is 8 mm Hg.  ------------------------------------------------------------------------  Conclusions:  1. Increased relative wall thickness with normal left  ventricular mass index, consistent with concentric left  ventricular remodeling.  2. Endocardium not well visualized; grossly normal left  ventricular systolic function.       PULMONARY CONSULT  Eddie Wooten MD  [phone]    Initial HPI on admission:  HPI:  57-year-old male with a past medical history of HTN, obesity, BENNIE on CPAP, ESRD on HD (Monday, ) presents to the ED with shortness of breath.  Patient endorses missing 2 dialysis sessions, 1 today and 1 on Monday secondary to COVID infection.  Patient endorses nayana COVID on his full dialysis session on Friday last week.  Patient endorses associated orthopnea, and shortness of breath, and dyspnea on exertion.  Patient also endorses fatigue.  He denies any fevers, chills, nausea, vomiting, diarrhea.  No other sick contacts at home.  Up-to-date on vaccinations.  Compliant with all his medications.  He denies any other symptoms at bedside. He endorses making urine    Patient no longer using CPAP at home - says he sleeps "OK"  Still c/o dyspnea with activity in room      PAST MEDICAL & SURGICAL HISTORY:  Renal disease  ESRD      Hypertension      Gout      Diabetes mellitus      HLD (hyperlipidemia)      Obesity      BENNIE on CPAP      Heart rate fast      H/O shoulder surgery      Injury of ankle and foot  surgically repaired, 10 years ago      H/O hernia repair  30 years ago      Peritoneal dialysis catheter in situ  Was inserted, and removed      Arteriovenous graft removed  Now there is a wound suction in left arm    FAMILY HISTORY:  Family history of hypertension  , in  mother     Social History:  · Substance use	No     Tobacco Screening:  · Core Measure Site	No       Review of Systems:  Other Review of Systems: All other review of systems negative, except as noted in HPI      Allergies and Intolerances:        Allergies:  	IV Contrast: Drug, Unknown    Medications:  MEDICATIONS  (STANDING):  aspirin enteric coated 81 milliGRAM(s) Oral daily  clopidogrel Tablet 75 milliGRAM(s) Oral daily  dexAMETHasone  Injectable 6 milliGRAM(s) IV Push daily  dextrose 5%. 1000 milliLiter(s) (100 mL/Hr) IV Continuous <Continuous>  dextrose 5%. 1000 milliLiter(s) (50 mL/Hr) IV Continuous <Continuous>  dextrose 50% Injectable 25 Gram(s) IV Push once  dextrose 50% Injectable 12.5 Gram(s) IV Push once  dextrose 50% Injectable 25 Gram(s) IV Push once  glucagon  Injectable 1 milliGRAM(s) IntraMuscular once  heparin   Injectable 5000 Unit(s) SubCutaneous every 8 hours  insulin detemir injectable (LEVEMIR) 45 Unit(s) SubCutaneous at bedtime  insulin lispro (ADMELOG) corrective regimen sliding scale   SubCutaneous three times a day before meals  remdesivir  IVPB   IV Intermittent   remdesivir  IVPB 200 milliGRAM(s) IV Intermittent every 24 hours    MEDICATIONS  (PRN):  acetaminophen     Tablet .. 650 milliGRAM(s) Oral every 6 hours PRN Temp greater or equal to 38C (100.4F)  dextrose Oral Gel 15 Gram(s) Oral once PRN Blood Glucose LESS THAN 70 milliGRAM(s)/deciliter    Vital Signs Last 24 Hrs  T(C): 37.4 (13 Oct 2022 04:26), Max: 38.3 (13 Oct 2022 00:42)  T(F): 99.4 (13 Oct 2022 04:), Max: 101 (13 Oct 2022 00:42)  HR: 96 (13 Oct 2022 04:) (89 - 101)  BP: 106/63 (13 Oct 2022 04:) (106/63 - 145/81)  BP(mean): --  RR: 18 (13 Oct 2022 04:) (18 - 20)  SpO2: 93% (13 Oct 2022 04:26) (93% - 100%)    Parameters below as of 13 Oct 2022 04:26  Patient On (Oxygen Delivery Method): room air    LABS:                        9.3    5.33  )-----------( 159      ( 13 Oct 2022 07:23 )             29.2     10-13    136  |  96  |  50<H>  ----------------------------<  78  4.4   |  21<L>  |  10.64<H>    Ca    8.6      13 Oct 2022 07:23    TPro  7.6  /  Alb  4.0  /  TBili  0.4  /  DBili  x   /  AST  21  /  ALT  8<L>  /  AlkPhos  38<L>  10-12      PT/INR - ( 12 Oct 2022 06:37 )   PT: 12.2 sec;   INR: 1.05 ratio         PTT - ( 12 Oct 2022 06:37 )  PTT:31.8 sec      Serum Pro-Brain Natriuretic Peptide: 1799 pg/mL (10-12-22 @ 06:37)      CULTURES:  Culture Results:   No growth to date. (10-12 @ 06:30)  Culture Results:   No growth to date. (10-12 @ 06:15)      Physical Examination:    General: Non toxic, No acute distress.      HEENT: Pupils equal, reactive to light.  Symmetric.    PULM: Clear without wheeze or rhonchi    CVS: Regular rate and rhythm, no murmurs, rubs, or gallops    ABD: Soft, nondistended, nontender, normoactive bowel sounds, no masses    EXT: No edema, nontender    SKIN: Warm and well perfused, no rashes noted.    NEURO: Alert, oriented, interactive, nonfocal    RADIOLOGY REVIEWED PERSONALLY  CXR:    PROCEDURE DATE:  10/12/2022        INTERPRETATION:  CLINICAL INDICATION: Sepsis.    EXAM: Frontal radiograph of the chest.    COMPARISON: Chest radiograph from 10/31/2021.    FINDINGS:  The lungs are clear.  There is no pleural effusion or pneumothorax.  The heart size is not well evaluated on this projection.  The visualized osseous structures demonstrate no acute pathology.    IMPRESSION:  Clear lungs.    TTE:    Patient name: LORRAINE RUTH  YOB: 1965   Age: 54 (M)   MR#: [id number]  Study Date: 2019  Location: O/PSonographer: Dianne Hinojosa [facility]  Study quality: Technically fair  Referring Physician: YESENIA CAMPBELL MD  Blood Pressure: 120/79 mmHg  Height: 175 cm  Weight: 162 kg  BSA: 2.7 m2  ------------------------------------------------------------------------  PROCEDURE: Transthoracic echocardiogram with 2-D, M-Mode  and complete spectral and color flow Doppler.  INDICATION: Shortnessof breath (R06.02)  ------------------------------------------------------------------------  Dimensions:    Normal Values:  LA:     2.9    2.0 - 4.0 cm  Ao:     3.7    2.0 - 3.8 cm  SEPTUM: 1.6    0.6 - 1.2 cm  PWT:    1.1    0.6 - 1.1 cm  LVIDd:  4.2    3.0 - 5.6 cm  LVIDs:         1.8 - 4.0 cm  Derived variables:  LVMI: 80 g/m2  RWT: 0.52  EF (Taylor Rule): 52 %  ------------------------------------------------------------------------  Observations:  Mitral Valve: Normal mitral valve. Minimalmitral  regurgitation.  Aortic Valve/Aorta: Normal trileaflet aortic valve.  Aortic Root: 3.7 cm.  Left Atrium: Normal left atrium.  Left Ventricle: Endocardium not well visualized; grossly  normal left ventricular systolic function. Increased  relative wall thickness with normal left ventricular mass  index, consistent with concentric left ventricular  remodeling.  Right Heart: Normal right atrium. The right ventricle is  not well visualized; grossly normal right ventricular  systolic function. Normal tricuspid valve. Normal pulmonic  valve.  Pericardium/Pleura: Normal pericardium with no pericardial  effusion.  Hemodynamic: Estimated right atrial pressure is 8 mm Hg.  ------------------------------------------------------------------------  Conclusions:  1. Increased relative wall thickness with normal left  ventricular mass index, consistent with concentric left  ventricular remodeling.  2. Endocardium not well visualized; grossly normal left  ventricular systolic function.

## 2022-10-13 NOTE — CHART NOTE - NSCHARTNOTEFT_GEN_A_CORE
Endocrine following for diabetes management. Pt. was ordered for Levemir 45 units last night but was held. Fasting this morning low despite not getting basal insulin. But now started on decadron. Expect increasing insulin requirements, but unlikely to need as high as Levemir 45 units. Will decrease dose to 20 units tonight and monitor on correction scale for now. May need to add standing pre-meal insulin based on patterns.         Tyler Hope D.O  [phone]

## 2022-10-13 NOTE — CONSULT NOTE ADULT - ASSESSMENT
ESRD on HD  COVID 19 viral infection: normal RA sats and clear CXR: declined Remdesevir  COncentric LVH on prior TTE with normal LV function    REC    Monitor resp status  No indication for Decadron at this time  NEgative balance as tolerated with HD  Patient not currently using CPAP

## 2022-10-13 NOTE — PHARMACOTHERAPY INTERVENTION NOTE - COMMENTS
Confirmed home medications with patient (list previously confirmed with pharmacy by ED technician but unable to confirm with patient at the time), updated in Outpatient Medication Review.     Added:  Calcium acetate 667 mg daily  Ivania-bart Rx one tablet daily    Changed:  Aspirin 81 mg daily to 325 mg BID  Dipyridamole 75 mg TID to 75 mg BID  Sevelamer carbonate from 2 tabs to 1 tab TID AC    Removed:  Clopidogrel 75 mg daily  Clobetasol 0.05% shampoo  Emla cream  Multivitamin    Discrepancies communicated with NP and inpatient medication orders adjusted.    Mireille Holcomb, PharmD, [facility]  Clinical Pharmacy Specialist  [phone] or Teams

## 2022-10-14 ENCOUNTER — TRANSCRIPTION ENCOUNTER (OUTPATIENT)
Age: 57
End: 2022-10-14

## 2022-10-14 VITALS
TEMPERATURE: 98 F | WEIGHT: 315 LBS | SYSTOLIC BLOOD PRESSURE: 136 MMHG | DIASTOLIC BLOOD PRESSURE: 85 MMHG | HEART RATE: 103 BPM | RESPIRATION RATE: 18 BRPM | OXYGEN SATURATION: 97 %

## 2022-10-14 LAB
ALBUMIN SERPL ELPH-MCNC: 4 G/DL — SIGNIFICANT CHANGE UP (ref 3.3–5)
ALP SERPL-CCNC: 39 U/L — LOW (ref 40–120)
ALT FLD-CCNC: 15 U/L — SIGNIFICANT CHANGE UP (ref 10–45)
ANION GAP SERPL CALC-SCNC: 20 MMOL/L — HIGH (ref 5–17)
AST SERPL-CCNC: 32 U/L — SIGNIFICANT CHANGE UP (ref 10–40)
BILIRUB DIRECT SERPL-MCNC: 0.1 MG/DL — SIGNIFICANT CHANGE UP (ref 0–0.3)
BILIRUB INDIRECT FLD-MCNC: 0.3 MG/DL — SIGNIFICANT CHANGE UP (ref 0.2–1)
BILIRUB SERPL-MCNC: 0.4 MG/DL — SIGNIFICANT CHANGE UP (ref 0.2–1.2)
BUN SERPL-MCNC: 71 MG/DL — HIGH (ref 7–23)
CALCIUM SERPL-MCNC: 8.9 MG/DL — SIGNIFICANT CHANGE UP (ref 8.4–10.5)
CALCIUM SERPL-MCNC: 9.3 MG/DL — SIGNIFICANT CHANGE UP (ref 8.4–10.5)
CHLORIDE SERPL-SCNC: 96 MMOL/L — SIGNIFICANT CHANGE UP (ref 96–108)
CO2 SERPL-SCNC: 21 MMOL/L — LOW (ref 22–31)
CREAT SERPL-MCNC: 12.79 MG/DL — HIGH (ref 0.5–1.3)
CREAT SERPL-MCNC: 13.14 MG/DL — HIGH (ref 0.5–1.3)
D DIMER BLD IA.RAPID-MCNC: 2310 NG/ML DDU — HIGH
D DIMER BLD IA.RAPID-MCNC: 2392 NG/ML DDU — HIGH
EGFR: 4 ML/MIN/1.73M2 — LOW
EGFR: 4 ML/MIN/1.73M2 — LOW
GLUCOSE BLDC GLUCOMTR-MCNC: 137 MG/DL — HIGH (ref 70–99)
GLUCOSE BLDC GLUCOMTR-MCNC: 151 MG/DL — HIGH (ref 70–99)
GLUCOSE BLDC GLUCOMTR-MCNC: 163 MG/DL — HIGH (ref 70–99)
GLUCOSE SERPL-MCNC: 123 MG/DL — HIGH (ref 70–99)
INR BLD: 1.07 RATIO — SIGNIFICANT CHANGE UP (ref 0.88–1.16)
PHOSPHATE SERPL-MCNC: 7.1 MG/DL — HIGH (ref 2.5–4.5)
POTASSIUM SERPL-MCNC: 6.4 MMOL/L — CRITICAL HIGH (ref 3.5–5.3)
POTASSIUM SERPL-SCNC: 6.4 MMOL/L — CRITICAL HIGH (ref 3.5–5.3)
PROT SERPL-MCNC: 8.1 G/DL — SIGNIFICANT CHANGE UP (ref 6–8.3)
PROTHROM AB SERPL-ACNC: 12.3 SEC — SIGNIFICANT CHANGE UP (ref 10.5–13.4)
PTH-INTACT FLD-MCNC: 424 PG/ML — HIGH (ref 15–65)
SARS-COV-2 RNA SPEC QL NAA+PROBE: DETECTED
SODIUM SERPL-SCNC: 137 MMOL/L — SIGNIFICANT CHANGE UP (ref 135–145)

## 2022-10-14 PROCEDURE — 82565 ASSAY OF CREATININE: CPT

## 2022-10-14 PROCEDURE — 36415 COLL VENOUS BLD VENIPUNCTURE: CPT

## 2022-10-14 PROCEDURE — 85014 HEMATOCRIT: CPT

## 2022-10-14 PROCEDURE — 82962 GLUCOSE BLOOD TEST: CPT

## 2022-10-14 PROCEDURE — 71045 X-RAY EXAM CHEST 1 VIEW: CPT

## 2022-10-14 PROCEDURE — 84484 ASSAY OF TROPONIN QUANT: CPT

## 2022-10-14 PROCEDURE — 97161 PT EVAL LOW COMPLEX 20 MIN: CPT

## 2022-10-14 PROCEDURE — 84295 ASSAY OF SERUM SODIUM: CPT

## 2022-10-14 PROCEDURE — 85018 HEMOGLOBIN: CPT

## 2022-10-14 PROCEDURE — 85379 FIBRIN DEGRADATION QUANT: CPT

## 2022-10-14 PROCEDURE — U0003: CPT

## 2022-10-14 PROCEDURE — 82330 ASSAY OF CALCIUM: CPT

## 2022-10-14 PROCEDURE — 83880 ASSAY OF NATRIURETIC PEPTIDE: CPT

## 2022-10-14 PROCEDURE — 84132 ASSAY OF SERUM POTASSIUM: CPT

## 2022-10-14 PROCEDURE — 80048 BASIC METABOLIC PNL TOTAL CA: CPT

## 2022-10-14 PROCEDURE — 84100 ASSAY OF PHOSPHORUS: CPT

## 2022-10-14 PROCEDURE — 96368 THER/DIAG CONCURRENT INF: CPT

## 2022-10-14 PROCEDURE — 82803 BLOOD GASES ANY COMBINATION: CPT

## 2022-10-14 PROCEDURE — 99285 EMERGENCY DEPT VISIT HI MDM: CPT | Mod: 25

## 2022-10-14 PROCEDURE — 82947 ASSAY GLUCOSE BLOOD QUANT: CPT

## 2022-10-14 PROCEDURE — 80053 COMPREHEN METABOLIC PANEL: CPT

## 2022-10-14 PROCEDURE — 85730 THROMBOPLASTIN TIME PARTIAL: CPT

## 2022-10-14 PROCEDURE — 83970 ASSAY OF PARATHORMONE: CPT

## 2022-10-14 PROCEDURE — 82435 ASSAY OF BLOOD CHLORIDE: CPT

## 2022-10-14 PROCEDURE — 0225U NFCT DS DNA&RNA 21 SARSCOV2: CPT

## 2022-10-14 PROCEDURE — 85025 COMPLETE CBC W/AUTO DIFF WBC: CPT

## 2022-10-14 PROCEDURE — 96365 THER/PROPH/DIAG IV INF INIT: CPT

## 2022-10-14 PROCEDURE — 82310 ASSAY OF CALCIUM: CPT

## 2022-10-14 PROCEDURE — 96375 TX/PRO/DX INJ NEW DRUG ADDON: CPT

## 2022-10-14 PROCEDURE — U0005: CPT

## 2022-10-14 PROCEDURE — 87040 BLOOD CULTURE FOR BACTERIA: CPT

## 2022-10-14 PROCEDURE — 99261: CPT

## 2022-10-14 PROCEDURE — 83605 ASSAY OF LACTIC ACID: CPT

## 2022-10-14 PROCEDURE — 83036 HEMOGLOBIN GLYCOSYLATED A1C: CPT

## 2022-10-14 PROCEDURE — 85027 COMPLETE CBC AUTOMATED: CPT

## 2022-10-14 PROCEDURE — 80076 HEPATIC FUNCTION PANEL: CPT

## 2022-10-14 PROCEDURE — 85610 PROTHROMBIN TIME: CPT

## 2022-10-14 RX ORDER — CALCIUM ACETATE 667 MG
1 TABLET ORAL
Qty: 0 | Refills: 0 | DISCHARGE

## 2022-10-14 RX ORDER — ACETAMINOPHEN 500 MG
2 TABLET ORAL
Qty: 0 | Refills: 0 | DISCHARGE
Start: 2022-10-14

## 2022-10-14 RX ORDER — ASPIRIN/CALCIUM CARB/MAGNESIUM 324 MG
1 TABLET ORAL
Qty: 0 | Refills: 0 | DISCHARGE

## 2022-10-14 RX ORDER — INSULIN DETEMIR 100/ML (3)
60 INSULIN PEN (ML) SUBCUTANEOUS
Qty: 0 | Refills: 0 | DISCHARGE

## 2022-10-14 RX ADMIN — Medication 6 MILLIGRAM(S): at 05:16

## 2022-10-14 RX ADMIN — HEPARIN SODIUM 5000 UNIT(S): 5000 INJECTION INTRAVENOUS; SUBCUTANEOUS at 05:16

## 2022-10-14 RX ADMIN — Medication 2: at 17:02

## 2022-10-14 RX ADMIN — SEVELAMER CARBONATE 800 MILLIGRAM(S): 2400 POWDER, FOR SUSPENSION ORAL at 12:26

## 2022-10-14 RX ADMIN — SEVELAMER CARBONATE 800 MILLIGRAM(S): 2400 POWDER, FOR SUSPENSION ORAL at 17:02

## 2022-10-14 RX ADMIN — FEBUXOSTAT 40 MILLIGRAM(S): 40 TABLET ORAL at 12:26

## 2022-10-14 RX ADMIN — SEVELAMER CARBONATE 800 MILLIGRAM(S): 2400 POWDER, FOR SUSPENSION ORAL at 09:09

## 2022-10-14 RX ADMIN — CALCITRIOL 0.25 MICROGRAM(S): 0.5 CAPSULE ORAL at 12:26

## 2022-10-14 RX ADMIN — CHLORHEXIDINE GLUCONATE 1 APPLICATION(S): 213 SOLUTION TOPICAL at 10:53

## 2022-10-14 RX ADMIN — HEPARIN SODIUM 5000 UNIT(S): 5000 INJECTION INTRAVENOUS; SUBCUTANEOUS at 12:39

## 2022-10-14 RX ADMIN — Medication 2: at 12:26

## 2022-10-14 RX ADMIN — REMDESIVIR 500 MILLIGRAM(S): 5 INJECTION INTRAVENOUS at 12:25

## 2022-10-14 RX ADMIN — Medication 325 MILLIGRAM(S): at 12:26

## 2022-10-14 NOTE — CHART NOTE - NSCHARTNOTEFT_GEN_A_CORE
FS reviewed. Patient is on decadron 6mg IV daily. Recommend continue Levemir 20 Units HS and low scale before meals and bedtime.       Amber Grewal DO FS reviewed. Patient is on decadron 6mg IV daily. Recommend continue Levemir 20 Units HS and mod scale before meals and bedtime.       Amber Grewal DO

## 2022-10-14 NOTE — PROGRESS NOTE ADULT - PROVIDER SPECIALTY LIST ADULT
Nephrology
[FreeTextEntry1] : Prevnar 13 to give at age 63\par Pneumovaccine year later
Infectious Disease
Internal Medicine
Internal Medicine
Infectious Disease
Pulmonology
Cardiology
Cardiology

## 2022-10-14 NOTE — PROGRESS NOTE ADULT - ASSESSMENT
56 yo male h/o ESRD on HD, htn, obesity, BENNIE on cpap, here with sob     SOB 2/2 fluid overload 2/2 missed dialysis sessions  renal consulted  s/p HD yesterday  next HD tomorrow   renal f/u    COVID  mildly hypoxic with fever o/n  id and pulm f/u noted  remdesevir day 1  dexa day 1    HTN  bp is soft  not on meds at home  monitor closely  cards f/u    DM  insulin as per endo  monitor fs      cont other home meds      dvt ppx    Advanced care planning was discussed with patient and family.  Advanced care planning forms were reviewed and discussed as appropriate.  Differential diagnosis and plan of care discussed with patient after the evaluation.   Pain assessed and judicious use of narcotics when appropriate was discussed.  Importance of Fall prevention discussed.  Counseling on Smoking and Alcohol cessation was offered when appropriate.  Counseling on Diet, exercise, and medication compliance was done.   Approx 30 minutes spent.
57-year-old male with a past medical history of HTN, obesity, BENNIE on CPAP, ESRD on HD (Monday, Wednesday Friday) presents to the ED with shortness of breath.
57y male with hx HTN, Obesity, chol, BENNIE on CPAP, ESRD on HD (M, W, F) presented to the ED with dyspnea after missing 2 dialysis sessions. He had sore throat for about a week as well and now tested + COVID. No other sick contacts and up-to-date on vaccinations. He still makes descent amount of urine. He denies any fevers or chills, no GI or  c/o. He had low grade fever in ER.   SOB likely due to missed HD sessions  He initially refused RDV, but now with fevers, desats, he agreed for short course of RDV    PLAN:  RDV and decadron  monitor WBC and fevers trend  AC as per medicine/? pulmonary  
COVID 19 viral infection  ESRD on HD  Obesity  Sleep apnea    REC    DC planning primary team  HD per renal  
57y male with hx HTN, Obesity, chol, BENNIE on CPAP, ESRD on HD (M, W, F) presented to the ED with dyspnea after missing 2 dialysis sessions. He had sore throat for about a week as well and now tested + COVID. No other sick contacts and up-to-date on vaccinations. He still makes descent amount of urine. He denies any fevers or chills, no GI or  c/o. He had low grade fever in ER.   SOB likely due to missed HD sessions  He initially refused RDV, but now with fevers, desats, he agreed for short course of RDV    PLAN:  RDV and decadron  monitor WBC and fevers trend  HD as per renal, hopefully his dyspnea will improve with HD as well  consider dedicated CT chest  AC as per medicine/? pulmonary  Reviewed with medicine NP
56 yo male h/o ESRD on HD, htn, obesity, BENNIE on cpap, here with sob     SOB 2/2 fluid overload 2/2 missed dialysis sessions  renal consulted  s/p HD  feels better  for HD today  next HD monday  renal f/u    COVID  no longer hypoxic  id and pulm f/u noted  remdesevir day 2  dexa day 2  can dc remdesevir and dexa after today    HTN  bp is soft  not on meds at home  monitor closely  cards f/u    DM  insulin as per endo  monitor fs      cont other home meds      dvt ppx    dc planning after HD today      Advanced care planning was discussed with patient and family.  Advanced care planning forms were reviewed and discussed as appropriate.  Differential diagnosis and plan of care discussed with patient after the evaluation.   Pain assessed and judicious use of narcotics when appropriate was discussed.  Importance of Fall prevention discussed.  Counseling on Smoking and Alcohol cessation was offered when appropriate.  Counseling on Diet, exercise, and medication compliance was done.   Approx 30 minutes spent.
57-year-old male with a past medical history of HTN, obesity, BENNIE on CPAP, ESRD on HD (Monday, Wednesday Friday) presents to the ED with shortness of breath.

## 2022-10-14 NOTE — DISCHARGE NOTE PROVIDER - NSDCCPCAREPLAN_GEN_ALL_CORE_FT
PRINCIPAL DISCHARGE DIAGNOSIS  Diagnosis: Hyperkalemia  Assessment and Plan of Treatment: Resolved post HD      SECONDARY DISCHARGE DIAGNOSES  Diagnosis: Hypertension  Assessment and Plan of Treatment: Low salt diet  Activity as tolerated.  Take all medication as prescribed.  Follow up with your medical doctor for routine blood pressure monitoring at your next visit.  Notify your doctor if you have any of the following symptoms:   Dizziness, Lightheadedness, Blurry vision, Headache, Chest pain, Shortness of breath    Diagnosis: COVID-19  Assessment and Plan of Treatment: You tested positive for COVID 19.  You no longer require hospitalization.  Please restrict activities outside of your home except for getting medical care.  Do not go to work, school, or public areas.  Avoid using public transportation, ride-sharing, or taxis.  Separate yourself from other people and animals in your home.  Call ahead before visiting your doctor.  Wear a facemask when you are around other people. Cover your cough and sneezes.  Clean your hands often.  Avoid sharing personal household items.  Clean all frequently touched surfaces daily.    Diagnosis: DM type 2, goal HbA1c < 7%  Assessment and Plan of Treatment: HgA1C this admission.  Make sure you get your HgA1c checked every three months.  If you take oral diabetes medications, check your blood glucose two times a day.  If you take insulin, check your blood glucose before meals and at bedtime.  It's important not to skip any meals.  Keep a log of your blood glucose results and always take it with you to your doctor appointments.  Keep a list of your current medications including injectables and over the counter medications and bring this medication list with you to all your doctor appointments.  If you have not seen your ophthalmologist this year call for appointment.  Check your feet daily for redness, sores, or openings. Do not self treat. If no improvement in two days call your primary care physician for an appointment.  Low blood sugar (hypoglycemia) is a blood sugar below 70mg/dl. Check your blood sugar if you feel signs/symptoms of hypoglycemia. If your blood sugar is below 70 take 15 grams of carbohydrates (ex 4 oz of apple juice, 3-4 glucose tablets, or 4-6 oz of regular soda) wait 15 minutes and repeat blood sugar to make sure it comes up above 70.  If your blood sugar is above 70 and you are due for a meal, have a meal.  If you are not due for a meal have a snack.  This snack helps keeps your blood sugar at a safe range.

## 2022-10-14 NOTE — PROGRESS NOTE ADULT - PROBLEM SELECTOR PLAN 3
BPs stable    - was on anti-HTN meds prior to dialysis, since receiving HD he does not take anymore anti-HTN meds  continue to monitor on no meds
BPs stable    - was on anti-HTN meds prior to dialysis, since receiving HD he does not take anymore anti-HTN meds  continue to monitor on no meds

## 2022-10-14 NOTE — PROGRESS NOTE ADULT - PROBLEM SELECTOR PLAN 2
on room air   started on remdesivir and decadron  supplemental O2 as needed
on room air   started on remdesivir and decadron  supplemental O2 as needed

## 2022-10-14 NOTE — DISCHARGE NOTE PROVIDER - NSDCMRMEDTOKEN_GEN_ALL_CORE_FT
acetaminophen 325 mg oral tablet: 2 tab(s) orally every 6 hours, As needed, Temp greater or equal to 38C (100.4F)  aspirin 325 mg oral tablet: 1 tab(s) orally once a day  atorvastatin 40 mg oral tablet: 1 tab(s) orally once a day  calcitriol 0.25 mcg oral capsule: 1 cap(s) orally once a day  dipyridamole 75 mg oral tablet: 1 tab(s) orally 2 times a day  febuxostat 40 mg oral tablet: 1 tab(s) orally once a day  FIRST Mouthwash BLM mucous membrane suspension: 10 milliliter(s) mucous membrane every 6 hours, As Needed  Note:No record with pharmacy  HumaLOG KwikPen 100 units/mL injectable solution: 18 unit(s) injectable 3 times a day (before meals)  Levemir 100 units/mL subcutaneous solution: 20 unit(s) subcutaneous once a day (at bedtime)  [place]ia-Bud Rx oral tablet: 1 tab(s) orally once a day  sevelamer carbonate 800 mg oral tablet: 1 tab(s) orally 3 times a day (with meals)  tacrolimus 0.1% topical ointment: Apply topically to affected area 2 times a day  Vascepa 1 g oral capsule: 2 cap(s) orally once (at bedtime)    Note:No record with pharmacy  Vitamin D3 50 mcg (2000 intl units) oral tablet: 1 tab(s) orally once a day

## 2022-10-14 NOTE — DISCHARGE NOTE PROVIDER - HOSPITAL COURSE
58 yo male h/o ESRD on HD, htn, obesity, BENNIE on cpap, here with SOB 2/2 fluid overload 2/2 missed dialysis sessions renal consulted s/p HD  feels better. COVID no longer hypoxic remdesevir day 2,  dexa day 2 can dc remdesevir and dexa after today. HTN bp is soft not on meds at home  monitor closely. DM insulin as per endo monitor fs.

## 2022-10-14 NOTE — DISCHARGE NOTE NURSING/CASE MANAGEMENT/SOCIAL WORK - PATIENT PORTAL LINK FT
You can access the FollowMyHealth Patient Portal offered by [facility] by registering at the following website: [email or web address]. By joining [facility]’s FollowMyHealth portal, you will also be able to view your health information using other applications (apps) compatible with our system.

## 2022-10-14 NOTE — PHYSICAL THERAPY INITIAL EVALUATION ADULT - PERTINENT HX OF CURRENT PROBLEM, REHAB EVAL
58 yo male PMH HTN, obesity, BENNIE on CPAP, ESRD on HD, presents to the ED with shortness of breath. Patient endorses nayana COVID on his full dialysis session on Friday last week. Patient endorses associated orthopnea, SOB, VELEZ, and fatigue. 10/12: XRay Chest (-)

## 2022-10-14 NOTE — DISCHARGE NOTE PROVIDER - CARE PROVIDER_API CALL
Sage Rodrigues)  Internal Medicine  [address], [address]  [place] [address]  Phone: [phone]  Fax: [phone]  Follow Up Time: 2 weeks    Eddie Wooten)  Internal Medicine; Pulmonary Disease  [address] [address]  Phone: [phone]  Fax: [phone]  Follow Up Time: 2 weeks    Deann Chilel ()  Medicine  [address] [place] [id number]  Phone: [phone]  Follow Up Time: 1 week

## 2022-10-14 NOTE — DISCHARGE NOTE NURSING/CASE MANAGEMENT/SOCIAL WORK - NSDCPEFALRISK_GEN_ALL_CORE
For information on Fall & Injury Prevention, visit: [email or web address] OR  [email or web address] OR  https://www.cdc.gov/steadi/patient.html

## 2022-10-14 NOTE — PROGRESS NOTE ADULT - PROBLEM SELECTOR PLAN 1
likely 2/2 to missed dialysis    - usually MWF, has not had dialysis since Friday   s/p HD   nephro following
likely 2/2 to missed dialysis    - usually MWF, has not had dialysis since Friday   s/p HD   nephro following

## 2022-10-14 NOTE — DISCHARGE NOTE PROVIDER - PROVIDER TOKENS
PROVIDER:[TOKEN:[[id number]],FOLLOWUP:[2 weeks]],PROVIDER:[TOKEN:[[id number]],FOLLOWUP:[2 weeks]],PROVIDER:[TOKEN:[[id number]],FOLLOWUP:[1 week]]

## 2022-10-14 NOTE — PHYSICAL THERAPY INITIAL EVALUATION ADULT - ADDITIONAL COMMENTS
Pt lives alone in apt, 3 stairs to enter. PTA pt was independent with all ADLs and ambulation, pt does not own DME. Pt states having a lot of support at home if needed.

## 2022-10-14 NOTE — PROGRESS NOTE ADULT - SUBJECTIVE AND OBJECTIVE BOX
CC: f/u for COVID    Patient reports feeling chills, tired, dry cough, had fever 101, desats to 93% RA    REVIEW OF SYSTEMS: no fevers, no chills, no nausea, no vomiting, no abd pain, no resp symptoms  All other review of systems negative (Comprehensive ROS)    Antimicrobials Day #1    remdesivir  IVPB   IV Intermittent     Other Medications Reviewed  MEDICATIONS  (STANDING):  aspirin enteric coated 81 milliGRAM(s) Oral daily  clopidogrel Tablet 75 milliGRAM(s) Oral daily  dexAMETHasone  Injectable 6 milliGRAM(s) IV Push daily  dextrose 5%. 1000 milliLiter(s) (100 mL/Hr) IV Continuous <Continuous>  dextrose 5%. 1000 milliLiter(s) (50 mL/Hr) IV Continuous <Continuous>  dextrose 50% Injectable 25 Gram(s) IV Push once  dextrose 50% Injectable 12.5 Gram(s) IV Push once  dextrose 50% Injectable 25 Gram(s) IV Push once  glucagon  Injectable 1 milliGRAM(s) IntraMuscular once  insulin detemir injectable (LEVEMIR) 45 Unit(s) SubCutaneous at bedtime  insulin lispro (ADMELOG) corrective regimen sliding scale   SubCutaneous three times a day before meals  remdesivir  IVPB   IV Intermittent       T(F): 99.4 (10-13-22 @ 04:26), Max: 101 (10-13-22 @ 00:42)  HR: 96 (10-13-22 @ 04:26)  BP: 106/63 (10-13-22 @ 04:26)  RR: 18 (10-13-22 @ 04:26)  SpO2: 93% (10-13-22 @ 04:26)    PHYSICAL EXAM:  General: alert, no acute distress  Eyes:  anicteric, no conjunctival injection, no discharge  Oropharynx: no lesions or injection 	  Neck: supple, without adenopathy  Lungs: clear to auscultation  Heart: regular rate and rhythm; no murmur, rubs or gallops  Abdomen: soft, nondistended, nontender, without mass or organomegaly  Skin: no lesions  Extremities: no clubbing, cyanosis, or edema  Neurologic: alert, oriented, moves all extremities    LAB RESULTS:                        9.3    5.33  )-----------( 159      ( 13 Oct 2022 07:23 )             29.2     10-13    136  |  96  |  50<H>  ----------------------------<  78  4.4   |  21<L>  |  10.64<H>    Ca    8.6      13 Oct 2022 07:23    TPro  7.6  /  Alb  4.0  /  TBili  0.4  /  DBili  x   /  AST  21  /  ALT  8<L>  /  AlkPhos  38<L>  10-12    LIVER FUNCTIONS - ( 12 Oct 2022 06:37 )  Alb: 4.0 g/dL / Pro: 7.6 g/dL / ALK PHOS: 38 U/L / ALT: 8 U/L / AST: 21 U/L / GGT: x               MICROBIOLOGY REVIEWED:    RADIOLOGY REVIEWED:    < from: Xray Chest 1 View- PORTABLE-Urgent (10.12.22 @ 06:35) >    IMPRESSION:  Clear lungs.    < end of copied text >  
LORRAINE RUTH JR  57y Male  MRN:[id number]    Patient is a 57y old  Male who presents with a chief complaint of Dyspnea (13 Oct 2022 12:16)    HPI:  57-year-old male with a past medical history of HTN, obesity, BENNIE on CPAP, ESRD on HD (Monday, Wednesday Friday) presents to the ED with shortness of breath.  Patient endorses missing 2 dialysis sessions, 1 today and 1 on Monday secondary to COVID infection.  Patient endorses nayana COVID on his full dialysis session on Friday last week.  Patient endorses associated orthopnea, and shortness of breath, and dyspnea on exertion.  Patient also endorses fatigue.  He denies any fevers, chills, nausea, vomiting, diarrhea.  No other sick contacts at home.  Up-to-date on vaccinations.  Compliant with all his medications.  He denies any other symptoms at bedside. He endorses making urine (12 Oct 2022 11:19)      Patient seen and evaluated at bedside. No acute events overnight except as noted.    Interval HPI: no acute events o/n     PAST MEDICAL & SURGICAL HISTORY:  Renal disease  ESRD      Hypertension      Gout      Diabetes mellitus      HLD (hyperlipidemia)      Obesity      BENNIE on CPAP      Heart rate fast      H/O shoulder surgery      Injury of ankle and foot  surgically repaired, 10 years ago      H/O hernia repair  30 years ago      Peritoneal dialysis catheter in situ  Was inserted, and removed      Arteriovenous graft removed  Now there is a wound suction in left arm          REVIEW OF SYSTEMS:  as per hpi     VITALS:   Vital Signs Last 24 Hrs  T(C): 36.4 (14 Oct 2022 12:24), Max: 37.7 (13 Oct 2022 14:13)  T(F): 97.6 (14 Oct 2022 12:24), Max: 99.9 (13 Oct 2022 14:13)  HR: 100 (14 Oct 2022 12:24) (69 - 109)  BP: 125/75 (14 Oct 2022 12:24) (104/65 - 127/74)  BP(mean): --  RR: 18 (14 Oct 2022 12:24) (18 - 18)  SpO2: 95% (14 Oct 2022 12:24) (95% - 97%)    Parameters below as of 14 Oct 2022 12:24  Patient On (Oxygen Delivery Method): room air          PHYSICAL EXAM:  GENERAL: NAD, well-developed  HEAD:  Atraumatic, Normocephalic  EYES: EOMI, PERRLA, conjunctiva and sclera clear  NECK: Supple, No JVD  CHEST/LUNG: Clear to auscultation bilaterally; No wheeze  HEART: S1, S2; No murmurs, rubs, or gallops  ABDOMEN: Soft, Nontender, Nondistended; Bowel sounds present  EXTREMITIES:  2+ Peripheral Pulses, No clubbing, cyanosis, or edema  PSYCH: Normal affect  NEUROLOGY: AAOX3; non-focal  SKIN: No rashes or lesions    Consultant(s) Notes Reviewed:  [x ] YES  [ ] NO  Care Discussed with Consultants/Other Providers [ x] YES  [ ] NO    MEDS:   MEDICATIONS  (STANDING):  aspirin 325 milliGRAM(s) Oral daily  atorvastatin 40 milliGRAM(s) Oral at bedtime  calcitriol   Capsule 0.25 MICROGram(s) Oral daily  chlorhexidine 2% Cloths 1 Application(s) Topical daily  dexAMETHasone  Injectable 6 milliGRAM(s) IV Push daily  dextrose 5%. 1000 milliLiter(s) (100 mL/Hr) IV Continuous <Continuous>  dextrose 5%. 1000 milliLiter(s) (50 mL/Hr) IV Continuous <Continuous>  dextrose 50% Injectable 25 Gram(s) IV Push once  dextrose 50% Injectable 12.5 Gram(s) IV Push once  dextrose 50% Injectable 25 Gram(s) IV Push once  febuxostat 40 milliGRAM(s) Oral daily  glucagon  Injectable 1 milliGRAM(s) IntraMuscular once  heparin   Injectable 5000 Unit(s) SubCutaneous every 8 hours  insulin detemir injectable (LEVEMIR) 20 Unit(s) SubCutaneous at bedtime  insulin lispro (ADMELOG) corrective regimen sliding scale   SubCutaneous three times a day before meals  remdesivir  IVPB   IV Intermittent   remdesivir  IVPB 100 milliGRAM(s) IV Intermittent every 24 hours  sevelamer carbonate 800 milliGRAM(s) Oral three times a day with meals    MEDICATIONS  (PRN):  acetaminophen     Tablet .. 650 milliGRAM(s) Oral every 6 hours PRN Temp greater or equal to 38C (100.4F)  dextrose Oral Gel 15 Gram(s) Oral once PRN Blood Glucose LESS THAN 70 milliGRAM(s)/deciliter  FIRST- Mouthwash  BLM 10 milliLiter(s) Swish and Spit every 6 hours PRN Mouth Care      ALLERGIES:  IV Contrast (Unknown)      LABS:                                    9.3    5.33  )-----------( 159      ( 13 Oct 2022 07:23 )             29.2   10-14    x   |  x   |  x   ----------------------------<  x   x    |  x   |  13.14<H>    Ca    8.6      13 Oct 2022 07:23  Phos  7.1     10-14    TPro  8.1  /  Alb  4.0  /  TBili  0.4  /  DBili  0.1  /  AST  32  /  ALT  15  /  AlkPhos  39<L>  10-14          cultures: Culture Results:   No growth to date. (10-12 @ 06:30)  Culture Results:   No growth to date. (10-12 @ 06:15)       
Subjective: Patient seen and examined. No new events except as noted.   Pt complaining of some SOB still, was laying in prone position.  Some chest pain but relieved when he lays on his side.  Tachycardia with fevers.    REVIEW OF SYSTEMS:    CONSTITUTIONAL: + weakness, fevers or chills  EYES/ENT: No visual changes;  No vertigo or throat pain   NECK: No pain or stiffness  RESPIRATORY: No cough, wheezing, hemoptysis; No shortness of breath  CARDIOVASCULAR: No chest pain or palpitations  GASTROINTESTINAL: No abdominal or epigastric pain. No nausea, vomiting, or hematemesis; No diarrhea or constipation. No melena or hematochezia.  GENITOURINARY: No dysuria, frequency or hematuria  NEUROLOGICAL: No numbness or weakness  SKIN: No itching, burning, rashes, or lesions   All other review of systems is negative unless indicated above.    MEDICATIONS:  MEDICATIONS  (STANDING):  aspirin enteric coated 81 milliGRAM(s) Oral daily  clopidogrel Tablet 75 milliGRAM(s) Oral daily  dexAMETHasone  Injectable 6 milliGRAM(s) IV Push daily  dextrose 5%. 1000 milliLiter(s) (100 mL/Hr) IV Continuous <Continuous>  dextrose 5%. 1000 milliLiter(s) (50 mL/Hr) IV Continuous <Continuous>  dextrose 50% Injectable 25 Gram(s) IV Push once  dextrose 50% Injectable 12.5 Gram(s) IV Push once  dextrose 50% Injectable 25 Gram(s) IV Push once  glucagon  Injectable 1 milliGRAM(s) IntraMuscular once  heparin   Injectable 5000 Unit(s) SubCutaneous every 8 hours  insulin detemir injectable (LEVEMIR) 45 Unit(s) SubCutaneous at bedtime  insulin lispro (ADMELOG) corrective regimen sliding scale   SubCutaneous three times a day before meals  remdesivir  IVPB   IV Intermittent   remdesivir  IVPB 200 milliGRAM(s) IV Intermittent every 24 hours      PHYSICAL EXAM:  T(C): 37.4 (10-13-22 @ 04:26), Max: 38.3 (10-13-22 @ 00:42)  HR: 96 (10-13-22 @ 04:26) (89 - 101)  BP: 106/63 (10-13-22 @ 04:26) (106/63 - 145/81)  RR: 18 (10-13-22 @ 04:26) (18 - 20)  SpO2: 93% (10-13-22 @ 04:26) (93% - 100%)  Wt(kg): --  I&O's Summary    13 Oct 2022 07:01  -  13 Oct 2022 11:59  --------------------------------------------------------  IN: 420 mL / OUT: 0 mL / NET: 420 mL    Appearance: Normal, Obese  HEENT:   Normal oral mucosa, PERRL, EOMI	  Lymphatic: No lymphadenopathy , no edema  Cardiovascular: Normal S1 S2, No JVD, No murmurs , Peripheral pulses palpable 2+ bilaterally  Respiratory: Lungs clear to auscultation, normal effort 	  Gastrointestinal:  Soft, Non-tender, + BS	  Skin: No rashes, No ecchymoses, No cyanosis, warm to touch  Musculoskeletal: Normal range of motion, normal strength  Psychiatry:  Mood & affect appropriate  Ext: No edema    LABS:    CARDIAC MARKERS:                      9.3    5.33  )-----------( 159      ( 13 Oct 2022 07:23 )             29.2     10-13    136  |  96  |  50<H>  ----------------------------<  78  4.4   |  21<L>  |  10.64<H>    Ca    8.6      13 Oct 2022 07:23    TPro  7.6  /  Alb  4.0  /  TBili  0.4  /  DBili  x   /  AST  21  /  ALT  8<L>  /  AlkPhos  38<L>  10-12    proBNP:   Lipid Profile:   HgA1c:   TSH:     TELEMETRY: 	    ECG:  	  RADIOLOGY:   DIAGNOSTIC TESTING:  [ ] Echocardiogram:  [ ]  Catheterization:  [ ] Stress Test:    OTHER: 	
Overnight events noted      VITAL:  T(C): , Max: 38.3 (10-13-22 @ 00:42)  T(F): , Max: 101 (10-13-22 @ 00:42)  HR: 100 (10-13-22 @ 14:13)  BP: 116/68 (10-13-22 @ 14:13)  BP(mean): --  RR: 18 (10-13-22 @ 14:13)  SpO2: 95% (10-13-22 @ 14:13)  Wt(kg): --      PHYSICAL EXAM:  General: Alerted, oriented  HEENT: MMM  Lungs:CTA-b/l  Heart: RRR S1S2  Abdomen: Soft, NTND  Ext: no pedal edema b/l  : no hyde  access: AVF     LABS:                        9.3    5.33  )-----------( 159      ( 13 Oct 2022 07:23 )             29.2     Na(136)/K(4.4)/Cl(96)/HCO3(21)/BUN(50)/Cr(10.64)Glu(78)/Ca(8.6)/Mg(--)/PO4(--)    10-13 @ 07:23  Na(138)/K(5.5)/Cl(100)/HCO3(18)/BUN(81)/Cr(15.65)Glu(92)/Ca(9.1)/Mg(--)/PO4(--)    10-12 @ 06:37          ASSESSMENT:   57-year-old male with a past medical history of HTN, obesity, BENNIE on CPAP, ESRD on HD (Monday, Wednesday Friday) presents to the ED with shortness of breath, missed dialysis on Monday      (1)Renal -  ESRD-HD - plan for HD today   2)CVS; BP soft stable   (3)Anemia - hb 10.1 likely esrd   (4)ID --covid positive   5) hyperphosphatemia--on phas binder.       RECOMMENDATIONS  (1)HD  today   - 2kg UF as able - Retacrit with HD, next would be on monday   (2)renal diet  3) all new medications dose for dialysis.  4) cont with calcitriol 0.25 mg daily  5) cont  sevelamer 800 mg tid             
CC: f/u for COVID    Patient reports feeling better today, no fever, no cough, seen at HD    REVIEW OF SYSTEMS: no fevers, no chills, no nausea, no vomiting, no abd pain, no resp symptoms  All other review of systems negative (Comprehensive ROS)    Antimicrobials Day #2  remdesivir  IVPB   IV Intermittent     Other Medications Reviewed  MEDICATIONS  (STANDING):  aspirin enteric coated 81 milliGRAM(s) Oral daily  clopidogrel Tablet 75 milliGRAM(s) Oral daily  dexAMETHasone  Injectable 6 milliGRAM(s) IV Push daily  dextrose 5%. 1000 milliLiter(s) (100 mL/Hr) IV Continuous <Continuous>  dextrose 5%. 1000 milliLiter(s) (50 mL/Hr) IV Continuous <Continuous>  dextrose 50% Injectable 25 Gram(s) IV Push once  dextrose 50% Injectable 12.5 Gram(s) IV Push once  dextrose 50% Injectable 25 Gram(s) IV Push once  glucagon  Injectable 1 milliGRAM(s) IntraMuscular once  insulin detemir injectable (LEVEMIR) 45 Unit(s) SubCutaneous at bedtime  insulin lispro (ADMELOG) corrective regimen sliding scale   SubCutaneous three times a day before meals  remdesivir  IVPB   IV Intermittent     Vital Signs Last 24 Hrs  T(C): 36.9 (14 Oct 2022 14:08), Max: 37.1 (13 Oct 2022 21:02)  T(F): 98.5 (14 Oct 2022 14:08), Max: 98.7 (13 Oct 2022 21:02)  HR: 99 (14 Oct 2022 14:08) (69 - 109)  BP: 136/85 (14 Oct 2022 14:08) (104/65 - 136/85)  BP(mean): --  RR: 18 (14 Oct 2022 14:08) (18 - 18)  SpO2: 94% (14 Oct 2022 14:08) (94% - 97%)    Parameters below as of 14 Oct 2022 14:08  Patient On (Oxygen Delivery Method): room air        PHYSICAL EXAM:  General: alert, no acute distress  Eyes:  anicteric, no conjunctival injection, no discharge  Oropharynx: no lesions or injection 	  Neck: supple, without adenopathy  Lungs: clear to auscultation  Heart: regular rate and rhythm; no murmur, rubs or gallops  Abdomen: soft, nondistended, nontender, without mass or organomegaly  Skin: no lesions  Extremities: no clubbing, cyanosis, or edema  Neurologic: alert, oriented, moves all extremities    LAB RESULTS:                                   9.3    5.33  )-----------( 159      ( 13 Oct 2022 07:23 )             29.2   10-14    137  |  96  |  71<H>  ----------------------------<  123<H>  6.4<HH>   |  21<L>  |  12.79<H>    Ca    9.3      14 Oct 2022 11:46  Phos  7.1     10-14    TPro  8.1  /  Alb  4.0  /  TBili  0.4  /  DBili  0.1  /  AST  32  /  ALT  15  /  AlkPhos  39<L>  10-14        MICROBIOLOGY REVIEWED:    RADIOLOGY REVIEWED:    < from: Xray Chest 1 View- PORTABLE-Urgent (10.12.22 @ 06:35) >    IMPRESSION:  Clear lungs.    < end of copied text >  
LORRAINE RUTH JR  57y Male  MRN:[id number]    Patient is a 57y old  Male who presents with a chief complaint of Dyspnea (13 Oct 2022 12:16)    HPI:  57-year-old male with a past medical history of HTN, obesity, BENNIE on CPAP, ESRD on HD (Monday, Wednesday Friday) presents to the ED with shortness of breath.  Patient endorses missing 2 dialysis sessions, 1 today and 1 on Monday secondary to COVID infection.  Patient endorses nayana COVID on his full dialysis session on Friday last week.  Patient endorses associated orthopnea, and shortness of breath, and dyspnea on exertion.  Patient also endorses fatigue.  He denies any fevers, chills, nausea, vomiting, diarrhea.  No other sick contacts at home.  Up-to-date on vaccinations.  Compliant with all his medications.  He denies any other symptoms at bedside. He endorses making urine (12 Oct 2022 11:19)      Patient seen and evaluated at bedside. No acute events overnight except as noted.    Interval HPI: no acute events o/n     PAST MEDICAL & SURGICAL HISTORY:  Renal disease  ESRD      Hypertension      Gout      Diabetes mellitus      HLD (hyperlipidemia)      Obesity      BENNIE on CPAP      Heart rate fast      H/O shoulder surgery      Injury of ankle and foot  surgically repaired, 10 years ago      H/O hernia repair  30 years ago      Peritoneal dialysis catheter in situ  Was inserted, and removed      Arteriovenous graft removed  Now there is a wound suction in left arm          REVIEW OF SYSTEMS:  as per hpi     VITALS:  Vital Signs Last 24 Hrs  T(C): 37.7 (13 Oct 2022 14:13), Max: 38.3 (13 Oct 2022 00:42)  T(F): 99.9 (13 Oct 2022 14:13), Max: 101 (13 Oct 2022 00:42)  HR: 100 (13 Oct 2022 14:13) (89 - 101)  BP: 116/68 (13 Oct 2022 14:13) (106/63 - 145/81)  BP(mean): --  RR: 18 (13 Oct 2022 14:13) (18 - 20)  SpO2: 95% (13 Oct 2022 14:13) (93% - 100%)    Parameters below as of 13 Oct 2022 14:13  Patient On (Oxygen Delivery Method): room air      CAPILLARY BLOOD GLUCOSE      POCT Blood Glucose.: 108 mg/dL (13 Oct 2022 11:59)  POCT Blood Glucose.: 97 mg/dL (13 Oct 2022 07:35)  POCT Blood Glucose.: 100 mg/dL (12 Oct 2022 23:34)  POCT Blood Glucose.: 81 mg/dL (12 Oct 2022 17:06)    I&O's Summary    13 Oct 2022 07:01  -  13 Oct 2022 15:39  --------------------------------------------------------  IN: 660 mL / OUT: 0 mL / NET: 660 mL        PHYSICAL EXAM:  GENERAL: NAD, well-developed  HEAD:  Atraumatic, Normocephalic  EYES: EOMI, PERRLA, conjunctiva and sclera clear  NECK: Supple, No JVD  CHEST/LUNG: Clear to auscultation bilaterally; No wheeze  HEART: S1, S2; No murmurs, rubs, or gallops  ABDOMEN: Soft, Nontender, Nondistended; Bowel sounds present  EXTREMITIES:  2+ Peripheral Pulses, No clubbing, cyanosis, or edema  PSYCH: Normal affect  NEUROLOGY: AAOX3; non-focal  SKIN: No rashes or lesions    Consultant(s) Notes Reviewed:  [x ] YES  [ ] NO  Care Discussed with Consultants/Other Providers [ x] YES  [ ] NO    MEDS:  MEDICATIONS  (STANDING):  atorvastatin 40 milliGRAM(s) Oral at bedtime  calcitriol   Capsule 0.25 MICROGram(s) Oral daily  dexAMETHasone  Injectable 6 milliGRAM(s) IV Push daily  dextrose 5%. 1000 milliLiter(s) (100 mL/Hr) IV Continuous <Continuous>  dextrose 5%. 1000 milliLiter(s) (50 mL/Hr) IV Continuous <Continuous>  dextrose 50% Injectable 25 Gram(s) IV Push once  dextrose 50% Injectable 12.5 Gram(s) IV Push once  dextrose 50% Injectable 25 Gram(s) IV Push once  febuxostat 40 milliGRAM(s) Oral daily  glucagon  Injectable 1 milliGRAM(s) IntraMuscular once  heparin   Injectable 5000 Unit(s) SubCutaneous every 8 hours  insulin detemir injectable (LEVEMIR) 45 Unit(s) SubCutaneous at bedtime  insulin lispro (ADMELOG) corrective regimen sliding scale   SubCutaneous three times a day before meals  remdesivir  IVPB   IV Intermittent   sevelamer carbonate 800 milliGRAM(s) Oral three times a day with meals    MEDICATIONS  (PRN):  acetaminophen     Tablet .. 650 milliGRAM(s) Oral every 6 hours PRN Temp greater or equal to 38C (100.4F)  dextrose Oral Gel 15 Gram(s) Oral once PRN Blood Glucose LESS THAN 70 milliGRAM(s)/deciliter  FIRST- Mouthwash  BLM 10 milliLiter(s) Swish and Spit every 6 hours PRN Mouth Care    ALLERGIES:  IV Contrast (Unknown)      LABS:                        9.3    5.33  )-----------( 159      ( 13 Oct 2022 07:23 )             29.2     10-13    136  |  96  |  50<H>  ----------------------------<  78  4.4   |  21<L>  |  10.64<H>    Ca    8.6      13 Oct 2022 07:23    TPro  7.6  /  Alb  4.0  /  TBili  0.4  /  DBili  x   /  AST  21  /  ALT  8<L>  /  AlkPhos  38<L>  10-12    PT/INR - ( 12 Oct 2022 06:37 )   PT: 12.2 sec;   INR: 1.05 ratio         PTT - ( 12 Oct 2022 06:37 )  PTT:31.8 sec      LIVER FUNCTIONS - ( 12 Oct 2022 06:37 )  Alb: 4.0 g/dL / Pro: 7.6 g/dL / ALK PHOS: 38 U/L / ALT: 8 U/L / AST: 21 U/L / GGT: x                   cultures: Culture Results:   No growth to date. (10-12 @ 06:30)  Culture Results:   No growth to date. (10-12 @ 06:15)       
Follow-up Pulm Progress Note  Eddie Wooten MD  [phone]    No new respiratory events overnight.  Denies SOB/CP.   Normal RA oxygen sats  Want to go  home    Medications:  Vital Signs Last 24 Hrs  T(C): 36.5 (14 Oct 2022 04:45), Max: 37.7 (13 Oct 2022 14:13)  T(F): 97.7 (14 Oct 2022 04:45), Max: 99.9 (13 Oct 2022 14:13)  HR: 109 (14 Oct 2022 10:10) (69 - 109)  BP: 104/65 (14 Oct 2022 10:10) (104/65 - 127/74)  BP(mean): --  RR: 18 (14 Oct 2022 10:10) (18 - 18)  SpO2: 95% (14 Oct 2022 10:10) (95% - 97%)    Parameters below as of 14 Oct 2022 10:10  Patient On (Oxygen Delivery Method): room air    10-13 @ 07:01  -  10-14 @ 07:00  --------------------------------------------------------  IN: 1140 mL / OUT: 0 mL / NET: 1140 mL        LABS:                        9.3    5.33  )-----------( 159      ( 13 Oct 2022 07:23 )             29.2     10-14    x   |  x   |  x   ----------------------------<  x   x    |  x   |  13.14<H>    Ca    8.6      13 Oct 2022 07:23    TPro  8.1  /  Alb  4.0  /  TBili  0.4  /  DBili  0.1  /  AST  32  /  ALT  15  /  AlkPhos  39<L>  10-14      PT/INR - ( 14 Oct 2022 11:25 )   PT: 12.3 sec;   INR: 1.07 ratio        Serum Pro-Brain Natriuretic Peptide: 1799 pg/mL (10-12-22 @ 06:37)      CULTURES:  Culture Results:   No growth to date. (10-12 @ 06:30)  Culture Results:   No growth to date. (10-12 @ 06:15)    Most recent blood culture -- 10-12 @ 06:30   -- -- .Blood Blood-Peripheral 10-12 @ 06:30  Most recent blood culture -- 10-12 @ 06:15   -- -- .Blood Blood-Peripheral 10-12 @ 06:15      Physical Examination:  PULM: No wheeze or rhonchi  CVS: Regular rate and rhythm, no murmurs, rubs, or gallops  ABD: Soft, non-tender  EXT:  No clubbing, cyanosis, or edema    RADIOLOGY REVIEWED  CXR:    PROCEDURE DATE:  10/12/2022        INTERPRETATION:  CLINICAL INDICATION: Sepsis.    EXAM: Frontal radiograph of the chest.    COMPARISON: Chest radiograph from 10/31/2021.    FINDINGS:  The lungs are clear.  There is no pleural effusion or pneumothorax.  The heart size is not well evaluated on this projection.  The visualized osseous structures demonstrate no acute pathology.    IMPRESSION:  Clear lungs.      CT chest:    TTE:  
Subjective: Patient seen and examined. No new events except as noted.     REVIEW OF SYSTEMS:    CONSTITUTIONAL: + weakness, fevers or chills  EYES/ENT: No visual changes;  No vertigo or throat pain   NECK: No pain or stiffness  RESPIRATORY: No cough, wheezing, hemoptysis; No shortness of breath  CARDIOVASCULAR: No chest pain or palpitations  GASTROINTESTINAL: No abdominal or epigastric pain. No nausea, vomiting, or hematemesis; No diarrhea or constipation. No melena or hematochezia.  GENITOURINARY: No dysuria, frequency or hematuria  NEUROLOGICAL: No numbness or weakness  SKIN: No itching, burning, rashes, or lesions   All other review of systems is negative unless indicated above.    MEDICATIONS:  MEDICATIONS  (STANDING):  aspirin 325 milliGRAM(s) Oral daily  atorvastatin 40 milliGRAM(s) Oral at bedtime  calcitriol   Capsule 0.25 MICROGram(s) Oral daily  chlorhexidine 2% Cloths 1 Application(s) Topical daily  dexAMETHasone  Injectable 6 milliGRAM(s) IV Push daily  dextrose 5%. 1000 milliLiter(s) (100 mL/Hr) IV Continuous <Continuous>  dextrose 5%. 1000 milliLiter(s) (50 mL/Hr) IV Continuous <Continuous>  dextrose 50% Injectable 25 Gram(s) IV Push once  dextrose 50% Injectable 12.5 Gram(s) IV Push once  dextrose 50% Injectable 25 Gram(s) IV Push once  febuxostat 40 milliGRAM(s) Oral daily  glucagon  Injectable 1 milliGRAM(s) IntraMuscular once  heparin   Injectable 5000 Unit(s) SubCutaneous every 8 hours  insulin detemir injectable (LEVEMIR) 20 Unit(s) SubCutaneous at bedtime  insulin lispro (ADMELOG) corrective regimen sliding scale   SubCutaneous three times a day before meals  remdesivir  IVPB   IV Intermittent   remdesivir  IVPB 100 milliGRAM(s) IV Intermittent every 24 hours  sevelamer carbonate 800 milliGRAM(s) Oral three times a day with meals      PHYSICAL EXAM:  T(C): 36.5 (10-14-22 @ 04:45), Max: 37.7 (10-13-22 @ 14:13)  HR: 109 (10-14-22 @ 10:10) (69 - 109)  BP: 104/65 (10-14-22 @ 10:10) (104/65 - 127/74)  RR: 18 (10-14-22 @ 10:10) (18 - 18)  SpO2: 95% (10-14-22 @ 10:10) (95% - 97%)  Wt(kg): --  I&O's Summary    13 Oct 2022 07:01  -  14 Oct 2022 07:00  --------------------------------------------------------  IN: 1140 mL / OUT: 0 mL / NET: 1140 mL    14 Oct 2022 07:01  -  14 Oct 2022 11:32  --------------------------------------------------------  IN: 360 mL / OUT: 400 mL / NET: -40 mL          Appearance: Normal	  HEENT:   Normal oral mucosa, PERRL, EOMI	  Lymphatic: No lymphadenopathy , no edema  Cardiovascular: Normal S1 S2, No JVD, No murmurs , Peripheral pulses palpable 2+ bilaterally  Respiratory: Lungs clear to auscultation, normal effort 	  Gastrointestinal:  Soft, Non-tender, + BS	  Skin: No rashes, No ecchymoses, No cyanosis, warm to touch  Musculoskeletal: Normal range of motion, normal strength  Psychiatry:  Mood & affect appropriate  Ext: No edema      LABS:    CARDIAC MARKERS:                                9.3    5.33  )-----------( 159      ( 13 Oct 2022 07:23 )             29.2     10-13    x   |  x   |  x   ----------------------------<  x   x    |  x   |  11.46<H>    Ca    8.6      13 Oct 2022 07:23    TPro  7.0  /  Alb  3.6  /  TBili  0.4  /  DBili  0.1  /  AST  27  /  ALT  13  /  AlkPhos  35<L>  10-13    proBNP:   Lipid Profile:   HgA1c:   TSH:             TELEMETRY: 	SR     ECG:  	  RADIOLOGY:   DIAGNOSTIC TESTING:  [ ] Echocardiogram:    [ ]  Catheterization:  [ ] Stress Test:    OTHER:

## 2022-10-27 NOTE — ED ADULT NURSE NOTE - ED STAT RN HAND OFF
Hemigard Intro: Due to skin fragility and wound tension, it was decided to use HEMIGARD adhesive retention suture devices to permit a linear closure. The skin was cleaned and dried for a 6cm distance away from the wound. Excessive hair, if present, was removed to allow for adhesion. Handoff

## 2022-11-16 NOTE — H&P PST ADULT - EYES
Performing Laboratory: [id number] Bill For Surgical Tray: no Expected Date Of Service: 11/16/2022 Billing Type: Third-Party Bill Lab Facility: 0 EOMI; PERRL; no drainage or redness

## 2023-01-23 NOTE — H&P PST ADULT - ENMT
Patient is requesting to have accomodation form completed for classes at [facility] (attachment at bottom of this encounter). Routing to Dr. Umana. Please advise if okay to complete form for patient.   ear L/ear R/mouth/pharynx details… detailed exam

## 2023-02-09 NOTE — H&P PST ADULT - GASTROINTESTINAL COMMENTS
Health Maintenance Due   Topic Date Due    Shingles Vaccine (1 of 2) Never done    COVID-19 Vaccine (4 - Booster for Pfizer series) 02/01/2022    Influenza Vaccine (1) 09/01/2022     Chart review done.  updated. Immunizations reviewed & updated. Care Everywhere updated.       obese

## 2023-04-11 ENCOUNTER — TRANSCRIPTION ENCOUNTER (OUTPATIENT)
Age: 58
End: 2023-04-11

## 2023-05-12 NOTE — H&P PST ADULT - NSICDXPROBLEM_GEN_ALL_CORE_FT
Spoke with patient to inquire if he will be picking PEP medication up as he was notified by the pharmacy multiple times that it was ready.  He states he spoke with his medical director and understands the risk is very low.  He also states the medical director is working with [facility] to get source labs as he passed away.  Writer will touch base with patient next week to see if labs are received.  All questions answered.    Did update SABI Tracy with patients choice.  She states all risks were discussed with him as well at [facility] but understands his change of mind.  
PROBLEM DIAGNOSES  Problem: End stage renal disease  Assessment and Plan: Right Arm Arteriovenous Graft Placement    Problem: Hypertension  Assessment and Plan: Continue your medication as prescribed    Problem: Fast heart beat  Assessment and Plan: Follow your doctor's instruction regarding Plavix    Problem: Need for prophylactic measure  Assessment and Plan: Patient is instructed to take two showers before coming for surgery.  First with regular soap, second with chlorhexidine soap given, rinse, wear clean clothes, come to the hospital.  Patient verbalized understanding.  Literature given  Patient is also told that he will be called if the swab result is positive.  At such time, he will go to to the pharmacy to  mupirocin ointment and apply it twice daily into each nostril

## 2023-07-14 ENCOUNTER — APPOINTMENT (OUTPATIENT)
Dept: INTERNAL MEDICINE | Facility: CLINIC | Age: 58
End: 2023-07-14
Payer: MEDICARE

## 2023-07-14 VITALS
SYSTOLIC BLOOD PRESSURE: 100 MMHG | WEIGHT: 310 LBS | OXYGEN SATURATION: 96 % | HEART RATE: 118 BPM | HEIGHT: 69 IN | DIASTOLIC BLOOD PRESSURE: 60 MMHG | BODY MASS INDEX: 45.91 KG/M2

## 2023-07-14 DIAGNOSIS — E16.2 HYPOGLYCEMIA, UNSPECIFIED: ICD-10-CM

## 2023-07-14 DIAGNOSIS — I48.91 UNSPECIFIED ATRIAL FIBRILLATION: ICD-10-CM

## 2023-07-14 DIAGNOSIS — I10 ESSENTIAL (PRIMARY) HYPERTENSION: ICD-10-CM

## 2023-07-14 DIAGNOSIS — Z01.818 ENCOUNTER FOR OTHER PREPROCEDURAL EXAMINATION: ICD-10-CM

## 2023-07-14 DIAGNOSIS — Z12.5 ENCOUNTER FOR SCREENING FOR MALIGNANT NEOPLASM OF PROSTATE: ICD-10-CM

## 2023-07-14 DIAGNOSIS — Z12.11 ENCOUNTER FOR SCREENING FOR MALIGNANT NEOPLASM OF COLON: ICD-10-CM

## 2023-07-14 DIAGNOSIS — Z99.2 END STAGE RENAL DISEASE: ICD-10-CM

## 2023-07-14 DIAGNOSIS — M10.9 GOUT, UNSPECIFIED: ICD-10-CM

## 2023-07-14 DIAGNOSIS — E78.5 HYPERLIPIDEMIA, UNSPECIFIED: ICD-10-CM

## 2023-07-14 DIAGNOSIS — E66.01 MORBID (SEVERE) OBESITY DUE TO EXCESS CALORIES: ICD-10-CM

## 2023-07-14 DIAGNOSIS — N18.6 END STAGE RENAL DISEASE: ICD-10-CM

## 2023-07-14 DIAGNOSIS — Z00.00 ENCOUNTER FOR GENERAL ADULT MEDICAL EXAMINATION W/OUT ABNORMAL FINDINGS: ICD-10-CM

## 2023-07-14 PROCEDURE — 99386 PREV VISIT NEW AGE 40-64: CPT | Mod: GY

## 2023-07-14 RX ORDER — INSULIN GLULISINE 100 [IU]/ML
100 INJECTION, SOLUTION SUBCUTANEOUS
Qty: 1 | Refills: 5 | Status: COMPLETED | COMMUNITY
Start: 2017-11-16 | End: 2023-07-14

## 2023-07-14 RX ORDER — ATORVASTATIN CALCIUM 40 MG/1
40 TABLET, FILM COATED ORAL
Qty: 30 | Refills: 0 | Status: COMPLETED | COMMUNITY
Start: 2018-05-24 | End: 2023-07-14

## 2023-07-14 RX ORDER — METOPROLOL TARTRATE 75 MG/1
TABLET, FILM COATED ORAL
Refills: 0 | Status: COMPLETED | COMMUNITY
End: 2023-07-14

## 2023-07-14 RX ORDER — INSULIN LISPRO 100 [IU]/ML
100 INJECTION, SOLUTION INTRAVENOUS; SUBCUTANEOUS
Qty: 1 | Refills: 3 | Status: ACTIVE | COMMUNITY

## 2023-07-14 RX ORDER — METOPROLOL TARTRATE 25 MG/1
25 TABLET, FILM COATED ORAL TWICE DAILY
Qty: 60 | Refills: 2 | Status: ACTIVE | COMMUNITY

## 2023-07-14 RX ORDER — NIFEDIPINE 60 MG
60 TABLET, EXTENDED RELEASE ORAL
Refills: 0 | Status: COMPLETED | COMMUNITY
End: 2023-07-14

## 2023-07-14 RX ORDER — INSULIN GLULISINE 100 [IU]/ML
100 INJECTION, SOLUTION SUBCUTANEOUS
Qty: 3 | Refills: 3 | Status: COMPLETED | COMMUNITY
Start: 2017-11-16 | End: 2023-07-14

## 2023-07-14 RX ORDER — MUPIROCIN 20 MG/G
2 OINTMENT TOPICAL
Qty: 22 | Refills: 0 | Status: COMPLETED | COMMUNITY
Start: 2019-03-15 | End: 2023-07-14

## 2023-07-14 RX ORDER — CALCITRIOL 0.5 UG/1
0.5 CAPSULE, LIQUID FILLED ORAL
Refills: 0 | Status: ACTIVE | COMMUNITY

## 2023-07-14 RX ORDER — NIFEDIPINE 60 MG/1
60 TABLET, EXTENDED RELEASE ORAL
Qty: 30 | Refills: 0 | Status: COMPLETED | COMMUNITY
Start: 2019-03-08 | End: 2023-07-14

## 2023-07-14 RX ORDER — PEN NEEDLE, DIABETIC 29 G X1/2"
32G X 4 MM NEEDLE, DISPOSABLE MISCELLANEOUS
Qty: 100 | Refills: 2 | Status: COMPLETED | COMMUNITY
Start: 2017-11-16 | End: 2023-07-14

## 2023-07-14 RX ORDER — ASPIRIN 81 MG/1
81 TABLET ORAL
Qty: 30 | Refills: 5 | Status: COMPLETED | COMMUNITY
Start: 2019-03-20 | End: 2023-07-14

## 2023-07-14 RX ORDER — FEBUXOSTAT 40 MG/1
40 TABLET ORAL
Refills: 0 | Status: ACTIVE | COMMUNITY

## 2023-07-14 RX ORDER — APIXABAN 5 MG/1
5 TABLET, FILM COATED ORAL
Qty: 60 | Refills: 5 | Status: ACTIVE | COMMUNITY

## 2023-07-14 RX ORDER — ICOSAPENT ETHYL 1000 MG/1
1 CAPSULE ORAL
Qty: 240 | Refills: 2 | Status: ACTIVE | COMMUNITY

## 2023-07-14 RX ORDER — DESOXIMETASONE 0.5 MG/G
0.05 CREAM TOPICAL
Qty: 60 | Refills: 0 | Status: COMPLETED | COMMUNITY
Start: 2018-11-24 | End: 2023-07-14

## 2023-07-14 RX ORDER — BLOOD SUGAR DIAGNOSTIC
STRIP MISCELLANEOUS
Qty: 2 | Refills: 4 | Status: COMPLETED | COMMUNITY
Start: 2017-11-30 | End: 2023-07-14

## 2023-07-14 RX ORDER — INSULIN GLARGINE 100 [IU]/ML
100 INJECTION, SOLUTION SUBCUTANEOUS
Qty: 1 | Refills: 0 | Status: COMPLETED | COMMUNITY
Start: 2017-11-30 | End: 2023-07-14

## 2023-07-14 RX ORDER — AMMONIUM LACTATE 12 %
12 CREAM (GRAM) TOPICAL
Qty: 140 | Refills: 0 | Status: COMPLETED | COMMUNITY
Start: 2019-03-15 | End: 2023-07-14

## 2023-07-14 RX ORDER — PREDNISONE 10 MG/1
10 TABLET ORAL
Qty: 30 | Refills: 0 | Status: COMPLETED | COMMUNITY
Start: 2019-03-08 | End: 2023-07-14

## 2023-07-14 RX ORDER — ASPIRIN 81 MG/1
81 TABLET, COATED ORAL
Qty: 30 | Refills: 5 | Status: COMPLETED | COMMUNITY
Start: 2019-10-03 | End: 2023-07-14

## 2023-07-14 RX ORDER — BLOOD-GLUCOSE METER
70 EACH MISCELLANEOUS
Qty: 120 | Refills: 5 | Status: COMPLETED | COMMUNITY
Start: 2017-11-16 | End: 2023-07-14

## 2023-07-14 RX ORDER — BLOOD SUGAR DIAGNOSTIC
STRIP MISCELLANEOUS 4 TIMES DAILY
Qty: 1 | Refills: 5 | Status: COMPLETED | COMMUNITY
Start: 2017-11-16 | End: 2023-07-14

## 2023-07-14 RX ORDER — ERGOCALCIFEROL 1.25 MG/1
1.25 MG CAPSULE, LIQUID FILLED ORAL
Refills: 0 | Status: COMPLETED | COMMUNITY
End: 2023-07-14

## 2023-07-14 RX ORDER — ATORVASTATIN CALCIUM 40 MG/1
40 TABLET, FILM COATED ORAL
Refills: 0 | Status: COMPLETED | COMMUNITY
End: 2023-07-14

## 2023-07-14 RX ORDER — INSULIN DETEMIR 100 [IU]/ML
100 INJECTION, SOLUTION SUBCUTANEOUS
Qty: 15 | Refills: 0 | Status: COMPLETED | COMMUNITY
Start: 2018-12-19 | End: 2023-07-14

## 2023-07-14 RX ORDER — NIFEDIPINE 30 MG/1
30 TABLET, FILM COATED, EXTENDED RELEASE ORAL
Qty: 30 | Refills: 0 | Status: COMPLETED | COMMUNITY
Start: 2018-01-17 | End: 2023-07-14

## 2023-07-14 RX ORDER — ATORVASTATIN CALCIUM 20 MG/1
20 TABLET, FILM COATED ORAL
Qty: 1 | Refills: 3 | Status: COMPLETED | COMMUNITY
Start: 2017-11-16 | End: 2023-07-14

## 2023-07-14 RX ORDER — CALCITRIOL 0.5 UG/1
CAPSULE, LIQUID FILLED ORAL
Refills: 0 | Status: ACTIVE | COMMUNITY

## 2023-07-14 RX ORDER — FEBUXOSTAT 80 MG/1
80 TABLET ORAL
Refills: 0 | Status: COMPLETED | COMMUNITY
End: 2023-07-14

## 2023-07-14 RX ORDER — SODIUM BICARBONATE 650 MG/1
650 TABLET ORAL
Refills: 0 | Status: COMPLETED | COMMUNITY
End: 2023-07-14

## 2023-07-14 RX ORDER — SEVELAMER CARBONATE 800 MG/1
0.8 POWDER, FOR SUSPENSION ORAL
Refills: 0 | Status: ACTIVE | COMMUNITY

## 2023-07-14 NOTE — REVIEW OF SYSTEMS
[Fever] : no fever [Chest Pain] : no chest pain [Palpitations] : no palpitations [Claudication] : no  leg claudication [Orthopena] : no orthopnea

## 2023-07-14 NOTE — HISTORY OF PRESENT ILLNESS
[FreeTextEntry1] : Estab Care  \par Oppositional from beginning to end of interview and examination. HbA1c should not be done because he recently ate candy, asked which records he needed to sign for, then reply the  will handle. \par Spouse present during entire interview/examination\par Dry skin\par VELEZ\par Numb toes  foot doctor [de-identified] : \par \par 58  ESRD  HD since Sept 2020\par GOUT  no further attacks since changed diet. used to take prednisone for gout attacks\par HTN\par ASCVD\par DM  Prior to ESRD HbA1c > 14.0 see Dr Hoover 2017 note Diabetic Eye 2022 August   Insulin decreased from 50 to 20 since ESRD\par Better controlled since ESRD\par Diabetic retinopathy, nephropathy, neuropathy. Hospitalization for hyperglycemia in the past\par podiatry 2022\par colonoscopy:   no\par PSA no\par Echocardiogram 2019  LVH  EF 52% Had more recent echo unavailable\par "Dr Guero Holden is both my nephrologist and my internist." Wife advised him to get an internist as well. \par Home FS never < 90. Lowest 105 Never hypoglycemic episodes.\par \par \par SH: Grew up: [facility] \par disability since HD ESRD kidney function\par \par PMH: Sept 2020 Hemodialysis  No transplant list \par \par Humalog 10 units  meals\par Levimir   20 units QHS\par atorva   40mg\par calcitriol\par Ca acetate\par uloric\par \par Lowest 93\par Never hypoglycemia\par eliquis      \par \par HD graft kept clotting on 2.5 BID incr to 5 BID   AFIB April  infection RUE graft fever  [facility]\par \par Tdap  NO\par pneumo 12/19/22\par dialysis   [facility]\par Hep 5/17 6/14 7/12\par Eye 2022\par SHINGRIX   \par \par sunblock\par smoke and CO   seatbelt\par No drugs no TOB    1 PPD x 10 1993 Feb 25     ETOH  NONE quit 1988\par \par Exercise:  I don't have energy leg press\par Diet  no\par \par No blood work available\par Told eat meat protein borderline low\par \par influenza March 2022\par COVID: Pfizer 2021 x 2\par \par PSH\par \par GOUT none lately

## 2023-07-14 NOTE — PHYSICAL EXAM
[No Acute Distress] : no acute distress [Well Nourished] : well nourished [Well Developed] : well developed [Well-Appearing] : well-appearing [Normal Sclera/Conjunctiva] : normal sclera/conjunctiva [PERRL] : pupils equal round and reactive to light [EOMI] : extraocular movements intact [Normal Outer Ear/Nose] : the outer ears and nose were normal in appearance [Normal Oropharynx] : the oropharynx was normal [No JVD] : no jugular venous distention [No Lymphadenopathy] : no lymphadenopathy [Supple] : supple [Thyroid Normal, No Nodules] : the thyroid was normal and there were no nodules present [No Respiratory Distress] : no respiratory distress  [No Accessory Muscle Use] : no accessory muscle use [Clear to Auscultation] : lungs were clear to auscultation bilaterally [Normal Rate] : normal rate  [Regular Rhythm] : with a regular rhythm [Normal S1, S2] : normal S1 and S2 [No Murmur] : no murmur heard [No Carotid Bruits] : no carotid bruits [No Abdominal Bruit] : a ~M bruit was not heard ~T in the abdomen [No Varicosities] : no varicosities [Pedal Pulses Present] : the pedal pulses are present [No Edema] : there was no peripheral edema [No Palpable Aorta] : no palpable aorta [No Extremity Clubbing/Cyanosis] : no extremity clubbing/cyanosis [Soft] : abdomen soft [Non Tender] : non-tender [Non-distended] : non-distended [No Masses] : no abdominal mass palpated [No HSM] : no HSM [Normal Bowel Sounds] : normal bowel sounds [Normal Posterior Cervical Nodes] : no posterior cervical lymphadenopathy [Normal Anterior Cervical Nodes] : no anterior cervical lymphadenopathy [No CVA Tenderness] : no CVA  tenderness [No Spinal Tenderness] : no spinal tenderness [No Joint Swelling] : no joint swelling [Grossly Normal Strength/Tone] : grossly normal strength/tone [No Rash] : no rash [Coordination Grossly Intact] : coordination grossly intact [No Focal Deficits] : no focal deficits [Normal Gait] : normal gait [Deep Tendon Reflexes (DTR)] : deep tendon reflexes were 2+ and symmetric [Normal Affect] : the affect was normal [Normal Insight/Judgement] : insight and judgment were intact [de-identified] : declined gown during examination [de-identified] : LLQ indentation S/P drain placement in the past  Obese abdomen

## 2023-07-25 ENCOUNTER — APPOINTMENT (OUTPATIENT)
Dept: INTERNAL MEDICINE | Facility: CLINIC | Age: 58
End: 2023-07-25

## 2023-07-25 DIAGNOSIS — E11.9 TYPE 2 DIABETES MELLITUS W/OUT COMPLICATIONS: ICD-10-CM

## 2023-08-09 RX ORDER — INSULIN DETEMIR 100 [IU]/ML
100 INJECTION, SOLUTION SUBCUTANEOUS
Qty: 3 | Refills: 3 | Status: ACTIVE | COMMUNITY

## 2023-08-09 RX ORDER — FLASH GLUCOSE SENSOR
KIT MISCELLANEOUS
Qty: 6 | Refills: 3 | Status: ACTIVE | COMMUNITY
Start: 2023-07-25 | End: 1900-01-01

## 2023-08-21 ENCOUNTER — APPOINTMENT (OUTPATIENT)
Dept: INTERNAL MEDICINE | Facility: CLINIC | Age: 58
End: 2023-08-21

## 2023-10-19 NOTE — ED PROVIDER NOTE - TRANSFER CONSULTATION #1
Labs September 2022: Normal CBC, absolute eosinophil count 2401 (H), normal-500 Labs September 2022: BUN 16 creatinine 1.15, prothrombin time 10.2 INR 1.0, normal electrolytes, no liver enzymes, normal lipase. will see patient in ED

## 2023-10-20 ENCOUNTER — APPOINTMENT (OUTPATIENT)
Dept: INTERNAL MEDICINE | Facility: CLINIC | Age: 58
End: 2023-10-20

## 2023-11-02 ENCOUNTER — APPOINTMENT (OUTPATIENT)
Dept: INTERNAL MEDICINE | Facility: CLINIC | Age: 58
End: 2023-11-02

## 2023-11-04 ENCOUNTER — INPATIENT (INPATIENT)
Facility: HOSPITAL | Age: 58
LOS: 17 days | Discharge: HOME | DRG: 314 | End: 2023-11-22
Attending: STUDENT IN AN ORGANIZED HEALTH CARE EDUCATION/TRAINING PROGRAM | Admitting: INTERNAL MEDICINE
Payer: MEDICARE

## 2023-11-04 VITALS
WEIGHT: 289.91 LBS | TEMPERATURE: 101 F | HEART RATE: 121 BPM | SYSTOLIC BLOOD PRESSURE: 107 MMHG | RESPIRATION RATE: 22 BRPM | DIASTOLIC BLOOD PRESSURE: 71 MMHG | HEIGHT: 70 IN | OXYGEN SATURATION: 99 %

## 2023-11-04 DIAGNOSIS — Z98.890 OTHER SPECIFIED POSTPROCEDURAL STATES: Chronic | ICD-10-CM

## 2023-11-04 DIAGNOSIS — A41.9 SEPSIS, UNSPECIFIED ORGANISM: ICD-10-CM

## 2023-11-04 DIAGNOSIS — N28.9 DISORDER OF KIDNEY AND URETER, UNSPECIFIED: ICD-10-CM

## 2023-11-04 DIAGNOSIS — R50.9 FEVER, UNSPECIFIED: ICD-10-CM

## 2023-11-04 DIAGNOSIS — I48.0 PAROXYSMAL ATRIAL FIBRILLATION: ICD-10-CM

## 2023-11-04 DIAGNOSIS — R06.02 SHORTNESS OF BREATH: ICD-10-CM

## 2023-11-04 DIAGNOSIS — S99.919A UNSPECIFIED INJURY OF UNSPECIFIED ANKLE, INITIAL ENCOUNTER: Chronic | ICD-10-CM

## 2023-11-04 DIAGNOSIS — Z99.2 DEPENDENCE ON RENAL DIALYSIS: Chronic | ICD-10-CM

## 2023-11-04 DIAGNOSIS — E11.9 TYPE 2 DIABETES MELLITUS WITHOUT COMPLICATIONS: ICD-10-CM

## 2023-11-04 LAB
ALBUMIN SERPL ELPH-MCNC: 4.1 G/DL — SIGNIFICANT CHANGE UP (ref 3.3–5)
ALBUMIN SERPL ELPH-MCNC: 4.1 G/DL — SIGNIFICANT CHANGE UP (ref 3.3–5)
ALP SERPL-CCNC: 58 U/L — SIGNIFICANT CHANGE UP (ref 40–120)
ALP SERPL-CCNC: 58 U/L — SIGNIFICANT CHANGE UP (ref 40–120)
ALT FLD-CCNC: 14 U/L — SIGNIFICANT CHANGE UP (ref 10–45)
ALT FLD-CCNC: 14 U/L — SIGNIFICANT CHANGE UP (ref 10–45)
ANION GAP SERPL CALC-SCNC: 17 MMOL/L — SIGNIFICANT CHANGE UP (ref 5–17)
ANION GAP SERPL CALC-SCNC: 17 MMOL/L — SIGNIFICANT CHANGE UP (ref 5–17)
ANISOCYTOSIS BLD QL: SLIGHT — SIGNIFICANT CHANGE UP
ANISOCYTOSIS BLD QL: SLIGHT — SIGNIFICANT CHANGE UP
APPEARANCE UR: CLEAR — SIGNIFICANT CHANGE UP
APPEARANCE UR: CLEAR — SIGNIFICANT CHANGE UP
APTT BLD: 34.7 SEC — SIGNIFICANT CHANGE UP (ref 24.5–35.6)
APTT BLD: 34.7 SEC — SIGNIFICANT CHANGE UP (ref 24.5–35.6)
AST SERPL-CCNC: 17 U/L — SIGNIFICANT CHANGE UP (ref 10–40)
AST SERPL-CCNC: 17 U/L — SIGNIFICANT CHANGE UP (ref 10–40)
BACTERIA # UR AUTO: NEGATIVE /HPF — SIGNIFICANT CHANGE UP
BACTERIA # UR AUTO: NEGATIVE /HPF — SIGNIFICANT CHANGE UP
BASE EXCESS BLDV CALC-SCNC: 0.6 MMOL/L — SIGNIFICANT CHANGE UP (ref -2–3)
BASE EXCESS BLDV CALC-SCNC: 0.6 MMOL/L — SIGNIFICANT CHANGE UP (ref -2–3)
BASE EXCESS BLDV CALC-SCNC: 2.8 MMOL/L — SIGNIFICANT CHANGE UP (ref -2–3)
BASE EXCESS BLDV CALC-SCNC: 2.8 MMOL/L — SIGNIFICANT CHANGE UP (ref -2–3)
BASOPHILS # BLD AUTO: 0.08 K/UL — SIGNIFICANT CHANGE UP (ref 0–0.2)
BASOPHILS # BLD AUTO: 0.08 K/UL — SIGNIFICANT CHANGE UP (ref 0–0.2)
BASOPHILS NFR BLD AUTO: 0.9 % — SIGNIFICANT CHANGE UP (ref 0–2)
BASOPHILS NFR BLD AUTO: 0.9 % — SIGNIFICANT CHANGE UP (ref 0–2)
BILIRUB SERPL-MCNC: 1 MG/DL — SIGNIFICANT CHANGE UP (ref 0.2–1.2)
BILIRUB SERPL-MCNC: 1 MG/DL — SIGNIFICANT CHANGE UP (ref 0.2–1.2)
BILIRUB UR-MCNC: NEGATIVE — SIGNIFICANT CHANGE UP
BILIRUB UR-MCNC: NEGATIVE — SIGNIFICANT CHANGE UP
BUN SERPL-MCNC: 30 MG/DL — HIGH (ref 7–23)
BUN SERPL-MCNC: 30 MG/DL — HIGH (ref 7–23)
CA-I SERPL-SCNC: 1.21 MMOL/L — SIGNIFICANT CHANGE UP (ref 1.15–1.33)
CA-I SERPL-SCNC: 1.21 MMOL/L — SIGNIFICANT CHANGE UP (ref 1.15–1.33)
CA-I SERPL-SCNC: 1.25 MMOL/L — SIGNIFICANT CHANGE UP (ref 1.15–1.33)
CA-I SERPL-SCNC: 1.25 MMOL/L — SIGNIFICANT CHANGE UP (ref 1.15–1.33)
CALCIUM SERPL-MCNC: 10.9 MG/DL — HIGH (ref 8.4–10.5)
CALCIUM SERPL-MCNC: 10.9 MG/DL — HIGH (ref 8.4–10.5)
CAST: 4 /LPF — SIGNIFICANT CHANGE UP (ref 0–4)
CAST: 4 /LPF — SIGNIFICANT CHANGE UP (ref 0–4)
CHLORIDE BLDV-SCNC: 101 MMOL/L — SIGNIFICANT CHANGE UP (ref 96–108)
CHLORIDE BLDV-SCNC: 101 MMOL/L — SIGNIFICANT CHANGE UP (ref 96–108)
CHLORIDE BLDV-SCNC: 102 MMOL/L — SIGNIFICANT CHANGE UP (ref 96–108)
CHLORIDE BLDV-SCNC: 102 MMOL/L — SIGNIFICANT CHANGE UP (ref 96–108)
CHLORIDE SERPL-SCNC: 98 MMOL/L — SIGNIFICANT CHANGE UP (ref 96–108)
CHLORIDE SERPL-SCNC: 98 MMOL/L — SIGNIFICANT CHANGE UP (ref 96–108)
CO2 BLDV-SCNC: 24 MMOL/L — SIGNIFICANT CHANGE UP (ref 22–26)
CO2 BLDV-SCNC: 24 MMOL/L — SIGNIFICANT CHANGE UP (ref 22–26)
CO2 BLDV-SCNC: 26 MMOL/L — SIGNIFICANT CHANGE UP (ref 22–26)
CO2 BLDV-SCNC: 26 MMOL/L — SIGNIFICANT CHANGE UP (ref 22–26)
CO2 SERPL-SCNC: 21 MMOL/L — LOW (ref 22–31)
CO2 SERPL-SCNC: 21 MMOL/L — LOW (ref 22–31)
COLOR SPEC: YELLOW — SIGNIFICANT CHANGE UP
COLOR SPEC: YELLOW — SIGNIFICANT CHANGE UP
CREAT SERPL-MCNC: 8.65 MG/DL — HIGH (ref 0.5–1.3)
CREAT SERPL-MCNC: 8.65 MG/DL — HIGH (ref 0.5–1.3)
DACRYOCYTES BLD QL SMEAR: SLIGHT — SIGNIFICANT CHANGE UP
DACRYOCYTES BLD QL SMEAR: SLIGHT — SIGNIFICANT CHANGE UP
DIFF PNL FLD: NEGATIVE — SIGNIFICANT CHANGE UP
DIFF PNL FLD: NEGATIVE — SIGNIFICANT CHANGE UP
EGFR: 6 ML/MIN/1.73M2 — LOW
EGFR: 6 ML/MIN/1.73M2 — LOW
ELLIPTOCYTES BLD QL SMEAR: SLIGHT — SIGNIFICANT CHANGE UP
ELLIPTOCYTES BLD QL SMEAR: SLIGHT — SIGNIFICANT CHANGE UP
EOSINOPHIL # BLD AUTO: 0.3 K/UL — SIGNIFICANT CHANGE UP (ref 0–0.5)
EOSINOPHIL # BLD AUTO: 0.3 K/UL — SIGNIFICANT CHANGE UP (ref 0–0.5)
EOSINOPHIL NFR BLD AUTO: 3.5 % — SIGNIFICANT CHANGE UP (ref 0–6)
EOSINOPHIL NFR BLD AUTO: 3.5 % — SIGNIFICANT CHANGE UP (ref 0–6)
FLUAV AG NPH QL: SIGNIFICANT CHANGE UP
FLUAV AG NPH QL: SIGNIFICANT CHANGE UP
FLUBV AG NPH QL: SIGNIFICANT CHANGE UP
FLUBV AG NPH QL: SIGNIFICANT CHANGE UP
GAS PNL BLDV: 131 MMOL/L — LOW (ref 136–145)
GAS PNL BLDV: 131 MMOL/L — LOW (ref 136–145)
GAS PNL BLDV: 132 MMOL/L — LOW (ref 136–145)
GAS PNL BLDV: 132 MMOL/L — LOW (ref 136–145)
GAS PNL BLDV: SIGNIFICANT CHANGE UP
GLUCOSE BLDV-MCNC: 100 MG/DL — HIGH (ref 70–99)
GLUCOSE BLDV-MCNC: 100 MG/DL — HIGH (ref 70–99)
GLUCOSE BLDV-MCNC: 143 MG/DL — HIGH (ref 70–99)
GLUCOSE BLDV-MCNC: 143 MG/DL — HIGH (ref 70–99)
GLUCOSE SERPL-MCNC: 91 MG/DL — SIGNIFICANT CHANGE UP (ref 70–99)
GLUCOSE SERPL-MCNC: 91 MG/DL — SIGNIFICANT CHANGE UP (ref 70–99)
GLUCOSE UR QL: NEGATIVE MG/DL — SIGNIFICANT CHANGE UP
GLUCOSE UR QL: NEGATIVE MG/DL — SIGNIFICANT CHANGE UP
HCO3 BLDV-SCNC: 23 MMOL/L — SIGNIFICANT CHANGE UP (ref 22–29)
HCO3 BLDV-SCNC: 23 MMOL/L — SIGNIFICANT CHANGE UP (ref 22–29)
HCO3 BLDV-SCNC: 25 MMOL/L — SIGNIFICANT CHANGE UP (ref 22–29)
HCO3 BLDV-SCNC: 25 MMOL/L — SIGNIFICANT CHANGE UP (ref 22–29)
HCT VFR BLD CALC: 35.7 % — LOW (ref 39–50)
HCT VFR BLD CALC: 35.7 % — LOW (ref 39–50)
HCT VFR BLDA CALC: 28 % — LOW (ref 39–51)
HCT VFR BLDA CALC: 28 % — LOW (ref 39–51)
HCT VFR BLDA CALC: 32 % — LOW (ref 39–51)
HCT VFR BLDA CALC: 32 % — LOW (ref 39–51)
HGB BLD CALC-MCNC: 10.8 G/DL — LOW (ref 12.6–17.4)
HGB BLD CALC-MCNC: 10.8 G/DL — LOW (ref 12.6–17.4)
HGB BLD CALC-MCNC: 9.2 G/DL — LOW (ref 12.6–17.4)
HGB BLD CALC-MCNC: 9.2 G/DL — LOW (ref 12.6–17.4)
HGB BLD-MCNC: 10.7 G/DL — LOW (ref 13–17)
HGB BLD-MCNC: 10.7 G/DL — LOW (ref 13–17)
HYPOCHROMIA BLD QL: SLIGHT — SIGNIFICANT CHANGE UP
HYPOCHROMIA BLD QL: SLIGHT — SIGNIFICANT CHANGE UP
INR BLD: 1.53 RATIO — HIGH (ref 0.85–1.18)
INR BLD: 1.53 RATIO — HIGH (ref 0.85–1.18)
KETONES UR-MCNC: NEGATIVE MG/DL — SIGNIFICANT CHANGE UP
KETONES UR-MCNC: NEGATIVE MG/DL — SIGNIFICANT CHANGE UP
LACTATE BLDV-MCNC: 1.4 MMOL/L — SIGNIFICANT CHANGE UP (ref 0.5–2)
LACTATE BLDV-MCNC: 1.4 MMOL/L — SIGNIFICANT CHANGE UP (ref 0.5–2)
LACTATE BLDV-MCNC: 3.4 MMOL/L — HIGH (ref 0.5–2)
LACTATE BLDV-MCNC: 3.4 MMOL/L — HIGH (ref 0.5–2)
LEUKOCYTE ESTERASE UR-ACNC: NEGATIVE — SIGNIFICANT CHANGE UP
LEUKOCYTE ESTERASE UR-ACNC: NEGATIVE — SIGNIFICANT CHANGE UP
LYMPHOCYTES # BLD AUTO: 0.6 K/UL — LOW (ref 1–3.3)
LYMPHOCYTES # BLD AUTO: 0.6 K/UL — LOW (ref 1–3.3)
LYMPHOCYTES # BLD AUTO: 7 % — LOW (ref 13–44)
LYMPHOCYTES # BLD AUTO: 7 % — LOW (ref 13–44)
MACROCYTES BLD QL: SLIGHT — SIGNIFICANT CHANGE UP
MACROCYTES BLD QL: SLIGHT — SIGNIFICANT CHANGE UP
MANUAL SMEAR VERIFICATION: SIGNIFICANT CHANGE UP
MANUAL SMEAR VERIFICATION: SIGNIFICANT CHANGE UP
MCHC RBC-ENTMCNC: 20 PG — LOW (ref 27–34)
MCHC RBC-ENTMCNC: 20 PG — LOW (ref 27–34)
MCHC RBC-ENTMCNC: 30 GM/DL — LOW (ref 32–36)
MCHC RBC-ENTMCNC: 30 GM/DL — LOW (ref 32–36)
MCV RBC AUTO: 66.6 FL — LOW (ref 80–100)
MCV RBC AUTO: 66.6 FL — LOW (ref 80–100)
MONOCYTES # BLD AUTO: 0.6 K/UL — SIGNIFICANT CHANGE UP (ref 0–0.9)
MONOCYTES # BLD AUTO: 0.6 K/UL — SIGNIFICANT CHANGE UP (ref 0–0.9)
MONOCYTES NFR BLD AUTO: 7 % — SIGNIFICANT CHANGE UP (ref 2–14)
MONOCYTES NFR BLD AUTO: 7 % — SIGNIFICANT CHANGE UP (ref 2–14)
NEUTROPHILS # BLD AUTO: 7.05 K/UL — SIGNIFICANT CHANGE UP (ref 1.8–7.4)
NEUTROPHILS # BLD AUTO: 7.05 K/UL — SIGNIFICANT CHANGE UP (ref 1.8–7.4)
NEUTROPHILS NFR BLD AUTO: 80.7 % — HIGH (ref 43–77)
NEUTROPHILS NFR BLD AUTO: 80.7 % — HIGH (ref 43–77)
NEUTS BAND # BLD: 0.9 % — SIGNIFICANT CHANGE UP (ref 0–8)
NEUTS BAND # BLD: 0.9 % — SIGNIFICANT CHANGE UP (ref 0–8)
NITRITE UR-MCNC: NEGATIVE — SIGNIFICANT CHANGE UP
NITRITE UR-MCNC: NEGATIVE — SIGNIFICANT CHANGE UP
PCO2 BLDV: 29 MMHG — LOW (ref 42–55)
PCO2 BLDV: 29 MMHG — LOW (ref 42–55)
PCO2 BLDV: 30 MMHG — LOW (ref 42–55)
PCO2 BLDV: 30 MMHG — LOW (ref 42–55)
PH BLDV: 7.5 — HIGH (ref 7.32–7.43)
PH BLDV: 7.5 — HIGH (ref 7.32–7.43)
PH BLDV: 7.54 — HIGH (ref 7.32–7.43)
PH BLDV: 7.54 — HIGH (ref 7.32–7.43)
PH UR: >=9 (ref 5–8)
PH UR: >=9 (ref 5–8)
PLAT MORPH BLD: NORMAL — SIGNIFICANT CHANGE UP
PLAT MORPH BLD: NORMAL — SIGNIFICANT CHANGE UP
PLATELET # BLD AUTO: 202 K/UL — SIGNIFICANT CHANGE UP (ref 150–400)
PLATELET # BLD AUTO: 202 K/UL — SIGNIFICANT CHANGE UP (ref 150–400)
PO2 BLDV: 33 MMHG — SIGNIFICANT CHANGE UP (ref 25–45)
PO2 BLDV: 33 MMHG — SIGNIFICANT CHANGE UP (ref 25–45)
PO2 BLDV: 62 MMHG — HIGH (ref 25–45)
PO2 BLDV: 62 MMHG — HIGH (ref 25–45)
POIKILOCYTOSIS BLD QL AUTO: SIGNIFICANT CHANGE UP
POIKILOCYTOSIS BLD QL AUTO: SIGNIFICANT CHANGE UP
POLYCHROMASIA BLD QL SMEAR: SLIGHT — SIGNIFICANT CHANGE UP
POLYCHROMASIA BLD QL SMEAR: SLIGHT — SIGNIFICANT CHANGE UP
POTASSIUM BLDV-SCNC: 5.4 MMOL/L — HIGH (ref 3.5–5.1)
POTASSIUM BLDV-SCNC: 5.4 MMOL/L — HIGH (ref 3.5–5.1)
POTASSIUM BLDV-SCNC: 5.9 MMOL/L — HIGH (ref 3.5–5.1)
POTASSIUM BLDV-SCNC: 5.9 MMOL/L — HIGH (ref 3.5–5.1)
POTASSIUM SERPL-MCNC: 4.8 MMOL/L — SIGNIFICANT CHANGE UP (ref 3.5–5.3)
POTASSIUM SERPL-MCNC: 4.8 MMOL/L — SIGNIFICANT CHANGE UP (ref 3.5–5.3)
POTASSIUM SERPL-SCNC: 4.8 MMOL/L — SIGNIFICANT CHANGE UP (ref 3.5–5.3)
POTASSIUM SERPL-SCNC: 4.8 MMOL/L — SIGNIFICANT CHANGE UP (ref 3.5–5.3)
PROT SERPL-MCNC: 8.3 G/DL — SIGNIFICANT CHANGE UP (ref 6–8.3)
PROT SERPL-MCNC: 8.3 G/DL — SIGNIFICANT CHANGE UP (ref 6–8.3)
PROT UR-MCNC: 300 MG/DL
PROT UR-MCNC: 300 MG/DL
PROTHROM AB SERPL-ACNC: 15.9 SEC — HIGH (ref 9.5–13)
PROTHROM AB SERPL-ACNC: 15.9 SEC — HIGH (ref 9.5–13)
RBC # BLD: 5.36 M/UL — SIGNIFICANT CHANGE UP (ref 4.2–5.8)
RBC # BLD: 5.36 M/UL — SIGNIFICANT CHANGE UP (ref 4.2–5.8)
RBC # FLD: 21.1 % — HIGH (ref 10.3–14.5)
RBC # FLD: 21.1 % — HIGH (ref 10.3–14.5)
RBC BLD AUTO: ABNORMAL
RBC BLD AUTO: ABNORMAL
RBC CASTS # UR COMP ASSIST: 0 /HPF — SIGNIFICANT CHANGE UP (ref 0–4)
RBC CASTS # UR COMP ASSIST: 0 /HPF — SIGNIFICANT CHANGE UP (ref 0–4)
RSV RNA NPH QL NAA+NON-PROBE: SIGNIFICANT CHANGE UP
RSV RNA NPH QL NAA+NON-PROBE: SIGNIFICANT CHANGE UP
SAO2 % BLDV: 57.8 % — LOW (ref 67–88)
SAO2 % BLDV: 57.8 % — LOW (ref 67–88)
SAO2 % BLDV: 93.7 % — HIGH (ref 67–88)
SAO2 % BLDV: 93.7 % — HIGH (ref 67–88)
SARS-COV-2 RNA SPEC QL NAA+PROBE: SIGNIFICANT CHANGE UP
SARS-COV-2 RNA SPEC QL NAA+PROBE: SIGNIFICANT CHANGE UP
SCHISTOCYTES BLD QL AUTO: SLIGHT — SIGNIFICANT CHANGE UP
SCHISTOCYTES BLD QL AUTO: SLIGHT — SIGNIFICANT CHANGE UP
SODIUM SERPL-SCNC: 136 MMOL/L — SIGNIFICANT CHANGE UP (ref 135–145)
SODIUM SERPL-SCNC: 136 MMOL/L — SIGNIFICANT CHANGE UP (ref 135–145)
SP GR SPEC: 1.02 — SIGNIFICANT CHANGE UP (ref 1–1.03)
SP GR SPEC: 1.02 — SIGNIFICANT CHANGE UP (ref 1–1.03)
SQUAMOUS # UR AUTO: 2 /HPF — SIGNIFICANT CHANGE UP (ref 0–5)
SQUAMOUS # UR AUTO: 2 /HPF — SIGNIFICANT CHANGE UP (ref 0–5)
TARGETS BLD QL SMEAR: SLIGHT — SIGNIFICANT CHANGE UP
TARGETS BLD QL SMEAR: SLIGHT — SIGNIFICANT CHANGE UP
UROBILINOGEN FLD QL: 1 MG/DL — SIGNIFICANT CHANGE UP (ref 0.2–1)
UROBILINOGEN FLD QL: 1 MG/DL — SIGNIFICANT CHANGE UP (ref 0.2–1)
WBC # BLD: 8.64 K/UL — SIGNIFICANT CHANGE UP (ref 3.8–10.5)
WBC # BLD: 8.64 K/UL — SIGNIFICANT CHANGE UP (ref 3.8–10.5)
WBC # FLD AUTO: 8.64 K/UL — SIGNIFICANT CHANGE UP (ref 3.8–10.5)
WBC # FLD AUTO: 8.64 K/UL — SIGNIFICANT CHANGE UP (ref 3.8–10.5)
WBC UR QL: 0 /HPF — SIGNIFICANT CHANGE UP (ref 0–5)
WBC UR QL: 0 /HPF — SIGNIFICANT CHANGE UP (ref 0–5)

## 2023-11-04 PROCEDURE — 71046 X-RAY EXAM CHEST 2 VIEWS: CPT | Mod: 26

## 2023-11-04 PROCEDURE — 99285 EMERGENCY DEPT VISIT HI MDM: CPT | Mod: GC

## 2023-11-04 RX ORDER — PIPERACILLIN AND TAZOBACTAM 4; .5 G/20ML; G/20ML
3.38 INJECTION, POWDER, LYOPHILIZED, FOR SOLUTION INTRAVENOUS EVERY 12 HOURS
Refills: 0 | Status: DISCONTINUED | OUTPATIENT
Start: 2023-11-04 | End: 2023-11-05

## 2023-11-04 RX ORDER — CHOLECALCIFEROL (VITAMIN D3) 125 MCG
1 CAPSULE ORAL
Qty: 0 | Refills: 0 | DISCHARGE

## 2023-11-04 RX ORDER — ACETAMINOPHEN 500 MG
650 TABLET ORAL ONCE
Refills: 0 | Status: DISCONTINUED | OUTPATIENT
Start: 2023-11-04 | End: 2023-11-04

## 2023-11-04 RX ORDER — METOPROLOL TARTRATE 50 MG
25 TABLET ORAL
Refills: 0 | Status: DISCONTINUED | OUTPATIENT
Start: 2023-11-04 | End: 2023-11-22

## 2023-11-04 RX ORDER — INSULIN DETEMIR 100/ML (3)
20 INSULIN PEN (ML) SUBCUTANEOUS
Qty: 0 | Refills: 0 | DISCHARGE

## 2023-11-04 RX ORDER — ATORVASTATIN CALCIUM 80 MG/1
1 TABLET, FILM COATED ORAL
Qty: 0 | Refills: 0 | DISCHARGE

## 2023-11-04 RX ORDER — CALCITRIOL 0.5 UG/1
1 CAPSULE ORAL
Qty: 0 | Refills: 0 | DISCHARGE

## 2023-11-04 RX ORDER — INSULIN LISPRO 100/ML
18 VIAL (ML) SUBCUTANEOUS
Qty: 0 | Refills: 0 | DISCHARGE

## 2023-11-04 RX ORDER — SODIUM CHLORIDE 9 MG/ML
1000 INJECTION INTRAMUSCULAR; INTRAVENOUS; SUBCUTANEOUS ONCE
Refills: 0 | Status: COMPLETED | OUTPATIENT
Start: 2023-11-04 | End: 2023-11-04

## 2023-11-04 RX ORDER — SEVELAMER CARBONATE 2400 MG/1
800 POWDER, FOR SUSPENSION ORAL
Refills: 0 | Status: DISCONTINUED | OUTPATIENT
Start: 2023-11-04 | End: 2023-11-10

## 2023-11-04 RX ORDER — ASPIRIN/CALCIUM CARB/MAGNESIUM 324 MG
1 TABLET ORAL
Qty: 0 | Refills: 0 | DISCHARGE

## 2023-11-04 RX ORDER — ACETAMINOPHEN 500 MG
1000 TABLET ORAL ONCE
Refills: 0 | Status: COMPLETED | OUTPATIENT
Start: 2023-11-04 | End: 2023-11-04

## 2023-11-04 RX ORDER — CALCITRIOL 0.5 UG/1
0.25 CAPSULE ORAL DAILY
Refills: 0 | Status: DISCONTINUED | OUTPATIENT
Start: 2023-11-04 | End: 2023-11-04

## 2023-11-04 RX ORDER — FEBUXOSTAT 40 MG/1
1 TABLET ORAL
Qty: 0 | Refills: 0 | DISCHARGE

## 2023-11-04 RX ORDER — SEVELAMER CARBONATE 2400 MG/1
1 POWDER, FOR SUSPENSION ORAL
Qty: 0 | Refills: 0 | DISCHARGE

## 2023-11-04 RX ORDER — TACROLIMUS/VEHICLE BASE NO.238 0.1 %
1 CREAM (GRAM) TOPICAL
Qty: 0 | Refills: 0 | DISCHARGE

## 2023-11-04 RX ORDER — ATORVASTATIN CALCIUM 80 MG/1
40 TABLET, FILM COATED ORAL AT BEDTIME
Refills: 0 | Status: DISCONTINUED | OUTPATIENT
Start: 2023-11-04 | End: 2023-11-22

## 2023-11-04 RX ORDER — PIPERACILLIN AND TAZOBACTAM 4; .5 G/20ML; G/20ML
3.38 INJECTION, POWDER, LYOPHILIZED, FOR SOLUTION INTRAVENOUS ONCE
Refills: 0 | Status: COMPLETED | OUTPATIENT
Start: 2023-11-04 | End: 2023-11-04

## 2023-11-04 RX ORDER — APIXABAN 2.5 MG/1
5 TABLET, FILM COATED ORAL
Refills: 0 | Status: DISCONTINUED | OUTPATIENT
Start: 2023-11-04 | End: 2023-11-09

## 2023-11-04 RX ORDER — ICOSAPENT ETHYL 500 MG/1
2 CAPSULE, LIQUID FILLED ORAL
Qty: 0 | Refills: 0 | DISCHARGE

## 2023-11-04 RX ORDER — DIPYRIDAMOLE 50 MG
1 TABLET ORAL
Qty: 0 | Refills: 0 | DISCHARGE

## 2023-11-04 RX ORDER — VANCOMYCIN HCL 1 G
1000 VIAL (EA) INTRAVENOUS ONCE
Refills: 0 | Status: COMPLETED | OUTPATIENT
Start: 2023-11-04 | End: 2023-11-04

## 2023-11-04 RX ADMIN — Medication 400 MILLIGRAM(S): at 21:41

## 2023-11-04 RX ADMIN — ATORVASTATIN CALCIUM 40 MILLIGRAM(S): 80 TABLET, FILM COATED ORAL at 21:41

## 2023-11-04 RX ADMIN — SODIUM CHLORIDE 1000 MILLILITER(S): 9 INJECTION INTRAMUSCULAR; INTRAVENOUS; SUBCUTANEOUS at 14:41

## 2023-11-04 RX ADMIN — Medication 400 MILLIGRAM(S): at 14:41

## 2023-11-04 RX ADMIN — Medication 25 MILLIGRAM(S): at 22:40

## 2023-11-04 RX ADMIN — PIPERACILLIN AND TAZOBACTAM 200 GRAM(S): 4; .5 INJECTION, POWDER, LYOPHILIZED, FOR SOLUTION INTRAVENOUS at 13:30

## 2023-11-04 RX ADMIN — APIXABAN 5 MILLIGRAM(S): 2.5 TABLET, FILM COATED ORAL at 22:40

## 2023-11-04 RX ADMIN — PIPERACILLIN AND TAZOBACTAM 25 GRAM(S): 4; .5 INJECTION, POWDER, LYOPHILIZED, FOR SOLUTION INTRAVENOUS at 22:33

## 2023-11-04 RX ADMIN — Medication 250 MILLIGRAM(S): at 14:31

## 2023-11-04 NOTE — ED PROVIDER NOTE - PROGRESS NOTE DETAILS
Mika, PGY3 - Per hospitalist patient's PCP does not have privileges and thus should be admitted under unattached.  Spoke to unattached Dr. Fatima, accepting patient for sepsis of unknown origin.

## 2023-11-04 NOTE — ED ADULT NURSE NOTE - OBJECTIVE STATEMENT
Pt is 58y M with PMH kidney failure on hemodialysis (M/W/F), HTN, HLD, DM complaining of bleeding from R femoral catheter. Pt reports bleeding from R femoral hemodialysis catheter x few days after "lock came off". Also endorses nausea and 1 episode of vomit this morning, emesis appeared as food. Also reports increased weakness x few days, denies lightheadedness, dizziness, HA. Upon assessment, A&Ox4, mild bleeding from R catheter site with no redness or swelling, febrile 103.3F rectally, tachy 105 HR. Denies chest pain, n/v at this time, endorses SOB at baseline. Endorses 7/10 lower back pain, chronic. Family at bedside.

## 2023-11-04 NOTE — ED ADULT TRIAGE NOTE - CHIEF COMPLAINT QUOTE
bleeding from R groin dialysis catheter x2 days, had full dialysis yesterday and had site evaluated. +fatigue, weakness, chills, vomiting today. MWF hemodialysis. had sepsis 2 months ago.
PAST MEDICAL HISTORY:  A-fib dx 1981    BPH (benign prostatic hyperplasia)     CAD (coronary artery disease) MI in 1994 balloon angioplasty    CHF (congestive heart failure)     GERD (gastroesophageal reflux disease)     HLD (hyperlipidemia)     HTN (hypertension)     IBS (irritable bowel syndrome) with constipation    PITO (obstructive sleep apnea) sleeps with O2 unable to tolerate cpap    Peripheral edema     SSS (sick sinus syndrome) AICD placed 11/06 generator change 10/09

## 2023-11-04 NOTE — ED ADULT NURSE NOTE - NSFALLRISKINTERV_ED_ALL_ED

## 2023-11-04 NOTE — H&P ADULT - NSHPLABSRESULTS_GEN_ALL_CORE
Lab Results:  CBC  CBC Full  -  ( 2023 13:14 )  WBC Count : 8.64 K/uL  RBC Count : 5.36 M/uL  Hemoglobin : 10.7 g/dL  Hematocrit : 35.7 %  Platelet Count - Automated : 202 K/uL  Mean Cell Volume : 66.6 fl  Mean Cell Hemoglobin : 20.0 pg  Mean Cell Hemoglobin Concentration : 30.0 gm/dL  Auto Neutrophil # : 7.05 K/uL  Auto Lymphocyte # : 0.60 K/uL  Auto Monocyte # : 0.60 K/uL  Auto Eosinophil # : 0.30 K/uL  Auto Basophil # : 0.08 K/uL  Auto Neutrophil % : 80.7 %  Auto Lymphocyte % : 7.0 %  Auto Monocyte % : 7.0 %  Auto Eosinophil % : 3.5 %  Auto Basophil % : 0.9 %    .		Differential:	[] Automated		[] Manual  Chemistry                        10.7   8.64  )-----------( 202      ( 2023 13:14 )             35.7     11-04    136  |  98  |  30<H>  ----------------------------<  91  4.8   |  21<L>  |  8.65<H>    Ca    10.9<H>      2023 13:14    TPro  8.3  /  Alb  4.1  /  TBili  1.0  /  DBili  x   /  AST  17  /  ALT  14  /  AlkPhos  58  11-04    LIVER FUNCTIONS - ( 2023 13:14 )  Alb: 4.1 g/dL / Pro: 8.3 g/dL / ALK PHOS: 58 U/L / ALT: 14 U/L / AST: 17 U/L / GGT: x           PT/INR - ( 2023 13:14 )   PT: 15.9 sec;   INR: 1.53 ratio         PTT - ( 2023 13:14 )  PTT:34.7 sec  Urinalysis Basic - ( 2023 16:29 )    Color: Yellow / Appearance: Clear / S.019 / pH: x  Gluc: x / Ketone: Negative mg/dL  / Bili: Negative / Urobili: 1.0 mg/dL   Blood: x / Protein: 300 mg/dL / Nitrite: Negative   Leuk Esterase: Negative / RBC: 0 /HPF / WBC 0 /HPF   Sq Epi: x / Non Sq Epi: 2 /HPF / Bacteria: Negative /HPF            MICROBIOLOGY/CULTURES:      RADIOLOGY RESULTS: reviewed

## 2023-11-04 NOTE — CONSULT NOTE ADULT - SUBJECTIVE AND OBJECTIVE BOX
CHIEF COMPLAINT:    HISTORY OF PRESENT ILLNESS:  58-year-old man with PMH of ESRD on hemodialysis via groin catheter, , BENNIE, hypertension, hyperlipidemia, presenting due to weakness for 2 days and fever of 101 Fahrenheit, fatigue, chills, vomiting episodes that started today.  Patient states that yesterday even though he felt unwell he went to his usual dialysis appointment which finished without any significant events.  Also endorsing mild shortness of breath.  Has had sepsis events in the past from his bilateral arm access sites, currently both access sites thrombosed which is why he needs dialysis catheter.  tried using peritoneal dialysis catheter but also eventually became septic and had to be removed. States that a stitch popped yesterday overlying the catheter site, +bleeding which is now resolved.      PAST MEDICAL & SURGICAL HISTORY:  Renal disease  ESRD      Hypertension      Gout      Diabetes mellitus      HLD (hyperlipidemia)      Obesity      BENNIE on CPAP      Heart rate fast      H/O shoulder surgery      Injury of ankle and foot  surgically repaired, 10 years ago      H/O hernia repair  30 years ago      Peritoneal dialysis catheter in situ  Was inserted, and removed      Arteriovenous graft removed  Now there is a wound suction in left arm              MEDICATIONS:  apixaban 5 milliGRAM(s) Oral two times a day  metoprolol tartrate 25 milliGRAM(s) Oral two times a day    piperacillin/tazobactam IVPB.. 3.375 Gram(s) IV Intermittent every 12 hours      acetaminophen   IVPB .. 1000 milliGRAM(s) IV Intermittent once      atorvastatin 40 milliGRAM(s) Oral at bedtime        FAMILY HISTORY:  Family history of hypertension  , in  mother        SOCIAL HISTORY:    [ ] Non-smoker  [ ] Smoker  [ ] Alcohol    Allergies    IV Contrast (Anaphylaxis)    Intolerances    	    REVIEW OF SYSTEMS:  CONSTITUTIONAL: No fever, weight loss, or fatigue  EYES: No eye pain, visual disturbances, or discharge  ENMT:  No difficulty hearing, tinnitus, vertigo; No sinus or throat pain  NECK: No pain or stiffness  RESPIRATORY: No cough, wheezing, chills or hemoptysis; No Shortness of Breath  CARDIOVASCULAR: No chest pain, palpitations, passing out, dizziness, or leg swelling  GASTROINTESTINAL: No abdominal or epigastric pain. No nausea, vomiting, or hematemesis; No diarrhea or constipation. No melena or hematochezia.  GENITOURINARY: No dysuria, frequency, hematuria, or incontinence  NEUROLOGICAL: No headaches, memory loss, loss of strength, numbness, or tremors  SKIN: No itching, burning, rashes, or lesions   LYMPH Nodes: No enlarged glands  ENDOCRINE: No heat or cold intolerance; No hair loss  MUSCULOSKELETAL: No joint pain or swelling; No muscle, back, or extremity pain  PSYCHIATRIC: No depression, anxiety, mood swings, or difficulty sleeping  HEME/LYMPH: No easy bruising, or bleeding gums  ALLERY AND IMMUNOLOGIC: No hives or eczema	    [ ] All others negative	  [ ] Unable to obtain    PHYSICAL EXAM:  T(C): 38.4 (23 @ 20:27), Max: 39.6 (23 @ 13:00)  HR: 100 (23 @ 20:27) (97 - 121)  BP: 131/78 (23 @ 20:27) (107/71 - 131/78)  RR: 20 (23 @ 20:27) (18 - 22)  SpO2: 99% (23 @ 20:27) (97% - 99%)  Wt(kg): --  I&O's Summary      Appearance: Normal	  HEENT:   Normal oral mucosa, PERRL, EOMI	  Lymphatic: No lymphadenopathy  Cardiovascular: Normal S1 S2, No JVD, No murmurs, No edema  Respiratory: Lungs clear to auscultation	  Psychiatry: A & O x 3, Mood & affect appropriate  Gastrointestinal:  Soft, Non-tender, + BS	  Skin: No rashes, No ecchymoses, No cyanosis	  Neurologic: Non-focal  Extremities: Normal range of motion, No clubbing, cyanosis or edema  Vascular: Peripheral pulses palpable 2+ bilaterally  CVC in R groin    TELEMETRY: 	    ECG:  	e  RADIOLOGY:  < from: Xray Chest 2 Views PA/Lat (23 @ 14:21) >    ACC: [id number] EXAM:  XR CHEST PA LAT 2V   ORDERED BY: DANITA BUSH     PROCEDURE DATE:  2023          INTERPRETATION:  CLINICAL INDICATION: Sepsis. Question pneumonia.    EXAM: Chest x-ray 2 views    COMPARISON: Chest x-ray 10/12/2022.    FINDINGS:  Left subclavian vascular cannula extending into the SVC.  Right and left axillary vascular stents.  The lungs are clear.  No large pleural effusion. No pneumothorax.  The heart is normal in size  The visualized osseous structures demonstrate no acute pathology. There   is old lateral right clavicular fracture-deformity    IMPRESSION:  No focal consolidations.    --- End of Report ---        < end of copied text >  < from: CT Neck Soft Tissue w/ IV Cont (21 @ 08:25) >  EXAM:  CT NECK SOFT TISSUE IC                            PROCEDURE DATE:  2021            INTERPRETATION:  CLINICAL INFORMATION: Lip and tongue swelling following contrast injections during recent thrombectomy.    COMPARISON: None available.    EXAMINATION: 90 cc intravenous Omnipaque 350 contrast was administered, 10 cc contrast was discarded. Axial thin section images with coronal and sagittal reformatted series were performed.    FINDINGS:    There is mild soft tissue swelling and inflammatory changes in the adjacent fat overlying the left. Is consistent with residual angioedema. The tongue, uvula and epiglottis are normal. There is no airway compression.    Nonspecific small subcentimeter lymph nodes are present in the submental, submandibular, jugular chain, and spinal accessory chain regions. No enlarged or necrotic lymph nodes are visualized.    The salivary glands are normal.    The thyroid gland is normal.    The paranasal sinuses are well-aerated. The mastoid air cells and middle ear cavities are well-aerated.    No focal intracranial abnormalities are noted within the field-of-view.    No vascular abnormality is identified.    The upper lungs are clear.    The visualized osseous structures are unremarkable.    IMPRESSION:    Mild enlargement of the lips with adjacent inflammatory changes consistent with residual edema. A tunnel uvula and epiglottis are normal. There is no airway compression. No enlarged cervical lymph nodes.      Dr. Thibodeaux discussed these findings with JOSELO Mccullough on 2021 9:31 AM .    --- End of Report ---          < end of copied text >    OTHER: 	  	  LABS:	 	    CARDIAC MARKERS:        Urine Studies:  Urinalysis Basic - ( 2023 16:29 )    Color: Yellow / Appearance: Clear / S.019 / pH:   Gluc:  / Ketone: Negative mg/dL  / Bili: Negative / Urobili: 1.0 mg/dL   Blood:  / Protein: 300 mg/dL / Nitrite: Negative   Leuk Esterase: Negative / RBC: 0 /HPF / WBC 0 /HPF   Sq Epi:  / Non Sq Epi: 2 /HPF / Bacteria: Negative /HPF                            10.7   8.64  )-----------(       ( 2023 13:14 )             35.7     11-04    136  |  98  |  30<H>  ----------------------------<  91  4.8   |  21<L>  |  8.65<H>    Ca    10.9<H>      2023 13:14    TPro  8.3  /  Alb  4.1  /  TBili  1.0  /  DBili  x   /  AST  17  /  ALT  14  /  AlkPhos  58  11-04    proBNP:   Lipid Profile:   HgA1c:   TSH:

## 2023-11-04 NOTE — H&P ADULT - HISTORY OF PRESENT ILLNESS
58-year-old man with PMH of ESRD on hemodialysis via groin catheter, Monday Wednesday Friday, BENNIE, hypertension, hyperlipidemia, presenting due to weakness for 2 days and fever of 101 Fahrenheit, fatigue, chills, vomiting episodes that started today.  Patient states that yesterday even though he felt unwell he went to his usual dialysis appointment which finished without any significant events.  Also endorsing mild shortness of breath.  Has had sepsis events in the past from his bilateral arm access sites, currently both access sites thrombosed which is why he needs dialysis catheter.  tried using peritoneal dialysis catheter but also eventually became septic and had to be removed. States that a stitch popped yesterday overlying the catheter site, +bleeding which is now resolved.

## 2023-11-04 NOTE — ED PROVIDER NOTE - PHYSICAL EXAMINATION
Gen: NAD, AOx3, able to make needs known  Head: NCAT  HEENT: EOMI, normal conjunctiva  Lung: CTAB, no respiratory distress, no wheezes/rhonchi/rales B/L, speaking in full sentences  CV: RRR, +murmur. pulses bilaterally   Abd: soft, NTND, no guarding, no CVA tenderness  MSK: no visible bony deformities  Neuro: No focal sensory or motor deficits  Skin: Warm, well perfused, no rash  Psych: normal affect Gen: NAD, AOx3, able to make needs known  Head: NCAT  HEENT: EOMI, normal conjunctiva  Lung: CTAB, no respiratory distress, no wheezes/rhonchi/rales B/L, speaking in full sentences  CV: RRR, +murmur. pulses bilaterally   Abd: soft, NTND, no guarding, no CVA tenderness  MSK: no visible bony deformities  Neuro: No focal sensory or motor deficits  Skin: Warm, well perfused, no rash. blood on bandage covering dialysis catheter insertion site, no active bleeding, no erythema or swelling surrounding insertion site.   Psych: normal affect

## 2023-11-04 NOTE — CONSULT NOTE ADULT - ASSESSMENT
58-year-old man with PMH of ESRD on hemodialysis via groin catheter, Monday Wednesday Friday, BENNIE, hypertension, hyperlipidemia, presenting due to weakness for 2 days and fever of 101 Fahrenheit, fatigue, chills, and shortness of breath

## 2023-11-04 NOTE — H&P ADULT - ASSESSMENT
58-year-old man with PMH of ESRD on hemodialysis via groin catheter, Monday Wednesday Friday, BENNIE, hypertension, hyperlipidemia, presenting due to weakness for 2 days and fever of 101 Fahrenheit, fatigue, chills, vomiting episodes that started today.  Patient states that yesterday even though he felt unwell he went to his usual dialysis appointment which finished without any significant events.  Also endorsing mild shortness of breath.  Has had sepsis events in the past from his bilateral arm access sites, currently both access sites thrombosed which is why he needs dialysis catheter.  tried using peritoneal dialysis catheter but also eventually became septic and had to be removed. States that a stitch popped yesterday overlying the catheter site, +bleeding which is now resolved.    1 sepsis  - cw abx  - fu cultures  - vanco by level  - ID consult in am   - vascular consult in am     2 ESRD  - HD as scheduled  - renal fu     3 Afib  - cw Eliquis  - rate controlled     4 HTN  - cw home meds

## 2023-11-04 NOTE — CONSULT NOTE ADULT - ASSESSMENT
58-year-old man with PMH of ESRD on hemodialysis via groin catheter, Monday Wednesday Friday, BENNIE, hypertension, hyperlipidemia, presenting due to weakness for 2 days and fever of 101 Fahrenheit, fatigue, chills, vomiting episodes that started today.  Patient states that yesterday even though he felt unwell he went to his usual dialysis appointment which finished without any significant events.  Nephrology consulted for ESRD on HD.    A/P:  ESRD on HD:  Dialysis center: [facility]  Nephrologist: Dr. Phoenix.  Access: CVC R groin  Last dialyzed 11/3 - completed HD w/o issue per pt.  Consent signed, witnessed, and placed in pt's chart.  No urgent need for HD tonight - electrolytes are acceptable and x-ray clear.  Renal diet.  Monitor BMP and UO.    HTN:  BP controlled.  Resume home meds.  UF w/ HD.  Monitor BP.    Anemia:  Hgb at goal.  Monitor Hgb.  Transfuse for Hgb <8.    CKD: MBD:  Check PTH.  Low PO4 diet.  C/W sevelamer w/ meals.  On calcitriol 0.25mcg qd.  Monitor Ca and PO4 daily.    Hypercalcemia:  Possibly in setting of dehydration vs. calcitriol.  D/C calcitriol.  Check PTH and Vit. D  Monitor Ca.    Fever:  Blood cultures collected; pending results.  Possibly CVC associated infection.  Pt reports bloody d/c yesterday; no purulent d/c.  On zosyn. 58-year-old man with PMH of ESRD on hemodialysis via groin catheter, Monday Wednesday Friday, BENNIE, hypertension, hyperlipidemia, presenting due to weakness for 2 days and fever of 101 Fahrenheit, fatigue, chills, vomiting episodes that started today.  Patient states that yesterday even though he felt unwell he went to his usual dialysis appointment which finished without any significant events.  Nephrology consulted for ESRD on HD.    A/P:  ESRD on HD: [facility]  Dialysis center: [facility]  Nephrologist: Patient doesn't remember  Access: CVC R groin  Last dialyzed 11/3 - completed HD w/o issue per pt.  Consent signed, witnessed, and placed in pt's chart.  No urgent need for HD tonight - electrolytes are acceptable and x-ray clear.  Renal diet.  Monitor BMP and UO.    HTN:  BP controlled.  Resume home meds.  UF w/ HD.  Monitor BP.    Anemia:  Hgb at goal.  Monitor Hgb.  MORALES with HD  Transfuse for Hgb <8.    CKD: MBD:  Check PTH.  Low PO4 diet.  C/W sevelamer w/ meals.  On calcitriol 0.25mcg qd.  Monitor Ca and PO4 daily.    Hypercalcemia:  Possibly in setting of dehydration vs. calcitriol.  D/C calcitriol.  Check PTH and Vit. D  Monitor Ca.    Fever:  Blood cultures collected; pending results.  Possibly CVC associated infection.  Pt reports bloody d/c yesterday; no purulent d/c.  On zosyn.

## 2023-11-04 NOTE — CONSULT NOTE ADULT - SUBJECTIVE AND OBJECTIVE BOX
[facility]   MD KAT BHAGAT MD ANGELA WONG, PA QIAN CHEN, JOSELO      TEL:  OFFICE: [phone]  From 5pm-7am answering service [phone]    --- INITIAL RENAL CONSULT NOTE ---date of service 23 @ 19:36    HPI:  58-year-old man with PMH of ESRD on hemodialysis via groin catheter, , BENNIE, hypertension, hyperlipidemia, presenting due to weakness for 2 days and fever of 101 Fahrenheit, fatigue, chills, vomiting episodes that started today.  Patient states that yesterday even though he felt unwell he went to his usual dialysis appointment which finished without any significant events.  Also endorsing mild shortness of breath.  Has had sepsis events in the past from his bilateral arm access sites, currently both access sites thrombosed which is why he needs dialysis catheter.  tried using peritoneal dialysis catheter but also eventually became septic and had to be removed. States that a stitch popped yesterday overlying the catheter site, +bleeding which is now resolved.      Allergies:  IV Contrast (Anaphylaxis)      PAST MEDICAL & SURGICAL HISTORY:  Renal disease  ESRD      Hypertension      Gout      Diabetes mellitus      HLD (hyperlipidemia)      Obesity      BENNIE on CPAP      Heart rate fast      H/O shoulder surgery      Injury of ankle and foot  surgically repaired, 10 years ago      H/O hernia repair  30 years ago      Peritoneal dialysis catheter in situ  Was inserted, and removed      Arteriovenous graft removed  Now there is a wound suction in left arm          Home Medications Reviewed    Hospital Medications:   MEDICATIONS  (STANDING):  acetaminophen   IVPB .. 1000 milliGRAM(s) IV Intermittent once  apixaban 5 milliGRAM(s) Oral two times a day  atorvastatin 40 milliGRAM(s) Oral at bedtime  calcitriol   Capsule 0.25 MICROGram(s) Oral daily  metoprolol tartrate 25 milliGRAM(s) Oral two times a day  piperacillin/tazobactam IVPB.. 3.375 Gram(s) IV Intermittent every 12 hours  sevelamer carbonate 800 milliGRAM(s) Oral three times a day with meals      SOCIAL HISTORY:  Denies ETOh, Smoking,     FAMILY HISTORY:  Family history of hypertension  , in  mother        REVIEW OF SYSTEMS:  CONSTITUTIONAL: per HPI  EYES/ENT: No visual changes;  No vertigo or throat pain   NECK: No pain or stiffness  RESPIRATORY: No cough, wheezing, hemoptysis; No shortness of breath  CARDIOVASCULAR: No chest pain or palpitations.  GASTROINTESTINAL: No abdominal or epigastric pain. No nausea, vomiting, or hematemesis; No diarrhea or constipation. No melena or hematochezia.  GENITOURINARY: No dysuria, frequency, foamy urine, urinary urgency, incontinence or hematuria  NEUROLOGICAL: No numbness or weakness  SKIN: No itching, burning, rashes, or lesions   VASCULAR: No bilateral lower extremity edema.   All other review of systems is negative unless indicated above.    VITALS:  T(F): 101.2 (23 @ 19:13), Max: 103.3 (23 @ 13:00)  HR: 99 (23 @ :13)  BP: 128/62 (23 @ 19:13)  RR: 20 (23 @ :13)  SpO2: 98% (23 @ :13)  Wt(kg): --    Height (cm): 177.8 ( @ :38)  Weight (kg): 131.5 ( @ :38)  BMI (kg/m2): 41.6 (:)  BSA (m2): 2.44 (:38)    PHYSICAL EXAM:  General: NAD  HEENT: anicteric sclera, oropharynx clear, MMM  Neck: No JVD  Respiratory: CTAB, no wheezes, rales or rhonchi  Cardiovascular: S1, S2, RRR  Gastrointestinal: BS+, soft, NT/ND  Extremities: No cyanosis or clubbing. No peripheral edema  Neurological: A/O x 3, no focal deficits  Psychiatric: Normal mood, normal affect  : No CVA tenderness. No hyde.   Skin: No rashes  Vascular Access: CVC in R groin    LABS:      136  |  98  |  30<H>  ----------------------------<  91  4.8   |  21<L>  |  8.65<H>    Ca    10.9<H>      2023 13:14    TPro  8.3  /  Alb  4.1  /  TBili  1.0  /  DBili      /  AST  17  /  ALT  14  /  AlkPhos  58  11-04    Creatinine Trend: 8.65 <--                        10.7   8.64  )-----------( 202      ( 2023 13:14 )             35.7     Urine Studies:  Urinalysis Basic - ( 2023 16:29 )    Color: Yellow / Appearance: Clear / S.019 / pH:   Gluc:  / Ketone: Negative mg/dL  / Bili: Negative / Urobili: 1.0 mg/dL   Blood:  / Protein: 300 mg/dL / Nitrite: Negative   Leuk Esterase: Negative / RBC: 0 /HPF / WBC 0 /HPF   Sq Epi:  / Non Sq Epi: 2 /HPF / Bacteria: Negative /HPF          RADIOLOGY & ADDITIONAL STUDIES:

## 2023-11-04 NOTE — ED PROVIDER NOTE - ATTENDING CONTRIBUTION TO CARE
Patient is a 58-year-old male with a history of hypertension, hyperlipidemia, type II DM, BENNIE on CPAP, ESRD on HD  on M/W/F via groin catheter now here for complaint of weakness, fatigue, vomiting, chills fever to 101.  Last HD was yesterday.  He reports not feeling well yesterday.  Per wife and patient, patient has had sepsis in the past.  Exact etiology of sepsis unknown.  Patient denies any cough, chest pain, abdominal pain.      VS noted  Gen. no acute distress, Non toxic   HEENT: EOMI, mmm  Lungs: CTAB/L no C/ W /R   CVS: RRR   Abd; Soft non tender, non distended,  groin catheter in place, bandaged  Ext: no edema  Skin: no rash  Neuro AAOx3 non focal clear speech  a/p:  fever/fatigue/vomiting–plan for sepsis labs, chest x-ray.  Concern for infectious etiology. Will start IV antibiotics.  Patient ELVIRA.  Arben Castillo MD Patient is a 58-year-old male with a history of hypertension, hyperlipidemia, type II DM, BENNIE on CPAP, ESRD on HD  on M/W/F via groin catheter now here for complaint of weakness, fatigue, vomiting, chills fever to 101.  Last HD was yesterday.  He reports not feeling well yesterday.  Per wife and patient, patient has had sepsis in the past.  Exact etiology of sepsis unknown.  Patient denies any cough, chest pain, abdominal pain.      VS noted  Gen. no acute distress, Non toxic   HEENT: EOMI, mmm  Lungs: CTAB/L no C/ W /R   CVS: RRR   Abd; Soft non tender, non distended, R groin catheter in place, bandaged  Ext: no edema  Skin: no rash  Neuro AAOx3 non focal clear speech  a/p:  fever/fatigue/vomiting–plan for sepsis labs, chest x-ray.  Concern for infectious etiology. Will start IV antibiotics.  Patient ELVIRA.  Arben Castillo MD

## 2023-11-04 NOTE — ED ADULT NURSE NOTE - CHIEF COMPLAINT QUOTE
bleeding from R groin dialysis catheter x2 days, had full dialysis yesterday and had site evaluated. +fatigue, weakness, chills, vomiting today. MWF hemodialysis. had sepsis 2 months ago.

## 2023-11-04 NOTE — ED PROVIDER NOTE - CLINICAL SUMMARY MEDICAL DECISION MAKING FREE TEXT BOX
Mika, PGY3 - 58-year-old man with PMH ESRD, multiple episodes of sepsis in the past from his dialysis access sites, presenting due to concerns for sepsis considering he is febrile, tachycardic, tachypneic.  We will send sepsis labs, broad-spectrum antibiotics, patient to be admitted for further sepsis evaluation considering that he has a dialysis catheter in his groin which is the most likely origin.  Per nursing we do not access dialysis catheters and thus we will not be sending a blood culture from this site. *The above represents an initial assessment/impression. Please refer to progress notes for potential changes in patient clinical course*

## 2023-11-04 NOTE — H&P ADULT - NSHPPHYSICALEXAM_GEN_ALL_CORE
General: WN/WD NAD  PERRLA  Neurology: A&Ox3, nonfocal, FULLER x 4  Respiratory: CTA B/L  CV: RRR, S1S2, no murmurs, rubs or gallops  Abdominal: Soft, NT, ND +BS, Last BM  Extremities: No edema, + peripheral pulses  Skin Normal

## 2023-11-04 NOTE — ED ADULT NURSE REASSESSMENT NOTE - NS ED NURSE REASSESS COMMENT FT1
received report from Jenelle HIDALGO. pt resting comfortably in stretcher. A&Ox4. febrile, other VSS. NAD noted. Pt is to receive additional dose of Tylenol at 2040 based on previous medication administration. plan of care discussed. safety and comfort measures maintained.

## 2023-11-04 NOTE — ED PROVIDER NOTE - OBJECTIVE STATEMENT
58-year-old man with PMH of ESRD on hemodialysis via groin catheter, Monday Wednesday Friday, BENNIE, hypertension, hyperlipidemia, presenting due to weakness for 2 days and fever of 101 Fahrenheit, fatigue, chills, vomiting episodes that started today.  Patient states that yesterday even though he felt unwell he went to his usual dialysis appointment which finished without any significant events.  Also endorsing mild shortness of breath.  Has had sepsis events in the past from his bilateral arm access sites, currently both access sites thrombosed which is why he needs dialysis catheter.  tried using peritoneal dialysis catheter but also eventually became septic and had to be removed. 58-year-old man with PMH of ESRD on hemodialysis via groin catheter, Monday Wednesday Friday, BENNIE, hypertension, hyperlipidemia, presenting due to weakness for 2 days and fever of 101 Fahrenheit, fatigue, chills, vomiting episodes that started today.  Patient states that yesterday even though he felt unwell he went to his usual dialysis appointment which finished without any significant events.  Also endorsing mild shortness of breath.  Has had sepsis events in the past from his bilateral arm access sites, currently both access sites thrombosed which is why he needs dialysis catheter.  tried using peritoneal dialysis catheter but also eventually became septic and had to be removed. States that a stitch popped yesterday overlying the catheter site, +bleeding which is now resolved.

## 2023-11-05 LAB
-  K. PNEUMONIAE GROUP: SIGNIFICANT CHANGE UP
-  K. PNEUMONIAE GROUP: SIGNIFICANT CHANGE UP
24R-OH-CALCIDIOL SERPL-MCNC: 44.2 NG/ML — SIGNIFICANT CHANGE UP (ref 30–80)
24R-OH-CALCIDIOL SERPL-MCNC: 44.2 NG/ML — SIGNIFICANT CHANGE UP (ref 30–80)
ALBUMIN SERPL ELPH-MCNC: 3.6 G/DL — SIGNIFICANT CHANGE UP (ref 3.3–5)
ALBUMIN SERPL ELPH-MCNC: 3.6 G/DL — SIGNIFICANT CHANGE UP (ref 3.3–5)
ALP SERPL-CCNC: 50 U/L — SIGNIFICANT CHANGE UP (ref 40–120)
ALP SERPL-CCNC: 50 U/L — SIGNIFICANT CHANGE UP (ref 40–120)
ALT FLD-CCNC: 13 U/L — SIGNIFICANT CHANGE UP (ref 10–45)
ALT FLD-CCNC: 13 U/L — SIGNIFICANT CHANGE UP (ref 10–45)
ANION GAP SERPL CALC-SCNC: 17 MMOL/L — SIGNIFICANT CHANGE UP (ref 5–17)
ANION GAP SERPL CALC-SCNC: 17 MMOL/L — SIGNIFICANT CHANGE UP (ref 5–17)
ANION GAP SERPL CALC-SCNC: 19 MMOL/L — HIGH (ref 5–17)
ANION GAP SERPL CALC-SCNC: 19 MMOL/L — HIGH (ref 5–17)
AST SERPL-CCNC: 20 U/L — SIGNIFICANT CHANGE UP (ref 10–40)
AST SERPL-CCNC: 20 U/L — SIGNIFICANT CHANGE UP (ref 10–40)
BASOPHILS # BLD AUTO: 0.03 K/UL — SIGNIFICANT CHANGE UP (ref 0–0.2)
BASOPHILS # BLD AUTO: 0.03 K/UL — SIGNIFICANT CHANGE UP (ref 0–0.2)
BASOPHILS NFR BLD AUTO: 0.3 % — SIGNIFICANT CHANGE UP (ref 0–2)
BASOPHILS NFR BLD AUTO: 0.3 % — SIGNIFICANT CHANGE UP (ref 0–2)
BILIRUB SERPL-MCNC: 1.2 MG/DL — SIGNIFICANT CHANGE UP (ref 0.2–1.2)
BILIRUB SERPL-MCNC: 1.2 MG/DL — SIGNIFICANT CHANGE UP (ref 0.2–1.2)
BUN SERPL-MCNC: 35 MG/DL — HIGH (ref 7–23)
BUN SERPL-MCNC: 35 MG/DL — HIGH (ref 7–23)
BUN SERPL-MCNC: 41 MG/DL — HIGH (ref 7–23)
BUN SERPL-MCNC: 41 MG/DL — HIGH (ref 7–23)
CALCIUM SERPL-MCNC: 10.3 MG/DL — SIGNIFICANT CHANGE UP (ref 8.4–10.5)
CALCIUM SERPL-MCNC: 9.9 MG/DL — SIGNIFICANT CHANGE UP (ref 8.4–10.5)
CALCIUM SERPL-MCNC: 9.9 MG/DL — SIGNIFICANT CHANGE UP (ref 8.4–10.5)
CHLORIDE SERPL-SCNC: 98 MMOL/L — SIGNIFICANT CHANGE UP (ref 96–108)
CHLORIDE SERPL-SCNC: 98 MMOL/L — SIGNIFICANT CHANGE UP (ref 96–108)
CHLORIDE SERPL-SCNC: 99 MMOL/L — SIGNIFICANT CHANGE UP (ref 96–108)
CHLORIDE SERPL-SCNC: 99 MMOL/L — SIGNIFICANT CHANGE UP (ref 96–108)
CO2 SERPL-SCNC: 18 MMOL/L — LOW (ref 22–31)
CO2 SERPL-SCNC: 18 MMOL/L — LOW (ref 22–31)
CO2 SERPL-SCNC: 19 MMOL/L — LOW (ref 22–31)
CO2 SERPL-SCNC: 19 MMOL/L — LOW (ref 22–31)
CREAT SERPL-MCNC: 10.36 MG/DL — HIGH (ref 0.5–1.3)
CREAT SERPL-MCNC: 10.36 MG/DL — HIGH (ref 0.5–1.3)
CREAT SERPL-MCNC: 11.81 MG/DL — HIGH (ref 0.5–1.3)
CREAT SERPL-MCNC: 11.81 MG/DL — HIGH (ref 0.5–1.3)
EGFR: 4 ML/MIN/1.73M2 — LOW
EGFR: 4 ML/MIN/1.73M2 — LOW
EGFR: 5 ML/MIN/1.73M2 — LOW
EGFR: 5 ML/MIN/1.73M2 — LOW
EOSINOPHIL # BLD AUTO: 0.1 K/UL — SIGNIFICANT CHANGE UP (ref 0–0.5)
EOSINOPHIL # BLD AUTO: 0.1 K/UL — SIGNIFICANT CHANGE UP (ref 0–0.5)
EOSINOPHIL NFR BLD AUTO: 1.2 % — SIGNIFICANT CHANGE UP (ref 0–6)
EOSINOPHIL NFR BLD AUTO: 1.2 % — SIGNIFICANT CHANGE UP (ref 0–6)
GLUCOSE BLDC GLUCOMTR-MCNC: 112 MG/DL — HIGH (ref 70–99)
GLUCOSE BLDC GLUCOMTR-MCNC: 112 MG/DL — HIGH (ref 70–99)
GLUCOSE BLDC GLUCOMTR-MCNC: 92 MG/DL — SIGNIFICANT CHANGE UP (ref 70–99)
GLUCOSE SERPL-MCNC: 101 MG/DL — HIGH (ref 70–99)
GLUCOSE SERPL-MCNC: 101 MG/DL — HIGH (ref 70–99)
GLUCOSE SERPL-MCNC: 76 MG/DL — SIGNIFICANT CHANGE UP (ref 70–99)
GLUCOSE SERPL-MCNC: 76 MG/DL — SIGNIFICANT CHANGE UP (ref 70–99)
GRAM STN FLD: ABNORMAL
HCT VFR BLD CALC: 35.6 % — LOW (ref 39–50)
HCT VFR BLD CALC: 35.6 % — LOW (ref 39–50)
HGB BLD-MCNC: 10.5 G/DL — LOW (ref 13–17)
HGB BLD-MCNC: 10.5 G/DL — LOW (ref 13–17)
IMM GRANULOCYTES NFR BLD AUTO: 0.5 % — SIGNIFICANT CHANGE UP (ref 0–0.9)
IMM GRANULOCYTES NFR BLD AUTO: 0.5 % — SIGNIFICANT CHANGE UP (ref 0–0.9)
LYMPHOCYTES # BLD AUTO: 0.54 K/UL — LOW (ref 1–3.3)
LYMPHOCYTES # BLD AUTO: 0.54 K/UL — LOW (ref 1–3.3)
LYMPHOCYTES # BLD AUTO: 6.2 % — LOW (ref 13–44)
LYMPHOCYTES # BLD AUTO: 6.2 % — LOW (ref 13–44)
MCHC RBC-ENTMCNC: 19.9 PG — LOW (ref 27–34)
MCHC RBC-ENTMCNC: 19.9 PG — LOW (ref 27–34)
MCHC RBC-ENTMCNC: 29.5 GM/DL — LOW (ref 32–36)
MCHC RBC-ENTMCNC: 29.5 GM/DL — LOW (ref 32–36)
MCV RBC AUTO: 67.4 FL — LOW (ref 80–100)
MCV RBC AUTO: 67.4 FL — LOW (ref 80–100)
METHOD TYPE: SIGNIFICANT CHANGE UP
METHOD TYPE: SIGNIFICANT CHANGE UP
MONOCYTES # BLD AUTO: 0.77 K/UL — SIGNIFICANT CHANGE UP (ref 0–0.9)
MONOCYTES # BLD AUTO: 0.77 K/UL — SIGNIFICANT CHANGE UP (ref 0–0.9)
MONOCYTES NFR BLD AUTO: 8.9 % — SIGNIFICANT CHANGE UP (ref 2–14)
MONOCYTES NFR BLD AUTO: 8.9 % — SIGNIFICANT CHANGE UP (ref 2–14)
NEUTROPHILS # BLD AUTO: 7.19 K/UL — SIGNIFICANT CHANGE UP (ref 1.8–7.4)
NEUTROPHILS # BLD AUTO: 7.19 K/UL — SIGNIFICANT CHANGE UP (ref 1.8–7.4)
NEUTROPHILS NFR BLD AUTO: 82.9 % — HIGH (ref 43–77)
NEUTROPHILS NFR BLD AUTO: 82.9 % — HIGH (ref 43–77)
NRBC # BLD: 0 /100 WBCS — SIGNIFICANT CHANGE UP (ref 0–0)
NRBC # BLD: 0 /100 WBCS — SIGNIFICANT CHANGE UP (ref 0–0)
PLATELET # BLD AUTO: 171 K/UL — SIGNIFICANT CHANGE UP (ref 150–400)
PLATELET # BLD AUTO: 171 K/UL — SIGNIFICANT CHANGE UP (ref 150–400)
POTASSIUM SERPL-MCNC: 5.1 MMOL/L — SIGNIFICANT CHANGE UP (ref 3.5–5.3)
POTASSIUM SERPL-MCNC: 5.1 MMOL/L — SIGNIFICANT CHANGE UP (ref 3.5–5.3)
POTASSIUM SERPL-MCNC: 5.7 MMOL/L — HIGH (ref 3.5–5.3)
POTASSIUM SERPL-MCNC: 5.7 MMOL/L — HIGH (ref 3.5–5.3)
POTASSIUM SERPL-SCNC: 5.1 MMOL/L — SIGNIFICANT CHANGE UP (ref 3.5–5.3)
POTASSIUM SERPL-SCNC: 5.1 MMOL/L — SIGNIFICANT CHANGE UP (ref 3.5–5.3)
POTASSIUM SERPL-SCNC: 5.7 MMOL/L — HIGH (ref 3.5–5.3)
POTASSIUM SERPL-SCNC: 5.7 MMOL/L — HIGH (ref 3.5–5.3)
PROT SERPL-MCNC: 7.7 G/DL — SIGNIFICANT CHANGE UP (ref 6–8.3)
PROT SERPL-MCNC: 7.7 G/DL — SIGNIFICANT CHANGE UP (ref 6–8.3)
PTH-INTACT FLD-MCNC: 397 PG/ML — HIGH (ref 15–65)
PTH-INTACT FLD-MCNC: 397 PG/ML — HIGH (ref 15–65)
RBC # BLD: 5.28 M/UL — SIGNIFICANT CHANGE UP (ref 4.2–5.8)
RBC # BLD: 5.28 M/UL — SIGNIFICANT CHANGE UP (ref 4.2–5.8)
RBC # FLD: 20.8 % — HIGH (ref 10.3–14.5)
RBC # FLD: 20.8 % — HIGH (ref 10.3–14.5)
SODIUM SERPL-SCNC: 135 MMOL/L — SIGNIFICANT CHANGE UP (ref 135–145)
SPECIMEN SOURCE: SIGNIFICANT CHANGE UP
WBC # BLD: 8.67 K/UL — SIGNIFICANT CHANGE UP (ref 3.8–10.5)
WBC # BLD: 8.67 K/UL — SIGNIFICANT CHANGE UP (ref 3.8–10.5)
WBC # FLD AUTO: 8.67 K/UL — SIGNIFICANT CHANGE UP (ref 3.8–10.5)
WBC # FLD AUTO: 8.67 K/UL — SIGNIFICANT CHANGE UP (ref 3.8–10.5)

## 2023-11-05 PROCEDURE — 74176 CT ABD & PELVIS W/O CONTRAST: CPT | Mod: 26

## 2023-11-05 PROCEDURE — 99223 1ST HOSP IP/OBS HIGH 75: CPT | Mod: GC

## 2023-11-05 PROCEDURE — 71250 CT THORAX DX C-: CPT | Mod: 26

## 2023-11-05 RX ORDER — SODIUM ZIRCONIUM CYCLOSILICATE 10 G/10G
10 POWDER, FOR SUSPENSION ORAL ONCE
Refills: 0 | Status: COMPLETED | OUTPATIENT
Start: 2023-11-05 | End: 2023-11-05

## 2023-11-05 RX ORDER — DEXTROSE 50 % IN WATER 50 %
25 SYRINGE (ML) INTRAVENOUS ONCE
Refills: 0 | Status: DISCONTINUED | OUTPATIENT
Start: 2023-11-05 | End: 2023-11-22

## 2023-11-05 RX ORDER — DEXTROSE 50 % IN WATER 50 %
12.5 SYRINGE (ML) INTRAVENOUS ONCE
Refills: 0 | Status: DISCONTINUED | OUTPATIENT
Start: 2023-11-05 | End: 2023-11-22

## 2023-11-05 RX ORDER — SODIUM CHLORIDE 9 MG/ML
1000 INJECTION, SOLUTION INTRAVENOUS
Refills: 0 | Status: DISCONTINUED | OUTPATIENT
Start: 2023-11-05 | End: 2023-11-22

## 2023-11-05 RX ORDER — CEFTRIAXONE 500 MG/1
2000 INJECTION, POWDER, FOR SOLUTION INTRAMUSCULAR; INTRAVENOUS EVERY 24 HOURS
Refills: 0 | Status: COMPLETED | OUTPATIENT
Start: 2023-11-05 | End: 2023-11-18

## 2023-11-05 RX ORDER — DEXTROSE 50 % IN WATER 50 %
15 SYRINGE (ML) INTRAVENOUS ONCE
Refills: 0 | Status: DISCONTINUED | OUTPATIENT
Start: 2023-11-05 | End: 2023-11-22

## 2023-11-05 RX ORDER — ERYTHROPOIETIN 10000 [IU]/ML
2000 INJECTION, SOLUTION INTRAVENOUS; SUBCUTANEOUS
Refills: 0 | Status: DISCONTINUED | OUTPATIENT
Start: 2023-11-06 | End: 2023-11-08

## 2023-11-05 RX ORDER — ACETAMINOPHEN 500 MG
1000 TABLET ORAL ONCE
Refills: 0 | Status: COMPLETED | OUTPATIENT
Start: 2023-11-05 | End: 2023-11-05

## 2023-11-05 RX ORDER — INSULIN LISPRO 100/ML
VIAL (ML) SUBCUTANEOUS AT BEDTIME
Refills: 0 | Status: DISCONTINUED | OUTPATIENT
Start: 2023-11-05 | End: 2023-11-22

## 2023-11-05 RX ORDER — INSULIN LISPRO 100/ML
VIAL (ML) SUBCUTANEOUS
Refills: 0 | Status: DISCONTINUED | OUTPATIENT
Start: 2023-11-05 | End: 2023-11-22

## 2023-11-05 RX ORDER — GLUCAGON INJECTION, SOLUTION 0.5 MG/.1ML
1 INJECTION, SOLUTION SUBCUTANEOUS ONCE
Refills: 0 | Status: DISCONTINUED | OUTPATIENT
Start: 2023-11-05 | End: 2023-11-22

## 2023-11-05 RX ORDER — ACETAMINOPHEN 500 MG
650 TABLET ORAL EVERY 6 HOURS
Refills: 0 | Status: COMPLETED | OUTPATIENT
Start: 2023-11-05 | End: 2023-11-08

## 2023-11-05 RX ORDER — SODIUM ZIRCONIUM CYCLOSILICATE 10 G/10G
10 POWDER, FOR SUSPENSION ORAL
Refills: 0 | Status: COMPLETED | OUTPATIENT
Start: 2023-11-05 | End: 2023-11-05

## 2023-11-05 RX ADMIN — SODIUM ZIRCONIUM CYCLOSILICATE 10 GRAM(S): 10 POWDER, FOR SUSPENSION ORAL at 21:02

## 2023-11-05 RX ADMIN — SODIUM ZIRCONIUM CYCLOSILICATE 10 GRAM(S): 10 POWDER, FOR SUSPENSION ORAL at 13:21

## 2023-11-05 RX ADMIN — SEVELAMER CARBONATE 800 MILLIGRAM(S): 2400 POWDER, FOR SUSPENSION ORAL at 17:36

## 2023-11-05 RX ADMIN — APIXABAN 5 MILLIGRAM(S): 2.5 TABLET, FILM COATED ORAL at 17:36

## 2023-11-05 RX ADMIN — SEVELAMER CARBONATE 800 MILLIGRAM(S): 2400 POWDER, FOR SUSPENSION ORAL at 09:49

## 2023-11-05 RX ADMIN — Medication 400 MILLIGRAM(S): at 15:10

## 2023-11-05 RX ADMIN — SODIUM ZIRCONIUM CYCLOSILICATE 10 GRAM(S): 10 POWDER, FOR SUSPENSION ORAL at 15:07

## 2023-11-05 RX ADMIN — Medication 400 MILLIGRAM(S): at 09:49

## 2023-11-05 RX ADMIN — APIXABAN 5 MILLIGRAM(S): 2.5 TABLET, FILM COATED ORAL at 06:58

## 2023-11-05 RX ADMIN — PIPERACILLIN AND TAZOBACTAM 25 GRAM(S): 4; .5 INJECTION, POWDER, LYOPHILIZED, FOR SOLUTION INTRAVENOUS at 09:53

## 2023-11-05 RX ADMIN — Medication 25 MILLIGRAM(S): at 17:36

## 2023-11-05 RX ADMIN — Medication 25 MILLIGRAM(S): at 06:58

## 2023-11-05 RX ADMIN — CEFTRIAXONE 100 MILLIGRAM(S): 500 INJECTION, POWDER, FOR SOLUTION INTRAMUSCULAR; INTRAVENOUS at 15:12

## 2023-11-05 RX ADMIN — SEVELAMER CARBONATE 800 MILLIGRAM(S): 2400 POWDER, FOR SUSPENSION ORAL at 14:07

## 2023-11-05 RX ADMIN — ATORVASTATIN CALCIUM 40 MILLIGRAM(S): 80 TABLET, FILM COATED ORAL at 21:02

## 2023-11-05 NOTE — PROGRESS NOTE ADULT - ASSESSMENT
58-year-old man with PMH of ESRD on hemodialysis via groin catheter, Monday Wednesday Friday, BENNIE, hypertension, hyperlipidemia, presenting due to weakness for 2 days and fever of 101 Fahrenheit, fatigue, chills, vomiting episodes that started today.  Patient states that yesterday even though he felt unwell he went to his usual dialysis appointment which finished without any significant events.  Also endorsing mild shortness of breath.  Has had sepsis events in the past from his bilateral arm access sites, currently both access sites thrombosed which is why he needs dialysis catheter.  tried using peritoneal dialysis catheter but also eventually became septic and had to be removed. States that a stitch popped yesterday overlying the catheter site, +bleeding which is now resolved.    1 sepsis  - harvinderley sec to cath infection  - cw abx  - fu cultures  - vanco by level  - ID consult     2 ESRD  - HD as scheduled  - renal fu     3 Afib  - cw Eliquis  - rate controlled     4 HTN  - cw home meds

## 2023-11-05 NOTE — PATIENT PROFILE ADULT - NSPROPTRIGHTREPNAME_GEN_A__NUR
Post-Care Instructions: I reviewed with the patient in detail post-care instructions. Patient is to keep the treatment areas dry overnight, and then apply bacitracin twice daily until healed. Patient may apply hydrogen peroxide soaks to remove any crusting. Total Number Of Lesions Treated: 15 Detail Level: Simple Prep Text (Optional): All risks and complications reviewed with the patient and consent obtained verbally. Patient has a diagnosis of acne that will benefit from treatment. Patient tolerated the procedure well.  Area cleansed and sunscreen applied. Extraction Method: 20 gauge needle and comedo extractor Render Number Of Lesions Treated: yes Consent was obtained and risks were reviewed including but not limited to scarring, infection, bleeding, scabbing, incomplete removal, and allergy to anesthesia. Acne Type: Comedonal Lesions Render Post-Care Instructions In Note?: no Corrie Holley

## 2023-11-05 NOTE — CONSULT NOTE ADULT - ASSESSMENT
58-year-old man with PMH of ESRD on hemodialysis via groin catheter, Monday Wednesday Friday, BENNIE, hypertension, hyperlipidemia, presenting due to weakness for 2 days and fever of 101 Fahrenheit, fatigue, chills, vomiting episodes that started today.  Patient states that yesterday even though he felt unwell he went to his usual dialysis appointment which finished without any significant events.  Also endorsing mild shortness of breath.  Has had sepsis events in the past from his bilateral arm access sites, currently both access sites thrombosed which is why he needs dialysis catheter.  tried using peritoneal dialysis catheter but also eventually became septic and had to be removed. States that a stitch popped yesterday overlying the catheter site, +bleeding which is now resolved. (04 Nov 2023 18:18)     Tmax 103.3 in ER  WBC 8.6, Labs consistent with ESRD  BCx 11/4; 2/2 Klebsiella Pneumoniae group; No ESBL 2/2 GNR    Received a dose of Vancomycin and Zosyn; Zosyn continued    Patient to be seen today. 58-year-old man with PMH of ESRD on hemodialysis via groin catheter, Monday Wednesday Friday, BENNIE, hypertension, hyperlipidemia, presenting due to weakness for 2 days and fever of 101 Fahrenheit, fatigue, chills, vomiting episodes that started today.  Patient states that yesterday even though he felt unwell he went to his usual dialysis appointment which finished without any significant events.  Also endorsing mild shortness of breath.  Has had sepsis events in the past from his bilateral arm access sites, currently both access sites thrombosed which is why he needs dialysis catheter.  tried using peritoneal dialysis catheter but also eventually became septic and had to be removed. States that a stitch popped yesterday overlying the catheter site, +bleeding which is now resolved. (04 Nov 2023 18:18)     Tmax 103.3 in ER  WBC 8.6, Labs consistent with ESRD  BCx 11/4; 2/2 Klebsiella Pneumoniae group; No ESBL 2/2 GNR    Received a dose of Vancomycin and Zosyn; Zosyn continued    # Klebsiella bacteremia associated with Rt Groin CVC  - patient accidently pulled out catheter today  - Repeat blood cultures  - Would switch Zosyn to cefepime 1 gm and then 500 mg Q 24 hour    Incomplete note        All recommendations are tentative pending Attending Attestation.    Leno Danielle MD, PGY-4  ID Fellow  [employer] Teams Preferred  After 5pm/weekends call [phone]     58-year-old man with PMH of ESRD on hemodialysis via groin catheter, Monday Wednesday Friday, EBNNIE, hypertension, hyperlipidemia, presenting due to weakness for 2 days and fever of 101 Fahrenheit, fatigue, chills, vomiting episodes that started today.  Patient states that yesterday even though he felt unwell he went to his usual dialysis appointment which finished without any significant events.  Also endorsing mild shortness of breath.  Has had sepsis events in the past from his bilateral arm access sites, currently both access sites thrombosed which is why he needs dialysis catheter.  tried using peritoneal dialysis catheter but also eventually became septic and had to be removed. States that a stitch popped yesterday overlying the catheter site, +bleeding which is now resolved. (04 Nov 2023 18:18)     Tmax 103.3 in ER  WBC 8.6, Labs consistent with ESRD  BCx 11/4; 2/2 Klebsiella Pneumoniae group; No ESBL 2/2 GNR    Received a dose of Vancomycin and Zosyn; Zosyn continued    # Klebsiella bacteremia associated with Rt Groin CVC  - He has LUE AVG in situ; thrombosed non functioning  - patient accidently pulled out  CVC catheter today  - Repeat blood cultures  - Please switch Zosyn to ceftriaxone 2 gms q 24 hours    Discussed with attending and primary service    Leno Danielle MD, PGY-4  ID Fellow  [employer] Teams Preferred  After 5pm/weekends call [phone]

## 2023-11-05 NOTE — PROGRESS NOTE ADULT - ASSESSMENT
58-year-old man with PMH of ESRD on hemodialysis via groin catheter, Monday Wednesday Friday, BENNIE, hypertension, hyperlipidemia, presenting due to weakness for 2 days and fever of 101 Fahrenheit, fatigue, chills, vomiting episodes that started today.  Patient states that yesterday even though he felt unwell he went to his usual dialysis appointment which finished without any significant events.  Nephrology consulted for ESRD on HD.    A/P:  ESRD on HD: [facility]  Dialysis center: [facility]  Nephrologist: Dr. Neftaly Holden  Access: CVC R groin; now dislodged.  Last dialyzed 11/3 - completed HD w/o issue per pt.  Consent signed, witnessed, and placed in pt's chart.  Pt will need shiley placement by IR tomorrow before HD.  IR following.  Renal diet.  Monitor BMP and UO.    HTN:  BP controlled.  Resume home meds.  UF w/ HD.  Monitor BP.    Hyperkalemia:  Likely in setting of ESRD.  Lokelma 10gm administered and pending redosing at 1600pm.  Repeat K at 1600pm and call Dr. Phoenix with results.  If K remains > 5.5, pt will need HD today.  Monitor K closely.  Low K diet.    Anemia:  Hgb at goal.  Monitor Hgb.  Epogen 2000units TIW w/ HD.  Transfuse for Hgb <8.    CKD: MBD:   - acceptable.  Low PO4 diet.  C/W sevelamer w/ meals.  Monitor Ca and PO4 daily.    Hypercalcemia:  Improving.  Possibly in setting of dehydration vs. calcitriol.  PTH acceptable for ESRD; Vit. D 44.2.  D/C'd calcitriol - no need for vit. d analog at this time.  Monitor Ca.    Fever:  Blood cultures with gram neg. rods.  Possibly CVC associated infection.  Received zosyn and vancomycin.

## 2023-11-05 NOTE — PATIENT PROFILE ADULT - HAVE YOU RECENTLY LOST WEIGHT WITHOUT TRYING?
Pt. BIBA transfer from [facility] for nephrostomy tube placement.  Pt. due to have surgery tomorrow but went to [facility] ED complaining of left flank pain x 10 hours.  Pt. denies pain on arrival to [facility] ED.
No (0)

## 2023-11-05 NOTE — PHYSICAL THERAPY INITIAL EVALUATION ADULT - ACTIVE RANGE OF MOTION EXAMINATION, REHAB EVAL
Two patient indentifiers verified. Pt was given test results. Pt denies any further question at this time and verbalized understanding. no Active ROM deficits were identified

## 2023-11-05 NOTE — CONSULT NOTE ADULT - SUBJECTIVE AND OBJECTIVE BOX
Patient is a 58y old  Male who presents with a chief complaint of fever (2023 20:35)    HPI:   58-year-old man with PMH of ESRD on hemodialysis via groin catheter, , BENNIE, hypertension, hyperlipidemia, presenting due to weakness for 2 days and fever of 101 Fahrenheit, fatigue, chills, vomiting episodes that started today.  Patient states that yesterday even though he felt unwell he went to his usual dialysis appointment which finished without any significant events.  Also endorsing mild shortness of breath.  Has had sepsis events in the past from his bilateral arm access sites, currently both access sites thrombosed which is why he needs dialysis catheter.  tried using peritoneal dialysis catheter but also eventually became septic and had to be removed. States that a stitch popped yesterday overlying the catheter site, +bleeding which is now resolved. (2023 18:18)     Tmax 103.3 in ER  WBC 8.6, Labs consistent with ESRD  BCx 11/; / Klebsiella Pneumoniae group; No ESBL    Received a dose of Vancomycin and Zosyn; Zosyn continued    prior hospital charts reviewed [ x ]  primary team notes reviewed [ x ]  other consultant notes reviewed [ x ]    PAST MEDICAL & SURGICAL HISTORY:  Renal disease  ESRD      Hypertension      Gout      Diabetes mellitus      HLD (hyperlipidemia)      Obesity      BENNIE on CPAP      Heart rate fast      H/O shoulder surgery      Injury of ankle and foot  surgically repaired, 10 years ago      H/O hernia repair  30 years ago      Peritoneal dialysis catheter in situ  Was inserted, and removed      Arteriovenous graft removed  Now there is a wound suction in left arm        Allergies  IV Contrast (Anaphylaxis)    ANTIMICROBIALS (past 90 days)  MEDICATIONS  (STANDING):  piperacillin/tazobactam IVPB..   25 mL/Hr IV Intermittent (23 @ 22:33)    piperacillin/tazobactam IVPB...   200 mL/Hr IV Intermittent (23 @ 13:30)    vancomycin  IVPB.   250 mL/Hr IV Intermittent (23 @ 14:31)        piperacillin/tazobactam IVPB.. 3.375 every 12 hours    MEDICATIONS  (STANDING):  apixaban 5 two times a day  atorvastatin 40 at bedtime  metoprolol tartrate 25 two times a day    SOCIAL HISTORY:       FAMILY HISTORY:  Family history of hypertension  , in  mother      REVIEW OF SYSTEMS  [  ] ROS unobtainable because:    [  ] All other systems negative except as noted below:	    Constitutional:  [ ] fever [ ] chills  [ ] weight loss  [ ] weakness  Skin:  [ ] rash [ ] phlebitis	  Eyes: [ ] icterus [ ] pain  [ ] discharge	  ENMT: [ ] sore throat  [ ] thrush [ ] ulcers [ ] exudates  Respiratory: [ ] dyspnea [ ] hemoptysis [ ] cough [ ] sputum	  Cardiovascular:  [ ] chest pain [ ] palpitations [ ] edema	  Gastrointestinal:  [ ] nausea [ ] vomiting [ ] diarrhea [ ] constipation [ ] pain	  Genitourinary:  [ ] dysuria [ ] frequency [ ] hematuria [ ] discharge [ ] flank pain  [ ] incontinence  Musculoskeletal:  [ ] myalgias [ ] arthralgias [ ] arthritis  [ ] back pain  Neurological:  [ ] headache [ ] seizures  [ ] confusion/altered mental status  Psychiatric:  [ ] anxiety [ ] depression	  Hematology/Lymphatics:  [ ] lymphadenopathy  Endocrine:  [ ] adrenal [ ] thyroid  Allergic/Immunologic:	 [ ] transplant [ ] seasonal    Vital Signs Last 24 Hrs  T(F): 103 (23 @ 08:34), Max: 103.3 (23 @ 13:00)  Vital Signs Last 24 Hrs  HR: 115 (23 @ 08:34) (97 - 121)  BP: 126/75 (23 @ 08:34) (107/71 - 135/70)  RR: 20 (23 @ 08:34)  SpO2: 99% (23 @ 08:34) (97% - 99%)  Wt(kg): --    PHYSICAL EXAM:                              10.5   8.67  )-----------( 171      ( 2023 07:12 )             35.6       135  |  99  |  35<H>  ----------------------------<  76  5.7<H>   |  19<L>  |  10.36<H>    Ca    10.3      2023 07:12    TPro  7.7  /  Alb  3.6  /  TBili  1.2  /  DBili  x   /  AST  20  /  ALT  13  /  AlkPhos  50  11    Urinalysis Basic - ( 2023 07:12 )    Color: x / Appearance: x / SG: x / pH: x  Gluc: 76 mg/dL / Ketone: x  / Bili: x / Urobili: x   Blood: x / Protein: x / Nitrite: x   Leuk Esterase: x / RBC: x / WBC x   Sq Epi: x / Non Sq Epi: x / Bacteria: x    MICROBIOLOGY:  Culture - Blood (collected 2023 12:45)  Source: .Blood Blood-Peripheral  Gram Stain (2023 04:42):    Growth in anaerobic bottle: Gram Negative Rods    Growth in aerobic bottle: Gram Negative Rods  Preliminary Report (2023 04:42):    Growth in anaerobic bottle: Gram Negative Rods    Growth in aerobic bottle: Gram Negative Rods    Direct identification is available within approximately 3-5    hours either by Blood Panel Multiplexed PCR or Direct    MALDI-TOF. Details: [email or web address]  Organism: Blood Culture PCR (2023 06:28)  Organism: Blood Culture PCR (2023 06:28)      Method Type: PCR      -  K. pneumoniae group: Detec (K. pneumoniae, K. quasipneumoniae, K. variicola)    Culture - Blood (collected 2023 12:30)  Source: .Blood Blood-Peripheral  Gram Stain (2023 06:42):    Growth in aerobic bottle: Gram Negative Rods    Growth in anaerobic bottle: Gram Negative Rods  Preliminary Report (2023 06:42):    Growth in aerobic bottle: Gram Negative Rods    Growth in anaerobic bottle: Gram Negative Rods                  RADIOLOGY:  imaging below personally reviewed and agree with findings    < from: Xray Chest 2 Views PA/Lat (23 @ 14:21) >    ACC: [id number] EXAM:  XR CHEST PA LAT 2V   ORDERED BY: DANITA BUSH     PROCEDURE DATE:  2023          INTERPRETATION:  CLINICAL INDICATION: Sepsis. Question pneumonia.    EXAM: Chest x-ray 2 views    COMPARISON: Chest x-ray 10/12/2022.    FINDINGS:  Left subclavian vascular cannula extending into the SVC.  Right and left axillary vascular stents.  The lungs are clear.  No large pleural effusion. No pneumothorax.  The heart is normal in size  The visualized osseous structures demonstrate no acute pathology. There   is old lateral right clavicular fracture-deformity    IMPRESSION:  No focal consolidations.    < end of copied text >   Patient is a 58y old  Male who presents with a chief complaint of fever (2023 20:35)    HPI:   58-year-old man with PMH of ESRD on hemodialysis via groin catheter ( Placed september) , BENNIE, hypertension, hyperlipidemia, presenting due to weakness for 2 days and fever of 101 Fahrenheit, fatigue, chills, vomiting episodes that started today.  Patient states that yesterday even though he felt unwell he went to his usual dialysis appointment which finished without any significant events.  Also endorsing mild shortness of breath.  Has had sepsis events in the past from his bilateral arm access sites, currently both access sites thrombosed which is why he needs dialysis catheter.  tried using peritoneal dialysis catheter but also eventually became septic and had to be removed. States that a stitch popped yesterday overlying the catheter site, +bleeding which is now resolved. (2023 18:18)     Tmax 103.3 in ER  WBC 8.6, Labs consistent with ESRD  BCx 11/; 4/ Klebsiella Pneumoniae group; No ESBL    Received a dose of Vancomycin and Zosyn; Zosyn continued    prior hospital charts reviewed [ x ]  primary team notes reviewed [ x ]  other consultant notes reviewed [ x ]    PAST MEDICAL & SURGICAL HISTORY:  Renal disease  ESRD      Hypertension      Gout      Diabetes mellitus      HLD (hyperlipidemia)      Obesity      BENNIE on CPAP      Heart rate fast      H/O shoulder surgery      Injury of ankle and foot  surgically repaired, 10 years ago      H/O hernia repair  30 years ago      Peritoneal dialysis catheter in situ  Was inserted, and removed      Arteriovenous graft removed  Now there is a wound suction in left arm        Allergies  IV Contrast (Anaphylaxis)    ANTIMICROBIALS (past 90 days)  MEDICATIONS  (STANDING):  piperacillin/tazobactam IVPB..   25 mL/Hr IV Intermittent (23 @ 22:33)    piperacillin/tazobactam IVPB...   200 mL/Hr IV Intermittent (23 @ 13:30)    vancomycin  IVPB.   250 mL/Hr IV Intermittent (23 @ 14:31)        piperacillin/tazobactam IVPB.. 3.375 every 12 hours    MEDICATIONS  (STANDING):  apixaban 5 two times a day  atorvastatin 40 at bedtime  metoprolol tartrate 25 two times a day    SOCIAL HISTORY:  Lives with family, no drug, alcohol use    FAMILY HISTORY:  Family history of hypertension  , in  mother      REVIEW OF SYSTEMS  [  ] ROS unobtainable because:    [  x] All other systems negative except as noted below:	    Constitutional:  [ ] fever [x ] chills  [ ] weight loss  [ x] weakness  Skin:  [ ] rash [ ] phlebitis	  Eyes: [ ] icterus [ ] pain  [ ] discharge	  ENMT: [ ] sore throat  [ ] thrush [ ] ulcers [ ] exudates  Respiratory: [ ] dyspnea [ ] hemoptysis [ ] cough [ ] sputum	  Cardiovascular:  [ ] chest pain [ ] palpitations [ ] edema	  Gastrointestinal:  [ ] nausea [ ] vomiting [ ] diarrhea [ ] constipation [ ] pain	  Genitourinary:  [ ] dysuria [ ] frequency [ ] hematuria [ ] discharge [ ] flank pain  [ ] incontinence  Musculoskeletal:  [ ] myalgias [ ] arthralgias [ ] arthritis  [ ] back pain  Neurological:  [ ] headache [ ] seizures  [ ] confusion/altered mental status  Psychiatric:  [ ] anxiety [ ] depression	  Hematology/Lymphatics:  [ ] lymphadenopathy  Endocrine:  [ ] adrenal [ ] thyroid  Allergic/Immunologic:	 [ ] transplant [ ] seasonal    Vital Signs Last 24 Hrs  T(F): 103 (23 @ 08:34), Max: 103.3 (23 @ 13:00)  Vital Signs Last 24 Hrs  HR: 115 (23 @ 08:34) (97 - 121)  BP: 126/75 (23 @ 08:34) (107/71 - 135/70)  RR: 20 (23 @ 08:34)  SpO2: 99% (23 @ 08:34) (97% - 99%)  Wt(kg): --    PHYSICAL EXAM:    General: Patient in NAD  HEENT: NCAT, EOMI, PERRL, no oral lesions  CV: S1+S2, no m/r/g appreciated   Lungs: No respiratory distress, CTAB  Abd: Soft, nontender, no guarding, no rebound tenderness, + bowel sounds   : No suprapubic tenderness, Rt groin catheter site no gross infection, catheter had pulled out of place accidently by patient.  Neuro: Alert and oriented to time, place and person. No focal deficits noted.   Ext: No cyanosis, no edema  Skin: No rash, no phlebitis                              10.5   8.67  )-----------( 171      ( 2023 07:12 )             35.6   11-    135  |  99  |  35<H>  ----------------------------<  76  5.7<H>   |  19<L>  |  10.36<H>    Ca    10.3      2023 07:12    TPro  7.7  /  Alb  3.6  /  TBili  1.2  /  DBili  x   /  AST  20  /  ALT  13  /  AlkPhos  50  11-05    Urinalysis Basic - ( 2023 07:12 )    Color: x / Appearance: x / SG: x / pH: x  Gluc: 76 mg/dL / Ketone: x  / Bili: x / Urobili: x   Blood: x / Protein: x / Nitrite: x   Leuk Esterase: x / RBC: x / WBC x   Sq Epi: x / Non Sq Epi: x / Bacteria: x    MICROBIOLOGY:  Culture - Blood (collected 2023 12:45)  Source: .Blood Blood-Peripheral  Gram Stain (2023 04:42):    Growth in anaerobic bottle: Gram Negative Rods    Growth in aerobic bottle: Gram Negative Rods  Preliminary Report (2023 04:42):    Growth in anaerobic bottle: Gram Negative Rods    Growth in aerobic bottle: Gram Negative Rods    Direct identification is available within approximately 3-5    hours either by Blood Panel Multiplexed PCR or Direct    MALDI-TOF. Details: [email or web address]  Organism: Blood Culture PCR (2023 06:28)  Organism: Blood Culture PCR (2023 06:28)      Method Type: PCR      -  K. pneumoniae group: Detec (K. pneumoniae, K. quasipneumoniae, K. variicola)    Culture - Blood (collected 2023 12:30)  Source: .Blood Blood-Peripheral  Gram Stain (2023 06:42):    Growth in aerobic bottle: Gram Negative Rods    Growth in anaerobic bottle: Gram Negative Rods  Preliminary Report (2023 06:42):    Growth in aerobic bottle: Gram Negative Rods    Growth in anaerobic bottle: Gram Negative Rods                  RADIOLOGY:  imaging below personally reviewed and agree with findings    < from: Xray Chest 2 Views PA/Lat (23 @ 14:21) >    ACC: [id number] EXAM:  XR CHEST PA LAT 2V   ORDERED BY: DANITA BUSH     PROCEDURE DATE:  2023          INTERPRETATION:  CLINICAL INDICATION: Sepsis. Question pneumonia.    EXAM: Chest x-ray 2 views    COMPARISON: Chest x-ray 10/12/2022.    FINDINGS:  Left subclavian vascular cannula extending into the SVC.  Right and left axillary vascular stents.  The lungs are clear.  No large pleural effusion. No pneumothorax.  The heart is normal in size  The visualized osseous structures demonstrate no acute pathology. There   is old lateral right clavicular fracture-deformity    IMPRESSION:  No focal consolidations.    < end of copied text >   Patient is a 58y old  Male who presents with a chief complaint of fever (2023 20:35)    HPI:   58-year-old man with PMH of ESRD on hemodialysis via groin catheter ( Placed september) , BENNIE, hypertension, hyperlipidemia, presenting due to weakness for 2 days and fever of 101 Fahrenheit, fatigue, chills, vomiting episodes that started today.  Patient states that yesterday even though he felt unwell he went to his usual dialysis appointment which finished without any significant events.  Also endorsing mild shortness of breath.  Has had sepsis events in the past from his bilateral arm access sites, currently both access sites thrombosed which is why he needs dialysis catheter.  tried using peritoneal dialysis catheter but also eventually became septic and had to be removed. States that a stitch popped yesterday overlying the catheter site, +bleeding which is now resolved. (2023 18:18)     Tmax 103.3 in ER  WBC 8.6, Labs consistent with ESRD  BCx 11/; 4/ Klebsiella Pneumoniae group; No ESBL    Received a dose of Vancomycin and Zosyn; Zosyn continued    prior hospital charts reviewed [ x ]  primary team notes reviewed [ x ]  other consultant notes reviewed [ x ]    PAST MEDICAL & SURGICAL HISTORY:  Renal disease  ESRD      Hypertension      Gout      Diabetes mellitus      HLD (hyperlipidemia)      Obesity      BENNIE on CPAP      Heart rate fast      H/O shoulder surgery      Injury of ankle and foot  surgically repaired, 10 years ago      H/O hernia repair  30 years ago      Peritoneal dialysis catheter in situ  Was inserted, and removed      Arteriovenous graft removed  Now there is a wound suction in left arm        Allergies  IV Contrast (Anaphylaxis)    ANTIMICROBIALS (past 90 days)  MEDICATIONS  (STANDING):  piperacillin/tazobactam IVPB..   25 mL/Hr IV Intermittent (23 @ 22:33)    piperacillin/tazobactam IVPB...   200 mL/Hr IV Intermittent (23 @ 13:30)    vancomycin  IVPB.   250 mL/Hr IV Intermittent (23 @ 14:31)        piperacillin/tazobactam IVPB.. 3.375 every 12 hours    MEDICATIONS  (STANDING):  apixaban 5 two times a day  atorvastatin 40 at bedtime  metoprolol tartrate 25 two times a day    SOCIAL HISTORY:  Lives with family, no drug, alcohol use    FAMILY HISTORY:  Family history of hypertension  , in  mother      REVIEW OF SYSTEMS  [  ] ROS unobtainable because:    [  x] All other systems negative except as noted below:	    Constitutional:  [ ] fever [x ] chills  [ ] weight loss  [ x] weakness  Skin:  [ ] rash [ ] phlebitis	  Eyes: [ ] icterus [ ] pain  [ ] discharge	  ENMT: [ ] sore throat  [ ] thrush [ ] ulcers [ ] exudates  Respiratory: [ ] dyspnea [ ] hemoptysis [ ] cough [ ] sputum	  Cardiovascular:  [ ] chest pain [ ] palpitations [ ] edema	  Gastrointestinal:  [ ] nausea [ ] vomiting [ ] diarrhea [ ] constipation [ ] pain	  Genitourinary:  [ ] dysuria [ ] frequency [ ] hematuria [ ] discharge [ ] flank pain  [ ] incontinence  Musculoskeletal:  [ ] myalgias [ ] arthralgias [ ] arthritis  [ ] back pain  Neurological:  [ ] headache [ ] seizures  [ ] confusion/altered mental status  Psychiatric:  [ ] anxiety [ ] depression	  Hematology/Lymphatics:  [ ] lymphadenopathy  Endocrine:  [ ] adrenal [ ] thyroid  Allergic/Immunologic:	 [ ] transplant [ ] seasonal    Vital Signs Last 24 Hrs  T(F): 103 (23 @ 08:34), Max: 103.3 (23 @ 13:00)  Vital Signs Last 24 Hrs  HR: 115 (23 @ 08:34) (97 - 121)  BP: 126/75 (23 @ 08:34) (107/71 - 135/70)  RR: 20 (23 @ 08:34)  SpO2: 99% (23 @ 08:34) (97% - 99%)  Wt(kg): --    PHYSICAL EXAM:    General: Patient in NAD  HEENT: NCAT, EOMI, PERRL, no oral lesions  CV: S1+S2, no m/r/g appreciated   Lungs: No respiratory distress, CTAB  Abd: Soft, nontender, no guarding, no rebound tenderness, + bowel sounds   : No suprapubic tenderness  Neuro: Alert and oriented to time, place and person. No focal deficits noted.   Ext: No cyanosis, no edema, LUE AVG thrombosed ( graft in place), no signs of infection, Rt groin catheter site no gross infection, catheter had pulled out of place accidently by patient.    Skin: No rash, no phlebitis                              10.5   8.67  )-----------( 171      ( 2023 07:12 )             35.6   11-    135  |  99  |  35<H>  ----------------------------<  76  5.7<H>   |  19<L>  |  10.36<H>    Ca    10.3      2023 07:12    TPro  7.7  /  Alb  3.6  /  TBili  1.2  /  DBili  x   /  AST  20  /  ALT  13  /  AlkPhos  50  11-05    Urinalysis Basic - ( 2023 07:12 )    Color: x / Appearance: x / SG: x / pH: x  Gluc: 76 mg/dL / Ketone: x  / Bili: x / Urobili: x   Blood: x / Protein: x / Nitrite: x   Leuk Esterase: x / RBC: x / WBC x   Sq Epi: x / Non Sq Epi: x / Bacteria: x    MICROBIOLOGY:  Culture - Blood (collected 2023 12:45)  Source: .Blood Blood-Peripheral  Gram Stain (2023 04:42):    Growth in anaerobic bottle: Gram Negative Rods    Growth in aerobic bottle: Gram Negative Rods  Preliminary Report (2023 04:42):    Growth in anaerobic bottle: Gram Negative Rods    Growth in aerobic bottle: Gram Negative Rods    Direct identification is available within approximately 3-5    hours either by Blood Panel Multiplexed PCR or Direct    MALDI-TOF. Details: [email or web address]  Organism: Blood Culture PCR (2023 06:28)  Organism: Blood Culture PCR (2023 06:28)      Method Type: PCR      -  K. pneumoniae group: Detec (K. pneumoniae, K. quasipneumoniae, K. variicola)    Culture - Blood (collected 2023 12:30)  Source: .Blood Blood-Peripheral  Gram Stain (2023 06:42):    Growth in aerobic bottle: Gram Negative Rods    Growth in anaerobic bottle: Gram Negative Rods  Preliminary Report (2023 06:42):    Growth in aerobic bottle: Gram Negative Rods    Growth in anaerobic bottle: Gram Negative Rods                  RADIOLOGY:  imaging below personally reviewed and agree with findings    < from: Xray Chest 2 Views PA/Lat (23 @ 14:21) >    ACC: [id number] EXAM:  XR CHEST PA LAT 2V   ORDERED BY: DANITA BUSH     PROCEDURE DATE:  2023          INTERPRETATION:  CLINICAL INDICATION: Sepsis. Question pneumonia.    EXAM: Chest x-ray 2 views    COMPARISON: Chest x-ray 10/12/2022.    FINDINGS:  Left subclavian vascular cannula extending into the SVC.  Right and left axillary vascular stents.  The lungs are clear.  No large pleural effusion. No pneumothorax.  The heart is normal in size  The visualized osseous structures demonstrate no acute pathology. There   is old lateral right clavicular fracture-deformity    IMPRESSION:  No focal consolidations.    < end of copied text >   Patient is a 58y old  Male who presents with a chief complaint of fever (2023 20:35)    HPI:   58-year-old man with PMH of ESRD on hemodialysis via groin catheter ( Placed september) , BENNIE, hypertension, hyperlipidemia, presenting due to weakness for 2 days and fever of 101 Fahrenheit, fatigue, chills, vomiting episodes that started today.  Patient states that yesterday even though he felt unwell he went to his usual dialysis appointment which finished without any significant events.  Also endorsing mild shortness of breath.  Has had sepsis events in the past from his bilateral arm access sites, currently both access sites thrombosed which is why he needs dialysis catheter.  tried using peritoneal dialysis catheter but also eventually became septic and had to be removed. States that a stitch popped yesterday overlying the catheter site, +bleeding which is now resolved. (2023 18:18)     Tmax 103.3 in ER  WBC 8.6, Labs consistent with ESRD  BCx 11/; 4/ Klebsiella Pneumoniae group; No ESBL    Received a dose of Vancomycin and Zosyn; Zosyn continued    prior hospital charts reviewed [ x ]  primary team notes reviewed [ x ]  other consultant notes reviewed [ x ]    PAST MEDICAL & SURGICAL HISTORY:  Renal disease  ESRD      Hypertension      Gout      Diabetes mellitus      HLD (hyperlipidemia)      Obesity      BENNIE on CPAP      Heart rate fast      H/O shoulder surgery      Injury of ankle and foot  surgically repaired, 10 years ago      H/O hernia repair  30 years ago      Peritoneal dialysis catheter in situ  Was inserted, and removed      Arteriovenous graft removed  Now there is a wound suction in left arm        Allergies  IV Contrast (Anaphylaxis)    ANTIMICROBIALS (past 90 days)  MEDICATIONS  (STANDING):  piperacillin/tazobactam IVPB..   25 mL/Hr IV Intermittent (23 @ 22:33)    piperacillin/tazobactam IVPB...   200 mL/Hr IV Intermittent (23 @ 13:30)    vancomycin  IVPB.   250 mL/Hr IV Intermittent (23 @ 14:31)        piperacillin/tazobactam IVPB.. 3.375 every 12 hours    MEDICATIONS  (STANDING):  apixaban 5 two times a day  atorvastatin 40 at bedtime  metoprolol tartrate 25 two times a day      SOCIAL HISTORY:  Lives with family, no drug, alcohol use        FAMILY HISTORY:  Family history of hypertension  , in  mother      REVIEW OF SYSTEMS  [  ] ROS unobtainable because:    [  x] All other systems negative except as noted below:	      Constitutional:  [ ] fever [x ] chills  [ ] weight loss  [ x] weakness  Skin:  [ ] rash [ ] phlebitis	  Eyes: [ ] icterus [ ] pain  [ ] discharge	  ENMT: [ ] sore throat  [ ] thrush   Respiratory: [ ] dyspnea [ ] cough [ ] sputum	  Cardiovascular:  [ ] chest pain  Gastrointestinal:  [ ] nausea [ ] vomiting [ ] diarrhea [ ] constipation [ ] pain	  Genitourinary:  [ ] dysuria  Musculoskeletal:  [ ] myalgias [ ] back pain  Neurological:  [ ] headache [ ] seizures  [ ] confusion/altered mental status  Psychiatric:  [ ] anxiety [ ] depression	  Endocrine:  [ ] adrenal [ ] thyroid  Allergic/Immunologic:	 [ ] transplant [ ] seasonal      Vital Signs Last 24 Hrs  T(F): 103 (23 @ 08:34), Max: 103.3 (23 @ 13:00)  Vital Signs Last 24 Hrs  HR: 115 (23 @ 08:34) (97 - 121)  BP: 126/75 (23 @ 08:34) (107/71 - 135/70)  RR: 20 (23 @ 08:34)  SpO2: 99% (23 @ 08:34) (97% - 99%)  Wt(kg): --    PHYSICAL EXAM:    General: Patient in NAD  EYES: no discharge   ENT:  no oral lesions  Neck: Supple   CV: S1+S2, normal   Lungs: No respiratory distress,   Abd: Soft, nontender, no guarding,   : No hyde  Neuro: Alert and oriented to time, place and person. No focal deficits noted.   Ext: No edema,   Vascular: LUE AVG thrombosed ( graft in place), no signs of infection, Rt groin catheter site no gross infection, catheter had pulled out of place accidently by patient.  Skin: No rash,                               10.5   8.67  )-----------( 171      ( 2023 07:12 )             35.6       135  |  99  |  35<H>  ----------------------------<  76  5.7<H>   |  19<L>  |  10.36<H>    Ca    10.3      2023 07:12    TPro  7.7  /  Alb  3.6  /  TBili  1.2  /  DBili  x   /  AST  20  /  ALT  13  /  AlkPhos  50  11-05    Urinalysis Basic - ( 2023 07:12 )    Color: x / Appearance: x / SG: x / pH: x  Gluc: 76 mg/dL / Ketone: x  / Bili: x / Urobili: x   Blood: x / Protein: x / Nitrite: x   Leuk Esterase: x / RBC: x / WBC x   Sq Epi: x / Non Sq Epi: x / Bacteria: x    MICROBIOLOGY:  Culture - Blood (collected 2023 12:45)  Source: .Blood Blood-Peripheral  Gram Stain (2023 04:42):    Growth in anaerobic bottle: Gram Negative Rods    Growth in aerobic bottle: Gram Negative Rods  Preliminary Report (2023 04:42):    Growth in anaerobic bottle: Gram Negative Rods    Growth in aerobic bottle: Gram Negative Rods    Direct identification is available within approximately 3-5    hours either by Blood Panel Multiplexed PCR or Direct    MALDI-TOF. Details: [email or web address]  Organism: Blood Culture PCR (2023 06:28)  Organism: Blood Culture PCR (2023 06:28)      Method Type: PCR      -  K. pneumoniae group: Detec (K. pneumoniae, K. quasipneumoniae, K. variicola)    Culture - Blood (collected 2023 12:30)  Source: .Blood Blood-Peripheral  Gram Stain (2023 06:42):    Growth in aerobic bottle: Gram Negative Rods    Growth in anaerobic bottle: Gram Negative Rods  Preliminary Report (2023 06:42):    Growth in aerobic bottle: Gram Negative Rods    Growth in anaerobic bottle: Gram Negative Rods          RADIOLOGY:  imaging below personally reviewed and agree with findings    < from: Xray Chest 2 Views PA/Lat (23 @ 14:21) >    ACC: [id number] EXAM:  XR CHEST PA LAT 2V   ORDERED BY: DANITA BUSH     PROCEDURE DATE:  2023          INTERPRETATION:  CLINICAL INDICATION: Sepsis. Question pneumonia.    EXAM: Chest x-ray 2 views    COMPARISON: Chest x-ray 10/12/2022.    FINDINGS:  Left subclavian vascular cannula extending into the SVC.  Right and left axillary vascular stents.  The lungs are clear.  No large pleural effusion. No pneumothorax.  The heart is normal in size  The visualized osseous structures demonstrate no acute pathology. There   is old lateral right clavicular fracture-deformity    IMPRESSION:  No focal consolidations.

## 2023-11-05 NOTE — PATIENT PROFILE ADULT - DO YOU FEEL LIKE HURTING YOURSELF OR OTHERS?
HPI:  1/4/20, Time: 12:11 AM         Gracie Borja is a 23 y.o. male presenting to the ED for right leg lacerration at home, beginning pta ago. The complaint has been intermittent, mild in severity, and worsened by nothing. Is 23year-old male complaining of laceration to right lower leg. Which is about 3 and half centimeters and very superficial.  He states he cut it on glass from a broken candle stick. He denies any other physical complaints. Review of Systems:   Pertinent positives and negatives are stated within HPI, all other systems reviewed and are negative.          --------------------------------------------- PAST HISTORY ---------------------------------------------  Past Medical History:  has a past medical history of Autism and OCD (obsessive compulsive disorder). Past Surgical History:  has no past surgical history on file. Social History:      Family History: family history is not on file. The patients home medications have been reviewed. Allergies: Patient has no known allergies. -------------------------------------------------- RESULTS -------------------------------------------------  All laboratory and radiology results have been personally reviewed by myself   LABS:  No results found for this visit on 01/03/20. RADIOLOGY:  Interpreted by Radiologist.  XR TIBIA FIBULA RIGHT (2 VIEWS)    (Results Pending)       ------------------------- NURSING NOTES AND VITALS REVIEWED ---------------------------   The nursing notes within the ED encounter and vital signs as below have been reviewed.    /68   Pulse 112   Temp 97.5 °F (36.4 °C) (Oral)   Resp 16   SpO2 98%   Oxygen Saturation Interpretation: Normal      ---------------------------------------------------PHYSICAL EXAM--------------------------------------      Constitutional/General: Alert and oriented x3, well appearing, non toxic in NAD  Head: Normocephalic and atraumatic  Eyes: PERRL, EOMI  Mouth: Oropharynx clear, handling secretions, no trismus  Neck: Supple, full ROM,   Pulmonary: Lungs clear to auscultation bilaterally, no wheezes, rales, or rhonchi. Not in respiratory distress  Cardiovascular:  Regular rate and rhythm, no murmurs, gallops, or rubs. 2+ distal pulses  Abdomen: Soft, non tender, non distended,   Extremities: Moves all extremities x 4. Warm and well perfused, patient has superficial 3-1/2 cm laceration to right lower extremity no foreign bodies were found in the wound. The wound is not deep. Skin: warm and dry without rash  Neurologic: GCS 15,  Psych: Normal Affect      ------------------------------ ED COURSE/MEDICAL DECISION MAKING----------------------  Medications   Tetanus-Diphth-Acell Pertussis (BOOSTRIX) injection 0.5 mL (0.5 mLs Intramuscular Given 1/4/20 0006)         ED COURSE:       Medical Decision Making:    Patient is refusing suture repair. I told him it will be better cosmetically and will improve healing time. He is absolutely refusing he wants butterfly or Steri-Strips once the wound cleaned and discharge. We will give him a tetanus shot. He is to follow-up with his PCP for wound check in 2 to 3 days. Counseling: The emergency provider has spoken with the patient and discussed todays results, in addition to providing specific details for the plan of care and counseling regarding the diagnosis and prognosis. Questions are answered at this time and they are agreeable with the plan.      --------------------------------- IMPRESSION AND DISPOSITION ---------------------------------    IMPRESSION  1. Laceration of right lower extremity, initial encounter        DISPOSITION  Disposition: Discharge to home  Patient condition is good      NOTE: This report was transcribed using voice recognition software.  Every effort was made to ensure accuracy; however, inadvertent computerized transcription errors may be present       Kierra Ross DO  01/04/20 0012 no

## 2023-11-05 NOTE — PHYSICAL THERAPY INITIAL EVALUATION ADULT - ADDITIONAL COMMENTS
As per pt, pt lives in an apartment  w/ spouse and 6 steps to enter w/ UHR. PTA pt was independent w/ all mobility w/ cane and ADLs.

## 2023-11-05 NOTE — CONSULT NOTE ADULT - ASSESSMENT
Interventional Radiology    Evaluate for Procedure: 57 yo male with dislodged right groin tunneled HD catheter and sepsis/bacteremia. IR consulted for evaluation.     HPI: 57 yo male with dislodged right groin tunneled HD catheter and sepsis/bacteremia. IR consulted for evaluation.     Allergies: IV Contrast (Anaphylaxis)    Medications (Abx/Cardiac/Anticoagulation/Blood Products)    apixaban: 5 milliGRAM(s) Oral (11-05 @ 06:58)  metoprolol tartrate: 25 milliGRAM(s) Oral (11-05 @ 06:58)  piperacillin/tazobactam IVPB..: 25 mL/Hr IV Intermittent (11-05 @ 09:53)  piperacillin/tazobactam IVPB...: 200 mL/Hr IV Intermittent (11-04 @ 13:30)  vancomycin  IVPB.: 250 mL/Hr IV Intermittent (11-04 @ 14:31)    Data:  177.8  131.5  T(C): 39.4  HR: 115  BP: 126/75  RR: 20  SpO2: 99%    -WBC 8.67 / HgB 10.5 / Hct 35.6 / Plt 171  -Na 135 / Cl 99 / BUN 35 / Glucose 76  -K 5.7 / CO2 19 / Cr 10.36  -ALT 13 / Alk Phos 50 / T.Bili 1.2  -INR 1.53 / PTT 34.7      Assessment/Plan:   57 yo male with dislodged right groin tunneled HD catheter and sepsis/bacteremia. IR consulted for evaluation.   -- recommend placement of new groin non-tunneled dialysis catheter in the setting of hyperkalemia & sepsis/bacteremia so patient can obtain urgent dialysis   -- patient not a candidate for tunneled line placement at this time; but please obtain CT chest, abdomen, pelvis (non-contrast is OK) for pre-procedural planning and evaluation of patient's vasculature for future tunneled HD catheter placement by IR   -- discussed plan with primary team    d/w Dr. Lacy    --  Marc Conde DO/HECTOR, PGY-5  Vascular and Interventional Radiology   Available on Microsoft Teams    - Non-emergent consults: Place IR consult order in [place]  - Emergent issues (pager): [facility] [phone]; [facility] [phone]; [phone]  - Scheduling questions: [facility] [phone]; [facility] [phone]  - Clinic/outpatient booking: [facility] [phone]; [facility] [phone] Interventional Radiology    Evaluate for Procedure: 59 yo male with dislodged right groin tunneled HD catheter and sepsis/bacteremia. IR consulted for evaluation.     HPI: 59 yo male with dislodged right groin tunneled HD catheter and sepsis/bacteremia. IR consulted for evaluation.     Allergies: IV Contrast (Anaphylaxis)    Medications (Abx/Cardiac/Anticoagulation/Blood Products)    apixaban: 5 milliGRAM(s) Oral (11-05 @ 06:58)  metoprolol tartrate: 25 milliGRAM(s) Oral (11-05 @ 06:58)  piperacillin/tazobactam IVPB..: 25 mL/Hr IV Intermittent (11-05 @ 09:53)  piperacillin/tazobactam IVPB...: 200 mL/Hr IV Intermittent (11-04 @ 13:30)  vancomycin  IVPB.: 250 mL/Hr IV Intermittent (11-04 @ 14:31)    Data:  177.8  131.5  T(C): 39.4  HR: 115  BP: 126/75  RR: 20  SpO2: 99%    -WBC 8.67 / HgB 10.5 / Hct 35.6 / Plt 171  -Na 135 / Cl 99 / BUN 35 / Glucose 76  -K 5.7 / CO2 19 / Cr 10.36  -ALT 13 / Alk Phos 50 / T.Bili 1.2  -INR 1.53 / PTT 34.7      Assessment/Plan:   59 yo male with dislodged right groin tunneled HD catheter and sepsis/bacteremia. IR consulted for evaluation.   -- recommend placement of new groin non-tunneled dialysis catheter by vascular surgery in the setting of sepsis/bacteremia   -- patient not a candidate for tunneled line placement at this time; but please obtain CT chest, abdomen, pelvis (non-contrast is OK) to evaluate if a neck tunneled HD catheter can be placed  -- discussed plan with primary team    d/w Dr. Lacy    --  Marc Conde DO/HECTOR, PGY-5  Vascular and Interventional Radiology   Available on Microsoft Teams    - Non-emergent consults: Place IR consult order in [place]  - Emergent issues (pager): [facility] [phone]; [facility] [phone]; [phone]  - Scheduling questions: [facility] [phone]; [facility] [phone]  - Clinic/outpatient booking: [place] [address] [phone]; [facility] [phone] Interventional Radiology    Evaluate for Procedure: 57 yo male with dislodged right groin tunneled HD catheter and sepsis/bacteremia. IR consulted for evaluation.     HPI: 57 yo male with dislodged right groin tunneled HD catheter and sepsis/bacteremia. IR consulted for evaluation.     Allergies: IV Contrast (Anaphylaxis)    Medications (Abx/Cardiac/Anticoagulation/Blood Products)    apixaban: 5 milliGRAM(s) Oral (11-05 @ 06:58)  metoprolol tartrate: 25 milliGRAM(s) Oral (11-05 @ 06:58)  piperacillin/tazobactam IVPB..: 25 mL/Hr IV Intermittent (11-05 @ 09:53)  piperacillin/tazobactam IVPB...: 200 mL/Hr IV Intermittent (11-04 @ 13:30)  vancomycin  IVPB.: 250 mL/Hr IV Intermittent (11-04 @ 14:31)    Data:  177.8  131.5  T(C): 39.4  HR: 115  BP: 126/75  RR: 20  SpO2: 99%    -WBC 8.67 / HgB 10.5 / Hct 35.6 / Plt 171  -Na 135 / Cl 99 / BUN 35 / Glucose 76  -K 5.7 / CO2 19 / Cr 10.36  -ALT 13 / Alk Phos 50 / T.Bili 1.2  -INR 1.53 / PTT 34.7      Assessment/Plan:   57 yo male with dislodged right groin tunneled HD catheter and sepsis/bacteremia. IR consulted for evaluation.   -- recommend placement of new groin non-tunneled dialysis catheter by vascular surgery in the setting of sepsis/bacteremia.   -- patient not a candidate for tunneled line placement at this time; but please obtain CT chest, abdomen, pelvis (non-contrast is OK) to evaluate if a neck tunneled HD catheter can be placed  -- discussed plan with primary team.    d/w Dr. Lacy    --  Marc Conde DO/HECTOR, PGY-5  Vascular and Interventional Radiology   Available on [employer] Teams    - Non-emergent consults: Place IR consult order in [place]  - Emergent issues (pager): [facility] [phone]; [facility] [phone]; [phone]  - Scheduling questions: [facility] [phone]; [facility] [phone]  - Clinic/outpatient booking: [facility] [phone]; [facility] [phone]

## 2023-11-05 NOTE — PATIENT PROFILE ADULT - FALL HARM RISK - HARM RISK INTERVENTIONS

## 2023-11-05 NOTE — PHYSICAL THERAPY INITIAL EVALUATION ADULT - PREDICTED DURATION OF THERAPY (DAYS/WKS), PT EVAL
4 weeks Pt is currently functioning at baseline (independent) and will be D/C from PT program at this time. Pt declining ambulation aide to ensure OOB/mobility/ambulation for remainder of hospital stay.

## 2023-11-05 NOTE — PHYSICAL THERAPY INITIAL EVALUATION ADULT - PERTINENT HX OF CURRENT PROBLEM, REHAB EVAL
Pt is a 58-year-old man with PMH of ESRD on hemodialysis via groin catheter, Monday Wednesday Friday, BENNIE, hypertension, hyperlipidemia, presenting due to weakness for 2 days and fever of 101 Fahrenheit, fatigue, chills, vomiting episodes that started today.  Patient states that yesterday even though he felt unwell he went to his usual dialysis appointment which finished without any significant events.  Also endorsing mild shortness of breath.  Has had sepsis events in the past from his bilateral arm access sites, currently both access sites thrombosed which is why he needs dialysis catheter.  tried using peritoneal dialysis catheter but also eventually became septic and had to be removed. States that a stitch popped yesterday overlying the catheter site, +bleeding which is now resolved.

## 2023-11-06 DIAGNOSIS — E11.9 TYPE 2 DIABETES MELLITUS WITHOUT COMPLICATIONS: ICD-10-CM

## 2023-11-06 DIAGNOSIS — A41.4 SEPSIS DUE TO ANAEROBES: ICD-10-CM

## 2023-11-06 DIAGNOSIS — R78.81 BACTEREMIA: ICD-10-CM

## 2023-11-06 DIAGNOSIS — N18.6 END STAGE RENAL DISEASE: ICD-10-CM

## 2023-11-06 DIAGNOSIS — Z29.9 ENCOUNTER FOR PROPHYLACTIC MEASURES, UNSPECIFIED: ICD-10-CM

## 2023-11-06 DIAGNOSIS — I10 ESSENTIAL (PRIMARY) HYPERTENSION: ICD-10-CM

## 2023-11-06 LAB
-  AMIKACIN: SIGNIFICANT CHANGE UP
-  AMIKACIN: SIGNIFICANT CHANGE UP
-  AMPICILLIN/SULBACTAM: SIGNIFICANT CHANGE UP
-  AMPICILLIN/SULBACTAM: SIGNIFICANT CHANGE UP
-  AMPICILLIN: SIGNIFICANT CHANGE UP
-  AMPICILLIN: SIGNIFICANT CHANGE UP
-  AZTREONAM: SIGNIFICANT CHANGE UP
-  AZTREONAM: SIGNIFICANT CHANGE UP
-  CEFAZOLIN: SIGNIFICANT CHANGE UP
-  CEFAZOLIN: SIGNIFICANT CHANGE UP
-  CEFEPIME: SIGNIFICANT CHANGE UP
-  CEFEPIME: SIGNIFICANT CHANGE UP
-  CEFOXITIN: SIGNIFICANT CHANGE UP
-  CEFOXITIN: SIGNIFICANT CHANGE UP
-  CEFTRIAXONE: SIGNIFICANT CHANGE UP
-  CEFTRIAXONE: SIGNIFICANT CHANGE UP
-  CIPROFLOXACIN: SIGNIFICANT CHANGE UP
-  CIPROFLOXACIN: SIGNIFICANT CHANGE UP
-  ERTAPENEM: SIGNIFICANT CHANGE UP
-  ERTAPENEM: SIGNIFICANT CHANGE UP
-  GENTAMICIN: SIGNIFICANT CHANGE UP
-  GENTAMICIN: SIGNIFICANT CHANGE UP
-  IMIPENEM: SIGNIFICANT CHANGE UP
-  IMIPENEM: SIGNIFICANT CHANGE UP
-  LEVOFLOXACIN: SIGNIFICANT CHANGE UP
-  LEVOFLOXACIN: SIGNIFICANT CHANGE UP
-  MEROPENEM: SIGNIFICANT CHANGE UP
-  MEROPENEM: SIGNIFICANT CHANGE UP
-  PIPERACILLIN/TAZOBACTAM: SIGNIFICANT CHANGE UP
-  PIPERACILLIN/TAZOBACTAM: SIGNIFICANT CHANGE UP
-  TOBRAMYCIN: SIGNIFICANT CHANGE UP
-  TOBRAMYCIN: SIGNIFICANT CHANGE UP
-  TRIMETHOPRIM/SULFAMETHOXAZOLE: SIGNIFICANT CHANGE UP
-  TRIMETHOPRIM/SULFAMETHOXAZOLE: SIGNIFICANT CHANGE UP
A1C WITH ESTIMATED AVERAGE GLUCOSE RESULT: 5.2 % — SIGNIFICANT CHANGE UP (ref 4–5.6)
A1C WITH ESTIMATED AVERAGE GLUCOSE RESULT: 5.2 % — SIGNIFICANT CHANGE UP (ref 4–5.6)
ANION GAP SERPL CALC-SCNC: 18 MMOL/L — HIGH (ref 5–17)
ANION GAP SERPL CALC-SCNC: 18 MMOL/L — HIGH (ref 5–17)
ANION GAP SERPL CALC-SCNC: 21 MMOL/L — HIGH (ref 5–17)
ANION GAP SERPL CALC-SCNC: 21 MMOL/L — HIGH (ref 5–17)
BUN SERPL-MCNC: 47 MG/DL — HIGH (ref 7–23)
BUN SERPL-MCNC: 47 MG/DL — HIGH (ref 7–23)
BUN SERPL-MCNC: 53 MG/DL — HIGH (ref 7–23)
BUN SERPL-MCNC: 53 MG/DL — HIGH (ref 7–23)
CALCIUM SERPL-MCNC: 10.2 MG/DL — SIGNIFICANT CHANGE UP (ref 8.4–10.5)
CHLORIDE SERPL-SCNC: 96 MMOL/L — SIGNIFICANT CHANGE UP (ref 96–108)
CHLORIDE SERPL-SCNC: 96 MMOL/L — SIGNIFICANT CHANGE UP (ref 96–108)
CHLORIDE SERPL-SCNC: 97 MMOL/L — SIGNIFICANT CHANGE UP (ref 96–108)
CHLORIDE SERPL-SCNC: 97 MMOL/L — SIGNIFICANT CHANGE UP (ref 96–108)
CO2 SERPL-SCNC: 15 MMOL/L — LOW (ref 22–31)
CO2 SERPL-SCNC: 15 MMOL/L — LOW (ref 22–31)
CO2 SERPL-SCNC: 21 MMOL/L — LOW (ref 22–31)
CO2 SERPL-SCNC: 21 MMOL/L — LOW (ref 22–31)
CREAT SERPL-MCNC: 12.59 MG/DL — HIGH (ref 0.5–1.3)
CREAT SERPL-MCNC: 12.59 MG/DL — HIGH (ref 0.5–1.3)
CREAT SERPL-MCNC: 13.51 MG/DL — HIGH (ref 0.5–1.3)
CREAT SERPL-MCNC: 13.51 MG/DL — HIGH (ref 0.5–1.3)
CULTURE RESULTS: ABNORMAL
CULTURE RESULTS: SIGNIFICANT CHANGE UP
CULTURE RESULTS: SIGNIFICANT CHANGE UP
EGFR: 4 ML/MIN/1.73M2 — LOW
ESTIMATED AVERAGE GLUCOSE: 103 MG/DL — SIGNIFICANT CHANGE UP (ref 68–114)
ESTIMATED AVERAGE GLUCOSE: 103 MG/DL — SIGNIFICANT CHANGE UP (ref 68–114)
GLUCOSE BLDC GLUCOMTR-MCNC: 87 MG/DL — SIGNIFICANT CHANGE UP (ref 70–99)
GLUCOSE BLDC GLUCOMTR-MCNC: 89 MG/DL — SIGNIFICANT CHANGE UP (ref 70–99)
GLUCOSE BLDC GLUCOMTR-MCNC: 89 MG/DL — SIGNIFICANT CHANGE UP (ref 70–99)
GLUCOSE BLDC GLUCOMTR-MCNC: 94 MG/DL — SIGNIFICANT CHANGE UP (ref 70–99)
GLUCOSE BLDC GLUCOMTR-MCNC: 94 MG/DL — SIGNIFICANT CHANGE UP (ref 70–99)
GLUCOSE SERPL-MCNC: 75 MG/DL — SIGNIFICANT CHANGE UP (ref 70–99)
GLUCOSE SERPL-MCNC: 75 MG/DL — SIGNIFICANT CHANGE UP (ref 70–99)
GLUCOSE SERPL-MCNC: 92 MG/DL — SIGNIFICANT CHANGE UP (ref 70–99)
GLUCOSE SERPL-MCNC: 92 MG/DL — SIGNIFICANT CHANGE UP (ref 70–99)
HCT VFR BLD CALC: 37.9 % — LOW (ref 39–50)
HCT VFR BLD CALC: 37.9 % — LOW (ref 39–50)
HGB BLD-MCNC: 11 G/DL — LOW (ref 13–17)
HGB BLD-MCNC: 11 G/DL — LOW (ref 13–17)
MCHC RBC-ENTMCNC: 19.7 PG — LOW (ref 27–34)
MCHC RBC-ENTMCNC: 19.7 PG — LOW (ref 27–34)
MCHC RBC-ENTMCNC: 29 GM/DL — LOW (ref 32–36)
MCHC RBC-ENTMCNC: 29 GM/DL — LOW (ref 32–36)
MCV RBC AUTO: 67.8 FL — LOW (ref 80–100)
MCV RBC AUTO: 67.8 FL — LOW (ref 80–100)
METHOD TYPE: SIGNIFICANT CHANGE UP
METHOD TYPE: SIGNIFICANT CHANGE UP
NRBC # BLD: 0 /100 WBCS — SIGNIFICANT CHANGE UP (ref 0–0)
NRBC # BLD: 0 /100 WBCS — SIGNIFICANT CHANGE UP (ref 0–0)
ORGANISM # SPEC MICROSCOPIC CNT: ABNORMAL
PHOSPHATE SERPL-MCNC: 6.1 MG/DL — HIGH (ref 2.5–4.5)
PHOSPHATE SERPL-MCNC: 6.1 MG/DL — HIGH (ref 2.5–4.5)
PLATELET # BLD AUTO: 143 K/UL — LOW (ref 150–400)
PLATELET # BLD AUTO: 143 K/UL — LOW (ref 150–400)
POTASSIUM SERPL-MCNC: 4.8 MMOL/L — SIGNIFICANT CHANGE UP (ref 3.5–5.3)
POTASSIUM SERPL-MCNC: 4.8 MMOL/L — SIGNIFICANT CHANGE UP (ref 3.5–5.3)
POTASSIUM SERPL-MCNC: 5.7 MMOL/L — HIGH (ref 3.5–5.3)
POTASSIUM SERPL-MCNC: 5.7 MMOL/L — HIGH (ref 3.5–5.3)
POTASSIUM SERPL-SCNC: 4.8 MMOL/L — SIGNIFICANT CHANGE UP (ref 3.5–5.3)
POTASSIUM SERPL-SCNC: 4.8 MMOL/L — SIGNIFICANT CHANGE UP (ref 3.5–5.3)
POTASSIUM SERPL-SCNC: 5.7 MMOL/L — HIGH (ref 3.5–5.3)
POTASSIUM SERPL-SCNC: 5.7 MMOL/L — HIGH (ref 3.5–5.3)
RBC # BLD: 5.59 M/UL — SIGNIFICANT CHANGE UP (ref 4.2–5.8)
RBC # BLD: 5.59 M/UL — SIGNIFICANT CHANGE UP (ref 4.2–5.8)
RBC # FLD: 21.1 % — HIGH (ref 10.3–14.5)
RBC # FLD: 21.1 % — HIGH (ref 10.3–14.5)
SODIUM SERPL-SCNC: 132 MMOL/L — LOW (ref 135–145)
SODIUM SERPL-SCNC: 132 MMOL/L — LOW (ref 135–145)
SODIUM SERPL-SCNC: 136 MMOL/L — SIGNIFICANT CHANGE UP (ref 135–145)
SODIUM SERPL-SCNC: 136 MMOL/L — SIGNIFICANT CHANGE UP (ref 135–145)
SPECIMEN SOURCE: SIGNIFICANT CHANGE UP
WBC # BLD: 7.56 K/UL — SIGNIFICANT CHANGE UP (ref 3.8–10.5)
WBC # BLD: 7.56 K/UL — SIGNIFICANT CHANGE UP (ref 3.8–10.5)
WBC # FLD AUTO: 7.56 K/UL — SIGNIFICANT CHANGE UP (ref 3.8–10.5)
WBC # FLD AUTO: 7.56 K/UL — SIGNIFICANT CHANGE UP (ref 3.8–10.5)

## 2023-11-06 PROCEDURE — 77001 FLUOROGUIDE FOR VEIN DEVICE: CPT | Mod: 26

## 2023-11-06 PROCEDURE — 36556 INSERT NON-TUNNEL CV CATH: CPT

## 2023-11-06 PROCEDURE — 99233 SBSQ HOSP IP/OBS HIGH 50: CPT

## 2023-11-06 PROCEDURE — 76937 US GUIDE VASCULAR ACCESS: CPT | Mod: 26

## 2023-11-06 RX ORDER — SODIUM ZIRCONIUM CYCLOSILICATE 10 G/10G
10 POWDER, FOR SUSPENSION ORAL ONCE
Refills: 0 | Status: COMPLETED | OUTPATIENT
Start: 2023-11-06 | End: 2023-11-06

## 2023-11-06 RX ORDER — SODIUM CHLORIDE 9 MG/ML
10 INJECTION INTRAMUSCULAR; INTRAVENOUS; SUBCUTANEOUS
Refills: 0 | Status: DISCONTINUED | OUTPATIENT
Start: 2023-11-06 | End: 2023-11-22

## 2023-11-06 RX ORDER — CHLORHEXIDINE GLUCONATE 213 G/1000ML
1 SOLUTION TOPICAL
Refills: 0 | Status: DISCONTINUED | OUTPATIENT
Start: 2023-11-06 | End: 2023-11-22

## 2023-11-06 RX ORDER — ONDANSETRON 8 MG/1
4 TABLET, FILM COATED ORAL ONCE
Refills: 0 | Status: COMPLETED | OUTPATIENT
Start: 2023-11-06 | End: 2023-11-06

## 2023-11-06 RX ADMIN — Medication 650 MILLIGRAM(S): at 08:02

## 2023-11-06 RX ADMIN — ONDANSETRON 4 MILLIGRAM(S): 8 TABLET, FILM COATED ORAL at 19:01

## 2023-11-06 RX ADMIN — CEFTRIAXONE 100 MILLIGRAM(S): 500 INJECTION, POWDER, FOR SOLUTION INTRAMUSCULAR; INTRAVENOUS at 16:34

## 2023-11-06 RX ADMIN — APIXABAN 5 MILLIGRAM(S): 2.5 TABLET, FILM COATED ORAL at 06:18

## 2023-11-06 RX ADMIN — ERYTHROPOIETIN 2000 UNIT(S): 10000 INJECTION, SOLUTION INTRAVENOUS; SUBCUTANEOUS at 22:09

## 2023-11-06 RX ADMIN — Medication 650 MILLIGRAM(S): at 00:16

## 2023-11-06 RX ADMIN — APIXABAN 5 MILLIGRAM(S): 2.5 TABLET, FILM COATED ORAL at 18:33

## 2023-11-06 RX ADMIN — ATORVASTATIN CALCIUM 40 MILLIGRAM(S): 80 TABLET, FILM COATED ORAL at 23:45

## 2023-11-06 RX ADMIN — Medication 650 MILLIGRAM(S): at 01:00

## 2023-11-06 RX ADMIN — Medication 650 MILLIGRAM(S): at 23:45

## 2023-11-06 RX ADMIN — Medication 25 MILLIGRAM(S): at 18:33

## 2023-11-06 RX ADMIN — SEVELAMER CARBONATE 800 MILLIGRAM(S): 2400 POWDER, FOR SUSPENSION ORAL at 18:32

## 2023-11-06 RX ADMIN — SEVELAMER CARBONATE 800 MILLIGRAM(S): 2400 POWDER, FOR SUSPENSION ORAL at 08:24

## 2023-11-06 RX ADMIN — Medication 25 MILLIGRAM(S): at 06:18

## 2023-11-06 RX ADMIN — SODIUM ZIRCONIUM CYCLOSILICATE 10 GRAM(S): 10 POWDER, FOR SUSPENSION ORAL at 10:41

## 2023-11-06 RX ADMIN — SEVELAMER CARBONATE 800 MILLIGRAM(S): 2400 POWDER, FOR SUSPENSION ORAL at 12:30

## 2023-11-06 NOTE — PROGRESS NOTE ADULT - PROBLEM SELECTOR PLAN 1
Patient found to have gram negative rods on BCx X2 on 11/4 likely HD catheter associated  - BCx growing Klebsiella PNA  - continue ceftriaxone 2g q24  - repeat cultures pending  - ID following

## 2023-11-06 NOTE — PROCEDURE NOTE - ADDITIONAL PROCEDURE DETAILS
.      unsuccessful  right IJ access . unable to advance wire centrally, multiple collateral smaller venous branches seen.       RIGHT EJV PATENT,  SUCCESSFUL  RIGHT EXTERNAL JUGULAR VEIN HD CATHETER.    14fr, 20cm length  double lumen  tip in svc  ok to use            Sadaf Jalili, IR PA-C, available on TEAMS or IR callback [phone]

## 2023-11-06 NOTE — PROGRESS NOTE ADULT - ASSESSMENT
58-year-old man with PMH of ESRD on hemodialysis via groin catheter, Monday Wednesday Friday, BENNIE, hypertension, hyperlipidemia, presenting due to weakness for 2 days and fever of 101 Fahrenheit, fatigue, chills, vomiting episodes that started today.  Patient states that yesterday even though he felt unwell he went to his usual dialysis appointment which finished without any significant events.  Nephrology consulted for ESRD on HD.    A/P:  ESRD on HD: [facility]  Dialysis center: [facility]  Nephrologist: Dr. Neftaly Holden  Access: CVC R groin; now dislodged.  Last dialyzed 11/3 - completed HD w/o issue per pt.  Consent signed, witnessed, and placed in pt's chart.  Recommend IR consult --->Pt will need shiley placement by IR today  HD after Catheter placement  Renal diet.  Monitor BMP and UO.    HTN:  BP controlled.  UF w/ HD.  Monitor BP.    Hyperkalemia:  Likely in setting of ESRD.  Lokelma 10gm today  HD after catheter placement today  Monitor K closely.  Low K diet.    Anemia:  Hgb at goal.  Monitor Hgb.  Epogen 2000units TIW w/ HD.  Transfuse for Hgb <8.    CKD: MBD:   - acceptable.  Low PO4 diet.  C/W sevelamer w/ meals.  Monitor Ca and PO4 daily.    Hypercalcemia:  Improving.  Possibly in setting of dehydration vs. calcitriol.  PTH acceptable for ESRD; Vit. D 44.2.  D/C'd calcitriol - no need for vit. d analog at this time.  Monitor Ca.    Fever:  Blood cultures with gram neg. rods.  Possibly CVC associated infection.  Received zosyn and vancomycin.

## 2023-11-06 NOTE — PROGRESS NOTE ADULT - ASSESSMENT
58M w/ PMH of ESRD on hemodialysis via groin catheter, M/W/F, BENNIE, hypertension, hyperlipidemia, presenting due to weakness for 2 days and fever found to have sepsis likely 2/2 catheter infection, complicated by bacteremia

## 2023-11-06 NOTE — PRE PROCEDURE NOTE - PRE PROCEDURE EVALUATION
Interventional Radiology    HPI: 58y Male with dislodged right groin tunneled HD catheter and sepsis/bacteremia. Patient presenting to IR for R groin non tunneled HD catheter placement.    Allergies: IV Contrast (Anaphylaxis)    Medications (Abx/Cardiac/Anticoagulation/Blood Products)  apixaban: 5 milliGRAM(s) Oral (11-06 @ 06:18)  cefTRIAXone   IVPB: 100 mL/Hr IV Intermittent (11-05 @ 15:12)  metoprolol tartrate: 25 milliGRAM(s) Oral (11-06 @ 06:18)  piperacillin/tazobactam IVPB..: 25 mL/Hr IV Intermittent (11-05 @ 09:53)    Data:    T(C): 37.6  HR: 103  BP: 147/81  RR: 18  SpO2: 97%    Exam  General: No acute distress  Chest: Non labored breathing  Abdomen: Non-distended  Extremities: No swelling, warm    -WBC 7.56 / HgB 11.0 / Hct 37.9 / Plt 143  -Na 132 / Cl 96 / BUN 47 / Glucose 75  -K 5.7 / CO2 15 / Cr 12.59  -ALT -- / Alk Phos -- / T.Bili --  -INR1.53      Plan:  58y Male with dislodged right groin tunneled HD catheter and sepsis/bacteremia. Patient presenting to IR for R groin non tunneled HD catheter placement.    -Risks/Benefits/alternatives explained with the patient and/or healthcare proxy and witnessed informed consent obtained.

## 2023-11-07 DIAGNOSIS — R91.8 OTHER NONSPECIFIC ABNORMAL FINDING OF LUNG FIELD: ICD-10-CM

## 2023-11-07 LAB
ANION GAP SERPL CALC-SCNC: 20 MMOL/L — HIGH (ref 5–17)
ANION GAP SERPL CALC-SCNC: 20 MMOL/L — HIGH (ref 5–17)
BUN SERPL-MCNC: 34 MG/DL — HIGH (ref 7–23)
BUN SERPL-MCNC: 34 MG/DL — HIGH (ref 7–23)
CALCIUM SERPL-MCNC: 10.1 MG/DL — SIGNIFICANT CHANGE UP (ref 8.4–10.5)
CALCIUM SERPL-MCNC: 10.1 MG/DL — SIGNIFICANT CHANGE UP (ref 8.4–10.5)
CHLORIDE SERPL-SCNC: 96 MMOL/L — SIGNIFICANT CHANGE UP (ref 96–108)
CHLORIDE SERPL-SCNC: 96 MMOL/L — SIGNIFICANT CHANGE UP (ref 96–108)
CO2 SERPL-SCNC: 21 MMOL/L — LOW (ref 22–31)
CO2 SERPL-SCNC: 21 MMOL/L — LOW (ref 22–31)
CREAT SERPL-MCNC: 9.86 MG/DL — HIGH (ref 0.5–1.3)
CREAT SERPL-MCNC: 9.86 MG/DL — HIGH (ref 0.5–1.3)
EGFR: 6 ML/MIN/1.73M2 — LOW
EGFR: 6 ML/MIN/1.73M2 — LOW
GLUCOSE BLDC GLUCOMTR-MCNC: 106 MG/DL — HIGH (ref 70–99)
GLUCOSE BLDC GLUCOMTR-MCNC: 106 MG/DL — HIGH (ref 70–99)
GLUCOSE BLDC GLUCOMTR-MCNC: 89 MG/DL — SIGNIFICANT CHANGE UP (ref 70–99)
GLUCOSE BLDC GLUCOMTR-MCNC: 89 MG/DL — SIGNIFICANT CHANGE UP (ref 70–99)
GLUCOSE BLDC GLUCOMTR-MCNC: 91 MG/DL — SIGNIFICANT CHANGE UP (ref 70–99)
GLUCOSE BLDC GLUCOMTR-MCNC: 91 MG/DL — SIGNIFICANT CHANGE UP (ref 70–99)
GLUCOSE BLDC GLUCOMTR-MCNC: 97 MG/DL — SIGNIFICANT CHANGE UP (ref 70–99)
GLUCOSE BLDC GLUCOMTR-MCNC: 97 MG/DL — SIGNIFICANT CHANGE UP (ref 70–99)
GLUCOSE SERPL-MCNC: 78 MG/DL — SIGNIFICANT CHANGE UP (ref 70–99)
GLUCOSE SERPL-MCNC: 78 MG/DL — SIGNIFICANT CHANGE UP (ref 70–99)
HCT VFR BLD CALC: 32.4 % — LOW (ref 39–50)
HCT VFR BLD CALC: 32.4 % — LOW (ref 39–50)
HGB BLD-MCNC: 9.7 G/DL — LOW (ref 13–17)
HGB BLD-MCNC: 9.7 G/DL — LOW (ref 13–17)
MAGNESIUM SERPL-MCNC: 2.2 MG/DL — SIGNIFICANT CHANGE UP (ref 1.6–2.6)
MAGNESIUM SERPL-MCNC: 2.2 MG/DL — SIGNIFICANT CHANGE UP (ref 1.6–2.6)
MCHC RBC-ENTMCNC: 19.6 PG — LOW (ref 27–34)
MCHC RBC-ENTMCNC: 19.6 PG — LOW (ref 27–34)
MCHC RBC-ENTMCNC: 29.9 GM/DL — LOW (ref 32–36)
MCHC RBC-ENTMCNC: 29.9 GM/DL — LOW (ref 32–36)
MCV RBC AUTO: 65.3 FL — LOW (ref 80–100)
MCV RBC AUTO: 65.3 FL — LOW (ref 80–100)
NRBC # BLD: 0 /100 WBCS — SIGNIFICANT CHANGE UP (ref 0–0)
NRBC # BLD: 0 /100 WBCS — SIGNIFICANT CHANGE UP (ref 0–0)
PHOSPHATE SERPL-MCNC: 5.1 MG/DL — HIGH (ref 2.5–4.5)
PHOSPHATE SERPL-MCNC: 5.1 MG/DL — HIGH (ref 2.5–4.5)
PLATELET # BLD AUTO: 170 K/UL — SIGNIFICANT CHANGE UP (ref 150–400)
PLATELET # BLD AUTO: 170 K/UL — SIGNIFICANT CHANGE UP (ref 150–400)
POTASSIUM SERPL-MCNC: 4.3 MMOL/L — SIGNIFICANT CHANGE UP (ref 3.5–5.3)
POTASSIUM SERPL-MCNC: 4.3 MMOL/L — SIGNIFICANT CHANGE UP (ref 3.5–5.3)
POTASSIUM SERPL-SCNC: 4.3 MMOL/L — SIGNIFICANT CHANGE UP (ref 3.5–5.3)
POTASSIUM SERPL-SCNC: 4.3 MMOL/L — SIGNIFICANT CHANGE UP (ref 3.5–5.3)
RBC # BLD: 4.96 M/UL — SIGNIFICANT CHANGE UP (ref 4.2–5.8)
RBC # BLD: 4.96 M/UL — SIGNIFICANT CHANGE UP (ref 4.2–5.8)
RBC # FLD: 20.5 % — HIGH (ref 10.3–14.5)
RBC # FLD: 20.5 % — HIGH (ref 10.3–14.5)
SODIUM SERPL-SCNC: 137 MMOL/L — SIGNIFICANT CHANGE UP (ref 135–145)
SODIUM SERPL-SCNC: 137 MMOL/L — SIGNIFICANT CHANGE UP (ref 135–145)
WBC # BLD: 5.65 K/UL — SIGNIFICANT CHANGE UP (ref 3.8–10.5)
WBC # BLD: 5.65 K/UL — SIGNIFICANT CHANGE UP (ref 3.8–10.5)
WBC # FLD AUTO: 5.65 K/UL — SIGNIFICANT CHANGE UP (ref 3.8–10.5)
WBC # FLD AUTO: 5.65 K/UL — SIGNIFICANT CHANGE UP (ref 3.8–10.5)

## 2023-11-07 PROCEDURE — 93306 TTE W/DOPPLER COMPLETE: CPT | Mod: 26

## 2023-11-07 PROCEDURE — 99233 SBSQ HOSP IP/OBS HIGH 50: CPT

## 2023-11-07 PROCEDURE — 99232 SBSQ HOSP IP/OBS MODERATE 35: CPT

## 2023-11-07 RX ORDER — OXYCODONE HYDROCHLORIDE 5 MG/1
2.5 TABLET ORAL ONCE
Refills: 0 | Status: DISCONTINUED | OUTPATIENT
Start: 2023-11-07 | End: 2023-11-07

## 2023-11-07 RX ORDER — LIDOCAINE 4 G/100G
1 CREAM TOPICAL DAILY
Refills: 0 | Status: DISCONTINUED | OUTPATIENT
Start: 2023-11-07 | End: 2023-11-21

## 2023-11-07 RX ADMIN — APIXABAN 5 MILLIGRAM(S): 2.5 TABLET, FILM COATED ORAL at 19:04

## 2023-11-07 RX ADMIN — Medication 25 MILLIGRAM(S): at 06:05

## 2023-11-07 RX ADMIN — CHLORHEXIDINE GLUCONATE 1 APPLICATION(S): 213 SOLUTION TOPICAL at 09:35

## 2023-11-07 RX ADMIN — APIXABAN 5 MILLIGRAM(S): 2.5 TABLET, FILM COATED ORAL at 06:05

## 2023-11-07 RX ADMIN — OXYCODONE HYDROCHLORIDE 2.5 MILLIGRAM(S): 5 TABLET ORAL at 01:07

## 2023-11-07 RX ADMIN — SEVELAMER CARBONATE 800 MILLIGRAM(S): 2400 POWDER, FOR SUSPENSION ORAL at 19:04

## 2023-11-07 RX ADMIN — SEVELAMER CARBONATE 800 MILLIGRAM(S): 2400 POWDER, FOR SUSPENSION ORAL at 09:28

## 2023-11-07 RX ADMIN — CEFTRIAXONE 100 MILLIGRAM(S): 500 INJECTION, POWDER, FOR SOLUTION INTRAMUSCULAR; INTRAVENOUS at 14:56

## 2023-11-07 RX ADMIN — ATORVASTATIN CALCIUM 40 MILLIGRAM(S): 80 TABLET, FILM COATED ORAL at 21:13

## 2023-11-07 RX ADMIN — Medication 25 MILLIGRAM(S): at 19:04

## 2023-11-07 RX ADMIN — SEVELAMER CARBONATE 800 MILLIGRAM(S): 2400 POWDER, FOR SUSPENSION ORAL at 14:56

## 2023-11-07 NOTE — PROGRESS NOTE ADULT - PROBLEM SELECTOR PLAN 6
DVT ppx: Eliquis (Afib)  Diet: DASH/CC  Dispo: pending clinical improvement CT w/ Right lower lobe 1.2 cm nodule with suggestion of fat and calcifications, partially visualized on prior CT 1/3/2020, likely hamartoma  - currently w/o acute pulmonary symptoms  - can f/u outpt for further monitoring

## 2023-11-07 NOTE — PROGRESS NOTE ADULT - ASSESSMENT
58-year-old man with PMH of ESRD on hemodialysis via groin catheter, Monday Wednesday Friday, BENNIE, hypertension, hyperlipidemia, presenting due to weakness for 2 days and fever of 101 Fahrenheit, fatigue, chills, vomiting episodes that started today.  Patient states that yesterday even though he felt unwell he went to his usual dialysis appointment which finished without any significant events.  Also endorsing mild shortness of breath.  Has had sepsis events in the past from his bilateral arm access sites, currently both access sites thrombosed which is why he needs dialysis catheter.  tried using peritoneal dialysis catheter but also eventually became septic and had to be removed. States that a stitch popped yesterday overlying the catheter site, +bleeding which is now resolved. (04 Nov 2023 18:18)     Tmax 103.3 in ER  WBC 8.6, Labs consistent with ESRD  BCx 11/4; 2/2 Klebsiella Pneumoniae group; No ESBL 2/2 GNR    Received a dose of Vancomycin and Zosyn; Zosyn continued    # Klebsiella bacteremia associated with Rt Groin CVC  # fever, tachycardia, sepsis       PLAN:   - Repeat blood cultures, NTD   - c/w ceftriaxone 2 gms q 24 hours  - trend cbc, no leucocytosis   - TTE with no abnormality   - recommend MRI of LS spine, if possible with contrast given new back pain.   - CT abd/pelvis with stranding around the groin area.       Plan discussed with medicine ACP.        Maura Girard  Please contact through MS Teams   If no response or past 5 pm/weekend call [phone].

## 2023-11-07 NOTE — PROGRESS NOTE ADULT - ASSESSMENT
58-year-old man with PMH of ESRD on hemodialysis via groin catheter, Monday Wednesday Friday, BENNIE, hypertension, hyperlipidemia, presenting due to weakness for 2 days and fever of 101 Fahrenheit, fatigue, chills, vomiting episodes that started today.  Patient states that yesterday even though he felt unwell he went to his usual dialysis appointment which finished without any significant events.  Nephrology consulted for ESRD on HD.    A/P:  ESRD on HD: [facility]  Dialysis center: [facility]  Nephrologist: Dr. Neftaly Holden  Access: CVC R groin; now dislodged.  Consent signed, witnessed, and placed in pt's chart.  s/p SHiley on 11/6   s/p HD On 11/6  Plan for HD tomorrow  Renal diet.  Monitor BMP and UO.    HTN:  BP controlled.  UF w/ HD.  Monitor BP.    Hyperkalemia:  Likely in setting of ESRD.  improved  Monitor K closely.  Low K diet.    Anemia:  Hgb at goal.  Monitor Hgb.  Epogen 2000units TIW w/ HD.  Transfuse for Hgb <8.    CKD: MBD:   - acceptable.  Low PO4 diet.  C/W sevelamer w/ meals.  Monitor Ca and PO4 daily.    Hypercalcemia:  Possibly in setting of dehydration vs. calcitriol.  PTH acceptable for ESRD; Vit. D 44.2.  D/C'd calcitriol - no need for vit. d analog at this time.  Monitor Ca.    Fever:  Blood cultures with gram neg. rods.  Possibly CVC associated infection.  Received zosyn and vancomycin.

## 2023-11-08 ENCOUNTER — NON-APPOINTMENT (OUTPATIENT)
Age: 58
End: 2023-11-08

## 2023-11-08 LAB
ANION GAP SERPL CALC-SCNC: 22 MMOL/L — HIGH (ref 5–17)
ANION GAP SERPL CALC-SCNC: 22 MMOL/L — HIGH (ref 5–17)
BASOPHILS # BLD AUTO: 0.1 K/UL — SIGNIFICANT CHANGE UP (ref 0–0.2)
BASOPHILS # BLD AUTO: 0.1 K/UL — SIGNIFICANT CHANGE UP (ref 0–0.2)
BASOPHILS NFR BLD AUTO: 1.8 % — SIGNIFICANT CHANGE UP (ref 0–2)
BASOPHILS NFR BLD AUTO: 1.8 % — SIGNIFICANT CHANGE UP (ref 0–2)
BUN SERPL-MCNC: 53 MG/DL — HIGH (ref 7–23)
BUN SERPL-MCNC: 53 MG/DL — HIGH (ref 7–23)
CALCIUM SERPL-MCNC: 9.9 MG/DL — SIGNIFICANT CHANGE UP (ref 8.4–10.5)
CALCIUM SERPL-MCNC: 9.9 MG/DL — SIGNIFICANT CHANGE UP (ref 8.4–10.5)
CHLORIDE SERPL-SCNC: 99 MMOL/L — SIGNIFICANT CHANGE UP (ref 96–108)
CHLORIDE SERPL-SCNC: 99 MMOL/L — SIGNIFICANT CHANGE UP (ref 96–108)
CO2 SERPL-SCNC: 20 MMOL/L — LOW (ref 22–31)
CO2 SERPL-SCNC: 20 MMOL/L — LOW (ref 22–31)
CREAT SERPL-MCNC: 12.47 MG/DL — HIGH (ref 0.5–1.3)
CREAT SERPL-MCNC: 12.47 MG/DL — HIGH (ref 0.5–1.3)
EGFR: 4 ML/MIN/1.73M2 — LOW
EGFR: 4 ML/MIN/1.73M2 — LOW
EOSINOPHIL # BLD AUTO: 0.59 K/UL — HIGH (ref 0–0.5)
EOSINOPHIL # BLD AUTO: 0.59 K/UL — HIGH (ref 0–0.5)
EOSINOPHIL NFR BLD AUTO: 10.9 % — HIGH (ref 0–6)
EOSINOPHIL NFR BLD AUTO: 10.9 % — HIGH (ref 0–6)
GLUCOSE BLDC GLUCOMTR-MCNC: 86 MG/DL — SIGNIFICANT CHANGE UP (ref 70–99)
GLUCOSE BLDC GLUCOMTR-MCNC: 86 MG/DL — SIGNIFICANT CHANGE UP (ref 70–99)
GLUCOSE BLDC GLUCOMTR-MCNC: 88 MG/DL — SIGNIFICANT CHANGE UP (ref 70–99)
GLUCOSE BLDC GLUCOMTR-MCNC: 88 MG/DL — SIGNIFICANT CHANGE UP (ref 70–99)
GLUCOSE BLDC GLUCOMTR-MCNC: 94 MG/DL — SIGNIFICANT CHANGE UP (ref 70–99)
GLUCOSE BLDC GLUCOMTR-MCNC: 94 MG/DL — SIGNIFICANT CHANGE UP (ref 70–99)
GLUCOSE SERPL-MCNC: 77 MG/DL — SIGNIFICANT CHANGE UP (ref 70–99)
GLUCOSE SERPL-MCNC: 77 MG/DL — SIGNIFICANT CHANGE UP (ref 70–99)
HCT VFR BLD CALC: 28.3 % — LOW (ref 39–50)
HCT VFR BLD CALC: 28.3 % — LOW (ref 39–50)
HGB BLD-MCNC: 8.6 G/DL — LOW (ref 13–17)
HGB BLD-MCNC: 8.6 G/DL — LOW (ref 13–17)
LYMPHOCYTES # BLD AUTO: 0.59 K/UL — LOW (ref 1–3.3)
LYMPHOCYTES # BLD AUTO: 0.59 K/UL — LOW (ref 1–3.3)
LYMPHOCYTES # BLD AUTO: 10.9 % — LOW (ref 13–44)
LYMPHOCYTES # BLD AUTO: 10.9 % — LOW (ref 13–44)
MAGNESIUM SERPL-MCNC: 2.3 MG/DL — SIGNIFICANT CHANGE UP (ref 1.6–2.6)
MAGNESIUM SERPL-MCNC: 2.3 MG/DL — SIGNIFICANT CHANGE UP (ref 1.6–2.6)
MCHC RBC-ENTMCNC: 19.7 PG — LOW (ref 27–34)
MCHC RBC-ENTMCNC: 19.7 PG — LOW (ref 27–34)
MCHC RBC-ENTMCNC: 30.4 GM/DL — LOW (ref 32–36)
MCHC RBC-ENTMCNC: 30.4 GM/DL — LOW (ref 32–36)
MCV RBC AUTO: 64.8 FL — LOW (ref 80–100)
MCV RBC AUTO: 64.8 FL — LOW (ref 80–100)
MONOCYTES # BLD AUTO: 0.49 K/UL — SIGNIFICANT CHANGE UP (ref 0–0.9)
MONOCYTES # BLD AUTO: 0.49 K/UL — SIGNIFICANT CHANGE UP (ref 0–0.9)
MONOCYTES NFR BLD AUTO: 9.1 % — SIGNIFICANT CHANGE UP (ref 2–14)
MONOCYTES NFR BLD AUTO: 9.1 % — SIGNIFICANT CHANGE UP (ref 2–14)
NEUTROPHILS # BLD AUTO: 3.61 K/UL — SIGNIFICANT CHANGE UP (ref 1.8–7.4)
NEUTROPHILS # BLD AUTO: 3.61 K/UL — SIGNIFICANT CHANGE UP (ref 1.8–7.4)
NEUTROPHILS NFR BLD AUTO: 67.3 % — SIGNIFICANT CHANGE UP (ref 43–77)
NEUTROPHILS NFR BLD AUTO: 67.3 % — SIGNIFICANT CHANGE UP (ref 43–77)
PHOSPHATE SERPL-MCNC: 6 MG/DL — HIGH (ref 2.5–4.5)
PHOSPHATE SERPL-MCNC: 6 MG/DL — HIGH (ref 2.5–4.5)
PLATELET # BLD AUTO: 191 K/UL — SIGNIFICANT CHANGE UP (ref 150–400)
PLATELET # BLD AUTO: 191 K/UL — SIGNIFICANT CHANGE UP (ref 150–400)
POTASSIUM SERPL-MCNC: 4.6 MMOL/L — SIGNIFICANT CHANGE UP (ref 3.5–5.3)
POTASSIUM SERPL-MCNC: 4.6 MMOL/L — SIGNIFICANT CHANGE UP (ref 3.5–5.3)
POTASSIUM SERPL-SCNC: 4.6 MMOL/L — SIGNIFICANT CHANGE UP (ref 3.5–5.3)
POTASSIUM SERPL-SCNC: 4.6 MMOL/L — SIGNIFICANT CHANGE UP (ref 3.5–5.3)
RBC # BLD: 4.37 M/UL — SIGNIFICANT CHANGE UP (ref 4.2–5.8)
RBC # BLD: 4.37 M/UL — SIGNIFICANT CHANGE UP (ref 4.2–5.8)
RBC # FLD: 19.7 % — HIGH (ref 10.3–14.5)
RBC # FLD: 19.7 % — HIGH (ref 10.3–14.5)
SODIUM SERPL-SCNC: 141 MMOL/L — SIGNIFICANT CHANGE UP (ref 135–145)
SODIUM SERPL-SCNC: 141 MMOL/L — SIGNIFICANT CHANGE UP (ref 135–145)
WBC # BLD: 5.37 K/UL — SIGNIFICANT CHANGE UP (ref 3.8–10.5)
WBC # BLD: 5.37 K/UL — SIGNIFICANT CHANGE UP (ref 3.8–10.5)
WBC # FLD AUTO: 5.37 K/UL — SIGNIFICANT CHANGE UP (ref 3.8–10.5)
WBC # FLD AUTO: 5.37 K/UL — SIGNIFICANT CHANGE UP (ref 3.8–10.5)

## 2023-11-08 PROCEDURE — 99232 SBSQ HOSP IP/OBS MODERATE 35: CPT

## 2023-11-08 PROCEDURE — 72158 MRI LUMBAR SPINE W/O & W/DYE: CPT | Mod: 26

## 2023-11-08 RX ORDER — DIAZEPAM 5 MG
10 TABLET ORAL ONCE
Refills: 0 | Status: DISCONTINUED | OUTPATIENT
Start: 2023-11-08 | End: 2023-11-08

## 2023-11-08 RX ORDER — ERYTHROPOIETIN 10000 [IU]/ML
10000 INJECTION, SOLUTION INTRAVENOUS; SUBCUTANEOUS
Refills: 0 | Status: DISCONTINUED | OUTPATIENT
Start: 2023-11-08 | End: 2023-11-22

## 2023-11-08 RX ADMIN — CHLORHEXIDINE GLUCONATE 1 APPLICATION(S): 213 SOLUTION TOPICAL at 08:29

## 2023-11-08 RX ADMIN — ATORVASTATIN CALCIUM 40 MILLIGRAM(S): 80 TABLET, FILM COATED ORAL at 22:30

## 2023-11-08 RX ADMIN — SEVELAMER CARBONATE 800 MILLIGRAM(S): 2400 POWDER, FOR SUSPENSION ORAL at 14:57

## 2023-11-08 RX ADMIN — Medication 10 MILLIGRAM(S): at 12:36

## 2023-11-08 RX ADMIN — SEVELAMER CARBONATE 800 MILLIGRAM(S): 2400 POWDER, FOR SUSPENSION ORAL at 08:29

## 2023-11-08 RX ADMIN — Medication 25 MILLIGRAM(S): at 05:41

## 2023-11-08 RX ADMIN — APIXABAN 5 MILLIGRAM(S): 2.5 TABLET, FILM COATED ORAL at 05:41

## 2023-11-08 RX ADMIN — ERYTHROPOIETIN 10000 UNIT(S): 10000 INJECTION, SOLUTION INTRAVENOUS; SUBCUTANEOUS at 18:29

## 2023-11-08 RX ADMIN — SEVELAMER CARBONATE 800 MILLIGRAM(S): 2400 POWDER, FOR SUSPENSION ORAL at 22:30

## 2023-11-08 RX ADMIN — APIXABAN 5 MILLIGRAM(S): 2.5 TABLET, FILM COATED ORAL at 22:30

## 2023-11-08 RX ADMIN — CEFTRIAXONE 100 MILLIGRAM(S): 500 INJECTION, POWDER, FOR SOLUTION INTRAMUSCULAR; INTRAVENOUS at 22:31

## 2023-11-08 RX ADMIN — Medication 25 MILLIGRAM(S): at 22:30

## 2023-11-08 NOTE — DIETITIAN INITIAL EVALUATION ADULT - ETIOLOGY
increased physiological demand for nutrients  Presumed prolonged energy intake exceeding energy expenditure PTA with genetic related factors

## 2023-11-08 NOTE — DIETITIAN INITIAL EVALUATION ADULT - PERSON TAUGHT/METHOD
Provided education on renal diet for HD. Provided education on increased demand for kcal and protein intake to help prevent unintended muscle/weight loss, reviewed foods high in potassium, phosphorus, and sodium, discussed foods recommended within therapeutic diet and protein portion sizing. Patient declining written material.    Encouraged protein-energy intake, discussed use of oral nutrition supplements to have available throughout the day, patient declining at this time. Honor food preferences as available/able./verbal instruction/patient instructed

## 2023-11-08 NOTE — DIETITIAN INITIAL EVALUATION ADULT - NSFNSGIIOFT_GEN_A_CORE
Patient reports nausea earlier today, emesis 11/6 per flowsheets; patient reports no diarrhea or constipation with last BM yesterday; bowel regimen: none

## 2023-11-08 NOTE — DIETITIAN INITIAL EVALUATION ADULT - OTHER INFO
Patient reports intentional weight loss PTA. Reports he was 350lb in April of this year, and was down to 298lb PTA. Patient reports in the past he was able to lose weight by dieting and exercising regularly, but now has difficulty exercising regularly in setting of dialysis/weakness.  Dosing weight: 289.9lb (11/4, stated).  Daily weights: 303.7lb (11/6, bed, pre-HD), 301.5lb (11/6, bed, post-HD).  RD obtained bed weight at time of visit 11/8: 297.2lb (patient was able to ambulate out of bed, scale read 314.3lb while patient in bed, and scale read 17.1lb while patient OOB).  IBW: 166lb, %IBW: 179% based on RD obtained weight.  Weight history per [facility]: 315lb (10/18/23), 318.1lb (9/26/23), 310lb (7/14/23), 345lb (10/12/22), 339.5lb (10/31/21).  Weight appears downtrending. RD to continue to monitor weight trends as available/able.     -Ordered for IV antibiotics, sevelamer carbonate, Correctional sliding scale insulin.  -S/p Lokelma 11/5 and 11/6.   -Elevated Phos, BUN, Cr and low eGFR noted today.

## 2023-11-08 NOTE — PROGRESS NOTE ADULT - ASSESSMENT
58-year-old man with PMH of ESRD on hemodialysis via groin catheter, Monday Wednesday Friday, BENNIE, hypertension, hyperlipidemia, presenting due to weakness for 2 days and fever of 101 Fahrenheit, fatigue, chills, vomiting episodes that started today.  Patient states that yesterday even though he felt unwell he went to his usual dialysis appointment which finished without any significant events.  Nephrology consulted for ESRD on HD.    A/P:  ESRD on HD: [facility]  Dialysis center: [facility]  Nephrologist: Dr. Neftaly Holden  Access: CVC R groin; now dislodged.  Consent signed, witnessed, and placed in pt's chart.  s/p SHiley on 11/6   s/p HD On 11/6  Plan for HD today  Renal diet.  Monitor BMP and UO.    HTN:  BP controlled.  UF w/ HD.  Monitor BP.    Hyperkalemia:  Likely in setting of ESRD.  improved  Monitor K closely.  Low K diet.    Anemia:  Monitor Hgb.  Epogen w/ HD.  Transfuse for Hgb <8.    CKD: MBD:   - acceptable.  Low PO4 diet.  C/W sevelamer w/ meals.  Monitor Ca and PO4 daily.    Hypercalcemia:  Possibly in setting of dehydration vs. calcitriol.  PTH acceptable for ESRD; Vit. D 44.2.  D/C'd calcitriol - no need for vit. d analog at this time.  Monitor Ca.    Fever:  Blood cultures with gram neg. rods.  Possibly CVC associated infection.  Received zosyn and vancomycin.

## 2023-11-08 NOTE — DIETITIAN INITIAL EVALUATION ADULT - PERTINENT LABORATORY DATA
11-08    141  |  99  |  53<H>  ----------------------------<  77  4.6   |  20<L>  |  12.47<H>    Ca    9.9      08 Nov 2023 09:28  Phos  6.0     11-08  Mg     2.3     11-08    POCT Blood Glucose.: 94 mg/dL (08 Nov 2023 14:52)  POCT Blood Glucose.: 86 mg/dL (08 Nov 2023 08:25)  POCT Blood Glucose.: 106 mg/dL (07 Nov 2023 22:00)  POCT Blood Glucose.: 97 mg/dL (07 Nov 2023 18:31)    A1C with Estimated Average Glucose Result: 5.2 % (11-06-23 @ 08:25)

## 2023-11-08 NOTE — DIETITIAN INITIAL EVALUATION ADULT - ENERGY INTAKE
Patient reports ongoing poor appetite and PO intake. Observed patient with % intake of lunch. Patient reports prior to this meal he was only able to eat 1-2 bites of food per meal. 26-50% intake documented per flowsheets.  Poor (<50%)

## 2023-11-08 NOTE — DIETITIAN INITIAL EVALUATION ADULT - REASON FOR ADMISSION
Sepsis    Per chart: "58M w/ PMH of ESRD on hemodialysis via groin catheter, M/W/F, BENNIE, hypertension, hyperlipidemia, presenting due to weakness for 2 days and fever found to have sepsis likely 2/2 catheter infection, complicated by bacteremia"

## 2023-11-08 NOTE — DIETITIAN INITIAL EVALUATION ADULT - NUTRITIONGOAL OUTCOME2
Patient to have gradual weight loss toward IBW while meeting estimated increased protein-energy needs.

## 2023-11-08 NOTE — PROGRESS NOTE ADULT - PROBLEM SELECTOR PLAN 1
Patient found to have gram negative rods on BCx X2 on 11/4 likely HD catheter associated  - BCx growing Klebsiella PNA  - continue ceftriaxone 2g q24  - repeat cultures NGTD  - ID following

## 2023-11-08 NOTE — PROGRESS NOTE ADULT - PROBLEM SELECTOR PLAN 6
CT w/ Right lower lobe 1.2 cm nodule with suggestion of fat and calcifications, partially visualized on prior CT 1/3/2020, likely hamartoma  - currently w/o acute pulmonary symptoms  - can f/u outpt for further monitoring

## 2023-11-08 NOTE — DIETITIAN INITIAL EVALUATION ADULT - PERTINENT MEDS FT
MEDICATIONS  (STANDING):  apixaban 5 milliGRAM(s) Oral two times a day  atorvastatin 40 milliGRAM(s) Oral at bedtime  cefTRIAXone   IVPB 2000 milliGRAM(s) IV Intermittent every 24 hours  epoetin isra (EPOGEN) Injectable 50774 Unit(s) IV Push <User Schedule>  insulin lispro (ADMELOG) corrective regimen sliding scale   SubCutaneous three times a day before meals  insulin lispro (ADMELOG) corrective regimen sliding scale   SubCutaneous at bedtime  metoprolol tartrate 25 milliGRAM(s) Oral two times a day  sevelamer carbonate 800 milliGRAM(s) Oral three times a day with meals

## 2023-11-08 NOTE — DIETITIAN INITIAL EVALUATION ADULT - ORAL INTAKE PTA/DIET HISTORY
Patient reports decreased appetite/PO intake x a few days PTA in setting of weakness. Reports he watches carbohydrate intake in setting of DM and monitors sodium, potassium, and phosphorus intake in setting of ESRD. Confirms no known food allergies.   Patient reports he follows with an RD at outpatient dialysis center. Reports he has been working on his potassium. Also reports he takes 1 or 2 sevelamer carbonate after he eats foods high in phosphorous. Patient seemingly with good baseline knowledge of nutrition education for ESRD.

## 2023-11-08 NOTE — DIETITIAN INITIAL EVALUATION ADULT - OTHER CALCULATIONS
Defer fluid needs to team.  Estimated nutrient needs based on RD obtained weight 297.2lb for energy and IBW 166lb for protein, with consideration for BMI and patient on HD.

## 2023-11-08 NOTE — DIETITIAN INITIAL EVALUATION ADULT - ADD RECOMMEND
-Recommend liberalizing DASH/TLC diet restriction in setting of poor PO intake. Continue with Consistent Carbohydrate and Renal diet restrictions. Patient declining oral nutrition supplements. RD to allow for double protein at meals.  -Monitor PO intake, diet, weight, labs, skin, GI symptoms, and BM regularity.  -RD remains available upon request and will follow up per protocol.   -Risk notification placed in chart from BMI>40.

## 2023-11-08 NOTE — DIETITIAN INITIAL EVALUATION ADULT - REASON INDICATOR FOR ASSESSMENT
97.8 RD assessment warranted for: Consult for "Pt's wife requested for a pt with DM and ESRD." Chart reviewed, events noted.  Source: medical record, patient.

## 2023-11-08 NOTE — DIETITIAN INITIAL EVALUATION ADULT - NS FNS DIET ORDER
Diet, DASH/TLC:   Sodium & Cholesterol Restricted  Consistent Carbohydrate {Evening Snack} (CSTCHOSN)  For patients receiving Renal Replacement - No Protein Restr, No Conc K, No Conc Phos, Low Sodium (RENAL) (11-05-23 @ 10:14) [Active]

## 2023-11-08 NOTE — DIETITIAN INITIAL EVALUATION ADULT - NSFNSADHERENCEPTAFT_GEN_A_CORE
Patient with hx DM, A1C 5.2% from 11/6 indicates good glycemic control PTA. Patient reports blood glucose levels are checked multiple times per day at home. Patient reports he was on insulin until 2 months ago and that he no longer needs it. Patient seemingly with good baseline knowledge of compliance and nutrition education for DM.

## 2023-11-09 DIAGNOSIS — M54.50 LOW BACK PAIN, UNSPECIFIED: ICD-10-CM

## 2023-11-09 LAB
ANION GAP SERPL CALC-SCNC: 18 MMOL/L — HIGH (ref 5–17)
ANION GAP SERPL CALC-SCNC: 18 MMOL/L — HIGH (ref 5–17)
APTT BLD: 33.8 SEC — SIGNIFICANT CHANGE UP (ref 24.5–35.6)
APTT BLD: 33.8 SEC — SIGNIFICANT CHANGE UP (ref 24.5–35.6)
BUN SERPL-MCNC: 36 MG/DL — HIGH (ref 7–23)
BUN SERPL-MCNC: 36 MG/DL — HIGH (ref 7–23)
CALCIUM SERPL-MCNC: 9.7 MG/DL — SIGNIFICANT CHANGE UP (ref 8.4–10.5)
CALCIUM SERPL-MCNC: 9.7 MG/DL — SIGNIFICANT CHANGE UP (ref 8.4–10.5)
CHLORIDE SERPL-SCNC: 97 MMOL/L — SIGNIFICANT CHANGE UP (ref 96–108)
CHLORIDE SERPL-SCNC: 97 MMOL/L — SIGNIFICANT CHANGE UP (ref 96–108)
CO2 SERPL-SCNC: 22 MMOL/L — SIGNIFICANT CHANGE UP (ref 22–31)
CO2 SERPL-SCNC: 22 MMOL/L — SIGNIFICANT CHANGE UP (ref 22–31)
CREAT SERPL-MCNC: 9.76 MG/DL — HIGH (ref 0.5–1.3)
CREAT SERPL-MCNC: 9.76 MG/DL — HIGH (ref 0.5–1.3)
EGFR: 6 ML/MIN/1.73M2 — LOW
EGFR: 6 ML/MIN/1.73M2 — LOW
GLUCOSE BLDC GLUCOMTR-MCNC: 101 MG/DL — HIGH (ref 70–99)
GLUCOSE BLDC GLUCOMTR-MCNC: 101 MG/DL — HIGH (ref 70–99)
GLUCOSE BLDC GLUCOMTR-MCNC: 113 MG/DL — HIGH (ref 70–99)
GLUCOSE BLDC GLUCOMTR-MCNC: 113 MG/DL — HIGH (ref 70–99)
GLUCOSE BLDC GLUCOMTR-MCNC: 114 MG/DL — HIGH (ref 70–99)
GLUCOSE BLDC GLUCOMTR-MCNC: 114 MG/DL — HIGH (ref 70–99)
GLUCOSE BLDC GLUCOMTR-MCNC: 94 MG/DL — SIGNIFICANT CHANGE UP (ref 70–99)
GLUCOSE BLDC GLUCOMTR-MCNC: 94 MG/DL — SIGNIFICANT CHANGE UP (ref 70–99)
GLUCOSE SERPL-MCNC: 95 MG/DL — SIGNIFICANT CHANGE UP (ref 70–99)
GLUCOSE SERPL-MCNC: 95 MG/DL — SIGNIFICANT CHANGE UP (ref 70–99)
HCT VFR BLD CALC: 30.1 % — LOW (ref 39–50)
HCT VFR BLD CALC: 30.1 % — LOW (ref 39–50)
HGB BLD-MCNC: 9 G/DL — LOW (ref 13–17)
HGB BLD-MCNC: 9 G/DL — LOW (ref 13–17)
INR BLD: 1.37 RATIO — HIGH (ref 0.85–1.18)
INR BLD: 1.37 RATIO — HIGH (ref 0.85–1.18)
MAGNESIUM SERPL-MCNC: 2.2 MG/DL — SIGNIFICANT CHANGE UP (ref 1.6–2.6)
MAGNESIUM SERPL-MCNC: 2.2 MG/DL — SIGNIFICANT CHANGE UP (ref 1.6–2.6)
MCHC RBC-ENTMCNC: 19.5 PG — LOW (ref 27–34)
MCHC RBC-ENTMCNC: 19.5 PG — LOW (ref 27–34)
MCHC RBC-ENTMCNC: 29.9 GM/DL — LOW (ref 32–36)
MCHC RBC-ENTMCNC: 29.9 GM/DL — LOW (ref 32–36)
MCV RBC AUTO: 65.2 FL — LOW (ref 80–100)
MCV RBC AUTO: 65.2 FL — LOW (ref 80–100)
NRBC # BLD: 0 /100 WBCS — SIGNIFICANT CHANGE UP (ref 0–0)
NRBC # BLD: 0 /100 WBCS — SIGNIFICANT CHANGE UP (ref 0–0)
PHOSPHATE SERPL-MCNC: 5.5 MG/DL — HIGH (ref 2.5–4.5)
PHOSPHATE SERPL-MCNC: 5.5 MG/DL — HIGH (ref 2.5–4.5)
PLATELET # BLD AUTO: 205 K/UL — SIGNIFICANT CHANGE UP (ref 150–400)
PLATELET # BLD AUTO: 205 K/UL — SIGNIFICANT CHANGE UP (ref 150–400)
POTASSIUM SERPL-MCNC: 4.1 MMOL/L — SIGNIFICANT CHANGE UP (ref 3.5–5.3)
POTASSIUM SERPL-MCNC: 4.1 MMOL/L — SIGNIFICANT CHANGE UP (ref 3.5–5.3)
POTASSIUM SERPL-SCNC: 4.1 MMOL/L — SIGNIFICANT CHANGE UP (ref 3.5–5.3)
POTASSIUM SERPL-SCNC: 4.1 MMOL/L — SIGNIFICANT CHANGE UP (ref 3.5–5.3)
PROTHROM AB SERPL-ACNC: 14.9 SEC — HIGH (ref 9.5–13)
PROTHROM AB SERPL-ACNC: 14.9 SEC — HIGH (ref 9.5–13)
RBC # BLD: 4.62 M/UL — SIGNIFICANT CHANGE UP (ref 4.2–5.8)
RBC # BLD: 4.62 M/UL — SIGNIFICANT CHANGE UP (ref 4.2–5.8)
RBC # FLD: 19.5 % — HIGH (ref 10.3–14.5)
RBC # FLD: 19.5 % — HIGH (ref 10.3–14.5)
SODIUM SERPL-SCNC: 137 MMOL/L — SIGNIFICANT CHANGE UP (ref 135–145)
SODIUM SERPL-SCNC: 137 MMOL/L — SIGNIFICANT CHANGE UP (ref 135–145)
WBC # BLD: 5.27 K/UL — SIGNIFICANT CHANGE UP (ref 3.8–10.5)
WBC # BLD: 5.27 K/UL — SIGNIFICANT CHANGE UP (ref 3.8–10.5)
WBC # FLD AUTO: 5.27 K/UL — SIGNIFICANT CHANGE UP (ref 3.8–10.5)
WBC # FLD AUTO: 5.27 K/UL — SIGNIFICANT CHANGE UP (ref 3.8–10.5)

## 2023-11-09 PROCEDURE — 99222 1ST HOSP IP/OBS MODERATE 55: CPT

## 2023-11-09 PROCEDURE — 99233 SBSQ HOSP IP/OBS HIGH 50: CPT

## 2023-11-09 PROCEDURE — 99232 SBSQ HOSP IP/OBS MODERATE 35: CPT

## 2023-11-09 RX ORDER — INSULIN LISPRO 100/ML
VIAL (ML) SUBCUTANEOUS AT BEDTIME
Refills: 0 | Status: DISCONTINUED | OUTPATIENT
Start: 2023-11-09 | End: 2023-11-18

## 2023-11-09 RX ORDER — INSULIN LISPRO 100/ML
VIAL (ML) SUBCUTANEOUS EVERY 6 HOURS
Refills: 0 | Status: DISCONTINUED | OUTPATIENT
Start: 2023-11-09 | End: 2023-11-10

## 2023-11-09 RX ORDER — POLYETHYLENE GLYCOL 3350 17 G/17G
17 POWDER, FOR SOLUTION ORAL DAILY
Refills: 0 | Status: DISCONTINUED | OUTPATIENT
Start: 2023-11-09 | End: 2023-11-21

## 2023-11-09 RX ORDER — ACETAMINOPHEN 500 MG
650 TABLET ORAL ONCE
Refills: 0 | Status: COMPLETED | OUTPATIENT
Start: 2023-11-09 | End: 2023-11-09

## 2023-11-09 RX ORDER — PHENYLEPHRINE-SHARK LIVER OIL-MINERAL OIL-PETROLATUM RECTAL OINTMENT
1 OINTMENT (GRAM) RECTAL
Refills: 0 | Status: DISCONTINUED | OUTPATIENT
Start: 2023-11-09 | End: 2023-11-21

## 2023-11-09 RX ADMIN — SEVELAMER CARBONATE 800 MILLIGRAM(S): 2400 POWDER, FOR SUSPENSION ORAL at 12:35

## 2023-11-09 RX ADMIN — CEFTRIAXONE 100 MILLIGRAM(S): 500 INJECTION, POWDER, FOR SOLUTION INTRAMUSCULAR; INTRAVENOUS at 14:34

## 2023-11-09 RX ADMIN — POLYETHYLENE GLYCOL 3350 17 GRAM(S): 17 POWDER, FOR SOLUTION ORAL at 14:34

## 2023-11-09 RX ADMIN — Medication 25 MILLIGRAM(S): at 05:11

## 2023-11-09 RX ADMIN — SEVELAMER CARBONATE 800 MILLIGRAM(S): 2400 POWDER, FOR SUSPENSION ORAL at 08:46

## 2023-11-09 RX ADMIN — APIXABAN 5 MILLIGRAM(S): 2.5 TABLET, FILM COATED ORAL at 05:11

## 2023-11-09 RX ADMIN — Medication 650 MILLIGRAM(S): at 05:19

## 2023-11-09 RX ADMIN — ATORVASTATIN CALCIUM 40 MILLIGRAM(S): 80 TABLET, FILM COATED ORAL at 21:24

## 2023-11-09 RX ADMIN — Medication 650 MILLIGRAM(S): at 04:19

## 2023-11-09 RX ADMIN — Medication 25 MILLIGRAM(S): at 17:16

## 2023-11-09 RX ADMIN — SEVELAMER CARBONATE 800 MILLIGRAM(S): 2400 POWDER, FOR SUSPENSION ORAL at 17:16

## 2023-11-09 RX ADMIN — PHENYLEPHRINE-SHARK LIVER OIL-MINERAL OIL-PETROLATUM RECTAL OINTMENT 1 APPLICATION(S): at 19:46

## 2023-11-09 RX ADMIN — CHLORHEXIDINE GLUCONATE 1 APPLICATION(S): 213 SOLUTION TOPICAL at 08:45

## 2023-11-09 NOTE — CONSULT NOTE ADULT - PROVIDER SPECIALTY LIST ADULT
Cardiology
Neurosurgery
Infectious Disease
Intervent Radiology
Nephrology
Vascular Surgery
Intervent Radiology
Intervent Radiology

## 2023-11-09 NOTE — CONSULT NOTE ADULT - ASSESSMENT
58-year-old man with PMH of ESRD on hemodialysis via groin catheter, Monday Wednesday Friday, BENNIE, hypertension, hyperlipidemia, presenting due to weakness for 2 days and fever of 101 Fahrenheit, fatigue, chills, vomiting episodes.    Recommendations:  - Bilateral extremities (upper and lower) vein mapping  - Please obtain operative records from [facility]  - Pain management PRN  - Rest of care per primary team    Plans are pending signature by the attending.    Vascular Surgery  [id number] 58-year-old man with PMH of ESRD on hemodialysis via groin catheter, Monday Wednesday Friday, BENINE, hypertension, hyperlipidemia, presenting due to weakness for 2 days and fever of 101 Fahrenheit, fatigue, chills, vomiting episodes.    Recommendations:  - Bilateral upper extremity vein mapping  - Please obtain operative records from [facility]  - Pain management PRN  - Rest of care per primary team    Plans are pending signature by the attending.    Vascular Surgery  [id number]

## 2023-11-09 NOTE — PROGRESS NOTE ADULT - ASSESSMENT
58-year-old man with PMH of ESRD on hemodialysis via groin catheter, Monday Wednesday Friday, BENNIE, hypertension, hyperlipidemia, presenting due to weakness for 2 days and fever of 101 Fahrenheit, fatigue, chills, vomiting episodes that started today.  Patient states that yesterday even though he felt unwell he went to his usual dialysis appointment which finished without any significant events.  Nephrology consulted for ESRD on HD.    A/P:  ESRD on HD: [facility]  Dialysis center: [facility] [place]  Nephrologist: Dr. Neftaly Holden  Access: CVC R groin; now dislodged.  Consent signed, witnessed, and placed in pt's chart.  s/p Shiley on 11/6   s/p HD On 11/8; UF'd 1L.  HD tomorrow.  Renal diet.  Monitor BMP and UO.    HTN:  BP controlled.  UF w/ HD.  Monitor BP.    Hyperkalemia:  Likely in setting of ESRD.  improved  Monitor K closely.  Low K diet.    Anemia:  Monitor Hgb.  Epogen w/ HD.  Transfuse for Hgb <8.    CKD: MBD:   - acceptable.  PO4 suboptimal.  Low PO4 diet.  C/W sevelamer w/ meals.  Monitor Ca and PO4 daily.    Hypercalcemia:  Possibly in setting of dehydration vs. calcitriol.  PTH acceptable for ESRD; Vit. D 44.2.  D/C'd calcitriol - no need for vit. d analog at this time.  Monitor Ca.    Fever:  Blood cultures with gram neg. rods.  Possibly CVC associated infection.  Received zosyn and vancomycin.

## 2023-11-09 NOTE — CONSULT NOTE ADULT - TIME BILLING
Advanced care planning was discussed with patient and family.  Advanced care planning forms were reviewed and discussed as appropriate.  Differential diagnosis and plan of care discussed with patient after the evaluation.   Pain assessed and judicious use of narcotics when appropriate was discussed.  Importance of Fall prevention discussed.  Counseling on Smoking and Alcohol cessation was offered when appropriate.  Counseling on Diet, exercise, and medication compliance was done.
end stage renal disease dependent on renal dialysis  bacteremia

## 2023-11-09 NOTE — CONSULT NOTE ADULT - SUBJECTIVE AND OBJECTIVE BOX
VASCULAR SURGERY CONSULT NOTE  --------------------------------------------------------------------------------------------  Patient is a 58y old  Male who presents with a chief complaint of fever (2023 16:33)    HPI:    58-year-old man with PMH of ESRD on hemodialysis via groin catheter, , BENNIE, hypertension, hyperlipidemia, presenting due to weakness for 2 days and fever of 101 Fahrenheit, fatigue, chills, vomiting episodes that started today.  Patient states that yesterday even though he felt unwell he went to his usual dialysis appointment which finished without any significant events.  Also endorsing mild shortness of breath.  Has had sepsis events in the past from his bilateral arm access sites, currently both access sites thrombosed which is why he needs dialysis catheter.  tried using peritoneal dialysis catheter but also eventually became septic and had to be removed. States that a stitch popped yesterday overlying the catheter site, +bleeding which is now resolved. (2023 18:18)      ROS: 10-system review is otherwise negative except HPI above.      PAST MEDICAL & SURGICAL HISTORY:  Renal disease  ESRD      Hypertension      Gout      Diabetes mellitus      HLD (hyperlipidemia)      Obesity      BENNIE on CPAP      Heart rate fast      H/O shoulder surgery      Injury of ankle and foot  surgically repaired, 10 years ago      H/O hernia repair  30 years ago      Peritoneal dialysis catheter in situ  Was inserted, and removed      Arteriovenous graft removed  Now there is a wound suction in left arm        FAMILY HISTORY:  Family history of hypertension  , in  mother        SOCIAL HISTORY:      ALLERGIES: IV Contrast (Anaphylaxis)      HOME MEDICATIONS:     CURRENT MEDICATIONS  MEDICATIONS (STANDING): atorvastatin 40 milliGRAM(s) Oral at bedtime  cefTRIAXone   IVPB 2000 milliGRAM(s) IV Intermittent every 24 hours  dextrose 5%. 1000 milliLiter(s) IV Continuous <Continuous>  dextrose 5%. 1000 milliLiter(s) IV Continuous <Continuous>  dextrose 50% Injectable 12.5 Gram(s) IV Push once  dextrose 50% Injectable 25 Gram(s) IV Push once  dextrose 50% Injectable 25 Gram(s) IV Push once  epoetin isra (EPOGEN) Injectable 93196 Unit(s) IV Push <User Schedule>  glucagon  Injectable 1 milliGRAM(s) IntraMuscular once  insulin lispro (ADMELOG) corrective regimen sliding scale   SubCutaneous three times a day before meals  insulin lispro (ADMELOG) corrective regimen sliding scale   SubCutaneous at bedtime  metoprolol tartrate 25 milliGRAM(s) Oral two times a day  polyethylene glycol 3350 17 Gram(s) Oral daily  sevelamer carbonate 800 milliGRAM(s) Oral three times a day with meals    MEDICATIONS (PRN):dextrose Oral Gel 15 Gram(s) Oral once PRN Blood Glucose LESS THAN 70 milliGRAM(s)/deciliter  sodium chloride 0.9% lock flush 10 milliLiter(s) IV Push every 1 hour PRN Pre/post blood products, medications, blood draw, and to maintain line patency    --------------------------------------------------------------------------------------------    Vitals:   T(C): 37.3 (23 @ 17:03), Max: 37.8 (23 @ 21:43)  HR: 100 (23 @ 17:03) (85 - 106)  BP: 123/80 (23 @ 17:03) (101/67 - 147/86)  RR: 18 (23 @ 17:03) (18 - 18)  SpO2: 97% (23 @ 17:03) (92% - 98%)  CAPILLARY BLOOD GLUCOSE      POCT Blood Glucose.: 113 mg/dL (2023 17:02)  POCT Blood Glucose.: 101 mg/dL (2023 12:32)  POCT Blood Glucose.: 94 mg/dL (2023 08:26)  POCT Blood Glucose.: 88 mg/dL (2023 21:40)    CAPILLARY BLOOD GLUCOSE      POCT Blood Glucose.: 113 mg/dL (2023 17:02)  POCT Blood Glucose.: 101 mg/dL (2023 12:32)  POCT Blood Glucose.: 94 mg/dL (2023 08:26)  POCT Blood Glucose.: 88 mg/dL (2023 21:40)       @ 07:01  -   @ 07:00  --------------------------------------------------------  IN:    IV PiggyBack: 50 mL    Oral Fluid: 210 mL  Total IN: 260 mL    OUT:    Other (mL): 1000 mL    Voided (mL): 400 mL  Total OUT: 1400 mL    Total NET: -1140 mL       @ 07:01  -   @ 17:35  --------------------------------------------------------  IN:    Oral Fluid: 480 mL  Total IN: 480 mL    OUT:  Total OUT: 0 mL    Total NET: 480 mL        Height (cm): 177.8 ( @ 11:38)  Weight (kg): 131.5 ( 11:38)  BMI (kg/m2): 41.6 ( 11:38)  BSA (m2): 2.44 ( 11:38)    PHYSICAL EXAM:   General: NAD, Lying in bed comfortably  Neuro: A+Ox3  HEENT: NC/AT  Neck: Soft, supple  Cardio: RRR, nml S1/S2  Resp: Good effort, CTA b/l  GI/Abd: Soft, NT/ND, no rebound/guarding  Vascular: right and left arms have AVFs no palpable thrill appreciated. Palpable pulses bilaterally. Intact motor and sensory functions.   --------------------------------------------------------------------------------------------    LABS  CBC (:49)                              9.0<L>                         5.27    )----------------(  205        --    % Neutrophils, --    % Lymphocytes, ANC: --                                  30.1<L>  CBC (:28)                              8.6<L>                         5.37    )----------------(  191        67.3  % Neutrophils, 10.9<L>% Lymphocytes, ANC: 3.61                                28.3<L>    BMP ( @ 08:49)             137     |  97      |  36<H> 		Ca++ --      Ca 9.7                ---------------------------------( 95    		Mg 2.2                4.1     |  22      |  9.76<H>			Ph 5.5<H>  BMP ( 09:28)             141     |  99      |  53<H> 		Ca++ --      Ca 9.9                ---------------------------------( 77    		Mg 2.3                4.6     |  20<L>   |  12.47<H>			Ph 6.0<H>              --------------------------------------------------------------------------------------------    MICROBIOLOGY  Urinalysis ( 08:49):     Color:  / Appearance:  / SG:  / pH:  / Gluc: 95 / Ketones:  / Bili:  / Urobili:  / Protein : / Nitrites:  / Leuk.Est:  / RBC:  / WBC:  / Sq Epi:  / Non Sq Epi:  / Bacteria        -> .Blood Blood Culture ( @ 07:58)     NG    NG    No growth at 72 Hours    -> .Blood Blood Culture ( @ 07:51)     NG    NG    No growth at 72 Hours    -> Clean Catch Clean Catch (Midstream) Culture ( @ 16:29)     NG    NG    <10,000 CFU/mL Normal Urogenital Corrie    -> .Blood Blood-Peripheral Culture ( @ 12:45)       Growth in anaerobic bottle: Gram Negative Rods  Growth in aerobic bottle: Gram Negative Rods<!>    Blood Culture PCR  Klebsiella pneumoniae<!>    Growth in aerobic and anaerobic bottles: Klebsiella pneumoniae  Direct identification is available within approximately 3-5  hours either by Blood Panel Multiplexed PCR or Direct  MALDI-TOF. Details: [email or web address]<!>    -> .Blood Blood-Peripheral Culture ( @ 12:30)       Growth in aerobic bottle: Gram Negative Rods  Growth in anaerobic bottle: Gram Negative Rods<!>    NG    Growth in aerobic and anaerobic bottles: Klebsiella pneumoniae  See previous culture [id number]<!>      --------------------------------------------------------------------------------------------

## 2023-11-09 NOTE — CONSULT NOTE ADULT - ATTENDING COMMENTS
58 year old man with end stage renal disease dependent on renal dialysis  multiple previous failed access for hemodialysis, many complicated by infection  failed peritoneal dialysis as well, complicated by infection  recommend line holiday  CT venogram of chest   bilateral upper extremity vein mapping  will follow
58-year-old man with PMH of ESRD on hemodialysis via groin catheter, Monday Wednesday Friday, BENNIE, hypertension, hyperlipidemia, presenting due to weakness for 2 days and fever of 101 Fahrenheit, fatigue, chills, vomiting episodes that started today.  Patient states that yesterday even though he felt unwell he went to his usual dialysis appointment which finished without any significant events.  Also endorsing mild shortness of breath.  Has had sepsis events in the past from his bilateral arm access sites, currently both access sites thrombosed which is why he needs dialysis catheter.  tried using peritoneal dialysis catheter but also eventually became septic and had to be removed. States that a stitch popped yesterday overlying the catheter site, +bleeding which is now resolved. (04 Nov 2023 18:18)     Tmax 103.3 in ER  WBC 8.6, Labs consistent with ESRD  BCx 11/4; 2/2 Klebsiella Pneumoniae group; No ESBL 2/2 GNR    Received a dose of Vancomycin and Zosyn; Zosyn continued    # Klebsiella bacteremia associated with Rt Groin CVC  # fever, tachycardia, sepsis     PLAN:   - He has LUE AVG in situ; thrombosed non functioning  - patient accidently pulled out  CVC catheter today  - Repeat blood cultures  - Please switch Zosyn to ceftriaxone 2 gms q 24 hours  - trend cbc, no leucocytosis   - TTE   - needs NM WBC whole body scan to ensure no other focus of infection  - CT abd/pelvis with stranding       Plan discussed with consulting team.       Maura Girard  Please contact through MS Teams   If no response or past 5 pm/weekend call [phone].

## 2023-11-09 NOTE — CONSULT NOTE ADULT - CONSULT REQUESTED DATE/TIME
06-Nov-2023
09-Nov-2023
09-Nov-2023 15:41
04-Nov-2023 19:36
09-Nov-2023
05-Nov-2023 08:36
05-Nov-2023 09:58
04-Nov-2023 20:35

## 2023-11-09 NOTE — CONSULT NOTE ADULT - ASSESSMENT
58M hx ESRD on HD, BENNIE, HTN, HLD adm med for sepsis/bacteremia. Pt w/ nonradiating central LBP. MR L spine w/ no evidence of osteo/discitis. Multilevel schmorl nodes, L5-S1 disc bulge w/ moderate narrowing of L neural foramen, poss contact of L5 nerve root. Exam: AOx3, FULLER 5/5, SILT. No ttp. Neg Rodríguez. Neg clonus.  -no acute neurosurgical intervention  -pain management per primary  -f/u w/ Dr. Piña outpatient PRN

## 2023-11-09 NOTE — CONSULT NOTE ADULT - SUBJECTIVE AND OBJECTIVE BOX
p (1480)     58M hx ESRD on HD, BENNIE, HTN, HLD adm med for sepsis/bacteremia. Pt w/ nonradiating central LBP. MR L spine w/ no evidence of osteo/discitis. Multilevel schmorl nodes, L5-S1 disc bulge w/ moderate narrowing of L neural foramen, poss contact of L5 nerve root. Exam: AOx3, FULLER 5/5, SILT. No ttp. Neg Rodríguez. Neg clonus.    --Anticoagulation:  apixaban 5 milliGRAM(s) Oral two times a day    =====================  PAST MEDICAL HISTORY   Renal disease    Hypertension    Gout    Diabetes mellitus    HLD (hyperlipidemia)    Obesity    BENNIE on CPAP    Heart rate fast      PAST SURGICAL HISTORY   H/O shoulder surgery    Injury of ankle and foot    H/O hernia repair    Peritoneal dialysis catheter in situ    Arteriovenous graft removed      IV Contrast (Anaphylaxis)      MEDICATIONS:  Antibiotics:  cefTRIAXone   IVPB 2000 milliGRAM(s) IV Intermittent every 24 hours    Neuro:    Other:  atorvastatin 40 milliGRAM(s) Oral at bedtime  dextrose 5%. 1000 milliLiter(s) IV Continuous <Continuous>  dextrose 5%. 1000 milliLiter(s) IV Continuous <Continuous>  dextrose 50% Injectable 25 Gram(s) IV Push once  dextrose 50% Injectable 25 Gram(s) IV Push once  dextrose 50% Injectable 12.5 Gram(s) IV Push once  dextrose Oral Gel 15 Gram(s) Oral once PRN  epoetin isra (EPOGEN) Injectable 13258 Unit(s) IV Push <User Schedule>  glucagon  Injectable 1 milliGRAM(s) IntraMuscular once  insulin lispro (ADMELOG) corrective regimen sliding scale   SubCutaneous at bedtime  insulin lispro (ADMELOG) corrective regimen sliding scale   SubCutaneous three times a day before meals  metoprolol tartrate 25 milliGRAM(s) Oral two times a day  polyethylene glycol 3350 17 Gram(s) Oral daily  sodium chloride 0.9% lock flush 10 milliLiter(s) IV Push every 1 hour PRN      SOCIAL HISTORY:   Occupation:   Marital Status:     FAMILY HISTORY:  Family history of MI (myocardial infarction)    Family history of hypertension        ROS: Negative except per HPI    LABS:                          9.0    5.27  )-----------( 205      ( 09 Nov 2023 08:49 )             30.1     11-09    137  |  97  |  36<H>  ----------------------------<  95  4.1   |  22  |  9.76<H>    Ca    9.7      09 Nov 2023 08:49  Phos  5.5     11-09  Mg     2.2     11-09

## 2023-11-09 NOTE — PROGRESS NOTE ADULT - PROBLEM SELECTOR PLAN 4
Patient w/ ESRD on HD M/W/F presents with dislodged groin HD catheter  -s/p shiley placement w/ IR on 11/6. Plan for permacath 11/10  - plan for HD per nephrology  - ESRD c/b hyperkalemia s/p lokelma, now resolved  - continue to monitor electrolytes  - continue sevelemer TID Patient w/ ESRD on HD M/W/F presents with dislodged groin HD catheter  -s/p shiley placement w/ IR on 11/6. Plan for permacath 11/10  - plan for HD per nephrology  - ESRD c/b hyperkalemia s/p lokelma, now resolved  - continue to monitor electrolytes  - continue sevelemer TID  - vascular consulted

## 2023-11-09 NOTE — PROGRESS NOTE ADULT - ASSESSMENT
58-year-old man with PMH of ESRD on hemodialysis via groin catheter, Monday Wednesday Friday, BENNIE, hypertension, hyperlipidemia, presenting due to weakness for 2 days and fever of 101 Fahrenheit, fatigue, chills, vomiting episodes that started today.  Patient states that yesterday even though he felt unwell he went to his usual dialysis appointment which finished without any significant events.  Also endorsing mild shortness of breath.  Has had sepsis events in the past from his bilateral arm access sites, currently both access sites thrombosed which is why he needs dialysis catheter.  tried using peritoneal dialysis catheter but also eventually became septic and had to be removed. States that a stitch popped yesterday overlying the catheter site, +bleeding which is now resolved. (04 Nov 2023 18:18)     Tmax 103.3 in ER  WBC 8.6, Labs consistent with ESRD  BCx 11/4; 2/2 Klebsiella Pneumoniae group; No ESBL 2/2 GNR    Received a dose of Vancomycin and Zosyn; Zosyn continued    # Klebsiella bacteremia associated with Rt Groin CVC  # fever, tachycardia, sepsis resolved       PLAN:   - Repeat blood cultures, NTD   - c/w ceftriaxone 2 gms q 24 hours  - trend cbc, no leucocytosis   - TTE with no abnormality   - MRI of LS spine, with no OM   - CT abd/pelvis with stranding around the groin area.   - can transition to po cipro 500 mg daily on discharge to complete 14 days from negative blood cx.       Plan discussed with medicine Attending.    Will sign off, please call with questions.       Maura Girard  Please contact through MS Teams   If no response or past 5 pm/weekend call [phone].

## 2023-11-09 NOTE — CONSULT NOTE ADULT - SUBJECTIVE AND OBJECTIVE BOX
Interventional Radiology    Evaluate for Procedure: Permacath placement    HPI: 58y Male with  58y Male with dislodged right groin tunneled HD catheter and sepsis/bacteremia. R groin line removed. IR placed Shiley o 11/6/23. Blood cultures have remained negative since 11/5/23. IR consulted for Permacath placement.     Allergies: IV Contrast (Anaphylaxis)    Medications (Abx/Cardiac/Anticoagulation/Blood Products)    apixaban: 5 milliGRAM(s) Oral (11-09 @ 05:11)  cefTRIAXone   IVPB: 100 mL/Hr IV Intermittent (11-09 @ 14:34)  metoprolol tartrate: 25 milliGRAM(s) Oral (11-09 @ 05:11)    Data:    T(C): 36.9  HR: 97  BP: 128/84  RR: 18  SpO2: 92%    -WBC 5.27 / HgB 9.0 / Hct 30.1 / Plt 205  -Na 137 / Cl 97 / BUN 36 / Glucose 95  -K 4.1 / CO2 22 / Cr 9.76  -ALT -- / Alk Phos -- / T.Bili --  -INR 1.53 / PTT 34.7      Assessment/Plan:   -58y Male with dislodged right groin tunneled HD catheter and sepsis/bacteremia. R groin line removed. IR placed Shiley on 11/6/23. Blood cultures have remained negative since 11/5/23. IR consulted for Permacath placement.       - case reviewed and approved for tomorrow 11/10/23  - please place IR procedure order under Dr. Lacy  - STAT labs in AM (cbc,coags, bmp, T&S)  - hold Eliquis starting today  - NPO today at 11pm

## 2023-11-09 NOTE — PROGRESS NOTE ADULT - PROBLEM SELECTOR PLAN 1
Patient found to have gram negative rods on BCx X2 on 11/4 likely HD catheter associated  - BCx growing Klebsiella PNA  - continue ceftriaxone 2g q24 for now, can eventually transition to cipro on discharge  - repeat cultures NGTD  - ID recs appreciated

## 2023-11-10 LAB
ANION GAP SERPL CALC-SCNC: 23 MMOL/L — HIGH (ref 5–17)
ANION GAP SERPL CALC-SCNC: 23 MMOL/L — HIGH (ref 5–17)
APTT BLD: 28.7 SEC — SIGNIFICANT CHANGE UP (ref 24.5–35.6)
APTT BLD: 28.7 SEC — SIGNIFICANT CHANGE UP (ref 24.5–35.6)
BLD GP AB SCN SERPL QL: NEGATIVE — SIGNIFICANT CHANGE UP
BLD GP AB SCN SERPL QL: NEGATIVE — SIGNIFICANT CHANGE UP
BUN SERPL-MCNC: 46 MG/DL — HIGH (ref 7–23)
BUN SERPL-MCNC: 46 MG/DL — HIGH (ref 7–23)
CALCIUM SERPL-MCNC: 10.4 MG/DL — SIGNIFICANT CHANGE UP (ref 8.4–10.5)
CALCIUM SERPL-MCNC: 10.4 MG/DL — SIGNIFICANT CHANGE UP (ref 8.4–10.5)
CHLORIDE SERPL-SCNC: 97 MMOL/L — SIGNIFICANT CHANGE UP (ref 96–108)
CHLORIDE SERPL-SCNC: 97 MMOL/L — SIGNIFICANT CHANGE UP (ref 96–108)
CO2 SERPL-SCNC: 19 MMOL/L — LOW (ref 22–31)
CO2 SERPL-SCNC: 19 MMOL/L — LOW (ref 22–31)
CREAT SERPL-MCNC: 11.52 MG/DL — HIGH (ref 0.5–1.3)
CREAT SERPL-MCNC: 11.52 MG/DL — HIGH (ref 0.5–1.3)
EGFR: 5 ML/MIN/1.73M2 — LOW
EGFR: 5 ML/MIN/1.73M2 — LOW
GLUCOSE BLDC GLUCOMTR-MCNC: 102 MG/DL — HIGH (ref 70–99)
GLUCOSE BLDC GLUCOMTR-MCNC: 102 MG/DL — HIGH (ref 70–99)
GLUCOSE BLDC GLUCOMTR-MCNC: 124 MG/DL — HIGH (ref 70–99)
GLUCOSE BLDC GLUCOMTR-MCNC: 124 MG/DL — HIGH (ref 70–99)
GLUCOSE BLDC GLUCOMTR-MCNC: 126 MG/DL — HIGH (ref 70–99)
GLUCOSE BLDC GLUCOMTR-MCNC: 126 MG/DL — HIGH (ref 70–99)
GLUCOSE BLDC GLUCOMTR-MCNC: 91 MG/DL — SIGNIFICANT CHANGE UP (ref 70–99)
GLUCOSE BLDC GLUCOMTR-MCNC: 91 MG/DL — SIGNIFICANT CHANGE UP (ref 70–99)
GLUCOSE BLDC GLUCOMTR-MCNC: 98 MG/DL — SIGNIFICANT CHANGE UP (ref 70–99)
GLUCOSE BLDC GLUCOMTR-MCNC: 98 MG/DL — SIGNIFICANT CHANGE UP (ref 70–99)
GLUCOSE SERPL-MCNC: 91 MG/DL — SIGNIFICANT CHANGE UP (ref 70–99)
GLUCOSE SERPL-MCNC: 91 MG/DL — SIGNIFICANT CHANGE UP (ref 70–99)
HCT VFR BLD CALC: 32.9 % — LOW (ref 39–50)
HCT VFR BLD CALC: 32.9 % — LOW (ref 39–50)
HGB BLD-MCNC: 9.7 G/DL — LOW (ref 13–17)
HGB BLD-MCNC: 9.7 G/DL — LOW (ref 13–17)
INR BLD: 1.31 RATIO — HIGH (ref 0.85–1.18)
INR BLD: 1.31 RATIO — HIGH (ref 0.85–1.18)
MAGNESIUM SERPL-MCNC: 2.4 MG/DL — SIGNIFICANT CHANGE UP (ref 1.6–2.6)
MAGNESIUM SERPL-MCNC: 2.4 MG/DL — SIGNIFICANT CHANGE UP (ref 1.6–2.6)
MCHC RBC-ENTMCNC: 19.5 PG — LOW (ref 27–34)
MCHC RBC-ENTMCNC: 19.5 PG — LOW (ref 27–34)
MCHC RBC-ENTMCNC: 29.5 GM/DL — LOW (ref 32–36)
MCHC RBC-ENTMCNC: 29.5 GM/DL — LOW (ref 32–36)
MCV RBC AUTO: 66.2 FL — LOW (ref 80–100)
MCV RBC AUTO: 66.2 FL — LOW (ref 80–100)
NRBC # BLD: 0 /100 WBCS — SIGNIFICANT CHANGE UP (ref 0–0)
NRBC # BLD: 0 /100 WBCS — SIGNIFICANT CHANGE UP (ref 0–0)
PHOSPHATE SERPL-MCNC: 6 MG/DL — HIGH (ref 2.5–4.5)
PHOSPHATE SERPL-MCNC: 6 MG/DL — HIGH (ref 2.5–4.5)
PLATELET # BLD AUTO: 226 K/UL — SIGNIFICANT CHANGE UP (ref 150–400)
PLATELET # BLD AUTO: 226 K/UL — SIGNIFICANT CHANGE UP (ref 150–400)
POTASSIUM SERPL-MCNC: 4.8 MMOL/L — SIGNIFICANT CHANGE UP (ref 3.5–5.3)
POTASSIUM SERPL-MCNC: 4.8 MMOL/L — SIGNIFICANT CHANGE UP (ref 3.5–5.3)
POTASSIUM SERPL-SCNC: 4.8 MMOL/L — SIGNIFICANT CHANGE UP (ref 3.5–5.3)
POTASSIUM SERPL-SCNC: 4.8 MMOL/L — SIGNIFICANT CHANGE UP (ref 3.5–5.3)
PROTHROM AB SERPL-ACNC: 13.6 SEC — HIGH (ref 9.5–13)
PROTHROM AB SERPL-ACNC: 13.6 SEC — HIGH (ref 9.5–13)
RBC # BLD: 4.97 M/UL — SIGNIFICANT CHANGE UP (ref 4.2–5.8)
RBC # BLD: 4.97 M/UL — SIGNIFICANT CHANGE UP (ref 4.2–5.8)
RBC # FLD: 20 % — HIGH (ref 10.3–14.5)
RBC # FLD: 20 % — HIGH (ref 10.3–14.5)
RH IG SCN BLD-IMP: POSITIVE — SIGNIFICANT CHANGE UP
RH IG SCN BLD-IMP: POSITIVE — SIGNIFICANT CHANGE UP
SODIUM SERPL-SCNC: 139 MMOL/L — SIGNIFICANT CHANGE UP (ref 135–145)
SODIUM SERPL-SCNC: 139 MMOL/L — SIGNIFICANT CHANGE UP (ref 135–145)
WBC # BLD: 6.39 K/UL — SIGNIFICANT CHANGE UP (ref 3.8–10.5)
WBC # BLD: 6.39 K/UL — SIGNIFICANT CHANGE UP (ref 3.8–10.5)
WBC # FLD AUTO: 6.39 K/UL — SIGNIFICANT CHANGE UP (ref 3.8–10.5)
WBC # FLD AUTO: 6.39 K/UL — SIGNIFICANT CHANGE UP (ref 3.8–10.5)

## 2023-11-10 PROCEDURE — 99233 SBSQ HOSP IP/OBS HIGH 50: CPT

## 2023-11-10 PROCEDURE — 77001 FLUOROGUIDE FOR VEIN DEVICE: CPT | Mod: 26

## 2023-11-10 PROCEDURE — 36558 INSERT TUNNELED CV CATH: CPT | Mod: RT

## 2023-11-10 RX ORDER — SEVELAMER CARBONATE 2400 MG/1
1600 POWDER, FOR SUSPENSION ORAL
Refills: 0 | Status: DISCONTINUED | OUTPATIENT
Start: 2023-11-10 | End: 2023-11-22

## 2023-11-10 RX ORDER — APIXABAN 2.5 MG/1
5 TABLET, FILM COATED ORAL
Refills: 0 | Status: DISCONTINUED | OUTPATIENT
Start: 2023-11-11 | End: 2023-11-16

## 2023-11-10 RX ORDER — ACETAMINOPHEN 500 MG
650 TABLET ORAL ONCE
Refills: 0 | Status: COMPLETED | OUTPATIENT
Start: 2023-11-10 | End: 2023-11-10

## 2023-11-10 RX ADMIN — SEVELAMER CARBONATE 1600 MILLIGRAM(S): 2400 POWDER, FOR SUSPENSION ORAL at 19:02

## 2023-11-10 RX ADMIN — Medication 650 MILLIGRAM(S): at 13:17

## 2023-11-10 RX ADMIN — CHLORHEXIDINE GLUCONATE 1 APPLICATION(S): 213 SOLUTION TOPICAL at 06:23

## 2023-11-10 RX ADMIN — ATORVASTATIN CALCIUM 40 MILLIGRAM(S): 80 TABLET, FILM COATED ORAL at 21:06

## 2023-11-10 RX ADMIN — Medication 25 MILLIGRAM(S): at 19:02

## 2023-11-10 RX ADMIN — Medication 25 MILLIGRAM(S): at 06:22

## 2023-11-10 RX ADMIN — PHENYLEPHRINE-SHARK LIVER OIL-MINERAL OIL-PETROLATUM RECTAL OINTMENT 1 APPLICATION(S): at 06:23

## 2023-11-10 RX ADMIN — ERYTHROPOIETIN 10000 UNIT(S): 10000 INJECTION, SOLUTION INTRAVENOUS; SUBCUTANEOUS at 21:36

## 2023-11-10 RX ADMIN — Medication 650 MILLIGRAM(S): at 12:47

## 2023-11-10 RX ADMIN — CEFTRIAXONE 100 MILLIGRAM(S): 500 INJECTION, POWDER, FOR SOLUTION INTRAMUSCULAR; INTRAVENOUS at 19:00

## 2023-11-10 NOTE — PROGRESS NOTE ADULT - ASSESSMENT
58-year-old man with PMH of ESRD on hemodialysis via groin catheter, Monday Wednesday Friday, BENNIE, hypertension, hyperlipidemia, presenting due to weakness for 2 days and fever of 101 Fahrenheit, fatigue, chills, vomiting episodes.    Recommendations:  - No acute surgical intervention recommend at this time  - Bilateral upper extremity vein mapping outpatient  - CT venogram canceled d/t severe contrast allergy  - Pt should follow up with Dr Mckoy within 2 weeks of discharge for outpatient care      Vascular Surgery  [id number]

## 2023-11-10 NOTE — PRE PROCEDURE NOTE - PRE PROCEDURE EVALUATION
Interventional Radiology    HPI: 58y Male with ESRD requiring long term HD s/p REJ nontunneled HD catheter placed 11/6 now presents for conversion to tunneled HD catheter.     Allergies: IV Contrast (Anaphylaxis)    Medications (Abx/Cardiac/Anticoagulation/Blood Products)  apixaban: 5 milliGRAM(s) Oral (11-09 @ 05:11)  cefTRIAXone   IVPB: 100 mL/Hr IV Intermittent (11-09 @ 14:34)  metoprolol tartrate: 25 milliGRAM(s) Oral (11-10 @ 06:22)    Data:    T(C): 36.4  HR: 68  BP: 143/96  RR: 16  SpO2: 96%    Exam  General: No acute distress  Chest: Non labored breathing  Abdomen: Non-distended  Extremities: No swelling, warm    -WBC 6.39 / HgB 9.7 / Hct 32.9 / Plt 226  -Na 139 / Cl 97 / BUN 46 / Glucose 91  -K 4.8 / CO2 19 / Cr 11.52  -ALT -- / Alk Phos -- / T.Bili --  -INR1.31    Imaging: Reviewed    Plan: 58y Male presents for tunneled HD catheter placement.   -Risks/Benefits/alternatives explained with the patient and/or healthcare proxy and witnessed informed consent obtained.

## 2023-11-10 NOTE — PROGRESS NOTE ADULT - ASSESSMENT
58-year-old man with PMH of ESRD on hemodialysis via groin catheter, Monday Wednesday Friday, BENNIE, hypertension, hyperlipidemia, presenting due to weakness for 2 days and fever of 101 Fahrenheit, fatigue, chills, vomiting episodes that started today.  Patient states that yesterday even though he felt unwell he went to his usual dialysis appointment which finished without any significant events.  Nephrology consulted for ESRD on HD.    A/P:  ESRD on HD: [facility]  Dialysis center: [facility]  Nephrologist: Dr. Neftaly Holden  Access: CVC R groin; now dislodged.  Consent signed, witnessed, and placed in pt's chart.  s/p Shiley on 11/6   s/p HD On 11/8; UF'd 1L.  HD after PC placement today.  Renal diet.  Monitor BMP and UO.    HTN:  BP controlled.  UF w/ HD.  Monitor BP.    Hyperkalemia:  Likely in setting of ESRD.  improved  Monitor K closely.  Low K diet.    Anemia:  Monitor Hgb.  Epogen w/ HD.  Transfuse for Hgb <8.    CKD: MBD:   - acceptable.  PO4 worsening.  Increase sevelamer to 1600mg TID.  Low PO4 diet.  C/W sevelamer w/ meals.  Monitor Ca and PO4 daily.    Hypercalcemia:  Possibly in setting of dehydration vs. calcitriol.  PTH acceptable for ESRD; Vit. D 44.2.  D/C'd calcitriol - no need for vit. d analog at this time.  Monitor Ca.    Fever:  Blood cultures with gram neg. rods.  Possibly CVC associated infection.  Received zosyn and vancomycin.

## 2023-11-10 NOTE — PROGRESS NOTE ADULT - PROBLEM SELECTOR PLAN 4
Patient w/ ESRD on HD M/W/F presents with dislodged groin HD catheter  -s/p shiley placement w/ IR on 11/6. Plan for permacath 11/10  - ESRD c/b hyperkalemia s/p lokelma, now resolved  - continue to monitor electrolytes  - continue sevelemer TID  - plan for HD per nephrology  - vascular consulted, vein mapping pending  - will need sutures from groin removed (pt is currently in IR procedure, unable to remove)

## 2023-11-10 NOTE — PROCEDURE NOTE - PROCEDURE FINDINGS AND DETAILS
Successful fluoroscopic-guided conversion of right external jugular vein non-tunneled to tunneled hemodialysis catheter.   Dialysis catheter tip is in the distal SVC, at the cavo-atrial junction.  Patient tolerated the procedure well.   OK to use catheter.

## 2023-11-10 NOTE — PROGRESS NOTE ADULT - PROBLEM SELECTOR PLAN 1
Patient found to have gram negative rods on BCx X2 on 11/4 likely HD catheter associated  - BCx growing Klebsiella PNA  - continue ceftriaxone 2g q24 for now, can eventually transition to cipro on discharge  - repeat cultures NGTD  - ID recs appreciated Patient found to have gram negative rods on BCx X2 on 11/4 likely HD catheter associated  - BCx growing Klebsiella PNA  - continue ceftriaxone 2g q24 for now, can eventually transition to cipro 500 mg daily on discharge to complete 14 days from negative blood cx  - repeat cultures NGTD  - ID recs appreciated

## 2023-11-11 LAB
ANION GAP SERPL CALC-SCNC: 18 MMOL/L — HIGH (ref 5–17)
ANION GAP SERPL CALC-SCNC: 18 MMOL/L — HIGH (ref 5–17)
BUN SERPL-MCNC: 30 MG/DL — HIGH (ref 7–23)
BUN SERPL-MCNC: 30 MG/DL — HIGH (ref 7–23)
CALCIUM SERPL-MCNC: 10.3 MG/DL — SIGNIFICANT CHANGE UP (ref 8.4–10.5)
CALCIUM SERPL-MCNC: 10.3 MG/DL — SIGNIFICANT CHANGE UP (ref 8.4–10.5)
CHLORIDE SERPL-SCNC: 98 MMOL/L — SIGNIFICANT CHANGE UP (ref 96–108)
CHLORIDE SERPL-SCNC: 98 MMOL/L — SIGNIFICANT CHANGE UP (ref 96–108)
CO2 SERPL-SCNC: 22 MMOL/L — SIGNIFICANT CHANGE UP (ref 22–31)
CO2 SERPL-SCNC: 22 MMOL/L — SIGNIFICANT CHANGE UP (ref 22–31)
CREAT SERPL-MCNC: 8.75 MG/DL — HIGH (ref 0.5–1.3)
CREAT SERPL-MCNC: 8.75 MG/DL — HIGH (ref 0.5–1.3)
CULTURE RESULTS: SIGNIFICANT CHANGE UP
EGFR: 6 ML/MIN/1.73M2 — LOW
EGFR: 6 ML/MIN/1.73M2 — LOW
GLUCOSE BLDC GLUCOMTR-MCNC: 101 MG/DL — HIGH (ref 70–99)
GLUCOSE BLDC GLUCOMTR-MCNC: 101 MG/DL — HIGH (ref 70–99)
GLUCOSE BLDC GLUCOMTR-MCNC: 106 MG/DL — HIGH (ref 70–99)
GLUCOSE BLDC GLUCOMTR-MCNC: 106 MG/DL — HIGH (ref 70–99)
GLUCOSE BLDC GLUCOMTR-MCNC: 108 MG/DL — HIGH (ref 70–99)
GLUCOSE BLDC GLUCOMTR-MCNC: 108 MG/DL — HIGH (ref 70–99)
GLUCOSE BLDC GLUCOMTR-MCNC: 96 MG/DL — SIGNIFICANT CHANGE UP (ref 70–99)
GLUCOSE BLDC GLUCOMTR-MCNC: 96 MG/DL — SIGNIFICANT CHANGE UP (ref 70–99)
GLUCOSE SERPL-MCNC: 88 MG/DL — SIGNIFICANT CHANGE UP (ref 70–99)
GLUCOSE SERPL-MCNC: 88 MG/DL — SIGNIFICANT CHANGE UP (ref 70–99)
HCT VFR BLD CALC: 34.3 % — LOW (ref 39–50)
HCT VFR BLD CALC: 34.3 % — LOW (ref 39–50)
HGB BLD-MCNC: 10.2 G/DL — LOW (ref 13–17)
HGB BLD-MCNC: 10.2 G/DL — LOW (ref 13–17)
MAGNESIUM SERPL-MCNC: 2.1 MG/DL — SIGNIFICANT CHANGE UP (ref 1.6–2.6)
MAGNESIUM SERPL-MCNC: 2.1 MG/DL — SIGNIFICANT CHANGE UP (ref 1.6–2.6)
MCHC RBC-ENTMCNC: 19.5 PG — LOW (ref 27–34)
MCHC RBC-ENTMCNC: 19.5 PG — LOW (ref 27–34)
MCHC RBC-ENTMCNC: 29.7 GM/DL — LOW (ref 32–36)
MCHC RBC-ENTMCNC: 29.7 GM/DL — LOW (ref 32–36)
MCV RBC AUTO: 65.7 FL — LOW (ref 80–100)
MCV RBC AUTO: 65.7 FL — LOW (ref 80–100)
NRBC # BLD: 0 /100 WBCS — SIGNIFICANT CHANGE UP (ref 0–0)
NRBC # BLD: 0 /100 WBCS — SIGNIFICANT CHANGE UP (ref 0–0)
PHOSPHATE SERPL-MCNC: 4.3 MG/DL — SIGNIFICANT CHANGE UP (ref 2.5–4.5)
PHOSPHATE SERPL-MCNC: 4.3 MG/DL — SIGNIFICANT CHANGE UP (ref 2.5–4.5)
PLATELET # BLD AUTO: 247 K/UL — SIGNIFICANT CHANGE UP (ref 150–400)
PLATELET # BLD AUTO: 247 K/UL — SIGNIFICANT CHANGE UP (ref 150–400)
POTASSIUM SERPL-MCNC: 4.1 MMOL/L — SIGNIFICANT CHANGE UP (ref 3.5–5.3)
POTASSIUM SERPL-MCNC: 4.1 MMOL/L — SIGNIFICANT CHANGE UP (ref 3.5–5.3)
POTASSIUM SERPL-SCNC: 4.1 MMOL/L — SIGNIFICANT CHANGE UP (ref 3.5–5.3)
POTASSIUM SERPL-SCNC: 4.1 MMOL/L — SIGNIFICANT CHANGE UP (ref 3.5–5.3)
RBC # BLD: 5.22 M/UL — SIGNIFICANT CHANGE UP (ref 4.2–5.8)
RBC # BLD: 5.22 M/UL — SIGNIFICANT CHANGE UP (ref 4.2–5.8)
RBC # FLD: 20.4 % — HIGH (ref 10.3–14.5)
RBC # FLD: 20.4 % — HIGH (ref 10.3–14.5)
SODIUM SERPL-SCNC: 138 MMOL/L — SIGNIFICANT CHANGE UP (ref 135–145)
SODIUM SERPL-SCNC: 138 MMOL/L — SIGNIFICANT CHANGE UP (ref 135–145)
SPECIMEN SOURCE: SIGNIFICANT CHANGE UP
WBC # BLD: 7.76 K/UL — SIGNIFICANT CHANGE UP (ref 3.8–10.5)
WBC # BLD: 7.76 K/UL — SIGNIFICANT CHANGE UP (ref 3.8–10.5)
WBC # FLD AUTO: 7.76 K/UL — SIGNIFICANT CHANGE UP (ref 3.8–10.5)
WBC # FLD AUTO: 7.76 K/UL — SIGNIFICANT CHANGE UP (ref 3.8–10.5)

## 2023-11-11 PROCEDURE — 99232 SBSQ HOSP IP/OBS MODERATE 35: CPT

## 2023-11-11 RX ORDER — BACITRACIN ZINC 500 UNIT/G
1 OINTMENT IN PACKET (EA) TOPICAL
Refills: 0 | Status: DISCONTINUED | OUTPATIENT
Start: 2023-11-11 | End: 2023-11-21

## 2023-11-11 RX ORDER — ACETAMINOPHEN 500 MG
650 TABLET ORAL ONCE
Refills: 0 | Status: COMPLETED | OUTPATIENT
Start: 2023-11-11 | End: 2023-11-11

## 2023-11-11 RX ADMIN — Medication 650 MILLIGRAM(S): at 13:21

## 2023-11-11 RX ADMIN — Medication 1 APPLICATION(S): at 07:06

## 2023-11-11 RX ADMIN — SEVELAMER CARBONATE 1600 MILLIGRAM(S): 2400 POWDER, FOR SUSPENSION ORAL at 12:47

## 2023-11-11 RX ADMIN — SEVELAMER CARBONATE 1600 MILLIGRAM(S): 2400 POWDER, FOR SUSPENSION ORAL at 17:48

## 2023-11-11 RX ADMIN — APIXABAN 5 MILLIGRAM(S): 2.5 TABLET, FILM COATED ORAL at 05:49

## 2023-11-11 RX ADMIN — Medication 650 MILLIGRAM(S): at 14:21

## 2023-11-11 RX ADMIN — PHENYLEPHRINE-SHARK LIVER OIL-MINERAL OIL-PETROLATUM RECTAL OINTMENT 1 APPLICATION(S): at 05:52

## 2023-11-11 RX ADMIN — Medication 650 MILLIGRAM(S): at 07:06

## 2023-11-11 RX ADMIN — CEFTRIAXONE 100 MILLIGRAM(S): 500 INJECTION, POWDER, FOR SOLUTION INTRAMUSCULAR; INTRAVENOUS at 16:04

## 2023-11-11 RX ADMIN — POLYETHYLENE GLYCOL 3350 17 GRAM(S): 17 POWDER, FOR SOLUTION ORAL at 12:53

## 2023-11-11 RX ADMIN — Medication 650 MILLIGRAM(S): at 08:06

## 2023-11-11 RX ADMIN — Medication 1 APPLICATION(S): at 19:01

## 2023-11-11 RX ADMIN — Medication 25 MILLIGRAM(S): at 17:50

## 2023-11-11 RX ADMIN — SEVELAMER CARBONATE 1600 MILLIGRAM(S): 2400 POWDER, FOR SUSPENSION ORAL at 08:58

## 2023-11-11 RX ADMIN — ATORVASTATIN CALCIUM 40 MILLIGRAM(S): 80 TABLET, FILM COATED ORAL at 21:28

## 2023-11-11 RX ADMIN — CHLORHEXIDINE GLUCONATE 1 APPLICATION(S): 213 SOLUTION TOPICAL at 05:52

## 2023-11-11 RX ADMIN — APIXABAN 5 MILLIGRAM(S): 2.5 TABLET, FILM COATED ORAL at 17:48

## 2023-11-11 RX ADMIN — Medication 25 MILLIGRAM(S): at 05:49

## 2023-11-11 NOTE — PROGRESS NOTE ADULT - PROBLEM SELECTOR PLAN 4
Patient w/ ESRD on HD M/W/F presents with dislodged groin HD catheter  -s/p shiley placement w/ IR on 11/6. Plan for permacath 11/10  - ESRD c/b hyperkalemia s/p lokelma, now resolved  - continue to monitor electrolytes  - continue sevelemer TID  - plan for HD per nephrology  - vascular consulted, vein mapping pending

## 2023-11-11 NOTE — PROGRESS NOTE ADULT - PROBLEM SELECTOR PLAN 1
Patient found to have gram negative rods on BCx X2 on 11/4 likely HD catheter associated  - BCx growing Klebsiella PNA  - continue ceftriaxone 2g q24 for now, can eventually transition to cipro 500 mg daily on discharge to complete 14 days from negative blood cx ( on 11/20 )  - repeat cultures NGTD  - ID recs appreciated

## 2023-11-11 NOTE — PROGRESS NOTE ADULT - ASSESSMENT
58-year-old man with PMH of ESRD on hemodialysis via groin catheter, Monday Wednesday Friday, BENNIE, hypertension, hyperlipidemia, presenting due to weakness for 2 days and fever of 101 Fahrenheit, fatigue, chills, vomiting episodes that started today.  Patient states that yesterday even though he felt unwell he went to his usual dialysis appointment which finished without any significant events.  Nephrology consulted for ESRD on HD.    A/P:  ESRD on HD: [facility]  Dialysis center: [facility]  Nephrologist: Dr. Neftaly Holden  Access: CVC R groin; now dislodged.  Consent signed, witnessed, and placed in pt's chart.  s/p Shiley on 11/6--s/p removal however pt reported pain at site last night. bacitracin applied  s/p Permacath on 11/10    s/p HD On 11/10 via permacath with no issue  Continue [facility]   Renal diet.  Monitor BMP and UO.    HTN:  BP controlled.  UF w/ HD.  Monitor BP.    Hyperkalemia:  Likely in setting of ESRD.  Monitor K closely.  Low K diet.    Anemia:  Monitor Hgb.  Epogen w/ HD.  Transfuse for Hgb <8.    CKD: MBD:   - acceptable.  sevelamer to 1600mg TID.  Low PO4 diet.  C/W sevelamer w/ meals.  Monitor Ca and PO4 daily.    Hypercalcemia:  Possibly in setting of dehydration vs. calcitriol.  PTH acceptable for ESRD; Vit. D 44.2.  D/C'd calcitriol - no need for vit. d analog at this time.  Monitor Ca.    Fever:  Blood cultures with gram neg. rods.  Possibly CVC associated infection.  ID follow up

## 2023-11-12 LAB
ANION GAP SERPL CALC-SCNC: 18 MMOL/L — HIGH (ref 5–17)
ANION GAP SERPL CALC-SCNC: 18 MMOL/L — HIGH (ref 5–17)
BUN SERPL-MCNC: 40 MG/DL — HIGH (ref 7–23)
BUN SERPL-MCNC: 40 MG/DL — HIGH (ref 7–23)
CALCIUM SERPL-MCNC: 10.4 MG/DL — SIGNIFICANT CHANGE UP (ref 8.4–10.5)
CALCIUM SERPL-MCNC: 10.4 MG/DL — SIGNIFICANT CHANGE UP (ref 8.4–10.5)
CHLORIDE SERPL-SCNC: 99 MMOL/L — SIGNIFICANT CHANGE UP (ref 96–108)
CHLORIDE SERPL-SCNC: 99 MMOL/L — SIGNIFICANT CHANGE UP (ref 96–108)
CO2 SERPL-SCNC: 22 MMOL/L — SIGNIFICANT CHANGE UP (ref 22–31)
CO2 SERPL-SCNC: 22 MMOL/L — SIGNIFICANT CHANGE UP (ref 22–31)
CREAT SERPL-MCNC: 10.74 MG/DL — HIGH (ref 0.5–1.3)
CREAT SERPL-MCNC: 10.74 MG/DL — HIGH (ref 0.5–1.3)
EGFR: 5 ML/MIN/1.73M2 — LOW
EGFR: 5 ML/MIN/1.73M2 — LOW
GLUCOSE BLDC GLUCOMTR-MCNC: 106 MG/DL — HIGH (ref 70–99)
GLUCOSE BLDC GLUCOMTR-MCNC: 106 MG/DL — HIGH (ref 70–99)
GLUCOSE BLDC GLUCOMTR-MCNC: 113 MG/DL — HIGH (ref 70–99)
GLUCOSE BLDC GLUCOMTR-MCNC: 113 MG/DL — HIGH (ref 70–99)
GLUCOSE BLDC GLUCOMTR-MCNC: 118 MG/DL — HIGH (ref 70–99)
GLUCOSE BLDC GLUCOMTR-MCNC: 118 MG/DL — HIGH (ref 70–99)
GLUCOSE BLDC GLUCOMTR-MCNC: 120 MG/DL — HIGH (ref 70–99)
GLUCOSE BLDC GLUCOMTR-MCNC: 120 MG/DL — HIGH (ref 70–99)
GLUCOSE SERPL-MCNC: 95 MG/DL — SIGNIFICANT CHANGE UP (ref 70–99)
GLUCOSE SERPL-MCNC: 95 MG/DL — SIGNIFICANT CHANGE UP (ref 70–99)
HCT VFR BLD CALC: 34.4 % — LOW (ref 39–50)
HCT VFR BLD CALC: 34.4 % — LOW (ref 39–50)
HGB BLD-MCNC: 10.1 G/DL — LOW (ref 13–17)
HGB BLD-MCNC: 10.1 G/DL — LOW (ref 13–17)
MCHC RBC-ENTMCNC: 19.5 PG — LOW (ref 27–34)
MCHC RBC-ENTMCNC: 19.5 PG — LOW (ref 27–34)
MCHC RBC-ENTMCNC: 29.4 GM/DL — LOW (ref 32–36)
MCHC RBC-ENTMCNC: 29.4 GM/DL — LOW (ref 32–36)
MCV RBC AUTO: 66.4 FL — LOW (ref 80–100)
MCV RBC AUTO: 66.4 FL — LOW (ref 80–100)
NRBC # BLD: 0 /100 WBCS — SIGNIFICANT CHANGE UP (ref 0–0)
NRBC # BLD: 0 /100 WBCS — SIGNIFICANT CHANGE UP (ref 0–0)
PLATELET # BLD AUTO: 281 K/UL — SIGNIFICANT CHANGE UP (ref 150–400)
PLATELET # BLD AUTO: 281 K/UL — SIGNIFICANT CHANGE UP (ref 150–400)
POTASSIUM SERPL-MCNC: 4 MMOL/L — SIGNIFICANT CHANGE UP (ref 3.5–5.3)
POTASSIUM SERPL-MCNC: 4 MMOL/L — SIGNIFICANT CHANGE UP (ref 3.5–5.3)
POTASSIUM SERPL-SCNC: 4 MMOL/L — SIGNIFICANT CHANGE UP (ref 3.5–5.3)
POTASSIUM SERPL-SCNC: 4 MMOL/L — SIGNIFICANT CHANGE UP (ref 3.5–5.3)
RBC # BLD: 5.18 M/UL — SIGNIFICANT CHANGE UP (ref 4.2–5.8)
RBC # BLD: 5.18 M/UL — SIGNIFICANT CHANGE UP (ref 4.2–5.8)
RBC # FLD: 20.1 % — HIGH (ref 10.3–14.5)
RBC # FLD: 20.1 % — HIGH (ref 10.3–14.5)
SODIUM SERPL-SCNC: 139 MMOL/L — SIGNIFICANT CHANGE UP (ref 135–145)
SODIUM SERPL-SCNC: 139 MMOL/L — SIGNIFICANT CHANGE UP (ref 135–145)
WBC # BLD: 8.28 K/UL — SIGNIFICANT CHANGE UP (ref 3.8–10.5)
WBC # BLD: 8.28 K/UL — SIGNIFICANT CHANGE UP (ref 3.8–10.5)
WBC # FLD AUTO: 8.28 K/UL — SIGNIFICANT CHANGE UP (ref 3.8–10.5)
WBC # FLD AUTO: 8.28 K/UL — SIGNIFICANT CHANGE UP (ref 3.8–10.5)

## 2023-11-12 PROCEDURE — 99232 SBSQ HOSP IP/OBS MODERATE 35: CPT

## 2023-11-12 RX ORDER — ACETAMINOPHEN 500 MG
650 TABLET ORAL ONCE
Refills: 0 | Status: COMPLETED | OUTPATIENT
Start: 2023-11-12 | End: 2023-11-12

## 2023-11-12 RX ORDER — ACETAMINOPHEN 500 MG
650 TABLET ORAL EVERY 6 HOURS
Refills: 0 | Status: DISCONTINUED | OUTPATIENT
Start: 2023-11-12 | End: 2023-11-21

## 2023-11-12 RX ADMIN — APIXABAN 5 MILLIGRAM(S): 2.5 TABLET, FILM COATED ORAL at 17:42

## 2023-11-12 RX ADMIN — Medication 650 MILLIGRAM(S): at 21:35

## 2023-11-12 RX ADMIN — SEVELAMER CARBONATE 1600 MILLIGRAM(S): 2400 POWDER, FOR SUSPENSION ORAL at 09:34

## 2023-11-12 RX ADMIN — Medication 1 APPLICATION(S): at 05:39

## 2023-11-12 RX ADMIN — CHLORHEXIDINE GLUCONATE 1 APPLICATION(S): 213 SOLUTION TOPICAL at 12:25

## 2023-11-12 RX ADMIN — Medication 25 MILLIGRAM(S): at 17:42

## 2023-11-12 RX ADMIN — Medication 25 MILLIGRAM(S): at 05:39

## 2023-11-12 RX ADMIN — APIXABAN 5 MILLIGRAM(S): 2.5 TABLET, FILM COATED ORAL at 05:39

## 2023-11-12 RX ADMIN — SEVELAMER CARBONATE 1600 MILLIGRAM(S): 2400 POWDER, FOR SUSPENSION ORAL at 17:42

## 2023-11-12 RX ADMIN — SEVELAMER CARBONATE 1600 MILLIGRAM(S): 2400 POWDER, FOR SUSPENSION ORAL at 12:14

## 2023-11-12 RX ADMIN — Medication 650 MILLIGRAM(S): at 05:58

## 2023-11-12 RX ADMIN — ATORVASTATIN CALCIUM 40 MILLIGRAM(S): 80 TABLET, FILM COATED ORAL at 21:32

## 2023-11-12 RX ADMIN — CEFTRIAXONE 100 MILLIGRAM(S): 500 INJECTION, POWDER, FOR SOLUTION INTRAMUSCULAR; INTRAVENOUS at 15:24

## 2023-11-12 RX ADMIN — Medication 650 MILLIGRAM(S): at 22:05

## 2023-11-12 RX ADMIN — Medication 1 APPLICATION(S): at 17:43

## 2023-11-12 NOTE — PROGRESS NOTE ADULT - ASSESSMENT
58-year-old man with PMH of ESRD on hemodialysis via groin catheter, Monday Wednesday Friday, BENNIE, hypertension, hyperlipidemia, presenting due to weakness for 2 days and fever of 101 Fahrenheit, fatigue, chills, vomiting episodes.    Recommendations:  - No acute surgical intervention recommend at this time  - Bilateral upper extremity vein mapping outpatient  - CT venogram canceled d/t severe contrast allergy  - Pt should follow up with Dr Mckoy within 2 weeks of discharge for outpatient care      Vascular Surgery  [id number] 58-year-old man with PMH of ESRD on hemodialysis via groin catheter, Monday Wednesday Friday, BENNIE, hypertension, hyperlipidemia, presenting due to weakness for 2 days and fever of 101 Fahrenheit, fatigue, chills, vomiting episodes.    Recommendations:  - No acute surgical intervention recommend at this time  - Bilateral upper extremity vein mapping outpatient  - please obtain CT venogram, patient will need to be premedicated for scan  - Pt should follow up with Dr Mckoy within 2 weeks of discharge for outpatient care      Vascular Surgery  [id number]

## 2023-11-12 NOTE — PROGRESS NOTE ADULT - ASSESSMENT
58-year-old man with PMH of ESRD on hemodialysis via groin catheter, Monday Wednesday Friday, BENNIE, hypertension, hyperlipidemia, presenting due to weakness for 2 days and fever of 101 Fahrenheit, fatigue, chills, vomiting episodes that started today.  Patient states that yesterday even though he felt unwell he went to his usual dialysis appointment which finished without any significant events.  Nephrology consulted for ESRD on HD.    A/P:  ESRD on HD: [facility]  Dialysis center: [facility]  Nephrologist: Dr. Neftaly Holden  Access: CVC R groin; now dislodged.  Consent signed, witnessed, and placed in pt's chart.  s/p Shiley on 11/6--s/p removal however pt reported pain at site last night. bacitracin applied  s/p Permacath on 11/10    s/p HD On 11/10 via permacath with no issue  Continue [facility]   Renal diet.  Monitor BMP and UO.    HTN:  BP controlled.  UF w/ HD.  Monitor BP.    Hyperkalemia:  Likely in setting of ESRD.  Monitor K closely.  Low K diet.    Anemia:  Monitor Hgb.  Epogen w/ HD.  Transfuse for Hgb <8.    CKD: MBD:   - acceptable.  sevelamer to 1600mg TID.  Low PO4 diet.  C/W sevelamer w/ meals.  Monitor Ca and PO4 daily.    Hypercalcemia:  Possibly in setting of dehydration vs. calcitriol.  PTH acceptable for ESRD; Vit. D 44.2.  hold calcitriol  Monitor Ca.    Fever:  Blood cultures with gram neg. rods.  Possibly CVC associated infection.  ID follow up

## 2023-11-13 LAB
ANION GAP SERPL CALC-SCNC: 17 MMOL/L — SIGNIFICANT CHANGE UP (ref 5–17)
ANION GAP SERPL CALC-SCNC: 17 MMOL/L — SIGNIFICANT CHANGE UP (ref 5–17)
BUN SERPL-MCNC: 48 MG/DL — HIGH (ref 7–23)
BUN SERPL-MCNC: 48 MG/DL — HIGH (ref 7–23)
CALCIUM SERPL-MCNC: 10.5 MG/DL — SIGNIFICANT CHANGE UP (ref 8.4–10.5)
CALCIUM SERPL-MCNC: 10.5 MG/DL — SIGNIFICANT CHANGE UP (ref 8.4–10.5)
CHLORIDE SERPL-SCNC: 100 MMOL/L — SIGNIFICANT CHANGE UP (ref 96–108)
CHLORIDE SERPL-SCNC: 100 MMOL/L — SIGNIFICANT CHANGE UP (ref 96–108)
CO2 SERPL-SCNC: 20 MMOL/L — LOW (ref 22–31)
CO2 SERPL-SCNC: 20 MMOL/L — LOW (ref 22–31)
CREAT SERPL-MCNC: 12.13 MG/DL — HIGH (ref 0.5–1.3)
CREAT SERPL-MCNC: 12.13 MG/DL — HIGH (ref 0.5–1.3)
EGFR: 4 ML/MIN/1.73M2 — LOW
EGFR: 4 ML/MIN/1.73M2 — LOW
GLUCOSE BLDC GLUCOMTR-MCNC: 102 MG/DL — HIGH (ref 70–99)
GLUCOSE BLDC GLUCOMTR-MCNC: 102 MG/DL — HIGH (ref 70–99)
GLUCOSE BLDC GLUCOMTR-MCNC: 103 MG/DL — HIGH (ref 70–99)
GLUCOSE BLDC GLUCOMTR-MCNC: 103 MG/DL — HIGH (ref 70–99)
GLUCOSE BLDC GLUCOMTR-MCNC: 108 MG/DL — HIGH (ref 70–99)
GLUCOSE BLDC GLUCOMTR-MCNC: 108 MG/DL — HIGH (ref 70–99)
GLUCOSE BLDC GLUCOMTR-MCNC: 88 MG/DL — SIGNIFICANT CHANGE UP (ref 70–99)
GLUCOSE BLDC GLUCOMTR-MCNC: 88 MG/DL — SIGNIFICANT CHANGE UP (ref 70–99)
GLUCOSE SERPL-MCNC: 103 MG/DL — HIGH (ref 70–99)
GLUCOSE SERPL-MCNC: 103 MG/DL — HIGH (ref 70–99)
HCT VFR BLD CALC: 34.4 % — LOW (ref 39–50)
HCT VFR BLD CALC: 34.4 % — LOW (ref 39–50)
HGB BLD-MCNC: 10.2 G/DL — LOW (ref 13–17)
HGB BLD-MCNC: 10.2 G/DL — LOW (ref 13–17)
MAGNESIUM SERPL-MCNC: 2.4 MG/DL — SIGNIFICANT CHANGE UP (ref 1.6–2.6)
MAGNESIUM SERPL-MCNC: 2.4 MG/DL — SIGNIFICANT CHANGE UP (ref 1.6–2.6)
MCHC RBC-ENTMCNC: 19.8 PG — LOW (ref 27–34)
MCHC RBC-ENTMCNC: 19.8 PG — LOW (ref 27–34)
MCHC RBC-ENTMCNC: 29.7 GM/DL — LOW (ref 32–36)
MCHC RBC-ENTMCNC: 29.7 GM/DL — LOW (ref 32–36)
MCV RBC AUTO: 66.7 FL — LOW (ref 80–100)
MCV RBC AUTO: 66.7 FL — LOW (ref 80–100)
NRBC # BLD: 0 /100 WBCS — SIGNIFICANT CHANGE UP (ref 0–0)
NRBC # BLD: 0 /100 WBCS — SIGNIFICANT CHANGE UP (ref 0–0)
PHOSPHATE SERPL-MCNC: 6.7 MG/DL — HIGH (ref 2.5–4.5)
PHOSPHATE SERPL-MCNC: 6.7 MG/DL — HIGH (ref 2.5–4.5)
PLATELET # BLD AUTO: 272 K/UL — SIGNIFICANT CHANGE UP (ref 150–400)
PLATELET # BLD AUTO: 272 K/UL — SIGNIFICANT CHANGE UP (ref 150–400)
POTASSIUM SERPL-MCNC: 4 MMOL/L — SIGNIFICANT CHANGE UP (ref 3.5–5.3)
POTASSIUM SERPL-MCNC: 4 MMOL/L — SIGNIFICANT CHANGE UP (ref 3.5–5.3)
POTASSIUM SERPL-SCNC: 4 MMOL/L — SIGNIFICANT CHANGE UP (ref 3.5–5.3)
POTASSIUM SERPL-SCNC: 4 MMOL/L — SIGNIFICANT CHANGE UP (ref 3.5–5.3)
RBC # BLD: 5.16 M/UL — SIGNIFICANT CHANGE UP (ref 4.2–5.8)
RBC # BLD: 5.16 M/UL — SIGNIFICANT CHANGE UP (ref 4.2–5.8)
RBC # FLD: 20.6 % — HIGH (ref 10.3–14.5)
RBC # FLD: 20.6 % — HIGH (ref 10.3–14.5)
SODIUM SERPL-SCNC: 137 MMOL/L — SIGNIFICANT CHANGE UP (ref 135–145)
SODIUM SERPL-SCNC: 137 MMOL/L — SIGNIFICANT CHANGE UP (ref 135–145)
WBC # BLD: 8.01 K/UL — SIGNIFICANT CHANGE UP (ref 3.8–10.5)
WBC # BLD: 8.01 K/UL — SIGNIFICANT CHANGE UP (ref 3.8–10.5)
WBC # FLD AUTO: 8.01 K/UL — SIGNIFICANT CHANGE UP (ref 3.8–10.5)
WBC # FLD AUTO: 8.01 K/UL — SIGNIFICANT CHANGE UP (ref 3.8–10.5)

## 2023-11-13 PROCEDURE — 99233 SBSQ HOSP IP/OBS HIGH 50: CPT

## 2023-11-13 PROCEDURE — 93970 EXTREMITY STUDY: CPT | Mod: 26

## 2023-11-13 RX ORDER — DIPHENHYDRAMINE HCL 50 MG
50 CAPSULE ORAL ONCE
Refills: 0 | Status: DISCONTINUED | OUTPATIENT
Start: 2023-11-13 | End: 2023-11-21

## 2023-11-13 RX ADMIN — APIXABAN 5 MILLIGRAM(S): 2.5 TABLET, FILM COATED ORAL at 18:43

## 2023-11-13 RX ADMIN — Medication 1 APPLICATION(S): at 18:43

## 2023-11-13 RX ADMIN — APIXABAN 5 MILLIGRAM(S): 2.5 TABLET, FILM COATED ORAL at 06:46

## 2023-11-13 RX ADMIN — Medication 25 MILLIGRAM(S): at 06:46

## 2023-11-13 RX ADMIN — Medication 25 MILLIGRAM(S): at 18:43

## 2023-11-13 RX ADMIN — Medication 1 APPLICATION(S): at 06:46

## 2023-11-13 RX ADMIN — SEVELAMER CARBONATE 1600 MILLIGRAM(S): 2400 POWDER, FOR SUSPENSION ORAL at 18:43

## 2023-11-13 RX ADMIN — SEVELAMER CARBONATE 1600 MILLIGRAM(S): 2400 POWDER, FOR SUSPENSION ORAL at 14:14

## 2023-11-13 RX ADMIN — CEFTRIAXONE 100 MILLIGRAM(S): 500 INJECTION, POWDER, FOR SOLUTION INTRAMUSCULAR; INTRAVENOUS at 22:35

## 2023-11-13 RX ADMIN — ATORVASTATIN CALCIUM 40 MILLIGRAM(S): 80 TABLET, FILM COATED ORAL at 22:27

## 2023-11-13 RX ADMIN — Medication 650 MILLIGRAM(S): at 17:10

## 2023-11-13 RX ADMIN — Medication 650 MILLIGRAM(S): at 18:47

## 2023-11-13 RX ADMIN — SEVELAMER CARBONATE 1600 MILLIGRAM(S): 2400 POWDER, FOR SUSPENSION ORAL at 08:27

## 2023-11-13 RX ADMIN — ERYTHROPOIETIN 10000 UNIT(S): 10000 INJECTION, SOLUTION INTRAVENOUS; SUBCUTANEOUS at 10:16

## 2023-11-13 RX ADMIN — CHLORHEXIDINE GLUCONATE 1 APPLICATION(S): 213 SOLUTION TOPICAL at 14:14

## 2023-11-13 NOTE — PROGRESS NOTE ADULT - ASSESSMENT
58-year-old man with PMH of ESRD on hemodialysis via groin catheter, Monday Wednesday Friday, BENNIE, hypertension, hyperlipidemia, presenting due to weakness for 2 days and fever of 101 Fahrenheit, fatigue, chills, vomiting episodes that started today.  Patient states that yesterday even though he felt unwell he went to his usual dialysis appointment which finished without any significant events.  Nephrology consulted for ESRD on HD.    A/P:  ESRD on HD: [facility]  Dialysis center: [facility]  Nephrologist: Dr. Neftaly Holden  Access: CVC R groin; now dislodged.  Consent signed, witnessed, and placed in pt's chart.  s/p Shiley on 11/6--s/p removal however pt reported pain at site last night. bacitracin applied  s/p Permacath on 11/10    s/p HD On 11/10 via permacath with no issue  Continue [facility]   Renal diet.  Monitor BMP and UO.    HTN:  BP controlled.  UF w/ HD.  Monitor BP.    Hyperkalemia:  Likely in setting of ESRD.  Monitor K closely.  Low K diet.    Anemia:  Monitor Hgb.  Epogen w/ HD.  Transfuse for Hgb <8.    CKD: MBD:   - acceptable.  sevelamer to 1600mg TID.  Low PO4 diet.  C/W sevelamer w/ meals.  Monitor Ca and PO4 daily.    Hypercalcemia:  Possibly in setting of dehydration vs. calcitriol.  PTH acceptable for ESRD; Vit. D 44.2.  hold calcitriol  Monitor Ca.    Fever:  Blood cultures with gram neg. rods.  Possibly CVC associated infection.  ID follow up

## 2023-11-13 NOTE — PROGRESS NOTE ADULT - PROBLEM SELECTOR PLAN 4
Patient w/ ESRD on HD M/W/F presents with dislodged groin HD catheter  -s/p shiley placement w/ IR on 11/6. Plan for permacath 11/10  - ESRD c/b hyperkalemia s/p lokelma, now resolved  - continue to monitor electrolytes  - continue sevelemer TID  - plan for HD per nephrology  - vascular consulted, vein mapping b/l outpatient   - CT venogram with contrast allergy protocol

## 2023-11-13 NOTE — PROGRESS NOTE ADULT - ASSESSMENT
58-year-old man with PMH of ESRD on hemodialysis via groin catheter, Monday Wednesday Friday, BENNIE, hypertension, hyperlipidemia, presenting due to weakness for 2 days and fever of 101 Fahrenheit, fatigue, chills, vomiting episodes.    Recommendations:  - Obtain CT venogram, patient will need to be premedicated w contrast allergy protocol for scan  - No acute surgical intervention recommend at this time  - Bilateral upper extremity vein mapping outpatient      Vascular Surgery  [id number]

## 2023-11-14 LAB
ANION GAP SERPL CALC-SCNC: 19 MMOL/L — HIGH (ref 5–17)
ANION GAP SERPL CALC-SCNC: 19 MMOL/L — HIGH (ref 5–17)
BUN SERPL-MCNC: 37 MG/DL — HIGH (ref 7–23)
BUN SERPL-MCNC: 37 MG/DL — HIGH (ref 7–23)
CALCIUM SERPL-MCNC: 11.1 MG/DL — HIGH (ref 8.4–10.5)
CALCIUM SERPL-MCNC: 11.1 MG/DL — HIGH (ref 8.4–10.5)
CHLORIDE SERPL-SCNC: 97 MMOL/L — SIGNIFICANT CHANGE UP (ref 96–108)
CHLORIDE SERPL-SCNC: 97 MMOL/L — SIGNIFICANT CHANGE UP (ref 96–108)
CO2 SERPL-SCNC: 21 MMOL/L — LOW (ref 22–31)
CO2 SERPL-SCNC: 21 MMOL/L — LOW (ref 22–31)
CREAT SERPL-MCNC: 10.81 MG/DL — HIGH (ref 0.5–1.3)
CREAT SERPL-MCNC: 10.81 MG/DL — HIGH (ref 0.5–1.3)
EGFR: 5 ML/MIN/1.73M2 — LOW
EGFR: 5 ML/MIN/1.73M2 — LOW
GLUCOSE BLDC GLUCOMTR-MCNC: 115 MG/DL — HIGH (ref 70–99)
GLUCOSE BLDC GLUCOMTR-MCNC: 115 MG/DL — HIGH (ref 70–99)
GLUCOSE BLDC GLUCOMTR-MCNC: 91 MG/DL — SIGNIFICANT CHANGE UP (ref 70–99)
GLUCOSE BLDC GLUCOMTR-MCNC: 91 MG/DL — SIGNIFICANT CHANGE UP (ref 70–99)
GLUCOSE BLDC GLUCOMTR-MCNC: 94 MG/DL — SIGNIFICANT CHANGE UP (ref 70–99)
GLUCOSE BLDC GLUCOMTR-MCNC: 94 MG/DL — SIGNIFICANT CHANGE UP (ref 70–99)
GLUCOSE BLDC GLUCOMTR-MCNC: 99 MG/DL — SIGNIFICANT CHANGE UP (ref 70–99)
GLUCOSE BLDC GLUCOMTR-MCNC: 99 MG/DL — SIGNIFICANT CHANGE UP (ref 70–99)
GLUCOSE SERPL-MCNC: 107 MG/DL — HIGH (ref 70–99)
GLUCOSE SERPL-MCNC: 107 MG/DL — HIGH (ref 70–99)
HCT VFR BLD CALC: 38.5 % — LOW (ref 39–50)
HCT VFR BLD CALC: 38.5 % — LOW (ref 39–50)
HGB BLD-MCNC: 11.2 G/DL — LOW (ref 13–17)
HGB BLD-MCNC: 11.2 G/DL — LOW (ref 13–17)
MAGNESIUM SERPL-MCNC: 2.4 MG/DL — SIGNIFICANT CHANGE UP (ref 1.6–2.6)
MAGNESIUM SERPL-MCNC: 2.4 MG/DL — SIGNIFICANT CHANGE UP (ref 1.6–2.6)
MCHC RBC-ENTMCNC: 19.2 PG — LOW (ref 27–34)
MCHC RBC-ENTMCNC: 19.2 PG — LOW (ref 27–34)
MCHC RBC-ENTMCNC: 29.1 GM/DL — LOW (ref 32–36)
MCHC RBC-ENTMCNC: 29.1 GM/DL — LOW (ref 32–36)
MCV RBC AUTO: 65.9 FL — LOW (ref 80–100)
MCV RBC AUTO: 65.9 FL — LOW (ref 80–100)
NRBC # BLD: 0 /100 WBCS — SIGNIFICANT CHANGE UP (ref 0–0)
NRBC # BLD: 0 /100 WBCS — SIGNIFICANT CHANGE UP (ref 0–0)
PHOSPHATE SERPL-MCNC: 6.1 MG/DL — HIGH (ref 2.5–4.5)
PHOSPHATE SERPL-MCNC: 6.1 MG/DL — HIGH (ref 2.5–4.5)
PLATELET # BLD AUTO: 246 K/UL — SIGNIFICANT CHANGE UP (ref 150–400)
PLATELET # BLD AUTO: 246 K/UL — SIGNIFICANT CHANGE UP (ref 150–400)
POTASSIUM SERPL-MCNC: 3.8 MMOL/L — SIGNIFICANT CHANGE UP (ref 3.5–5.3)
POTASSIUM SERPL-MCNC: 3.8 MMOL/L — SIGNIFICANT CHANGE UP (ref 3.5–5.3)
POTASSIUM SERPL-SCNC: 3.8 MMOL/L — SIGNIFICANT CHANGE UP (ref 3.5–5.3)
POTASSIUM SERPL-SCNC: 3.8 MMOL/L — SIGNIFICANT CHANGE UP (ref 3.5–5.3)
RBC # BLD: 5.84 M/UL — HIGH (ref 4.2–5.8)
RBC # BLD: 5.84 M/UL — HIGH (ref 4.2–5.8)
RBC # FLD: 20.8 % — HIGH (ref 10.3–14.5)
RBC # FLD: 20.8 % — HIGH (ref 10.3–14.5)
SODIUM SERPL-SCNC: 137 MMOL/L — SIGNIFICANT CHANGE UP (ref 135–145)
SODIUM SERPL-SCNC: 137 MMOL/L — SIGNIFICANT CHANGE UP (ref 135–145)
WBC # BLD: 7.05 K/UL — SIGNIFICANT CHANGE UP (ref 3.8–10.5)
WBC # BLD: 7.05 K/UL — SIGNIFICANT CHANGE UP (ref 3.8–10.5)
WBC # FLD AUTO: 7.05 K/UL — SIGNIFICANT CHANGE UP (ref 3.8–10.5)
WBC # FLD AUTO: 7.05 K/UL — SIGNIFICANT CHANGE UP (ref 3.8–10.5)

## 2023-11-14 PROCEDURE — 99233 SBSQ HOSP IP/OBS HIGH 50: CPT

## 2023-11-14 RX ADMIN — ATORVASTATIN CALCIUM 40 MILLIGRAM(S): 80 TABLET, FILM COATED ORAL at 22:00

## 2023-11-14 RX ADMIN — SEVELAMER CARBONATE 1600 MILLIGRAM(S): 2400 POWDER, FOR SUSPENSION ORAL at 13:57

## 2023-11-14 RX ADMIN — SEVELAMER CARBONATE 1600 MILLIGRAM(S): 2400 POWDER, FOR SUSPENSION ORAL at 10:03

## 2023-11-14 RX ADMIN — Medication 25 MILLIGRAM(S): at 17:23

## 2023-11-14 RX ADMIN — CEFTRIAXONE 100 MILLIGRAM(S): 500 INJECTION, POWDER, FOR SOLUTION INTRAMUSCULAR; INTRAVENOUS at 22:04

## 2023-11-14 RX ADMIN — Medication 25 MILLIGRAM(S): at 05:33

## 2023-11-14 RX ADMIN — APIXABAN 5 MILLIGRAM(S): 2.5 TABLET, FILM COATED ORAL at 17:24

## 2023-11-14 RX ADMIN — APIXABAN 5 MILLIGRAM(S): 2.5 TABLET, FILM COATED ORAL at 05:33

## 2023-11-14 RX ADMIN — Medication 1 APPLICATION(S): at 05:33

## 2023-11-14 RX ADMIN — Medication 1 APPLICATION(S): at 17:25

## 2023-11-14 NOTE — PROGRESS NOTE ADULT - ASSESSMENT
58-year-old man with PMH of ESRD on hemodialysis via groin catheter, Monday Wednesday Friday, BENNIE, hypertension, hyperlipidemia, presenting due to weakness for 2 days and fever of 101 Fahrenheit, fatigue, chills, vomiting episodes.    Recommendations:  - Obtain CT venogram, patient will need to be premedicated w contrast allergy protocol for scan  - Bilateral upper extremity vein mapping completed  - No acute surgical intervention recommend at this time    Vascular Surgery  [id number]

## 2023-11-14 NOTE — PROGRESS NOTE ADULT - ASSESSMENT
58-year-old man with PMH of ESRD on hemodialysis via groin catheter, Monday Wednesday Friday, BENNIE, hypertension, hyperlipidemia, presenting due to weakness for 2 days and fever of 101 Fahrenheit, fatigue, chills, vomiting episodes that started today.  Patient states that yesterday even though he felt unwell he went to his usual dialysis appointment which finished without any significant events.  Nephrology consulted for ESRD on HD.    A/P:  ESRD on HD: [facility]  Dialysis center: [facility]  Nephrologist: Dr. Neftaly Holden  Access: CVC R groin; now dislodged.  Consent signed, witnessed, and placed in pt's chart.  s/p Shiley on 11/6--s/p removal however pt reported pain at site last night. bacitracin applied  s/p Permacath on 11/10    s/p HD On 11/10 via permacath with no issue  Continue [facility]   Renal diet.  Monitor BMP and UO.    HTN:  BP controlled.  UF w/ HD.  Monitor BP.    Hyperkalemia:  Likely in setting of ESRD.  Monitor K closely.  Low K diet.    Anemia:  Monitor Hgb.  Epogen w/ HD.  Transfuse for Hgb <8.    CKD: MBD:   - acceptable.  sevelamer to 1600mg TID.  Low PO4 diet.  C/W sevelamer w/ meals.  Monitor Ca and PO4 daily.    Hypercalcemia:  Possibly in setting of dehydration vs. calcitriol.  PTH acceptable for ESRD; Vit. D 44.2.  hold calcitriol  Monitor Ca.    Fever:  Blood cultures with gram neg. rods.  Possibly CVC associated infection.  ID follow up

## 2023-11-14 NOTE — PROGRESS NOTE ADULT - PROBLEM SELECTOR PLAN 4
Patient w/ ESRD on HD M/W/F presents with dislodged groin HD catheter  -s/p shiley placement w/ IR on 11/6. Plan for permacath 11/10  - ESRD c/b hyperkalemia s/p lokelma, now resolved  - continue to monitor electrolytes  - continue sevelemer TID  - plan for HD per nephrology  - vascular consulted, vein mapping b/l outpatient   - CT venogram with contrast allergy protocol. Patient refusing to take steroids to prevent contrast induced allergy, states he had a severe reaction last time despite taking steroids where he had an anaphylaxtic episode and requiring hospitaliztion for 1 week. States it occurred 2x in the past with contrast, both times were very severe. Also would like to speak to nephro regarding renal transplant, patient is interested.

## 2023-11-15 LAB
ANION GAP SERPL CALC-SCNC: 16 MMOL/L — SIGNIFICANT CHANGE UP (ref 5–17)
ANION GAP SERPL CALC-SCNC: 16 MMOL/L — SIGNIFICANT CHANGE UP (ref 5–17)
BUN SERPL-MCNC: 47 MG/DL — HIGH (ref 7–23)
BUN SERPL-MCNC: 47 MG/DL — HIGH (ref 7–23)
CALCIUM SERPL-MCNC: 10.9 MG/DL — HIGH (ref 8.4–10.5)
CALCIUM SERPL-MCNC: 10.9 MG/DL — HIGH (ref 8.4–10.5)
CHLORIDE SERPL-SCNC: 96 MMOL/L — SIGNIFICANT CHANGE UP (ref 96–108)
CHLORIDE SERPL-SCNC: 96 MMOL/L — SIGNIFICANT CHANGE UP (ref 96–108)
CO2 SERPL-SCNC: 23 MMOL/L — SIGNIFICANT CHANGE UP (ref 22–31)
CO2 SERPL-SCNC: 23 MMOL/L — SIGNIFICANT CHANGE UP (ref 22–31)
CREAT SERPL-MCNC: 12.47 MG/DL — HIGH (ref 0.5–1.3)
CREAT SERPL-MCNC: 12.47 MG/DL — HIGH (ref 0.5–1.3)
EGFR: 4 ML/MIN/1.73M2 — LOW
EGFR: 4 ML/MIN/1.73M2 — LOW
GLUCOSE BLDC GLUCOMTR-MCNC: 108 MG/DL — HIGH (ref 70–99)
GLUCOSE BLDC GLUCOMTR-MCNC: 108 MG/DL — HIGH (ref 70–99)
GLUCOSE BLDC GLUCOMTR-MCNC: 114 MG/DL — HIGH (ref 70–99)
GLUCOSE BLDC GLUCOMTR-MCNC: 114 MG/DL — HIGH (ref 70–99)
GLUCOSE BLDC GLUCOMTR-MCNC: 144 MG/DL — HIGH (ref 70–99)
GLUCOSE BLDC GLUCOMTR-MCNC: 144 MG/DL — HIGH (ref 70–99)
GLUCOSE BLDC GLUCOMTR-MCNC: 155 MG/DL — HIGH (ref 70–99)
GLUCOSE BLDC GLUCOMTR-MCNC: 155 MG/DL — HIGH (ref 70–99)
GLUCOSE SERPL-MCNC: 158 MG/DL — HIGH (ref 70–99)
GLUCOSE SERPL-MCNC: 158 MG/DL — HIGH (ref 70–99)
HCT VFR BLD CALC: 36.9 % — LOW (ref 39–50)
HCT VFR BLD CALC: 36.9 % — LOW (ref 39–50)
HGB BLD-MCNC: 11 G/DL — LOW (ref 13–17)
HGB BLD-MCNC: 11 G/DL — LOW (ref 13–17)
MCHC RBC-ENTMCNC: 19.4 PG — LOW (ref 27–34)
MCHC RBC-ENTMCNC: 19.4 PG — LOW (ref 27–34)
MCHC RBC-ENTMCNC: 29.8 GM/DL — LOW (ref 32–36)
MCHC RBC-ENTMCNC: 29.8 GM/DL — LOW (ref 32–36)
MCV RBC AUTO: 65.1 FL — LOW (ref 80–100)
MCV RBC AUTO: 65.1 FL — LOW (ref 80–100)
NRBC # BLD: 0 /100 WBCS — SIGNIFICANT CHANGE UP (ref 0–0)
NRBC # BLD: 0 /100 WBCS — SIGNIFICANT CHANGE UP (ref 0–0)
PLATELET # BLD AUTO: 254 K/UL — SIGNIFICANT CHANGE UP (ref 150–400)
PLATELET # BLD AUTO: 254 K/UL — SIGNIFICANT CHANGE UP (ref 150–400)
POTASSIUM SERPL-MCNC: 5.1 MMOL/L — SIGNIFICANT CHANGE UP (ref 3.5–5.3)
POTASSIUM SERPL-MCNC: 5.1 MMOL/L — SIGNIFICANT CHANGE UP (ref 3.5–5.3)
POTASSIUM SERPL-SCNC: 5.1 MMOL/L — SIGNIFICANT CHANGE UP (ref 3.5–5.3)
POTASSIUM SERPL-SCNC: 5.1 MMOL/L — SIGNIFICANT CHANGE UP (ref 3.5–5.3)
RBC # BLD: 5.67 M/UL — SIGNIFICANT CHANGE UP (ref 4.2–5.8)
RBC # BLD: 5.67 M/UL — SIGNIFICANT CHANGE UP (ref 4.2–5.8)
RBC # FLD: 20.2 % — HIGH (ref 10.3–14.5)
RBC # FLD: 20.2 % — HIGH (ref 10.3–14.5)
SODIUM SERPL-SCNC: 135 MMOL/L — SIGNIFICANT CHANGE UP (ref 135–145)
SODIUM SERPL-SCNC: 135 MMOL/L — SIGNIFICANT CHANGE UP (ref 135–145)
WBC # BLD: 6.9 K/UL — SIGNIFICANT CHANGE UP (ref 3.8–10.5)
WBC # BLD: 6.9 K/UL — SIGNIFICANT CHANGE UP (ref 3.8–10.5)
WBC # FLD AUTO: 6.9 K/UL — SIGNIFICANT CHANGE UP (ref 3.8–10.5)
WBC # FLD AUTO: 6.9 K/UL — SIGNIFICANT CHANGE UP (ref 3.8–10.5)

## 2023-11-15 PROCEDURE — 99233 SBSQ HOSP IP/OBS HIGH 50: CPT

## 2023-11-15 RX ORDER — DIAZEPAM 5 MG
10 TABLET ORAL ONCE
Refills: 0 | Status: DISCONTINUED | OUTPATIENT
Start: 2023-11-15 | End: 2023-11-16

## 2023-11-15 RX ORDER — LANOLIN ALCOHOL/MO/W.PET/CERES
3 CREAM (GRAM) TOPICAL AT BEDTIME
Refills: 0 | Status: DISCONTINUED | OUTPATIENT
Start: 2023-11-15 | End: 2023-11-15

## 2023-11-15 RX ORDER — ONDANSETRON 8 MG/1
4 TABLET, FILM COATED ORAL EVERY 8 HOURS
Refills: 0 | Status: DISCONTINUED | OUTPATIENT
Start: 2023-11-15 | End: 2023-11-21

## 2023-11-15 RX ADMIN — ATORVASTATIN CALCIUM 40 MILLIGRAM(S): 80 TABLET, FILM COATED ORAL at 22:05

## 2023-11-15 RX ADMIN — APIXABAN 5 MILLIGRAM(S): 2.5 TABLET, FILM COATED ORAL at 06:02

## 2023-11-15 RX ADMIN — ERYTHROPOIETIN 10000 UNIT(S): 10000 INJECTION, SOLUTION INTRAVENOUS; SUBCUTANEOUS at 11:29

## 2023-11-15 RX ADMIN — Medication 25 MILLIGRAM(S): at 12:11

## 2023-11-15 RX ADMIN — Medication 1 APPLICATION(S): at 07:16

## 2023-11-15 RX ADMIN — CHLORHEXIDINE GLUCONATE 1 APPLICATION(S): 213 SOLUTION TOPICAL at 07:16

## 2023-11-15 RX ADMIN — APIXABAN 5 MILLIGRAM(S): 2.5 TABLET, FILM COATED ORAL at 18:17

## 2023-11-15 RX ADMIN — SEVELAMER CARBONATE 1600 MILLIGRAM(S): 2400 POWDER, FOR SUSPENSION ORAL at 14:34

## 2023-11-15 RX ADMIN — SEVELAMER CARBONATE 1600 MILLIGRAM(S): 2400 POWDER, FOR SUSPENSION ORAL at 18:18

## 2023-11-15 RX ADMIN — PHENYLEPHRINE-SHARK LIVER OIL-MINERAL OIL-PETROLATUM RECTAL OINTMENT 1 APPLICATION(S): at 07:16

## 2023-11-15 RX ADMIN — CEFTRIAXONE 100 MILLIGRAM(S): 500 INJECTION, POWDER, FOR SOLUTION INTRAMUSCULAR; INTRAVENOUS at 22:05

## 2023-11-15 RX ADMIN — Medication 25 MILLIGRAM(S): at 18:17

## 2023-11-15 NOTE — PROGRESS NOTE ADULT - ASSESSMENT
58-year-old man with PMH of ESRD on hemodialysis via groin catheter, Monday Wednesday Friday, BENNIE, hypertension, hyperlipidemia, presenting due to weakness for 2 days and fever of 101 Fahrenheit, fatigue, chills, vomiting episodes.    Recommendations:  - Obtain CT venogram, patient will need to be premedicated w contrast allergy protocol for scan     - However, patient refusing steroids associated with contrast allergy protocol  - Bilateral upper extremity vein mapping completed  - No acute surgical intervention recommend at this time  - rest of care per primary    Vascular Surgery  [id number]

## 2023-11-15 NOTE — PROGRESS NOTE ADULT - ASSESSMENT
58-year-old man with PMH of ESRD on hemodialysis via groin catheter, Monday Wednesday Friday, BENNIE, hypertension, hyperlipidemia, presenting due to weakness for 2 days and fever of 101 Fahrenheit, fatigue, chills, vomiting episodes that started today.  Patient states that yesterday even though he felt unwell he went to his usual dialysis appointment which finished without any significant events.  Nephrology consulted for ESRD on HD.    A/P:  ESRD on HD: [facility]  Dialysis center: [facility]  Nephrologist: Dr. Neftaly Holden  Access: CVC R groin; now dislodged.  Consent signed, witnessed, and placed in pt's chart.  s/p Permacath on 11/10    s/p HD On 11/13  Pt seen on dialysis today tolerating well  Continue [facility]   Renal diet.  Monitor BMP and UO.    HTN:  BP controlled.  UF w/ HD.  Monitor BP.    Hyperkalemia:  Likely in setting of ESRD.  Monitor K closely.  Low K diet.    Anemia:  Monitor Hgb.  Epogen w/ HD.  Transfuse for Hgb <8.    CKD: MBD:   - acceptable.  sevelamer to 1600mg TID.  Low PO4 diet.  C/W sevelamer w/ meals.  Monitor Ca and PO4 daily.    Hypercalcemia:  Possibly in setting of dehydration vs. calcitriol.  PTH acceptable for ESRD; Vit. D 44.2.  hold calcitriol  Monitor Ca.    Fever:  Blood cultures with gram neg. rods.  Possibly CVC associated infection.  ID follow up

## 2023-11-15 NOTE — PROGRESS NOTE ADULT - PROBLEM SELECTOR PLAN 4
Patient w/ ESRD on HD M/W/F presents with dislodged groin HD catheter  -s/p shiley placement w/ IR on 11/6. Plan for permacath 11/10  - ESRD c/b hyperkalemia s/p lokelma, now resolved  - continue to monitor electrolytes  - continue Sevelamer TID  - plan for HD per nephrology  - vascular consulted, vein mapping b/l outpatient   - CT venogram with contrast allergy protocol. Patient refusing to take steroids to prevent contrast induced allergy, states he had a severe reaction last time despite taking steroids where he had an anaphylaxtic episode and requiring hospitalization for 1 week. States it occurred 2x in the past with contrast, both times were very severe. Also would like to speak to nephro regarding renal transplant, patient is interested.  - Spoke to Vascular, since patient refused CT venogram due to severe contrast allergy, will aim for MR venogram of chest.   - Spoke to Nephrologist, updated and followed up on the patient regarding renal transplant, will need to f/u outpt.

## 2023-11-16 LAB
ANION GAP SERPL CALC-SCNC: 19 MMOL/L — HIGH (ref 5–17)
ANION GAP SERPL CALC-SCNC: 19 MMOL/L — HIGH (ref 5–17)
APTT BLD: 33 SEC — SIGNIFICANT CHANGE UP (ref 24.5–35.6)
APTT BLD: 33 SEC — SIGNIFICANT CHANGE UP (ref 24.5–35.6)
APTT BLD: 56.9 SEC — HIGH (ref 24.5–35.6)
APTT BLD: 56.9 SEC — HIGH (ref 24.5–35.6)
BUN SERPL-MCNC: 45 MG/DL — HIGH (ref 7–23)
BUN SERPL-MCNC: 45 MG/DL — HIGH (ref 7–23)
CALCIUM SERPL-MCNC: 11 MG/DL — HIGH (ref 8.4–10.5)
CALCIUM SERPL-MCNC: 11 MG/DL — HIGH (ref 8.4–10.5)
CHLORIDE SERPL-SCNC: 95 MMOL/L — LOW (ref 96–108)
CHLORIDE SERPL-SCNC: 95 MMOL/L — LOW (ref 96–108)
CO2 SERPL-SCNC: 23 MMOL/L — SIGNIFICANT CHANGE UP (ref 22–31)
CO2 SERPL-SCNC: 23 MMOL/L — SIGNIFICANT CHANGE UP (ref 22–31)
CREAT SERPL-MCNC: 10.89 MG/DL — HIGH (ref 0.5–1.3)
CREAT SERPL-MCNC: 10.89 MG/DL — HIGH (ref 0.5–1.3)
EGFR: 5 ML/MIN/1.73M2 — LOW
EGFR: 5 ML/MIN/1.73M2 — LOW
GLUCOSE BLDC GLUCOMTR-MCNC: 106 MG/DL — HIGH (ref 70–99)
GLUCOSE BLDC GLUCOMTR-MCNC: 106 MG/DL — HIGH (ref 70–99)
GLUCOSE BLDC GLUCOMTR-MCNC: 108 MG/DL — HIGH (ref 70–99)
GLUCOSE BLDC GLUCOMTR-MCNC: 108 MG/DL — HIGH (ref 70–99)
GLUCOSE BLDC GLUCOMTR-MCNC: 113 MG/DL — HIGH (ref 70–99)
GLUCOSE BLDC GLUCOMTR-MCNC: 113 MG/DL — HIGH (ref 70–99)
GLUCOSE BLDC GLUCOMTR-MCNC: 120 MG/DL — HIGH (ref 70–99)
GLUCOSE BLDC GLUCOMTR-MCNC: 120 MG/DL — HIGH (ref 70–99)
GLUCOSE SERPL-MCNC: 113 MG/DL — HIGH (ref 70–99)
GLUCOSE SERPL-MCNC: 113 MG/DL — HIGH (ref 70–99)
HCT VFR BLD CALC: 39.7 % — SIGNIFICANT CHANGE UP (ref 39–50)
HCT VFR BLD CALC: 39.7 % — SIGNIFICANT CHANGE UP (ref 39–50)
HGB BLD-MCNC: 11.7 G/DL — LOW (ref 13–17)
HGB BLD-MCNC: 11.7 G/DL — LOW (ref 13–17)
MCHC RBC-ENTMCNC: 19.3 PG — LOW (ref 27–34)
MCHC RBC-ENTMCNC: 19.3 PG — LOW (ref 27–34)
MCHC RBC-ENTMCNC: 29.5 GM/DL — LOW (ref 32–36)
MCHC RBC-ENTMCNC: 29.5 GM/DL — LOW (ref 32–36)
MCV RBC AUTO: 65.6 FL — LOW (ref 80–100)
MCV RBC AUTO: 65.6 FL — LOW (ref 80–100)
NRBC # BLD: 0 /100 WBCS — SIGNIFICANT CHANGE UP (ref 0–0)
NRBC # BLD: 0 /100 WBCS — SIGNIFICANT CHANGE UP (ref 0–0)
PLATELET # BLD AUTO: 217 K/UL — SIGNIFICANT CHANGE UP (ref 150–400)
PLATELET # BLD AUTO: 217 K/UL — SIGNIFICANT CHANGE UP (ref 150–400)
POTASSIUM SERPL-MCNC: 4.4 MMOL/L — SIGNIFICANT CHANGE UP (ref 3.5–5.3)
POTASSIUM SERPL-MCNC: 4.4 MMOL/L — SIGNIFICANT CHANGE UP (ref 3.5–5.3)
POTASSIUM SERPL-SCNC: 4.4 MMOL/L — SIGNIFICANT CHANGE UP (ref 3.5–5.3)
POTASSIUM SERPL-SCNC: 4.4 MMOL/L — SIGNIFICANT CHANGE UP (ref 3.5–5.3)
RBC # BLD: 6.05 M/UL — HIGH (ref 4.2–5.8)
RBC # BLD: 6.05 M/UL — HIGH (ref 4.2–5.8)
RBC # FLD: 20.8 % — HIGH (ref 10.3–14.5)
RBC # FLD: 20.8 % — HIGH (ref 10.3–14.5)
SODIUM SERPL-SCNC: 137 MMOL/L — SIGNIFICANT CHANGE UP (ref 135–145)
SODIUM SERPL-SCNC: 137 MMOL/L — SIGNIFICANT CHANGE UP (ref 135–145)
WBC # BLD: 9.53 K/UL — SIGNIFICANT CHANGE UP (ref 3.8–10.5)
WBC # BLD: 9.53 K/UL — SIGNIFICANT CHANGE UP (ref 3.8–10.5)
WBC # FLD AUTO: 9.53 K/UL — SIGNIFICANT CHANGE UP (ref 3.8–10.5)
WBC # FLD AUTO: 9.53 K/UL — SIGNIFICANT CHANGE UP (ref 3.8–10.5)

## 2023-11-16 PROCEDURE — 71552 MRI CHEST W/O & W/DYE: CPT | Mod: 26

## 2023-11-16 PROCEDURE — 99233 SBSQ HOSP IP/OBS HIGH 50: CPT

## 2023-11-16 RX ORDER — HEPARIN SODIUM 5000 [USP'U]/ML
10000 INJECTION INTRAVENOUS; SUBCUTANEOUS EVERY 6 HOURS
Refills: 0 | Status: DISCONTINUED | OUTPATIENT
Start: 2023-11-16 | End: 2023-11-20

## 2023-11-16 RX ORDER — HEPARIN SODIUM 5000 [USP'U]/ML
5000 INJECTION INTRAVENOUS; SUBCUTANEOUS EVERY 6 HOURS
Refills: 0 | Status: DISCONTINUED | OUTPATIENT
Start: 2023-11-16 | End: 2023-11-20

## 2023-11-16 RX ORDER — HEPARIN SODIUM 5000 [USP'U]/ML
INJECTION INTRAVENOUS; SUBCUTANEOUS
Qty: 25000 | Refills: 0 | Status: DISCONTINUED | OUTPATIENT
Start: 2023-11-16 | End: 2023-11-20

## 2023-11-16 RX ADMIN — CEFTRIAXONE 100 MILLIGRAM(S): 500 INJECTION, POWDER, FOR SOLUTION INTRAMUSCULAR; INTRAVENOUS at 22:05

## 2023-11-16 RX ADMIN — Medication 25 MILLIGRAM(S): at 18:06

## 2023-11-16 RX ADMIN — APIXABAN 5 MILLIGRAM(S): 2.5 TABLET, FILM COATED ORAL at 09:50

## 2023-11-16 RX ADMIN — HEPARIN SODIUM 5000 UNIT(S): 5000 INJECTION INTRAVENOUS; SUBCUTANEOUS at 22:12

## 2023-11-16 RX ADMIN — ATORVASTATIN CALCIUM 40 MILLIGRAM(S): 80 TABLET, FILM COATED ORAL at 22:06

## 2023-11-16 RX ADMIN — SEVELAMER CARBONATE 1600 MILLIGRAM(S): 2400 POWDER, FOR SUSPENSION ORAL at 12:00

## 2023-11-16 RX ADMIN — HEPARIN SODIUM 2400 UNIT(S)/HR: 5000 INJECTION INTRAVENOUS; SUBCUTANEOUS at 14:11

## 2023-11-16 RX ADMIN — Medication 1 MILLIGRAM(S): at 12:00

## 2023-11-16 RX ADMIN — HEPARIN SODIUM 2700 UNIT(S)/HR: 5000 INJECTION INTRAVENOUS; SUBCUTANEOUS at 22:09

## 2023-11-16 RX ADMIN — SEVELAMER CARBONATE 1600 MILLIGRAM(S): 2400 POWDER, FOR SUSPENSION ORAL at 18:06

## 2023-11-16 RX ADMIN — CHLORHEXIDINE GLUCONATE 1 APPLICATION(S): 213 SOLUTION TOPICAL at 09:52

## 2023-11-16 RX ADMIN — SEVELAMER CARBONATE 1600 MILLIGRAM(S): 2400 POWDER, FOR SUSPENSION ORAL at 09:50

## 2023-11-16 RX ADMIN — Medication 25 MILLIGRAM(S): at 09:51

## 2023-11-16 RX ADMIN — Medication 1 APPLICATION(S): at 18:05

## 2023-11-16 RX ADMIN — Medication 1 APPLICATION(S): at 09:51

## 2023-11-16 NOTE — PROGRESS NOTE ADULT - ASSESSMENT
58-year-old man with PMH of ESRD on hemodialysis via groin catheter, Monday Wednesday Friday, BENNIE, hypertension, hyperlipidemia, presenting due to weakness for 2 days and fever of 101 Fahrenheit, fatigue, chills, vomiting episodes that started today.  Patient states that yesterday even though he felt unwell he went to his usual dialysis appointment which finished without any significant events.  Nephrology consulted for ESRD on HD.    A/P:  ESRD on HD: [facility]  Dialysis center: [facility]  Nephrologist: Dr. Neftaly Holden  Access: CVC R groin; now dislodged.  Consent signed, witnessed, and placed in pt's chart.  s/p Permacath on 11/10    s/p HD On 11/15; UF'd 1L.  Continue [facility]   Renal diet.  Monitor BMP and UO.    HTN:  BP controlled.  UF w/ HD.  Monitor BP.    Hyperkalemia:  Likely in setting of ESRD.  Monitor K closely.  Low K diet.    Anemia:  Monitor Hgb.  Epogen w/ HD.  Transfuse for Hgb <8.    CKD: MBD:   - acceptable.  sevelamer to 1600mg TID.  Low PO4 diet.  C/W sevelamer w/ meals.  Monitor Ca and PO4 daily.    Hypercalcemia:  Possibly in setting of dehydration vs. calcitriol.  PTH acceptable for ESRD; Vit. D 44.2.  Off calcitriol, Ca remains high.  Dialyze with 2K-2.5Ca bath.  Monitor Ca.    Fever:  Blood cultures with gram neg. rods.  Possibly CVC associated infection.  ID follow up  58-year-old man with PMH of ESRD on hemodialysis via groin catheter, Monday Wednesday Friday, BENNIE, hypertension, hyperlipidemia, presenting due to weakness for 2 days and fever of 101 Fahrenheit, fatigue, chills, vomiting episodes that started today.  Patient states that yesterday even though he felt unwell he went to his usual dialysis appointment which finished without any significant events.  Nephrology consulted for ESRD on HD.    A/P:  ESRD on HD: [facility]  Dialysis center: [facility]  Nephrologist: Dr. Neftaly Holden  Access: CVC R groin; now dislodged.  Consent signed, witnessed, and placed in pt's chart.  s/p Permacath on 11/10    s/p HD On 11/15; UF'd 1L.  S/p MR venogram w/ and w/o Gadavist.  Plan for HD tomorrow.  Continue [facility]   Renal diet.  Monitor BMP and UO.    HTN:  BP controlled.  UF w/ HD.  Monitor BP.    Hyperkalemia:  Likely in setting of ESRD.  Monitor K closely.  Low K diet.    Anemia:  Monitor Hgb.  Epogen w/ HD.  Transfuse for Hgb <8.    CKD: MBD:   - acceptable.  sevelamer to 1600mg TID.  Low PO4 diet.  C/W sevelamer w/ meals.  Monitor Ca and PO4 daily.    Hypercalcemia:  Possibly in setting of dehydration vs. calcitriol.  PTH acceptable for ESRD; Vit. D 44.2.  Off calcitriol, Ca remains high.  Dialyze with 2K-2.5Ca bath.  Monitor Ca.    Fever:  Blood cultures with gram neg. rods.  Possibly CVC associated infection.  ID follow up

## 2023-11-16 NOTE — PROGRESS NOTE ADULT - PROBLEM SELECTOR PLAN 4
Patient w/ ESRD on HD M/W/F presents with dislodged groin HD catheter  -s/p shiley placement w/ IR on 11/6. Plan for permacath 11/10  - ESRD c/b hyperkalemia s/p lokelma, now resolved  - continue to monitor electrolytes  - continue Sevelamer TID  - plan for HD per nephrology  - vascular consulted, vein mapping b/l outpatient   - CT venogram with contrast allergy protocol. Patient refusing to take steroids to prevent contrast induced allergy, states he had a severe reaction last time despite taking steroids where he had an anaphylatoxic episode and requiring hospitalization for 1 week. States it occurred 2x in the past with contrast, both times were very severe. Also would like to speak to nephro regarding renal transplant, patient is interested.  - Spoke to Vascular, since patient refused CT venogram due to severe contrast allergy, will aim for MR venogram of chest.   - Spoke to Nephrologist, updated and followed up on the patient regarding renal transplant, will need to f/u outpt.  - AVF next week as per Vascular, will place medical clearance

## 2023-11-16 NOTE — PROGRESS NOTE ADULT - ASSESSMENT
58-year-old man with PMH of ESRD on hemodialysis via groin catheter, Monday Wednesday Friday, BENNIE, hypertension, hyperlipidemia, presenting due to weakness for 2 days and fever of 101 Fahrenheit, fatigue, chills, vomiting episodes.    Recommendations:  - Planned for MR venogram of the chest today- will f/up results  - Bilateral upper extremity vein mapping completed  - No acute surgical intervention recommend at this time  - rest of care per primary    Vascular Surgery  [id number]   58-year-old man with PMH of ESRD on hemodialysis via groin catheter, Monday Wednesday Friday, BENNIE, hypertension, hyperlipidemia, presenting due to weakness for 2 days and fever of 101 Fahrenheit, fatigue, chills, vomiting episodes.    Recommendations:  - Planned for MR venogram of the chest today- will f/up results  - Bilateral upper extremity vein mapping completed  - Potential OR Monday for AVF, would appreciate medical and cardiac clearance  - rest of care per primary    Vascular Surgery  [id number]

## 2023-11-17 LAB
ALBUMIN SERPL ELPH-MCNC: 3.8 G/DL — SIGNIFICANT CHANGE UP (ref 3.3–5)
ALBUMIN SERPL ELPH-MCNC: 3.8 G/DL — SIGNIFICANT CHANGE UP (ref 3.3–5)
ALP SERPL-CCNC: 63 U/L — SIGNIFICANT CHANGE UP (ref 40–120)
ALP SERPL-CCNC: 63 U/L — SIGNIFICANT CHANGE UP (ref 40–120)
ALT FLD-CCNC: 13 U/L — SIGNIFICANT CHANGE UP (ref 10–45)
ALT FLD-CCNC: 13 U/L — SIGNIFICANT CHANGE UP (ref 10–45)
ANION GAP SERPL CALC-SCNC: 19 MMOL/L — HIGH (ref 5–17)
ANION GAP SERPL CALC-SCNC: 19 MMOL/L — HIGH (ref 5–17)
APTT BLD: 129.9 SEC — CRITICAL HIGH (ref 24.5–35.6)
APTT BLD: 129.9 SEC — CRITICAL HIGH (ref 24.5–35.6)
APTT BLD: 91 SEC — HIGH (ref 24.5–35.6)
APTT BLD: 91 SEC — HIGH (ref 24.5–35.6)
APTT BLD: 94.2 SEC — HIGH (ref 24.5–35.6)
APTT BLD: 94.2 SEC — HIGH (ref 24.5–35.6)
AST SERPL-CCNC: 15 U/L — SIGNIFICANT CHANGE UP (ref 10–40)
AST SERPL-CCNC: 15 U/L — SIGNIFICANT CHANGE UP (ref 10–40)
BILIRUB SERPL-MCNC: 0.4 MG/DL — SIGNIFICANT CHANGE UP (ref 0.2–1.2)
BILIRUB SERPL-MCNC: 0.4 MG/DL — SIGNIFICANT CHANGE UP (ref 0.2–1.2)
BUN SERPL-MCNC: 55 MG/DL — HIGH (ref 7–23)
BUN SERPL-MCNC: 55 MG/DL — HIGH (ref 7–23)
CALCIUM SERPL-MCNC: 10.5 MG/DL — SIGNIFICANT CHANGE UP (ref 8.4–10.5)
CALCIUM SERPL-MCNC: 10.5 MG/DL — SIGNIFICANT CHANGE UP (ref 8.4–10.5)
CHLORIDE SERPL-SCNC: 93 MMOL/L — LOW (ref 96–108)
CHLORIDE SERPL-SCNC: 93 MMOL/L — LOW (ref 96–108)
CO2 SERPL-SCNC: 23 MMOL/L — SIGNIFICANT CHANGE UP (ref 22–31)
CO2 SERPL-SCNC: 23 MMOL/L — SIGNIFICANT CHANGE UP (ref 22–31)
CREAT SERPL-MCNC: 13.39 MG/DL — HIGH (ref 0.5–1.3)
CREAT SERPL-MCNC: 13.39 MG/DL — HIGH (ref 0.5–1.3)
EGFR: 4 ML/MIN/1.73M2 — LOW
EGFR: 4 ML/MIN/1.73M2 — LOW
GLUCOSE BLDC GLUCOMTR-MCNC: 113 MG/DL — HIGH (ref 70–99)
GLUCOSE BLDC GLUCOMTR-MCNC: 113 MG/DL — HIGH (ref 70–99)
GLUCOSE BLDC GLUCOMTR-MCNC: 116 MG/DL — HIGH (ref 70–99)
GLUCOSE BLDC GLUCOMTR-MCNC: 122 MG/DL — HIGH (ref 70–99)
GLUCOSE BLDC GLUCOMTR-MCNC: 122 MG/DL — HIGH (ref 70–99)
GLUCOSE SERPL-MCNC: 103 MG/DL — HIGH (ref 70–99)
GLUCOSE SERPL-MCNC: 103 MG/DL — HIGH (ref 70–99)
HCT VFR BLD CALC: 38.7 % — LOW (ref 39–50)
HCT VFR BLD CALC: 38.7 % — LOW (ref 39–50)
HGB BLD-MCNC: 11.3 G/DL — LOW (ref 13–17)
HGB BLD-MCNC: 11.3 G/DL — LOW (ref 13–17)
MCHC RBC-ENTMCNC: 19.2 PG — LOW (ref 27–34)
MCHC RBC-ENTMCNC: 19.2 PG — LOW (ref 27–34)
MCHC RBC-ENTMCNC: 29.2 GM/DL — LOW (ref 32–36)
MCHC RBC-ENTMCNC: 29.2 GM/DL — LOW (ref 32–36)
MCV RBC AUTO: 65.6 FL — LOW (ref 80–100)
MCV RBC AUTO: 65.6 FL — LOW (ref 80–100)
NRBC # BLD: 0 /100 WBCS — SIGNIFICANT CHANGE UP (ref 0–0)
NRBC # BLD: 0 /100 WBCS — SIGNIFICANT CHANGE UP (ref 0–0)
PLATELET # BLD AUTO: 202 K/UL — SIGNIFICANT CHANGE UP (ref 150–400)
PLATELET # BLD AUTO: 202 K/UL — SIGNIFICANT CHANGE UP (ref 150–400)
POTASSIUM SERPL-MCNC: 4.2 MMOL/L — SIGNIFICANT CHANGE UP (ref 3.5–5.3)
POTASSIUM SERPL-MCNC: 4.2 MMOL/L — SIGNIFICANT CHANGE UP (ref 3.5–5.3)
POTASSIUM SERPL-SCNC: 4.2 MMOL/L — SIGNIFICANT CHANGE UP (ref 3.5–5.3)
POTASSIUM SERPL-SCNC: 4.2 MMOL/L — SIGNIFICANT CHANGE UP (ref 3.5–5.3)
PROT SERPL-MCNC: 7.9 G/DL — SIGNIFICANT CHANGE UP (ref 6–8.3)
PROT SERPL-MCNC: 7.9 G/DL — SIGNIFICANT CHANGE UP (ref 6–8.3)
RBC # BLD: 5.9 M/UL — HIGH (ref 4.2–5.8)
RBC # BLD: 5.9 M/UL — HIGH (ref 4.2–5.8)
RBC # FLD: 20.5 % — HIGH (ref 10.3–14.5)
RBC # FLD: 20.5 % — HIGH (ref 10.3–14.5)
SODIUM SERPL-SCNC: 135 MMOL/L — SIGNIFICANT CHANGE UP (ref 135–145)
SODIUM SERPL-SCNC: 135 MMOL/L — SIGNIFICANT CHANGE UP (ref 135–145)
WBC # BLD: 9.52 K/UL — SIGNIFICANT CHANGE UP (ref 3.8–10.5)
WBC # BLD: 9.52 K/UL — SIGNIFICANT CHANGE UP (ref 3.8–10.5)
WBC # FLD AUTO: 9.52 K/UL — SIGNIFICANT CHANGE UP (ref 3.8–10.5)
WBC # FLD AUTO: 9.52 K/UL — SIGNIFICANT CHANGE UP (ref 3.8–10.5)

## 2023-11-17 PROCEDURE — 93971 EXTREMITY STUDY: CPT | Mod: 26,RT

## 2023-11-17 RX ORDER — LANOLIN ALCOHOL/MO/W.PET/CERES
3 CREAM (GRAM) TOPICAL ONCE
Refills: 0 | Status: COMPLETED | OUTPATIENT
Start: 2023-11-17 | End: 2023-11-17

## 2023-11-17 RX ADMIN — HEPARIN SODIUM 2300 UNIT(S)/HR: 5000 INJECTION INTRAVENOUS; SUBCUTANEOUS at 06:15

## 2023-11-17 RX ADMIN — HEPARIN SODIUM 0 UNIT(S)/HR: 5000 INJECTION INTRAVENOUS; SUBCUTANEOUS at 04:59

## 2023-11-17 RX ADMIN — CEFTRIAXONE 100 MILLIGRAM(S): 500 INJECTION, POWDER, FOR SOLUTION INTRAMUSCULAR; INTRAVENOUS at 21:12

## 2023-11-17 RX ADMIN — Medication 25 MILLIGRAM(S): at 06:14

## 2023-11-17 RX ADMIN — HEPARIN SODIUM 2300 UNIT(S)/HR: 5000 INJECTION INTRAVENOUS; SUBCUTANEOUS at 15:10

## 2023-11-17 RX ADMIN — ATORVASTATIN CALCIUM 40 MILLIGRAM(S): 80 TABLET, FILM COATED ORAL at 21:12

## 2023-11-17 RX ADMIN — HEPARIN SODIUM 2300 UNIT(S)/HR: 5000 INJECTION INTRAVENOUS; SUBCUTANEOUS at 22:40

## 2023-11-17 RX ADMIN — Medication 3 MILLIGRAM(S): at 23:31

## 2023-11-17 RX ADMIN — CHLORHEXIDINE GLUCONATE 1 APPLICATION(S): 213 SOLUTION TOPICAL at 06:18

## 2023-11-17 RX ADMIN — SEVELAMER CARBONATE 1600 MILLIGRAM(S): 2400 POWDER, FOR SUSPENSION ORAL at 12:26

## 2023-11-17 RX ADMIN — Medication 25 MILLIGRAM(S): at 18:05

## 2023-11-17 RX ADMIN — HEPARIN SODIUM 2300 UNIT(S)/HR: 5000 INJECTION INTRAVENOUS; SUBCUTANEOUS at 14:59

## 2023-11-17 RX ADMIN — HEPARIN SODIUM 2300 UNIT(S)/HR: 5000 INJECTION INTRAVENOUS; SUBCUTANEOUS at 19:41

## 2023-11-17 RX ADMIN — SEVELAMER CARBONATE 1600 MILLIGRAM(S): 2400 POWDER, FOR SUSPENSION ORAL at 08:29

## 2023-11-17 RX ADMIN — ERYTHROPOIETIN 10000 UNIT(S): 10000 INJECTION, SOLUTION INTRAVENOUS; SUBCUTANEOUS at 15:32

## 2023-11-17 RX ADMIN — SEVELAMER CARBONATE 1600 MILLIGRAM(S): 2400 POWDER, FOR SUSPENSION ORAL at 18:05

## 2023-11-17 RX ADMIN — Medication 1 APPLICATION(S): at 18:05

## 2023-11-17 RX ADMIN — Medication 1 APPLICATION(S): at 06:15

## 2023-11-17 NOTE — PROGRESS NOTE ADULT - NS ATTEND AMEND GEN_ALL_CORE FT
HD after permcath
as above
KRISTINE tomorrow
catheter came out-hyperkalmic  Treat medically as suggetsed-if K remains high will do HD today-otherwise catheter by IR in the morning  h/o multiple failed access in the past  d/w patient and his wife in detail  Will call vascular to evaluate him again  may benefit from heme to do hypercoagulable work up
Shamar tomorrow

## 2023-11-17 NOTE — PROGRESS NOTE ADULT - ASSESSMENT
58-year-old man with PMH of ESRD on hemodialysis via groin catheter, Monday Wednesday Friday, BENNIE, hypertension, hyperlipidemia, presenting due to weakness for 2 days and fever of 101 Fahrenheit, fatigue, chills, vomiting episodes.    Recommendations:  - MR imaging and Bilateral upper extremity vein mapping reviewed  - Potential OR next week for AVF; pending duplex of axilla  - f/u duplex  - please document medical and cardiac clearance  - rest of care per primary    Vascular Surgery  [id number]

## 2023-11-17 NOTE — PROGRESS NOTE ADULT - ASSESSMENT
58-year-old man with PMH of ESRD on hemodialysis via groin catheter, Monday Wednesday Friday, BENNIE, hypertension, hyperlipidemia, presenting due to weakness for 2 days and fever of 101 Fahrenheit, fatigue, chills, vomiting episodes that started today.  Patient states that yesterday even though he felt unwell he went to his usual dialysis appointment which finished without any significant events.  Nephrology consulted for ESRD on HD.    A/P:  ESRD on HD: MWF  Dialysis center: [facility]  Nephrologist: Dr. Neftaly Holden  Access: CVC R groin; now dislodged.  Consent signed, witnessed, and placed in pt's chart.  s/p Permacath on 11/10    s/p HD On 11/15; UF'd 1L.  S/p MR venogram w/ and w/o Gadavist.  HD today; UF goal 1L.  Continue MWF schedule.  Renal diet.  Monitor BMP and UO.    HTN:  BP controlled.  UF w/ HD.  Monitor BP.    Hyperkalemia:  Likely in setting of ESRD.  Monitor K closely.  Low K diet.    Anemia:  Monitor Hgb.  Epogen w/ HD.  Transfuse for Hgb <8.    CKD: MBD:   - acceptable.  sevelamer to 1600mg TID.  Low PO4 diet.  C/W sevelamer w/ meals.  Monitor Ca and PO4 daily.    Hypercalcemia:  Possibly in setting of dehydration vs. calcitriol.  PTH acceptable for ESRD; Vit. D 44.2.  Off calcitriol.  Ca high normal today.  Dialyze with 2K-2.25Ca bath.  Monitor Ca.    Fever:  Blood cultures with gram neg. rods.  Possibly CVC associated infection.  ID follow up

## 2023-11-17 NOTE — PROVIDER CONTACT NOTE (CRITICAL VALUE NOTIFICATION) - SITUATION
Pt blood cx resulted +. Growth in aerobic bottle gram negative rods
admitted for sepsis/klebsiella. AV fistula to be placed on Monday.

## 2023-11-18 LAB
APTT BLD: 94.6 SEC — HIGH (ref 24.5–35.6)
APTT BLD: 94.6 SEC — HIGH (ref 24.5–35.6)
BUN SERPL-MCNC: 42 MG/DL — HIGH (ref 7–23)
BUN SERPL-MCNC: 42 MG/DL — HIGH (ref 7–23)
CALCIUM SERPL-MCNC: 10.9 MG/DL — HIGH (ref 8.4–10.5)
CALCIUM SERPL-MCNC: 10.9 MG/DL — HIGH (ref 8.4–10.5)
CHLORIDE SERPL-SCNC: 93 MMOL/L — LOW (ref 96–108)
CHLORIDE SERPL-SCNC: 93 MMOL/L — LOW (ref 96–108)
CO2 SERPL-SCNC: 22 MMOL/L — SIGNIFICANT CHANGE UP (ref 22–31)
CO2 SERPL-SCNC: 22 MMOL/L — SIGNIFICANT CHANGE UP (ref 22–31)
CREAT SERPL-MCNC: 11.36 MG/DL — HIGH (ref 0.5–1.3)
CREAT SERPL-MCNC: 11.36 MG/DL — HIGH (ref 0.5–1.3)
EGFR: 5 ML/MIN/1.73M2 — LOW
EGFR: 5 ML/MIN/1.73M2 — LOW
GLUCOSE BLDC GLUCOMTR-MCNC: 100 MG/DL — HIGH (ref 70–99)
GLUCOSE BLDC GLUCOMTR-MCNC: 100 MG/DL — HIGH (ref 70–99)
GLUCOSE BLDC GLUCOMTR-MCNC: 101 MG/DL — HIGH (ref 70–99)
GLUCOSE BLDC GLUCOMTR-MCNC: 101 MG/DL — HIGH (ref 70–99)
GLUCOSE BLDC GLUCOMTR-MCNC: 95 MG/DL — SIGNIFICANT CHANGE UP (ref 70–99)
GLUCOSE BLDC GLUCOMTR-MCNC: 95 MG/DL — SIGNIFICANT CHANGE UP (ref 70–99)
GLUCOSE BLDC GLUCOMTR-MCNC: 98 MG/DL — SIGNIFICANT CHANGE UP (ref 70–99)
GLUCOSE BLDC GLUCOMTR-MCNC: 98 MG/DL — SIGNIFICANT CHANGE UP (ref 70–99)
GLUCOSE SERPL-MCNC: 104 MG/DL — HIGH (ref 70–99)
GLUCOSE SERPL-MCNC: 104 MG/DL — HIGH (ref 70–99)
HCT VFR BLD CALC: 38.2 % — LOW (ref 39–50)
HCT VFR BLD CALC: 38.2 % — LOW (ref 39–50)
HGB BLD-MCNC: 11.3 G/DL — LOW (ref 13–17)
HGB BLD-MCNC: 11.3 G/DL — LOW (ref 13–17)
MCHC RBC-ENTMCNC: 19.2 PG — LOW (ref 27–34)
MCHC RBC-ENTMCNC: 19.2 PG — LOW (ref 27–34)
MCHC RBC-ENTMCNC: 29.6 GM/DL — LOW (ref 32–36)
MCHC RBC-ENTMCNC: 29.6 GM/DL — LOW (ref 32–36)
MCV RBC AUTO: 64.9 FL — LOW (ref 80–100)
MCV RBC AUTO: 64.9 FL — LOW (ref 80–100)
NRBC # BLD: 0 /100 WBCS — SIGNIFICANT CHANGE UP (ref 0–0)
NRBC # BLD: 0 /100 WBCS — SIGNIFICANT CHANGE UP (ref 0–0)
PLATELET # BLD AUTO: 190 K/UL — SIGNIFICANT CHANGE UP (ref 150–400)
PLATELET # BLD AUTO: 190 K/UL — SIGNIFICANT CHANGE UP (ref 150–400)
POTASSIUM SERPL-MCNC: 4.4 MMOL/L — SIGNIFICANT CHANGE UP (ref 3.5–5.3)
POTASSIUM SERPL-MCNC: 4.4 MMOL/L — SIGNIFICANT CHANGE UP (ref 3.5–5.3)
POTASSIUM SERPL-SCNC: 4.4 MMOL/L — SIGNIFICANT CHANGE UP (ref 3.5–5.3)
POTASSIUM SERPL-SCNC: 4.4 MMOL/L — SIGNIFICANT CHANGE UP (ref 3.5–5.3)
RBC # BLD: 5.89 M/UL — HIGH (ref 4.2–5.8)
RBC # BLD: 5.89 M/UL — HIGH (ref 4.2–5.8)
RBC # FLD: 20.7 % — HIGH (ref 10.3–14.5)
RBC # FLD: 20.7 % — HIGH (ref 10.3–14.5)
SODIUM SERPL-SCNC: 133 MMOL/L — LOW (ref 135–145)
SODIUM SERPL-SCNC: 133 MMOL/L — LOW (ref 135–145)
WBC # BLD: 8.44 K/UL — SIGNIFICANT CHANGE UP (ref 3.8–10.5)
WBC # BLD: 8.44 K/UL — SIGNIFICANT CHANGE UP (ref 3.8–10.5)
WBC # FLD AUTO: 8.44 K/UL — SIGNIFICANT CHANGE UP (ref 3.8–10.5)
WBC # FLD AUTO: 8.44 K/UL — SIGNIFICANT CHANGE UP (ref 3.8–10.5)

## 2023-11-18 PROCEDURE — 99233 SBSQ HOSP IP/OBS HIGH 50: CPT

## 2023-11-18 RX ORDER — OXYCODONE HYDROCHLORIDE 5 MG/1
2.5 TABLET ORAL ONCE
Refills: 0 | Status: DISCONTINUED | OUTPATIENT
Start: 2023-11-18 | End: 2023-11-18

## 2023-11-18 RX ADMIN — Medication 650 MILLIGRAM(S): at 19:29

## 2023-11-18 RX ADMIN — Medication 650 MILLIGRAM(S): at 18:30

## 2023-11-18 RX ADMIN — OXYCODONE HYDROCHLORIDE 2.5 MILLIGRAM(S): 5 TABLET ORAL at 19:29

## 2023-11-18 RX ADMIN — HEPARIN SODIUM 2300 UNIT(S)/HR: 5000 INJECTION INTRAVENOUS; SUBCUTANEOUS at 03:19

## 2023-11-18 RX ADMIN — SEVELAMER CARBONATE 1600 MILLIGRAM(S): 2400 POWDER, FOR SUSPENSION ORAL at 09:48

## 2023-11-18 RX ADMIN — Medication 25 MILLIGRAM(S): at 18:29

## 2023-11-18 RX ADMIN — HEPARIN SODIUM 2300 UNIT(S)/HR: 5000 INJECTION INTRAVENOUS; SUBCUTANEOUS at 21:55

## 2023-11-18 RX ADMIN — Medication 25 MILLIGRAM(S): at 05:41

## 2023-11-18 RX ADMIN — HEPARIN SODIUM 2300 UNIT(S)/HR: 5000 INJECTION INTRAVENOUS; SUBCUTANEOUS at 07:28

## 2023-11-18 RX ADMIN — OXYCODONE HYDROCHLORIDE 2.5 MILLIGRAM(S): 5 TABLET ORAL at 18:29

## 2023-11-18 RX ADMIN — ATORVASTATIN CALCIUM 40 MILLIGRAM(S): 80 TABLET, FILM COATED ORAL at 21:53

## 2023-11-18 RX ADMIN — SEVELAMER CARBONATE 1600 MILLIGRAM(S): 2400 POWDER, FOR SUSPENSION ORAL at 18:29

## 2023-11-18 RX ADMIN — HEPARIN SODIUM 2300 UNIT(S)/HR: 5000 INJECTION INTRAVENOUS; SUBCUTANEOUS at 09:49

## 2023-11-18 RX ADMIN — CEFTRIAXONE 100 MILLIGRAM(S): 500 INJECTION, POWDER, FOR SOLUTION INTRAMUSCULAR; INTRAVENOUS at 21:53

## 2023-11-18 RX ADMIN — Medication 1 APPLICATION(S): at 18:29

## 2023-11-18 NOTE — PROGRESS NOTE ADULT - PROBLEM SELECTOR PLAN 4
Patient w/ ESRD on HD M/W/F presents with dislodged groin HD catheter  -s/p shiley placement w/ IR on 11/6. Plan for permacath 11/10  - ESRD c/b hyperkalemia s/p lokelma, now resolved  - continue to monitor electrolytes  - continue Sevelamer TID  - plan for HD per nephrology  - vascular consulted, vein mapping b/l outpatient   - CT venogram with contrast allergy protocol. Patient refusing to take steroids to prevent contrast induced allergy, states he had a severe reaction last time despite taking steroids where he had an anaphylatoxic episode and requiring hospitalization for 1 week. States it occurred 2x in the past with contrast, both times were very severe. Also would like to speak to nephro regarding renal transplant, patient is interested.  - Spoke to Vascular, since patient refused CT venogram due to severe contrast allergy, will aim for MR venogram of chest.   - Spoke to Nephrologist, updated and followed up on the patient regarding renal transplant, will need to f/u outpt.  - Patient refusing AVF, was scheduled next week as per Vascular; would like to get graft instead. ongoing conversation with vascular/nephro       Patient is a low risk patient for a low risk procedure. They have no anginal symptoms nor dyspnea on exertion, however patient is not able to walk multiple fights of stairs without problems. They have no clinical evidence of PE such as leg swelling dyspnea, oxygen requirement, or chest pain. He has no orthopnea or paroxysmal nocturnal dyspnea. They no report of palpitations or syncope. There is no documented past medical history of ACS, heart failure, pulmonary embolism, or malignant arrhythmia. RCRI score is class II, which estimates a 10.1% chance of mortality within 30 days of surgery. They deny sleep apnea. They have no family history of complications with anesthesia. On exam, vital signs WNL, no carotid murmur nor systolic murmur. Lungs clear without crackles.    patient is medically optimized for surgery/procedure.

## 2023-11-18 NOTE — PROGRESS NOTE ADULT - ASSESSMENT
58-year-old man with PMH of ESRD on hemodialysis via groin catheter, Monday Wednesday Friday, BENNIE, hypertension, hyperlipidemia, presenting due to weakness for 2 days and fever of 101 Fahrenheit, fatigue, chills, vomiting episodes that started today.  Patient states that yesterday even though he felt unwell he went to his usual dialysis appointment which finished without any significant events.  Nephrology consulted for ESRD on HD.    A/P:  ESRD on HD: MWF  Dialysis center: [facility]  Nephrologist: Dr. Neftaly Holden  Access: CVC R groin; now dislodged.  Consent signed, witnessed, and placed in pt's chart.  s/p Permacath on 11/10    s/p HD On 11/17  S/p MR venogram w/ and w/o Gadavist.  Continue MWF schedule.--HOLIDAY SCHEDULE THIS WEEK. PLAN FOR HD On SUNDAY, TUESDAY AND  FRIDAY  Renal diet.  Monitor BMP and UO.    HTN:  BP controlled.  UF w/ HD.  Monitor BP.    Hyperkalemia:  Likely in setting of ESRD.  Monitor K closely.  Low K diet.    Anemia:  Monitor Hgb.  Epogen w/ HD.  Transfuse for Hgb <8.    CKD: MBD:   - acceptable.  sevelamer to 1600mg TID.  Low PO4 diet.  C/W sevelamer w/ meals.  Monitor Ca and PO4 daily.    Hypercalcemia:  Possibly in setting of dehydration vs. calcitriol.  PTH acceptable for ESRD; Vit. D 44.2.  Off calcitriol.  Ca high normal--hd with low calcium bath  Monitor Ca.    Fever:  Blood cultures with gram neg. rods.  Possibly CVC associated infection.  ID follow up

## 2023-11-19 LAB
APTT BLD: 98.1 SEC — HIGH (ref 24.5–35.6)
APTT BLD: 98.1 SEC — HIGH (ref 24.5–35.6)
BUN SERPL-MCNC: 54 MG/DL — HIGH (ref 7–23)
BUN SERPL-MCNC: 54 MG/DL — HIGH (ref 7–23)
CALCIUM SERPL-MCNC: 10.7 MG/DL — HIGH (ref 8.4–10.5)
CALCIUM SERPL-MCNC: 10.7 MG/DL — HIGH (ref 8.4–10.5)
CHLORIDE SERPL-SCNC: 90 MMOL/L — LOW (ref 96–108)
CHLORIDE SERPL-SCNC: 90 MMOL/L — LOW (ref 96–108)
CO2 SERPL-SCNC: 24 MMOL/L — SIGNIFICANT CHANGE UP (ref 22–31)
CO2 SERPL-SCNC: 24 MMOL/L — SIGNIFICANT CHANGE UP (ref 22–31)
CREAT SERPL-MCNC: 13.55 MG/DL — HIGH (ref 0.5–1.3)
CREAT SERPL-MCNC: 13.55 MG/DL — HIGH (ref 0.5–1.3)
EGFR: 4 ML/MIN/1.73M2 — LOW
EGFR: 4 ML/MIN/1.73M2 — LOW
GLUCOSE BLDC GLUCOMTR-MCNC: 100 MG/DL — HIGH (ref 70–99)
GLUCOSE BLDC GLUCOMTR-MCNC: 100 MG/DL — HIGH (ref 70–99)
GLUCOSE BLDC GLUCOMTR-MCNC: 102 MG/DL — HIGH (ref 70–99)
GLUCOSE BLDC GLUCOMTR-MCNC: 102 MG/DL — HIGH (ref 70–99)
GLUCOSE BLDC GLUCOMTR-MCNC: 104 MG/DL — HIGH (ref 70–99)
GLUCOSE BLDC GLUCOMTR-MCNC: 104 MG/DL — HIGH (ref 70–99)
GLUCOSE BLDC GLUCOMTR-MCNC: 97 MG/DL — SIGNIFICANT CHANGE UP (ref 70–99)
GLUCOSE BLDC GLUCOMTR-MCNC: 97 MG/DL — SIGNIFICANT CHANGE UP (ref 70–99)
GLUCOSE SERPL-MCNC: 99 MG/DL — SIGNIFICANT CHANGE UP (ref 70–99)
GLUCOSE SERPL-MCNC: 99 MG/DL — SIGNIFICANT CHANGE UP (ref 70–99)
HCT VFR BLD CALC: 32.9 % — LOW (ref 39–50)
HCT VFR BLD CALC: 32.9 % — LOW (ref 39–50)
HGB BLD-MCNC: 10 G/DL — LOW (ref 13–17)
HGB BLD-MCNC: 10 G/DL — LOW (ref 13–17)
MCHC RBC-ENTMCNC: 19.6 PG — LOW (ref 27–34)
MCHC RBC-ENTMCNC: 19.6 PG — LOW (ref 27–34)
MCHC RBC-ENTMCNC: 30.4 GM/DL — LOW (ref 32–36)
MCHC RBC-ENTMCNC: 30.4 GM/DL — LOW (ref 32–36)
MCV RBC AUTO: 64.5 FL — LOW (ref 80–100)
MCV RBC AUTO: 64.5 FL — LOW (ref 80–100)
NRBC # BLD: 0 /100 WBCS — SIGNIFICANT CHANGE UP (ref 0–0)
NRBC # BLD: 0 /100 WBCS — SIGNIFICANT CHANGE UP (ref 0–0)
PLATELET # BLD AUTO: 181 K/UL — SIGNIFICANT CHANGE UP (ref 150–400)
PLATELET # BLD AUTO: 181 K/UL — SIGNIFICANT CHANGE UP (ref 150–400)
POTASSIUM SERPL-MCNC: 4 MMOL/L — SIGNIFICANT CHANGE UP (ref 3.5–5.3)
POTASSIUM SERPL-MCNC: 4 MMOL/L — SIGNIFICANT CHANGE UP (ref 3.5–5.3)
POTASSIUM SERPL-SCNC: 4 MMOL/L — SIGNIFICANT CHANGE UP (ref 3.5–5.3)
POTASSIUM SERPL-SCNC: 4 MMOL/L — SIGNIFICANT CHANGE UP (ref 3.5–5.3)
RBC # BLD: 5.1 M/UL — SIGNIFICANT CHANGE UP (ref 4.2–5.8)
RBC # BLD: 5.1 M/UL — SIGNIFICANT CHANGE UP (ref 4.2–5.8)
RBC # FLD: 20.3 % — HIGH (ref 10.3–14.5)
RBC # FLD: 20.3 % — HIGH (ref 10.3–14.5)
SODIUM SERPL-SCNC: 134 MMOL/L — LOW (ref 135–145)
SODIUM SERPL-SCNC: 134 MMOL/L — LOW (ref 135–145)
WBC # BLD: 7.63 K/UL — SIGNIFICANT CHANGE UP (ref 3.8–10.5)
WBC # BLD: 7.63 K/UL — SIGNIFICANT CHANGE UP (ref 3.8–10.5)
WBC # FLD AUTO: 7.63 K/UL — SIGNIFICANT CHANGE UP (ref 3.8–10.5)
WBC # FLD AUTO: 7.63 K/UL — SIGNIFICANT CHANGE UP (ref 3.8–10.5)

## 2023-11-19 PROCEDURE — 99232 SBSQ HOSP IP/OBS MODERATE 35: CPT

## 2023-11-19 RX ORDER — SODIUM CHLORIDE 9 MG/ML
500 INJECTION INTRAMUSCULAR; INTRAVENOUS; SUBCUTANEOUS ONCE
Refills: 0 | Status: COMPLETED | OUTPATIENT
Start: 2023-11-19 | End: 2023-11-19

## 2023-11-19 RX ORDER — ACETAMINOPHEN 500 MG
1000 TABLET ORAL ONCE
Refills: 0 | Status: COMPLETED | OUTPATIENT
Start: 2023-11-19 | End: 2023-11-19

## 2023-11-19 RX ORDER — CEFTRIAXONE 500 MG/1
2000 INJECTION, POWDER, FOR SOLUTION INTRAMUSCULAR; INTRAVENOUS EVERY 24 HOURS
Refills: 0 | Status: DISCONTINUED | OUTPATIENT
Start: 2023-11-19 | End: 2023-11-21

## 2023-11-19 RX ADMIN — Medication 400 MILLIGRAM(S): at 19:07

## 2023-11-19 RX ADMIN — CEFTRIAXONE 100 MILLIGRAM(S): 500 INJECTION, POWDER, FOR SOLUTION INTRAMUSCULAR; INTRAVENOUS at 22:19

## 2023-11-19 RX ADMIN — HEPARIN SODIUM 2300 UNIT(S)/HR: 5000 INJECTION INTRAVENOUS; SUBCUTANEOUS at 07:47

## 2023-11-19 RX ADMIN — ERYTHROPOIETIN 10000 UNIT(S): 10000 INJECTION, SOLUTION INTRAVENOUS; SUBCUTANEOUS at 12:49

## 2023-11-19 RX ADMIN — SODIUM CHLORIDE 500 MILLILITER(S): 9 INJECTION INTRAMUSCULAR; INTRAVENOUS; SUBCUTANEOUS at 19:07

## 2023-11-19 RX ADMIN — HEPARIN SODIUM 2300 UNIT(S)/HR: 5000 INJECTION INTRAVENOUS; SUBCUTANEOUS at 11:35

## 2023-11-19 RX ADMIN — ATORVASTATIN CALCIUM 40 MILLIGRAM(S): 80 TABLET, FILM COATED ORAL at 21:33

## 2023-11-19 RX ADMIN — HEPARIN SODIUM 2300 UNIT(S)/HR: 5000 INJECTION INTRAVENOUS; SUBCUTANEOUS at 19:07

## 2023-11-19 NOTE — PROGRESS NOTE ADULT - ASSESSMENT
58-year-old man with PMH of ESRD on hemodialysis via groin catheter, Monday Wednesday Friday, BENNIE, hypertension, hyperlipidemia, presenting due to weakness for 2 days and fever of 101 Fahrenheit, fatigue, chills, vomiting episodes.    Recommendations:  - Still pending final operative planning, will likely be performed outpatient   - MR imaging and Bilateral upper extremity vein mapping reviewed  - Duplex of axilla completed  - please document medical and cardiac clearance  - rest of care per primary    Vascular Surgery  [phone]

## 2023-11-19 NOTE — PROGRESS NOTE ADULT - ASSESSMENT
58-year-old man with PMH of ESRD on hemodialysis via groin catheter, Monday Wednesday Friday, BENNIE, hypertension, hyperlipidemia, presenting due to weakness for 2 days and fever of 101 Fahrenheit, fatigue, chills, vomiting episodes that started today.  Patient states that yesterday even though he felt unwell he went to his usual dialysis appointment which finished without any significant events.  Nephrology consulted for ESRD on HD.    A/P:  ESRD on HD: MWF  Dialysis center: [facility]  Nephrologist: Dr. Neftaly Holden  Access: CVC R groin; now dislodged.  Consent signed, witnessed, and placed in pt's chart.  s/p Permacath on 11/10    s/p HD On 11/17  S/p MR venogram w/ and w/o Gadavist.  Continue MWF schedule.--HOLIDAY SCHEDULE THIS WEEK. PLAN FOR HD On SUNDAY, TUESDAY AND  FRIDAY  Renal diet.  Monitor BMP and UO.    HTN:  BP controlled.  UF w/ HD.  Monitor BP.    Hyperkalemia:  Likely in setting of ESRD.  Monitor K closely.  Low K diet.    Anemia:  Monitor Hgb.  Epogen w/ HD.  Transfuse for Hgb <8.    CKD: MBD:   - acceptable.  sevelamer to 1600mg TID.  Low PO4 diet.  C/W sevelamer w/ meals.  Monitor Ca and PO4 daily.    Hypercalcemia:  Possibly in setting of dehydration vs. calcitriol.  PTH acceptable for ESRD; Vit. D 44.2.  Off calcitriol.  Ca high normal--hd with low calcium bath  Monitor Ca.    Fever:  Blood cultures with gram neg. rods.  Possibly CVC associated infection.  ID follow up

## 2023-11-19 NOTE — PROGRESS NOTE ADULT - PROBLEM SELECTOR PLAN 1
Patient w/ ESRD on HD M/W/F presents with dislodged groin HD catheter  -s/p shiley placement w/ IR on 11/6. Plan for permacath 11/10  - ESRD c/b hyperkalemia s/p lokelma, now resolved  - continue to monitor electrolytes  - continue Sevelamer TID  - plan for HD per nephrology  - vascular consulted  - Spoke to Vascular, since patient refused CT venogram due to severe contrast allergy, will aim for MR venogram of chest. MR imaging and Bilateral upper extremity vein mapping reviewed. Duplex of axilla completed  - Spoke to Nephrologist, updated and followed up on the patient regarding renal transplant, will need to f/u outpt.  - Patient refusing AVF, was scheduled next week as per Vascular; would like to get graft instead. ongoing conversation with vascular/nephro       Patient is a low risk patient for a low risk procedure. They have no anginal symptoms nor dyspnea on exertion, however patient is not able to walk multiple fights of stairs without problems. They have no clinical evidence of PE such as leg swelling dyspnea, oxygen requirement, or chest pain. He has no orthopnea or paroxysmal nocturnal dyspnea. They no report of palpitations or syncope. There is no documented past medical history of ACS, heart failure, pulmonary embolism, or malignant arrhythmia. RCRI score is class II, which estimates a 10.1% chance of mortality within 30 days of surgery. They deny sleep apnea. They have no family history of complications with anesthesia. On exam, vital signs WNL, no carotid murmur nor systolic murmur. Lungs clear without crackles.    patient is medically optimized for surgery/procedure.

## 2023-11-19 NOTE — PROGRESS NOTE ADULT - PROBLEM SELECTOR PLAN 4
Resolved. Initially patient w/ sepsis on admission in the setting of bacteremia, resolved.   - continue abx as above  - repeat cultures NGTD  - continue to monitor WBC

## 2023-11-20 LAB
ANION GAP SERPL CALC-SCNC: 19 MMOL/L — HIGH (ref 5–17)
ANION GAP SERPL CALC-SCNC: 19 MMOL/L — HIGH (ref 5–17)
APTT BLD: 107.2 SEC — HIGH (ref 24.5–35.6)
APTT BLD: 107.2 SEC — HIGH (ref 24.5–35.6)
BUN SERPL-MCNC: 38 MG/DL — HIGH (ref 7–23)
BUN SERPL-MCNC: 38 MG/DL — HIGH (ref 7–23)
CALCIUM SERPL-MCNC: 10.7 MG/DL — HIGH (ref 8.4–10.5)
CALCIUM SERPL-MCNC: 10.7 MG/DL — HIGH (ref 8.4–10.5)
CHLORIDE SERPL-SCNC: 93 MMOL/L — LOW (ref 96–108)
CHLORIDE SERPL-SCNC: 93 MMOL/L — LOW (ref 96–108)
CO2 SERPL-SCNC: 23 MMOL/L — SIGNIFICANT CHANGE UP (ref 22–31)
CO2 SERPL-SCNC: 23 MMOL/L — SIGNIFICANT CHANGE UP (ref 22–31)
CREAT SERPL-MCNC: 11.68 MG/DL — HIGH (ref 0.5–1.3)
CREAT SERPL-MCNC: 11.68 MG/DL — HIGH (ref 0.5–1.3)
EGFR: 5 ML/MIN/1.73M2 — LOW
EGFR: 5 ML/MIN/1.73M2 — LOW
GLUCOSE BLDC GLUCOMTR-MCNC: 104 MG/DL — HIGH (ref 70–99)
GLUCOSE BLDC GLUCOMTR-MCNC: 104 MG/DL — HIGH (ref 70–99)
GLUCOSE BLDC GLUCOMTR-MCNC: 106 MG/DL — HIGH (ref 70–99)
GLUCOSE BLDC GLUCOMTR-MCNC: 106 MG/DL — HIGH (ref 70–99)
GLUCOSE BLDC GLUCOMTR-MCNC: 120 MG/DL — HIGH (ref 70–99)
GLUCOSE BLDC GLUCOMTR-MCNC: 120 MG/DL — HIGH (ref 70–99)
GLUCOSE BLDC GLUCOMTR-MCNC: 91 MG/DL — SIGNIFICANT CHANGE UP (ref 70–99)
GLUCOSE BLDC GLUCOMTR-MCNC: 91 MG/DL — SIGNIFICANT CHANGE UP (ref 70–99)
GLUCOSE SERPL-MCNC: 94 MG/DL — SIGNIFICANT CHANGE UP (ref 70–99)
GLUCOSE SERPL-MCNC: 94 MG/DL — SIGNIFICANT CHANGE UP (ref 70–99)
HAV IGM SER-ACNC: SIGNIFICANT CHANGE UP
HAV IGM SER-ACNC: SIGNIFICANT CHANGE UP
HBV CORE IGM SER-ACNC: SIGNIFICANT CHANGE UP
HBV CORE IGM SER-ACNC: SIGNIFICANT CHANGE UP
HBV SURFACE AG SER-ACNC: SIGNIFICANT CHANGE UP
HBV SURFACE AG SER-ACNC: SIGNIFICANT CHANGE UP
HCT VFR BLD CALC: 36.8 % — LOW (ref 39–50)
HCT VFR BLD CALC: 36.8 % — LOW (ref 39–50)
HCV AB S/CO SERPL IA: 0.19 S/CO — SIGNIFICANT CHANGE UP (ref 0–0.99)
HCV AB S/CO SERPL IA: 0.19 S/CO — SIGNIFICANT CHANGE UP (ref 0–0.99)
HCV AB SERPL-IMP: SIGNIFICANT CHANGE UP
HCV AB SERPL-IMP: SIGNIFICANT CHANGE UP
HGB BLD-MCNC: 10.9 G/DL — LOW (ref 13–17)
HGB BLD-MCNC: 10.9 G/DL — LOW (ref 13–17)
MCHC RBC-ENTMCNC: 19.2 PG — LOW (ref 27–34)
MCHC RBC-ENTMCNC: 19.2 PG — LOW (ref 27–34)
MCHC RBC-ENTMCNC: 29.6 GM/DL — LOW (ref 32–36)
MCHC RBC-ENTMCNC: 29.6 GM/DL — LOW (ref 32–36)
MCV RBC AUTO: 64.9 FL — LOW (ref 80–100)
MCV RBC AUTO: 64.9 FL — LOW (ref 80–100)
NRBC # BLD: 0 /100 WBCS — SIGNIFICANT CHANGE UP (ref 0–0)
NRBC # BLD: 0 /100 WBCS — SIGNIFICANT CHANGE UP (ref 0–0)
PLATELET # BLD AUTO: 164 K/UL — SIGNIFICANT CHANGE UP (ref 150–400)
PLATELET # BLD AUTO: 164 K/UL — SIGNIFICANT CHANGE UP (ref 150–400)
POTASSIUM SERPL-MCNC: 4 MMOL/L — SIGNIFICANT CHANGE UP (ref 3.5–5.3)
POTASSIUM SERPL-MCNC: 4 MMOL/L — SIGNIFICANT CHANGE UP (ref 3.5–5.3)
POTASSIUM SERPL-SCNC: 4 MMOL/L — SIGNIFICANT CHANGE UP (ref 3.5–5.3)
POTASSIUM SERPL-SCNC: 4 MMOL/L — SIGNIFICANT CHANGE UP (ref 3.5–5.3)
RAPID RVP RESULT: SIGNIFICANT CHANGE UP
RAPID RVP RESULT: SIGNIFICANT CHANGE UP
RBC # BLD: 5.67 M/UL — SIGNIFICANT CHANGE UP (ref 4.2–5.8)
RBC # BLD: 5.67 M/UL — SIGNIFICANT CHANGE UP (ref 4.2–5.8)
RBC # FLD: 20.7 % — HIGH (ref 10.3–14.5)
RBC # FLD: 20.7 % — HIGH (ref 10.3–14.5)
SARS-COV-2 RNA SPEC QL NAA+PROBE: SIGNIFICANT CHANGE UP
SARS-COV-2 RNA SPEC QL NAA+PROBE: SIGNIFICANT CHANGE UP
SODIUM SERPL-SCNC: 135 MMOL/L — SIGNIFICANT CHANGE UP (ref 135–145)
SODIUM SERPL-SCNC: 135 MMOL/L — SIGNIFICANT CHANGE UP (ref 135–145)
WBC # BLD: 6.47 K/UL — SIGNIFICANT CHANGE UP (ref 3.8–10.5)
WBC # BLD: 6.47 K/UL — SIGNIFICANT CHANGE UP (ref 3.8–10.5)
WBC # FLD AUTO: 6.47 K/UL — SIGNIFICANT CHANGE UP (ref 3.8–10.5)
WBC # FLD AUTO: 6.47 K/UL — SIGNIFICANT CHANGE UP (ref 3.8–10.5)

## 2023-11-20 PROCEDURE — 99232 SBSQ HOSP IP/OBS MODERATE 35: CPT

## 2023-11-20 RX ORDER — APIXABAN 2.5 MG/1
5 TABLET, FILM COATED ORAL
Refills: 0 | Status: DISCONTINUED | OUTPATIENT
Start: 2023-11-20 | End: 2023-11-22

## 2023-11-20 RX ADMIN — ATORVASTATIN CALCIUM 40 MILLIGRAM(S): 80 TABLET, FILM COATED ORAL at 22:01

## 2023-11-20 RX ADMIN — SEVELAMER CARBONATE 1600 MILLIGRAM(S): 2400 POWDER, FOR SUSPENSION ORAL at 13:46

## 2023-11-20 RX ADMIN — SEVELAMER CARBONATE 1600 MILLIGRAM(S): 2400 POWDER, FOR SUSPENSION ORAL at 09:49

## 2023-11-20 RX ADMIN — Medication 25 MILLIGRAM(S): at 17:48

## 2023-11-20 RX ADMIN — HEPARIN SODIUM 2300 UNIT(S)/HR: 5000 INJECTION INTRAVENOUS; SUBCUTANEOUS at 07:42

## 2023-11-20 RX ADMIN — APIXABAN 5 MILLIGRAM(S): 2.5 TABLET, FILM COATED ORAL at 17:49

## 2023-11-20 RX ADMIN — CHLORHEXIDINE GLUCONATE 1 APPLICATION(S): 213 SOLUTION TOPICAL at 10:15

## 2023-11-20 RX ADMIN — SEVELAMER CARBONATE 1600 MILLIGRAM(S): 2400 POWDER, FOR SUSPENSION ORAL at 17:49

## 2023-11-20 RX ADMIN — HEPARIN SODIUM 2000 UNIT(S)/HR: 5000 INJECTION INTRAVENOUS; SUBCUTANEOUS at 10:14

## 2023-11-20 RX ADMIN — CEFTRIAXONE 100 MILLIGRAM(S): 500 INJECTION, POWDER, FOR SOLUTION INTRAMUSCULAR; INTRAVENOUS at 22:02

## 2023-11-20 NOTE — PROGRESS NOTE ADULT - ASSESSMENT
58-year-old man with PMH of ESRD on hemodialysis via groin catheter, Monday Wednesday Friday, BENNIE, hypertension, hyperlipidemia, presenting due to weakness for 2 days and fever of 101 Fahrenheit, fatigue, chills, vomiting episodes. Vascular surgery consulted for access planning as has had multiple failed AVFs in both arms.    Recommendations:  - Office follow up with Dr. Mckoy for AVF planning as an outpatient  - Please document medical and cardiac clearance  - Please reconsult with any additional questions/concerns  - Remainder of care per primary    Vascular Surgery  [id number]

## 2023-11-20 NOTE — PROGRESS NOTE ADULT - ASSESSMENT
58-year-old man with PMH of ESRD on hemodialysis via groin catheter, Monday Wednesday Friday, BENNIE, hypertension, hyperlipidemia, presenting due to weakness for 2 days and fever of 101 Fahrenheit, fatigue, chills, vomiting episodes that started today.  Patient states that yesterday even though he felt unwell he went to his usual dialysis appointment which finished without any significant events.  Nephrology consulted for ESRD on HD.    A/P:  ESRD on HD: MWF  Dialysis center: [facility]  Nephrologist: Dr. Neftaly Holden  Access: CVC R groin; now dislodged.  Consent signed, witnessed, and placed in pt's chart.  s/p Permacath on 11/10    s/p HD On 11/19  S/p MR venogram w/ and w/o Gadavist.  Continue MWF schedule.--HOLIDAY SCHEDULE THIS WEEK. PLAN FOR HD On SUNDAY, TUESDAY AND  FRIDAY  Renal diet.  Monitor BMP and UO.    HTN:  BP controlled.  UF w/ HD.  Monitor BP.    Hyperkalemia:  Likely in setting of ESRD.  Monitor K closely.  Low K diet.    Anemia:  Monitor Hgb.  Epogen w/ HD.  Transfuse for Hgb <8.    CKD: MBD:   - acceptable.  sevelamer to 1600mg TID.  Low PO4 diet.  C/W sevelamer w/ meals.  Monitor Ca and PO4 daily.    Hypercalcemia:  Possibly in setting of dehydration vs. calcitriol.  PTH acceptable for ESRD; Vit. D 44.2.  Off calcitriol.  Ca high normal--hd with low calcium bath  Monitor Ca.    Fever:  Blood cultures with gram neg. rods.  Possibly CVC associated infection.  ID follow up

## 2023-11-20 NOTE — PROGRESS NOTE ADULT - PROBLEM SELECTOR PLAN 1
Patient w/ ESRD on HD M/W/F presents with dislodged groin HD catheter  -s/p shiley placement w/ IR on 11/6. Plan for permacath 11/10  - ESRD c/b hyperkalemia s/p lokelma, now resolved  - continue to monitor electrolytes  - continue Sevelamer TID  - plan for HD per nephrology  - Spoke to Vascular, since patient refused CT venogram due to severe contrast allergy, will aim for MR venogram of chest. MR imaging and Bilateral upper extremity vein mapping reviewed. Duplex of axilla completed  - Spoke to Nephrologist, updated and followed up on the patient regarding renal transplant, will need to f/u outpt

## 2023-11-20 NOTE — CHART NOTE - NSCHARTNOTESELECT_GEN_ALL_CORE
Nutrition Services
Event Note
Internal Medicine/Event Note
Post RIJ cath  site/Event Note

## 2023-11-20 NOTE — CHART NOTE - NSCHARTNOTEFT_GEN_A_CORE
Called by vascular tech and reported , doppler shows R axillary DVT. Vascular aware. o change in plan. Patient already  on heparin drip.  Viktoriya Waldron NP  medicine ACP
I reviewed the H&P, I examined the patient, and there are no changes in the patient's condition.   
Long discussion with patient about HD options. Patient had AV grafts placed at outside hospital because he refused autologous fistula creation. He is not interested in pursuing a fistula creation and would only like a graft placed. Thrombosed outflow stent in left upper extremity. Not imaged stent in right upper extremity. Pending dupplex of right upper extremity stent to determine further HD options.
Notified Dr Bello patient currently not on AC; Eliquis was discontinued today pending Permacath placement. Per Dr Bello; will calculated CHADSVASC and enter order if appropriate. Pt is currently on Eliquis for Hx of Afib      Windy Grover NP  Department of Medicine   x [phone]
Notified by RN; patient with pain   at post RIJ cath site; patient seen at  bedside; site area (+) erythema; no purulent drainage noted. Will apply bacitracin tid; acetaminophen for pain. Will monitor     Vital Signs Last 24 Hrs  T(C): 37.2 (11 Nov 2023 05:23), Max: 37.6 (11 Nov 2023 01:05)  T(F): 98.9 (11 Nov 2023 05:23), Max: 99.6 (11 Nov 2023 01:05)  HR: 98 (11 Nov 2023 05:23) (68 - 98)  BP: 123/77 (11 Nov 2023 05:23) (107/61 - 153/85)  BP(mean): 100 (10 Nov 2023 18:40) (84 - 101)  RR: 18 (11 Nov 2023 05:23) (15 - 20)  SpO2: 100% (11 Nov 2023 05:23) (93% - 100%)    Parameters below as of 11 Nov 2023 05:23  Patient On (Oxygen Delivery Method): room air    Windy Grover NP
Patient not seen, was in HD during the time of assessment.     Will aim to see patient after HD.    Dr. Crisostomo
Nutrition Follow Up Note  Patient seen for: follow-up for nutrition service     Chart reviewed, events noted.    Source: [x] Patient       [x] electronic medical record          Diet Order:   Diet, DASH/TLC:   Sodium & Cholesterol Restricted  Consistent Carbohydrate {Evening Snack} (CSTCHOSN)  For patients receiving Renal Replacement - No Protein Restr, No Conc K, No Conc Phos, Low Sodium (RENAL) (11-15-)    - Is current order appropriate/adequate?   [x]  No: see below for recommendation      - PO intake :     [x] 50-75%  Fair        -- Pt reports fair PO intake most time, no food preferences obtained at present as pt states he has been ordering foods from the menu. Continue to decline oral nutrition supplements when offered.     - Nutrition-related concerns:  -- Pt with abnormal renal indices, dx ESRD on HD, on Renvela and Epogen. Last HD with output: 1000ml (), 1000ml (), 1000ml (11/15)   -- Pt is on ISS (Lispro) to regulate blood glucose while in house.   -- GI: On Simethicone, Zofran, Miralax. Last BM was 2 days ago per pt.     Weights:   Drug Dosing Weight  Height (cm): 177.8 (10 Nov 2023 17:13)  Weight (kg): 131.5 (10 Nov 2023 17:13)  BMI (kg/m2): 41.6 (10 Nov 2023 17:13)  Daily Weight in k.3 (-19), Weight in k.3 (-), Weight in k.8 (-), Weight in k.8 (-17), Weight in k.6 (-15), Weight in k.6 (-15)  -- Wt fluctuation expected due to fluid shift (on HD, edema). Will continue to monitor weight trends as available/able.     Nutritionally Pertinent MEDICATIONS  (STANDING):  atorvastatin  cefTRIAXone   IVPB  dextrose 5%.  dextrose 5%.  dextrose 50% Injectable  dextrose 50% Injectable  dextrose 50% Injectable  glucagon  Injectable  insulin lispro (ADMELOG) corrective regimen sliding scale  insulin lispro (ADMELOG) corrective regimen sliding scale  methylPREDNISolone  metoprolol tartrate  polyethylene glycol 3350    Pertinent Labs:  @ 09:23: Na 135, BUN 38<H>, Cr 11.68<H>, BG 94, K+ 4.0, Phos --, Mg --, Alk Phos --, ALT/SGPT --, AST/SGOT --, HbA1c --    A1C with Estimated Average Glucose Result: 5.2 % (23 @ 08:25)    Finger Sticks:  POCT Blood Glucose.: 104 mg/dL ( @ 13:39)  POCT Blood Glucose.: 120 mg/dL ( @ 09:48)  POCT Blood Glucose.: 102 mg/dL ( @ 21:30)  POCT Blood Glucose.: 104 mg/dL ( @ 18:29)    Skin per nursing documentation: no pressure injury per nursing flowsheet      Edema: edema +1 to left and right ankles per flowsheet      Estimated Needs based on IBW of 86kg:  [x] recalculated:   Calories: 2150-2580kcal (25-30kcal/kg BW)   Proteins: 95-112g (1.1-1.3g/kg BW)   Fluids: defer to team     Previous Nutrition Diagnosis: Increased nutrient needs, overweight/obesity   Nutrition Diagnosis is: [x] ongoing      New Nutrition Diagnosis: [x] Not applicable    Nutrition Care Plan:  [x] In Progress    Nutrition Interventions:     Education Provided:       [x] Yes: Emphasized the importance of adequate kcal and protein intake; recommend to optimize nutritional intake in case of decreased appetite; recommended small frequent meals by ordering nutrient-dense snacks and leaving non-perishable food away from tray for later consumption during the day or between meals; to start with protein, and sips of supplement throughout the day; reviewed foods with protein and menu order procedures in hospital.   Pt does not want nutrition education/materials on HD and diabetes diet, states he has been seeing a RD in HD clinic.         Recommendations:      -- Recommend to d/c DASH restriction, continue CSTCHO Renal as tolerated, continue to allow 2x protein entrees. RD remains available to adjust diet as needed.   -- Recommend Nephro-Bud supplement, pending no medical contraindications, for micronutrient support.   -- Monitor PO intake, GI tolerance, skin integrity, labs, weight, and bowel movement regularity.   -- Will continue to honor food and beverage preferences within diet restriction of patient in an effort to maximize level of nutrient intake.  -- Assist with meals PRN and encourage PO intake.    Monitoring and Evaluation:   Continue to monitor nutritional intake, tolerance to diet prescription, weights, labs, skin integrity    RD remains available upon request and will follow up per protocol  Radha Abbasi MS, RDN, CDN (Teams)

## 2023-11-21 ENCOUNTER — TRANSCRIPTION ENCOUNTER (OUTPATIENT)
Age: 58
End: 2023-11-21

## 2023-11-21 LAB
ANION GAP SERPL CALC-SCNC: 18 MMOL/L — HIGH (ref 5–17)
ANION GAP SERPL CALC-SCNC: 18 MMOL/L — HIGH (ref 5–17)
APPEARANCE UR: CLEAR — SIGNIFICANT CHANGE UP
APPEARANCE UR: CLEAR — SIGNIFICANT CHANGE UP
BACTERIA # UR AUTO: NEGATIVE /HPF — SIGNIFICANT CHANGE UP
BACTERIA # UR AUTO: NEGATIVE /HPF — SIGNIFICANT CHANGE UP
BILIRUB UR-MCNC: NEGATIVE — SIGNIFICANT CHANGE UP
BILIRUB UR-MCNC: NEGATIVE — SIGNIFICANT CHANGE UP
BUN SERPL-MCNC: 48 MG/DL — HIGH (ref 7–23)
BUN SERPL-MCNC: 48 MG/DL — HIGH (ref 7–23)
CALCIUM SERPL-MCNC: 10.2 MG/DL — SIGNIFICANT CHANGE UP (ref 8.4–10.5)
CALCIUM SERPL-MCNC: 10.2 MG/DL — SIGNIFICANT CHANGE UP (ref 8.4–10.5)
CAST: 1 /LPF — SIGNIFICANT CHANGE UP (ref 0–4)
CAST: 1 /LPF — SIGNIFICANT CHANGE UP (ref 0–4)
CHLORIDE SERPL-SCNC: 94 MMOL/L — LOW (ref 96–108)
CHLORIDE SERPL-SCNC: 94 MMOL/L — LOW (ref 96–108)
CO2 SERPL-SCNC: 22 MMOL/L — SIGNIFICANT CHANGE UP (ref 22–31)
CO2 SERPL-SCNC: 22 MMOL/L — SIGNIFICANT CHANGE UP (ref 22–31)
COLOR SPEC: YELLOW — SIGNIFICANT CHANGE UP
COLOR SPEC: YELLOW — SIGNIFICANT CHANGE UP
CREAT SERPL-MCNC: 13.32 MG/DL — HIGH (ref 0.5–1.3)
CREAT SERPL-MCNC: 13.32 MG/DL — HIGH (ref 0.5–1.3)
DIFF PNL FLD: ABNORMAL
DIFF PNL FLD: ABNORMAL
EGFR: 4 ML/MIN/1.73M2 — LOW
EGFR: 4 ML/MIN/1.73M2 — LOW
GLUCOSE BLDC GLUCOMTR-MCNC: 103 MG/DL — HIGH (ref 70–99)
GLUCOSE BLDC GLUCOMTR-MCNC: 103 MG/DL — HIGH (ref 70–99)
GLUCOSE BLDC GLUCOMTR-MCNC: 129 MG/DL — HIGH (ref 70–99)
GLUCOSE BLDC GLUCOMTR-MCNC: 129 MG/DL — HIGH (ref 70–99)
GLUCOSE BLDC GLUCOMTR-MCNC: 136 MG/DL — HIGH (ref 70–99)
GLUCOSE BLDC GLUCOMTR-MCNC: 136 MG/DL — HIGH (ref 70–99)
GLUCOSE BLDC GLUCOMTR-MCNC: 146 MG/DL — HIGH (ref 70–99)
GLUCOSE BLDC GLUCOMTR-MCNC: 146 MG/DL — HIGH (ref 70–99)
GLUCOSE SERPL-MCNC: 157 MG/DL — HIGH (ref 70–99)
GLUCOSE SERPL-MCNC: 157 MG/DL — HIGH (ref 70–99)
GLUCOSE UR QL: NEGATIVE MG/DL — SIGNIFICANT CHANGE UP
GLUCOSE UR QL: NEGATIVE MG/DL — SIGNIFICANT CHANGE UP
HCT VFR BLD CALC: 34.5 % — LOW (ref 39–50)
HCT VFR BLD CALC: 34.5 % — LOW (ref 39–50)
HGB BLD-MCNC: 10.5 G/DL — LOW (ref 13–17)
HGB BLD-MCNC: 10.5 G/DL — LOW (ref 13–17)
KETONES UR-MCNC: ABNORMAL MG/DL
KETONES UR-MCNC: ABNORMAL MG/DL
LEUKOCYTE ESTERASE UR-ACNC: NEGATIVE — SIGNIFICANT CHANGE UP
LEUKOCYTE ESTERASE UR-ACNC: NEGATIVE — SIGNIFICANT CHANGE UP
MAGNESIUM SERPL-MCNC: 2.1 MG/DL — SIGNIFICANT CHANGE UP (ref 1.6–2.6)
MAGNESIUM SERPL-MCNC: 2.1 MG/DL — SIGNIFICANT CHANGE UP (ref 1.6–2.6)
MCHC RBC-ENTMCNC: 19.8 PG — LOW (ref 27–34)
MCHC RBC-ENTMCNC: 19.8 PG — LOW (ref 27–34)
MCHC RBC-ENTMCNC: 30.4 GM/DL — LOW (ref 32–36)
MCHC RBC-ENTMCNC: 30.4 GM/DL — LOW (ref 32–36)
MCV RBC AUTO: 65 FL — LOW (ref 80–100)
MCV RBC AUTO: 65 FL — LOW (ref 80–100)
NITRITE UR-MCNC: NEGATIVE — SIGNIFICANT CHANGE UP
NITRITE UR-MCNC: NEGATIVE — SIGNIFICANT CHANGE UP
NRBC # BLD: 0 /100 WBCS — SIGNIFICANT CHANGE UP (ref 0–0)
NRBC # BLD: 0 /100 WBCS — SIGNIFICANT CHANGE UP (ref 0–0)
PH UR: 7 — SIGNIFICANT CHANGE UP (ref 5–8)
PH UR: 7 — SIGNIFICANT CHANGE UP (ref 5–8)
PHOSPHATE SERPL-MCNC: 6.7 MG/DL — HIGH (ref 2.5–4.5)
PHOSPHATE SERPL-MCNC: 6.7 MG/DL — HIGH (ref 2.5–4.5)
PLATELET # BLD AUTO: 146 K/UL — LOW (ref 150–400)
PLATELET # BLD AUTO: 146 K/UL — LOW (ref 150–400)
POTASSIUM SERPL-MCNC: 4.6 MMOL/L — SIGNIFICANT CHANGE UP (ref 3.5–5.3)
POTASSIUM SERPL-MCNC: 4.6 MMOL/L — SIGNIFICANT CHANGE UP (ref 3.5–5.3)
POTASSIUM SERPL-SCNC: 4.6 MMOL/L — SIGNIFICANT CHANGE UP (ref 3.5–5.3)
POTASSIUM SERPL-SCNC: 4.6 MMOL/L — SIGNIFICANT CHANGE UP (ref 3.5–5.3)
PROT UR-MCNC: 100 MG/DL
PROT UR-MCNC: 100 MG/DL
RBC # BLD: 5.31 M/UL — SIGNIFICANT CHANGE UP (ref 4.2–5.8)
RBC # BLD: 5.31 M/UL — SIGNIFICANT CHANGE UP (ref 4.2–5.8)
RBC # FLD: 20.6 % — HIGH (ref 10.3–14.5)
RBC # FLD: 20.6 % — HIGH (ref 10.3–14.5)
RBC CASTS # UR COMP ASSIST: 0 /HPF — SIGNIFICANT CHANGE UP (ref 0–4)
RBC CASTS # UR COMP ASSIST: 0 /HPF — SIGNIFICANT CHANGE UP (ref 0–4)
SODIUM SERPL-SCNC: 134 MMOL/L — LOW (ref 135–145)
SODIUM SERPL-SCNC: 134 MMOL/L — LOW (ref 135–145)
SP GR SPEC: 1.01 — SIGNIFICANT CHANGE UP (ref 1–1.03)
SP GR SPEC: 1.01 — SIGNIFICANT CHANGE UP (ref 1–1.03)
SQUAMOUS # UR AUTO: 2 /HPF — SIGNIFICANT CHANGE UP (ref 0–5)
SQUAMOUS # UR AUTO: 2 /HPF — SIGNIFICANT CHANGE UP (ref 0–5)
UROBILINOGEN FLD QL: 1 MG/DL — SIGNIFICANT CHANGE UP (ref 0.2–1)
UROBILINOGEN FLD QL: 1 MG/DL — SIGNIFICANT CHANGE UP (ref 0.2–1)
WBC # BLD: 6.82 K/UL — SIGNIFICANT CHANGE UP (ref 3.8–10.5)
WBC # BLD: 6.82 K/UL — SIGNIFICANT CHANGE UP (ref 3.8–10.5)
WBC # FLD AUTO: 6.82 K/UL — SIGNIFICANT CHANGE UP (ref 3.8–10.5)
WBC # FLD AUTO: 6.82 K/UL — SIGNIFICANT CHANGE UP (ref 3.8–10.5)
WBC UR QL: 0 /HPF — SIGNIFICANT CHANGE UP (ref 0–5)
WBC UR QL: 0 /HPF — SIGNIFICANT CHANGE UP (ref 0–5)

## 2023-11-21 PROCEDURE — 99232 SBSQ HOSP IP/OBS MODERATE 35: CPT

## 2023-11-21 PROCEDURE — 99233 SBSQ HOSP IP/OBS HIGH 50: CPT

## 2023-11-21 RX ORDER — POLYETHYLENE GLYCOL 3350 17 G/17G
17 POWDER, FOR SOLUTION ORAL ONCE
Refills: 0 | Status: COMPLETED | OUTPATIENT
Start: 2023-11-21 | End: 2023-11-21

## 2023-11-21 RX ADMIN — Medication 1 TABLET(S): at 13:10

## 2023-11-21 RX ADMIN — Medication 0: at 21:39

## 2023-11-21 RX ADMIN — POLYETHYLENE GLYCOL 3350 17 GRAM(S): 17 POWDER, FOR SOLUTION ORAL at 19:03

## 2023-11-21 RX ADMIN — SEVELAMER CARBONATE 1600 MILLIGRAM(S): 2400 POWDER, FOR SUSPENSION ORAL at 07:50

## 2023-11-21 RX ADMIN — SEVELAMER CARBONATE 1600 MILLIGRAM(S): 2400 POWDER, FOR SUSPENSION ORAL at 13:16

## 2023-11-21 RX ADMIN — ATORVASTATIN CALCIUM 40 MILLIGRAM(S): 80 TABLET, FILM COATED ORAL at 21:39

## 2023-11-21 RX ADMIN — SEVELAMER CARBONATE 1600 MILLIGRAM(S): 2400 POWDER, FOR SUSPENSION ORAL at 19:03

## 2023-11-21 RX ADMIN — APIXABAN 5 MILLIGRAM(S): 2.5 TABLET, FILM COATED ORAL at 06:19

## 2023-11-21 RX ADMIN — APIXABAN 5 MILLIGRAM(S): 2.5 TABLET, FILM COATED ORAL at 18:03

## 2023-11-21 RX ADMIN — Medication 25 MILLIGRAM(S): at 18:03

## 2023-11-21 NOTE — DISCHARGE NOTE PROVIDER - NSDCCPTREATMENT_GEN_ALL_CORE_FT
PRINCIPAL PROCEDURE  Procedure: MRI lumbar spine w/ contrast  Findings and Treatment: diffuse disc bulge asymmetric to the left   results in moderate narrowing of the left neural foramen with contact   upon the exiting left L5 nerve root.

## 2023-11-21 NOTE — PROGRESS NOTE ADULT - ASSESSMENT
58-year-old man with PMH of ESRD on hemodialysis via groin catheter, Monday Wednesday Friday, BENNIE, hypertension, hyperlipidemia, presenting due to weakness for 2 days and fever of 101 Fahrenheit, fatigue, chills, vomiting episodes that started today.  Patient states that yesterday even though he felt unwell he went to his usual dialysis appointment which finished without any significant events.  Also endorsing mild shortness of breath.  Has had sepsis events in the past from his bilateral arm access sites, currently both access sites thrombosed which is why he needs dialysis catheter.  tried using peritoneal dialysis catheter but also eventually became septic and had to be removed. States that a stitch popped yesterday overlying the catheter site, +bleeding which is now resolved. (04 Nov 2023 18:18)     Tmax 103.3 in ER  WBC 8.6, Labs consistent with ESRD  BCx 11/4; 2/2 Klebsiella Pneumoniae group; No ESBL 2/2 GNR    Received a dose of Vancomycin and Zosyn; Zosyn continued      # Klebsiella bacteremia associated with Rt Groin CVC, s/p completion of therapy   # new fever.       PLAN:   - Recent blood cultures, NTD   - stopped ceftriaxone, monitor off abx.   - trend cbc, no leucocytosis   - TTE with no abnormality   - MRI of LS spine, with no OM   - if blood cx stay negative at 48 hrs and otherwise stable, can be discharged from ID perspective.   - RVP negative, U/A negative.       Plan discussed with medicine Attending.          Maura Girard  Please contact through MS Teams   If no response or past 5 pm/weekend call [phone].

## 2023-11-21 NOTE — DISCHARGE NOTE PROVIDER - NSDCMRMEDTOKEN_GEN_ALL_CORE_FT
atorvastatin 40 mg oral tablet: 1 tab(s) orally once a day  calcitriol 0.25 mcg oral capsule: 1 cap(s) orally once a day  cholecalciferol 50 mcg (2000 intl units) oral tablet: 1 tab(s) orally once a day  Eliquis 5 mg oral tablet: 1 tab(s) orally 2 times a day  febuxostat 40 mg oral tablet: 1 tab(s) orally once a day  metoprolol tartrate 25 mg oral tablet: 1 tab(s) orally 2 times a day  [place]-Bud Rx oral tablet: 1 tab(s) orally once a day  sevelamer carbonate 800 mg oral tablet: 1 tab(s) orally 3 times a day (with meals)  tacrolimus 0.1% topical ointment: Apply topically to affected area 2 times a day as needed  Vascepa 1 g oral capsule: 2 cap(s) orally once a day (at bedtime)

## 2023-11-21 NOTE — DISCHARGE NOTE NURSING/CASE MANAGEMENT/SOCIAL WORK - PATIENT PORTAL LINK FT
You can access the FollowMyHealth Patient Portal offered by [facility] by registering at the following website: [email or web address]. By joining [facility]’s FollowMyHealth portal, you will also be able to view your health information using other applications (apps) compatible with our system.

## 2023-11-21 NOTE — DISCHARGE NOTE PROVIDER - NSDCACTIVITY_GEN_ALL_CORE
Reason For Visit  Shaun Kolb is a(n) established patient here today for a 5-14 yo [facility]. Patient accompanied by mother Cande Rose. Quality    Pediatric Wellness CI height documented, body mass index, 58 percentile, discussion of nutritional quality of diet, patient education given about proper diet, printed information given for activities, no tobacco use, did not provide intervention and counseling in regards to tobacco use, no preventive medicine therapy for influenza, patient accompanied by mother, does not have feelings of hopelessness (PHQ-2), no anhedonia (PHQ-2), not referred to local mental health center, not taking medication for depression and monitoring patient. Smoking Cessation CI no tobacco use and did not provide intervention and counseling in regards to tobacco use. History of Present Illness    General Health: The child's health since the last visit is described as good . no illness since last visit. Caregiver concerns:   Nutrition/Elimination: The patient does not use dietary supplements. Dietary Impression:. the child's current diet is normal.   Elimination: No elimination issues are expressed. Dental Health: The patient brushes 1-2 time(s) a day and reports regular dental visits. Sleep: Sleep Issues: No sleep issues are reported. Behavior: No behavior issues identified. here for school form and HMV  doing well, no concerns    here for sports physical clearance  never any shortness of breath/chest pain/syncope with exercise, or dizziness  no FHx of arrhythmias or other heart disease  no FHx of sudden death in a young person or deafness of unknown cause  .       Review of Systems    Const: Normal.   Eyes: Normal.   ENT: Normal.   Neck: Normal.   CV: Normal.   Resp: Normal.   GI: Normal.   Neuro: Normal.   Musc: Normal.   Skin: Normal.   Heme/Immun: Normal.   : Normal.   Psych: Normal.   Endo: Normal.      Allergies  No Known Drug Allergies    Family History  Problems   Family history of hypertension (Z82.49)  No pertinent family history : Mother  FHx T2DM- GP's. Social History  Never smoker  Denied: History of Secondhand smoke exposure  He lives with his mother, father and sister. Review  Past medical history, problem list, family medical history, surgical history and social history reviewed. Screening    no concerns from the caregiver. no screenings are needed at this time. no concerns from the caregiver, no screenings are needed at this time. TB Screening    At Risk? See Scanned Form, but No.      Vitals  Vital Signs    Recorded: [id number] 08:04AM   Height 5 ft 6 in   Weight 120 lb 9.44 oz   BMI Calculated 19.46   BSA Calculated 1.61   BMI Percentile 58   2-20 Stature Percentile 77 %   2-20 Weight Percentile 69 %   Systolic 042, LUE, Sitting   Diastolic 66, LUE, Sitting   Temperature 98.7 F, Tympanic   Heart Rate 66   O2 Saturation 100     Physical Exam  Constitutional: well developed, well nourished, in no acute distress, interactive, current vital signs reviewed and at baseline for patient based on underlying medical diagnoses. Head and Face: atraumatic, no deformities, normocephalic, normal facies. Eyes: no discharge, normal conjunctiva, no eyelid swelling and no ptosis. the sclerae were normal, pupils equal, round and reactive to light and accommodation, conjugate gaze and extraocular movements were intact. ENT: normal appearing outer ear and normal appearing nose. nasal mucosa moist and pink and no nasal discharge. normal lips, no dental plaque present, no dental white spot lesions present, no dental caries, no fillings, crowns, or teeth missing due to cavities and no gingivitis. oral mucosa pink and moist, no oral lesions, normal appearing pharynx and normal appearing tongue. Neck: normal appearing neck and supple neck. thyroid not enlarged and no thyroid nodules. Lymphatic: no lymphadenopathy.    Chest: normal chest appearance. Pulmonary: no respiratory distress, normal respiratory rate and effort and no accessory muscle use. breath sounds clear to auscultation bilaterally. Cardiovascular: normal rate, no murmurs were heard, regular rhythm, normal S1 and normal S2. Abdomen: soft, nontender, nondistended, normal bowel sounds and no abdominal mass. no hepatomegaly and no splenomegaly. no umbilical hernia was discovered. Musculoskeletal: normal gait. no clubbing or cyanosis of the fingernails. no joint swelling seen and no joint tenderness was elicited. no scoliosis. normal ROM of all extremities. muscle strength and tone were normal.   Genitourinary: the scrotum was normal, the testicles were not swollen and there were no testicular masses . the Gumaro stage for pubic hair development was 3. the Gumaro stage for genital development was 3. the penis was normal and no penile adhesions. no inguinal hernia was discovered. Neurologic: cranial nerves grossly intact. normal DTRs. no sensory deficits noted. no coordination deficits. Psychiatric: oriented to person, place and time. alert and awake, interactive and mood/affect were appropriate for age . Skin: normal skin color and pigmentation, normal bruising for age/ development and no rash. no skin lesions. normal skin turgor. Immunizations    Immunization Status: Up to date after today's administrations . the patient has not had any significant adverse reactions to immunizations. Assessment   1. Well child visit (Z00.129)   2. Elevated blood pressure reading (R03.0)   3. Encounter for immunization (Z23)   4. Routine sports physical exam (Z02.5)   5. School physical exam (Z02.0)    Plan  Immunizations    Â· Gardasil 9 Intramuscular Suspension    Instructed patient and parents to schedule dental appointment for JUAN CARLOS. Patient/Parent educated on the importance of oral health. Emotional Well-Being. Ã¢â¬Â¢ Discuss puberty and physical changes/sexuality. Ã¢â¬Â¢ Encourage constructive conflict resolution, demonstrate anger management at home. Physical Growth and Development. Ã¢â¬Â¢ Provide nutritious meals and snacks; limit sweets/sodas/high-fat foods. Ã¢â¬Â¢ Encourage personal hygiene routine. Ã¢â¬Â¢ Encourage physical activity for 1 hour/day. Ã¢â¬Â¢ Limit TV/computer time to 2 hours/day. Ã¢â¬Â¢ Provide safe/quality/appropriate after-school care. Ã¢â¬Â¢ Self-breast/testicular exam.   Risk Reduction. Ã¢â¬Â¢ Discuss drug/tobacco/alcohol use and peer pressure . Ã¢â¬Â¢ Get to know child/teen's friends and their parents. Ã¢â¬Â¢ Provide home safety for fire/carbon monoxide poisoning. Ã¢â¬Â¢ Teach self-safety if feeling unsafe at friend's home/car, answer the door/telephone when adult not home, personal body privacy. Ã¢â¬Â¢ Pregnancy/STI prevention. Ã¢â¬Â¢ Discuss family expectations concerning dating/sexual contact/abstinence/substance use/peer pressure. Social and Academic Competence. Ã¢â¬Â¢ Establish consistent limits/rules and consistent consequences. Ã¢â¬Â¢ Increase difficulty in chores to develop sense of family responsibility/self-accomplishment. Ã¢â¬Â¢ Discuss school activities and school work. Ã¢â¬Â¢ Provide space/time for homework/personal time. Violence and Injury Prevention. Ã¢â¬Â¢ Develop a family plan for exiting jouse in a fire/establish meeting place after exit. Ã¢â¬Â¢ Do no allow riding in a car with teens who use alcohol/drugs. Ã¢â¬Â¢ Lock up guns, enroll in gun safety class if interested. Ã¢â¬Â¢ Promote use of seat belt and ride in back seat until 15years old. Ã¢â¬Â¢ Supervise when near or in water even if child knows how to swim. Ã¢â¬Â¢ During sports wear protective gear at all times. Ã¢â¬Â¢ Discuss additional help with teacher if there are concerns/bullying. Please obtain labs after fasting (except water) for at least 8 hrs. I recommend going to the lab first thing in the morning after sleeping, since it's easy to fast overnight!   You can make an appointment at our lab here in clinic for Saturday from 8 to 10:20 AM- make an appointment on your way out at the 's desk. You can also go to one of the other labs listed on the lab sheet we gave you. I will call with any abnormal results. Come back in the fall for a flu shot in will recheck blood pressure in 6 months. Educational Webdsites: www.healthychildren. org, www. [employer]. org, [email or web address]      Anticipatory Guidance  Anticipatory Guidance 9 -14 yr   Selected topics from each category were either discussed or a handout was given: emotional well-being, physical growth & development, risk reduction, social & academic competence and violence & injury prevention. Discussion/Summary    Health Maintenance Impression: normal growth and normal development. Additional Impression:. 13 yo HMV  elevated BP- recheck 6 mths, diet counseling done  screening lipids. Information discussed with Parent/Guardian and patient   CDC VIS was given to parent/legal guardian/designated adult. Risks and benefits of vaccines was discussed and questions answered. Parent/legal guardian/designated adult verbalized understanding. Child was observed after administration and no side effects noted. All questions answered. Parent verbalized understanding. Child discharged to home. Signatures   Electronically signed by : Giovanni Jeronimo, [address] [place] [address];  Aug 25 2018  8:05AM CST    Electronically signed by : Mai Moore, ; Aug 26 2018  5:53PM CST No heavy lifting/straining

## 2023-11-21 NOTE — PROGRESS NOTE ADULT - ASSESSMENT
58-year-old man with PMH of ESRD on hemodialysis via groin catheter, Monday Wednesday Friday, BENNIE, hypertension, hyperlipidemia, presenting due to weakness for 2 days and fever of 101 Fahrenheit, fatigue, chills, vomiting episodes that started today.  Patient states that yesterday even though he felt unwell he went to his usual dialysis appointment which finished without any significant events.  Nephrology consulted for ESRD on HD.    A/P:  ESRD on HD: MWF  Dialysis center: [facility]  Nephrologist: Dr. Neftaly Holden  Access: CVC R groin; now dislodged.  Consent signed, witnessed, and placed in pt's chart.  s/p Permacath on 11/10    s/p HD On 11/19  S/p MR venogram w/ and w/o Gadavist.  Continue MWF schedule.--HOLIDAY SCHEDULE THIS WEEK. PLAN FOR HD On SUNDAY, TUESDAY AND  FRIDAY  Renal diet.  Monitor BMP and UO.    HTN:  BP controlled.  UF w/ HD.  Monitor BP.    Hyperkalemia:  Likely in setting of ESRD.  Monitor K closely.  Low K diet.    Anemia:  Monitor Hgb.  Epogen w/ HD.  Transfuse for Hgb <8.    CKD: MBD:   - acceptable.  sevelamer to 1600mg TID.  Low PO4 diet.  C/W sevelamer w/ meals.  Monitor Ca and PO4 daily.    Hypercalcemia:  Possibly in setting of dehydration vs. calcitriol.  PTH acceptable for ESRD; Vit. D 44.2.  Off calcitriol.  Ca high normal--hd with low calcium bath  Monitor Ca.    Fever:  Blood cultures with gram neg. rods.  Possibly CVC associated infection.  ID follow up

## 2023-11-21 NOTE — DISCHARGE NOTE PROVIDER - PROVIDER TOKENS
PROVIDER:[TOKEN:[[id number]],FOLLOWUP:[1-3 days]] PROVIDER:[TOKEN:[[id number]],FOLLOWUP:[1-3 days]],PROVIDER:[TOKEN:[[id number]]],PROVIDER:[TOKEN:[[id number]]],PROVIDER:[TOKEN:[[id number]]]

## 2023-11-21 NOTE — DISCHARGE NOTE NURSING/CASE MANAGEMENT/SOCIAL WORK - NSDCPEFALRISK_GEN_ALL_CORE
For information on Fall & Injury Prevention, visit: [email or web address] OR  [email or web address] OR  https://www.cdc.gov/steadi/patient.html

## 2023-11-21 NOTE — DISCHARGE NOTE PROVIDER - HOSPITAL COURSE
HPI:   58-year-old man with PMH of ESRD on hemodialysis via groin catheter, Monday Wednesday Friday, BENNIE, hypertension, hyperlipidemia, presenting due to weakness for 2 days and fever of 101 Fahrenheit, fatigue, chills, vomiting episodes that started today.  Patient states that yesterday even though he felt unwell he went to his usual dialysis appointment which finished without any significant events.  Also endorsing mild shortness of breath.  Has had sepsis events in the past from his bilateral arm access sites, currently both access sites thrombosed which is why he needs dialysis catheter.  tried using peritoneal dialysis catheter but also eventually became septic and had to be removed. States that a stitch popped yesterday overlying the catheter site, +bleeding which is now resolved. (04 Nov 2023 18:18)    Hospital Course:   58M w/ PMH of ESRD on hemodialysis via groin catheter, M/W/F, BENNIE, hypertension, hyperlipidemia, presenting due to weakness for 2 days and fever found to have sepsis likely 2/2 catheter infection, complicated by bacteremia . Patient found to have gram negative rods on BCx X2 on 11/4 likely HD catheter associated   BCx growing Klebsiella PNA; received  ceftriaxone 2g q24 and transition to cipro 500 mg daily on discharge to complete 14 days from negative blood cx (on 11/20)   repeat cultures NGTD.  Patient w/ ESRD on HD M/W/F presents with dislodged groin HD catheters/p shiley placement w/ IR on 11/6. Plan for permacath 11/10  ESRD c/b hyperkalemia s/p lokelma, now resolved continue Sevelamer TID. Spoke to Vascular, since patient refused CT venogram due to severe contrast allergy, will aim for MR venogram of chest. MR imaging and Bilateral upper extremity vein mapping reviewed. Duplex of axilla completed. Spoke to Nephrologist, updated and followed up on the patient regarding renal transplant, will need to f/u outpt.  Patient complained of  lower back pain. MRI w/o concern for osteomyelitis, however shows multilevel endplate degenerative Schmorl nodes, may be source of patient's pain. Additionally, diffuse disc bulge asymmetric to the left results in moderate narrowing of the left neural foramen with contact upon the exiting left L5 nerve root; seen by neurosurgeon , no intervention ; outpatient follow up . Patient workup  show Abnormal finding on lung imaging.    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    Dispo: no skilled needs.        Important Medication Changes and Reason:    Active or Pending Issues Requiring Follow-up: Lung imaging , vascular , PCP    Advanced Directives:   [ x] Full code  [ ] DNR  [ ] Hospice    Discharge Diagnoses:  Bacteremia   ESRD       HPI:   58-year-old man with PMH of ESRD on hemodialysis via groin catheter, Monday Wednesday Friday, BENNIE, hypertension, hyperlipidemia, presenting due to weakness for 2 days and fever of 101 Fahrenheit, fatigue, chills, vomiting episodes that started today.  Patient states that yesterday even though he felt unwell he went to his usual dialysis appointment which finished without any significant events.  Also endorsing mild shortness of breath.  Has had sepsis events in the past from his bilateral arm access sites, currently both access sites thrombosed which is why he needs dialysis catheter.  tried using peritoneal dialysis catheter but also eventually became septic and had to be removed. States that a stitch popped yesterday overlying the catheter site, +bleeding which is now resolved. (04 Nov 2023 18:18)    Hospital Course:   58M w/ PMH of ESRD on hemodialysis via groin catheter, M/W/F, BENNIE, hypertension, hyperlipidemia, presenting due to weakness for 2 days and fever found to have sepsis likely 2/2 catheter infection, complicated by bacteremia . Patient found to have gram negative rods on BCx X2 on 11/4 likely HD catheter associated   BCx growing Klebsiella PNA; received  ceftriaxone 2g q24 ; completed dose on 11/21   repeat cultures NGTD.  Patient w/ ESRD on HD M/W/F presents with dislodged groin HD catheters/p shiley placement w/ IR on 11/6. Plan for permacath 11/10  ESRD c/b hyperkalemia s/p lokelma, now resolved continue Sevelamer TID. Spoke to Vascular, since patient refused CT venogram due to severe contrast allergy, will aim for MR venogram of chest. MR imaging and Bilateral upper extremity vein mapping reviewed. Duplex of axilla completed. Spoke to Nephrologist, updated and followed up on the patient regarding renal transplant, will need to f/u outpt.  Patient complained of  lower back pain. MRI w/o concern for osteomyelitis, however shows multilevel endplate degenerative Schmorl nodes, may be source of patient's pain. Additionally, diffuse disc bulge asymmetric to the left results in moderate narrowing of the left neural foramen with contact upon the exiting left L5 nerve root; seen by neurosurgeon , no intervention ; outpatient follow up . Patient workup  show Abnormal finding on lung imaging.    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    Dispo: no skilled needs.        Important Medication Changes and Reason:    Active or Pending Issues Requiring Follow-up: Lung imaging , vascular , PCP    Advanced Directives:   [ x] Full code  [ ] DNR  [ ] Hospice    Discharge Diagnoses:  Bacteremia   ESRD

## 2023-11-21 NOTE — DISCHARGE NOTE NURSING/CASE MANAGEMENT/SOCIAL WORK - NSDCFUADDAPPT_GEN_ALL_CORE_FT
Abnormal finding on lung imaging.   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  APPTS ARE READY TO BE MADE: [ ] YES    Best Family or Patient Contact (if needed):    Additional Information about above appointments (if needed):    1: PCP  2: Nephrology  3:     Other comments or requests:

## 2023-11-21 NOTE — DISCHARGE NOTE PROVIDER - CARE PROVIDER_API CALL
Neftaly Holden  Nephrology  [facility], [address]  [place] [address]  Phone: [phone]  Fax: [phone]  Follow Up Time: 1-3 days   Neftaly Holden  Nephrology  [address] [facility], [address]  [place] [address]  Phone: [phone]  Fax: [phone]  Follow Up Time: 1-3 days    Demario Piña  Neurosurgery  [address] [place] [address], [address]  [place] [address]  Phone: [phone]  Fax: [phone]  Follow Up Time:     Raghu Juarez  [address], [address]  [place] [address]  Phone: [phone]  Fax: [phone]  Follow Up Time:     David Mckoy  Vascular Surgery  [address] [place] [address]  Phone: [phone]  Fax: [phone]  Follow Up Time:

## 2023-11-21 NOTE — DISCHARGE NOTE PROVIDER - NSDCFUADDAPPT_GEN_ALL_CORE_FT
Abnormal finding on lung imaging.   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  APPTS ARE READY TO BE MADE: [ ] YES    Best Family or Patient Contact (if needed):    Additional Information about above appointments (if needed):    1: PCP  2: Nephrology  3:     Other comments or requests:    Abnormal finding on lung imaging.   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  APPTS ARE READY TO BE MADE: [ ] YES    Best Family or Patient Contact (if needed):    Additional Information about above appointments (if needed):    1: PCP 1 week  2: Nephrology 1 week  3: Vascular 2 weeks    Other comments or requests:

## 2023-11-21 NOTE — DISCHARGE NOTE PROVIDER - NSDCCPCAREPLAN_GEN_ALL_CORE_FT
PRINCIPAL DISCHARGE DIAGNOSIS  Diagnosis: Acute sepsis  Assessment and Plan of Treatment: Take all antibiotics as ordered.  Call you Health care provider upon arrival home to make a one week follow up appointment.  If you develop fever, chills, malaise, or change in mental status call your Health Care Provider or go to the Emergency Department.  Nutrition is important, eat small frequent meals to help ensure you get adequate calories.  Do not stay in bed all day!  Increase your activity daily as tolerated.        SECONDARY DISCHARGE DIAGNOSES  Diagnosis: Abnormal finding on lung imaging  Assessment and Plan of Treatment: Abnormal finding on lung imaging.   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    Diagnosis: Renal disease  Assessment and Plan of Treatment: ESRD; Avoid taking (NSAIDs) - (ex: Ibuprofen, Advil, Celebrex, Naprosyn)  Avoid taking any nephrotoxic agents (can harm kidneys) - Intravenous contrast for diagnostic testing, combination cold medications.  Have all medications adjusted for your renal function by your Health Care Provider.  Blood pressure control is important.  Take all medication as prescribed.      Diagnosis: Paroxysmal atrial fibrillation  Assessment and Plan of Treatment: c/w eliquis and metoprolol      Diagnosis: Fever  Assessment and Plan of Treatment: resolved    Diagnosis: SOB (shortness of breath)  Assessment and Plan of Treatment: ESRD on HD: [facility]  Dialysis center: [facility]  Nephrologist: Dr. Neftaly Holden.  s/p Permacath on 11/10    s/p HD On 11/19  Continue MW schedule.--HOLIDAY SCHEDULE THIS WEEK. PLAN FOR HD On SUNDAY, TUESDAY AND  FRIDAY  Renal diet.  Monitor BMP and UO.       PRINCIPAL DISCHARGE DIAGNOSIS  Diagnosis: Acute sepsis  Assessment and Plan of Treatment: You were found to have infection in your blood likely from your dialysis catheter, which is now resolved.  You completed a course of antibiotics and are feeling better.   Call you Health care provider upon arrival home to make a one week follow up appointment.  If you develop fever, chills, malaise, or change in mental status call your Health Care Provider or go to the Emergency Department.  Nutrition is important, eat small frequent meals to help ensure you get adequate calories.  Do not stay in bed all day!  Increase your activity daily as tolerated.        SECONDARY DISCHARGE DIAGNOSES  Diagnosis: SOB (shortness of breath)  Assessment and Plan of Treatment: ESRD on HD: MWF, plan to resume HD on Friday  Dialysis center: [facility]  Nephrologist: Dr. Neftaly Holden.  s/p Permacatchelsea on 11/10    s/p HD On 11/19  Continue MWF schedule.--HOLIDAY SCHEDULE THIS WEEK. PLAN FOR HD On SUNDAY, TUESDAY AND  FRIDAY  Renal diet.  Monitor BMP and UO.      Diagnosis: Fever  Assessment and Plan of Treatment: resolved    Diagnosis: Paroxysmal atrial fibrillation  Assessment and Plan of Treatment: continue with eliquis and metoprolol      Diagnosis: Renal disease  Assessment and Plan of Treatment: ESRD; Avoid taking (NSAIDs) - (ex: Ibuprofen, Advil, Celebrex, Naprosyn)  Avoid taking any nephrotoxic agents (can harm kidneys) - Intravenous contrast for diagnostic testing, combination cold medications.  Have all medications adjusted for your renal function by your Health Care Provider.  Blood pressure control is important.  Take all medication as prescribed.      Diagnosis: Abnormal finding on lung imaging  Assessment and Plan of Treatment: Abnormal finding on lung imaging.   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 Neftaly Holden.  s/p Permacatchelsea on 11/10    s/p HD On 11/19  Continue MWF schedule.--HOLIDAY SCHEDULE THIS WEEK. PLAN FOR HD On SUNDAY, TUESDAY AND  FRIDAY  Renal diet  Please follow up with vascular surgery for permanent access for hemodialysis    Diagnosis: Imaging abnormality  Assessment and Plan of Treatment: CT of the chest showed a right lower lobe 1.2 cm nodule with suggestion of fat and calcifications,   partially visualized on prior CT 1/3/2020, likely hamartoma. Recommend   comparison with outside chest CTs if available for stability.  Please follow up with your pulmonolgist outpatient       Diagnosis: Back pain  Assessment and Plan of Treatment: You had back pain. An MRI was done which did not show infection. You were found to have a disc bulge. Please follow up with your neurosurgery if you develop back pain (Dr Piña)

## 2023-11-22 VITALS
SYSTOLIC BLOOD PRESSURE: 100 MMHG | OXYGEN SATURATION: 97 % | RESPIRATION RATE: 18 BRPM | DIASTOLIC BLOOD PRESSURE: 68 MMHG | HEART RATE: 98 BPM | TEMPERATURE: 98 F

## 2023-11-22 LAB
GLUCOSE BLDC GLUCOMTR-MCNC: 111 MG/DL — HIGH (ref 70–99)
GLUCOSE BLDC GLUCOMTR-MCNC: 111 MG/DL — HIGH (ref 70–99)
GLUCOSE BLDC GLUCOMTR-MCNC: 98 MG/DL — SIGNIFICANT CHANGE UP (ref 70–99)
GLUCOSE BLDC GLUCOMTR-MCNC: 98 MG/DL — SIGNIFICANT CHANGE UP (ref 70–99)

## 2023-11-22 PROCEDURE — 87637 SARSCOV2&INF A&B&RSV AMP PRB: CPT

## 2023-11-22 PROCEDURE — 99285 EMERGENCY DEPT VISIT HI MDM: CPT

## 2023-11-22 PROCEDURE — 76937 US GUIDE VASCULAR ACCESS: CPT

## 2023-11-22 PROCEDURE — 71046 X-RAY EXAM CHEST 2 VIEWS: CPT

## 2023-11-22 PROCEDURE — 71552 MRI CHEST W/O & W/DYE: CPT

## 2023-11-22 PROCEDURE — 96375 TX/PRO/DX INJ NEW DRUG ADDON: CPT

## 2023-11-22 PROCEDURE — 82306 VITAMIN D 25 HYDROXY: CPT

## 2023-11-22 PROCEDURE — 83036 HEMOGLOBIN GLYCOSYLATED A1C: CPT

## 2023-11-22 PROCEDURE — 87077 CULTURE AEROBIC IDENTIFY: CPT

## 2023-11-22 PROCEDURE — 93970 EXTREMITY STUDY: CPT

## 2023-11-22 PROCEDURE — 82330 ASSAY OF CALCIUM: CPT

## 2023-11-22 PROCEDURE — 96374 THER/PROPH/DIAG INJ IV PUSH: CPT

## 2023-11-22 PROCEDURE — 84295 ASSAY OF SERUM SODIUM: CPT

## 2023-11-22 PROCEDURE — 0225U NFCT DS DNA&RNA 21 SARSCOV2: CPT

## 2023-11-22 PROCEDURE — 97161 PT EVAL LOW COMPLEX 20 MIN: CPT

## 2023-11-22 PROCEDURE — 84100 ASSAY OF PHOSPHORUS: CPT

## 2023-11-22 PROCEDURE — 99232 SBSQ HOSP IP/OBS MODERATE 35: CPT

## 2023-11-22 PROCEDURE — C1752: CPT

## 2023-11-22 PROCEDURE — 86900 BLOOD TYPING SEROLOGIC ABO: CPT

## 2023-11-22 PROCEDURE — 85014 HEMATOCRIT: CPT

## 2023-11-22 PROCEDURE — 85018 HEMOGLOBIN: CPT

## 2023-11-22 PROCEDURE — 85025 COMPLETE CBC W/AUTO DIFF WBC: CPT

## 2023-11-22 PROCEDURE — 82803 BLOOD GASES ANY COMBINATION: CPT

## 2023-11-22 PROCEDURE — 81001 URINALYSIS AUTO W/SCOPE: CPT

## 2023-11-22 PROCEDURE — 77001 FLUOROGUIDE FOR VEIN DEVICE: CPT

## 2023-11-22 PROCEDURE — 87040 BLOOD CULTURE FOR BACTERIA: CPT

## 2023-11-22 PROCEDURE — 87086 URINE CULTURE/COLONY COUNT: CPT

## 2023-11-22 PROCEDURE — 82310 ASSAY OF CALCIUM: CPT

## 2023-11-22 PROCEDURE — 82962 GLUCOSE BLOOD TEST: CPT

## 2023-11-22 PROCEDURE — 99261: CPT

## 2023-11-22 PROCEDURE — 36556 INSERT NON-TUNNEL CV CATH: CPT

## 2023-11-22 PROCEDURE — 74176 CT ABD & PELVIS W/O CONTRAST: CPT

## 2023-11-22 PROCEDURE — 80074 ACUTE HEPATITIS PANEL: CPT

## 2023-11-22 PROCEDURE — 99239 HOSP IP/OBS DSCHRG MGMT >30: CPT

## 2023-11-22 PROCEDURE — 83970 ASSAY OF PARATHORMONE: CPT

## 2023-11-22 PROCEDURE — 85730 THROMBOPLASTIN TIME PARTIAL: CPT

## 2023-11-22 PROCEDURE — A9585: CPT

## 2023-11-22 PROCEDURE — 87150 DNA/RNA AMPLIFIED PROBE: CPT

## 2023-11-22 PROCEDURE — C1769: CPT

## 2023-11-22 PROCEDURE — 36415 COLL VENOUS BLD VENIPUNCTURE: CPT

## 2023-11-22 PROCEDURE — 85027 COMPLETE CBC AUTOMATED: CPT

## 2023-11-22 PROCEDURE — 84132 ASSAY OF SERUM POTASSIUM: CPT

## 2023-11-22 PROCEDURE — 86901 BLOOD TYPING SEROLOGIC RH(D): CPT

## 2023-11-22 PROCEDURE — 85610 PROTHROMBIN TIME: CPT

## 2023-11-22 PROCEDURE — 83605 ASSAY OF LACTIC ACID: CPT

## 2023-11-22 PROCEDURE — C1887: CPT

## 2023-11-22 PROCEDURE — 87186 SC STD MICRODIL/AGAR DIL: CPT

## 2023-11-22 PROCEDURE — 83735 ASSAY OF MAGNESIUM: CPT

## 2023-11-22 PROCEDURE — 82435 ASSAY OF BLOOD CHLORIDE: CPT

## 2023-11-22 PROCEDURE — 86850 RBC ANTIBODY SCREEN: CPT

## 2023-11-22 PROCEDURE — C1894: CPT

## 2023-11-22 PROCEDURE — 80053 COMPREHEN METABOLIC PANEL: CPT

## 2023-11-22 PROCEDURE — 80048 BASIC METABOLIC PNL TOTAL CA: CPT

## 2023-11-22 PROCEDURE — 97116 GAIT TRAINING THERAPY: CPT

## 2023-11-22 PROCEDURE — 71250 CT THORAX DX C-: CPT

## 2023-11-22 PROCEDURE — 72158 MRI LUMBAR SPINE W/O & W/DYE: CPT

## 2023-11-22 PROCEDURE — 82947 ASSAY GLUCOSE BLOOD QUANT: CPT

## 2023-11-22 PROCEDURE — C8929: CPT

## 2023-11-22 PROCEDURE — 93971 EXTREMITY STUDY: CPT

## 2023-11-22 PROCEDURE — 36558 INSERT TUNNELED CV CATH: CPT

## 2023-11-22 RX ADMIN — CHLORHEXIDINE GLUCONATE 1 APPLICATION(S): 213 SOLUTION TOPICAL at 08:52

## 2023-11-22 RX ADMIN — Medication 1 TABLET(S): at 12:47

## 2023-11-22 RX ADMIN — Medication 25 MILLIGRAM(S): at 05:37

## 2023-11-22 RX ADMIN — SEVELAMER CARBONATE 1600 MILLIGRAM(S): 2400 POWDER, FOR SUSPENSION ORAL at 08:49

## 2023-11-22 RX ADMIN — APIXABAN 5 MILLIGRAM(S): 2.5 TABLET, FILM COATED ORAL at 05:37

## 2023-11-22 RX ADMIN — SEVELAMER CARBONATE 1600 MILLIGRAM(S): 2400 POWDER, FOR SUSPENSION ORAL at 12:47

## 2023-11-22 NOTE — PROGRESS NOTE ADULT - PROBLEM SELECTOR PROBLEM 1
SOB (shortness of breath)
ESRD on dialysis
SOB (shortness of breath)
SOB (shortness of breath)
Bacteremia
Bacteremia
SOB (shortness of breath)
SOB (shortness of breath)
Bacteremia
SOB (shortness of breath)
Bacteremia
SOB (shortness of breath)
Bacteremia
SOB (shortness of breath)
Bacteremia
SOB (shortness of breath)
Bacteremia
Bacteremia
ESRD on dialysis
Bacteremia
SOB (shortness of breath)
SOB (shortness of breath)
ESRD on dialysis
ESRD on dialysis
Bacteremia

## 2023-11-22 NOTE — PROGRESS NOTE ADULT - NUTRITIONAL ASSESSMENT
This patient has been assessed with a concern for Malnutrition and has been determined to have a diagnosis/diagnoses of Morbid obesity (BMI > 40).    This patient is being managed with:   Diet DASH/TLC-  Sodium & Cholesterol Restricted  Consistent Carbohydrate {Evening Snack} (CSTCHOSN)  For patients receiving Renal Replacement - No Protein Restr No Conc K No Conc Phos Low Sodium (RENAL)  Entered: Nov 5 2023 10:06AM  
This patient has been assessed with a concern for Malnutrition and has been determined to have a diagnosis/diagnoses of Morbid obesity (BMI > 40).    This patient is being managed with:   Diet DASH/TLC-  Sodium & Cholesterol Restricted  Consistent Carbohydrate {Evening Snack} (CSTCHOSN)  For patients receiving Renal Replacement - No Protein Restr No Conc K No Conc Phos Low Sodium (RENAL)  Entered: Nov 5 2023 10:06AM  
This patient has been assessed with a concern for Malnutrition and has been determined to have a diagnosis/diagnoses of Morbid obesity (BMI > 40).    This patient is being managed with:   Diet DASH/TLC-  Sodium & Cholesterol Restricted  Consistent Carbohydrate {Evening Snack} (CSTCHOSN)  For patients receiving Renal Replacement - No Protein Restr No Conc K No Conc Phos Low Sodium (RENAL)  Entered: Nov 15 2023  5:07PM  
This patient has been assessed with a concern for Malnutrition and has been determined to have a diagnosis/diagnoses of Morbid obesity (BMI > 40).    This patient is being managed with:   Diet DASH/TLC-  Sodium & Cholesterol Restricted  Consistent Carbohydrate {Evening Snack} (CSTCHOSN)  For patients receiving Renal Replacement - No Protein Restr No Conc K No Conc Phos Low Sodium (RENAL)  Entered: Nov 5 2023 10:06AM  
This patient has been assessed with a concern for Malnutrition and has been determined to have a diagnosis/diagnoses of Morbid obesity (BMI > 40).    This patient is being managed with:   Diet Renal Restrictions-  For patients receiving Renal Replacement - No Protein Restr No Conc K No Conc Phos Low Sodium  Consistent Carbohydrate {Evening Snack} (CSTCHOSN)  Entered: Nov 20 2023  3:55PM  
This patient has been assessed with a concern for Malnutrition and has been determined to have a diagnosis/diagnoses of Morbid obesity (BMI > 40).    This patient is being managed with:   Diet DASH/TLC-  Sodium & Cholesterol Restricted  Consistent Carbohydrate {Evening Snack} (CSTCHOSN)  For patients receiving Renal Replacement - No Protein Restr No Conc K No Conc Phos Low Sodium (RENAL)  Entered: Nov 5 2023 10:06AM  
This patient has been assessed with a concern for Malnutrition and has been determined to have a diagnosis/diagnoses of Morbid obesity (BMI > 40).    This patient is being managed with:   Diet Renal Restrictions-  For patients receiving Renal Replacement - No Protein Restr No Conc K No Conc Phos Low Sodium  Consistent Carbohydrate {Evening Snack} (CSTCHOSN)  Entered: Nov 20 2023  3:55PM  
This patient has been assessed with a concern for Malnutrition and has been determined to have a diagnosis/diagnoses of Morbid obesity (BMI > 40).    This patient is being managed with:   Diet DASH/TLC-  Sodium & Cholesterol Restricted  Consistent Carbohydrate {Evening Snack} (CSTCHOSN)  For patients receiving Renal Replacement - No Protein Restr No Conc K No Conc Phos Low Sodium (RENAL)  Entered: Nov 5 2023 10:06AM  
This patient has been assessed with a concern for Malnutrition and has been determined to have a diagnosis/diagnoses of Morbid obesity (BMI > 40).    This patient is being managed with:   Diet DASH/TLC-  Sodium & Cholesterol Restricted  Consistent Carbohydrate {Evening Snack} (CSTCHOSN)  For patients receiving Renal Replacement - No Protein Restr No Conc K No Conc Phos Low Sodium (RENAL)  Entered: Nov 15 2023  5:07PM  
This patient has been assessed with a concern for Malnutrition and has been determined to have a diagnosis/diagnoses of Morbid obesity (BMI > 40).    This patient is being managed with:   Diet NPO-  NPO for Procedure/Test     NPO Start Date: 09-Nov-2023   NPO Start Time: 23:00  Except Medications  Entered: Nov 9 2023  4:29PM    Diet DASH/TLC-  Sodium & Cholesterol Restricted  Consistent Carbohydrate {Evening Snack} (CSTCHOSN)  For patients receiving Renal Replacement - No Protein Restr No Conc K No Conc Phos Low Sodium (RENAL)  Entered: Nov 5 2023 10:06AM  
This patient has been assessed with a concern for Malnutrition and has been determined to have a diagnosis/diagnoses of Morbid obesity (BMI > 40).    This patient is being managed with:   Diet NPO-  NPO for Procedure/Test     NPO Start Date: 09-Nov-2023   NPO Start Time: 23:00  Except Medications  Entered: Nov 9 2023  4:29PM    Diet DASH/TLC-  Sodium & Cholesterol Restricted  Consistent Carbohydrate {Evening Snack} (CSTCHOSN)  For patients receiving Renal Replacement - No Protein Restr No Conc K No Conc Phos Low Sodium (RENAL)  Entered: Nov 5 2023 10:06AM  
This patient has been assessed with a concern for Malnutrition and has been determined to have a diagnosis/diagnoses of Morbid obesity (BMI > 40).    This patient is being managed with:   Diet DASH/TLC-  Sodium & Cholesterol Restricted  Consistent Carbohydrate {Evening Snack} (CSTCHOSN)  For patients receiving Renal Replacement - No Protein Restr No Conc K No Conc Phos Low Sodium (RENAL)  Entered: Nov 15 2023  5:07PM  
This patient has been assessed with a concern for Malnutrition and has been determined to have a diagnosis/diagnoses of Morbid obesity (BMI > 40).    This patient is being managed with:   Diet DASH/TLC-  Sodium & Cholesterol Restricted  Consistent Carbohydrate {Evening Snack} (CSTCHOSN)  For patients receiving Renal Replacement - No Protein Restr No Conc K No Conc Phos Low Sodium (RENAL)  Entered: Nov 5 2023 10:06AM  
This patient has been assessed with a concern for Malnutrition and has been determined to have a diagnosis/diagnoses of Morbid obesity (BMI > 40).    This patient is being managed with:   Diet DASH/TLC-  Sodium & Cholesterol Restricted  Consistent Carbohydrate {Evening Snack} (CSTCHOSN)  For patients receiving Renal Replacement - No Protein Restr No Conc K No Conc Phos Low Sodium (RENAL)  Entered: Nov 5 2023 10:06AM

## 2023-11-22 NOTE — PROGRESS NOTE ADULT - PROBLEM SELECTOR PROBLEM 5
Paroxysmal atrial fibrillation
Hypertension
Paroxysmal atrial fibrillation
Hypertension
Paroxysmal atrial fibrillation
Hypertension
Paroxysmal atrial fibrillation
Hypertension
Hypertension
Paroxysmal atrial fibrillation
Hypertension
Hypertension
Paroxysmal atrial fibrillation
Paroxysmal atrial fibrillation
Hypertension
Paroxysmal atrial fibrillation
Paroxysmal atrial fibrillation
Hypertension
Paroxysmal atrial fibrillation
T2DM (type 2 diabetes mellitus)
Paroxysmal atrial fibrillation
Paroxysmal atrial fibrillation
Hypertension
Paroxysmal atrial fibrillation
T2DM (type 2 diabetes mellitus)
T2DM (type 2 diabetes mellitus)
Hypertension
Regular Diet - No restrictions
Hypertension
Hypertension

## 2023-11-22 NOTE — PROGRESS NOTE ADULT - ASSESSMENT
58-year-old man with PMH of ESRD on hemodialysis via groin catheter, Monday Wednesday Friday, BENNIE, hypertension, hyperlipidemia, presenting due to weakness for 2 days and fever of 101 Fahrenheit, fatigue, chills, vomiting episodes that started today.  Patient states that yesterday even though he felt unwell he went to his usual dialysis appointment which finished without any significant events.  Also endorsing mild shortness of breath.  Has had sepsis events in the past from his bilateral arm access sites, currently both access sites thrombosed which is why he needs dialysis catheter.  tried using peritoneal dialysis catheter but also eventually became septic and had to be removed. States that a stitch popped yesterday overlying the catheter site, +bleeding which is now resolved. (04 Nov 2023 18:18)     Tmax 103.3 in ER  WBC 8.6, Labs consistent with ESRD  BCx 11/4; 2/2 Klebsiella Pneumoniae group; No ESBL 2/2 GNR    Received a dose of Vancomycin and Zosyn; Zosyn continued      # Klebsiella bacteremia associated with Rt Groin CVC, s/p completion of therapy   # isolated fever.     now resolved, clinically stable, with no localizing symptoms.       PLAN:   - Recent blood cultures, NTD   - monitor off abx.   - trend cbc, no leucocytosis   - TTE with no abnormality   - MRI of LS spine, with no OM   - RVP negative, U/A negative.         Maura Girard  Please contact through MS Teams   If no response or past 5 pm/weekend call [phone].

## 2023-11-22 NOTE — PROGRESS NOTE ADULT - ASSESSMENT
58-year-old man with PMH of ESRD on hemodialysis via groin catheter, Monday Wednesday Friday, BENNIE, hypertension, hyperlipidemia, presenting due to weakness for 2 days and fever of 101 Fahrenheit, fatigue, chills, vomiting episodes that started today.  Patient states that yesterday even though he felt unwell he went to his usual dialysis appointment which finished without any significant events.  Nephrology consulted for ESRD on HD.    A/P:  ESRD on HD: MWF  Dialysis center: [facility]  Nephrologist: Dr. Neftaly Holden  Access: CVC R groin; now dislodged.  Consent signed, witnessed, and placed in pt's chart.  s/p Permacath on 11/10    s/p HD On 11/21   S/p MR venogram w/ and w/o Gadavist.  Continue MWF schedule.--HOLIDAY SCHEDULE THIS WEEK. PLAN FOR HD On SUNDAY, TUESDAY AND  FRIDAY this week  Renal diet.  Monitor BMP and UO.    HTN:  BP controlled.  UF w/ HD.  Monitor BP.    Hyperkalemia:  Likely in setting of ESRD.  Monitor K closely.  Low K diet.    Anemia:  Monitor Hgb.  Epogen w/ HD.  Transfuse for Hgb <8.    CKD: MBD:   - acceptable.  sevelamer to 1600mg TID.  Low PO4 diet.  C/W sevelamer w/ meals.  Monitor Ca and PO4 daily.    Hypercalcemia:  Possibly in setting of dehydration vs. calcitriol.  PTH acceptable for ESRD; Vit. D 44.2.  Off calcitriol.  Ca high normal--hd with low calcium bath  Monitor Ca.    Fever:  Blood cultures with gram neg. rods.  Possibly CVC associated infection.  ID follow up

## 2023-11-22 NOTE — PROGRESS NOTE ADULT - PROBLEM SELECTOR PROBLEM 2
Fever
Fever
Sepsis due to Klebsiella pneumoniae
Fever
Fever
Lower back pain
Lower back pain
Bacteremia
Fever
Lower back pain
Fever
Lower back pain
Bacteremia
Bacteremia
Lower back pain
Lower back pain
Fever
Sepsis due to Klebsiella pneumoniae
Fever
Sepsis due to Klebsiella pneumoniae
Lower back pain
Bacteremia
Lower back pain
Lower back pain

## 2023-11-22 NOTE — PROGRESS NOTE ADULT - PROBLEM SELECTOR PROBLEM 3
Lower back pain
Sepsis due to Klebsiella pneumoniae
Sepsis due to Klebsiella pneumoniae
Lower back pain
Sepsis due to Klebsiella pneumoniae
Renal disease
Sepsis due to Klebsiella pneumoniae
Sepsis due to Klebsiella pneumoniae
Renal disease
ESRD on dialysis
Renal disease
Sepsis due to Klebsiella pneumoniae
Renal disease
Lower back pain
Renal disease
Lower back pain
Renal disease
Sepsis due to Klebsiella pneumoniae
Sepsis due to Klebsiella pneumoniae
ESRD on dialysis
Renal disease
ESRD on dialysis
Sepsis due to Klebsiella pneumoniae

## 2023-11-22 NOTE — PROGRESS NOTE ADULT - TIME BILLING
Advanced care planning was discussed with patient and family.  Advanced care planning forms were reviewed and discussed as appropriate.  Differential diagnosis and plan of care discussed with patient after the evaluation.   Pain assessed and judicious use of narcotics when appropriate was discussed.  Importance of Fall prevention discussed.  Counseling on Smoking and Alcohol cessation was offered when appropriate.  Counseling on Diet, exercise, and medication compliance was done.
chart reviewing, history taking, physical exam, assessment and documentation, including speaking to specialist/SW/CM regarding the management.
chart reviewing, history taking, physical exam, assessment and documentation, including speaking to specialist/SW/CM regarding the management.
Advanced care planning was discussed with patient and family.  Advanced care planning forms were reviewed and discussed as appropriate.  Differential diagnosis and plan of care discussed with patient after the evaluation.   Pain assessed and judicious use of narcotics when appropriate was discussed.  Importance of Fall prevention discussed.  Counseling on Smoking and Alcohol cessation was offered when appropriate.  Counseling on Diet, exercise, and medication compliance was done.
chart reviewing, history taking, physical exam, assessment and documentation, including speaking to specialist/SW/CM regarding the management.
Advanced care planning was discussed with patient and family.  Advanced care planning forms were reviewed and discussed as appropriate.  Differential diagnosis and plan of care discussed with patient after the evaluation.   Pain assessed and judicious use of narcotics when appropriate was discussed.  Importance of Fall prevention discussed.  Counseling on Smoking and Alcohol cessation was offered when appropriate.  Counseling on Diet, exercise, and medication compliance was done.
chart reviewing, history taking, physical exam, assessment and documentation, including speaking to specialist/SW/CM regarding the management.
- Ordering, reviewing, and interpreting labs, testing, and imaging  - Independently obtaining a review of systems and performing a physical exam  - Reviewing consultant documentation/recommendations  - Counselling and educating patient regarding interpretation of aforementioned items and plan of care
Advanced care planning was discussed with patient and family.  Advanced care planning forms were reviewed and discussed as appropriate.  Differential diagnosis and plan of care discussed with patient after the evaluation.   Pain assessed and judicious use of narcotics when appropriate was discussed.  Importance of Fall prevention discussed.  Counseling on Smoking and Alcohol cessation was offered when appropriate.  Counseling on Diet, exercise, and medication compliance was done.
chart reviewing, history taking, physical exam, assessment and documentation, including speaking to specialist/SW/CM regarding the management.
chart reviewing, history taking, physical exam, assessment and documentation, including speaking to specialist/SW/CM regarding the management.
- Ordering, reviewing, and interpreting labs, testing, and imaging.  - Independently obtaining a review of systems and performing a physical exam  - Reviewing consultant documentation/recommendations in addition to discussing plan of care with consultants.  - Counselling and educating patient and family regarding interpretation of aforementioned items and plan of care.
- Ordering, reviewing, and interpreting labs, testing, and imaging.  - Independently obtaining a review of systems and performing a physical exam  - Reviewing consultant documentation/recommendations in addition to discussing plan of care with consultants.  - Counselling and educating patient and family regarding interpretation of aforementioned items and plan of care.
- Ordering, reviewing, and interpreting labs, testing, and imaging  - Independently obtaining a review of systems and performing a physical exam  - Reviewing consultant documentation/recommendations in addition to discussing plan of care with consultants  - Counselling and educating patient regarding interpretation of aforementioned items and plan of care
- Ordering, reviewing, and interpreting labs, testing, and imaging  - Independently obtaining a review of systems and performing a physical exam  - Reviewing consultant documentation/recommendations in addition to discussing plan of care with consultants  - Counselling and educating patient regarding interpretation of aforementioned items and plan of care

## 2023-11-22 NOTE — PROGRESS NOTE ADULT - PROBLEM SELECTOR PLAN 2
2/2 Klebsiella Pneumoniae group; No ESBL 2/2 GNR  TTE reviewed. No evidence of IE   ID following
Patient w/ lower back pain. MRI w/o concern for osteomyelitis, however shows multilevel endplate degenerative Schmorl nodes, may be source of patient's pain. Additionally, diffuse disc bulge asymmetric to the left   results in moderate narrowing of the left neural foramen with contact upon the exiting left L5 nerve root  - neurosx consulted, no acute sx intervention  - pain control for now  - outpt f/u
Patient found to have gram negative rods on BCx X2 on 11/4 likely HD catheter associated  - BCx growing Klebsiella PNA  - continue ceftriaxone 2g q24 for now, can eventually transition to cipro 500 mg daily on discharge to complete 14 days from negative blood cx (on 11/20)  - repeat cultures NGTD  - ID recs appreciated  - febrile to 100.6 on 11/19, repeat blood cultures sent
2/2 Klebsiella Pneumoniae group; No ESBL 2/2 GNR  TTE , may need ADILIA rule out endocarditis.  ID eval  IR consulted
Patient found to have gram negative rods on BCx X2 on 11/4 likely HD catheter associated  - BCx growing Klebsiella PNA  - continue ceftriaxone 2g q24 for now, can eventually transition to cipro 500 mg daily on discharge to complete 14 days from negative blood cx (on 11/20)  - repeat cultures NGTD  - ID recs appreciated  - febrile to 100.6 on 11/19, repeat blood cultures sent
Patient w/ lower back pain. MRI w/o concern for osteomyelitis, however shows multilevel endplate degenerative Schmorl nodes, may be source of patient's pain. Additionally, diffuse disc bulge asymmetric to the left   results in moderate narrowing of the left neural foramen with contact upon the exiting left L5 nerve root  - neurosx consulted, no acute sx intervention  - pain control for now  - outpt f/u
2/2 Klebsiella Pneumoniae group; No ESBL 2/2 GNR  TTE reviewed. No evidence of IE   ID following
Patient w/ lower back pain. MRI w/o concern for osteomyelitis, however shows multilevel endplate degenerative Schmorl nodes, may be source of patient's pain. Additionally, diffuse disc bulge asymmetric to the left   results in moderate narrowing of the left neural foramen with contact upon the exiting left L5 nerve root  - neurosx consulted, no acute sx intervention  - pain control for now  - outpt f/u
Patient w/ lower back pain. MRI w/o concern for osteomyelitis, however shows multilevel endplate degenerative Schmorl nodes, may be source of patient's pain. Additionally, diffuse disc bulge asymmetric to the left   results in moderate narrowing of the left neural foramen with contact upon the exiting left L5 nerve root  - neurosx consulted, no acute sx intervention  - pain control for now  - outpt f/u
Patient w/ sepsis on admission in the setting of bacteremia  - continue abx as above  - repeat cultures pending  - continue to monitor WBC
2/2 Klebsiella Pneumoniae group; No ESBL 2/2 GNR  TTE reviewed. No evidence of IE   ID following
2/2 Klebsiella Pneumoniae group; No ESBL 2/2 GNR  TTE , may need ADILIA rule out endocarditis.  ID eval
Patient w/ lower back pain. MRI w/o concern for osteomyelitis, however shows multilevel endplate degenerative Schmorl nodes, may be source of patient's pain. Additionally, diffuse disc bulge asymmetric to the left   results in moderate narrowing of the left neural foramen with contact upon the exiting left L5 nerve root  - neurosx consulted, no acute sx intervention  - pain control for now  - outpt f/u
2/2 Klebsiella Pneumoniae group; No ESBL 2/2 GNR  TTE reviewed. No evidence of IE   ID following
Patient w/ sepsis on admission in the setting of bacteremia  - continue abx as above  - repeat cultures pending  - continue to monitor WBC
2/2 Klebsiella Pneumoniae group; No ESBL 2/2 GNR  TTE reviewed. No evidence of IE   ID following
2/2 Klebsiella Pneumoniae group; No ESBL 2/2 GNR  TTE , may need ADILIA rule out endocarditis.  ID eval
2/2 Klebsiella Pneumoniae group; No ESBL 2/2 GNR  TTE reviewed. No evidence of IE   ID following
2/2 Klebsiella Pneumoniae group; No ESBL 2/2 GNR  TTE reviewed. No evidence of IE   ID following
Patient w/ lower back pain. MRI w/o concern for osteomyelitis, however shows multilevel endplate degenerative Schmorl nodes, may be source of patient's pain. Additionally, diffuse disc bulge asymmetric to the left   results in moderate narrowing of the left neural foramen with contact upon the exiting left L5 nerve root  - neurosx consulted, no acute sx intervention  - pain control for now  - outpt f/u
2/2 Klebsiella Pneumoniae group; No ESBL 2/2 GNR  TTE reviewed. No evidence of IE   ID following
Patient found to have gram negative rods on BCx X2 on 11/4 likely HD catheter associated  - BCx growing Klebsiella PNA  - continue ceftriaxone 2g q24 for now, can eventually transition to cipro 500 mg daily on discharge to complete 14 days from negative blood cx (on 11/20)  - repeat cultures NGTD  - ID recs appreciated  - febrile to 100.6 on 11/19, repeat blood cultures neg 48 hours
Patient w/ lower back pain. MRI w/o concern for osteomyelitis, however shows multilevel endplate degenerative Schmorl nodes, may be source of patient's pain. Additionally, diffuse disc bulge asymmetric to the left   results in moderate narrowing of the left neural foramen with contact upon the exiting left L5 nerve root  - neurosx consulted, no acute sx intervention  - pain control for now  - outpt f/u
2/2 Klebsiella Pneumoniae group; No ESBL 2/2 GNR  TTE reviewed. No evidence of IE   ID following
2/2 Klebsiella Pneumoniae group; No ESBL 2/2 GNR  TTE reviewed. No evidence of IE   ID following
Patient w/ lower back pain. MRI w/o concern for osteomyelitis, however shows multilevel endplate degenerative Schmorl nodes, may be source of patient's pain. Additionally, diffuse disc bulge asymmetric to the left   results in moderate narrowing of the left neural foramen with contact upon the exiting left L5 nerve root  - neurosx consulted, no acute sx intervention  - pain control for now  - outpt f/u
2/2 Klebsiella Pneumoniae group; No ESBL 2/2 GNR  TTE reviewed. No evidence of IE   ID following
Patient w/ sepsis on admission in the setting of bacteremia  - continue abx as above  - repeat cultures pending  - continue to monitor WBC
Patient found to have gram negative rods on BCx X2 on 11/4 likely HD catheter associated  - BCx growing Klebsiella PNA  - continue ceftriaxone 2g q24 for now, can eventually transition to cipro 500 mg daily on discharge to complete 14 days from negative blood cx ( on 11/20 )  - repeat cultures NGTD  - ID recs appreciated
Patient w/ lower back pain. MRI w/o concern for osteomyelitis, however shows multilevel endplate degenerative Schmorl nodes, may be source of patient's pain. Additionally, diffuse disc bulge asymmetric to the left   results in moderate narrowing of the left neural foramen with contact upon the exiting left L5 nerve root  - neurosx consulted, no acute sx intervention  - pain control for now  - outpt f/u

## 2023-11-22 NOTE — PROGRESS NOTE ADULT - PROBLEM SELECTOR PROBLEM 7
Abnormal finding on lung imaging
Preventive measure
Abnormal finding on lung imaging
Preventive measure
Abnormal finding on lung imaging

## 2023-11-22 NOTE — PROGRESS NOTE ADULT - PROBLEM SELECTOR PLAN 7
CT w/ Right lower lobe 1.2 cm nodule with suggestion of fat and calcifications, partially visualized on prior CT 1/3/2020, likely hamartoma  - currently w/o acute pulmonary symptoms  - can f/u outpt for further monitoring
DVT ppx: Eliquis (Afib)  Diet: DASH/CC  Dispo: pending clinical improvement
CT w/ Right lower lobe 1.2 cm nodule with suggestion of fat and calcifications, partially visualized on prior CT 1/3/2020, likely hamartoma  - currently w/o acute pulmonary symptoms  - can f/u outpt for further monitoring
CT w/ Right lower lobe 1.2 cm nodule with suggestion of fat and calcifications, partially visualized on prior CT 1/3/2020, likely hamartoma  - currently w/o acute pulmonary symptoms  - can f/u outpt for further monitoring
DVT ppx: Eliquis (Afib)  Diet: DASH/CC  Dispo: pending clinical improvement
CT w/ Right lower lobe 1.2 cm nodule with suggestion of fat and calcifications, partially visualized on prior CT 1/3/2020, likely hamartoma  - currently w/o acute pulmonary symptoms  - can f/u outpt for further monitoring
CT w/ Right lower lobe 1.2 cm nodule with suggestion of fat and calcifications, partially visualized on prior CT 1/3/2020, likely hamartoma  - currently w/o acute pulmonary symptoms  - can f/u outpt for further monitoring

## 2023-11-22 NOTE — PROGRESS NOTE ADULT - PROBLEM SELECTOR PLAN 5
Resume home metoprolol tartrate 25mg BID  - monitor blood pressures closely
transitioned to Heparin gtt for OR monday
On eliquis.
Resume home metoprolol tartrate 25mg BID  - monitor blood pressures closely
Resume home metoprolol tartrate 25mg BID  - monitor blood pressures closely
T2DM  - ISS premeal and qhs  - 5.2 A1c  - monitor blood glucose
On eliquis.
transitioned to Heparin gtt for OR monday
Resume home metoprolol tartrate 25mg BID  - monitor blood pressures closely
Resume home metoprolol tartrate 25mg BID  - monitor blood pressures closely
On eliquis.
transitioned to Heparin gtt for OR monday
Resume home metoprolol tartrate 25mg BID  - monitor blood pressures closely
On eliquis.
On eliquis.
Resume home metoprolol tartrate 25mg BID  - monitor blood pressures closely
On eliquis.
Resume home metoprolol tartrate 25mg BID  - monitor blood pressures closely
Resume home metoprolol tartrate 25mg BID  - monitor blood pressures closely
T2DM  - ISS premeal and qhs  - 5.2 A1c  - monitor blood glucose
transitioned to Heparin gtt for OR monday
Resume home metoprolol tartrate 25mg BID  - monitor blood pressures closely
On eliquis.
Resume home metoprolol tartrate 25mg BID  - monitor blood pressures closely
On eliquis.
transitioned to Heparin gtt for OR monday
transitioned to Heparin gtt for OR monday
On eliquis.
On eliquis.
T2DM  - ISS premeal and qhs  - f/u A1c  - monitor blood glucose
Resume home metoprolol tartrate 25mg BID  - monitor blood pressures closely
Resume home metoprolol tartrate 25mg BID  - monitor blood pressures closely

## 2023-11-22 NOTE — PROGRESS NOTE ADULT - TIME-BASED BILLING (NON-CRITICAL CARE)
Time-based billing (NON-critical care)
Statement Selected

## 2023-11-22 NOTE — PROGRESS NOTE ADULT - SUBJECTIVE AND OBJECTIVE BOX
58yPatient is a 58y old  Male who presents with a chief complaint of fever (21 Nov 2023 14:39)      Interval history:  Afebrile, receiving HD. Denies any pain or localizing symptoms.       Allergies:   IV Contrast (Anaphylaxis)      Antimicrobials:  cefTRIAXone   IVPB 2000 milliGRAM(s) IV Intermittent every 24 hours      REVIEW OF SYSTEMS:  No chest pain   No SOB  No abdominal pain  No rash.       Vital Signs Last 24 Hrs  T(C): 36.8 (11-21-23 @ 12:55), Max: 37.5 (11-21-23 @ 01:26)  T(F): 98.2 (11-21-23 @ 12:55), Max: 99.5 (11-21-23 @ 01:26)  HR: 107 (11-21-23 @ 12:55) (75 - 110)  BP: 132/84 (11-21-23 @ 12:55) (102/69 - 132/84)  BP(mean): --  RR: 18 (11-21-23 @ 12:55) (18 - 18)  SpO2: 97% (11-21-23 @ 12:55) (93% - 97%)      PHYSICAL EXAM:  Pt in no acute distress, alert, awake.   rt sided HD catheter  breathing comfortably  non distended abdomen  no edema LE   no phlebitis                               10.5   6.82  )-----------( 146      ( 21 Nov 2023 10:32 )             34.5   11-21    134<L>  |  94<L>  |  48<H>  ----------------------------<  157<H>  4.6   |  22  |  13.32<H>    Ca    10.2      21 Nov 2023 10:32  Phos  6.7     11-21  Mg     2.1     11-21          Culture - Blood (collected 20 Nov 2023 08:35)  Source: .Blood Blood-Peripheral  Preliminary Report (21 Nov 2023 15:01):    No growth at 24 hours    Culture - Blood (collected 20 Nov 2023 08:30)  Source: .Blood Blood-Peripheral  Preliminary Report (21 Nov 2023 15:01):    No growth at 24 hours        Radiology:    < from: [facility] Duplex Upper Ext Vein Scan, Right (11.17.23 @ 17:52) >  IMPRESSION:    Right axillary vein stent is thrombosed (new DVT).        
[facility] [place] [facility]   MD KAT BHAGAT MD ANGELA WONG, PA QIAN CHEN, NP    TEL:  OFFICE: [phone]  From 5pm-7am Answering Service [phone]    -- RENAL FOLLOW UP NOTE ---Date of Service 11-10-23 @ 15:26    Patient is a 58y old  Male who presents with a chief complaint of fever (10 Nov 2023 13:27)      Patient seen and examined at bedside. No chest pain/sob    VITALS:  T(F): 98.3 (11-10-23 @ 13:18), Max: 99.3 (11-09-23 @ 21:33)  HR: 85 (11-10-23 @ 13:18)  BP: 134/83 (11-10-23 @ 13:18)  RR: 18 (11-10-23 @ 13:18)  SpO2: 96% (11-10-23 @ 13:18)  Wt(kg): --    11-09 @ 07:01  -  11-10 @ 07:00  --------------------------------------------------------  IN: 890 mL / OUT: 150 mL / NET: 740 mL          PHYSICAL EXAM:  General: NAD  Neck: No JVD  Respiratory: CTAB, no wheezes, rales or rhonchi  Cardiovascular: S1, S2, RRR  Gastrointestinal: BS+, soft, NT/ND  Extremities: No peripheral edema    Hospital Medications:   MEDICATIONS  (STANDING):  atorvastatin 40 milliGRAM(s) Oral at bedtime  cefTRIAXone   IVPB 2000 milliGRAM(s) IV Intermittent every 24 hours  chlorhexidine 4% Liquid 1 Application(s) Topical <User Schedule>  dextrose 5%. 1000 milliLiter(s) (100 mL/Hr) IV Continuous <Continuous>  dextrose 5%. 1000 milliLiter(s) (50 mL/Hr) IV Continuous <Continuous>  dextrose 50% Injectable 25 Gram(s) IV Push once  dextrose 50% Injectable 25 Gram(s) IV Push once  dextrose 50% Injectable 12.5 Gram(s) IV Push once  epoetin isra (EPOGEN) Injectable 02153 Unit(s) IV Push <User Schedule>  glucagon  Injectable 1 milliGRAM(s) IntraMuscular once  hemorrhoidal Ointment 1 Application(s) Rectal two times a day  insulin lispro (ADMELOG) corrective regimen sliding scale   SubCutaneous every 6 hours  insulin lispro (ADMELOG) corrective regimen sliding scale   SubCutaneous three times a day before meals  insulin lispro (ADMELOG) corrective regimen sliding scale   SubCutaneous at bedtime  insulin lispro (ADMELOG) corrective regimen sliding scale   SubCutaneous at bedtime  lidocaine   4% Patch 1 Patch Transdermal daily  metoprolol tartrate 25 milliGRAM(s) Oral two times a day  polyethylene glycol 3350 17 Gram(s) Oral daily  sevelamer carbonate 800 milliGRAM(s) Oral three times a day with meals      LABS:  11-10    139  |  97  |  46<H>  ----------------------------<  91  4.8   |  19<L>  |  11.52<H>    Ca    10.4      10 Nov 2023 07:12  Phos  6.0     11-10  Mg     2.4     11-10      Creatinine Trend: 11.52 <--, 9.76 <--, 12.47 <--, 9.86 <--, 13.51 <--, 12.59 <--, 11.81 <--, 10.36 <--, 8.65 <--    Phosphorus: 6.0 mg/dL (11-10 @ 07:12)                              9.7    6.39  )-----------( 226      ( 10 Nov 2023 07:13 )             32.9     Urine Studies:  Urinalysis - [11-10-23 @ 07:12]      Color  / Appearance  / SG  / pH       Gluc 91 / Ketone   / Bili  / Urobili        Blood  / Protein  / Leuk Est  / Nitrite       RBC  / WBC  / Hyaline  / Gran  / Sq Epi  / Non Sq Epi  / Bacteria       PTH -- (Ca 10.3)      [11-05-23 @ 07:12]   397  Vitamin D (25OH) 44.2      [11-05-23 @ 07:12]  HbA1c 6.1      [01-04-20 @ 05:50]        RADIOLOGY & ADDITIONAL STUDIES:  
[facility] – [place] [facility]   MD KAT BHAGAT MD ANGELA WONG, PA QIAN CHEN, NP    TEL:  OFFICE: [phone]  From 5pm-7am Answering Service [phone]    -- RENAL FOLLOW UP NOTE ---Date of Service 11-17-23 @ 16:48    Patient is a 58y old  Male who presents with a chief complaint of fever (17 Nov 2023 11:17)      Patient seen and examined at bedside. No chest pain/sob    VITALS:  T(F): 97.7 (11-17-23 @ 13:40), Max: 98.8 (11-17-23 @ 01:07)  HR: 103 (11-17-23 @ 13:40)  BP: 108/68 (11-17-23 @ 13:40)  RR: 20 (11-17-23 @ 13:40)  SpO2: 95% (11-17-23 @ 13:40)  Wt(kg): --    11-16 @ 07:01  -  11-17 @ 07:00  --------------------------------------------------------  IN: 600 mL / OUT: 0 mL / NET: 600 mL    11-17 @ 07:01  -  11-17 @ 16:48  --------------------------------------------------------  IN: 300 mL / OUT: 0 mL / NET: 300 mL          PHYSICAL EXAM:  General: NAD  Neck: No JVD  Respiratory: CTAB, no wheezes, rales or rhonchi  Cardiovascular: S1, S2, RRR  Gastrointestinal: BS+, soft, NT/ND  Extremities: No peripheral edema    Hospital Medications:   MEDICATIONS  (STANDING):  atorvastatin 40 milliGRAM(s) Oral at bedtime  bacitracin   Ointment 1 Application(s) Topical two times a day  cefTRIAXone   IVPB 2000 milliGRAM(s) IV Intermittent every 24 hours  chlorhexidine 4% Liquid 1 Application(s) Topical <User Schedule>  dextrose 5%. 1000 milliLiter(s) (50 mL/Hr) IV Continuous <Continuous>  dextrose 5%. 1000 milliLiter(s) (100 mL/Hr) IV Continuous <Continuous>  dextrose 50% Injectable 25 Gram(s) IV Push once  dextrose 50% Injectable 12.5 Gram(s) IV Push once  dextrose 50% Injectable 25 Gram(s) IV Push once  diphenhydrAMINE 50 milliGRAM(s) Oral once  epoetin isra (EPOGEN) Injectable 95791 Unit(s) IV Push <User Schedule>  glucagon  Injectable 1 milliGRAM(s) IntraMuscular once  hemorrhoidal Ointment 1 Application(s) Rectal two times a day  heparin  Infusion.  Unit(s)/Hr (24 mL/Hr) IV Continuous <Continuous>  insulin lispro (ADMELOG) corrective regimen sliding scale   SubCutaneous at bedtime  insulin lispro (ADMELOG) corrective regimen sliding scale   SubCutaneous three times a day before meals  insulin lispro (ADMELOG) corrective regimen sliding scale   SubCutaneous at bedtime  lidocaine   4% Patch 1 Patch Transdermal daily  methylPREDNISolone 32 milliGRAM(s) Oral once  metoprolol tartrate 25 milliGRAM(s) Oral two times a day  polyethylene glycol 3350 17 Gram(s) Oral daily  sevelamer carbonate 1600 milliGRAM(s) Oral three times a day with meals      LABS:  11-17    135  |  93<L>  |  55<H>  ----------------------------<  103<H>  4.2   |  23  |  13.39<H>    Ca    10.5      17 Nov 2023 04:19    TPro  7.9  /  Alb  3.8  /  TBili  0.4  /  DBili      /  AST  15  /  ALT  13  /  AlkPhos  63  11-17    Creatinine Trend: 13.39 <--, 10.89 <--, 12.47 <--, 10.81 <--, 12.13 <--, 10.74 <--, 8.75 <--    Albumin: 3.8 g/dL (11-17 @ 04:19)                              11.3   9.52  )-----------( 202      ( 17 Nov 2023 04:19 )             38.7     Urine Studies:  Urinalysis - [11-17-23 @ 04:19]      Color  / Appearance  / SG  / pH       Gluc 103 / Ketone   / Bili  / Urobili        Blood  / Protein  / Leuk Est  / Nitrite       RBC  / WBC  / Hyaline  / Gran  / Sq Epi  / Non Sq Epi  / Bacteria       PTH -- (Ca 10.3)      [11-05-23 @ 07:12]   397  Vitamin D (25OH) 44.2      [11-05-23 @ 07:12]  HbA1c 6.1      [01-04-20 @ 05:50]        RADIOLOGY & ADDITIONAL STUDIES:  
[facility] – [place] [facility]   MD KAT BHAGAT MD ANGELA WONG, PA QIAN CHEN, NP    TEL:  OFFICE: [phone]  From 5pm-7am Answering Service [phone]    -- RENAL FOLLOW UP NOTE ---Date of Service 11-05-23 @ 14:48    Patient is a 58y old  Male who presents with a chief complaint of fever (05 Nov 2023 09:57)      Patient seen and examined at bedside. No chest pain/sob    VITALS:  T(F): 103 (11-05-23 @ 13:37), Max: 103 (11-05-23 @ 08:34)  HR: 112 (11-05-23 @ 13:37)  BP: 131/69 (11-05-23 @ 13:37)  RR: 20 (11-05-23 @ 13:37)  SpO2: 98% (11-05-23 @ 13:37)  Wt(kg): --    11-04 @ 08:01  -  11-05 @ 07:00  --------------------------------------------------------  IN: 0 mL / OUT: 250 mL / NET: -250 mL          PHYSICAL EXAM:  General: NAD  Neck: No JVD  Respiratory: CTAB, no wheezes, rales or rhonchi  Cardiovascular: S1, S2, RRR  Gastrointestinal: BS+, soft, NT/ND  Extremities: No peripheral edema    Hospital Medications:   MEDICATIONS  (STANDING):  acetaminophen   IVPB .. 1000 milliGRAM(s) IV Intermittent once  apixaban 5 milliGRAM(s) Oral two times a day  atorvastatin 40 milliGRAM(s) Oral at bedtime  cefTRIAXone   IVPB 2000 milliGRAM(s) IV Intermittent every 24 hours  dextrose 5%. 1000 milliLiter(s) (100 mL/Hr) IV Continuous <Continuous>  dextrose 5%. 1000 milliLiter(s) (50 mL/Hr) IV Continuous <Continuous>  dextrose 50% Injectable 25 Gram(s) IV Push once  dextrose 50% Injectable 12.5 Gram(s) IV Push once  dextrose 50% Injectable 25 Gram(s) IV Push once  glucagon  Injectable 1 milliGRAM(s) IntraMuscular once  insulin lispro (ADMELOG) corrective regimen sliding scale   SubCutaneous three times a day before meals  insulin lispro (ADMELOG) corrective regimen sliding scale   SubCutaneous at bedtime  metoprolol tartrate 25 milliGRAM(s) Oral two times a day  sevelamer carbonate 800 milliGRAM(s) Oral three times a day with meals  sodium zirconium cyclosilicate 10 Gram(s) Oral <User Schedule>      LABS:  11-05    135  |  99  |  35<H>  ----------------------------<  76  5.7<H>   |  19<L>  |  10.36<H>    Ca    10.3      05 Nov 2023 07:12    TPro  7.7  /  Alb  3.6  /  TBili  1.2  /  DBili      /  AST  20  /  ALT  13  /  AlkPhos  50  11-05    Creatinine Trend: 10.36 <--, 8.65 <--    Albumin: 3.6 g/dL (11-05 @ 07:12)  Vitamin D, 25-Hydroxy: 44.2 ng/mL (11-05 @ 07:12)    [id number]  intact [id number]  parathyroid hormone intact, serum--                            10.5   8.67  )-----------( 171      ( 05 Nov 2023 07:12 )             35.6     Urine Studies:  Urinalysis - [11-05-23 @ 07:12]      Color  / Appearance  / SG  / pH       Gluc 76 / Ketone   / Bili  / Urobili        Blood  / Protein  / Leuk Est  / Nitrite       RBC  / WBC  / Hyaline  / Gran  / Sq Epi  / Non Sq Epi  / Bacteria       PTH -- (Ca 10.3)      [11-05-23 @ 07:12]   397  Vitamin D (25OH) 44.2      [11-05-23 @ 07:12]  HbA1c 6.1      [01-04-20 @ 05:50]        RADIOLOGY & ADDITIONAL STUDIES:  
[facility] – [place] [facility]   MD KAT BHAGAT MD RUORU WONG, PA    TEL:  FROM 9 AM to 5 PM ---OFFICE: [phone]  AVAILABLE ON TEAMS    FROM 5 PM - 9 AM PLEASE CALL ANSWERING SERVICE: [phone]    RENAL FOLLOW UP NOTE--Date of Service 11-13-23 @ 12:06  --------------------------------------------------------------------------------  HPI:      Pt seen and examined at bedside.       PAST HISTORY  --------------------------------------------------------------------------------  No significant changes to PMH, PSH, FHx, SHx, unless otherwise noted    ALLERGIES & MEDICATIONS  --------------------------------------------------------------------------------  Allergies    IV Contrast (Anaphylaxis)    Intolerances      Standing Inpatient Medications  apixaban 5 milliGRAM(s) Oral two times a day  atorvastatin 40 milliGRAM(s) Oral at bedtime  bacitracin   Ointment 1 Application(s) Topical two times a day  cefTRIAXone   IVPB 2000 milliGRAM(s) IV Intermittent every 24 hours  chlorhexidine 4% Liquid 1 Application(s) Topical <User Schedule>  dextrose 5%. 1000 milliLiter(s) IV Continuous <Continuous>  dextrose 5%. 1000 milliLiter(s) IV Continuous <Continuous>  dextrose 50% Injectable 12.5 Gram(s) IV Push once  dextrose 50% Injectable 25 Gram(s) IV Push once  dextrose 50% Injectable 25 Gram(s) IV Push once  epoetin isra (EPOGEN) Injectable 65266 Unit(s) IV Push <User Schedule>  glucagon  Injectable 1 milliGRAM(s) IntraMuscular once  hemorrhoidal Ointment 1 Application(s) Rectal two times a day  insulin lispro (ADMELOG) corrective regimen sliding scale   SubCutaneous three times a day before meals  insulin lispro (ADMELOG) corrective regimen sliding scale   SubCutaneous at bedtime  insulin lispro (ADMELOG) corrective regimen sliding scale   SubCutaneous at bedtime  lidocaine   4% Patch 1 Patch Transdermal daily  metoprolol tartrate 25 milliGRAM(s) Oral two times a day  polyethylene glycol 3350 17 Gram(s) Oral daily  sevelamer carbonate 1600 milliGRAM(s) Oral three times a day with meals    PRN Inpatient Medications  acetaminophen     Tablet .. 650 milliGRAM(s) Oral every 6 hours PRN  dextrose Oral Gel 15 Gram(s) Oral once PRN  sodium chloride 0.9% lock flush 10 milliLiter(s) IV Push every 1 hour PRN      REVIEW OF SYSTEMS  --------------------------------------------------------------------------------  General: no fever  MSK: no edema     VITALS/PHYSICAL EXAM  --------------------------------------------------------------------------------  T(C): 36.5 (11-13-23 @ 09:20), Max: 37.1 (11-12-23 @ 21:18)  HR: 87 (11-13-23 @ 09:20) (87 - 97)  BP: 150/86 (11-13-23 @ 09:20) (116/78 - 157/84)  RR: 18 (11-13-23 @ 09:20) (18 - 18)  SpO2: 96% (11-13-23 @ 09:20) (95% - 96%)  Wt(kg): --        11-12-23 @ 07:01  -  11-13-23 @ 07:00  --------------------------------------------------------  IN: 720 mL / OUT: 0 mL / NET: 720 mL      Physical Exam:  	Gen: NAD  	HEENT: MMM  	Pulm: CTA B/L  	CV: S1S2  	Abd: Soft, +BS  	Ext: No LE edema B/L                      Neuro: Awake   	Skin: Warm and Dry   	Vascular access: HD catheter            no  mary ellen  LABS/STUDIES  --------------------------------------------------------------------------------              10.2   8.01  >-----------<  272      [11-13-23 @ 07:32]              34.4     137  |  100  |  48  ----------------------------<  103      [11-13-23 @ 07:31]  4.0   |  20  |  12.13        Ca     10.5     [11-13-23 @ 07:31]      Mg     2.4     [11-13-23 @ 07:31]      Phos  6.7     [11-13-23 @ 07:31]            Creatinine Trend:  SCr 12.13 [11-13 @ 07:31]  SCr 10.74 [11-12 @ 07:09]  SCr 8.75 [11-11 @ 07:04]  SCr 11.52 [11-10 @ 07:12]  SCr 9.76 [11-09 @ 08:49]    Urinalysis - [11-13-23 @ 07:31]      Color  / Appearance  / SG  / pH       Gluc 103 / Ketone   / Bili  / Urobili        Blood  / Protein  / Leuk Est  / Nitrite       RBC  / WBC  / Hyaline  / Gran  / Sq Epi  / Non Sq Epi  / Bacteria       PTH -- (Ca 10.3)      [11-05-23 @ 07:12]   397  Vitamin D (25OH) 44.2      [11-05-23 @ 07:12]  HbA1c 6.1      [01-04-20 @ 05:50]      
PROGRESS NOTE:   Authored by Dr. Pacheco Bello MD, Available on MS Teams    Patient is a 58y old  Male who presents with a chief complaint of fever (06 Nov 2023 12:53)      SUBJECTIVE / OVERNIGHT EVENTS: Patient feels unwell. Denies any chest pain, shortness of breath, nausea, vomiting, or abdominal pain but just does not feel well.     ADDITIONAL REVIEW OF SYSTEMS:    MEDICATIONS  (STANDING):  apixaban 5 milliGRAM(s) Oral two times a day  atorvastatin 40 milliGRAM(s) Oral at bedtime  cefTRIAXone   IVPB 2000 milliGRAM(s) IV Intermittent every 24 hours  chlorhexidine 4% Liquid 1 Application(s) Topical <User Schedule>  dextrose 5%. 1000 milliLiter(s) (100 mL/Hr) IV Continuous <Continuous>  dextrose 5%. 1000 milliLiter(s) (50 mL/Hr) IV Continuous <Continuous>  dextrose 50% Injectable 25 Gram(s) IV Push once  dextrose 50% Injectable 12.5 Gram(s) IV Push once  dextrose 50% Injectable 25 Gram(s) IV Push once  epoetin isra (EPOGEN) Injectable 2000 Unit(s) IV Push <User Schedule>  glucagon  Injectable 1 milliGRAM(s) IntraMuscular once  insulin lispro (ADMELOG) corrective regimen sliding scale   SubCutaneous three times a day before meals  insulin lispro (ADMELOG) corrective regimen sliding scale   SubCutaneous at bedtime  metoprolol tartrate 25 milliGRAM(s) Oral two times a day  ondansetron    Tablet 4 milliGRAM(s) Oral once  sevelamer carbonate 800 milliGRAM(s) Oral three times a day with meals    MEDICATIONS  (PRN):  acetaminophen     Tablet .. 650 milliGRAM(s) Oral every 6 hours PRN Temp greater or equal to 38C (100.4F), Moderate Pain (4 - 6)  dextrose Oral Gel 15 Gram(s) Oral once PRN Blood Glucose LESS THAN 70 milliGRAM(s)/deciliter  sodium chloride 0.9% lock flush 10 milliLiter(s) IV Push every 1 hour PRN Pre/post blood products, medications, blood draw, and to maintain line patency      CAPILLARY BLOOD GLUCOSE      POCT Blood Glucose.: 87 mg/dL (06 Nov 2023 17:58)  POCT Blood Glucose.: 89 mg/dL (06 Nov 2023 12:21)  POCT Blood Glucose.: 87 mg/dL (06 Nov 2023 08:21)  POCT Blood Glucose.: 92 mg/dL (05 Nov 2023 21:30)    I&O's Summary    05 Nov 2023 07:01  -  06 Nov 2023 07:00  --------------------------------------------------------  IN: 630 mL / OUT: 575 mL / NET: 55 mL    06 Nov 2023 07:01  -  06 Nov 2023 18:57  --------------------------------------------------------  IN: 220 mL / OUT: 225 mL / NET: -5 mL        PHYSICAL EXAM:  Vital Signs Last 24 Hrs  T(C): 36.9 (06 Nov 2023 16:31), Max: 39.4 (06 Nov 2023 08:33)  T(F): 98.5 (06 Nov 2023 16:31), Max: 102.9 (06 Nov 2023 08:33)  HR: 114 (06 Nov 2023 16:31) (91 - 114)  BP: 136/82 (06 Nov 2023 16:31) (97/64 - 167/88)  BP(mean): --  RR: 18 (06 Nov 2023 16:31) (18 - 20)  SpO2: 98% (06 Nov 2023 16:31) (95% - 100%)    Parameters below as of 06 Nov 2023 16:31  Patient On (Oxygen Delivery Method): room air        CONSTITUTIONAL: NAD  RESPIRATORY: Normal respiratory effort; lungs are clear to auscultation bilaterally  CARDIOVASCULAR: Regular rate and rhythm, normal S1 and S2, no murmur/rub/gallop; No lower extremity edema  ABDOMEN: Nontender to palpation, normoactive bowel sounds, no rebound/guarding  MUSCLOSKELETAL: no clubbing or cyanosis of digits; no joint swelling or tenderness to palpation  EXTREMITIES: R groin w/o erythema, nontender to palpation (at site of previous catheter)  PSYCH: A+O to person, place, and time; affect appropriate    LABS:                        11.0   7.56  )-----------( 143      ( 06 Nov 2023 07:39 )             37.9     11-06    136  |  97  |  53<H>  ----------------------------<  92  4.8   |  21<L>  |  13.51<H>    Ca    10.2      06 Nov 2023 16:41  Phos  6.1     11-06    TPro  7.7  /  Alb  3.6  /  TBili  1.2  /  DBili  x   /  AST  20  /  ALT  13  /  AlkPhos  50  11-05          Urinalysis Basic - ( 06 Nov 2023 16:41 )    Color: x / Appearance: x / SG: x / pH: x  Gluc: 92 mg/dL / Ketone: x  / Bili: x / Urobili: x   Blood: x / Protein: x / Nitrite: x   Leuk Esterase: x / RBC: x / WBC x   Sq Epi: x / Non Sq Epi: x / Bacteria: x        Culture - Urine (collected 04 Nov 2023 16:29)  Source: Clean Catch Clean Catch (Midstream)  Final Report (06 Nov 2023 06:23):    <10,000 CFU/mL Normal Urogenital Corrie    Culture - Blood (collected 04 Nov 2023 12:45)  Source: .Blood Blood-Peripheral  Gram Stain (05 Nov 2023 04:42):    Growth in anaerobic bottle: Gram Negative Rods    Growth in aerobic bottle: Gram Negative Rods  Final Report (06 Nov 2023 15:33):    Growth in aerobic and anaerobic bottles: Klebsiella pneumoniae    Direct identification is available within approximately 3-5    hours either by Blood Panel Multiplexed PCR or Direct    MALDI-TOF. Details: [email or web address]  Organism: Blood Culture PCR  Klebsiella pneumoniae (06 Nov 2023 15:33)  Organism: Klebsiella pneumoniae (06 Nov 2023 15:33)  Organism: Blood Culture PCR (06 Nov 2023 15:33)    Culture - Blood (collected 04 Nov 2023 12:30)  Source: .Blood Blood-Peripheral  Gram Stain (05 Nov 2023 06:42):    Growth in aerobic bottle: Gram Negative Rods    Growth in anaerobic bottle: Gram Negative Rods  Final Report (06 Nov 2023 15:40):    Growth in aerobic and anaerobic bottles: Klebsiella pneumoniae    See previous culture [id number]
PROGRESS NOTE:   Authored by Dr. Pacheco Bello MD, Available on MS Teams    Patient is a 58y old  Male who presents with a chief complaint of fever (07 Nov 2023 09:32)      SUBJECTIVE / OVERNIGHT EVENTS: Patient febrile overnight. Patient feels a little better today. Denies chest pain or shortness of breath. No nausea, vomiting, abdominal pain. Has decreased appetite    ADDITIONAL REVIEW OF SYSTEMS:    MEDICATIONS  (STANDING):  apixaban 5 milliGRAM(s) Oral two times a day  atorvastatin 40 milliGRAM(s) Oral at bedtime  cefTRIAXone   IVPB 2000 milliGRAM(s) IV Intermittent every 24 hours  chlorhexidine 4% Liquid 1 Application(s) Topical <User Schedule>  dextrose 5%. 1000 milliLiter(s) (100 mL/Hr) IV Continuous <Continuous>  dextrose 5%. 1000 milliLiter(s) (50 mL/Hr) IV Continuous <Continuous>  dextrose 50% Injectable 25 Gram(s) IV Push once  dextrose 50% Injectable 12.5 Gram(s) IV Push once  dextrose 50% Injectable 25 Gram(s) IV Push once  epoetin isra (EPOGEN) Injectable 2000 Unit(s) IV Push <User Schedule>  glucagon  Injectable 1 milliGRAM(s) IntraMuscular once  insulin lispro (ADMELOG) corrective regimen sliding scale   SubCutaneous three times a day before meals  insulin lispro (ADMELOG) corrective regimen sliding scale   SubCutaneous at bedtime  metoprolol tartrate 25 milliGRAM(s) Oral two times a day  sevelamer carbonate 800 milliGRAM(s) Oral three times a day with meals    MEDICATIONS  (PRN):  acetaminophen     Tablet .. 650 milliGRAM(s) Oral every 6 hours PRN Temp greater or equal to 38C (100.4F), Moderate Pain (4 - 6)  dextrose Oral Gel 15 Gram(s) Oral once PRN Blood Glucose LESS THAN 70 milliGRAM(s)/deciliter  sodium chloride 0.9% lock flush 10 milliLiter(s) IV Push every 1 hour PRN Pre/post blood products, medications, blood draw, and to maintain line patency      CAPILLARY BLOOD GLUCOSE      POCT Blood Glucose.: 91 mg/dL (07 Nov 2023 14:00)  POCT Blood Glucose.: 89 mg/dL (07 Nov 2023 09:15)  POCT Blood Glucose.: 94 mg/dL (06 Nov 2023 23:42)  POCT Blood Glucose.: 87 mg/dL (06 Nov 2023 17:58)    I&O's Summary    06 Nov 2023 07:01  -  07 Nov 2023 07:00  --------------------------------------------------------  IN: 620 mL / OUT: 1225 mL / NET: -605 mL    07 Nov 2023 07:01  -  07 Nov 2023 14:12  --------------------------------------------------------  IN: 240 mL / OUT: 0 mL / NET: 240 mL        PHYSICAL EXAM:  Vital Signs Last 24 Hrs  T(C): 37.4 (07 Nov 2023 13:16), Max: 38.3 (06 Nov 2023 23:37)  T(F): 99.3 (07 Nov 2023 13:16), Max: 100.9 (06 Nov 2023 23:37)  HR: 98 (07 Nov 2023 13:16) (98 - 120)  BP: 111/69 (07 Nov 2023 13:16) (106/54 - 136/82)  BP(mean): --  RR: 18 (07 Nov 2023 13:16) (18 - 18)  SpO2: 97% (07 Nov 2023 13:16) (95% - 98%)    Parameters below as of 07 Nov 2023 13:16  Patient On (Oxygen Delivery Method): room air        CONSTITUTIONAL: NAD  HEENT: R IJ shiley catheter  RESPIRATORY: Normal respiratory effort; lungs are clear to auscultation bilaterally  CARDIOVASCULAR: Regular rate and rhythm, normal S1 and S2, no murmur/rub/gallop; No lower extremity edema  ABDOMEN: Nontender to palpation, normoactive bowel sounds, no rebound/guarding  MUSCLOSKELETAL: no clubbing or cyanosis of digits; no joint swelling or tenderness to palpation  PSYCH: A+O to person, place, and time; affect appropriate    LABS:                        9.7    5.65  )-----------( 170      ( 07 Nov 2023 07:12 )             32.4     11-07    137  |  96  |  34<H>  ----------------------------<  78  4.3   |  21<L>  |  9.86<H>    Ca    10.1      07 Nov 2023 07:16  Phos  5.1     11-07  Mg     2.2     11-07            Urinalysis Basic - ( 07 Nov 2023 07:16 )    Color: x / Appearance: x / SG: x / pH: x  Gluc: 78 mg/dL / Ketone: x  / Bili: x / Urobili: x   Blood: x / Protein: x / Nitrite: x   Leuk Esterase: x / RBC: x / WBC x   Sq Epi: x / Non Sq Epi: x / Bacteria: x        Culture - Urine (collected 04 Nov 2023 16:29)  Source: Clean Catch Clean Catch (Midstream)  Final Report (06 Nov 2023 06:23):    <10,000 CFU/mL Normal Urogenital Corrie
PROGRESS NOTE:   Authored by Dr. Pacheco Bello MD, Available on MS Teams    Patient is a 58y old  Male who presents with a chief complaint of fever (09 Nov 2023 16:00)      SUBJECTIVE / OVERNIGHT EVENTS: Patient feels okay. Denies any chest pain or shortness of breath. Continues to pain across the lower back.     ADDITIONAL REVIEW OF SYSTEMS:    MEDICATIONS  (STANDING):  atorvastatin 40 milliGRAM(s) Oral at bedtime  cefTRIAXone   IVPB 2000 milliGRAM(s) IV Intermittent every 24 hours  chlorhexidine 4% Liquid 1 Application(s) Topical <User Schedule>  dextrose 5%. 1000 milliLiter(s) (100 mL/Hr) IV Continuous <Continuous>  dextrose 5%. 1000 milliLiter(s) (50 mL/Hr) IV Continuous <Continuous>  dextrose 50% Injectable 25 Gram(s) IV Push once  dextrose 50% Injectable 25 Gram(s) IV Push once  dextrose 50% Injectable 12.5 Gram(s) IV Push once  epoetin isra (EPOGEN) Injectable 84926 Unit(s) IV Push <User Schedule>  glucagon  Injectable 1 milliGRAM(s) IntraMuscular once  insulin lispro (ADMELOG) corrective regimen sliding scale   SubCutaneous three times a day before meals  insulin lispro (ADMELOG) corrective regimen sliding scale   SubCutaneous at bedtime  lidocaine   4% Patch 1 Patch Transdermal daily  metoprolol tartrate 25 milliGRAM(s) Oral two times a day  polyethylene glycol 3350 17 Gram(s) Oral daily  sevelamer carbonate 800 milliGRAM(s) Oral three times a day with meals    MEDICATIONS  (PRN):  dextrose Oral Gel 15 Gram(s) Oral once PRN Blood Glucose LESS THAN 70 milliGRAM(s)/deciliter  sodium chloride 0.9% lock flush 10 milliLiter(s) IV Push every 1 hour PRN Pre/post blood products, medications, blood draw, and to maintain line patency      CAPILLARY BLOOD GLUCOSE      POCT Blood Glucose.: 101 mg/dL (09 Nov 2023 12:32)  POCT Blood Glucose.: 94 mg/dL (09 Nov 2023 08:26)  POCT Blood Glucose.: 88 mg/dL (08 Nov 2023 21:40)    I&O's Summary    08 Nov 2023 07:01  -  09 Nov 2023 07:00  --------------------------------------------------------  IN: 260 mL / OUT: 1400 mL / NET: -1140 mL    09 Nov 2023 07:01  -  09 Nov 2023 16:36  --------------------------------------------------------  IN: 480 mL / OUT: 0 mL / NET: 480 mL        PHYSICAL EXAM:  Vital Signs Last 24 Hrs  T(C): 36.9 (09 Nov 2023 13:03), Max: 37.8 (08 Nov 2023 21:43)  T(F): 98.4 (09 Nov 2023 13:03), Max: 100.1 (09 Nov 2023 01:02)  HR: 97 (09 Nov 2023 13:03) (85 - 106)  BP: 128/84 (09 Nov 2023 13:03) (101/67 - 147/86)  BP(mean): --  RR: 18 (09 Nov 2023 13:03) (18 - 18)  SpO2: 92% (09 Nov 2023 13:03) (92% - 98%)    Parameters below as of 09 Nov 2023 13:03  Patient On (Oxygen Delivery Method): room air        CONSTITUTIONAL: NAD  RESPIRATORY: Normal respiratory effort; lungs are clear to auscultation bilaterally  CARDIOVASCULAR: Regular rate and rhythm, normal S1 and S2, no murmur/rub/gallop; No lower extremity edema  ABDOMEN: Nontender to palpation, normoactive bowel sounds, no rebound/guarding  MUSCLOSKELETAL: no clubbing or cyanosis of digits; no joint swelling or tenderness to palpation  PSYCH: A+O to person, place, and time; affect appropriate    LABS:                        9.0    5.27  )-----------( 205      ( 09 Nov 2023 08:49 )             30.1     11-09    137  |  97  |  36<H>  ----------------------------<  95  4.1   |  22  |  9.76<H>    Ca    9.7      09 Nov 2023 08:49  Phos  5.5     11-09  Mg     2.2     11-09            Urinalysis Basic - ( 09 Nov 2023 08:49 )    Color: x / Appearance: x / SG: x / pH: x  Gluc: 95 mg/dL / Ketone: x  / Bili: x / Urobili: x   Blood: x / Protein: x / Nitrite: x   Leuk Esterase: x / RBC: x / WBC x   Sq Epi: x / Non Sq Epi: x / Bacteria: x  
[facility] [place] [facility]   MD KAT BHAGAT MD RUORU WONG, PA    TEL:  FROM 9 AM to 5 PM ---OFFICE: [phone]  AVAILABLE ON TEAMS    FROM 5 PM - 9 AM PLEASE CALL ANSWERING SERVICE: [phone]    RENAL FOLLOW UP NOTE--Date of Service 11-12-23 @ 08:07  --------------------------------------------------------------------------------  HPI:      Pt seen and examined at bedside.   Denies SOB, chest pain     PAST HISTORY  --------------------------------------------------------------------------------  No significant changes to PMH, PSH, FHx, SHx, unless otherwise noted    ALLERGIES & MEDICATIONS  --------------------------------------------------------------------------------  Allergies    IV Contrast (Anaphylaxis)    Intolerances      Standing Inpatient Medications  apixaban 5 milliGRAM(s) Oral two times a day  atorvastatin 40 milliGRAM(s) Oral at bedtime  bacitracin   Ointment 1 Application(s) Topical two times a day  cefTRIAXone   IVPB 2000 milliGRAM(s) IV Intermittent every 24 hours  chlorhexidine 4% Liquid 1 Application(s) Topical <User Schedule>  dextrose 5%. 1000 milliLiter(s) IV Continuous <Continuous>  dextrose 5%. 1000 milliLiter(s) IV Continuous <Continuous>  dextrose 50% Injectable 12.5 Gram(s) IV Push once  dextrose 50% Injectable 25 Gram(s) IV Push once  dextrose 50% Injectable 25 Gram(s) IV Push once  epoetin isra (EPOGEN) Injectable 73029 Unit(s) IV Push <User Schedule>  glucagon  Injectable 1 milliGRAM(s) IntraMuscular once  hemorrhoidal Ointment 1 Application(s) Rectal two times a day  insulin lispro (ADMELOG) corrective regimen sliding scale   SubCutaneous at bedtime  insulin lispro (ADMELOG) corrective regimen sliding scale   SubCutaneous three times a day before meals  insulin lispro (ADMELOG) corrective regimen sliding scale   SubCutaneous at bedtime  lidocaine   4% Patch 1 Patch Transdermal daily  metoprolol tartrate 25 milliGRAM(s) Oral two times a day  polyethylene glycol 3350 17 Gram(s) Oral daily  sevelamer carbonate 1600 milliGRAM(s) Oral three times a day with meals    PRN Inpatient Medications  dextrose Oral Gel 15 Gram(s) Oral once PRN  sodium chloride 0.9% lock flush 10 milliLiter(s) IV Push every 1 hour PRN      REVIEW OF SYSTEMS  --------------------------------------------------------------------------------  General: no fever  CVS: no chest pain  RESP: no sob, no cough  ABD: no abdominal pain  : no dysuria,  MSK: no edema     VITALS/PHYSICAL EXAM  --------------------------------------------------------------------------------  T(C): 36.8 (11-12-23 @ 05:35), Max: 37.4 (11-11-23 @ 21:21)  HR: 100 (11-12-23 @ 05:35) (91 - 100)  BP: 135/85 (11-12-23 @ 05:35) (115/61 - 161/82)  RR: 18 (11-12-23 @ 05:35) (18 - 18)  SpO2: 99% (11-12-23 @ 05:35) (94% - 99%)  Wt(kg): --  Height (cm): 177.8 (11-10-23 @ 17:13)  Weight (kg): 131.5 (11-10-23 @ 17:13)  BMI (kg/m2): 41.6 (11-10-23 @ 17:13)  BSA (m2): 2.44 (11-10-23 @ 17:13)      11-11-23 @ 07:01  -  11-12-23 @ 07:00  --------------------------------------------------------  IN: 1200 mL / OUT: 0 mL / NET: 1200 mL      Physical Exam:  	Gen: NAD  	HEENT: MMM  	Pulm: CTA B/L  	CV: S1S2  	Abd: Soft, +BS  	Ext: No LE edema B/L                      Neuro: Awake   	Skin: Warm and Dry   	Vascular access:  HD catheter            no mary ellen  LABS/STUDIES  --------------------------------------------------------------------------------              10.1   8.28  >-----------<  281      [11-12-23 @ 07:09]              34.4     138  |  98  |  30  ----------------------------<  88      [11-11-23 @ 07:04]  4.1   |  22  |  8.75        Ca     10.3     [11-11-23 @ 07:04]      Mg     2.1     [11-11-23 @ 07:04]      Phos  4.3     [11-11-23 @ 07:04]            Creatinine Trend:  SCr 8.75 [11-11 @ 07:04]  SCr 11.52 [11-10 @ 07:12]  SCr 9.76 [11-09 @ 08:49]  SCr 12.47 [11-08 @ 09:28]  SCr 9.86 [11-07 @ 07:16]    Urinalysis - [11-11-23 @ 07:04]      Color  / Appearance  / SG  / pH       Gluc 88 / Ketone   / Bili  / Urobili        Blood  / Protein  / Leuk Est  / Nitrite       RBC  / WBC  / Hyaline  / Gran  / Sq Epi  / Non Sq Epi  / Bacteria       PTH -- (Ca 10.3)      [11-05-23 @ 07:12]   397  Vitamin D (25OH) 44.2      [11-05-23 @ 07:12]  HbA1c 6.1      [01-04-20 @ 05:50]      
[facility] NEPHROLOGY   Dr Lizett Peck      OFFICE: [phone] ( 9 AM to 5PM )  Answering Service ( 5pm - 7 am) [phone]     Chief Complaint: ESRD/Ongoing hemodialysis requirement  date of service 11-15-23 @ 10:53    24 hour events/subjective:  PT seen and examined on dialysis , tolerating well. BP stable, Access working well      PAST HISTORY  --------------------------------------------------------------------------------  No significant changes to PMH, PSH, FHx, SHx, unless otherwise noted    ALLERGIES & MEDICATIONS  --------------------------------------------------------------------------------  Allergies    IV Contrast (Anaphylaxis)    Intolerances      Standing Inpatient Medications  apixaban 5 milliGRAM(s) Oral two times a day  atorvastatin 40 milliGRAM(s) Oral at bedtime  bacitracin   Ointment 1 Application(s) Topical two times a day  cefTRIAXone   IVPB 2000 milliGRAM(s) IV Intermittent every 24 hours  chlorhexidine 4% Liquid 1 Application(s) Topical <User Schedule>  dextrose 5%. 1000 milliLiter(s) IV Continuous <Continuous>  dextrose 5%. 1000 milliLiter(s) IV Continuous <Continuous>  dextrose 50% Injectable 25 Gram(s) IV Push once  dextrose 50% Injectable 25 Gram(s) IV Push once  dextrose 50% Injectable 12.5 Gram(s) IV Push once  diphenhydrAMINE 50 milliGRAM(s) Oral once  epoetin isra (EPOGEN) Injectable 76276 Unit(s) IV Push <User Schedule>  glucagon  Injectable 1 milliGRAM(s) IntraMuscular once  hemorrhoidal Ointment 1 Application(s) Rectal two times a day  insulin lispro (ADMELOG) corrective regimen sliding scale   SubCutaneous at bedtime  insulin lispro (ADMELOG) corrective regimen sliding scale   SubCutaneous three times a day before meals  insulin lispro (ADMELOG) corrective regimen sliding scale   SubCutaneous at bedtime  lidocaine   4% Patch 1 Patch Transdermal daily  methylPREDNISolone 32 milliGRAM(s) Oral once  metoprolol tartrate 25 milliGRAM(s) Oral two times a day  polyethylene glycol 3350 17 Gram(s) Oral daily  sevelamer carbonate 1600 milliGRAM(s) Oral three times a day with meals    PRN Inpatient Medications  acetaminophen     Tablet .. 650 milliGRAM(s) Oral every 6 hours PRN  dextrose Oral Gel 15 Gram(s) Oral once PRN  sodium chloride 0.9% lock flush 10 milliLiter(s) IV Push every 1 hour PRN      REVIEW OF SYSTEMS  --------------------------------------------------------------------------------  As above.    VITALS/PHYSICAL EXAM  --------------------------------------------------------------------------------  T(C): 36.2 (11-15-23 @ 08:51), Max: 37.6 (11-15-23 @ 01:02)  HR: 102 (11-15-23 @ 08:51) (96 - 110)  BP: 155/77 (11-15-23 @ 08:51) (101/73 - 155/77)  RR: 16 (11-15-23 @ 08:51) (16 - 18)  SpO2: 93% (11-15-23 @ 08:51) (93% - 99%)  Wt(kg): --        11-14-23 @ 07:01  -  11-15-23 @ 07:00  --------------------------------------------------------  IN: 320 mL / OUT: 200 mL / NET: 120 mL    11-15-23 @ 07:01  -  11-15-23 @ 10:53  --------------------------------------------------------  IN: 240 mL / OUT: 0 mL / NET: 240 mL      Physical Exam:  	Gen: NAD  	HEENT: CHASE  	Pulm: CTA B/L  	CV: S1S2  	Abd: Soft  	Ext: No LE edema B/L  	Neuro: Awake  	Skin: Warm and Dry   	Vascular access: hd catheter            SHARI no  mary ellen  LABS/STUDIES  --------------------------------------------------------------------------------              11.0   6.90  >-----------<  254      [11-15-23 @ 08:30]              36.9     135  |  96  |  47  ----------------------------<  158      [11-15-23 @ 08:30]  5.1   |  23  |  12.47        Ca     10.9     [11-15-23 @ 08:30]      Mg     2.4     [11-14-23 @ 08:37]      Phos  6.1     [11-14-23 @ 08:37]            PTH -- (Ca 10.3)      [11-05-23 @ 07:12]   397  Vitamin D (25OH) 44.2      [11-05-23 @ 07:12]  HbA1c 6.1      [01-04-20 @ 05:50]        
[facility] [place] [facility]   MD KAT BHAGAT MD RUORU WONG, PA    TEL:  FROM 9 AM to 5 PM ---OFFICE: [phone]  AVAILABLE ON TEAMS    FROM 5 PM - 9 AM PLEASE CALL ANSWERING SERVICE: [phone]    RENAL FOLLOW UP NOTE--Date of Service 11-20-23 @ 10:06  --------------------------------------------------------------------------------  HPI:      Pt seen and examined at bedside.       PAST HISTORY  --------------------------------------------------------------------------------  No significant changes to PMH, PSH, FHx, SHx, unless otherwise noted    ALLERGIES & MEDICATIONS  --------------------------------------------------------------------------------  Allergies    IV Contrast (Anaphylaxis)    Intolerances      Standing Inpatient Medications  atorvastatin 40 milliGRAM(s) Oral at bedtime  bacitracin   Ointment 1 Application(s) Topical two times a day  cefTRIAXone   IVPB 2000 milliGRAM(s) IV Intermittent every 24 hours  chlorhexidine 4% Liquid 1 Application(s) Topical <User Schedule>  dextrose 5%. 1000 milliLiter(s) IV Continuous <Continuous>  dextrose 5%. 1000 milliLiter(s) IV Continuous <Continuous>  dextrose 50% Injectable 12.5 Gram(s) IV Push once  dextrose 50% Injectable 25 Gram(s) IV Push once  dextrose 50% Injectable 25 Gram(s) IV Push once  diphenhydrAMINE 50 milliGRAM(s) Oral once  epoetin isra (EPOGEN) Injectable 04446 Unit(s) IV Push <User Schedule>  glucagon  Injectable 1 milliGRAM(s) IntraMuscular once  hemorrhoidal Ointment 1 Application(s) Rectal two times a day  heparin  Infusion.  Unit(s)/Hr IV Continuous <Continuous>  insulin lispro (ADMELOG) corrective regimen sliding scale   SubCutaneous three times a day before meals  insulin lispro (ADMELOG) corrective regimen sliding scale   SubCutaneous at bedtime  lidocaine   4% Patch 1 Patch Transdermal daily  methylPREDNISolone 32 milliGRAM(s) Oral once  metoprolol tartrate 25 milliGRAM(s) Oral two times a day  polyethylene glycol 3350 17 Gram(s) Oral daily  sevelamer carbonate 1600 milliGRAM(s) Oral three times a day with meals    PRN Inpatient Medications  acetaminophen     Tablet .. 650 milliGRAM(s) Oral every 6 hours PRN  aluminum hydroxide/magnesium hydroxide/simethicone Suspension 30 milliLiter(s) Oral every 4 hours PRN  dextrose Oral Gel 15 Gram(s) Oral once PRN  heparin   Injectable 78559 Unit(s) IV Push every 6 hours PRN  heparin   Injectable 5000 Unit(s) IV Push every 6 hours PRN  ondansetron Injectable 4 milliGRAM(s) IV Push every 8 hours PRN  sodium chloride 0.9% lock flush 10 milliLiter(s) IV Push every 1 hour PRN      REVIEW OF SYSTEMS  --------------------------------------------------------------------------------  General: no fever  MSK: no edema     VITALS/PHYSICAL EXAM  --------------------------------------------------------------------------------  T(C): 36.6 (11-20-23 @ 09:07), Max: 38.1 (11-19-23 @ 18:46)  HR: 97 (11-20-23 @ 09:07) (97 - 122)  BP: 101/68 (11-20-23 @ 09:07) (74/50 - 124/68)  RR: 18 (11-20-23 @ 09:07) (17 - 18)  SpO2: 97% (11-20-23 @ 09:07) (93% - 99%)  Wt(kg): --        11-19-23 @ 07:01  -  11-20-23 @ 07:00  --------------------------------------------------------  IN: 1046 mL / OUT: 1000 mL / NET: 46 mL      Physical Exam:  	Gen: NAD  	HEENT: MMM  	Pulm: CTA B/L  	CV: S1S2  	Abd: Soft, +BS  	Ext: No LE edema B/L                      Neuro: Awake   	Skin: Warm and Dry   	Vascular access: HD catheter            no  mary ellen  LABS/STUDIES  --------------------------------------------------------------------------------              10.9   6.47  >-----------<  164      [11-20-23 @ 09:23]              36.8     135  |  93  |  38  ----------------------------<  94      [11-20-23 @ 09:23]  4.0   |  23  |  11.68        Ca     10.7     [11-20-23 @ 09:23]        PTT: 107.2      [11-20-23 @ 09:23]      Creatinine Trend:  SCr 11.68 [11-20 @ 09:23]  SCr 13.55 [11-19 @ 10:42]  SCr 11.36 [11-18 @ 09:20]  SCr 13.39 [11-17 @ 04:19]  SCr 10.89 [11-16 @ 08:10]    Urinalysis - [11-20-23 @ 09:23]      Color  / Appearance  / SG  / pH       Gluc 94 / Ketone   / Bili  / Urobili        Blood  / Protein  / Leuk Est  / Nitrite       RBC  / WBC  / Hyaline  / Gran  / Sq Epi  / Non Sq Epi  / Bacteria       PTH -- (Ca 10.3)      [11-05-23 @ 07:12]   397  Vitamin D (25OH) 44.2      [11-05-23 @ 07:12]  HbA1c 6.1      [01-04-20 @ 05:50]      
SURGERY PROGRESS NOTE    Sepsis        LORRAINE RUTH   |  [id number]        S: EMELI overnight. Pt seen and evaluated bedside this AM. Pt resting comfortably on exam. Tolerating Diet. Pain well controlled.    MEDICATIONS  (STANDING):  apixaban 5 milliGRAM(s) Oral two times a day  atorvastatin 40 milliGRAM(s) Oral at bedtime  bacitracin   Ointment 1 Application(s) Topical two times a day  cefTRIAXone   IVPB 2000 milliGRAM(s) IV Intermittent every 24 hours  chlorhexidine 4% Liquid 1 Application(s) Topical <User Schedule>  dextrose 5%. 1000 milliLiter(s) (100 mL/Hr) IV Continuous <Continuous>  dextrose 5%. 1000 milliLiter(s) (50 mL/Hr) IV Continuous <Continuous>  dextrose 50% Injectable 12.5 Gram(s) IV Push once  dextrose 50% Injectable 25 Gram(s) IV Push once  dextrose 50% Injectable 25 Gram(s) IV Push once  epoetin isra (EPOGEN) Injectable 80734 Unit(s) IV Push <User Schedule>  glucagon  Injectable 1 milliGRAM(s) IntraMuscular once  hemorrhoidal Ointment 1 Application(s) Rectal two times a day  insulin lispro (ADMELOG) corrective regimen sliding scale   SubCutaneous at bedtime  insulin lispro (ADMELOG) corrective regimen sliding scale   SubCutaneous at bedtime  insulin lispro (ADMELOG) corrective regimen sliding scale   SubCutaneous three times a day before meals  lidocaine   4% Patch 1 Patch Transdermal daily  metoprolol tartrate 25 milliGRAM(s) Oral two times a day  polyethylene glycol 3350 17 Gram(s) Oral daily  sevelamer carbonate 1600 milliGRAM(s) Oral three times a day with meals    MEDICATIONS  (PRN):  dextrose Oral Gel 15 Gram(s) Oral once PRN Blood Glucose LESS THAN 70 milliGRAM(s)/deciliter  sodium chloride 0.9% lock flush 10 milliLiter(s) IV Push every 1 hour PRN Pre/post blood products, medications, blood draw, and to maintain line patency      O:   Vital Signs Last 24 Hrs  T(C): 37 (12 Nov 2023 09:25), Max: 37.4 (11 Nov 2023 21:21)  T(F): 98.6 (12 Nov 2023 09:25), Max: 99.3 (11 Nov 2023 21:21)  HR: 96 (12 Nov 2023 09:25) (96 - 100)  BP: 158/88 (12 Nov 2023 09:25) (115/61 - 161/82)  BP(mean): --  RR: 18 (12 Nov 2023 09:25) (18 - 18)  SpO2: 98% (12 Nov 2023 09:25) (94% - 99%)    Parameters below as of 12 Nov 2023 09:25  Patient On (Oxygen Delivery Method): room air        PHYSICAL EXAM:  General: NAD, Lying in bed comfortably  Neuro: A+Ox3  HEENT: NC/AT  Neck: Soft, supple  Cardio: RRR, nml S1/S2  Resp: Good effort, CTA b/l  GI/Abd: Soft, NT/ND, no rebound/guarding  Vascular: right and left arms have AVFs no palpable thrill appreciated. Palpable pulses bilaterally. Intact motor and sensory functions.                           10.1   8.28  )-----------( 281      ( 12 Nov 2023 07:09 )             34.4     11-12    139  |  99  |  40<H>  ----------------------------<  95  4.0   |  22  |  10.74<H>    Ca    10.4      12 Nov 2023 07:09  Phos  4.3     11-11  Mg     2.1     11-11 11-11-23 @ 07:01  -  11-12-23 @ 07:00  --------------------------------------------------------  IN: 1200 mL / OUT: 0 mL / NET: 1200 mL    11-12-23 @ 07:01  -  11-12-23 @ 11:23  --------------------------------------------------------  IN: 240 mL / OUT: 0 mL / NET: 240 mL        IMAGING STUDIES:      
Subjective: Patient seen and examined. No new events except as noted.     REVIEW OF SYSTEMS:    CONSTITUTIONAL: +weakness, fevers or chills  EYES/ENT: No visual changes;  No vertigo or throat pain   NECK: No pain or stiffness  RESPIRATORY: No cough, wheezing, hemoptysis; No shortness of breath  CARDIOVASCULAR: No chest pain or palpitations  GASTROINTESTINAL: No abdominal or epigastric pain. No nausea, vomiting, or hematemesis; No diarrhea or constipation. No melena or hematochezia.  GENITOURINARY: No dysuria, frequency or hematuria  NEUROLOGICAL: No numbness or weakness  SKIN: No itching, burning, rashes, or lesions   All other review of systems is negative unless indicated above.    MEDICATIONS:  MEDICATIONS  (STANDING):  atorvastatin 40 milliGRAM(s) Oral at bedtime  cefTRIAXone   IVPB 2000 milliGRAM(s) IV Intermittent every 24 hours  chlorhexidine 4% Liquid 1 Application(s) Topical <User Schedule>  dextrose 5%. 1000 milliLiter(s) (100 mL/Hr) IV Continuous <Continuous>  dextrose 5%. 1000 milliLiter(s) (50 mL/Hr) IV Continuous <Continuous>  dextrose 50% Injectable 12.5 Gram(s) IV Push once  dextrose 50% Injectable 25 Gram(s) IV Push once  dextrose 50% Injectable 25 Gram(s) IV Push once  epoetin isra (EPOGEN) Injectable 41262 Unit(s) IV Push <User Schedule>  glucagon  Injectable 1 milliGRAM(s) IntraMuscular once  hemorrhoidal Ointment 1 Application(s) Rectal two times a day  insulin lispro (ADMELOG) corrective regimen sliding scale   SubCutaneous every 6 hours  insulin lispro (ADMELOG) corrective regimen sliding scale   SubCutaneous at bedtime  insulin lispro (ADMELOG) corrective regimen sliding scale   SubCutaneous three times a day before meals  insulin lispro (ADMELOG) corrective regimen sliding scale   SubCutaneous at bedtime  lidocaine   4% Patch 1 Patch Transdermal daily  metoprolol tartrate 25 milliGRAM(s) Oral two times a day  polyethylene glycol 3350 17 Gram(s) Oral daily  sevelamer carbonate 800 milliGRAM(s) Oral three times a day with meals      PHYSICAL EXAM:  T(C): 37.1 (11-10-23 @ 08:26), Max: 37.4 (11-09-23 @ 21:33)  HR: 76 (11-10-23 @ 08:26) (76 - 100)  BP: 148/86 (11-10-23 @ 08:26) (123/80 - 149/84)  RR: 18 (11-10-23 @ 08:26) (18 - 18)  SpO2: 96% (11-10-23 @ 08:26) (92% - 97%)  Wt(kg): --  I&O's Summary    09 Nov 2023 07:01  -  10 Nov 2023 07:00  --------------------------------------------------------  IN: 890 mL / OUT: 150 mL / NET: 740 mL          Appearance: NAD  HEENT:   Dry  oral mucosa, PERRL, EOMI	  Lymphatic: No lymphadenopathy  Cardiovascular: Normal S1 S2, No JVD, No murmurs, No edema  Respiratory: decreased bs    Psychiatry: A & O x 3, Mood & affect appropriate  Gastrointestinal:  Soft, Non-tender, + BS	  Skin: No rashes, No ecchymoses, No cyanosis	  Neurologic: Non-focal  Extremities: Normal range of motion, No clubbing, cyanosis or edema  Vascular: Peripheral pulses palpable 2+ bilaterally  HD catheter         LABS:    CARDIAC MARKERS:                                9.7    6.39  )-----------( 226      ( 10 Nov 2023 07:13 )             32.9     11-10    139  |  97  |  46<H>  ----------------------------<  91  4.8   |  19<L>  |  11.52<H>    Ca    10.4      10 Nov 2023 07:12  Phos  6.0     11-10  Mg     2.4     11-10      proBNP:   Lipid Profile:   HgA1c:   TSH:             TELEMETRY: 	   ECG:  	  RADIOLOGY:   DIAGNOSTIC TESTING:  [ ] Echocardiogram:  [ ]  Catheterization:  [ ] Stress Test:    OTHER: 	          
[facility] [place] [facility]   MD KAT BHAGAT MD RUORU WONG, PA    TEL:  FROM 9 AM to 5 PM ---OFFICE: [phone]  AVAILABLE ON TEAMS    FROM 5 PM - 9 AM PLEASE CALL ANSWERING SERVICE: [phone]    RENAL FOLLOW UP NOTE--Date of Service 11-22-23 @ 09:41  --------------------------------------------------------------------------------  HPI:      Pt seen and examined at bedside.   Denies SOB, chest pain     PAST HISTORY  --------------------------------------------------------------------------------  No significant changes to PMH, PSH, FHx, SHx, unless otherwise noted    ALLERGIES & MEDICATIONS  --------------------------------------------------------------------------------  Allergies    IV Contrast (Anaphylaxis)    Intolerances      Standing Inpatient Medications  apixaban 5 milliGRAM(s) Oral two times a day  atorvastatin 40 milliGRAM(s) Oral at bedtime  chlorhexidine 4% Liquid 1 Application(s) Topical <User Schedule>  dextrose 5%. 1000 milliLiter(s) IV Continuous <Continuous>  dextrose 5%. 1000 milliLiter(s) IV Continuous <Continuous>  dextrose 50% Injectable 12.5 Gram(s) IV Push once  dextrose 50% Injectable 25 Gram(s) IV Push once  dextrose 50% Injectable 25 Gram(s) IV Push once  epoetin isra (EPOGEN) Injectable 05609 Unit(s) IV Push <User Schedule>  glucagon  Injectable 1 milliGRAM(s) IntraMuscular once  insulin lispro (ADMELOG) corrective regimen sliding scale   SubCutaneous at bedtime  insulin lispro (ADMELOG) corrective regimen sliding scale   SubCutaneous three times a day before meals  metoprolol tartrate 25 milliGRAM(s) Oral two times a day  Nephro-bart 1 Tablet(s) Oral daily  sevelamer carbonate 1600 milliGRAM(s) Oral three times a day with meals    PRN Inpatient Medications  dextrose Oral Gel 15 Gram(s) Oral once PRN  sodium chloride 0.9% lock flush 10 milliLiter(s) IV Push every 1 hour PRN      REVIEW OF SYSTEMS  --------------------------------------------------------------------------------  General: no fever  CVS: no chest pain  RESP: no sob, no cough  ABD: no abdominal pain  : no dysuria,  MSK: no edema     VITALS/PHYSICAL EXAM  --------------------------------------------------------------------------------  T(C): 37.5 (11-22-23 @ 05:16), Max: 37.5 (11-22-23 @ 05:16)  HR: 110 (11-22-23 @ 05:16) (96 - 110)  BP: 100/69 (11-22-23 @ 05:16) (100/69 - 132/84)  RR: 18 (11-22-23 @ 05:16) (18 - 20)  SpO2: 93% (11-22-23 @ 05:16) (93% - 98%)  Wt(kg): --        11-21-23 @ 07:01  -  11-22-23 @ 07:00  --------------------------------------------------------  IN: 1560 mL / OUT: 1000 mL / NET: 560 mL      Physical Exam:  	Gen: NAD  	HEENT: MMM  	Pulm: CTA B/L  	CV: S1S2  	Abd: Soft, +BS  	Ext: No LE edema B/L                      Neuro: Awake   	Skin: Warm and Dry   	Vascular access:  HD catheter            no  hyde  LABS/STUDIES  --------------------------------------------------------------------------------              10.5   6.82  >-----------<  146      [11-21-23 @ 10:32]              34.5     134  |  94  |  48  ----------------------------<  157      [11-21-23 @ 10:32]  4.6   |  22  |  13.32        Ca     10.2     [11-21-23 @ 10:32]      Mg     2.1     [11-21-23 @ 10:32]      Phos  6.7     [11-21-23 @ 10:32]            Creatinine Trend:  SCr 13.32 [11-21 @ 10:32]  SCr 11.68 [11-20 @ 09:23]  SCr 13.55 [11-19 @ 10:42]  SCr 11.36 [11-18 @ 09:20]  SCr 13.39 [11-17 @ 04:19]    Urinalysis - [11-21-23 @ 10:32]      Color  / Appearance  / SG  / pH       Gluc 157 / Ketone   / Bili  / Urobili        Blood  / Protein  / Leuk Est  / Nitrite       RBC  / WBC  / Hyaline  / Gran  / Sq Epi  / Non Sq Epi  / Bacteria       PTH -- (Ca 10.3)      [11-05-23 @ 07:12]   397  Vitamin D (25OH) 44.2      [11-05-23 @ 07:12]  HbA1c 6.1      [01-04-20 @ 05:50]    HBsAg Nonreact      [11-20-23 @ 13:38]  HCV 0.19, Nonreact      [11-20-23 @ 13:38]    
[facility] [place] [facility]   MD KAT BHAGAT MD ANGELA WONG, PA QIAN CHEN, NP    TEL:  OFFICE: [phone]  From 5pm-7am Answering Service [phone]    -- RENAL FOLLOW UP NOTE ---Date of Service 11-09-23 @ 10:56    Patient is a 58y old  Male who presents with a chief complaint of fever (09 Nov 2023 09:41)      Patient seen and examined at bedside. No chest pain/sob    VITALS:  T(F): 98.6 (11-09-23 @ 10:16), Max: 100.1 (11-09-23 @ 01:02)  HR: 105 (11-09-23 @ 10:16)  BP: 101/67 (11-09-23 @ 10:16)  RR: 18 (11-09-23 @ 10:16)  SpO2: 94% (11-09-23 @ 10:16)  Wt(kg): --    11-08 @ 07:01  -  11-09 @ 07:00  --------------------------------------------------------  IN: 260 mL / OUT: 1400 mL / NET: -1140 mL    11-09 @ 07:01  -  11-09 @ 10:56  --------------------------------------------------------  IN: 240 mL / OUT: 0 mL / NET: 240 mL          PHYSICAL EXAM:  General: NAD  Neck: No JVD  Respiratory: CTAB, no wheezes, rales or rhonchi  Cardiovascular: S1, S2, RRR  Gastrointestinal: BS+, soft, NT/ND  Extremities: No peripheral edema    Hospital Medications:   MEDICATIONS  (STANDING):  apixaban 5 milliGRAM(s) Oral two times a day  atorvastatin 40 milliGRAM(s) Oral at bedtime  cefTRIAXone   IVPB 2000 milliGRAM(s) IV Intermittent every 24 hours  chlorhexidine 4% Liquid 1 Application(s) Topical <User Schedule>  dextrose 5%. 1000 milliLiter(s) (100 mL/Hr) IV Continuous <Continuous>  dextrose 5%. 1000 milliLiter(s) (50 mL/Hr) IV Continuous <Continuous>  dextrose 50% Injectable 25 Gram(s) IV Push once  dextrose 50% Injectable 25 Gram(s) IV Push once  dextrose 50% Injectable 12.5 Gram(s) IV Push once  epoetin isra (EPOGEN) Injectable 39026 Unit(s) IV Push <User Schedule>  glucagon  Injectable 1 milliGRAM(s) IntraMuscular once  insulin lispro (ADMELOG) corrective regimen sliding scale   SubCutaneous three times a day before meals  insulin lispro (ADMELOG) corrective regimen sliding scale   SubCutaneous at bedtime  lidocaine   4% Patch 1 Patch Transdermal daily  metoprolol tartrate 25 milliGRAM(s) Oral two times a day  sevelamer carbonate 800 milliGRAM(s) Oral three times a day with meals      LABS:  11-09    137  |  97  |  36<H>  ----------------------------<  95  4.1   |  22  |  9.76<H>    Ca    9.7      09 Nov 2023 08:49  Phos  5.5     11-09  Mg     2.2     11-09      Creatinine Trend: 9.76 <--, 12.47 <--, 9.86 <--, 13.51 <--, 12.59 <--, 11.81 <--, 10.36 <--, 8.65 <--    Phosphorus: 5.5 mg/dL (11-09 @ 08:49)                              9.0    5.27  )-----------( 205      ( 09 Nov 2023 08:49 )             30.1     Urine Studies:  Urinalysis - [11-09-23 @ 08:49]      Color  / Appearance  / SG  / pH       Gluc 95 / Ketone   / Bili  / Urobili        Blood  / Protein  / Leuk Est  / Nitrite       RBC  / WBC  / Hyaline  / Gran  / Sq Epi  / Non Sq Epi  / Bacteria       PTH -- (Ca 10.3)      [11-05-23 @ 07:12]   397  Vitamin D (25OH) 44.2      [11-05-23 @ 07:12]  HbA1c 6.1      [01-04-20 @ 05:50]        RADIOLOGY & ADDITIONAL STUDIES:  
[facility] [place] [facility]   MD KAT BHAGAT MD RUORU WONG, PA    TEL:  FROM 9 AM to 5 PM ---OFFICE: [phone]  AVAILABLE ON TEAMS    FROM 5 PM - 9 AM PLEASE CALL ANSWERING SERVICE: [phone]    RENAL FOLLOW UP NOTE--Date of Service 11-18-23 @ 08:48  --------------------------------------------------------------------------------  HPI:      Pt seen and examined at bedside.   Denies SOB, chest pain     PAST HISTORY  --------------------------------------------------------------------------------  No significant changes to PMH, PSH, FHx, SHx, unless otherwise noted    ALLERGIES & MEDICATIONS  --------------------------------------------------------------------------------  Allergies    IV Contrast (Anaphylaxis)    Intolerances      Standing Inpatient Medications  atorvastatin 40 milliGRAM(s) Oral at bedtime  bacitracin   Ointment 1 Application(s) Topical two times a day  cefTRIAXone   IVPB 2000 milliGRAM(s) IV Intermittent every 24 hours  chlorhexidine 4% Liquid 1 Application(s) Topical <User Schedule>  dextrose 5%. 1000 milliLiter(s) IV Continuous <Continuous>  dextrose 5%. 1000 milliLiter(s) IV Continuous <Continuous>  dextrose 50% Injectable 25 Gram(s) IV Push once  dextrose 50% Injectable 12.5 Gram(s) IV Push once  dextrose 50% Injectable 25 Gram(s) IV Push once  diphenhydrAMINE 50 milliGRAM(s) Oral once  epoetin isra (EPOGEN) Injectable 31475 Unit(s) IV Push <User Schedule>  glucagon  Injectable 1 milliGRAM(s) IntraMuscular once  hemorrhoidal Ointment 1 Application(s) Rectal two times a day  heparin  Infusion.  Unit(s)/Hr IV Continuous <Continuous>  insulin lispro (ADMELOG) corrective regimen sliding scale   SubCutaneous at bedtime  insulin lispro (ADMELOG) corrective regimen sliding scale   SubCutaneous three times a day before meals  insulin lispro (ADMELOG) corrective regimen sliding scale   SubCutaneous at bedtime  lidocaine   4% Patch 1 Patch Transdermal daily  methylPREDNISolone 32 milliGRAM(s) Oral once  metoprolol tartrate 25 milliGRAM(s) Oral two times a day  polyethylene glycol 3350 17 Gram(s) Oral daily  sevelamer carbonate 1600 milliGRAM(s) Oral three times a day with meals    PRN Inpatient Medications  acetaminophen     Tablet .. 650 milliGRAM(s) Oral every 6 hours PRN  aluminum hydroxide/magnesium hydroxide/simethicone Suspension 30 milliLiter(s) Oral every 4 hours PRN  dextrose Oral Gel 15 Gram(s) Oral once PRN  heparin   Injectable 17126 Unit(s) IV Push every 6 hours PRN  heparin   Injectable 5000 Unit(s) IV Push every 6 hours PRN  ondansetron Injectable 4 milliGRAM(s) IV Push every 8 hours PRN  sodium chloride 0.9% lock flush 10 milliLiter(s) IV Push every 1 hour PRN      REVIEW OF SYSTEMS  --------------------------------------------------------------------------------  General: no fever  CVS: no chest pain  ,  MSK: no edema     VITALS/PHYSICAL EXAM  --------------------------------------------------------------------------------  T(C): 37.1 (11-18-23 @ 05:12), Max: 37.2 (11-17-23 @ 21:25)  HR: 104 (11-18-23 @ 05:12) (99 - 126)  BP: 110/70 (11-18-23 @ 05:12) (96/63 - 124/82)  RR: 18 (11-18-23 @ 05:12) (18 - 20)  SpO2: 95% (11-18-23 @ 05:12) (95% - 98%)  Wt(kg): --        11-17-23 @ 07:01  -  11-18-23 @ 07:00  --------------------------------------------------------  IN: 300 mL / OUT: 1001 mL / NET: -701 mL      Physical Exam:  	Gen: NAD  	HEENT: MMM  	Pulm: CTA B/L  	CV: S1S2  	Abd: Soft, +BS  	Ext: No LE edema B/L                      Neuro: Awake   	Skin: Warm and Dry   	Vascular access:  HD catheter            no  mary ellen  LABS/STUDIES  --------------------------------------------------------------------------------              11.3   9.52  >-----------<  202      [11-17-23 @ 04:19]              38.7     135  |  93  |  55  ----------------------------<  103      [11-17-23 @ 04:19]  4.2   |  23  |  13.39        Ca     10.5     [11-17-23 @ 04:19]    TPro  7.9  /  Alb  3.8  /  TBili  0.4  /  DBili  x   /  AST  15  /  ALT  13  /  AlkPhos  63  [11-17-23 @ 04:19]      PTT: 91.0       [11-17-23 @ 22:10]      Creatinine Trend:  SCr 13.39 [11-17 @ 04:19]  SCr 10.89 [11-16 @ 08:10]  SCr 12.47 [11-15 @ 08:30]  SCr 10.81 [11-14 @ 08:37]  SCr 12.13 [11-13 @ 07:31]    Urinalysis - [11-17-23 @ 04:19]      Color  / Appearance  / SG  / pH       Gluc 103 / Ketone   / Bili  / Urobili        Blood  / Protein  / Leuk Est  / Nitrite       RBC  / WBC  / Hyaline  / Gran  / Sq Epi  / Non Sq Epi  / Bacteria       PTH -- (Ca 10.3)      [11-05-23 @ 07:12]   397  Vitamin D (25OH) 44.2      [11-05-23 @ 07:12]  HbA1c 6.1      [01-04-20 @ 05:50]      
[facility] Division of Hospital Medicine  Radha Crisostomo MD  Available via MS Teams  Pager: [phone]    SUBJECTIVE / OVERNIGHT EVENTS: Patient seen and examined at bedside. No acute overnight events. Pt denies dyspnea, chest pain fevers, chills, cough, HA, dizziness, syncope, chest palpitations, abd pain, n/v/d, urinary or bowel changes, active sites of bleeding or any other symptoms at this time. Discussed his access, patient again refusing AVF and would like a graft. Reports he had ongoing discussion with vascular as well.     ADDITIONAL REVIEW OF SYSTEMS: n/a     MEDICATIONS  (STANDING):  atorvastatin 40 milliGRAM(s) Oral at bedtime  bacitracin   Ointment 1 Application(s) Topical two times a day  cefTRIAXone   IVPB 2000 milliGRAM(s) IV Intermittent every 24 hours  chlorhexidine 4% Liquid 1 Application(s) Topical <User Schedule>  dextrose 5%. 1000 milliLiter(s) (50 mL/Hr) IV Continuous <Continuous>  dextrose 5%. 1000 milliLiter(s) (100 mL/Hr) IV Continuous <Continuous>  dextrose 50% Injectable 25 Gram(s) IV Push once  dextrose 50% Injectable 12.5 Gram(s) IV Push once  dextrose 50% Injectable 25 Gram(s) IV Push once  diphenhydrAMINE 50 milliGRAM(s) Oral once  epoetin isra (EPOGEN) Injectable 56672 Unit(s) IV Push <User Schedule>  glucagon  Injectable 1 milliGRAM(s) IntraMuscular once  hemorrhoidal Ointment 1 Application(s) Rectal two times a day  heparin  Infusion.  Unit(s)/Hr (24 mL/Hr) IV Continuous <Continuous>  insulin lispro (ADMELOG) corrective regimen sliding scale   SubCutaneous at bedtime  insulin lispro (ADMELOG) corrective regimen sliding scale   SubCutaneous three times a day before meals  insulin lispro (ADMELOG) corrective regimen sliding scale   SubCutaneous at bedtime  lidocaine   4% Patch 1 Patch Transdermal daily  methylPREDNISolone 32 milliGRAM(s) Oral once  metoprolol tartrate 25 milliGRAM(s) Oral two times a day  polyethylene glycol 3350 17 Gram(s) Oral daily  sevelamer carbonate 1600 milliGRAM(s) Oral three times a day with meals    MEDICATIONS  (PRN):  acetaminophen     Tablet .. 650 milliGRAM(s) Oral every 6 hours PRN Mild Pain (1 - 3)  aluminum hydroxide/magnesium hydroxide/simethicone Suspension 30 milliLiter(s) Oral every 4 hours PRN Dyspepsia  dextrose Oral Gel 15 Gram(s) Oral once PRN Blood Glucose LESS THAN 70 milliGRAM(s)/deciliter  heparin   Injectable 48929 Unit(s) IV Push every 6 hours PRN For aPTT less than 40  heparin   Injectable 5000 Unit(s) IV Push every 6 hours PRN For aPTT between 40 - 57  ondansetron Injectable 4 milliGRAM(s) IV Push every 8 hours PRN Nausea and/or Vomiting  sodium chloride 0.9% lock flush 10 milliLiter(s) IV Push every 1 hour PRN Pre/post blood products, medications, blood draw, and to maintain line patency      I&O's Summary    17 Nov 2023 07:01  -  18 Nov 2023 07:00  --------------------------------------------------------  IN: 300 mL / OUT: 1001 mL / NET: -701 mL        PHYSICAL EXAM:  Vital Signs Last 24 Hrs  T(C): 36.8 (18 Nov 2023 13:53), Max: 37.2 (17 Nov 2023 21:25)  T(F): 98.2 (18 Nov 2023 13:53), Max: 98.9 (17 Nov 2023 21:25)  HR: 101 (18 Nov 2023 13:53) (98 - 118)  BP: 104/67 (18 Nov 2023 13:53) (96/63 - 110/70)  BP(mean): --  RR: 18 (18 Nov 2023 13:53) (18 - 20)  SpO2: 97% (18 Nov 2023 13:53) (92% - 97%)    Parameters below as of 18 Nov 2023 13:53  Patient On (Oxygen Delivery Method): room air      CONSTITUTIONAL: NAD, well-developed, well-groomed  EYES: PERRLA; conjunctiva and sclera clear  ENMT: Moist oral mucosa, no pharyngeal injection or exudates; normal dentition  NECK: Supple, no palpable masses; no thyromegaly  RESPIRATORY: Normal respiratory effort; lungs are clear to auscultation bilaterally  CARDIOVASCULAR: Regular rate and rhythm, normal S1 and S2, no murmur/rub/gallop; No lower extremity edema; Peripheral pulses are 2+ bilaterally  ABDOMEN: Nontender to palpation, normoactive bowel sounds, no rebound/guarding; No hepatosplenomegaly  MUSCULOSKELETAL:   no clubbing or cyanosis of digits; no joint swelling or tenderness to palpation  PSYCH: A+O to person, place, and time; affect appropriate  NEUROLOGY: CN 2-12 are intact and symmetric; no gross sensory deficits   SKIN: No rashes; no palpable lesions    LABS:                        11.3   8.44  )-----------( 190      ( 18 Nov 2023 10:53 )             38.2     11-18    133<L>  |  93<L>  |  42<H>  ----------------------------<  104<H>  4.4   |  22  |  11.36<H>    Ca    10.9<H>      18 Nov 2023 09:20    TPro  7.9  /  Alb  3.8  /  TBili  0.4  /  DBili  x   /  AST  15  /  ALT  13  /  AlkPhos  63  11-17    PTT - ( 18 Nov 2023 09:20 )  PTT:94.6 sec      Urinalysis Basic - ( 18 Nov 2023 09:20 )    Color: x / Appearance: x / SG: x / pH: x  Gluc: 104 mg/dL / Ketone: x  / Bili: x / Urobili: x   Blood: x / Protein: x / Nitrite: x   Leuk Esterase: x / RBC: x / WBC x   Sq Epi: x / Non Sq Epi: x / Bacteria: x            RADIOLOGY & ADDITIONAL TESTS:  New Results Reviewed Today:   New Imaging Personally Reviewed Today:  New Electrocardiogram Personally Reviewed Today:  Prior or Outpatient Records Reviewed Today:    COMMUNICATION:  Care Discussed with Consultants/Other Providers and Details of Discussion:  Discussions with Patient/Family:  PCP Communication:    
Overnight events:   - No acute events    SUBJECTIVE:  Patient was seen and examined on AM rounds. Denies new complaints.    OBJECTIVE:  Vital Signs Last 24 Hrs  T(C): 36.2 (15 Nov 2023 08:51), Max: 37.6 (15 Nov 2023 01:02)  T(F): 97.2 (15 Nov 2023 08:51), Max: 99.6 (15 Nov 2023 01:02)  HR: 102 (15 Nov 2023 08:51) (96 - 110)  BP: 155/77 (15 Nov 2023 08:51) (101/73 - 155/77)  BP(mean): --  RR: 16 (15 Nov 2023 08:51) (16 - 18)  SpO2: 93% (15 Nov 2023 08:51) (93% - 99%)    Parameters below as of 15 Nov 2023 08:51  Patient On (Oxygen Delivery Method): room air        11-14-23 @ 07:01  -  11-15-23 @ 07:00  --------------------------------------------------------  IN: 320 mL / OUT: 200 mL / NET: 120 mL    11-15-23 @ 07:01  -  11-15-23 @ 10:36  --------------------------------------------------------  IN: 240 mL / OUT: 0 mL / NET: 240 mL        Physical Examination:  General: NAD, Lying in bed comfortably  Neuro: A+Ox3  HEENT: NC/AT  Neck: Soft, supple  Cardio: RRR, nml S1/S2  Resp: Good effort, CTA b/l  GI/Abd: Soft, NT/ND, no rebound/guarding  Vascular: right and left arms have AVFs no palpable thrill appreciated. Palpable pulses bilaterally. Intact motor and sensory functions.       LABS:                        11.0   6.90  )-----------( 254      ( 15 Nov 2023 08:30 )             36.9       11-15    135  |  96  |  47<H>  ----------------------------<  158<H>  5.1   |  23  |  12.47<H>    Ca    10.9<H>      15 Nov 2023 08:30  Phos  6.1     11-14  Mg     2.4     11-14        IMAGING:  < from: [facility] Duplex Upper Ext Vein Scan, Bilat (11.13.23 @ 18:25) >  FINDINGS:    No echogenic thrombus is seen within the vein lumina. Complete coaptation   of those segments of vein accessible to compression was achieved. AV   graft in theleft upper extremity is thrombosed. Note is made of a   dialysis catheter within the right subclavian vein.    Spontaneous venous flow with respiratory and cardiac pulsatility is noted   throughout.    Feeding branches: Absent.    VEIN MAPPING:    RIGHT Basilic Vein:  Proximal upper arm not visualized  Mid upper arm 0.34 cm  Distal upper arm 0.30 cm  Antecubital fossa 0.31 cm  Proximal forearm 0.15 cm  Mid forearm 0.19 cm  Distal forearm 0.19 cm    RIGHT Cephalic Vein:  Proximal upper arm 0.58 cm  Mid upper arm 0.38 cm  Distal upper arm 0.56 cm  Antecubital fossa 0.72 cm  Proximal forearm 0.16 cm  Mid forearm 0.23 cm  Distal forearm 0.22 cm    LEFT Basilic Vein:  Proximal upper arm not visualized  Mid upper arm 0.48 cm  Distal upper arm 0.26 cm  Antecubital fossa 0.29 cm  Proximal forearm 0.14 cm  Mid forearm 0.12 cm  Distal forearm 0.12 cm    LEFT Cephalic Vein:  Proximal upper arm 0.23 cm  Mid upper arm 0.29 cm  Distal upper arm 0.32 cm  Antecubital fossa 0.33 cm  Proximal forearm 0.29 cm  Mid forearm 0.30 cm  Distal forearm 0.25 cm    IMPRESSION:    No duplex evidence of acute deep venous thrombus (DVT) in the right and   left upper extremity.    Thrombosed AV graft in the left upper arm.    Vein mapping as above.    < end of copied text >  
[facility] [place]   MD KAT BHAGAT MD ANGELA WONG, PA QIAN CHEN, NP    TEL:  OFFICE: [phone]  From 5pm-7am Answering Service [phone]    -- RENAL FOLLOW UP NOTE ---Date of Service 11-16-23 @ 14:40    Patient is a 58y old  Male who presents with a chief complaint of fever (16 Nov 2023 12:28)      Patient seen and examined at bedside. No chest pain/sob    VITALS:  T(F): 98.2 (11-16-23 @ 10:03), Max: 99 (11-15-23 @ 21:05)  HR: 106 (11-16-23 @ 10:03)  BP: 128/81 (11-16-23 @ 10:03)  RR: 18 (11-16-23 @ 10:03)  SpO2: 97% (11-16-23 @ 10:03)  Wt(kg): --    11-15 @ 07:01  -  11-16 @ 07:00  --------------------------------------------------------  IN: 240 mL / OUT: 1000 mL / NET: -760 mL          PHYSICAL EXAM:  General: NAD  Neck: No JVD  Respiratory: CTAB, no wheezes, rales or rhonchi  Cardiovascular: S1, S2, RRR  Gastrointestinal: BS+, soft, NT/ND  Extremities: No peripheral edema    Hospital Medications:   MEDICATIONS  (STANDING):  atorvastatin 40 milliGRAM(s) Oral at bedtime  bacitracin   Ointment 1 Application(s) Topical two times a day  cefTRIAXone   IVPB 2000 milliGRAM(s) IV Intermittent every 24 hours  chlorhexidine 4% Liquid 1 Application(s) Topical <User Schedule>  dextrose 5%. 1000 milliLiter(s) (50 mL/Hr) IV Continuous <Continuous>  dextrose 5%. 1000 milliLiter(s) (100 mL/Hr) IV Continuous <Continuous>  dextrose 50% Injectable 25 Gram(s) IV Push once  dextrose 50% Injectable 12.5 Gram(s) IV Push once  dextrose 50% Injectable 25 Gram(s) IV Push once  diphenhydrAMINE 50 milliGRAM(s) Oral once  epoetin isra (EPOGEN) Injectable 03773 Unit(s) IV Push <User Schedule>  glucagon  Injectable 1 milliGRAM(s) IntraMuscular once  hemorrhoidal Ointment 1 Application(s) Rectal two times a day  heparin  Infusion.  Unit(s)/Hr (24 mL/Hr) IV Continuous <Continuous>  insulin lispro (ADMELOG) corrective regimen sliding scale   SubCutaneous at bedtime  insulin lispro (ADMELOG) corrective regimen sliding scale   SubCutaneous three times a day before meals  insulin lispro (ADMELOG) corrective regimen sliding scale   SubCutaneous at bedtime  lidocaine   4% Patch 1 Patch Transdermal daily  methylPREDNISolone 32 milliGRAM(s) Oral once  metoprolol tartrate 25 milliGRAM(s) Oral two times a day  polyethylene glycol 3350 17 Gram(s) Oral daily  sevelamer carbonate 1600 milliGRAM(s) Oral three times a day with meals      LABS:  11-16    137  |  95<L>  |  45<H>  ----------------------------<  113<H>  4.4   |  23  |  10.89<H>    Ca    11.0<H>      16 Nov 2023 08:10      Creatinine Trend: 10.89 <--, 12.47 <--, 10.81 <--, 12.13 <--, 10.74 <--, 8.75 <--, 11.52 <--                                11.0   6.90  )-----------( 254      ( 15 Nov 2023 08:30 )             36.9     Urine Studies:  Urinalysis - [11-16-23 @ 08:10]      Color  / Appearance  / SG  / pH       Gluc 113 / Ketone   / Bili  / Urobili        Blood  / Protein  / Leuk Est  / Nitrite       RBC  / WBC  / Hyaline  / Gran  / Sq Epi  / Non Sq Epi  / Bacteria       PTH -- (Ca 10.3)      [11-05-23 @ 07:12]   397  Vitamin D (25OH) 44.2      [11-05-23 @ 07:12]  HbA1c 6.1      [01-04-20 @ 05:50]        RADIOLOGY & ADDITIONAL STUDIES:  
SURGERY PROGRESS NOTE    Sepsis      LORRAINE RUTH   |  [id number]      S: EMELI overnight. Pt seen and evaluated bedside this AM. Pt resting comfortably on exam. Tolerating Diet. Pain well controlled.    MEDICATIONS  (STANDING):  apixaban 5 milliGRAM(s) Oral two times a day  atorvastatin 40 milliGRAM(s) Oral at bedtime  bacitracin   Ointment 1 Application(s) Topical two times a day  cefTRIAXone   IVPB 2000 milliGRAM(s) IV Intermittent every 24 hours  chlorhexidine 4% Liquid 1 Application(s) Topical <User Schedule>  dextrose 5%. 1000 milliLiter(s) (100 mL/Hr) IV Continuous <Continuous>  dextrose 5%. 1000 milliLiter(s) (50 mL/Hr) IV Continuous <Continuous>  dextrose 50% Injectable 25 Gram(s) IV Push once  dextrose 50% Injectable 25 Gram(s) IV Push once  dextrose 50% Injectable 12.5 Gram(s) IV Push once  epoetin isra (EPOGEN) Injectable 96049 Unit(s) IV Push <User Schedule>  glucagon  Injectable 1 milliGRAM(s) IntraMuscular once  hemorrhoidal Ointment 1 Application(s) Rectal two times a day  insulin lispro (ADMELOG) corrective regimen sliding scale   SubCutaneous at bedtime  insulin lispro (ADMELOG) corrective regimen sliding scale   SubCutaneous at bedtime  insulin lispro (ADMELOG) corrective regimen sliding scale   SubCutaneous three times a day before meals  lidocaine   4% Patch 1 Patch Transdermal daily  metoprolol tartrate 25 milliGRAM(s) Oral two times a day  polyethylene glycol 3350 17 Gram(s) Oral daily  sevelamer carbonate 1600 milliGRAM(s) Oral three times a day with meals    MEDICATIONS  (PRN):  acetaminophen     Tablet .. 650 milliGRAM(s) Oral every 6 hours PRN Mild Pain (1 - 3)  dextrose Oral Gel 15 Gram(s) Oral once PRN Blood Glucose LESS THAN 70 milliGRAM(s)/deciliter  sodium chloride 0.9% lock flush 10 milliLiter(s) IV Push every 1 hour PRN Pre/post blood products, medications, blood draw, and to maintain line patency      O:   Vital Signs Last 24 Hrs  T(C): 36.5 (13 Nov 2023 09:20), Max: 37.1 (12 Nov 2023 21:18)  T(F): 97.7 (13 Nov 2023 09:20), Max: 98.8 (12 Nov 2023 21:18)  HR: 87 (13 Nov 2023 09:20) (87 - 97)  BP: 150/86 (13 Nov 2023 09:20) (116/78 - 157/84)  BP(mean): --  RR: 18 (13 Nov 2023 09:20) (18 - 18)  SpO2: 96% (13 Nov 2023 09:20) (95% - 96%)    Parameters below as of 13 Nov 2023 09:20  Patient On (Oxygen Delivery Method): room air        PHYSICAL EXAM:  General: NAD, Lying in bed comfortably  Neuro: A+Ox3  HEENT: NC/AT  Neck: Soft, supple  Cardio: RRR, nml S1/S2  Resp: Good effort, CTA b/l  GI/Abd: Soft, NT/ND, no rebound/guarding  Vascular: right and left arms have AVFs no palpable thrill appreciated. Palpable pulses bilaterally. Intact motor and sensory functions.                             10.2   8.01  )-----------( 272      ( 13 Nov 2023 07:32 )             34.4     11-13    137  |  100  |  48<H>  ----------------------------<  103<H>  4.0   |  20<L>  |  12.13<H>    Ca    10.5      13 Nov 2023 07:31  Phos  6.7     11-13  Mg     2.4     11-13 11-12-23 @ 07:01  -  11-13-23 @ 07:00  --------------------------------------------------------  IN: 720 mL / OUT: 0 mL / NET: 720 mL        IMAGING STUDIES:      
[facility] [place] [facility]   MD KAT BHAGAT MD RUORU WONG, PA    TEL:  FROM 9 AM to 5 PM ---OFFICE: [phone]  AVAILABLE ON TEAMS    FROM 5 PM - 9 AM PLEASE CALL ANSWERING SERVICE: [phone]    RENAL FOLLOW UP NOTE--Date of Service 11-21-23 @ 11:06  --------------------------------------------------------------------------------  HPI:      Pt seen and examined at bedside.   Denies SOB, chest pain     PAST HISTORY  --------------------------------------------------------------------------------  No significant changes to PMH, PSH, FHx, SHx, unless otherwise noted    ALLERGIES & MEDICATIONS  --------------------------------------------------------------------------------  Allergies    IV Contrast (Anaphylaxis)    Intolerances      Standing Inpatient Medications  apixaban 5 milliGRAM(s) Oral two times a day  atorvastatin 40 milliGRAM(s) Oral at bedtime  bacitracin   Ointment 1 Application(s) Topical two times a day  cefTRIAXone   IVPB 2000 milliGRAM(s) IV Intermittent every 24 hours  chlorhexidine 4% Liquid 1 Application(s) Topical <User Schedule>  dextrose 5%. 1000 milliLiter(s) IV Continuous <Continuous>  dextrose 5%. 1000 milliLiter(s) IV Continuous <Continuous>  dextrose 50% Injectable 12.5 Gram(s) IV Push once  dextrose 50% Injectable 25 Gram(s) IV Push once  dextrose 50% Injectable 25 Gram(s) IV Push once  diphenhydrAMINE 50 milliGRAM(s) Oral once  epoetin isra (EPOGEN) Injectable 00972 Unit(s) IV Push <User Schedule>  glucagon  Injectable 1 milliGRAM(s) IntraMuscular once  hemorrhoidal Ointment 1 Application(s) Rectal two times a day  insulin lispro (ADMELOG) corrective regimen sliding scale   SubCutaneous three times a day before meals  insulin lispro (ADMELOG) corrective regimen sliding scale   SubCutaneous at bedtime  lidocaine   4% Patch 1 Patch Transdermal daily  methylPREDNISolone 32 milliGRAM(s) Oral once  metoprolol tartrate 25 milliGRAM(s) Oral two times a day  Nephro-bart 1 Tablet(s) Oral daily  polyethylene glycol 3350 17 Gram(s) Oral daily  sevelamer carbonate 1600 milliGRAM(s) Oral three times a day with meals    PRN Inpatient Medications  acetaminophen     Tablet .. 650 milliGRAM(s) Oral every 6 hours PRN  aluminum hydroxide/magnesium hydroxide/simethicone Suspension 30 milliLiter(s) Oral every 4 hours PRN  dextrose Oral Gel 15 Gram(s) Oral once PRN  ondansetron Injectable 4 milliGRAM(s) IV Push every 8 hours PRN  sodium chloride 0.9% lock flush 10 milliLiter(s) IV Push every 1 hour PRN      REVIEW OF SYSTEMS  --------------------------------------------------------------------------------  General: no fever  CVS: no chest pain  MSK: no edema     VITALS/PHYSICAL EXAM  --------------------------------------------------------------------------------  T(C): 36.3 (11-21-23 @ 09:20), Max: 37.5 (11-21-23 @ 01:26)  HR: 104 (11-21-23 @ 09:20) (75 - 110)  BP: 123/77 (11-21-23 @ 09:20) (102/69 - 123/77)  RR: 18 (11-21-23 @ 09:20) (18 - 18)  SpO2: 93% (11-21-23 @ 09:20) (93% - 98%)  Wt(kg): --        11-20-23 @ 07:01  -  11-21-23 @ 07:00  --------------------------------------------------------  IN: 1130 mL / OUT: 350 mL / NET: 780 mL    11-21-23 @ 07:01  -  11-21-23 @ 11:06  --------------------------------------------------------  IN: 240 mL / OUT: 0 mL / NET: 240 mL      Physical Exam:  	Gen: NAD  	HEENT: MMM  	Pulm: CTA B/L  	CV: S1S2  	Abd: Soft, +BS  	Ext: No LE edema B/L                      Neuro: Awake   	Skin: Warm and Dry   	Vascular access:  HD catheter            no mary ellen  LABS/STUDIES  --------------------------------------------------------------------------------              10.5   6.82  >-----------<  146      [11-21-23 @ 10:32]              34.5     135  |  93  |  38  ----------------------------<  94      [11-20-23 @ 09:23]  4.0   |  23  |  11.68        Ca     10.7     [11-20-23 @ 09:23]        PTT: 107.2      [11-20-23 @ 09:23]      Creatinine Trend:  SCr 11.68 [11-20 @ 09:23]  SCr 13.55 [11-19 @ 10:42]  SCr 11.36 [11-18 @ 09:20]  SCr 13.39 [11-17 @ 04:19]  SCr 10.89 [11-16 @ 08:10]    Urinalysis - [11-21-23 @ 08:19]      Color Yellow / Appearance Clear / SG 1.015 / pH 7.0      Gluc Negative / Ketone Trace  / Bili Negative / Urobili 1.0       Blood Trace / Protein 100 / Leuk Est Negative / Nitrite Negative      RBC 0 / WBC 0 / Hyaline  / Gran  / Sq Epi  / Non Sq Epi 2 / Bacteria Negative      PTH -- (Ca 10.3)      [11-05-23 @ 07:12]   397  Vitamin D (25OH) 44.2      [11-05-23 @ 07:12]  HbA1c 6.1      [01-04-20 @ 05:50]    HBsAg Nonreact      [11-20-23 @ 13:38]  HCV 0.19, Nonreact      [11-20-23 @ 13:38]    
Subjective: Patient seen and examined. No new events except as noted.     REVIEW OF SYSTEMS:    CONSTITUTIONAL: No weakness, fevers or chills  EYES/ENT: No visual changes;  No vertigo or throat pain   NECK: No pain or stiffness  RESPIRATORY: No cough, wheezing, hemoptysis; No shortness of breath  CARDIOVASCULAR: No chest pain or palpitations  GASTROINTESTINAL: No abdominal or epigastric pain. No nausea, vomiting, or hematemesis; No diarrhea or constipation. No melena or hematochezia.  GENITOURINARY: No dysuria, frequency or hematuria  NEUROLOGICAL: No numbness or weakness  SKIN: No itching, burning, rashes, or lesions   All other review of systems is negative unless indicated above.    MEDICATIONS:  MEDICATIONS  (STANDING):  atorvastatin 40 milliGRAM(s) Oral at bedtime  bacitracin   Ointment 1 Application(s) Topical two times a day  cefTRIAXone   IVPB 2000 milliGRAM(s) IV Intermittent every 24 hours  chlorhexidine 4% Liquid 1 Application(s) Topical <User Schedule>  dextrose 5%. 1000 milliLiter(s) (50 mL/Hr) IV Continuous <Continuous>  dextrose 5%. 1000 milliLiter(s) (100 mL/Hr) IV Continuous <Continuous>  dextrose 50% Injectable 25 Gram(s) IV Push once  dextrose 50% Injectable 12.5 Gram(s) IV Push once  dextrose 50% Injectable 25 Gram(s) IV Push once  diphenhydrAMINE 50 milliGRAM(s) Oral once  epoetin isra (EPOGEN) Injectable 90587 Unit(s) IV Push <User Schedule>  glucagon  Injectable 1 milliGRAM(s) IntraMuscular once  hemorrhoidal Ointment 1 Application(s) Rectal two times a day  heparin  Infusion.  Unit(s)/Hr (24 mL/Hr) IV Continuous <Continuous>  insulin lispro (ADMELOG) corrective regimen sliding scale   SubCutaneous at bedtime  insulin lispro (ADMELOG) corrective regimen sliding scale   SubCutaneous three times a day before meals  insulin lispro (ADMELOG) corrective regimen sliding scale   SubCutaneous at bedtime  lidocaine   4% Patch 1 Patch Transdermal daily  methylPREDNISolone 32 milliGRAM(s) Oral once  metoprolol tartrate 25 milliGRAM(s) Oral two times a day  polyethylene glycol 3350 17 Gram(s) Oral daily  sevelamer carbonate 1600 milliGRAM(s) Oral three times a day with meals      PHYSICAL EXAM:  T(C): 36.9 (11-18-23 @ 18:37), Max: 37.2 (11-17-23 @ 21:25)  HR: 98 (11-18-23 @ 18:37) (98 - 107)  BP: 108/75 (11-18-23 @ 18:37) (96/63 - 110/70)  RR: 18 (11-18-23 @ 18:37) (18 - 18)  SpO2: 95% (11-18-23 @ 18:37) (92% - 97%)  Wt(kg): --  I&O's Summary    17 Nov 2023 07:01  -  18 Nov 2023 07:00  --------------------------------------------------------  IN: 300 mL / OUT: 1001 mL / NET: -701 mL          Appearance: Normal	  HEENT:   Normal oral mucosa, PERRL, EOMI	  Lymphatic: No lymphadenopathy , no edema  Cardiovascular: Normal S1 S2, No JVD, No murmurs , Peripheral pulses palpable 2+ bilaterally  Respiratory: Lungs clear to auscultation, normal effort 	  Gastrointestinal:  Soft, Non-tender, + BS	  Skin: No rashes, No ecchymoses, No cyanosis, warm to touch  Musculoskeletal: Normal range of motion, normal strength  Psychiatry:  Mood & affect appropriate  Ext: No edema      LABS:    CARDIAC MARKERS:                                11.3   8.44  )-----------( 190      ( 18 Nov 2023 10:53 )             38.2     11-18    133<L>  |  93<L>  |  42<H>  ----------------------------<  104<H>  4.4   |  22  |  11.36<H>    Ca    10.9<H>      18 Nov 2023 09:20    TPro  7.9  /  Alb  3.8  /  TBili  0.4  /  DBili  x   /  AST  15  /  ALT  13  /  AlkPhos  63  11-17    proBNP:   Lipid Profile:   HgA1c:   TSH:             TELEMETRY: 	    ECG:  	  RADIOLOGY:   DIAGNOSTIC TESTING:  [ ] Echocardiogram:  [ ]  Catheterization:  [ ] Stress Test:    OTHER: 	          
Subjective: Patient seen and examined. No new events except as noted.     REVIEW OF SYSTEMS:    CONSTITUTIONAL: No weakness, fevers or chills  EYES/ENT: No visual changes;  No vertigo or throat pain   NECK: No pain or stiffness  RESPIRATORY: No cough, wheezing, hemoptysis; No shortness of breath  CARDIOVASCULAR: No chest pain or palpitations  GASTROINTESTINAL: No abdominal or epigastric pain. No nausea, vomiting, or hematemesis; No diarrhea or constipation. No melena or hematochezia.  GENITOURINARY: No dysuria, frequency or hematuria  NEUROLOGICAL: No numbness or weakness  SKIN: No itching, burning, rashes, or lesions   All other review of systems is negative unless indicated above.    MEDICATIONS:  MEDICATIONS  (STANDING):  atorvastatin 40 milliGRAM(s) Oral at bedtime  bacitracin   Ointment 1 Application(s) Topical two times a day  chlorhexidine 4% Liquid 1 Application(s) Topical <User Schedule>  dextrose 5%. 1000 milliLiter(s) (100 mL/Hr) IV Continuous <Continuous>  dextrose 5%. 1000 milliLiter(s) (50 mL/Hr) IV Continuous <Continuous>  dextrose 50% Injectable 25 Gram(s) IV Push once  dextrose 50% Injectable 12.5 Gram(s) IV Push once  dextrose 50% Injectable 25 Gram(s) IV Push once  diphenhydrAMINE 50 milliGRAM(s) Oral once  epoetin isra (EPOGEN) Injectable 63675 Unit(s) IV Push <User Schedule>  glucagon  Injectable 1 milliGRAM(s) IntraMuscular once  hemorrhoidal Ointment 1 Application(s) Rectal two times a day  heparin  Infusion.  Unit(s)/Hr (24 mL/Hr) IV Continuous <Continuous>  insulin lispro (ADMELOG) corrective regimen sliding scale   SubCutaneous three times a day before meals  insulin lispro (ADMELOG) corrective regimen sliding scale   SubCutaneous at bedtime  lidocaine   4% Patch 1 Patch Transdermal daily  methylPREDNISolone 32 milliGRAM(s) Oral once  metoprolol tartrate 25 milliGRAM(s) Oral two times a day  polyethylene glycol 3350 17 Gram(s) Oral daily  sevelamer carbonate 1600 milliGRAM(s) Oral three times a day with meals      PHYSICAL EXAM:  T(C): 36.8 (11-19-23 @ 05:05), Max: 36.9 (11-18-23 @ 18:37)  HR: 93 (11-19-23 @ 05:05) (93 - 101)  BP: 105/68 (11-19-23 @ 05:05) (98/63 - 136/82)  RR: 18 (11-19-23 @ 05:05) (18 - 18)  SpO2: 97% (11-19-23 @ 05:05) (92% - 97%)  Wt(kg): --  I&O's Summary    18 Nov 2023 07:01  -  19 Nov 2023 07:00  --------------------------------------------------------  IN: 1282 mL / OUT: 150 mL / NET: 1132 mL          Appearance: Normal	  HEENT:   Normal oral mucosa, PERRL, EOMI	  Lymphatic: No lymphadenopathy , no edema  Cardiovascular: Normal S1 S2, No JVD, No murmurs , Peripheral pulses palpable 2+ bilaterally  Respiratory: Lungs clear to auscultation, normal effort 	  Gastrointestinal:  Soft, Non-tender, + BS	  Skin: No rashes, No ecchymoses, No cyanosis, warm to touch  Musculoskeletal: Normal range of motion, normal strength  Psychiatry:  Mood & affect appropriate  Ext: No edema  Vascular access:  HD catheter     LABS:    CARDIAC MARKERS:                                11.3   8.44  )-----------( 190      ( 18 Nov 2023 10:53 )             38.2     11-18    133<L>  |  93<L>  |  42<H>  ----------------------------<  104<H>  4.4   |  22  |  11.36<H>    Ca    10.9<H>      18 Nov 2023 09:20      proBNP:   Lipid Profile:   HgA1c:   TSH:             TELEMETRY: 	    ECG:  	  RADIOLOGY:   DIAGNOSTIC TESTING:  [ ] Echocardiogram:  [ ]  Catheterization:  [ ] Stress Test:    OTHER: 	          
58y old  Male who presents with a chief complaint of fever (22 Nov 2023 15:33)      Interval history:  Afebrile, denies any complains. Wants to go home.         Allergies:   IV Contrast (Anaphylaxis)      Antimicrobials:      REVIEW OF SYSTEMS:  No chest pain   No SOB  No abdominal pain  No rash.       Vital Signs Last 24 Hrs  T(C): 36.9 (11-22-23 @ 13:23), Max: 37.5 (11-22-23 @ 05:16)  T(F): 98.4 (11-22-23 @ 13:23), Max: 99.5 (11-22-23 @ 05:16)  HR: 98 (11-22-23 @ 13:23) (96 - 110)  BP: 100/68 (11-22-23 @ 13:23) (100/68 - 122/79)  BP(mean): --  RR: 18 (11-22-23 @ 13:23) (18 - 20)  SpO2: 97% (11-22-23 @ 13:23) (93% - 97%)      PHYSICAL EXAM:  Pt in no acute distress, alert, awake.   rt sided HD catheter  breathing comfortably  non distended abdomen  no edema LE   no phlebitis                               10.5   6.82  )-----------( 146      ( 21 Nov 2023 10:32 )             34.5   11-21    134<L>  |  94<L>  |  48<H>  ----------------------------<  157<H>  4.6   |  22  |  13.32<H>    Ca    10.2      21 Nov 2023 10:32  Phos  6.7     11-21  Mg     2.1     11-21          Culture - Blood (collected 20 Nov 2023 08:35)  Source: .Blood Blood-Peripheral  Preliminary Report (22 Nov 2023 15:10):    No growth at 48 Hours    Culture - Blood (collected 20 Nov 2023 08:30)  Source: .Blood Blood-Peripheral  Preliminary Report (22 Nov 2023 15:10):    No growth at 48 Hours              
58yPatient is a 58y old  Male who presents with a chief complaint of fever (09 Nov 2023 17:33)      Interval history:  Low grade temp, no new complains.       Allergies:   IV Contrast (Anaphylaxis)      Antimicrobials:  cefTRIAXone   IVPB 2000 milliGRAM(s) IV Intermittent every 24 hours      REVIEW OF SYSTEMS:  No chest pain   No SOB  No abdominal pain  No rash.       Vital Signs Last 24 Hrs  T(C): 37.4 (11-09-23 @ 21:33), Max: 37.8 (11-09-23 @ 01:02)  T(F): 99.3 (11-09-23 @ 21:33), Max: 100.1 (11-09-23 @ 01:02)  HR: 97 (11-09-23 @ 21:33) (85 - 105)  BP: 133/75 (11-09-23 @ 21:33) (101/67 - 133/75)  BP(mean): --  RR: 18 (11-09-23 @ 21:33) (18 - 18)  SpO2: 92% (11-09-23 @ 21:33) (92% - 97%)        PHYSICAL EXAM:  Pt in no acute distress, alert, awake.   rt sided shiley   breathing comfortably  non distended abdomen  no edema LE   no phlebitis                             9.0    5.27  )-----------( 205      ( 09 Nov 2023 08:49 )             30.1   11-09    137  |  97  |  36<H>  ----------------------------<  95  4.1   |  22  |  9.76<H>    Ca    9.7      09 Nov 2023 08:49  Phos  5.5     11-09  Mg     2.2     11-09        Culture - Blood in AM (11.06.23 @ 07:58)   Specimen Source: .Blood Blood  Culture Results:   No growth at 72 Hours          < from: MR Lumbar Spine w/wo IV Cont (11.08.23 @ 14:32) >  IMPRESSION:    No evidence of osteomyelitis or discitis. No abnormal enhancement.   Multilevel endplate degenerative Schmorl nodes, may be source of   patient's pain. Additionally, diffuse disc bulge asymmetric to the left   results in moderate narrowing of the left neural foramen with contact   upon the exiting left L5 nerve root.          
[facility] [place] [facility]   MD KAT BHAGAT MD RUORU WONG, PA    TEL:  FROM 9 AM to 5 PM ---OFFICE: [phone]  AVAILABLE ON TEAMS    FROM 5 PM - 9 AM PLEASE CALL ANSWERING SERVICE: [phone]    RENAL FOLLOW UP NOTE--Date of Service 11-14-23 @ 13:26  --------------------------------------------------------------------------------  HPI:      Pt seen and examined at bedside.   Denies SOB, chest pain     PAST HISTORY  --------------------------------------------------------------------------------  No significant changes to PMH, PSH, FHx, SHx, unless otherwise noted    ALLERGIES & MEDICATIONS  --------------------------------------------------------------------------------  Allergies    IV Contrast (Anaphylaxis)    Intolerances      Standing Inpatient Medications  apixaban 5 milliGRAM(s) Oral two times a day  atorvastatin 40 milliGRAM(s) Oral at bedtime  bacitracin   Ointment 1 Application(s) Topical two times a day  cefTRIAXone   IVPB 2000 milliGRAM(s) IV Intermittent every 24 hours  chlorhexidine 4% Liquid 1 Application(s) Topical <User Schedule>  dextrose 5%. 1000 milliLiter(s) IV Continuous <Continuous>  dextrose 5%. 1000 milliLiter(s) IV Continuous <Continuous>  dextrose 50% Injectable 12.5 Gram(s) IV Push once  dextrose 50% Injectable 25 Gram(s) IV Push once  dextrose 50% Injectable 25 Gram(s) IV Push once  diphenhydrAMINE 50 milliGRAM(s) Oral once  epoetin isra (EPOGEN) Injectable 72627 Unit(s) IV Push <User Schedule>  glucagon  Injectable 1 milliGRAM(s) IntraMuscular once  hemorrhoidal Ointment 1 Application(s) Rectal two times a day  insulin lispro (ADMELOG) corrective regimen sliding scale   SubCutaneous at bedtime  insulin lispro (ADMELOG) corrective regimen sliding scale   SubCutaneous at bedtime  insulin lispro (ADMELOG) corrective regimen sliding scale   SubCutaneous three times a day before meals  lidocaine   4% Patch 1 Patch Transdermal daily  methylPREDNISolone 32 milliGRAM(s) Oral once  metoprolol tartrate 25 milliGRAM(s) Oral two times a day  polyethylene glycol 3350 17 Gram(s) Oral daily  sevelamer carbonate 1600 milliGRAM(s) Oral three times a day with meals    PRN Inpatient Medications  acetaminophen     Tablet .. 650 milliGRAM(s) Oral every 6 hours PRN  dextrose Oral Gel 15 Gram(s) Oral once PRN  sodium chloride 0.9% lock flush 10 milliLiter(s) IV Push every 1 hour PRN      REVIEW OF SYSTEMS  --------------------------------------------------------------------------------  General: no fever  MSK: no edema     VITALS/PHYSICAL EXAM  --------------------------------------------------------------------------------  T(C): 36.9 (11-14-23 @ 13:04), Max: 37.2 (11-14-23 @ 01:07)  HR: 96 (11-14-23 @ 13:04) (96 - 107)  BP: 137/82 (11-14-23 @ 13:04) (97/60 - 137/82)  RR: 18 (11-14-23 @ 13:04) (18 - 18)  SpO2: 99% (11-14-23 @ 13:04) (95% - 99%)  Wt(kg): --        11-13-23 @ 07:01  -  11-14-23 @ 07:00  --------------------------------------------------------  IN: 480 mL / OUT: 1200 mL / NET: -720 mL      Physical Exam:  	Gen: NAD  	HEENT: MMM  	Pulm: CTA B/L  	CV: S1S2  	Abd: Soft, +BS  	Ext: No LE edema B/L                      Neuro: Awake   	Skin: Warm and Dry   	Vascular access:  HD catheter            no mary ellen  LABS/STUDIES  --------------------------------------------------------------------------------              11.2   7.05  >-----------<  246      [11-14-23 @ 08:37]              38.5     137  |  97  |  37  ----------------------------<  107      [11-14-23 @ 08:37]  3.8   |  21  |  10.81        Ca     11.1     [11-14-23 @ 08:37]      Mg     2.4     [11-14-23 @ 08:37]      Phos  6.1     [11-14-23 @ 08:37]            Creatinine Trend:  SCr 10.81 [11-14 @ 08:37]  SCr 12.13 [11-13 @ 07:31]  SCr 10.74 [11-12 @ 07:09]  SCr 8.75 [11-11 @ 07:04]  SCr 11.52 [11-10 @ 07:12]    Urinalysis - [11-14-23 @ 08:37]      Color  / Appearance  / SG  / pH       Gluc 107 / Ketone   / Bili  / Urobili        Blood  / Protein  / Leuk Est  / Nitrite       RBC  / WBC  / Hyaline  / Gran  / Sq Epi  / Non Sq Epi  / Bacteria       PTH -- (Ca 10.3)      [11-05-23 @ 07:12]   397  Vitamin D (25OH) 44.2      [11-05-23 @ 07:12]  HbA1c 6.1      [01-04-20 @ 05:50]      
SURGERY DAILY PROGRESS NOTE    SUBJECTIVE: Febrile to 100.6 yesterday, now afebrile. Patient resting comfortably on the floor, tolerating diet and pain controlled.      OBJECTIVE:  Vital Signs Last 24 Hrs  T(C): 36.8 (20 Nov 2023 05:09), Max: 38.1 (19 Nov 2023 18:46)  T(F): 98.3 (20 Nov 2023 05:09), Max: 100.6 (19 Nov 2023 18:46)  HR: 107 (20 Nov 2023 05:09) (98 - 122)  BP: 99/66 (20 Nov 2023 05:09) (74/50 - 124/77)  BP(mean): --  RR: 18 (20 Nov 2023 05:09) (17 - 18)  SpO2: 97% (20 Nov 2023 05:09) (93% - 99%)    Parameters below as of 20 Nov 2023 05:09  Patient On (Oxygen Delivery Method): room air        I&O's Summary    19 Nov 2023 07:01  -  20 Nov 2023 07:00  --------------------------------------------------------  IN: 1046 mL / OUT: 1000 mL / NET: 46 mL        Physical Exam:  General Appearance: Appears well, NAD   Chest: Nonlabored breathing on RA  CV: RRR  Extremities: BUE warm and well perfused. Motor/sensation grossly intact  Vascular: Absent thrill in bilateral AVFs. Palpable pulses BUE    LABS:                        10.0   7.63  )-----------( 181      ( 19 Nov 2023 10:42 )             32.9     11-19    134<L>  |  90<L>  |  54<H>  ----------------------------<  99  4.0   |  24  |  13.55<H>    Ca    10.7<H>      19 Nov 2023 10:42      PTT - ( 19 Nov 2023 10:42 )  PTT:98.1 sec  Urinalysis Basic - ( 19 Nov 2023 10:42 )    Color: x / Appearance: x / SG: x / pH: x  Gluc: 99 mg/dL / Ketone: x  / Bili: x / Urobili: x   Blood: x / Protein: x / Nitrite: x   Leuk Esterase: x / RBC: x / WBC x   Sq Epi: x / Non Sq Epi: x / Bacteria: x    
[facility] [place] [facility]   MD KAT BHAGAT MD RUORU WONG, PA    TEL:  FROM 9 AM to 5 PM ---OFFICE: [phone]  AVAILABLE ON TEAMS    FROM 5 PM - 9 AM PLEASE CALL ANSWERING SERVICE: [phone]    RENAL FOLLOW UP NOTE--Date of Service 11-11-23 @ 07:45  --------------------------------------------------------------------------------  HPI:      Pt seen and examined at bedside.   Denies SOB, chest pain     PAST HISTORY  --------------------------------------------------------------------------------  No significant changes to PMH, PSH, FHx, SHx, unless otherwise noted    ALLERGIES & MEDICATIONS  --------------------------------------------------------------------------------  Allergies    IV Contrast (Anaphylaxis)    Intolerances      Standing Inpatient Medications  apixaban 5 milliGRAM(s) Oral two times a day  atorvastatin 40 milliGRAM(s) Oral at bedtime  bacitracin   Ointment 1 Application(s) Topical two times a day  cefTRIAXone   IVPB 2000 milliGRAM(s) IV Intermittent every 24 hours  chlorhexidine 4% Liquid 1 Application(s) Topical <User Schedule>  dextrose 5%. 1000 milliLiter(s) IV Continuous <Continuous>  dextrose 5%. 1000 milliLiter(s) IV Continuous <Continuous>  dextrose 50% Injectable 25 Gram(s) IV Push once  dextrose 50% Injectable 25 Gram(s) IV Push once  dextrose 50% Injectable 12.5 Gram(s) IV Push once  epoetin isra (EPOGEN) Injectable 53402 Unit(s) IV Push <User Schedule>  glucagon  Injectable 1 milliGRAM(s) IntraMuscular once  hemorrhoidal Ointment 1 Application(s) Rectal two times a day  insulin lispro (ADMELOG) corrective regimen sliding scale   SubCutaneous three times a day before meals  insulin lispro (ADMELOG) corrective regimen sliding scale   SubCutaneous at bedtime  insulin lispro (ADMELOG) corrective regimen sliding scale   SubCutaneous at bedtime  lidocaine   4% Patch 1 Patch Transdermal daily  metoprolol tartrate 25 milliGRAM(s) Oral two times a day  polyethylene glycol 3350 17 Gram(s) Oral daily  sevelamer carbonate 1600 milliGRAM(s) Oral three times a day with meals    PRN Inpatient Medications  dextrose Oral Gel 15 Gram(s) Oral once PRN  sodium chloride 0.9% lock flush 10 milliLiter(s) IV Push every 1 hour PRN      REVIEW OF SYSTEMS  --------------------------------------------------------------------------------  General: no fever  MSK: no edema     VITALS/PHYSICAL EXAM  --------------------------------------------------------------------------------  T(C): 37.2 (11-11-23 @ 05:23), Max: 37.6 (11-11-23 @ 01:05)  HR: 98 (11-11-23 @ 05:23) (68 - 98)  BP: 123/77 (11-11-23 @ 05:23) (107/61 - 153/85)  RR: 18 (11-11-23 @ 05:23) (15 - 20)  SpO2: 100% (11-11-23 @ 05:23) (93% - 100%)  Wt(kg): --  Height (cm): 177.8 (11-10-23 @ 17:13)  Weight (kg): 131.5 (11-10-23 @ 17:13)  BMI (kg/m2): 41.6 (11-10-23 @ 17:13)  BSA (m2): 2.44 (11-10-23 @ 17:13)      11-10-23 @ 07:01  -  11-11-23 @ 07:00  --------------------------------------------------------  IN: 240 mL / OUT: 1200 mL / NET: -960 mL      Physical Exam:  	Gen: NAD  	HEENT: MMM  	Pulm: CTA B/L  	CV: S1S2  	Abd: Soft, +BS  	Ext: No LE edema B/L                      Neuro: Awake   	Skin: Warm and Dry   	Vascular access:  HD catheter            no  mary ellen  LABS/STUDIES  --------------------------------------------------------------------------------              10.2   7.76  >-----------<  247      [11-11-23 @ 07:03]              34.3     138  |  98  |  30  ----------------------------<  88      [11-11-23 @ 07:04]  4.1   |  22  |  8.75        Ca     10.3     [11-11-23 @ 07:04]      Mg     2.1     [11-11-23 @ 07:04]      Phos  4.3     [11-11-23 @ 07:04]      PT/INR: PT 13.6 , INR 1.31       [11-10-23 @ 07:14]  PTT: 28.7       [11-10-23 @ 07:14]      Creatinine Trend:  SCr 8.75 [11-11 @ 07:04]  SCr 11.52 [11-10 @ 07:12]  SCr 9.76 [11-09 @ 08:49]  SCr 12.47 [11-08 @ 09:28]  SCr 9.86 [11-07 @ 07:16]    Urinalysis - [11-11-23 @ 07:04]      Color  / Appearance  / SG  / pH       Gluc 88 / Ketone   / Bili  / Urobili        Blood  / Protein  / Leuk Est  / Nitrite       RBC  / WBC  / Hyaline  / Gran  / Sq Epi  / Non Sq Epi  / Bacteria       PTH -- (Ca 10.3)      [11-05-23 @ 07:12]   397  Vitamin D (25OH) 44.2      [11-05-23 @ 07:12]  HbA1c 6.1      [01-04-20 @ 05:50]      
Subjective: Patient seen and examined. No new events except as noted.     REVIEW OF SYSTEMS:    CONSTITUTIONAL: + weakness, fevers or chills  EYES/ENT: No visual changes;  No vertigo or throat pain   NECK: No pain or stiffness  RESPIRATORY: No cough, wheezing, hemoptysis; No shortness of breath  CARDIOVASCULAR: No chest pain or palpitations  GASTROINTESTINAL: No abdominal or epigastric pain. No nausea, vomiting, or hematemesis; No diarrhea or constipation. No melena or hematochezia.  GENITOURINARY: No dysuria, frequency or hematuria  NEUROLOGICAL: No numbness or weakness  SKIN: No itching, burning, rashes, or lesions   All other review of systems is negative unless indicated above.    MEDICATIONS:  MEDICATIONS  (STANDING):  atorvastatin 40 milliGRAM(s) Oral at bedtime  bacitracin   Ointment 1 Application(s) Topical two times a day  cefTRIAXone   IVPB 2000 milliGRAM(s) IV Intermittent every 24 hours  chlorhexidine 4% Liquid 1 Application(s) Topical <User Schedule>  dextrose 5%. 1000 milliLiter(s) (50 mL/Hr) IV Continuous <Continuous>  dextrose 5%. 1000 milliLiter(s) (100 mL/Hr) IV Continuous <Continuous>  dextrose 50% Injectable 12.5 Gram(s) IV Push once  dextrose 50% Injectable 25 Gram(s) IV Push once  dextrose 50% Injectable 25 Gram(s) IV Push once  diphenhydrAMINE 50 milliGRAM(s) Oral once  epoetin isra (EPOGEN) Injectable 78357 Unit(s) IV Push <User Schedule>  glucagon  Injectable 1 milliGRAM(s) IntraMuscular once  hemorrhoidal Ointment 1 Application(s) Rectal two times a day  heparin  Infusion.  Unit(s)/Hr (24 mL/Hr) IV Continuous <Continuous>  insulin lispro (ADMELOG) corrective regimen sliding scale   SubCutaneous three times a day before meals  insulin lispro (ADMELOG) corrective regimen sliding scale   SubCutaneous at bedtime  lidocaine   4% Patch 1 Patch Transdermal daily  methylPREDNISolone 32 milliGRAM(s) Oral once  metoprolol tartrate 25 milliGRAM(s) Oral two times a day  polyethylene glycol 3350 17 Gram(s) Oral daily  sevelamer carbonate 1600 milliGRAM(s) Oral three times a day with meals      PHYSICAL EXAM:  T(C): 36.6 (11-20-23 @ 09:07), Max: 38.1 (11-19-23 @ 18:46)  HR: 97 (11-20-23 @ 09:07) (97 - 122)  BP: 101/68 (11-20-23 @ 09:07) (74/50 - 124/68)  RR: 18 (11-20-23 @ 09:07) (17 - 18)  SpO2: 97% (11-20-23 @ 09:07) (93% - 99%)  Wt(kg): --  I&O's Summary    19 Nov 2023 07:01  -  20 Nov 2023 07:00  --------------------------------------------------------  IN: 1046 mL / OUT: 1000 mL / NET: 46 mL              Appearance: Normal	  HEENT:   Normal oral mucosa, PERRL, EOMI	  Lymphatic: No lymphadenopathy , no edema  Cardiovascular: Normal S1 S2, No JVD, No murmurs , Peripheral pulses palpable 2+ bilaterally  Respiratory: Lungs clear to auscultation, normal effort 	  Gastrointestinal:  Soft, Non-tender, + BS	  Skin: No rashes, No ecchymoses, No cyanosis, warm to touch  Musculoskeletal: Normal range of motion, normal strength  Psychiatry:  Mood & affect appropriate  Ext: No edema  Vascular access:  HD catheter     LABS:    CARDIAC MARKERS:                                10.9   6.47  )-----------( 164      ( 20 Nov 2023 09:23 )             36.8     11-20    135  |  93<L>  |  38<H>  ----------------------------<  94  4.0   |  23  |  11.68<H>    Ca    10.7<H>      20 Nov 2023 09:23      proBNP:   Lipid Profile:   HgA1c:   TSH:             TELEMETRY: 	    ECG:  	  RADIOLOGY:   DIAGNOSTIC TESTING:  [ ] Echocardiogram:  [ ]  Catheterization:  [ ] Stress Test:    OTHER: 	          
Subjective: Patient seen and examined. No new events except as noted.   low grade temp 99.5     REVIEW OF SYSTEMS:    CONSTITUTIONAL: + weakness, fevers or chills  EYES/ENT: No visual changes;  No vertigo or throat pain   NECK: No pain or stiffness  RESPIRATORY: No cough, wheezing, hemoptysis; No shortness of breath  CARDIOVASCULAR: No chest pain or palpitations  GASTROINTESTINAL: No abdominal or epigastric pain. No nausea, vomiting, or hematemesis; No diarrhea or constipation. No melena or hematochezia.  GENITOURINARY: No dysuria, frequency or hematuria  NEUROLOGICAL: No numbness or weakness  SKIN: No itching, burning, rashes, or lesions   All other review of systems is negative unless indicated above.    MEDICATIONS:  MEDICATIONS  (STANDING):  apixaban 5 milliGRAM(s) Oral two times a day  atorvastatin 40 milliGRAM(s) Oral at bedtime  chlorhexidine 4% Liquid 1 Application(s) Topical <User Schedule>  dextrose 5%. 1000 milliLiter(s) (100 mL/Hr) IV Continuous <Continuous>  dextrose 5%. 1000 milliLiter(s) (50 mL/Hr) IV Continuous <Continuous>  dextrose 50% Injectable 25 Gram(s) IV Push once  dextrose 50% Injectable 25 Gram(s) IV Push once  dextrose 50% Injectable 12.5 Gram(s) IV Push once  epoetin isra (EPOGEN) Injectable 05501 Unit(s) IV Push <User Schedule>  glucagon  Injectable 1 milliGRAM(s) IntraMuscular once  insulin lispro (ADMELOG) corrective regimen sliding scale   SubCutaneous three times a day before meals  insulin lispro (ADMELOG) corrective regimen sliding scale   SubCutaneous at bedtime  metoprolol tartrate 25 milliGRAM(s) Oral two times a day  Nephro-bart 1 Tablet(s) Oral daily  sevelamer carbonate 1600 milliGRAM(s) Oral three times a day with meals      PHYSICAL EXAM:  T(C): 37.5 (11-22-23 @ 05:16), Max: 37.5 (11-22-23 @ 05:16)  HR: 110 (11-22-23 @ 05:16) (96 - 110)  BP: 100/69 (11-22-23 @ 05:16) (100/69 - 132/84)  RR: 18 (11-22-23 @ 05:16) (18 - 20)  SpO2: 93% (11-22-23 @ 05:16) (93% - 98%)  Wt(kg): --  I&O's Summary    21 Nov 2023 07:01  -  22 Nov 2023 07:00  --------------------------------------------------------  IN: 1560 mL / OUT: 1000 mL / NET: 560 mL        Appearance: Normal	  HEENT:   Normal oral mucosa, PERRL, EOMI	  Lymphatic: No lymphadenopathy , no edema  Cardiovascular: Normal S1 S2, No JVD, No murmurs , Peripheral pulses palpable 2+ bilaterally  Respiratory: Lungs clear to auscultation, normal effort 	  Gastrointestinal:  Soft, Non-tender, + BS	  Skin: No rashes, No ecchymoses, No cyanosis, warm to touch  Musculoskeletal: Normal range of motion, normal strength  Psychiatry:  Mood & affect appropriate  Ext: No edema  Vascular access:  HD catheter       LABS:    CARDIAC MARKERS:                                10.5   6.82  )-----------( 146      ( 21 Nov 2023 10:32 )             34.5     11-21    134<L>  |  94<L>  |  48<H>  ----------------------------<  157<H>  4.6   |  22  |  13.32<H>    Ca    10.2      21 Nov 2023 10:32  Phos  6.7     11-21  Mg     2.1     11-21      proBNP:   Lipid Profile:   HgA1c:   TSH:     Trace          TELEMETRY: 	    ECG:  	  RADIOLOGY:   DIAGNOSTIC TESTING:  [ ] Echocardiogram:  [ ]  Catheterization:  [ ] Stress Test:    OTHER: 	          
58yPatient is a 58y old  Male who presents with a chief complaint of fever (07 Nov 2023 14:12)      Interval history:  Febrile, low back pain.       Allergies:   IV Contrast (Anaphylaxis)    Antimicrobials:  cefTRIAXone   IVPB 2000 milliGRAM(s) IV Intermittent every 24 hours      REVIEW OF SYSTEMS:  No chest pain   No SOB  No abdominal pain  No rash.       Vital Signs Last 24 Hrs  T(C): 37.4 (11-07-23 @ 13:16), Max: 38.3 (11-06-23 @ 23:37)  T(F): 99.3 (11-07-23 @ 13:16), Max: 100.9 (11-06-23 @ 23:37)  HR: 100 (11-07-23 @ 17:32) (98 - 120)  BP: 112/70 (11-07-23 @ 17:32) (106/54 - 121/75)  BP(mean): --  RR: 18 (11-07-23 @ 17:32) (18 - 18)  SpO2: 95% (11-07-23 @ 17:32) (95% - 98%)      PHYSICAL EXAM:  Pt in no acute distress, alert, awake.   breathing comfortably   rt IJ shiley   non distended abdomen  no edema LE   no phlebitis                             9.7    5.65  )-----------( 170      ( 07 Nov 2023 07:12 )             32.4   11-07    137  |  96  |  34<H>  ----------------------------<  78  4.3   |  21<L>  |  9.86<H>    Ca    10.1      07 Nov 2023 07:16  Phos  5.1     11-07  Mg     2.2     11-07        Culture - Blood (collected 06 Nov 2023 07:58)  Source: .Blood Blood  Preliminary Report (07 Nov 2023 15:02):    No growth at 24 hours    Culture - Blood (collected 06 Nov 2023 07:51)  Source: .Blood Blood  Preliminary Report (07 Nov 2023 15:02):    No growth at 24 hours      Radiology:    < from: CT Abdomen and Pelvis No Cont (11.05.23 @ 14:28) >  IMPRESSION:  Right external iliac and common femoral artery perivesicular fat   stranding likely represents edema, inflammation and/or hemorrhage from   recent removal of right femoral catheter. No adjacent well-defined fluid   collection is identified.    Right lower lobe 1.2 cm nodule with suggestion of fat and calcifications,   partially visualized on prior CT 1/3/2020, likely hamartoma. Recommend   comparison with outside chest CTs if available for stability.          
[facility]   MD KAT BHAGAT MD RUORU WONG, PA    TEL:  FROM 9 AM to 5 PM ---OFFICE: [phone]  AVAILABLE ON TEAMS    FROM 5 PM - 9 AM PLEASE CALL ANSWERING SERVICE: [phone]    RENAL FOLLOW UP NOTE--Date of Service 11-08-23 @ 11:39  --------------------------------------------------------------------------------  HPI:      Pt seen and examined at bedside.   Denies SOB, chest pain     PAST HISTORY  --------------------------------------------------------------------------------  No significant changes to PMH, PSH, FHx, SHx, unless otherwise noted    ALLERGIES & MEDICATIONS  --------------------------------------------------------------------------------  Allergies    IV Contrast (Anaphylaxis)    Intolerances      Standing Inpatient Medications  apixaban 5 milliGRAM(s) Oral two times a day  atorvastatin 40 milliGRAM(s) Oral at bedtime  cefTRIAXone   IVPB 2000 milliGRAM(s) IV Intermittent every 24 hours  chlorhexidine 4% Liquid 1 Application(s) Topical <User Schedule>  dextrose 5%. 1000 milliLiter(s) IV Continuous <Continuous>  dextrose 5%. 1000 milliLiter(s) IV Continuous <Continuous>  dextrose 50% Injectable 12.5 Gram(s) IV Push once  dextrose 50% Injectable 25 Gram(s) IV Push once  dextrose 50% Injectable 25 Gram(s) IV Push once  epoetin isra (EPOGEN) Injectable 2000 Unit(s) IV Push <User Schedule>  glucagon  Injectable 1 milliGRAM(s) IntraMuscular once  insulin lispro (ADMELOG) corrective regimen sliding scale   SubCutaneous at bedtime  insulin lispro (ADMELOG) corrective regimen sliding scale   SubCutaneous three times a day before meals  lidocaine   4% Patch 1 Patch Transdermal daily  metoprolol tartrate 25 milliGRAM(s) Oral two times a day  sevelamer carbonate 800 milliGRAM(s) Oral three times a day with meals    PRN Inpatient Medications  acetaminophen     Tablet .. 650 milliGRAM(s) Oral every 6 hours PRN  dextrose Oral Gel 15 Gram(s) Oral once PRN  sodium chloride 0.9% lock flush 10 milliLiter(s) IV Push every 1 hour PRN      REVIEW OF SYSTEMS  --------------------------------------------------------------------------------  General: no fever  MSK: no edema     VITALS/PHYSICAL EXAM  --------------------------------------------------------------------------------  T(C): 36.7 (11-08-23 @ 09:06), Max: 37.7 (11-07-23 @ 17:32)  HR: 89 (11-08-23 @ 09:06) (86 - 100)  BP: 122/73 (11-08-23 @ 09:06) (111/69 - 148/70)  RR: 18 (11-08-23 @ 09:06) (18 - 18)  SpO2: 96% (11-08-23 @ 09:06) (93% - 97%)  Wt(kg): --        11-07-23 @ 07:01  -  11-08-23 @ 07:00  --------------------------------------------------------  IN: 480 mL / OUT: 182 mL / NET: 298 mL    11-08-23 @ 07:01  -  11-08-23 @ 11:39  --------------------------------------------------------  IN: 160 mL / OUT: 0 mL / NET: 160 mL      Physical Exam:  	Gen: NAD  	HEENT: MMM  	Pulm: CTA B/L  	CV: S1S2  	Abd: Soft, +BS  	Ext: No LE edema B/L                      Neuro: Awake   	Skin: Warm and Dry   	Vascular access:  HD catheter            no  mary ellen  LABS/STUDIES  --------------------------------------------------------------------------------              8.6    5.37  >-----------<  191      [11-08-23 @ 09:28]              28.3     141  |  99  |  53  ----------------------------<  77      [11-08-23 @ 09:28]  4.6   |  20  |  12.47        Ca     9.9     [11-08-23 @ 09:28]      Mg     2.3     [11-08-23 @ 09:28]      Phos  6.0     [11-08-23 @ 09:28]            Creatinine Trend:  SCr 12.47 [11-08 @ 09:28]  SCr 9.86 [11-07 @ 07:16]  SCr 13.51 [11-06 @ 16:41]  SCr 12.59 [11-06 @ 07:39]  SCr 11.81 [11-05 @ 19:21]    Urinalysis - [11-08-23 @ 09:28]      Color  / Appearance  / SG  / pH       Gluc 77 / Ketone   / Bili  / Urobili        Blood  / Protein  / Leuk Est  / Nitrite       RBC  / WBC  / Hyaline  / Gran  / Sq Epi  / Non Sq Epi  / Bacteria       PTH -- (Ca 10.3)      [11-05-23 @ 07:12]   397  Vitamin D (25OH) 44.2      [11-05-23 @ 07:12]  HbA1c 6.1      [01-04-20 @ 05:50]      
[facility] [place] [facility]   MD KAT BHAGAT MD RUORU WONG, PA    TEL:  FROM 9 AM to 5 PM ---OFFICE: [phone]  AVAILABLE ON TEAMS    FROM 5 PM - 9 AM PLEASE CALL ANSWERING SERVICE: [phone]    RENAL FOLLOW UP NOTE--Date of Service 11-19-23 @ 09:13  --------------------------------------------------------------------------------  HPI:      Pt seen and examined at bedside.   Denies SOB, chest pain     PAST HISTORY  --------------------------------------------------------------------------------  No significant changes to PMH, PSH, FHx, SHx, unless otherwise noted    ALLERGIES & MEDICATIONS  --------------------------------------------------------------------------------  Allergies    IV Contrast (Anaphylaxis)    Intolerances      Standing Inpatient Medications  atorvastatin 40 milliGRAM(s) Oral at bedtime  bacitracin   Ointment 1 Application(s) Topical two times a day  chlorhexidine 4% Liquid 1 Application(s) Topical <User Schedule>  dextrose 5%. 1000 milliLiter(s) IV Continuous <Continuous>  dextrose 5%. 1000 milliLiter(s) IV Continuous <Continuous>  dextrose 50% Injectable 25 Gram(s) IV Push once  dextrose 50% Injectable 12.5 Gram(s) IV Push once  dextrose 50% Injectable 25 Gram(s) IV Push once  diphenhydrAMINE 50 milliGRAM(s) Oral once  epoetin isra (EPOGEN) Injectable 07429 Unit(s) IV Push <User Schedule>  glucagon  Injectable 1 milliGRAM(s) IntraMuscular once  hemorrhoidal Ointment 1 Application(s) Rectal two times a day  heparin  Infusion.  Unit(s)/Hr IV Continuous <Continuous>  insulin lispro (ADMELOG) corrective regimen sliding scale   SubCutaneous three times a day before meals  insulin lispro (ADMELOG) corrective regimen sliding scale   SubCutaneous at bedtime  lidocaine   4% Patch 1 Patch Transdermal daily  methylPREDNISolone 32 milliGRAM(s) Oral once  metoprolol tartrate 25 milliGRAM(s) Oral two times a day  polyethylene glycol 3350 17 Gram(s) Oral daily  sevelamer carbonate 1600 milliGRAM(s) Oral three times a day with meals    PRN Inpatient Medications  acetaminophen     Tablet .. 650 milliGRAM(s) Oral every 6 hours PRN  aluminum hydroxide/magnesium hydroxide/simethicone Suspension 30 milliLiter(s) Oral every 4 hours PRN  dextrose Oral Gel 15 Gram(s) Oral once PRN  heparin   Injectable 5000 Unit(s) IV Push every 6 hours PRN  heparin   Injectable 29061 Unit(s) IV Push every 6 hours PRN  ondansetron Injectable 4 milliGRAM(s) IV Push every 8 hours PRN  sodium chloride 0.9% lock flush 10 milliLiter(s) IV Push every 1 hour PRN      REVIEW OF SYSTEMS  --------------------------------------------------------------------------------  General: no fever  CVS: no chest pain  RESP: no sob, no cough  ABD: no abdominal pain  : no dysuria,  MSK: no edema     VITALS/PHYSICAL EXAM  --------------------------------------------------------------------------------  T(C): 36.8 (11-19-23 @ 05:05), Max: 36.9 (11-18-23 @ 18:37)  HR: 93 (11-19-23 @ 05:05) (93 - 101)  BP: 105/68 (11-19-23 @ 05:05) (98/63 - 136/82)  RR: 18 (11-19-23 @ 05:05) (18 - 18)  SpO2: 97% (11-19-23 @ 05:05) (92% - 97%)  Wt(kg): --        11-18-23 @ 07:01  -  11-19-23 @ 07:00  --------------------------------------------------------  IN: 1282 mL / OUT: 150 mL / NET: 1132 mL      Physical Exam:  	Gen: NAD  	HEENT: MMM  	Pulm: CTA B/L  	CV: S1S2  	Abd: Soft, +BS  	Ext: No LE edema B/L                      Neuro: Awake   	Skin: Warm and Dry   	Vascular access:  HD catheter             no mary ellen  LABS/STUDIES  --------------------------------------------------------------------------------              11.3   8.44  >-----------<  190      [11-18-23 @ 10:53]              38.2     133  |  93  |  42  ----------------------------<  104      [11-18-23 @ 09:20]  4.4   |  22  |  11.36        Ca     10.9     [11-18-23 @ 09:20]        PTT: 94.6       [11-18-23 @ 09:20]      Creatinine Trend:  SCr 11.36 [11-18 @ 09:20]  SCr 13.39 [11-17 @ 04:19]  SCr 10.89 [11-16 @ 08:10]  SCr 12.47 [11-15 @ 08:30]  SCr 10.81 [11-14 @ 08:37]    Urinalysis - [11-18-23 @ 09:20]      Color  / Appearance  / SG  / pH       Gluc 104 / Ketone   / Bili  / Urobili        Blood  / Protein  / Leuk Est  / Nitrite       RBC  / WBC  / Hyaline  / Gran  / Sq Epi  / Non Sq Epi  / Bacteria       PTH -- (Ca 10.3)      [11-05-23 @ 07:12]   397  Vitamin D (25OH) 44.2      [11-05-23 @ 07:12]  HbA1c 6.1      [01-04-20 @ 05:50]      
[facility] [place] [facility]   MD KAT BHAGAT MD RUORU WONG, PA    TEL:  FROM 9 AM to 5 PM ---OFFICE: [phone]  AVAILABLE ON TEAMS    FROM 5 PM - 9 AM PLEASE CALL ANSWERING SERVICE: [phone]    RENAL FOLLOW UP NOTE--Date of Service 11-06-23 @ 10:28  --------------------------------------------------------------------------------  HPI:      Pt seen and examined at bedside.       PAST HISTORY  --------------------------------------------------------------------------------  No significant changes to PMH, PSH, FHx, SHx, unless otherwise noted    ALLERGIES & MEDICATIONS  --------------------------------------------------------------------------------  Allergies    IV Contrast (Anaphylaxis)    Intolerances      Standing Inpatient Medications  apixaban 5 milliGRAM(s) Oral two times a day  atorvastatin 40 milliGRAM(s) Oral at bedtime  cefTRIAXone   IVPB 2000 milliGRAM(s) IV Intermittent every 24 hours  dextrose 5%. 1000 milliLiter(s) IV Continuous <Continuous>  dextrose 5%. 1000 milliLiter(s) IV Continuous <Continuous>  dextrose 50% Injectable 25 Gram(s) IV Push once  dextrose 50% Injectable 12.5 Gram(s) IV Push once  dextrose 50% Injectable 25 Gram(s) IV Push once  epoetin isra (EPOGEN) Injectable 2000 Unit(s) IV Push <User Schedule>  glucagon  Injectable 1 milliGRAM(s) IntraMuscular once  insulin lispro (ADMELOG) corrective regimen sliding scale   SubCutaneous three times a day before meals  insulin lispro (ADMELOG) corrective regimen sliding scale   SubCutaneous at bedtime  metoprolol tartrate 25 milliGRAM(s) Oral two times a day  sevelamer carbonate 800 milliGRAM(s) Oral three times a day with meals  sodium zirconium cyclosilicate 10 Gram(s) Oral once    PRN Inpatient Medications  acetaminophen     Tablet .. 650 milliGRAM(s) Oral every 6 hours PRN  dextrose Oral Gel 15 Gram(s) Oral once PRN      REVIEW OF SYSTEMS  --------------------------------------------------------------------------------  General: no fever  MSK: no edema     VITALS/PHYSICAL EXAM  --------------------------------------------------------------------------------  T(C): 39.4 (11-06-23 @ 08:33), Max: 39.4 (11-05-23 @ 13:37)  HR: 91 (11-06-23 @ 08:33) (91 - 126)  BP: 138/73 (11-06-23 @ 08:33) (97/64 - 138/73)  RR: 19 (11-06-23 @ 08:33) (19 - 20)  SpO2: 98% (11-06-23 @ 08:33) (95% - 98%)  Wt(kg): --  Height (cm): 177.8 (11-04-23 @ 11:38)  Weight (kg): 131.5 (11-04-23 @ 11:38)  BMI (kg/m2): 41.6 (11-04-23 @ 11:38)  BSA (m2): 2.44 (11-04-23 @ 11:38)      11-05-23 @ 07:01  -  11-06-23 @ 07:00  --------------------------------------------------------  IN: 630 mL / OUT: 575 mL / NET: 55 mL    11-06-23 @ 07:01  -  11-06-23 @ 10:28  --------------------------------------------------------  IN: 0 mL / OUT: 225 mL / NET: -225 mL      Physical Exam:  	Gen: NAD  	HEENT: MMCHARISSA  	Pulm: CTA B/L  	CV: S1S2  	Abd: Soft, +BS  	Ext: No LE edema B/L                      Neuro: Awake   	Skin: Warm and Dry   	Vascular access: NO HD catheter             no hyde  LABS/STUDIES  --------------------------------------------------------------------------------              11.0   7.56  >-----------<  143      [11-06-23 @ 07:39]              37.9     132  |  96  |  47  ----------------------------<  75      [11-06-23 @ 07:39]  5.7   |  15  |  12.59        Ca     10.2     [11-06-23 @ 07:39]      Phos  6.1     [11-06-23 @ 07:39]    TPro  7.7  /  Alb  3.6  /  TBili  1.2  /  DBili  x   /  AST  20  /  ALT  13  /  AlkPhos  50  [11-05-23 @ 07:12]    PT/INR: PT 15.9 , INR 1.53       [11-04-23 @ 13:14]  PTT: 34.7       [11-04-23 @ 13:14]      Creatinine Trend:  SCr 12.59 [11-06 @ 07:39]  SCr 11.81 [11-05 @ 19:21]  SCr 10.36 [11-05 @ 07:12]  SCr 8.65 [11-04 @ 13:14]    Urinalysis - [11-06-23 @ 07:39]      Color  / Appearance  / SG  / pH       Gluc 75 / Ketone   / Bili  / Urobili        Blood  / Protein  / Leuk Est  / Nitrite       RBC  / WBC  / Hyaline  / Gran  / Sq Epi  / Non Sq Epi  / Bacteria       PTH -- (Ca 10.3)      [11-05-23 @ 07:12]   397  Vitamin D (25OH) 44.2      [11-05-23 @ 07:12]  HbA1c 6.1      [01-04-20 @ 05:50]      
[facility] [place] [facility]   MD KAT BHAGAT MD RUORU WONG, PA    TEL:  FROM 9 AM to 5 PM ---OFFICE: [phone]  AVAILABLE ON TEAMS    FROM 5 PM - 9 AM PLEASE CALL ANSWERING SERVICE: [phone]    RENAL FOLLOW UP NOTE--Date of Service 11-07-23 @ 09:32  --------------------------------------------------------------------------------  HPI:      Pt seen and examined at bedside.       PAST HISTORY  --------------------------------------------------------------------------------  No significant changes to PMH, PSH, FHx, SHx, unless otherwise noted    ALLERGIES & MEDICATIONS  --------------------------------------------------------------------------------  Allergies    IV Contrast (Anaphylaxis)    Intolerances      Standing Inpatient Medications  apixaban 5 milliGRAM(s) Oral two times a day  atorvastatin 40 milliGRAM(s) Oral at bedtime  cefTRIAXone   IVPB 2000 milliGRAM(s) IV Intermittent every 24 hours  chlorhexidine 4% Liquid 1 Application(s) Topical <User Schedule>  dextrose 5%. 1000 milliLiter(s) IV Continuous <Continuous>  dextrose 5%. 1000 milliLiter(s) IV Continuous <Continuous>  dextrose 50% Injectable 25 Gram(s) IV Push once  dextrose 50% Injectable 12.5 Gram(s) IV Push once  dextrose 50% Injectable 25 Gram(s) IV Push once  epoetin isra (EPOGEN) Injectable 2000 Unit(s) IV Push <User Schedule>  glucagon  Injectable 1 milliGRAM(s) IntraMuscular once  insulin lispro (ADMELOG) corrective regimen sliding scale   SubCutaneous three times a day before meals  insulin lispro (ADMELOG) corrective regimen sliding scale   SubCutaneous at bedtime  metoprolol tartrate 25 milliGRAM(s) Oral two times a day  sevelamer carbonate 800 milliGRAM(s) Oral three times a day with meals    PRN Inpatient Medications  acetaminophen     Tablet .. 650 milliGRAM(s) Oral every 6 hours PRN  dextrose Oral Gel 15 Gram(s) Oral once PRN  sodium chloride 0.9% lock flush 10 milliLiter(s) IV Push every 1 hour PRN      REVIEW OF SYSTEMS  --------------------------------------------------------------------------------  General: no fever  MSK: no edema     VITALS/PHYSICAL EXAM  --------------------------------------------------------------------------------  T(C): 37.1 (11-07-23 @ 05:32), Max: 38.3 (11-06-23 @ 23:37)  HR: 104 (11-07-23 @ 05:32) (99 - 120)  BP: 111/70 (11-07-23 @ 05:32) (106/54 - 167/88)  RR: 18 (11-07-23 @ 05:32) (18 - 18)  SpO2: 96% (11-07-23 @ 05:32) (96% - 100%)  Wt(kg): --        11-06-23 @ 07:01  -  11-07-23 @ 07:00  --------------------------------------------------------  IN: 620 mL / OUT: 1225 mL / NET: -605 mL      Physical Exam:  	Gen: NAD  	HEENT: MMM  	Pulm: CTA B/L  	CV: S1S2  	Abd: Soft, +BS  	Ext: No LE edema B/L                      Neuro: Awake   	Skin: Warm and Dry   	Vascular access:  HD catheter            no  hyde  LABS/STUDIES  --------------------------------------------------------------------------------              9.7    5.65  >-----------<  170      [11-07-23 @ 07:12]              32.4     137  |  96  |  34  ----------------------------<  78      [11-07-23 @ 07:16]  4.3   |  21  |  9.86        Ca     10.1     [11-07-23 @ 07:16]      Mg     2.2     [11-07-23 @ 07:16]      Phos  5.1     [11-07-23 @ 07:16]            Creatinine Trend:  SCr 9.86 [11-07 @ 07:16]  SCr 13.51 [11-06 @ 16:41]  SCr 12.59 [11-06 @ 07:39]  SCr 11.81 [11-05 @ 19:21]  SCr 10.36 [11-05 @ 07:12]    Urinalysis - [11-07-23 @ 07:16]      Color  / Appearance  / SG  / pH       Gluc 78 / Ketone   / Bili  / Urobili        Blood  / Protein  / Leuk Est  / Nitrite       RBC  / WBC  / Hyaline  / Gran  / Sq Epi  / Non Sq Epi  / Bacteria       PTH -- (Ca 10.3)      [11-05-23 @ 07:12]   397  Vitamin D (25OH) 44.2      [11-05-23 @ 07:12]  HbA1c 6.1      [01-04-20 @ 05:50]      
Overnight events:   - No acute events    SUBJECTIVE:  Patient was seen and examined on AM rounds. Denies new complaints.    OBJECTIVE:  Vital Signs Last 24 Hrs  T(C): 36.8 (17 Nov 2023 10:32), Max: 37.2 (16 Nov 2023 14:48)  T(F): 98.3 (17 Nov 2023 10:32), Max: 99 (16 Nov 2023 14:48)  HR: 126 (17 Nov 2023 10:32) (99 - 126)  BP: 124/82 (17 Nov 2023 10:32) (103/67 - 125/78)  BP(mean): --  RR: 20 (17 Nov 2023 10:32) (18 - 20)  SpO2: 98% (17 Nov 2023 10:32) (94% - 98%)    Parameters below as of 17 Nov 2023 10:32  Patient On (Oxygen Delivery Method): room air      11-16-23 @ 07:01  -  11-17-23 @ 07:00  --------------------------------------------------------  IN: 600 mL / OUT: 0 mL / NET: 600 mL    11-17-23 @ 07:01  -  11-17-23 @ 11:17  --------------------------------------------------------  IN: 300 mL / OUT: 0 mL / NET: 300 mL      Physical Examination:  General: NAD, Lying in bed comfortably, A+Ox3  HEENT: NC/AT  Cardio: RRR  Resp: Good effort, CTA b/l  GI/Abd: Soft, NT/ND, no rebound/guarding  Vascular: right and left arms have AVFs no palpable thrill appreciated. Palpable pulses bilaterally. Intact motor and sensory functions.       LABS:                        11.3   9.52  )-----------( 202      ( 17 Nov 2023 04:19 )             38.7       11-17    135  |  93<L>  |  55<H>  ----------------------------<  103<H>  4.2   |  23  |  13.39<H>    Ca    10.5      17 Nov 2023 04:19    TPro  7.9  /  Alb  3.8  /  TBili  0.4  /  DBili  x   /  AST  15  /  ALT  13  /  AlkPhos  63  11-17      IMAGING:    < from: MR Chest w/wo IV Cont (11.16.23 @ 14:12) >  FINDINGS:  Bilateral central venous catheters terminate within the right atrium.    Patent SVC.The left-sided catheter partially obscures the left   brachiocephalic vein lumen, although it appears patent. Right   brachiocephalic vein is patent.    Bilateral subclavian veins are patent. Bilateral internal jugular veins   are only partially imaged, although appear diminutive in size.    Limited evaluation for patency of bilateral axillary vein stents on MRI   of the chest.    Right lower lobe pulmonary nodule as seen on prior chest CT.    IMPRESSION:  Central veins within the chest appear patent.    < end of copied text >  
Patient is a 58y old  Male who presents with a chief complaint of fever (05 Nov 2023 14:46)    Date of servie : 11-05-23 @ 16:49  INTERVAL HPI/OVERNIGHT EVENTS:  T(C): 39.4 (11-05-23 @ 13:37), Max: 39.4 (11-05-23 @ 08:34)  HR: 112 (11-05-23 @ 13:37) (97 - 115)  BP: 131/69 (11-05-23 @ 13:37) (116/63 - 135/70)  RR: 20 (11-05-23 @ 13:37) (18 - 20)  SpO2: 98% (11-05-23 @ 13:37) (97% - 99%)  Wt(kg): --  I&O's Summary    04 Nov 2023 08:01  -  05 Nov 2023 07:00  --------------------------------------------------------  IN: 0 mL / OUT: 250 mL / NET: -250 mL        LABS:                        10.5   8.67  )-----------( 171      ( 05 Nov 2023 07:12 )             35.6     11-05    135  |  99  |  35<H>  ----------------------------<  76  5.7<H>   |  19<L>  |  10.36<H>    Ca    10.3      05 Nov 2023 07:12    TPro  7.7  /  Alb  3.6  /  TBili  1.2  /  DBili  x   /  AST  20  /  ALT  13  /  AlkPhos  50  11-05    PT/INR - ( 04 Nov 2023 13:14 )   PT: 15.9 sec;   INR: 1.53 ratio         PTT - ( 04 Nov 2023 13:14 )  PTT:34.7 sec  Urinalysis Basic - ( 05 Nov 2023 07:12 )    Color: x / Appearance: x / SG: x / pH: x  Gluc: 76 mg/dL / Ketone: x  / Bili: x / Urobili: x   Blood: x / Protein: x / Nitrite: x   Leuk Esterase: x / RBC: x / WBC x   Sq Epi: x / Non Sq Epi: x / Bacteria: x      CAPILLARY BLOOD GLUCOSE      POCT Blood Glucose.: 112 mg/dL (05 Nov 2023 13:41)        Urinalysis Basic - ( 05 Nov 2023 07:12 )    Color: x / Appearance: x / SG: x / pH: x  Gluc: 76 mg/dL / Ketone: x  / Bili: x / Urobili: x   Blood: x / Protein: x / Nitrite: x   Leuk Esterase: x / RBC: x / WBC x   Sq Epi: x / Non Sq Epi: x / Bacteria: x        MEDICATIONS  (STANDING):  apixaban 5 milliGRAM(s) Oral two times a day  atorvastatin 40 milliGRAM(s) Oral at bedtime  cefTRIAXone   IVPB 2000 milliGRAM(s) IV Intermittent every 24 hours  dextrose 5%. 1000 milliLiter(s) (100 mL/Hr) IV Continuous <Continuous>  dextrose 5%. 1000 milliLiter(s) (50 mL/Hr) IV Continuous <Continuous>  dextrose 50% Injectable 25 Gram(s) IV Push once  dextrose 50% Injectable 12.5 Gram(s) IV Push once  dextrose 50% Injectable 25 Gram(s) IV Push once  glucagon  Injectable 1 milliGRAM(s) IntraMuscular once  insulin lispro (ADMELOG) corrective regimen sliding scale   SubCutaneous three times a day before meals  insulin lispro (ADMELOG) corrective regimen sliding scale   SubCutaneous at bedtime  metoprolol tartrate 25 milliGRAM(s) Oral two times a day  sevelamer carbonate 800 milliGRAM(s) Oral three times a day with meals    MEDICATIONS  (PRN):  dextrose Oral Gel 15 Gram(s) Oral once PRN Blood Glucose LESS THAN 70 milliGRAM(s)/deciliter          PHYSICAL EXAM:  GENERAL: NAD, well-groomed, well-developed  HEAD:  Atraumatic, Normocephalic  CHEST/LUNG: Clear to percussion bilaterally; No rales, rhonchi, wheezing, or rubs  HEART: Regular rate and rhythm; No murmurs, rubs, or gallops  ABDOMEN: Soft, Nontender, Nondistended; Bowel sounds present  EXTREMITIES:  2+ Peripheral Pulses, No clubbing, cyanosis, or edema  LYMPH: No lymphadenopathy noted  SKIN: No rashes or lesions    Care Discussed with Consultants/Other Providers [ ] YES  [ ] NO
SURGERY PROGRESS NOTE    Sepsis        LORRAINE RUTH   |  [id number]        S: EMELI overnight. Pt seen and evaluated bedside this AM. Pt resting comfortably on exam. Tolerating Diet. Pain well controlled.    MEDICATIONS  (STANDING):  apixaban 5 milliGRAM(s) Oral two times a day  atorvastatin 40 milliGRAM(s) Oral at bedtime  bacitracin   Ointment 1 Application(s) Topical two times a day  cefTRIAXone   IVPB 2000 milliGRAM(s) IV Intermittent every 24 hours  chlorhexidine 4% Liquid 1 Application(s) Topical <User Schedule>  dextrose 5%. 1000 milliLiter(s) (100 mL/Hr) IV Continuous <Continuous>  dextrose 5%. 1000 milliLiter(s) (50 mL/Hr) IV Continuous <Continuous>  dextrose 50% Injectable 12.5 Gram(s) IV Push once  dextrose 50% Injectable 25 Gram(s) IV Push once  dextrose 50% Injectable 25 Gram(s) IV Push once  diphenhydrAMINE 50 milliGRAM(s) Oral once  epoetin isra (EPOGEN) Injectable 31455 Unit(s) IV Push <User Schedule>  glucagon  Injectable 1 milliGRAM(s) IntraMuscular once  hemorrhoidal Ointment 1 Application(s) Rectal two times a day  insulin lispro (ADMELOG) corrective regimen sliding scale   SubCutaneous at bedtime  insulin lispro (ADMELOG) corrective regimen sliding scale   SubCutaneous three times a day before meals  insulin lispro (ADMELOG) corrective regimen sliding scale   SubCutaneous at bedtime  lidocaine   4% Patch 1 Patch Transdermal daily  methylPREDNISolone 32 milliGRAM(s) Oral once  metoprolol tartrate 25 milliGRAM(s) Oral two times a day  polyethylene glycol 3350 17 Gram(s) Oral daily  sevelamer carbonate 1600 milliGRAM(s) Oral three times a day with meals    MEDICATIONS  (PRN):  acetaminophen     Tablet .. 650 milliGRAM(s) Oral every 6 hours PRN Mild Pain (1 - 3)  dextrose Oral Gel 15 Gram(s) Oral once PRN Blood Glucose LESS THAN 70 milliGRAM(s)/deciliter  sodium chloride 0.9% lock flush 10 milliLiter(s) IV Push every 1 hour PRN Pre/post blood products, medications, blood draw, and to maintain line patency      O:   Vital Signs Last 24 Hrs  T(C): 37.1 (14 Nov 2023 09:17), Max: 37.2 (14 Nov 2023 01:07)  T(F): 98.8 (14 Nov 2023 09:17), Max: 98.9 (14 Nov 2023 01:07)  HR: 99 (14 Nov 2023 09:17) (99 - 107)  BP: 112/75 (14 Nov 2023 09:17) (97/60 - 129/78)  BP(mean): --  RR: 18 (14 Nov 2023 09:17) (18 - 18)  SpO2: 96% (14 Nov 2023 09:17) (95% - 98%)    Parameters below as of 14 Nov 2023 09:17  Patient On (Oxygen Delivery Method): room air        PHYSICAL EXAM:  General: NAD, Lying in bed comfortably  Neuro: A+Ox3  HEENT: NC/AT  Neck: Soft, supple  Cardio: RRR, nml S1/S2  Resp: Good effort, CTA b/l  GI/Abd: Soft, NT/ND, no rebound/guarding  Vascular: right and left arms have AVFs no palpable thrill appreciated. Palpable pulses bilaterally. Intact motor and sensory functions.                           11.2   7.05  )-----------( 246      ( 14 Nov 2023 08:37 )             38.5     11-14    137  |  97  |  37<H>  ----------------------------<  107<H>  3.8   |  21<L>  |  10.81<H>    Ca    11.1<H>      14 Nov 2023 08:37  Phos  6.1     11-14  Mg     2.4     11-14 11-13-23 @ 07:01  -  11-14-23 @ 07:00  --------------------------------------------------------  IN: 480 mL / OUT: 1200 mL / NET: -720 mL        IMAGING STUDIES:      
SURGERY PROGRESS NOTE    Sepsis        LORRAINE RUTH   |  [id number]        S: EMELI overnight. Pt seen and evaluated bedside this AM. Pt resting comfortably on exam. Tolerating Diet. Pain well controlled.    MEDICATIONS  (STANDING):  atorvastatin 40 milliGRAM(s) Oral at bedtime  bacitracin   Ointment 1 Application(s) Topical two times a day  chlorhexidine 4% Liquid 1 Application(s) Topical <User Schedule>  dextrose 5%. 1000 milliLiter(s) (100 mL/Hr) IV Continuous <Continuous>  dextrose 5%. 1000 milliLiter(s) (50 mL/Hr) IV Continuous <Continuous>  dextrose 50% Injectable 25 Gram(s) IV Push once  dextrose 50% Injectable 12.5 Gram(s) IV Push once  dextrose 50% Injectable 25 Gram(s) IV Push once  diphenhydrAMINE 50 milliGRAM(s) Oral once  epoetin isra (EPOGEN) Injectable 04338 Unit(s) IV Push <User Schedule>  glucagon  Injectable 1 milliGRAM(s) IntraMuscular once  hemorrhoidal Ointment 1 Application(s) Rectal two times a day  heparin  Infusion.  Unit(s)/Hr (24 mL/Hr) IV Continuous <Continuous>  insulin lispro (ADMELOG) corrective regimen sliding scale   SubCutaneous three times a day before meals  insulin lispro (ADMELOG) corrective regimen sliding scale   SubCutaneous at bedtime  lidocaine   4% Patch 1 Patch Transdermal daily  methylPREDNISolone 32 milliGRAM(s) Oral once  metoprolol tartrate 25 milliGRAM(s) Oral two times a day  polyethylene glycol 3350 17 Gram(s) Oral daily  sevelamer carbonate 1600 milliGRAM(s) Oral three times a day with meals    MEDICATIONS  (PRN):  acetaminophen     Tablet .. 650 milliGRAM(s) Oral every 6 hours PRN Mild Pain (1 - 3)  aluminum hydroxide/magnesium hydroxide/simethicone Suspension 30 milliLiter(s) Oral every 4 hours PRN Dyspepsia  dextrose Oral Gel 15 Gram(s) Oral once PRN Blood Glucose LESS THAN 70 milliGRAM(s)/deciliter  heparin   Injectable 5000 Unit(s) IV Push every 6 hours PRN For aPTT between 40 - 57  heparin   Injectable 90352 Unit(s) IV Push every 6 hours PRN For aPTT less than 40  ondansetron Injectable 4 milliGRAM(s) IV Push every 8 hours PRN Nausea and/or Vomiting  sodium chloride 0.9% lock flush 10 milliLiter(s) IV Push every 1 hour PRN Pre/post blood products, medications, blood draw, and to maintain line patency      O:   Vital Signs Last 24 Hrs  T(C): 36.6 (19 Nov 2023 09:30), Max: 36.9 (18 Nov 2023 18:37)  T(F): 97.9 (19 Nov 2023 09:30), Max: 98.5 (19 Nov 2023 01:05)  HR: 105 (19 Nov 2023 09:30) (93 - 105)  BP: 124/77 (19 Nov 2023 09:30) (101/57 - 136/82)  BP(mean): --  RR: 17 (19 Nov 2023 09:30) (17 - 18)  SpO2: 97% (19 Nov 2023 09:30) (95% - 97%)    Parameters below as of 19 Nov 2023 09:30  Patient On (Oxygen Delivery Method): room air        PHYSICAL EXAM:  General: NAD, Lying in bed comfortably, A+Ox3  HEENT: NC/AT  Cardio: RRR  Resp: Good effort, CTA b/l  GI/Abd: Soft, NT/ND, no rebound/guarding  Vascular: right and left arms have AVFs no palpable thrill appreciated. Palpable pulses bilaterally. Intact motor and sensory functions.                           10.0   7.63  )-----------( 181      ( 19 Nov 2023 10:42 )             32.9     11-19    134<L>  |  90<L>  |  54<H>  ----------------------------<  99  4.0   |  24  |  13.55<H>    Ca    10.7<H>      19 Nov 2023 10:42          11-18-23 @ 07:01  -  11-19-23 @ 07:00  --------------------------------------------------------  IN: 1282 mL / OUT: 150 mL / NET: 1132 mL        IMAGING STUDIES:      
SURGERY PROGRESS NOTE    Sepsis        LORRAINE RUTH   |  [id number]        S: EMELI overnight. Pt seen and evaluated bedside this AM. Pt resting comfortably on exam. Tolerating Diet.  Pain well controlled.    MEDICATIONS  (STANDING):  apixaban 5 milliGRAM(s) Oral two times a day  atorvastatin 40 milliGRAM(s) Oral at bedtime  bacitracin   Ointment 1 Application(s) Topical two times a day  cefTRIAXone   IVPB 2000 milliGRAM(s) IV Intermittent every 24 hours  chlorhexidine 4% Liquid 1 Application(s) Topical <User Schedule>  dextrose 5%. 1000 milliLiter(s) (50 mL/Hr) IV Continuous <Continuous>  dextrose 5%. 1000 milliLiter(s) (100 mL/Hr) IV Continuous <Continuous>  dextrose 50% Injectable 25 Gram(s) IV Push once  dextrose 50% Injectable 12.5 Gram(s) IV Push once  dextrose 50% Injectable 25 Gram(s) IV Push once  diazepam    Tablet 10 milliGRAM(s) Oral once  diphenhydrAMINE 50 milliGRAM(s) Oral once  epoetin isra (EPOGEN) Injectable 40658 Unit(s) IV Push <User Schedule>  glucagon  Injectable 1 milliGRAM(s) IntraMuscular once  hemorrhoidal Ointment 1 Application(s) Rectal two times a day  insulin lispro (ADMELOG) corrective regimen sliding scale   SubCutaneous three times a day before meals  insulin lispro (ADMELOG) corrective regimen sliding scale   SubCutaneous at bedtime  insulin lispro (ADMELOG) corrective regimen sliding scale   SubCutaneous at bedtime  lidocaine   4% Patch 1 Patch Transdermal daily  methylPREDNISolone 32 milliGRAM(s) Oral once  metoprolol tartrate 25 milliGRAM(s) Oral two times a day  polyethylene glycol 3350 17 Gram(s) Oral daily  sevelamer carbonate 1600 milliGRAM(s) Oral three times a day with meals    MEDICATIONS  (PRN):  acetaminophen     Tablet .. 650 milliGRAM(s) Oral every 6 hours PRN Mild Pain (1 - 3)  aluminum hydroxide/magnesium hydroxide/simethicone Suspension 30 milliLiter(s) Oral every 4 hours PRN Dyspepsia  dextrose Oral Gel 15 Gram(s) Oral once PRN Blood Glucose LESS THAN 70 milliGRAM(s)/deciliter  ondansetron Injectable 4 milliGRAM(s) IV Push every 8 hours PRN Nausea and/or Vomiting  sodium chloride 0.9% lock flush 10 milliLiter(s) IV Push every 1 hour PRN Pre/post blood products, medications, blood draw, and to maintain line patency      O:   Vital Signs Last 24 Hrs  T(C): 36.8 (16 Nov 2023 05:11), Max: 37.2 (15 Nov 2023 21:05)  T(F): 98.3 (16 Nov 2023 05:11), Max: 99 (15 Nov 2023 21:05)  HR: 108 (16 Nov 2023 05:11) (102 - 120)  BP: 110/72 (16 Nov 2023 05:11) (104/69 - 155/77)  BP(mean): --  RR: 18 (16 Nov 2023 05:11) (16 - 18)  SpO2: 95% (16 Nov 2023 05:11) (93% - 99%)    Parameters below as of 16 Nov 2023 05:11  Patient On (Oxygen Delivery Method): room air        PHYSICAL EXAM:  General: NAD, Lying in bed comfortably  Neuro: A+Ox3  HEENT: NC/AT  Neck: Soft, supple  Cardio: RRR, nml S1/S2  Resp: Good effort, CTA b/l  GI/Abd: Soft, NT/ND, no rebound/guarding  Vascular: right and left arms have AVFs no palpable thrill appreciated. Palpable pulses bilaterally. Intact motor and sensory functions.                           11.0   6.90  )-----------( 254      ( 15 Nov 2023 08:30 )             36.9     11-15    135  |  96  |  47<H>  ----------------------------<  158<H>  5.1   |  23  |  12.47<H>    Ca    10.9<H>      15 Nov 2023 08:30  Phos  6.1     11-14  Mg     2.4     11-14          11-15-23 @ 07:01  -  11-16-23 @ 07:00  --------------------------------------------------------  IN: 240 mL / OUT: 1000 mL / NET: -760 mL        IMAGING STUDIES:      
SURGERY PROGRESS NOTE    Sepsis      LORRAINE RUTH   |  [id number]      S: EMELI overnight. Pt seen and evaluated bedside. Pt resting comfortably on exam. NPO. Pain well controlled.    MEDICATIONS  (STANDING):  atorvastatin 40 milliGRAM(s) Oral at bedtime  cefTRIAXone   IVPB 2000 milliGRAM(s) IV Intermittent every 24 hours  chlorhexidine 4% Liquid 1 Application(s) Topical <User Schedule>  dextrose 5%. 1000 milliLiter(s) (100 mL/Hr) IV Continuous <Continuous>  dextrose 5%. 1000 milliLiter(s) (50 mL/Hr) IV Continuous <Continuous>  dextrose 50% Injectable 25 Gram(s) IV Push once  dextrose 50% Injectable 25 Gram(s) IV Push once  dextrose 50% Injectable 12.5 Gram(s) IV Push once  epoetin isra (EPOGEN) Injectable 82153 Unit(s) IV Push <User Schedule>  glucagon  Injectable 1 milliGRAM(s) IntraMuscular once  hemorrhoidal Ointment 1 Application(s) Rectal two times a day  insulin lispro (ADMELOG) corrective regimen sliding scale   SubCutaneous every 6 hours  insulin lispro (ADMELOG) corrective regimen sliding scale   SubCutaneous three times a day before meals  insulin lispro (ADMELOG) corrective regimen sliding scale   SubCutaneous at bedtime  insulin lispro (ADMELOG) corrective regimen sliding scale   SubCutaneous at bedtime  lidocaine   4% Patch 1 Patch Transdermal daily  metoprolol tartrate 25 milliGRAM(s) Oral two times a day  polyethylene glycol 3350 17 Gram(s) Oral daily  sevelamer carbonate 1600 milliGRAM(s) Oral three times a day with meals    MEDICATIONS  (PRN):  dextrose Oral Gel 15 Gram(s) Oral once PRN Blood Glucose LESS THAN 70 milliGRAM(s)/deciliter  sodium chloride 0.9% lock flush 10 milliLiter(s) IV Push every 1 hour PRN Pre/post blood products, medications, blood draw, and to maintain line patency      O:   Vital Signs Last 24 Hrs  T(C): 36.4 (10 Nov 2023 17:13), Max: 37.4 (09 Nov 2023 21:33)  T(F): 97.5 (10 Nov 2023 16:08), Max: 99.3 (09 Nov 2023 21:33)  HR: 68 (10 Nov 2023 17:13) (68 - 97)  BP: 143/96 (10 Nov 2023 17:13) (133/75 - 149/84)  BP(mean): 84 (10 Nov 2023 17:13) (84 - 84)  RR: 16 (10 Nov 2023 17:13) (16 - 18)  SpO2: 96% (10 Nov 2023 17:13) (92% - 96%)    Parameters below as of 10 Nov 2023 13:18  Patient On (Oxygen Delivery Method): room air        PHYSICAL EXAM:  General: NAD, Lying in bed comfortably  Neuro: A+Ox3  HEENT: NC/AT  Neck: Soft, supple  Cardio: RRR, nml S1/S2  Resp: Good effort, CTA b/l  GI/Abd: Soft, NT/ND, no rebound/guarding  Vascular: right and left arms have AVFs no palpable thrill appreciated. Palpable pulses bilaterally. Intact motor and sensory functions.                           9.7    6.39  )-----------( 226      ( 10 Nov 2023 07:13 )             32.9     11-10    139  |  97  |  46<H>  ----------------------------<  91  4.8   |  19<L>  |  11.52<H>    Ca    10.4      10 Nov 2023 07:12  Phos  6.0     11-10  Mg     2.4     11-10          11-09-23 @ 07:01  -  11-10-23 @ 07:00  --------------------------------------------------------  IN: 890 mL / OUT: 150 mL / NET: 740 mL        IMAGING STUDIES:      
Subjective: Patient seen and examined. No new events except as noted.   states to be feeling ok     REVIEW OF SYSTEMS:    CONSTITUTIONAL: No weakness, fevers or chills  EYES/ENT: No visual changes;  No vertigo or throat pain   NECK: No pain or stiffness  RESPIRATORY: No cough, wheezing, hemoptysis; No shortness of breath  CARDIOVASCULAR: No chest pain or palpitations  GASTROINTESTINAL: No abdominal or epigastric pain. No nausea, vomiting, or hematemesis; No diarrhea or constipation. No melena or hematochezia.  GENITOURINARY: No dysuria, frequency or hematuria  NEUROLOGICAL: No numbness or weakness  SKIN: No itching, burning, rashes, or lesions   All other review of systems is negative unless indicated above.    MEDICATIONS:  MEDICATIONS  (STANDING):  apixaban 5 milliGRAM(s) Oral two times a day  atorvastatin 40 milliGRAM(s) Oral at bedtime  metoprolol tartrate 25 milliGRAM(s) Oral two times a day  piperacillin/tazobactam IVPB.. 3.375 Gram(s) IV Intermittent every 12 hours  sevelamer carbonate 800 milliGRAM(s) Oral three times a day with meals      PHYSICAL EXAM:  T(C): 39.4 (11-05-23 @ 08:34), Max: 39.6 (11-04-23 @ 13:00)  HR: 115 (11-05-23 @ 08:34) (97 - 121)  BP: 126/75 (11-05-23 @ 08:34) (107/71 - 135/70)  RR: 20 (11-05-23 @ 08:34) (18 - 22)  SpO2: 99% (11-05-23 @ 08:34) (97% - 99%)  Wt(kg): --  I&O's Summary    04 Nov 2023 08:01  -  05 Nov 2023 07:00  --------------------------------------------------------  IN: 0 mL / OUT: 250 mL / NET: -250 mL      Height (cm): 177.8 (11-04 @ 11:38)  Weight (kg): 131.5 (11-04 @ 11:38)  BMI (kg/m2): 41.6 (11-04 @ 11:38)  BSA (m2): 2.44 (11-04 @ 11:38)      Appearance: Normal	  HEENT:   Normal oral mucosa, PERRL, EOMI	  Lymphatic: No lymphadenopathy  Cardiovascular: Normal S1 S2, No JVD, No murmurs, No edema  Respiratory: Lungs clear to auscultation	  Psychiatry: A & O x 3, Mood & affect appropriate  Gastrointestinal:  Soft, Non-tender, + BS	  Skin: No rashes, No ecchymoses, No cyanosis	  Neurologic: Non-focal  Extremities: Normal range of motion, No clubbing, cyanosis or edema  Vascular: Peripheral pulses palpable 2+ bilaterally  CVC in R groin      LABS:    CARDIAC MARKERS:                                10.5   8.67  )-----------( 171      ( 05 Nov 2023 07:12 )             35.6     11-05    135  |  99  |  35<H>  ----------------------------<  76  5.7<H>   |  19<L>  |  10.36<H>    Ca    10.3      05 Nov 2023 07:12    TPro  7.7  /  Alb  3.6  /  TBili  1.2  /  DBili  x   /  AST  20  /  ALT  13  /  AlkPhos  50  11-05    Urinalysis Basic - ( 05 Nov 2023 07:12 )    Color: x / Appearance: x / SG: x / pH: x  Gluc: 76 mg/dL / Ketone: x  / Bili: x / Urobili: x   Blood: x / Protein: x / Nitrite: x   Leuk Esterase: x / RBC: x / WBC x   Sq Epi: x / Non Sq Epi: x / Bacteria: x    MICROBIOLOGY:  Culture - Blood (collected 04 Nov 2023 12:45)  Source: .Blood Blood-Peripheral  Gram Stain (05 Nov 2023 04:42):    Growth in anaerobic bottle: Gram Negative Rods    Growth in aerobic bottle: Gram Negative Rods  Preliminary Report (05 Nov 2023 04:42):    Growth in anaerobic bottle: Gram Negative Rods    Growth in aerobic bottle: Gram Negative Rods    Direct identification is available within approximately 3-5    hours either by Blood Panel Multiplexed PCR or Direct    MALDI-TOF. Details: [email or web address]  Organism: Blood Culture PCR (05 Nov 2023 06:28)  Organism: Blood Culture PCR (05 Nov 2023 06:28)      Method Type: PCR      -  K. pneumoniae group: Detec (K. pneumoniae, K. quasipneumoniae, K. variicola)    Culture - Blood (collected 04 Nov 2023 12:30)  Source: .Blood Blood-Peripheral  Gram Stain (05 Nov 2023 06:42):    Growth in aerobic bottle: Gram Negative Rods    Growth in anaerobic bottle: Gram Negative Rods  Preliminary Report (05 Nov 2023 06:42):    Growth in aerobic bottle: Gram Negative Rods    Growth in anaerobic bottle: Gram Negative Rods                  TELEMETRY: 	    ECG:  	  RADIOLOGY:   DIAGNOSTIC TESTING:  [ ] Echocardiogram:  [ ]  Catheterization:  [ ] Stress Test:    OTHER: 	          
[facility] Division of Hospital Medicine  Radha Crisostomo MD  Available via MS Teams  Pager: [phone]    SUBJECTIVE / OVERNIGHT EVENTS: Patient seen and examined at bedside. No acute overnight events. No new events.     ADDITIONAL REVIEW OF SYSTEMS: n/a     MEDICATIONS  (STANDING):  atorvastatin 40 milliGRAM(s) Oral at bedtime  bacitracin   Ointment 1 Application(s) Topical two times a day  chlorhexidine 4% Liquid 1 Application(s) Topical <User Schedule>  dextrose 5%. 1000 milliLiter(s) (100 mL/Hr) IV Continuous <Continuous>  dextrose 5%. 1000 milliLiter(s) (50 mL/Hr) IV Continuous <Continuous>  dextrose 50% Injectable 25 Gram(s) IV Push once  dextrose 50% Injectable 12.5 Gram(s) IV Push once  dextrose 50% Injectable 25 Gram(s) IV Push once  diphenhydrAMINE 50 milliGRAM(s) Oral once  epoetin isra (EPOGEN) Injectable 93750 Unit(s) IV Push <User Schedule>  glucagon  Injectable 1 milliGRAM(s) IntraMuscular once  hemorrhoidal Ointment 1 Application(s) Rectal two times a day  heparin  Infusion.  Unit(s)/Hr (24 mL/Hr) IV Continuous <Continuous>  insulin lispro (ADMELOG) corrective regimen sliding scale   SubCutaneous three times a day before meals  insulin lispro (ADMELOG) corrective regimen sliding scale   SubCutaneous at bedtime  lidocaine   4% Patch 1 Patch Transdermal daily  methylPREDNISolone 32 milliGRAM(s) Oral once  metoprolol tartrate 25 milliGRAM(s) Oral two times a day  polyethylene glycol 3350 17 Gram(s) Oral daily  sevelamer carbonate 1600 milliGRAM(s) Oral three times a day with meals    MEDICATIONS  (PRN):  acetaminophen     Tablet .. 650 milliGRAM(s) Oral every 6 hours PRN Mild Pain (1 - 3)  aluminum hydroxide/magnesium hydroxide/simethicone Suspension 30 milliLiter(s) Oral every 4 hours PRN Dyspepsia  dextrose Oral Gel 15 Gram(s) Oral once PRN Blood Glucose LESS THAN 70 milliGRAM(s)/deciliter  heparin   Injectable 5000 Unit(s) IV Push every 6 hours PRN For aPTT between 40 - 57  heparin   Injectable 41482 Unit(s) IV Push every 6 hours PRN For aPTT less than 40  ondansetron Injectable 4 milliGRAM(s) IV Push every 8 hours PRN Nausea and/or Vomiting  sodium chloride 0.9% lock flush 10 milliLiter(s) IV Push every 1 hour PRN Pre/post blood products, medications, blood draw, and to maintain line patency      I&O's Summary    18 Nov 2023 07:01  -  19 Nov 2023 07:00  --------------------------------------------------------  IN: 1282 mL / OUT: 150 mL / NET: 1132 mL    19 Nov 2023 07:01  -  19 Nov 2023 15:05  --------------------------------------------------------  IN: 0 mL / OUT: 1000 mL / NET: -1000 mL        PHYSICAL EXAM:  Vital Signs Last 24 Hrs  T(C): 37.5 (19 Nov 2023 13:46), Max: 37.5 (19 Nov 2023 13:46)  T(F): 99.5 (19 Nov 2023 13:46), Max: 99.5 (19 Nov 2023 13:46)  HR: 118 (19 Nov 2023 13:46) (93 - 118)  BP: 119/75 (19 Nov 2023 13:46) (101/57 - 136/82)  BP(mean): --  RR: 18 (19 Nov 2023 13:46) (17 - 18)  SpO2: 96% (19 Nov 2023 13:46) (95% - 98%)    Parameters below as of 19 Nov 2023 13:46  Patient On (Oxygen Delivery Method): room air      CONSTITUTIONAL: NAD, well-developed, well-groomed  EYES: PERRLA; conjunctiva and sclera clear  ENMT: Moist oral mucosa, no pharyngeal injection or exudates; normal dentition  NECK: Supple, no palpable masses; no thyromegaly  RESPIRATORY: Normal respiratory effort; lungs are clear to auscultation bilaterally  CARDIOVASCULAR: Regular rate and rhythm, normal S1 and S2, no murmur/rub/gallop; No lower extremity edema; Peripheral pulses are 2+ bilaterally  ABDOMEN: Nontender to palpation, normoactive bowel sounds, no rebound/guarding; No hepatosplenomegaly  MUSCULOSKELETAL:   no clubbing or cyanosis of digits; no joint swelling or tenderness to palpation  PSYCH: A+O to person, place, and time; affect appropriate  NEUROLOGY: CN 2-12 are intact and symmetric; no gross sensory deficits   SKIN: No rashes; no palpable lesions    LABS:                        10.0   7.63  )-----------( 181      ( 19 Nov 2023 10:42 )             32.9     11-19    134<L>  |  90<L>  |  54<H>  ----------------------------<  99  4.0   |  24  |  13.55<H>    Ca    10.7<H>      19 Nov 2023 10:42      PTT - ( 19 Nov 2023 10:42 )  PTT:98.1 sec      Urinalysis Basic - ( 19 Nov 2023 10:42 )    Color: x / Appearance: x / SG: x / pH: x  Gluc: 99 mg/dL / Ketone: x  / Bili: x / Urobili: x   Blood: x / Protein: x / Nitrite: x   Leuk Esterase: x / RBC: x / WBC x   Sq Epi: x / Non Sq Epi: x / Bacteria: x            RADIOLOGY & ADDITIONAL TESTS:  New Results Reviewed Today:   New Imaging Personally Reviewed Today:  New Electrocardiogram Personally Reviewed Today:  Prior or Outpatient Records Reviewed Today:    COMMUNICATION:  Care Discussed with Consultants/Other Providers and Details of Discussion:  Discussions with Patient/Family:  PCP Communication:    
Subjective: Patient seen and examined. No new events except as noted.     REVIEW OF SYSTEMS:    CONSTITUTIONAL: +weakness, fevers or chills  EYES/ENT: No visual changes;  No vertigo or throat pain   NECK: No pain or stiffness  RESPIRATORY: No cough, wheezing, hemoptysis; No shortness of breath  CARDIOVASCULAR: No chest pain or palpitations  GASTROINTESTINAL: No abdominal or epigastric pain. No nausea, vomiting, or hematemesis; No diarrhea or constipation. No melena or hematochezia.  GENITOURINARY: No dysuria, frequency or hematuria  NEUROLOGICAL: No numbness or weakness  SKIN: No itching, burning, rashes, or lesions   All other review of systems is negative unless indicated above.    MEDICATIONS:  MEDICATIONS  (STANDING):  apixaban 5 milliGRAM(s) Oral two times a day  atorvastatin 40 milliGRAM(s) Oral at bedtime  bacitracin   Ointment 1 Application(s) Topical two times a day  cefTRIAXone   IVPB 2000 milliGRAM(s) IV Intermittent every 24 hours  chlorhexidine 4% Liquid 1 Application(s) Topical <User Schedule>  dextrose 5%. 1000 milliLiter(s) (50 mL/Hr) IV Continuous <Continuous>  dextrose 5%. 1000 milliLiter(s) (100 mL/Hr) IV Continuous <Continuous>  dextrose 50% Injectable 12.5 Gram(s) IV Push once  dextrose 50% Injectable 25 Gram(s) IV Push once  dextrose 50% Injectable 25 Gram(s) IV Push once  diphenhydrAMINE 50 milliGRAM(s) Oral once  epoetin isra (EPOGEN) Injectable 43268 Unit(s) IV Push <User Schedule>  glucagon  Injectable 1 milliGRAM(s) IntraMuscular once  hemorrhoidal Ointment 1 Application(s) Rectal two times a day  insulin lispro (ADMELOG) corrective regimen sliding scale   SubCutaneous at bedtime  insulin lispro (ADMELOG) corrective regimen sliding scale   SubCutaneous at bedtime  insulin lispro (ADMELOG) corrective regimen sliding scale   SubCutaneous three times a day before meals  lidocaine   4% Patch 1 Patch Transdermal daily  methylPREDNISolone 32 milliGRAM(s) Oral once  metoprolol tartrate 25 milliGRAM(s) Oral two times a day  polyethylene glycol 3350 17 Gram(s) Oral daily  sevelamer carbonate 1600 milliGRAM(s) Oral three times a day with meals      PHYSICAL EXAM:  T(C): 36.2 (11-15-23 @ 08:51), Max: 37.6 (11-15-23 @ 01:02)  HR: 102 (11-15-23 @ 08:51) (96 - 110)  BP: 155/77 (11-15-23 @ 08:51) (101/73 - 155/77)  RR: 16 (11-15-23 @ 08:51) (16 - 18)  SpO2: 93% (11-15-23 @ 08:51) (93% - 99%)  Wt(kg): --  I&O's Summary    14 Nov 2023 07:01  -  15 Nov 2023 07:00  --------------------------------------------------------  IN: 320 mL / OUT: 200 mL / NET: 120 mL    15 Nov 2023 07:01  -  15 Nov 2023 10:54  --------------------------------------------------------  IN: 240 mL / OUT: 0 mL / NET: 240 mL          Appearance: Normal	  HEENT:   Normal oral mucosa, PERRL, EOMI	  Lymphatic: No lymphadenopathy , no edema  Cardiovascular: Normal S1 S2, No JVD, No murmurs , Peripheral pulses palpable 2+ bilaterally  Respiratory: Lungs clear to auscultation, normal effort 	  Gastrointestinal:  Soft, Non-tender, + BS	  Skin: No rashes, No ecchymoses, No cyanosis, warm to touch  Musculoskeletal: Normal range of motion, normal strength  Psychiatry:  Mood & affect appropriate  Ext: No edema      LABS:    CARDIAC MARKERS:                                11.0   6.90  )-----------( 254      ( 15 Nov 2023 08:30 )             36.9     11-15    135  |  96  |  47<H>  ----------------------------<  158<H>  5.1   |  23  |  12.47<H>    Ca    10.9<H>      15 Nov 2023 08:30  Phos  6.1     11-14  Mg     2.4     11-14      proBNP:   Lipid Profile:   HgA1c:   TSH:             TELEMETRY: 	    ECG:  	  RADIOLOGY:   DIAGNOSTIC TESTING:  [ ] Echocardiogram:  [ ]  Catheterization:  [ ] Stress Test:    OTHER: 	          
Subjective: Patient seen and examined. No new events except as noted.     REVIEW OF SYSTEMS:    CONSTITUTIONAL: +weakness, fevers or chills  EYES/ENT: No visual changes;  No vertigo or throat pain   NECK: No pain or stiffness  RESPIRATORY: No cough, wheezing, hemoptysis; No shortness of breath  CARDIOVASCULAR: No chest pain or palpitations  GASTROINTESTINAL: No abdominal or epigastric pain. No nausea, vomiting, or hematemesis; No diarrhea or constipation. No melena or hematochezia.  GENITOURINARY: No dysuria, frequency or hematuria  NEUROLOGICAL: No numbness or weakness  SKIN: No itching, burning, rashes, or lesions   All other review of systems is negative unless indicated above.    MEDICATIONS:  MEDICATIONS  (STANDING):  apixaban 5 milliGRAM(s) Oral two times a day  atorvastatin 40 milliGRAM(s) Oral at bedtime  bacitracin   Ointment 1 Application(s) Topical two times a day  cefTRIAXone   IVPB 2000 milliGRAM(s) IV Intermittent every 24 hours  chlorhexidine 4% Liquid 1 Application(s) Topical <User Schedule>  dextrose 5%. 1000 milliLiter(s) (50 mL/Hr) IV Continuous <Continuous>  dextrose 5%. 1000 milliLiter(s) (100 mL/Hr) IV Continuous <Continuous>  dextrose 50% Injectable 25 Gram(s) IV Push once  dextrose 50% Injectable 25 Gram(s) IV Push once  dextrose 50% Injectable 12.5 Gram(s) IV Push once  epoetin isra (EPOGEN) Injectable 78725 Unit(s) IV Push <User Schedule>  glucagon  Injectable 1 milliGRAM(s) IntraMuscular once  hemorrhoidal Ointment 1 Application(s) Rectal two times a day  insulin lispro (ADMELOG) corrective regimen sliding scale   SubCutaneous at bedtime  insulin lispro (ADMELOG) corrective regimen sliding scale   SubCutaneous three times a day before meals  insulin lispro (ADMELOG) corrective regimen sliding scale   SubCutaneous at bedtime  lidocaine   4% Patch 1 Patch Transdermal daily  metoprolol tartrate 25 milliGRAM(s) Oral two times a day  polyethylene glycol 3350 17 Gram(s) Oral daily  sevelamer carbonate 1600 milliGRAM(s) Oral three times a day with meals      PHYSICAL EXAM:  T(C): 36.7 (11-13-23 @ 05:28), Max: 37.1 (11-12-23 @ 21:18)  HR: 92 (11-13-23 @ 05:28) (92 - 97)  BP: 157/84 (11-13-23 @ 05:28) (116/78 - 157/84)  RR: 18 (11-13-23 @ 05:28) (18 - 18)  SpO2: 96% (11-13-23 @ 05:28) (95% - 96%)  Wt(kg): --  I&O's Summary    12 Nov 2023 07:01  -  13 Nov 2023 07:00  --------------------------------------------------------  IN: 720 mL / OUT: 0 mL / NET: 720 mL      Appearance: NAD  HEENT:   Dry  oral mucosa, PERRL, EOMI	  Lymphatic: No lymphadenopathy  Cardiovascular: Normal S1 S2, No JVD, No murmurs, No edema  Respiratory: decreased bs    Psychiatry: A & O x 3, Mood & affect appropriate  Gastrointestinal:  Soft, Non-tender, + BS	  Skin: No rashes, No ecchymoses, No cyanosis	  Neurologic: Non-focal  Extremities: Normal range of motion, No clubbing, cyanosis or edema  Vascular: Peripheral pulses palpable 2+ bilaterally  HD catheter       LABS:    CARDIAC MARKERS:                                10.2   8.01  )-----------( 272      ( 13 Nov 2023 07:32 )             34.4     11-13    137  |  100  |  48<H>  ----------------------------<  103<H>  4.0   |  20<L>  |  12.13<H>    Ca    10.5      13 Nov 2023 07:31  Phos  6.7     11-13  Mg     2.4     11-13          TELEMETRY: 	    ECG:  	  RADIOLOGY:   DIAGNOSTIC TESTING:  [ ] Echocardiogram:  [ ]  Catheterization:  [ ] Stress Test:    OTHER: 	          
Subjective: Patient seen and examined. No new events except as noted.   due to fever AVF formation was rescheduled for outpt   resting comfortably     REVIEW OF SYSTEMS:    CONSTITUTIONAL:+ weakness, fevers or chills  EYES/ENT: No visual changes;  No vertigo or throat pain   NECK: No pain or stiffness  RESPIRATORY: No cough, wheezing, hemoptysis; No shortness of breath  CARDIOVASCULAR: No chest pain or palpitations  GASTROINTESTINAL: No abdominal or epigastric pain. No nausea, vomiting, or hematemesis; No diarrhea or constipation. No melena or hematochezia.  GENITOURINARY: No dysuria, frequency or hematuria  NEUROLOGICAL: No numbness or weakness  SKIN: No itching, burning, rashes, or lesions   All other review of systems is negative unless indicated above.    MEDICATIONS:  MEDICATIONS  (STANDING):  apixaban 5 milliGRAM(s) Oral two times a day  atorvastatin 40 milliGRAM(s) Oral at bedtime  bacitracin   Ointment 1 Application(s) Topical two times a day  cefTRIAXone   IVPB 2000 milliGRAM(s) IV Intermittent every 24 hours  chlorhexidine 4% Liquid 1 Application(s) Topical <User Schedule>  dextrose 5%. 1000 milliLiter(s) (50 mL/Hr) IV Continuous <Continuous>  dextrose 5%. 1000 milliLiter(s) (100 mL/Hr) IV Continuous <Continuous>  dextrose 50% Injectable 12.5 Gram(s) IV Push once  dextrose 50% Injectable 25 Gram(s) IV Push once  dextrose 50% Injectable 25 Gram(s) IV Push once  diphenhydrAMINE 50 milliGRAM(s) Oral once  epoetin isra (EPOGEN) Injectable 48088 Unit(s) IV Push <User Schedule>  glucagon  Injectable 1 milliGRAM(s) IntraMuscular once  hemorrhoidal Ointment 1 Application(s) Rectal two times a day  insulin lispro (ADMELOG) corrective regimen sliding scale   SubCutaneous three times a day before meals  insulin lispro (ADMELOG) corrective regimen sliding scale   SubCutaneous at bedtime  lidocaine   4% Patch 1 Patch Transdermal daily  methylPREDNISolone 32 milliGRAM(s) Oral once  metoprolol tartrate 25 milliGRAM(s) Oral two times a day  Nephro-bart 1 Tablet(s) Oral daily  polyethylene glycol 3350 17 Gram(s) Oral daily  sevelamer carbonate 1600 milliGRAM(s) Oral three times a day with meals      PHYSICAL EXAM:  T(C): 36.9 (11-21-23 @ 05:24), Max: 37.5 (11-21-23 @ 01:26)  HR: 107 (11-21-23 @ 05:24) (75 - 110)  BP: 108/72 (11-21-23 @ 05:24) (102/69 - 121/73)  RR: 18 (11-21-23 @ 05:24) (18 - 18)  SpO2: 95% (11-21-23 @ 05:24) (95% - 98%)  Wt(kg): --  I&O's Summary    20 Nov 2023 07:01  -  21 Nov 2023 07:00  --------------------------------------------------------  IN: 1130 mL / OUT: 350 mL / NET: 780 mL          Appearance: Normal	  HEENT:   Normal oral mucosa, PERRL, EOMI	  Lymphatic: No lymphadenopathy , no edema  Cardiovascular: Normal S1 S2, No JVD, No murmurs , Peripheral pulses palpable 2+ bilaterally  Respiratory: Lungs clear to auscultation, normal effort 	  Gastrointestinal:  Soft, Non-tender, + BS	  Skin: No rashes, No ecchymoses, No cyanosis, warm to touch  Musculoskeletal: Normal range of motion, normal strength  Psychiatry:  Mood & affect appropriate  Ext: No edema  Vascular access:  HD catheter           LABS:    CARDIAC MARKERS:                                10.9   6.47  )-----------( 164      ( 20 Nov 2023 09:23 )             36.8     11-20    135  |  93<L>  |  38<H>  ----------------------------<  94  4.0   |  23  |  11.68<H>    Ca    10.7<H>      20 Nov 2023 09:23            TELEMETRY: 	    ECG:  	  RADIOLOGY:   DIAGNOSTIC TESTING:  [ ] Echocardiogram:  [ ]  Catheterization:  [ ] Stress Test:    OTHER: 	          
[facility] Division of Hospital Medicine  Radha Crisostomo MD  Available via MS Teams  Pager: [phone]    SUBJECTIVE / OVERNIGHT EVENTS: Patient seen and examined at bedside. No acute overnight events. Pt denies dyspnea, chest pain fevers, chills, cough, HA, dizziness, syncope, chest palpitations, abd pain, n/v/d, urinary or bowel changes, active sites of bleeding or any other symptoms at this time. patient refusing to take steroids to prevent contrast induced allergy, states he had a severe reaction last time despite taking steroids where he had an anaphylaxtic episode and requiring hospitaliztion for 1 week. States it occurred 2x in the past with contrast, both times were very severe. Also would like to speak to nephro regarding renal transplant, patient is interested.     ADDITIONAL REVIEW OF SYSTEMS: n/a     MEDICATIONS  (STANDING):  apixaban 5 milliGRAM(s) Oral two times a day  atorvastatin 40 milliGRAM(s) Oral at bedtime  bacitracin   Ointment 1 Application(s) Topical two times a day  cefTRIAXone   IVPB 2000 milliGRAM(s) IV Intermittent every 24 hours  chlorhexidine 4% Liquid 1 Application(s) Topical <User Schedule>  dextrose 5%. 1000 milliLiter(s) (100 mL/Hr) IV Continuous <Continuous>  dextrose 5%. 1000 milliLiter(s) (50 mL/Hr) IV Continuous <Continuous>  dextrose 50% Injectable 12.5 Gram(s) IV Push once  dextrose 50% Injectable 25 Gram(s) IV Push once  dextrose 50% Injectable 25 Gram(s) IV Push once  diphenhydrAMINE 50 milliGRAM(s) Oral once  epoetin isra (EPOGEN) Injectable 83527 Unit(s) IV Push <User Schedule>  glucagon  Injectable 1 milliGRAM(s) IntraMuscular once  hemorrhoidal Ointment 1 Application(s) Rectal two times a day  insulin lispro (ADMELOG) corrective regimen sliding scale   SubCutaneous three times a day before meals  insulin lispro (ADMELOG) corrective regimen sliding scale   SubCutaneous at bedtime  insulin lispro (ADMELOG) corrective regimen sliding scale   SubCutaneous at bedtime  lidocaine   4% Patch 1 Patch Transdermal daily  methylPREDNISolone 32 milliGRAM(s) Oral once  metoprolol tartrate 25 milliGRAM(s) Oral two times a day  polyethylene glycol 3350 17 Gram(s) Oral daily  sevelamer carbonate 1600 milliGRAM(s) Oral three times a day with meals    MEDICATIONS  (PRN):  acetaminophen     Tablet .. 650 milliGRAM(s) Oral every 6 hours PRN Mild Pain (1 - 3)  dextrose Oral Gel 15 Gram(s) Oral once PRN Blood Glucose LESS THAN 70 milliGRAM(s)/deciliter  sodium chloride 0.9% lock flush 10 milliLiter(s) IV Push every 1 hour PRN Pre/post blood products, medications, blood draw, and to maintain line patency      I&O's Summary    13 Nov 2023 07:01  -  14 Nov 2023 07:00  --------------------------------------------------------  IN: 480 mL / OUT: 1200 mL / NET: -720 mL        PHYSICAL EXAM:  Vital Signs Last 24 Hrs  T(C): 36.9 (14 Nov 2023 13:04), Max: 37.2 (14 Nov 2023 01:07)  T(F): 98.5 (14 Nov 2023 13:04), Max: 98.9 (14 Nov 2023 01:07)  HR: 96 (14 Nov 2023 13:04) (96 - 107)  BP: 137/82 (14 Nov 2023 13:04) (97/60 - 137/82)  BP(mean): --  RR: 18 (14 Nov 2023 13:04) (18 - 18)  SpO2: 99% (14 Nov 2023 13:04) (95% - 99%)    Parameters below as of 14 Nov 2023 13:04  Patient On (Oxygen Delivery Method): room air      CONSTITUTIONAL: NAD, well-developed, well-groomed  EYES: PERRLA; conjunctiva and sclera clear  ENMT: Moist oral mucosa, no pharyngeal injection or exudates; normal dentition  NECK: Supple, no palpable masses; no thyromegaly  RESPIRATORY: Normal respiratory effort; lungs are clear to auscultation bilaterally  CARDIOVASCULAR: Regular rate and rhythm, normal S1 and S2, no murmur/rub/gallop; No lower extremity edema; Peripheral pulses are 2+ bilaterally  ABDOMEN: Nontender to palpation, normoactive bowel sounds, no rebound/guarding; No hepatosplenomegaly  MUSCULOSKELETAL:   no clubbing or cyanosis of digits; no joint swelling or tenderness to palpation  PSYCH: A+O to person, place, and time; affect appropriate  NEUROLOGY: CN 2-12 are intact and symmetric; no gross sensory deficits   SKIN: No rashes; no palpable lesions    LABS:                        11.2   7.05  )-----------( 246      ( 14 Nov 2023 08:37 )             38.5     11-14    137  |  97  |  37<H>  ----------------------------<  107<H>  3.8   |  21<L>  |  10.81<H>    Ca    11.1<H>      14 Nov 2023 08:37  Phos  6.1     11-14  Mg     2.4     11-14            Urinalysis Basic - ( 14 Nov 2023 08:37 )    Color: x / Appearance: x / SG: x / pH: x  Gluc: 107 mg/dL / Ketone: x  / Bili: x / Urobili: x   Blood: x / Protein: x / Nitrite: x   Leuk Esterase: x / RBC: x / WBC x   Sq Epi: x / Non Sq Epi: x / Bacteria: x            RADIOLOGY & ADDITIONAL TESTS:  New Results Reviewed Today:   New Imaging Personally Reviewed Today:  New Electrocardiogram Personally Reviewed Today:  Prior or Outpatient Records Reviewed Today:    COMMUNICATION:  Care Discussed with Consultants/Other Providers and Details of Discussion: Spoke to vascular to address patient's concern: patient refusing to take steroids to prevent contrast induced allergy, states he had a severe reaction last time despite taking steroids where he had an anaphylaxtic episode and requiring hospitaliztion for 1 week. States it occurred 2x in the past with contrast, both times were very severe. Also would like to speak to nephro regarding renal transplant, patient is interested. Nephrology notified.   Discussions with Patient/Family:  PCP Communication:    
[facility] Division of Hospital Medicine  Radha Crisostomo MD  Available via MS Teams  Pager: [phone]    SUBJECTIVE / OVERNIGHT EVENTS: Patient seen and examined at bedside. No acute overnight events. Pt denies dyspnea, chest pain fevers, chills, cough, HA, dizziness, syncope, chest palpitations, abd pain, n/v/d, urinary or bowel changes, active sites of bleeding or any other symptoms at this time.    ADDITIONAL REVIEW OF SYSTEMS: n/a    MEDICATIONS  (STANDING):  atorvastatin 40 milliGRAM(s) Oral at bedtime  bacitracin   Ointment 1 Application(s) Topical two times a day  cefTRIAXone   IVPB 2000 milliGRAM(s) IV Intermittent every 24 hours  chlorhexidine 4% Liquid 1 Application(s) Topical <User Schedule>  dextrose 5%. 1000 milliLiter(s) (50 mL/Hr) IV Continuous <Continuous>  dextrose 5%. 1000 milliLiter(s) (100 mL/Hr) IV Continuous <Continuous>  dextrose 50% Injectable 25 Gram(s) IV Push once  dextrose 50% Injectable 12.5 Gram(s) IV Push once  dextrose 50% Injectable 25 Gram(s) IV Push once  diphenhydrAMINE 50 milliGRAM(s) Oral once  epoetin isra (EPOGEN) Injectable 12212 Unit(s) IV Push <User Schedule>  glucagon  Injectable 1 milliGRAM(s) IntraMuscular once  hemorrhoidal Ointment 1 Application(s) Rectal two times a day  heparin  Infusion.  Unit(s)/Hr (24 mL/Hr) IV Continuous <Continuous>  insulin lispro (ADMELOG) corrective regimen sliding scale   SubCutaneous at bedtime  insulin lispro (ADMELOG) corrective regimen sliding scale   SubCutaneous three times a day before meals  insulin lispro (ADMELOG) corrective regimen sliding scale   SubCutaneous at bedtime  lidocaine   4% Patch 1 Patch Transdermal daily  methylPREDNISolone 32 milliGRAM(s) Oral once  metoprolol tartrate 25 milliGRAM(s) Oral two times a day  polyethylene glycol 3350 17 Gram(s) Oral daily  sevelamer carbonate 1600 milliGRAM(s) Oral three times a day with meals    MEDICATIONS  (PRN):  acetaminophen     Tablet .. 650 milliGRAM(s) Oral every 6 hours PRN Mild Pain (1 - 3)  aluminum hydroxide/magnesium hydroxide/simethicone Suspension 30 milliLiter(s) Oral every 4 hours PRN Dyspepsia  dextrose Oral Gel 15 Gram(s) Oral once PRN Blood Glucose LESS THAN 70 milliGRAM(s)/deciliter  heparin   Injectable 11424 Unit(s) IV Push every 6 hours PRN For aPTT less than 40  heparin   Injectable 5000 Unit(s) IV Push every 6 hours PRN For aPTT between 40 - 57  ondansetron Injectable 4 milliGRAM(s) IV Push every 8 hours PRN Nausea and/or Vomiting  sodium chloride 0.9% lock flush 10 milliLiter(s) IV Push every 1 hour PRN Pre/post blood products, medications, blood draw, and to maintain line patency      I&O's Summary    15 Nov 2023 07:01  -  16 Nov 2023 07:00  --------------------------------------------------------  IN: 240 mL / OUT: 1000 mL / NET: -760 mL        PHYSICAL EXAM:  Vital Signs Last 24 Hrs  T(C): 36.8 (16 Nov 2023 10:03), Max: 37.2 (15 Nov 2023 21:05)  T(F): 98.2 (16 Nov 2023 10:03), Max: 99 (15 Nov 2023 21:05)  HR: 106 (16 Nov 2023 10:03) (106 - 120)  BP: 128/81 (16 Nov 2023 10:03) (104/69 - 128/81)  BP(mean): --  RR: 18 (16 Nov 2023 10:03) (18 - 18)  SpO2: 97% (16 Nov 2023 10:03) (95% - 99%)    Parameters below as of 16 Nov 2023 10:03  Patient On (Oxygen Delivery Method): room air      CONSTITUTIONAL: NAD, well-developed, well-groomed  EYES: PERRLA; conjunctiva and sclera clear  ENMT: Moist oral mucosa, no pharyngeal injection or exudates; normal dentition  NECK: Supple, no palpable masses; no thyromegaly  RESPIRATORY: Normal respiratory effort; lungs are clear to auscultation bilaterally  CARDIOVASCULAR: Regular rate and rhythm, normal S1 and S2, no murmur/rub/gallop; No lower extremity edema; Peripheral pulses are 2+ bilaterally  ABDOMEN: Nontender to palpation, normoactive bowel sounds, no rebound/guarding; No hepatosplenomegaly  MUSCULOSKELETAL:   no clubbing or cyanosis of digits; no joint swelling or tenderness to palpation  PSYCH: A+O to person, place, and time; affect appropriate  NEUROLOGY: CN 2-12 are intact and symmetric; no gross sensory deficits   SKIN: No rashes; no palpable lesions    LABS:                        11.0   6.90  )-----------( 254      ( 15 Nov 2023 08:30 )             36.9     11-16    137  |  95<L>  |  45<H>  ----------------------------<  113<H>  4.4   |  23  |  10.89<H>    Ca    11.0<H>      16 Nov 2023 08:10      PTT - ( 16 Nov 2023 12:15 )  PTT:33.0 sec      Urinalysis Basic - ( 16 Nov 2023 08:10 )    Color: x / Appearance: x / SG: x / pH: x  Gluc: 113 mg/dL / Ketone: x  / Bili: x / Urobili: x   Blood: x / Protein: x / Nitrite: x   Leuk Esterase: x / RBC: x / WBC x   Sq Epi: x / Non Sq Epi: x / Bacteria: x            RADIOLOGY & ADDITIONAL TESTS:  New Results Reviewed Today:   New Imaging Personally Reviewed Today:  New Electrocardiogram Personally Reviewed Today:  Prior or Outpatient Records Reviewed Today:    COMMUNICATION:  Care Discussed with Consultants/Other Providers and Details of Discussion: Spoke to Vascular, will aim for AVF next week. Pending MR venogram chest.   Discussions with Patient/Family: n/a  PCP Communication:    
PROGRESS NOTE:   Authored by Dr. Pacheco Bello MD, Available on MS Teams    Patient is a 58y old  Male who presents with a chief complaint of fever (20 Nov 2023 10:20)      SUBJECTIVE / OVERNIGHT EVENTS: Patient denies any complaints. No acute events overnight.    ADDITIONAL REVIEW OF SYSTEMS:    MEDICATIONS  (STANDING):  atorvastatin 40 milliGRAM(s) Oral at bedtime  bacitracin   Ointment 1 Application(s) Topical two times a day  cefTRIAXone   IVPB 2000 milliGRAM(s) IV Intermittent every 24 hours  chlorhexidine 4% Liquid 1 Application(s) Topical <User Schedule>  dextrose 5%. 1000 milliLiter(s) (100 mL/Hr) IV Continuous <Continuous>  dextrose 5%. 1000 milliLiter(s) (50 mL/Hr) IV Continuous <Continuous>  dextrose 50% Injectable 25 Gram(s) IV Push once  dextrose 50% Injectable 25 Gram(s) IV Push once  dextrose 50% Injectable 12.5 Gram(s) IV Push once  diphenhydrAMINE 50 milliGRAM(s) Oral once  epoetin isra (EPOGEN) Injectable 70421 Unit(s) IV Push <User Schedule>  glucagon  Injectable 1 milliGRAM(s) IntraMuscular once  hemorrhoidal Ointment 1 Application(s) Rectal two times a day  insulin lispro (ADMELOG) corrective regimen sliding scale   SubCutaneous at bedtime  insulin lispro (ADMELOG) corrective regimen sliding scale   SubCutaneous three times a day before meals  lidocaine   4% Patch 1 Patch Transdermal daily  methylPREDNISolone 32 milliGRAM(s) Oral once  metoprolol tartrate 25 milliGRAM(s) Oral two times a day  polyethylene glycol 3350 17 Gram(s) Oral daily  sevelamer carbonate 1600 milliGRAM(s) Oral three times a day with meals    MEDICATIONS  (PRN):  acetaminophen     Tablet .. 650 milliGRAM(s) Oral every 6 hours PRN Mild Pain (1 - 3)  aluminum hydroxide/magnesium hydroxide/simethicone Suspension 30 milliLiter(s) Oral every 4 hours PRN Dyspepsia  dextrose Oral Gel 15 Gram(s) Oral once PRN Blood Glucose LESS THAN 70 milliGRAM(s)/deciliter  ondansetron Injectable 4 milliGRAM(s) IV Push every 8 hours PRN Nausea and/or Vomiting  sodium chloride 0.9% lock flush 10 milliLiter(s) IV Push every 1 hour PRN Pre/post blood products, medications, blood draw, and to maintain line patency      CAPILLARY BLOOD GLUCOSE      POCT Blood Glucose.: 104 mg/dL (20 Nov 2023 13:39)  POCT Blood Glucose.: 120 mg/dL (20 Nov 2023 09:48)  POCT Blood Glucose.: 102 mg/dL (19 Nov 2023 21:30)  POCT Blood Glucose.: 104 mg/dL (19 Nov 2023 18:29)    I&O's Summary    19 Nov 2023 07:01  -  20 Nov 2023 07:00  --------------------------------------------------------  IN: 1046 mL / OUT: 1000 mL / NET: 46 mL    20 Nov 2023 07:01  -  20 Nov 2023 14:54  --------------------------------------------------------  IN: 240 mL / OUT: 150 mL / NET: 90 mL        PHYSICAL EXAM:  Vital Signs Last 24 Hrs  T(C): 36.7 (20 Nov 2023 13:22), Max: 38.1 (19 Nov 2023 18:46)  T(F): 98.1 (20 Nov 2023 13:22), Max: 100.6 (19 Nov 2023 18:46)  HR: 100 (20 Nov 2023 13:22) (97 - 122)  BP: 108/71 (20 Nov 2023 13:22) (74/50 - 108/71)  BP(mean): --  RR: 18 (20 Nov 2023 13:22) (18 - 18)  SpO2: 98% (20 Nov 2023 13:22) (93% - 99%)    Parameters below as of 20 Nov 2023 13:22  Patient On (Oxygen Delivery Method): room air        CONSTITUTIONAL: NAD  RESPIRATORY: Normal respiratory effort; lungs are clear to auscultation bilaterally  CARDIOVASCULAR: Regular rate and rhythm, normal S1 and S2, no murmur/rub/gallop; No lower extremity edema  ABDOMEN: Nontender to palpation, normoactive bowel sounds, no rebound/guarding  MUSCLOSKELETAL: no clubbing or cyanosis of digits; no joint swelling or tenderness to palpation  PSYCH: A+O to person, place, and time; affect appropriate    LABS:                        10.9   6.47  )-----------( 164      ( 20 Nov 2023 09:23 )             36.8     11-20    135  |  93<L>  |  38<H>  ----------------------------<  94  4.0   |  23  |  11.68<H>    Ca    10.7<H>      20 Nov 2023 09:23      PTT - ( 20 Nov 2023 09:23 )  PTT:107.2 sec      Urinalysis Basic - ( 20 Nov 2023 09:23 )    Color: x / Appearance: x / SG: x / pH: x  Gluc: 94 mg/dL / Ketone: x  / Bili: x / Urobili: x   Blood: x / Protein: x / Nitrite: x   Leuk Esterase: x / RBC: x / WBC x   Sq Epi: x / Non Sq Epi: x / Bacteria: x  
Subjective: Patient seen and examined. No new events except as noted.     REVIEW OF SYSTEMS:    CONSTITUTIONAL: + weakness, fevers or chills  EYES/ENT: No visual changes;  No vertigo or throat pain   NECK: No pain or stiffness  RESPIRATORY: No cough, wheezing, hemoptysis; No shortness of breath  CARDIOVASCULAR: No chest pain or palpitations  GASTROINTESTINAL: No abdominal or epigastric pain. No nausea, vomiting, or hematemesis; No diarrhea or constipation. No melena or hematochezia.  GENITOURINARY: No dysuria, frequency or hematuria  NEUROLOGICAL: No numbness or weakness  SKIN: No itching, burning, rashes, or lesions   All other review of systems is negative unless indicated above.    MEDICATIONS:  MEDICATIONS  (STANDING):  apixaban 5 milliGRAM(s) Oral two times a day  atorvastatin 40 milliGRAM(s) Oral at bedtime  cefTRIAXone   IVPB 2000 milliGRAM(s) IV Intermittent every 24 hours  chlorhexidine 4% Liquid 1 Application(s) Topical <User Schedule>  dextrose 5%. 1000 milliLiter(s) (100 mL/Hr) IV Continuous <Continuous>  dextrose 5%. 1000 milliLiter(s) (50 mL/Hr) IV Continuous <Continuous>  dextrose 50% Injectable 25 Gram(s) IV Push once  dextrose 50% Injectable 12.5 Gram(s) IV Push once  dextrose 50% Injectable 25 Gram(s) IV Push once  epoetin isra (EPOGEN) Injectable 2000 Unit(s) IV Push <User Schedule>  glucagon  Injectable 1 milliGRAM(s) IntraMuscular once  insulin lispro (ADMELOG) corrective regimen sliding scale   SubCutaneous three times a day before meals  insulin lispro (ADMELOG) corrective regimen sliding scale   SubCutaneous at bedtime  metoprolol tartrate 25 milliGRAM(s) Oral two times a day  sevelamer carbonate 800 milliGRAM(s) Oral three times a day with meals      PHYSICAL EXAM:  T(C): 37.1 (11-07-23 @ 05:32), Max: 38.3 (11-06-23 @ 23:37)  HR: 104 (11-07-23 @ 05:32) (99 - 120)  BP: 111/70 (11-07-23 @ 05:32) (106/54 - 167/88)  RR: 18 (11-07-23 @ 05:32) (18 - 18)  SpO2: 96% (11-07-23 @ 05:32) (96% - 100%)  Wt(kg): --  I&O's Summary    06 Nov 2023 07:01  -  07 Nov 2023 07:00  --------------------------------------------------------  IN: 620 mL / OUT: 1225 mL / NET: -605 mL          Appearance: NAD  HEENT:   Dry  oral mucosa, PERRL, EOMI	  Lymphatic: No lymphadenopathy  Cardiovascular: Normal S1 S2, No JVD, No murmurs, No edema  Respiratory: decreased bs    Psychiatry: A & O x 3, Mood & affect appropriate  Gastrointestinal:  Soft, Non-tender, + BS	  Skin: No rashes, No ecchymoses, No cyanosis	  Neurologic: Non-focal  Extremities: Normal range of motion, No clubbing, cyanosis or edema  Vascular: Peripheral pulses palpable 2+ bilaterally  CVC in R groin    LABS:    CARDIAC MARKERS:                                9.7    5.65  )-----------( 170      ( 07 Nov 2023 07:12 )             32.4     11-07    137  |  96  |  34<H>  ----------------------------<  78  4.3   |  21<L>  |  9.86<H>    Ca    10.1      07 Nov 2023 07:16  Phos  5.1     11-07  Mg     2.2     11-07      proBNP:   Lipid Profile:   HgA1c:   TSH:     Negative          TELEMETRY: 	    ECG:  	  RADIOLOGY:   DIAGNOSTIC TESTING:  [ ] Echocardiogram:  [ ]  Catheterization:  [ ] Stress Test:    OTHER: 	          
Subjective: Patient seen and examined. No new events except as noted.     REVIEW OF SYSTEMS:    CONSTITUTIONAL: + weakness, fevers or chills  EYES/ENT: No visual changes;  No vertigo or throat pain   NECK: No pain or stiffness  RESPIRATORY: No cough, wheezing, hemoptysis; No shortness of breath  CARDIOVASCULAR: No chest pain or palpitations  GASTROINTESTINAL: No abdominal or epigastric pain. No nausea, vomiting, or hematemesis; No diarrhea or constipation. No melena or hematochezia.  GENITOURINARY: No dysuria, frequency or hematuria  NEUROLOGICAL: No numbness or weakness  SKIN: No itching, burning, rashes, or lesions   All other review of systems is negative unless indicated above.    MEDICATIONS:  MEDICATIONS  (STANDING):  apixaban 5 milliGRAM(s) Oral two times a day  atorvastatin 40 milliGRAM(s) Oral at bedtime  cefTRIAXone   IVPB 2000 milliGRAM(s) IV Intermittent every 24 hours  dextrose 5%. 1000 milliLiter(s) (100 mL/Hr) IV Continuous <Continuous>  dextrose 5%. 1000 milliLiter(s) (50 mL/Hr) IV Continuous <Continuous>  dextrose 50% Injectable 25 Gram(s) IV Push once  dextrose 50% Injectable 12.5 Gram(s) IV Push once  dextrose 50% Injectable 25 Gram(s) IV Push once  epoetin isra (EPOGEN) Injectable 2000 Unit(s) IV Push <User Schedule>  glucagon  Injectable 1 milliGRAM(s) IntraMuscular once  insulin lispro (ADMELOG) corrective regimen sliding scale   SubCutaneous three times a day before meals  insulin lispro (ADMELOG) corrective regimen sliding scale   SubCutaneous at bedtime  metoprolol tartrate 25 milliGRAM(s) Oral two times a day  sevelamer carbonate 800 milliGRAM(s) Oral three times a day with meals      PHYSICAL EXAM:  T(C): 39.4 (11-06-23 @ 08:33), Max: 39.4 (11-05-23 @ 13:37)  HR: 91 (11-06-23 @ 08:33) (91 - 126)  BP: 138/73 (11-06-23 @ 08:33) (97/64 - 138/73)  RR: 19 (11-06-23 @ 08:33) (19 - 20)  SpO2: 98% (11-06-23 @ 08:33) (95% - 98%)  Wt(kg): --  I&O's Summary    05 Nov 2023 07:01  -  06 Nov 2023 07:00  --------------------------------------------------------  IN: 630 mL / OUT: 575 mL / NET: 55 mL    06 Nov 2023 07:01  -  06 Nov 2023 09:47  --------------------------------------------------------  IN: 0 mL / OUT: 225 mL / NET: -225 mL      Appearance: NAD  HEENT:   Dry  oral mucosa, PERRL, EOMI	  Lymphatic: No lymphadenopathy  Cardiovascular: Normal S1 S2, No JVD, No murmurs, No edema  Respiratory: decreased bs    Psychiatry: A & O x 3, Mood & affect appropriate  Gastrointestinal:  Soft, Non-tender, + BS	  Skin: No rashes, No ecchymoses, No cyanosis	  Neurologic: Non-focal  Extremities: Normal range of motion, No clubbing, cyanosis or edema  Vascular: Peripheral pulses palpable 2+ bilaterally  CVC in R groin      LABS:    CARDIAC MARKERS:                                11.0   7.56  )-----------( 143      ( 06 Nov 2023 07:39 )             37.9     11-06    132<L>  |  96  |  47<H>  ----------------------------<  75  5.7<H>   |  15<L>  |  12.59<H>    Ca    10.2      06 Nov 2023 07:39  Phos  6.1     11-06    TPro  7.7  /  Alb  3.6  /  TBili  1.2  /  DBili  x   /  AST  20  /  ALT  13  /  AlkPhos  50  11-05      TELEMETRY: 	    ECG:  	  RADIOLOGY:   DIAGNOSTIC TESTING:  [ ] Echocardiogram:    [ ]  Catheterization:  [ ] Stress Test:    OTHER: 	          
[facility] Division of Hospital Medicine  Radha Crisostomo MD  Available via MS Teams  Pager: [phone]    SUBJECTIVE / OVERNIGHT EVENTS: Patient seen and examined at bedside. No acute overnight events. Pt denies dyspnea, chest pain fevers, chills, cough, HA, dizziness, syncope, chest palpitations, abd pain, n/v/d, urinary or bowel changes, active sites of bleeding or any other symptoms at this time. Patient refused contrast study, will aim for MR venogram.     ADDITIONAL REVIEW OF SYSTEMS: n/a     MEDICATIONS  (STANDING):  apixaban 5 milliGRAM(s) Oral two times a day  atorvastatin 40 milliGRAM(s) Oral at bedtime  bacitracin   Ointment 1 Application(s) Topical two times a day  cefTRIAXone   IVPB 2000 milliGRAM(s) IV Intermittent every 24 hours  chlorhexidine 4% Liquid 1 Application(s) Topical <User Schedule>  dextrose 5%. 1000 milliLiter(s) (50 mL/Hr) IV Continuous <Continuous>  dextrose 5%. 1000 milliLiter(s) (100 mL/Hr) IV Continuous <Continuous>  dextrose 50% Injectable 12.5 Gram(s) IV Push once  dextrose 50% Injectable 25 Gram(s) IV Push once  dextrose 50% Injectable 25 Gram(s) IV Push once  diphenhydrAMINE 50 milliGRAM(s) Oral once  epoetin isra (EPOGEN) Injectable 17219 Unit(s) IV Push <User Schedule>  glucagon  Injectable 1 milliGRAM(s) IntraMuscular once  hemorrhoidal Ointment 1 Application(s) Rectal two times a day  insulin lispro (ADMELOG) corrective regimen sliding scale   SubCutaneous at bedtime  insulin lispro (ADMELOG) corrective regimen sliding scale   SubCutaneous at bedtime  insulin lispro (ADMELOG) corrective regimen sliding scale   SubCutaneous three times a day before meals  lidocaine   4% Patch 1 Patch Transdermal daily  methylPREDNISolone 32 milliGRAM(s) Oral once  metoprolol tartrate 25 milliGRAM(s) Oral two times a day  polyethylene glycol 3350 17 Gram(s) Oral daily  sevelamer carbonate 1600 milliGRAM(s) Oral three times a day with meals    MEDICATIONS  (PRN):  acetaminophen     Tablet .. 650 milliGRAM(s) Oral every 6 hours PRN Mild Pain (1 - 3)  dextrose Oral Gel 15 Gram(s) Oral once PRN Blood Glucose LESS THAN 70 milliGRAM(s)/deciliter  sodium chloride 0.9% lock flush 10 milliLiter(s) IV Push every 1 hour PRN Pre/post blood products, medications, blood draw, and to maintain line patency      I&O's Summary    14 Nov 2023 07:01  -  15 Nov 2023 07:00  --------------------------------------------------------  IN: 320 mL / OUT: 200 mL / NET: 120 mL    15 Nov 2023 07:01  -  15 Nov 2023 13:31  --------------------------------------------------------  IN: 240 mL / OUT: 0 mL / NET: 240 mL        PHYSICAL EXAM:  Vital Signs Last 24 Hrs  T(C): 37 (15 Nov 2023 13:19), Max: 37.6 (15 Nov 2023 01:02)  T(F): 98.6 (15 Nov 2023 13:19), Max: 99.6 (15 Nov 2023 01:02)  HR: 120 (15 Nov 2023 13:19) (102 - 120)  BP: 124/79 (15 Nov 2023 13:19) (101/73 - 155/77)  BP(mean): --  RR: 18 (15 Nov 2023 13:19) (16 - 18)  SpO2: 97% (15 Nov 2023 13:19) (93% - 97%)    Parameters below as of 15 Nov 2023 13:19  Patient On (Oxygen Delivery Method): room air      CONSTITUTIONAL: NAD, well-developed, well-groomed  EYES: PERRLA; conjunctiva and sclera clear  ENMT: Moist oral mucosa, no pharyngeal injection or exudates; normal dentition  NECK: Supple, no palpable masses; no thyromegaly  RESPIRATORY: Normal respiratory effort; lungs are clear to auscultation bilaterally  CARDIOVASCULAR: Regular rate and rhythm, normal S1 and S2, no murmur/rub/gallop; No lower extremity edema; Peripheral pulses are 2+ bilaterally  ABDOMEN: Nontender to palpation, normoactive bowel sounds, no rebound/guarding; No hepatosplenomegaly  MUSCULOSKELETAL:   no clubbing or cyanosis of digits; no joint swelling or tenderness to palpation  PSYCH: A+O to person, place, and time; affect appropriate  NEUROLOGY: CN 2-12 are intact and symmetric; no gross sensory deficits   SKIN: No rashes; no palpable lesions    LABS:                        11.0   6.90  )-----------( 254      ( 15 Nov 2023 08:30 )             36.9     11-15    135  |  96  |  47<H>  ----------------------------<  158<H>  5.1   |  23  |  12.47<H>    Ca    10.9<H>      15 Nov 2023 08:30  Phos  6.1     11-14  Mg     2.4     11-14            Urinalysis Basic - ( 15 Nov 2023 08:30 )    Color: x / Appearance: x / SG: x / pH: x  Gluc: 158 mg/dL / Ketone: x  / Bili: x / Urobili: x   Blood: x / Protein: x / Nitrite: x   Leuk Esterase: x / RBC: x / WBC x   Sq Epi: x / Non Sq Epi: x / Bacteria: x            RADIOLOGY & ADDITIONAL TESTS:  New Results Reviewed Today:   New Imaging Personally Reviewed Today:  New Electrocardiogram Personally Reviewed Today:  Prior or Outpatient Records Reviewed Today:    COMMUNICATION:  Care Discussed with Consultants/Other Providers and Details of Discussion: Spoke to Vascular, since patient refused CT venogram due to severe contrast allergy, will aim for MR venogram. Spoke to Nephrologist, updated and followed up on the patient regarding renal transplant, will need to f/u outpt.   Discussions with Patient/Family:  PCP Communication:    
Subjective: Patient seen and examined. No new events except as noted.     REVIEW OF SYSTEMS:    CONSTITUTIONAL:+ weakness, fevers or chills  EYES/ENT: No visual changes;  No vertigo or throat pain   NECK: No pain or stiffness  RESPIRATORY: No cough, wheezing, hemoptysis; No shortness of breath  CARDIOVASCULAR: No chest pain or palpitations  GASTROINTESTINAL: No abdominal or epigastric pain. No nausea, vomiting, or hematemesis; No diarrhea or constipation. No melena or hematochezia.  GENITOURINARY: No dysuria, frequency or hematuria  NEUROLOGICAL: No numbness or weakness  SKIN: No itching, burning, rashes, or lesions   All other review of systems is negative unless indicated above.    MEDICATIONS:  MEDICATIONS  (STANDING):  apixaban 5 milliGRAM(s) Oral two times a day  atorvastatin 40 milliGRAM(s) Oral at bedtime  bacitracin   Ointment 1 Application(s) Topical two times a day  cefTRIAXone   IVPB 2000 milliGRAM(s) IV Intermittent every 24 hours  chlorhexidine 4% Liquid 1 Application(s) Topical <User Schedule>  dextrose 5%. 1000 milliLiter(s) (50 mL/Hr) IV Continuous <Continuous>  dextrose 5%. 1000 milliLiter(s) (100 mL/Hr) IV Continuous <Continuous>  dextrose 50% Injectable 25 Gram(s) IV Push once  dextrose 50% Injectable 12.5 Gram(s) IV Push once  dextrose 50% Injectable 25 Gram(s) IV Push once  diazepam    Tablet 10 milliGRAM(s) Oral once  diphenhydrAMINE 50 milliGRAM(s) Oral once  epoetin isra (EPOGEN) Injectable 13081 Unit(s) IV Push <User Schedule>  glucagon  Injectable 1 milliGRAM(s) IntraMuscular once  hemorrhoidal Ointment 1 Application(s) Rectal two times a day  insulin lispro (ADMELOG) corrective regimen sliding scale   SubCutaneous at bedtime  insulin lispro (ADMELOG) corrective regimen sliding scale   SubCutaneous three times a day before meals  insulin lispro (ADMELOG) corrective regimen sliding scale   SubCutaneous at bedtime  lidocaine   4% Patch 1 Patch Transdermal daily  methylPREDNISolone 32 milliGRAM(s) Oral once  metoprolol tartrate 25 milliGRAM(s) Oral two times a day  polyethylene glycol 3350 17 Gram(s) Oral daily  sevelamer carbonate 1600 milliGRAM(s) Oral three times a day with meals      PHYSICAL EXAM:  T(C): 36.8 (11-16-23 @ 05:11), Max: 37.2 (11-15-23 @ 21:05)  HR: 108 (11-16-23 @ 05:11) (108 - 120)  BP: 110/72 (11-16-23 @ 05:11) (104/69 - 128/68)  RR: 18 (11-16-23 @ 05:11) (17 - 18)  SpO2: 95% (11-16-23 @ 05:11) (95% - 99%)  Wt(kg): --  I&O's Summary    15 Nov 2023 07:01  -  16 Nov 2023 07:00  --------------------------------------------------------  IN: 240 mL / OUT: 1000 mL / NET: -760 mL          Appearance: Normal	  HEENT:   Normal oral mucosa, PERRL, EOMI	  Lymphatic: No lymphadenopathy , no edema  Cardiovascular: Normal S1 S2, No JVD, No murmurs , Peripheral pulses palpable 2+ bilaterally  Respiratory: Lungs clear to auscultation, normal effort 	  Gastrointestinal:  Soft, Non-tender, + BS	  Skin: No rashes, No ecchymoses, No cyanosis, warm to touch  Musculoskeletal: Normal range of motion, normal strength  Psychiatry:  Mood & affect appropriate  Ext: No edema  Hd catheter      LABS:    CARDIAC MARKERS:                                11.0   6.90  )-----------( 254      ( 15 Nov 2023 08:30 )             36.9     11-16    137  |  95<L>  |  45<H>  ----------------------------<  113<H>  4.4   |  23  |  10.89<H>    Ca    11.0<H>      16 Nov 2023 08:10      proBNP:   Lipid Profile:   HgA1c:   TSH:             TELEMETRY: 	    ECG:  	  RADIOLOGY:   DIAGNOSTIC TESTING:  [ ] Echocardiogram:  [ ]  Catheterization:  [ ] Stress Test:    OTHER: 	          
Subjective: Patient seen and examined. No new events except as noted.     REVIEW OF SYSTEMS:    CONSTITUTIONAL:+ weakness, fevers or chills  EYES/ENT: No visual changes;  No vertigo or throat pain   NECK: No pain or stiffness  RESPIRATORY: No cough, wheezing, hemoptysis; No shortness of breath  CARDIOVASCULAR: No chest pain or palpitations  GASTROINTESTINAL: No abdominal or epigastric pain. No nausea, vomiting, or hematemesis; No diarrhea or constipation. No melena or hematochezia.  GENITOURINARY: No dysuria, frequency or hematuria  NEUROLOGICAL: No numbness or weakness  SKIN: No itching, burning, rashes, or lesions   All other review of systems is negative unless indicated above.    MEDICATIONS:  MEDICATIONS  (STANDING):  apixaban 5 milliGRAM(s) Oral two times a day  atorvastatin 40 milliGRAM(s) Oral at bedtime  cefTRIAXone   IVPB 2000 milliGRAM(s) IV Intermittent every 24 hours  chlorhexidine 4% Liquid 1 Application(s) Topical <User Schedule>  dextrose 5%. 1000 milliLiter(s) (50 mL/Hr) IV Continuous <Continuous>  dextrose 5%. 1000 milliLiter(s) (100 mL/Hr) IV Continuous <Continuous>  dextrose 50% Injectable 12.5 Gram(s) IV Push once  dextrose 50% Injectable 25 Gram(s) IV Push once  dextrose 50% Injectable 25 Gram(s) IV Push once  epoetin isra (EPOGEN) Injectable 93415 Unit(s) IV Push <User Schedule>  glucagon  Injectable 1 milliGRAM(s) IntraMuscular once  insulin lispro (ADMELOG) corrective regimen sliding scale   SubCutaneous three times a day before meals  insulin lispro (ADMELOG) corrective regimen sliding scale   SubCutaneous at bedtime  lidocaine   4% Patch 1 Patch Transdermal daily  metoprolol tartrate 25 milliGRAM(s) Oral two times a day  sevelamer carbonate 800 milliGRAM(s) Oral three times a day with meals      PHYSICAL EXAM:  T(C): 37.4 (11-08-23 @ 17:13), Max: 37.6 (11-07-23 @ 22:02)  HR: 91 (11-08-23 @ 17:13) (86 - 100)  BP: 138/77 (11-08-23 @ 17:13) (114/68 - 148/70)  RR: 18 (11-08-23 @ 17:13) (18 - 18)  SpO2: 94% (11-08-23 @ 17:13) (93% - 96%)  Wt(kg): --  I&O's Summary    07 Nov 2023 07:01  -  08 Nov 2023 07:00  --------------------------------------------------------  IN: 480 mL / OUT: 182 mL / NET: 298 mL    08 Nov 2023 07:01  -  08 Nov 2023 18:07  --------------------------------------------------------  IN: 160 mL / OUT: 0 mL / NET: 160 mL          Appearance: NAD  HEENT:   Dry  oral mucosa, PERRL, EOMI	  Lymphatic: No lymphadenopathy  Cardiovascular: Normal S1 S2, No JVD, No murmurs, No edema  Respiratory: decreased bs    Psychiatry: A & O x 3, Mood & affect appropriate  Gastrointestinal:  Soft, Non-tender, + BS	  Skin: No rashes, No ecchymoses, No cyanosis	  Neurologic: Non-focal  Extremities: Normal range of motion, No clubbing, cyanosis or edema  Vascular: Peripheral pulses palpable 2+ bilaterally  HD catheter     LABS:    CARDIAC MARKERS:                                8.6    5.37  )-----------( 191      ( 08 Nov 2023 09:28 )             28.3     11-08    141  |  99  |  53<H>  ----------------------------<  77  4.6   |  20<L>  |  12.47<H>    Ca    9.9      08 Nov 2023 09:28  Phos  6.0     11-08  Mg     2.3     11-08        TELEMETRY: 	    ECG:  	  RADIOLOGY:   DIAGNOSTIC TESTING:  [ ] Echocardiogram:  < from: TTE W or WO Ultrasound Enhancing Agent (11.07.23 @ 08:07) >    TRANSTHORACIC ECHOCARDIOGRAM REPORT  ________________________________________________________________________________                                      _______       Pt. Name:       LORRAINE RUTH JR Study Date:    11/7/2023  MRN:            [id number]        YOB: 1965  Accession #:    [id number]        Age:           58 years  Account#:       [id number]     Gender:        M  Heart Rate:     97 bpm           Height:        69.00 in (175.26 cm)  Rhythm:         sinus rhythm     Weight:        350.00 lb (158.76 kg)  Blood Pressure: 127/84 mmHg      BSA/BMI:       2.62 m² / 51.69 kg/m²  ________________________________________________________________________________________  Referring Physician:    [id number] Nieves Fatima  Interpreting Physician: Yasmani Leon MD  Primary Sonographer:    Glen Galvez CS    CPT:                ECHO TTE WITH CON COMP W DOPP - .m;DEFINITY ECHO                      CONTRAST PER ML - .m;DEFINITY ECHO CONTRAST PER ML     WASTED - .m  Indication(s):      Abnormal electrocardiogram ECG/EKG - R94.31  Procedure:          Transthoracic echocardiogram with 2-D, M-mode and complete                      spectral and color flow Doppler.  Ordering Location:  [facility]  Admission Status:   Inpatient  Contrast Injection: Verbal consent was obtained for injection of Ultrasonic                      Enhancing Agent following a discussion of risks and                      benefits.                      Endocardial visualization enhanced with 3 ml of Definity                      Ultrasound enhancing agent (Lot#:[id number] Exp.Date:10/01/2024                      Discarded Dose:7ml).  UEA Reaction:       Patient had no adverse reaction after injection of                      Ultrasound Enhancing Agent.  Study Information:  Image quality for this study is technically difficult.    _______________________________________________________________________________________     CONCLUSIONS:      1. Technically difficult image quality.   2. Left ventricular systolic function is normal with an ejection fraction of 64 % by Taylor's method of disks.    ________________________________________________________________________________________  FINDINGS:     Left Ventricle:  After obtaining consent, Definity ultrasound enhancing agent was given for enhanced left ventricular opacification and improved delineation of the left ventricular endocardial borders. Left ventricular wall thickness is normal. Left ventricular systolic function is normal with a calculated ejection fraction of 64 % by the Taylor's biplane method of disks. There are no regional wall motion abnormalities seen.     Right Ventricle:  The right ventricular cavity is normal in size, normal wall thickness and normal systolic function. Tricuspid annular plane systolic excursion (TAPSE) is 1.9 cm (normal >=1.7 cm).     Left Atrium:  The left atrium is normal in size.     Right Atrium:  The right atrium is normal in size with an indexed volume of 14.01 ml/m².     Aortic Valve:  Normal aortic valve.     Mitral Valve:  Normal mitral valve.     Tricuspid Valve:  Normal tricuspid valve.     Pulmonic Valve:  Normal pulmonic valve.     Aorta:  The aortic annulus and aortic root appear normal in size.     Pericardium:  No pericardial effusion seen.     Systemic Veins:  The inferior vena cava is normal in size measuring 0.80 cm in diameter, (normal <2.1cm) with abnormal inspiratory collapse (abnormal <50%) consistent with mildly elevated right atrial pressure (~8, range 5-10mmHg).  ____________________________________________________________________    < end of copied text >    [ ]  Catheterization:  [ ] Stress Test:    OTHER: 	          
PROGRESS NOTE:   Authored by Dr. Pacheco Bello MD, Available on MS Teams    Patient is a 58y old  Male who presents with a chief complaint of fever (08 Nov 2023 18:06)      SUBJECTIVE / OVERNIGHT EVENTS: Patient continues to have back pain. No chest pain or abdominal pain.    ADDITIONAL REVIEW OF SYSTEMS:    MEDICATIONS  (STANDING):  apixaban 5 milliGRAM(s) Oral two times a day  atorvastatin 40 milliGRAM(s) Oral at bedtime  cefTRIAXone   IVPB 2000 milliGRAM(s) IV Intermittent every 24 hours  chlorhexidine 4% Liquid 1 Application(s) Topical <User Schedule>  dextrose 5%. 1000 milliLiter(s) (50 mL/Hr) IV Continuous <Continuous>  dextrose 5%. 1000 milliLiter(s) (100 mL/Hr) IV Continuous <Continuous>  dextrose 50% Injectable 12.5 Gram(s) IV Push once  dextrose 50% Injectable 25 Gram(s) IV Push once  dextrose 50% Injectable 25 Gram(s) IV Push once  epoetin isra (EPOGEN) Injectable 45739 Unit(s) IV Push <User Schedule>  glucagon  Injectable 1 milliGRAM(s) IntraMuscular once  insulin lispro (ADMELOG) corrective regimen sliding scale   SubCutaneous three times a day before meals  insulin lispro (ADMELOG) corrective regimen sliding scale   SubCutaneous at bedtime  lidocaine   4% Patch 1 Patch Transdermal daily  metoprolol tartrate 25 milliGRAM(s) Oral two times a day  sevelamer carbonate 800 milliGRAM(s) Oral three times a day with meals    MEDICATIONS  (PRN):  acetaminophen     Tablet .. 650 milliGRAM(s) Oral every 6 hours PRN Temp greater or equal to 38C (100.4F), Moderate Pain (4 - 6)  dextrose Oral Gel 15 Gram(s) Oral once PRN Blood Glucose LESS THAN 70 milliGRAM(s)/deciliter  sodium chloride 0.9% lock flush 10 milliLiter(s) IV Push every 1 hour PRN Pre/post blood products, medications, blood draw, and to maintain line patency      CAPILLARY BLOOD GLUCOSE      POCT Blood Glucose.: 94 mg/dL (08 Nov 2023 14:52)  POCT Blood Glucose.: 86 mg/dL (08 Nov 2023 08:25)  POCT Blood Glucose.: 106 mg/dL (07 Nov 2023 22:00)    I&O's Summary    07 Nov 2023 07:01  -  08 Nov 2023 07:00  --------------------------------------------------------  IN: 480 mL / OUT: 182 mL / NET: 298 mL    08 Nov 2023 07:01  -  08 Nov 2023 18:51  --------------------------------------------------------  IN: 160 mL / OUT: 0 mL / NET: 160 mL        PHYSICAL EXAM:  Vital Signs Last 24 Hrs  T(C): 36.8 (08 Nov 2023 17:49), Max: 37.6 (07 Nov 2023 22:02)  T(F): 98.2 (08 Nov 2023 17:49), Max: 99.7 (07 Nov 2023 22:02)  HR: 90 (08 Nov 2023 17:49) (86 - 100)  BP: 142/72 (08 Nov 2023 17:49) (114/68 - 148/70)  BP(mean): --  RR: 18 (08 Nov 2023 17:49) (18 - 18)  SpO2: 95% (08 Nov 2023 17:49) (93% - 96%)    Parameters below as of 08 Nov 2023 17:49  Patient On (Oxygen Delivery Method): room air        CONSTITUTIONAL: NAD  RESPIRATORY: Normal respiratory effort; lungs are clear to auscultation bilaterally  CARDIOVASCULAR: Regular rate and rhythm, normal S1 and S2, no murmur/rub/gallop; No lower extremity edema  ABDOMEN: Nontender to palpation, normoactive bowel sounds, no rebound/guarding  MUSCLOSKELETAL: no clubbing or cyanosis of digits; lowerback tenderness along the entire back  PSYCH: A+O to person, place, and time; affect appropriate    LABS:                        8.6    5.37  )-----------( 191      ( 08 Nov 2023 09:28 )             28.3     11-08    141  |  99  |  53<H>  ----------------------------<  77  4.6   |  20<L>  |  12.47<H>    Ca    9.9      08 Nov 2023 09:28  Phos  6.0     11-08  Mg     2.3     11-08            Urinalysis Basic - ( 08 Nov 2023 09:28 )    Color: x / Appearance: x / SG: x / pH: x  Gluc: 77 mg/dL / Ketone: x  / Bili: x / Urobili: x   Blood: x / Protein: x / Nitrite: x   Leuk Esterase: x / RBC: x / WBC x   Sq Epi: x / Non Sq Epi: x / Bacteria: x        Culture - Blood (collected 06 Nov 2023 07:58)  Source: .Blood Blood  Preliminary Report (08 Nov 2023 15:10):    No growth at 48 Hours    Culture - Blood (collected 06 Nov 2023 07:51)  Source: .Blood Blood  Preliminary Report (08 Nov 2023 15:10):    No growth at 48 Hours
Subjective: Patient seen and examined. No new events except as noted.     REVIEW OF SYSTEMS:    CONSTITUTIONAL:+ weakness, fevers or chills  EYES/ENT: No visual changes;  No vertigo or throat pain   NECK: No pain or stiffness  RESPIRATORY: No cough, wheezing, hemoptysis; No shortness of breath  CARDIOVASCULAR: No chest pain or palpitations  GASTROINTESTINAL: No abdominal or epigastric pain. No nausea, vomiting, or hematemesis; No diarrhea or constipation. No melena or hematochezia.  GENITOURINARY: No dysuria, frequency or hematuria  NEUROLOGICAL: No numbness or weakness  SKIN: No itching, burning, rashes, or lesions   All other review of systems is negative unless indicated above.    MEDICATIONS:  MEDICATIONS  (STANDING):  apixaban 5 milliGRAM(s) Oral two times a day  atorvastatin 40 milliGRAM(s) Oral at bedtime  bacitracin   Ointment 1 Application(s) Topical two times a day  cefTRIAXone   IVPB 2000 milliGRAM(s) IV Intermittent every 24 hours  chlorhexidine 4% Liquid 1 Application(s) Topical <User Schedule>  dextrose 5%. 1000 milliLiter(s) (50 mL/Hr) IV Continuous <Continuous>  dextrose 5%. 1000 milliLiter(s) (100 mL/Hr) IV Continuous <Continuous>  dextrose 50% Injectable 12.5 Gram(s) IV Push once  dextrose 50% Injectable 25 Gram(s) IV Push once  dextrose 50% Injectable 25 Gram(s) IV Push once  diphenhydrAMINE 50 milliGRAM(s) Oral once  epoetin isra (EPOGEN) Injectable 84144 Unit(s) IV Push <User Schedule>  glucagon  Injectable 1 milliGRAM(s) IntraMuscular once  hemorrhoidal Ointment 1 Application(s) Rectal two times a day  insulin lispro (ADMELOG) corrective regimen sliding scale   SubCutaneous at bedtime  insulin lispro (ADMELOG) corrective regimen sliding scale   SubCutaneous at bedtime  insulin lispro (ADMELOG) corrective regimen sliding scale   SubCutaneous three times a day before meals  lidocaine   4% Patch 1 Patch Transdermal daily  methylPREDNISolone 32 milliGRAM(s) Oral once  metoprolol tartrate 25 milliGRAM(s) Oral two times a day  polyethylene glycol 3350 17 Gram(s) Oral daily  sevelamer carbonate 1600 milliGRAM(s) Oral three times a day with meals      PHYSICAL EXAM:  T(C): 37.1 (11-14-23 @ 09:17), Max: 37.2 (11-14-23 @ 01:07)  HR: 99 (11-14-23 @ 09:17) (99 - 108)  BP: 112/75 (11-14-23 @ 09:17) (97/60 - 129/78)  RR: 18 (11-14-23 @ 09:17) (18 - 18)  SpO2: 96% (11-14-23 @ 09:17) (95% - 98%)  Wt(kg): --  I&O's Summary    13 Nov 2023 07:01  -  14 Nov 2023 07:00  --------------------------------------------------------  IN: 480 mL / OUT: 1200 mL / NET: -720 mL            Appearance: NAD  HEENT:   Dry  oral mucosa, PERRL, EOMI	  Lymphatic: No lymphadenopathy  Cardiovascular: Normal S1 S2, No JVD, No murmurs, No edema  Respiratory: decreased bs    Psychiatry: A & O x 3, Mood & affect appropriate  Gastrointestinal:  Soft, Non-tender, + BS	  Skin: No rashes, No ecchymoses, No cyanosis	  Neurologic: Non-focal  Extremities: Normal range of motion, No clubbing, cyanosis or edema  Vascular: Peripheral pulses palpable 2+ bilaterally  HD catheter       LABS:    CARDIAC MARKERS:                                11.2   7.05  )-----------( 246      ( 14 Nov 2023 08:37 )             38.5     11-14    137  |  97  |  37<H>  ----------------------------<  107<H>  3.8   |  21<L>  |  10.81<H>    Ca    11.1<H>      14 Nov 2023 08:37  Phos  6.1     11-14  Mg     2.4     11-14      proBNP:   Lipid Profile:   HgA1c:   TSH:             TELEMETRY: 	    ECG:  	  RADIOLOGY:   DIAGNOSTIC TESTING:  [ ] Echocardiogram:  [ ]  Catheterization:  [ ] Stress Test:    OTHER: 	          
PROGRESS NOTE:   Authored by Dr. Pacheco Bello MD, Available on MS Teams    Patient is a 58y old  Male who presents with a chief complaint of fever (21 Nov 2023 11:06)      SUBJECTIVE / OVERNIGHT EVENTS: Patient seen during HD today. No acute complaints.     ADDITIONAL REVIEW OF SYSTEMS:    MEDICATIONS  (STANDING):  apixaban 5 milliGRAM(s) Oral two times a day  atorvastatin 40 milliGRAM(s) Oral at bedtime  bacitracin   Ointment 1 Application(s) Topical two times a day  cefTRIAXone   IVPB 2000 milliGRAM(s) IV Intermittent every 24 hours  chlorhexidine 4% Liquid 1 Application(s) Topical <User Schedule>  dextrose 5%. 1000 milliLiter(s) (100 mL/Hr) IV Continuous <Continuous>  dextrose 5%. 1000 milliLiter(s) (50 mL/Hr) IV Continuous <Continuous>  dextrose 50% Injectable 25 Gram(s) IV Push once  dextrose 50% Injectable 25 Gram(s) IV Push once  dextrose 50% Injectable 12.5 Gram(s) IV Push once  epoetin isra (EPOGEN) Injectable 81222 Unit(s) IV Push <User Schedule>  glucagon  Injectable 1 milliGRAM(s) IntraMuscular once  hemorrhoidal Ointment 1 Application(s) Rectal two times a day  insulin lispro (ADMELOG) corrective regimen sliding scale   SubCutaneous at bedtime  insulin lispro (ADMELOG) corrective regimen sliding scale   SubCutaneous three times a day before meals  lidocaine   4% Patch 1 Patch Transdermal daily  metoprolol tartrate 25 milliGRAM(s) Oral two times a day  Nephro-bart 1 Tablet(s) Oral daily  polyethylene glycol 3350 17 Gram(s) Oral daily  sevelamer carbonate 1600 milliGRAM(s) Oral three times a day with meals    MEDICATIONS  (PRN):  dextrose Oral Gel 15 Gram(s) Oral once PRN Blood Glucose LESS THAN 70 milliGRAM(s)/deciliter  sodium chloride 0.9% lock flush 10 milliLiter(s) IV Push every 1 hour PRN Pre/post blood products, medications, blood draw, and to maintain line patency      CAPILLARY BLOOD GLUCOSE      POCT Blood Glucose.: 146 mg/dL (21 Nov 2023 12:56)  POCT Blood Glucose.: 129 mg/dL (21 Nov 2023 07:25)  POCT Blood Glucose.: 106 mg/dL (20 Nov 2023 21:34)  POCT Blood Glucose.: 91 mg/dL (20 Nov 2023 16:59)    I&O's Summary    20 Nov 2023 07:01  -  21 Nov 2023 07:00  --------------------------------------------------------  IN: 1130 mL / OUT: 350 mL / NET: 780 mL    21 Nov 2023 07:01  -  21 Nov 2023 14:40  --------------------------------------------------------  IN: 240 mL / OUT: 1000 mL / NET: -760 mL        PHYSICAL EXAM:  Vital Signs Last 24 Hrs  T(C): 36.8 (21 Nov 2023 12:55), Max: 37.5 (21 Nov 2023 01:26)  T(F): 98.2 (21 Nov 2023 12:55), Max: 99.5 (21 Nov 2023 01:26)  HR: 107 (21 Nov 2023 12:55) (75 - 110)  BP: 132/84 (21 Nov 2023 12:55) (102/69 - 132/84)  BP(mean): --  RR: 18 (21 Nov 2023 12:55) (18 - 18)  SpO2: 97% (21 Nov 2023 12:55) (93% - 97%)    Parameters below as of 21 Nov 2023 12:55  Patient On (Oxygen Delivery Method): room air        CONSTITUTIONAL: NAD, well-developed  RESPIRATORY: Normal respiratory effort; lungs are clear to auscultation bilaterally  CARDIOVASCULAR: Regular rate and rhythm, normal S1 and S2, no murmur/rub/gallop; No lower extremity edema  ABDOMEN: Nontender to palpation, normoactive bowel sounds, no rebound/guarding  MUSCLOSKELETAL: no clubbing or cyanosis of digits; no joint swelling or tenderness to palpation  PSYCH: A+O to person, place, and time; affect appropriate    LABS:                        10.5   6.82  )-----------( 146      ( 21 Nov 2023 10:32 )             34.5     11-21    134<L>  |  94<L>  |  48<H>  ----------------------------<  157<H>  4.6   |  22  |  13.32<H>    Ca    10.2      21 Nov 2023 10:32  Phos  6.7     11-21  Mg     2.1     11-21      PTT - ( 20 Nov 2023 09:23 )  PTT:107.2 sec      Urinalysis Basic - ( 21 Nov 2023 10:32 )    Color: x / Appearance: x / SG: x / pH: x  Gluc: 157 mg/dL / Ketone: x  / Bili: x / Urobili: x   Blood: x / Protein: x / Nitrite: x   Leuk Esterase: x / RBC: x / WBC x   Sq Epi: x / Non Sq Epi: x / Bacteria: x    Discussed with ID, recommend monitoring 48 hours for negative cultures 
[facility] Division of Hospital Medicine  Radha Crisostomo MD  Available via MS Teams  Pager: [phone]    SUBJECTIVE / OVERNIGHT EVENTS: Patient seen and examined at bedside. No acute overnight events. Pt denies dyspnea, chest pain fevers, chills, cough, HA, dizziness, syncope, chest palpitations, abd pain, n/v/d, urinary or bowel changes, active sites of bleeding or any other symptoms at this time.    ADDITIONAL REVIEW OF SYSTEMS:n/a    MEDICATIONS  (STANDING):  apixaban 5 milliGRAM(s) Oral two times a day  atorvastatin 40 milliGRAM(s) Oral at bedtime  bacitracin   Ointment 1 Application(s) Topical two times a day  cefTRIAXone   IVPB 2000 milliGRAM(s) IV Intermittent every 24 hours  chlorhexidine 4% Liquid 1 Application(s) Topical <User Schedule>  dextrose 5%. 1000 milliLiter(s) (50 mL/Hr) IV Continuous <Continuous>  dextrose 5%. 1000 milliLiter(s) (100 mL/Hr) IV Continuous <Continuous>  dextrose 50% Injectable 12.5 Gram(s) IV Push once  dextrose 50% Injectable 25 Gram(s) IV Push once  dextrose 50% Injectable 25 Gram(s) IV Push once  diphenhydrAMINE 50 milliGRAM(s) Oral once  epoetin isra (EPOGEN) Injectable 97482 Unit(s) IV Push <User Schedule>  glucagon  Injectable 1 milliGRAM(s) IntraMuscular once  hemorrhoidal Ointment 1 Application(s) Rectal two times a day  insulin lispro (ADMELOG) corrective regimen sliding scale   SubCutaneous at bedtime  insulin lispro (ADMELOG) corrective regimen sliding scale   SubCutaneous at bedtime  insulin lispro (ADMELOG) corrective regimen sliding scale   SubCutaneous three times a day before meals  lidocaine   4% Patch 1 Patch Transdermal daily  methylPREDNISolone 32 milliGRAM(s) Oral once  metoprolol tartrate 25 milliGRAM(s) Oral two times a day  polyethylene glycol 3350 17 Gram(s) Oral daily  sevelamer carbonate 1600 milliGRAM(s) Oral three times a day with meals    MEDICATIONS  (PRN):  acetaminophen     Tablet .. 650 milliGRAM(s) Oral every 6 hours PRN Mild Pain (1 - 3)  dextrose Oral Gel 15 Gram(s) Oral once PRN Blood Glucose LESS THAN 70 milliGRAM(s)/deciliter  sodium chloride 0.9% lock flush 10 milliLiter(s) IV Push every 1 hour PRN Pre/post blood products, medications, blood draw, and to maintain line patency      I&O's Summary    12 Nov 2023 07:01  -  13 Nov 2023 07:00  --------------------------------------------------------  IN: 720 mL / OUT: 0 mL / NET: 720 mL    13 Nov 2023 07:01  -  13 Nov 2023 17:08  --------------------------------------------------------  IN: 0 mL / OUT: 1200 mL / NET: -1200 mL        PHYSICAL EXAM:  Vital Signs Last 24 Hrs  T(C): 36.6 (13 Nov 2023 16:57), Max: 37.1 (12 Nov 2023 21:18)  T(F): 97.8 (13 Nov 2023 16:57), Max: 98.8 (12 Nov 2023 21:18)  HR: 100 (13 Nov 2023 16:57) (87 - 108)  BP: 129/78 (13 Nov 2023 16:57) (116/70 - 157/84)  BP(mean): --  RR: 18 (13 Nov 2023 16:57) (18 - 18)  SpO2: 98% (13 Nov 2023 16:57) (95% - 98%)    Parameters below as of 13 Nov 2023 16:57  Patient On (Oxygen Delivery Method): room air      CONSTITUTIONAL: NAD, well-developed, well-groomed  EYES: PERRLA; conjunctiva and sclera clear  ENMT: Moist oral mucosa, no pharyngeal injection or exudates; normal dentition  NECK: Supple, no palpable masses; no thyromegaly  RESPIRATORY: Normal respiratory effort; lungs are clear to auscultation bilaterally  CARDIOVASCULAR: Regular rate and rhythm, normal S1 and S2, no murmur/rub/gallop; No lower extremity edema; Peripheral pulses are 2+ bilaterally  ABDOMEN: Nontender to palpation, normoactive bowel sounds, no rebound/guarding; No hepatosplenomegaly  MUSCULOSKELETAL:   no clubbing or cyanosis of digits; no joint swelling or tenderness to palpation  PSYCH: A+O to person, place, and time; affect appropriate  NEUROLOGY: CN 2-12 are intact and symmetric; no gross sensory deficits   SKIN: No rashes; no palpable lesions    LABS:                        10.2   8.01  )-----------( 272      ( 13 Nov 2023 07:32 )             34.4     11-13    137  |  100  |  48<H>  ----------------------------<  103<H>  4.0   |  20<L>  |  12.13<H>    Ca    10.5      13 Nov 2023 07:31  Phos  6.7     11-13  Mg     2.4     11-13            Urinalysis Basic - ( 13 Nov 2023 07:31 )    Color: x / Appearance: x / SG: x / pH: x  Gluc: 103 mg/dL / Ketone: x  / Bili: x / Urobili: x   Blood: x / Protein: x / Nitrite: x   Leuk Esterase: x / RBC: x / WBC x   Sq Epi: x / Non Sq Epi: x / Bacteria: x            RADIOLOGY & ADDITIONAL TESTS:  New Results Reviewed Today:   New Imaging Personally Reviewed Today:  New Electrocardiogram Personally Reviewed Today:  Prior or Outpatient Records Reviewed Today:    COMMUNICATION:  Care Discussed with Consultants/Other Providers and Details of Discussion:  Discussions with Patient/Family:  PCP Communication:    
Subjective: Patient seen and examined. No new events except as noted.     REVIEW OF SYSTEMS:    CONSTITUTIONAL: No weakness, fevers or chills  EYES/ENT: No visual changes;  No vertigo or throat pain   NECK: No pain or stiffness  RESPIRATORY: No cough, wheezing, hemoptysis; No shortness of breath  CARDIOVASCULAR: No chest pain or palpitations  GASTROINTESTINAL: No abdominal or epigastric pain. No nausea, vomiting, or hematemesis; No diarrhea or constipation. No melena or hematochezia.  GENITOURINARY: No dysuria, frequency or hematuria  NEUROLOGICAL: No numbness or weakness  SKIN: No itching, burning, rashes, or lesions   All other review of systems is negative unless indicated above.    MEDICATIONS:  MEDICATIONS  (STANDING):  atorvastatin 40 milliGRAM(s) Oral at bedtime  bacitracin   Ointment 1 Application(s) Topical two times a day  cefTRIAXone   IVPB 2000 milliGRAM(s) IV Intermittent every 24 hours  chlorhexidine 4% Liquid 1 Application(s) Topical <User Schedule>  dextrose 5%. 1000 milliLiter(s) (50 mL/Hr) IV Continuous <Continuous>  dextrose 5%. 1000 milliLiter(s) (100 mL/Hr) IV Continuous <Continuous>  dextrose 50% Injectable 25 Gram(s) IV Push once  dextrose 50% Injectable 12.5 Gram(s) IV Push once  dextrose 50% Injectable 25 Gram(s) IV Push once  diphenhydrAMINE 50 milliGRAM(s) Oral once  epoetin isra (EPOGEN) Injectable 78052 Unit(s) IV Push <User Schedule>  glucagon  Injectable 1 milliGRAM(s) IntraMuscular once  hemorrhoidal Ointment 1 Application(s) Rectal two times a day  heparin  Infusion.  Unit(s)/Hr (24 mL/Hr) IV Continuous <Continuous>  insulin lispro (ADMELOG) corrective regimen sliding scale   SubCutaneous at bedtime  insulin lispro (ADMELOG) corrective regimen sliding scale   SubCutaneous three times a day before meals  insulin lispro (ADMELOG) corrective regimen sliding scale   SubCutaneous at bedtime  lidocaine   4% Patch 1 Patch Transdermal daily  methylPREDNISolone 32 milliGRAM(s) Oral once  metoprolol tartrate 25 milliGRAM(s) Oral two times a day  polyethylene glycol 3350 17 Gram(s) Oral daily  sevelamer carbonate 1600 milliGRAM(s) Oral three times a day with meals      PHYSICAL EXAM:  T(C): 37 (11-17-23 @ 05:29), Max: 37.2 (11-16-23 @ 14:48)  HR: 99 (11-17-23 @ 05:29) (99 - 106)  BP: 110/65 (11-17-23 @ 05:29) (103/67 - 128/81)  RR: 18 (11-17-23 @ 05:29) (18 - 18)  SpO2: 96% (11-17-23 @ 05:29) (94% - 97%)  Wt(kg): --  I&O's Summary    16 Nov 2023 07:01  -  17 Nov 2023 07:00  --------------------------------------------------------  IN: 600 mL / OUT: 0 mL / NET: 600 mL          Appearance: Normal	  HEENT:   Normal oral mucosa, PERRL, EOMI	  Lymphatic: No lymphadenopathy , no edema  Cardiovascular: Normal S1 S2, No JVD, No murmurs , Peripheral pulses palpable 2+ bilaterally  Respiratory: Lungs clear to auscultation, normal effort 	  Gastrointestinal:  Soft, Non-tender, + BS	  Skin: No rashes, No ecchymoses, No cyanosis, warm to touch  Musculoskeletal: Normal range of motion, normal strength  Psychiatry:  Mood & affect appropriate  Ext: No edema  Vascular: right and left arms have AVFs no palpable thrill appreciated. Palpable pulses bilaterally. Intact motor and sensory functions.       LABS:    CARDIAC MARKERS:                                11.3   9.52  )-----------( 202      ( 17 Nov 2023 04:19 )             38.7     11-17    135  |  93<L>  |  55<H>  ----------------------------<  103<H>  4.2   |  23  |  13.39<H>    Ca    10.5      17 Nov 2023 04:19    TPro  7.9  /  Alb  3.8  /  TBili  0.4  /  DBili  x   /  AST  15  /  ALT  13  /  AlkPhos  63  11-17    proBNP:   Lipid Profile:   HgA1c:   TSH:             TELEMETRY: 	    ECG:  	  RADIOLOGY: < from: MR Chest w/wo IV Cont (11.16.23 @ 14:12) >    ACC: [id number] EXAM:  MR CHEST WAW IC   ORDERED BY:  ALIDA HUANG     PROCEDURE DATE:  11/16/2023          INTERPRETATION:  CLINICAL INFORMATION: Occluded AV fistulas. Evaluate   central venous    COMPARISON: CT chest 11/5/2023.    PROCEDURE:  MRI ofthe chest was performed with and without IV contrast.  9 mL Gadavist administered. 1 mL discarded.    FINDINGS:  Bilateral central venous catheters terminate within the right atrium.    Patent SVC.The left-sided catheter partially obscures the left   brachiocephalic vein lumen, although it appears patent. Right   brachiocephalic vein is patent.    Bilateral subclavian veins are patent. Bilateral internal jugular veins   are only partially imaged, although appear diminutive in size.    Limited evaluation for patency of bilateral axillary vein stents on MRI   of the chest.    Right lower lobe pulmonary nodule as seen on prior chest CT.    IMPRESSION:  Central veins within the chest appear patent.    --- End of Report ---    < end of copied text >  < from: MR Lumbar Spine w/wo IV Cont (11.08.23 @ 14:32) >    ACC: [id number] EXAM:  MR SPINE LUMBAR WAW IC   ORDERED BY: ROWDY VICTOR     PROCEDURE DATE:  11/08/2023          INTERPRETATION:  CLINICAL INFORMATION: Bacteremia with new back pain    ADDITIONAL CLINICAL INFORMATION: Not Applicable    TECHNIQUE: Multiplanar, multisequence MRI was performed of the lumbar   spine.  IV Contrast: Gadavist  10 cc administered   0 cc discarded    PRIOR STUDIES: No priors available for comparison.    FINDINGS:    LOCALIZER: No additional findings.  BONES: Diffuse T1 hypointense signal of the marrow signal, compatible   with known anemia. There are multiple endplate Schmorl nodes, with   associated edema, most prominent along the inferior endplate of T12, L1   and L5 and along the superior endplate of L3. There is a subcentimeter   bony hemangioma within the L3 vertebral body.  ALIGNMENT: The alignment is normal.  SACROILIAC JOINTS/SACRUM: There is no sacral fracture. The SI joints are   partially visualized but are intact.  CONUS AND CAUDA EQUINA: The distal cord and conus are normal in signal.   Conus terminates at L1.  VISUALIZED INTRAPELVIC/INTRA-ABDOMINAL SOFT TISSUES: Normal.  PARASPINAL SOFT TISSUES: Normal.      INDIVIDUAL LEVELS:    LOWER THORACIC SPINE: No spinal canal or neuroforaminal stenosis.    L1-L2: No spinal canal or neuroforaminal stenosis.  L2-L3: No spinal canal or neuroforaminal stenosis.  L3-L4: No spinal canal or neuroforaminal stenosis.  L4-L5: No spinal canal or neuroforaminal stenosis.  L5-S1: Diffuse disc bulge asymmetric to the left results in moderate   narrowing of the left neural foramen with contact upon the exiting left   L5 nerve root. There is mild right foraminal narrowing. No significant   central spinal canal stenosis.      IMPRESSION:    No evidence of osteomyelitis or discitis. No abnormal enhancement.   Multilevel endplate degenerative Schmorl nodes, may be source of   patient's pain. Additionally, diffuse disc bulge asymmetric to the left   results in moderate narrowing of the left neural foramen with contact   upon the exiting left L5 nerve root.    --- End of Report ---            JOSIE LUCERO MD; Attending Radiologist  This document has been electronically signed. Nov 8 2023  2:54PM    < end of copied text >    DIAGNOSTIC TESTING:  [ ] Echocardiogram:  [ ]  Catheterization:  [ ] Stress Test:    OTHER: 	          
Subjective: Patient seen and examined. No new events except as noted.     REVIEW OF SYSTEMS:    CONSTITUTIONAL: + weakness, fevers or chills  EYES/ENT: No visual changes;  No vertigo or throat pain   NECK: No pain or stiffness  RESPIRATORY: No cough, wheezing, hemoptysis; No shortness of breath  CARDIOVASCULAR: No chest pain or palpitations  GASTROINTESTINAL: No abdominal or epigastric pain. No nausea, vomiting, or hematemesis; No diarrhea or constipation. No melena or hematochezia.  GENITOURINARY: No dysuria, frequency or hematuria  NEUROLOGICAL: No numbness or weakness  SKIN: No itching, burning, rashes, or lesions   All other review of systems is negative unless indicated above.    MEDICATIONS:  MEDICATIONS  (STANDING):  apixaban 5 milliGRAM(s) Oral two times a day  atorvastatin 40 milliGRAM(s) Oral at bedtime  bacitracin   Ointment 1 Application(s) Topical two times a day  cefTRIAXone   IVPB 2000 milliGRAM(s) IV Intermittent every 24 hours  chlorhexidine 4% Liquid 1 Application(s) Topical <User Schedule>  dextrose 5%. 1000 milliLiter(s) (100 mL/Hr) IV Continuous <Continuous>  dextrose 5%. 1000 milliLiter(s) (50 mL/Hr) IV Continuous <Continuous>  dextrose 50% Injectable 12.5 Gram(s) IV Push once  dextrose 50% Injectable 25 Gram(s) IV Push once  dextrose 50% Injectable 25 Gram(s) IV Push once  epoetin isra (EPOGEN) Injectable 49994 Unit(s) IV Push <User Schedule>  glucagon  Injectable 1 milliGRAM(s) IntraMuscular once  hemorrhoidal Ointment 1 Application(s) Rectal two times a day  insulin lispro (ADMELOG) corrective regimen sliding scale   SubCutaneous three times a day before meals  insulin lispro (ADMELOG) corrective regimen sliding scale   SubCutaneous at bedtime  insulin lispro (ADMELOG) corrective regimen sliding scale   SubCutaneous at bedtime  lidocaine   4% Patch 1 Patch Transdermal daily  metoprolol tartrate 25 milliGRAM(s) Oral two times a day  polyethylene glycol 3350 17 Gram(s) Oral daily  sevelamer carbonate 1600 milliGRAM(s) Oral three times a day with meals      PHYSICAL EXAM:  T(C): 36.8 (11-12-23 @ 05:35), Max: 37.4 (11-11-23 @ 21:21)  HR: 100 (11-12-23 @ 05:35) (91 - 100)  BP: 135/85 (11-12-23 @ 05:35) (115/61 - 161/82)  RR: 18 (11-12-23 @ 05:35) (18 - 18)  SpO2: 99% (11-12-23 @ 05:35) (94% - 99%)  Wt(kg): --  I&O's Summary    11 Nov 2023 07:01  -  12 Nov 2023 07:00  --------------------------------------------------------  IN: 1200 mL / OUT: 0 mL / NET: 1200 mL            Appearance: NAD  HEENT:   Dry  oral mucosa, PERRL, EOMI	  Lymphatic: No lymphadenopathy  Cardiovascular: Normal S1 S2, No JVD, No murmurs, No edema  Respiratory: decreased bs    Psychiatry: A & O x 3, Mood & affect appropriate  Gastrointestinal:  Soft, Non-tender, + BS	  Skin: No rashes, No ecchymoses, No cyanosis	  Neurologic: Non-focal  Extremities: Normal range of motion, No clubbing, cyanosis or edema  Vascular: Peripheral pulses palpable 2+ bilaterally  HD catheter     LABS:    CARDIAC MARKERS:                                10.1   8.28  )-----------( 281      ( 12 Nov 2023 07:09 )             34.4     11-12    139  |  99  |  40<H>  ----------------------------<  95  4.0   |  22  |  10.74<H>    Ca    10.4      12 Nov 2023 07:09  Phos  4.3     11-11  Mg     2.1     11-11      proBNP:   Lipid Profile:   HgA1c:   TSH:             TELEMETRY: 	    ECG:  	  RADIOLOGY:   DIAGNOSTIC TESTING:  [ ] Echocardiogram:  [ ]  Catheterization:  [ ] Stress Test:    OTHER: 	          
Subjective: Patient seen and examined. No new events except as noted.     REVIEW OF SYSTEMS:    CONSTITUTIONAL: No weakness, fevers or chills  EYES/ENT: No visual changes;  No vertigo or throat pain   NECK: No pain or stiffness  RESPIRATORY: No cough, wheezing, hemoptysis; No shortness of breath  CARDIOVASCULAR: No chest pain or palpitations  GASTROINTESTINAL: No abdominal or epigastric pain. No nausea, vomiting, or hematemesis; No diarrhea or constipation. No melena or hematochezia.  GENITOURINARY: No dysuria, frequency or hematuria  NEUROLOGICAL: No numbness or weakness  SKIN: No itching, burning, rashes, or lesions   All other review of systems is negative unless indicated above.    MEDICATIONS:  MEDICATIONS  (STANDING):  apixaban 5 milliGRAM(s) Oral two times a day  atorvastatin 40 milliGRAM(s) Oral at bedtime  bacitracin   Ointment 1 Application(s) Topical two times a day  cefTRIAXone   IVPB 2000 milliGRAM(s) IV Intermittent every 24 hours  chlorhexidine 4% Liquid 1 Application(s) Topical <User Schedule>  dextrose 5%. 1000 milliLiter(s) (50 mL/Hr) IV Continuous <Continuous>  dextrose 5%. 1000 milliLiter(s) (100 mL/Hr) IV Continuous <Continuous>  dextrose 50% Injectable 12.5 Gram(s) IV Push once  dextrose 50% Injectable 25 Gram(s) IV Push once  dextrose 50% Injectable 25 Gram(s) IV Push once  epoetin isra (EPOGEN) Injectable 39486 Unit(s) IV Push <User Schedule>  glucagon  Injectable 1 milliGRAM(s) IntraMuscular once  hemorrhoidal Ointment 1 Application(s) Rectal two times a day  insulin lispro (ADMELOG) corrective regimen sliding scale   SubCutaneous three times a day before meals  insulin lispro (ADMELOG) corrective regimen sliding scale   SubCutaneous at bedtime  insulin lispro (ADMELOG) corrective regimen sliding scale   SubCutaneous at bedtime  lidocaine   4% Patch 1 Patch Transdermal daily  metoprolol tartrate 25 milliGRAM(s) Oral two times a day  polyethylene glycol 3350 17 Gram(s) Oral daily  sevelamer carbonate 1600 milliGRAM(s) Oral three times a day with meals      PHYSICAL EXAM:  T(C): 37.3 (11-11-23 @ 17:15), Max: 37.6 (11-11-23 @ 01:05)  HR: 100 (11-11-23 @ 17:15) (88 - 100)  BP: 115/61 (11-11-23 @ 17:15) (107/61 - 161/82)  RR: 18 (11-11-23 @ 17:15) (18 - 18)  SpO2: 94% (11-11-23 @ 17:15) (94% - 100%)  Wt(kg): --  I&O's Summary    10 Nov 2023 07:01  -  11 Nov 2023 07:00  --------------------------------------------------------  IN: 240 mL / OUT: 1200 mL / NET: -960 mL    11 Nov 2023 07:01  -  11 Nov 2023 19:45  --------------------------------------------------------  IN: 720 mL / OUT: 0 mL / NET: 720 mL            Appearance: NAD  HEENT:   Dry  oral mucosa, PERRL, EOMI	  Lymphatic: No lymphadenopathy  Cardiovascular: Normal S1 S2, No JVD, No murmurs, No edema  Respiratory: decreased bs    Psychiatry: A & O x 3, Mood & affect appropriate  Gastrointestinal:  Soft, Non-tender, + BS	  Skin: No rashes, No ecchymoses, No cyanosis	  Neurologic: Non-focal  Extremities: Normal range of motion, No clubbing, cyanosis or edema  Vascular: Peripheral pulses palpable 2+ bilaterally  HD catheter         LABS:    CARDIAC MARKERS:                                10.2   7.76  )-----------( 247      ( 11 Nov 2023 07:03 )             34.3     11-11    138  |  98  |  30<H>  ----------------------------<  88  4.1   |  22  |  8.75<H>    Ca    10.3      11 Nov 2023 07:04  Phos  4.3     11-11  Mg     2.1     11-11      proBNP:   Lipid Profile:   HgA1c:   TSH:             TELEMETRY: 	    ECG:  	  RADIOLOGY:   DIAGNOSTIC TESTING:  [ ] Echocardiogram:  [ ]  Catheterization:  [ ] Stress Test:    OTHER: 	          
Subjective: Patient seen and examined. No new events except as noted.     REVIEW OF SYSTEMS:    CONSTITUTIONAL: +weakness, fevers or chills  EYES/ENT: No visual changes;  No vertigo or throat pain   NECK: No pain or stiffness  RESPIRATORY: No cough, wheezing, hemoptysis; No shortness of breath  CARDIOVASCULAR: No chest pain or palpitations  GASTROINTESTINAL: No abdominal or epigastric pain. No nausea, vomiting, or hematemesis; No diarrhea or constipation. No melena or hematochezia.  GENITOURINARY: No dysuria, frequency or hematuria  NEUROLOGICAL: No numbness or weakness  SKIN: No itching, burning, rashes, or lesions   All other review of systems is negative unless indicated above.    MEDICATIONS:  MEDICATIONS  (STANDING):  apixaban 5 milliGRAM(s) Oral two times a day  atorvastatin 40 milliGRAM(s) Oral at bedtime  cefTRIAXone   IVPB 2000 milliGRAM(s) IV Intermittent every 24 hours  chlorhexidine 4% Liquid 1 Application(s) Topical <User Schedule>  dextrose 5%. 1000 milliLiter(s) (100 mL/Hr) IV Continuous <Continuous>  dextrose 5%. 1000 milliLiter(s) (50 mL/Hr) IV Continuous <Continuous>  dextrose 50% Injectable 12.5 Gram(s) IV Push once  dextrose 50% Injectable 25 Gram(s) IV Push once  dextrose 50% Injectable 25 Gram(s) IV Push once  epoetin isra (EPOGEN) Injectable 90132 Unit(s) IV Push <User Schedule>  glucagon  Injectable 1 milliGRAM(s) IntraMuscular once  insulin lispro (ADMELOG) corrective regimen sliding scale   SubCutaneous at bedtime  insulin lispro (ADMELOG) corrective regimen sliding scale   SubCutaneous three times a day before meals  lidocaine   4% Patch 1 Patch Transdermal daily  metoprolol tartrate 25 milliGRAM(s) Oral two times a day  sevelamer carbonate 800 milliGRAM(s) Oral three times a day with meals      PHYSICAL EXAM:  T(C): 37.2 (11-09-23 @ 05:04), Max: 37.8 (11-08-23 @ 21:43)  HR: 85 (11-09-23 @ 05:04) (85 - 106)  BP: 132/76 (11-09-23 @ 05:04) (126/79 - 147/86)  RR: 18 (11-09-23 @ 05:04) (18 - 18)  SpO2: 94% (11-09-23 @ 05:04) (94% - 98%)  Wt(kg): --  I&O's Summary    08 Nov 2023 07:01  -  09 Nov 2023 07:00  --------------------------------------------------------  IN: 260 mL / OUT: 1400 mL / NET: -1140 mL          Appearance: NAD  HEENT:   Dry  oral mucosa, PERRL, EOMI	  Lymphatic: No lymphadenopathy  Cardiovascular: Normal S1 S2, No JVD, No murmurs, No edema  Respiratory: decreased bs    Psychiatry: A & O x 3, Mood & affect appropriate  Gastrointestinal:  Soft, Non-tender, + BS	  Skin: No rashes, No ecchymoses, No cyanosis	  Neurologic: Non-focal  Extremities: Normal range of motion, No clubbing, cyanosis or edema  Vascular: Peripheral pulses palpable 2+ bilaterally  HD catheter       LABS:    CARDIAC MARKERS:                                9.0    5.27  )-----------( 205      ( 09 Nov 2023 08:49 )             30.1     11-09    137  |  97  |  36<H>  ----------------------------<  95  4.1   |  22  |  9.76<H>    Ca    9.7      09 Nov 2023 08:49  Phos  5.5     11-09  Mg     2.2     11-09      proBNP:   Lipid Profile:   HgA1c:   TSH:             TELEMETRY: 	    ECG:  	  RADIOLOGY:   DIAGNOSTIC TESTING:  [ ] Echocardiogram:  [ ]  Catheterization:  [ ] Stress Test:    OTHER: 	          
PROGRESS NOTE:   Authored by Dr. Pacheco Bello MD, Available on MS Teams    Patient is a 58y old  Male who presents with a chief complaint of fever (22 Nov 2023 09:45)      SUBJECTIVE / OVERNIGHT EVENTS: Patient feels well. BCx negative at 48 hours.     ADDITIONAL REVIEW OF SYSTEMS:    MEDICATIONS  (STANDING):  apixaban 5 milliGRAM(s) Oral two times a day  atorvastatin 40 milliGRAM(s) Oral at bedtime  chlorhexidine 4% Liquid 1 Application(s) Topical <User Schedule>  dextrose 5%. 1000 milliLiter(s) (50 mL/Hr) IV Continuous <Continuous>  dextrose 5%. 1000 milliLiter(s) (100 mL/Hr) IV Continuous <Continuous>  dextrose 50% Injectable 12.5 Gram(s) IV Push once  dextrose 50% Injectable 25 Gram(s) IV Push once  dextrose 50% Injectable 25 Gram(s) IV Push once  epoetin isra (EPOGEN) Injectable 13910 Unit(s) IV Push <User Schedule>  glucagon  Injectable 1 milliGRAM(s) IntraMuscular once  insulin lispro (ADMELOG) corrective regimen sliding scale   SubCutaneous at bedtime  insulin lispro (ADMELOG) corrective regimen sliding scale   SubCutaneous three times a day before meals  metoprolol tartrate 25 milliGRAM(s) Oral two times a day  Nephro-bart 1 Tablet(s) Oral daily  sevelamer carbonate 1600 milliGRAM(s) Oral three times a day with meals    MEDICATIONS  (PRN):  dextrose Oral Gel 15 Gram(s) Oral once PRN Blood Glucose LESS THAN 70 milliGRAM(s)/deciliter  sodium chloride 0.9% lock flush 10 milliLiter(s) IV Push every 1 hour PRN Pre/post blood products, medications, blood draw, and to maintain line patency      CAPILLARY BLOOD GLUCOSE      POCT Blood Glucose.: 98 mg/dL (22 Nov 2023 14:35)  POCT Blood Glucose.: 111 mg/dL (22 Nov 2023 08:24)  POCT Blood Glucose.: 136 mg/dL (21 Nov 2023 21:36)  POCT Blood Glucose.: 103 mg/dL (21 Nov 2023 18:42)    I&O's Summary    21 Nov 2023 07:01  -  22 Nov 2023 07:00  --------------------------------------------------------  IN: 1560 mL / OUT: 1000 mL / NET: 560 mL    22 Nov 2023 07:01  -  22 Nov 2023 15:33  --------------------------------------------------------  IN: 480 mL / OUT: 0 mL / NET: 480 mL        PHYSICAL EXAM:  Vital Signs Last 24 Hrs  T(C): 36.9 (22 Nov 2023 13:23), Max: 37.5 (22 Nov 2023 05:16)  T(F): 98.4 (22 Nov 2023 13:23), Max: 99.5 (22 Nov 2023 05:16)  HR: 98 (22 Nov 2023 13:23) (96 - 110)  BP: 100/68 (22 Nov 2023 13:23) (100/68 - 123/81)  BP(mean): --  RR: 18 (22 Nov 2023 13:23) (18 - 20)  SpO2: 97% (22 Nov 2023 13:23) (93% - 98%)    Parameters below as of 22 Nov 2023 13:23  Patient On (Oxygen Delivery Method): room air        CONSTITUTIONAL: NAD, well-developed  RESPIRATORY: Normal respiratory effort; lungs are clear to auscultation bilaterally  CARDIOVASCULAR: Regular rate and rhythm, normal S1 and S2, no murmur/rub/gallop; No lower extremity edema  ABDOMEN: Nontender to palpation, normoactive bowel sounds, no rebound/guarding  MUSCLOSKELETAL: no clubbing or cyanosis of digits; no joint swelling or tenderness to palpation  PSYCH: A+O to person, place, and time; affect appropriate    LABS:                        10.5   6.82  )-----------( 146      ( 21 Nov 2023 10:32 )             34.5     11-21    134<L>  |  94<L>  |  48<H>  ----------------------------<  157<H>  4.6   |  22  |  13.32<H>    Ca    10.2      21 Nov 2023 10:32  Phos  6.7     11-21  Mg     2.1     11-21            Urinalysis Basic - ( 21 Nov 2023 10:32 )    Color: x / Appearance: x / SG: x / pH: x  Gluc: 157 mg/dL / Ketone: x  / Bili: x / Urobili: x   Blood: x / Protein: x / Nitrite: x   Leuk Esterase: x / RBC: x / WBC x   Sq Epi: x / Non Sq Epi: x / Bacteria: x        Culture - Blood (collected 20 Nov 2023 08:35)  Source: .Blood Blood-Peripheral  Preliminary Report (22 Nov 2023 15:10):    No growth at 48 Hours    Culture - Blood (collected 20 Nov 2023 08:30)  Source: .Blood Blood-Peripheral  Preliminary Report (22 Nov 2023 15:10):    No growth at 48 Hours        RADIOLOGY & ADDITIONAL TESTS:  Results Reviewed:   Imaging Personally Reviewed:  Electrocardiogram Personally Reviewed:    COORDINATION OF CARE:  Care Discussed with Consultants/Other Providers [Y/N]:  Prior or Outpatient Records Reviewed [Y/N]:  
[facility] Division of Hospital Medicine  Ashlee Silvestre MD  Available via MS Teams  Pager: [phone]    SUBJECTIVE / OVERNIGHT EVENTS:   - overnight events noted. otherwise has no complaints, denies any n/v/abd pain/cough/ chest pain.    MEDICATIONS  (STANDING):  apixaban 5 milliGRAM(s) Oral two times a day  atorvastatin 40 milliGRAM(s) Oral at bedtime  bacitracin   Ointment 1 Application(s) Topical two times a day  cefTRIAXone   IVPB 2000 milliGRAM(s) IV Intermittent every 24 hours  chlorhexidine 4% Liquid 1 Application(s) Topical <User Schedule>  dextrose 5%. 1000 milliLiter(s) (100 mL/Hr) IV Continuous <Continuous>  dextrose 5%. 1000 milliLiter(s) (50 mL/Hr) IV Continuous <Continuous>  dextrose 50% Injectable 12.5 Gram(s) IV Push once  dextrose 50% Injectable 25 Gram(s) IV Push once  dextrose 50% Injectable 25 Gram(s) IV Push once  epoetin isra (EPOGEN) Injectable 67492 Unit(s) IV Push <User Schedule>  glucagon  Injectable 1 milliGRAM(s) IntraMuscular once  hemorrhoidal Ointment 1 Application(s) Rectal two times a day  insulin lispro (ADMELOG) corrective regimen sliding scale   SubCutaneous at bedtime  insulin lispro (ADMELOG) corrective regimen sliding scale   SubCutaneous three times a day before meals  insulin lispro (ADMELOG) corrective regimen sliding scale   SubCutaneous at bedtime  lidocaine   4% Patch 1 Patch Transdermal daily  metoprolol tartrate 25 milliGRAM(s) Oral two times a day  polyethylene glycol 3350 17 Gram(s) Oral daily  sevelamer carbonate 1600 milliGRAM(s) Oral three times a day with meals    MEDICATIONS  (PRN):  dextrose Oral Gel 15 Gram(s) Oral once PRN Blood Glucose LESS THAN 70 milliGRAM(s)/deciliter  sodium chloride 0.9% lock flush 10 milliLiter(s) IV Push every 1 hour PRN Pre/post blood products, medications, blood draw, and to maintain line patency      I&O's Summary    10 Nov 2023 07:01  -  11 Nov 2023 07:00  --------------------------------------------------------  IN: 240 mL / OUT: 1200 mL / NET: -960 mL    11 Nov 2023 07:01  -  11 Nov 2023 14:24  --------------------------------------------------------  IN: 480 mL / OUT: 0 mL / NET: 480 mL        PHYSICAL EXAM:  Vital Signs Last 24 Hrs  T(C): 37.2 (11 Nov 2023 13:21), Max: 37.6 (11 Nov 2023 01:05)  T(F): 99 (11 Nov 2023 13:21), Max: 99.6 (11 Nov 2023 01:05)  HR: 98 (11 Nov 2023 13:21) (68 - 98)  BP: 161/82 (11 Nov 2023 13:21) (107/61 - 161/82)  BP(mean): 100 (10 Nov 2023 18:40) (84 - 101)  RR: 18 (11 Nov 2023 13:21) (15 - 20)  SpO2: 96% (11 Nov 2023 13:21) (93% - 100%)    Parameters below as of 11 Nov 2023 13:21  Patient On (Oxygen Delivery Method): room air      CONSTITUTIONAL: NAD  RESPIRATORY: Normal respiratory effort; lungs are clear to auscultation bilaterally  CARDIOVASCULAR: Regular rate and rhythm, normal S1 and S2, no murmur/rub/gallop; No lower extremity edema  ABDOMEN: Nontender to palpation, normoactive bowel sounds, no rebound/guarding  MUSCLOSKELETAL: no clubbing or cyanosis of digits; no joint swelling or tenderness to palpation.  PSYCH: A+O to person, place, and time; affect appropriate    LABS:                        10.2   7.76  )-----------( 247      ( 11 Nov 2023 07:03 )             34.3     11-11    138  |  98  |  30<H>  ----------------------------<  88  4.1   |  22  |  8.75<H>    Ca    10.3      11 Nov 2023 07:04  Phos  4.3     11-11  Mg     2.1     11-11      PT/INR - ( 10 Nov 2023 07:14 )   PT: 13.6 sec;   INR: 1.31 ratio         PTT - ( 10 Nov 2023 07:14 )  PTT:28.7 sec      Urinalysis Basic - ( 11 Nov 2023 07:04 )    Color: x / Appearance: x / SG: x / pH: x  Gluc: 88 mg/dL / Ketone: x  / Bili: x / Urobili: x   Blood: x / Protein: x / Nitrite: x   Leuk Esterase: x / RBC: x / WBC x   Sq Epi: x / Non Sq Epi: x / Bacteria: x  
PROGRESS NOTE:   Authored by Dr. Pacheco Bello MD, Available on MS Teams    Patient is a 58y old  Male who presents with a chief complaint of fever (10 Nov 2023 15:25)      SUBJECTIVE / OVERNIGHT EVENTS: Patient feels okay. No chest pain or abdominal pain. Patient would like workup for possible AVF is possible.    ADDITIONAL REVIEW OF SYSTEMS:    MEDICATIONS  (STANDING):  atorvastatin 40 milliGRAM(s) Oral at bedtime  cefTRIAXone   IVPB 2000 milliGRAM(s) IV Intermittent every 24 hours  chlorhexidine 4% Liquid 1 Application(s) Topical <User Schedule>  dextrose 5%. 1000 milliLiter(s) (100 mL/Hr) IV Continuous <Continuous>  dextrose 5%. 1000 milliLiter(s) (50 mL/Hr) IV Continuous <Continuous>  dextrose 50% Injectable 25 Gram(s) IV Push once  dextrose 50% Injectable 25 Gram(s) IV Push once  dextrose 50% Injectable 12.5 Gram(s) IV Push once  epoetin isra (EPOGEN) Injectable 50821 Unit(s) IV Push <User Schedule>  glucagon  Injectable 1 milliGRAM(s) IntraMuscular once  hemorrhoidal Ointment 1 Application(s) Rectal two times a day  insulin lispro (ADMELOG) corrective regimen sliding scale   SubCutaneous every 6 hours  insulin lispro (ADMELOG) corrective regimen sliding scale   SubCutaneous at bedtime  insulin lispro (ADMELOG) corrective regimen sliding scale   SubCutaneous three times a day before meals  insulin lispro (ADMELOG) corrective regimen sliding scale   SubCutaneous at bedtime  lidocaine   4% Patch 1 Patch Transdermal daily  metoprolol tartrate 25 milliGRAM(s) Oral two times a day  polyethylene glycol 3350 17 Gram(s) Oral daily  sevelamer carbonate 1600 milliGRAM(s) Oral three times a day with meals    MEDICATIONS  (PRN):  dextrose Oral Gel 15 Gram(s) Oral once PRN Blood Glucose LESS THAN 70 milliGRAM(s)/deciliter  sodium chloride 0.9% lock flush 10 milliLiter(s) IV Push every 1 hour PRN Pre/post blood products, medications, blood draw, and to maintain line patency      CAPILLARY BLOOD GLUCOSE      POCT Blood Glucose.: 126 mg/dL (10 Nov 2023 12:11)  POCT Blood Glucose.: 102 mg/dL (10 Nov 2023 06:21)  POCT Blood Glucose.: 124 mg/dL (10 Nov 2023 00:10)  POCT Blood Glucose.: 114 mg/dL (09 Nov 2023 21:19)    I&O's Summary    09 Nov 2023 07:01  -  10 Nov 2023 07:00  --------------------------------------------------------  IN: 890 mL / OUT: 150 mL / NET: 740 mL        PHYSICAL EXAM:  Vital Signs Last 24 Hrs  T(C): 36.4 (10 Nov 2023 16:08), Max: 37.4 (09 Nov 2023 21:33)  T(F): 97.5 (10 Nov 2023 16:08), Max: 99.3 (09 Nov 2023 21:33)  HR: 68 (10 Nov 2023 16:08) (68 - 97)  BP: 143/96 (10 Nov 2023 16:08) (133/75 - 149/84)  BP(mean): --  RR: 16 (10 Nov 2023 16:08) (16 - 18)  SpO2: 96% (10 Nov 2023 16:08) (92% - 96%)    Parameters below as of 10 Nov 2023 13:18  Patient On (Oxygen Delivery Method): room air        CONSTITUTIONAL: NAD  RESPIRATORY: Normal respiratory effort; lungs are clear to auscultation bilaterally  CARDIOVASCULAR: Regular rate and rhythm, normal S1 and S2, no murmur/rub/gallop; No lower extremity edema  ABDOMEN: Nontender to palpation, normoactive bowel sounds, no rebound/guarding  MUSCLOSKELETAL: no clubbing or cyanosis of digits; no joint swelling or tenderness to palpation  PSYCH: A+O to person, place, and time; affect appropriate    LABS:                        9.7    6.39  )-----------( 226      ( 10 Nov 2023 07:13 )             32.9     11-10    139  |  97  |  46<H>  ----------------------------<  91  4.8   |  19<L>  |  11.52<H>    Ca    10.4      10 Nov 2023 07:12  Phos  6.0     11-10  Mg     2.4     11-10      PT/INR - ( 10 Nov 2023 07:14 )   PT: 13.6 sec;   INR: 1.31 ratio         PTT - ( 10 Nov 2023 07:14 )  PTT:28.7 sec      Urinalysis Basic - ( 10 Nov 2023 07:12 )    Color: x / Appearance: x / SG: x / pH: x  Gluc: 91 mg/dL / Ketone: x  / Bili: x / Urobili: x   Blood: x / Protein: x / Nitrite: x   Leuk Esterase: x / RBC: x / WBC x   Sq Epi: x / Non Sq Epi: x / Bacteria: x
[facility] Division of Hospital Medicine  Ashlee Silvestre MD  Available via MS Teams  Pager: [phone]    SUBJECTIVE / OVERNIGHT EVENTS:  - no events overnight, has no acute complaints, denies any n/v/abd pain/cp/cough/ diarrhea, constipation    MEDICATIONS  (STANDING):  apixaban 5 milliGRAM(s) Oral two times a day  atorvastatin 40 milliGRAM(s) Oral at bedtime  bacitracin   Ointment 1 Application(s) Topical two times a day  cefTRIAXone   IVPB 2000 milliGRAM(s) IV Intermittent every 24 hours  chlorhexidine 4% Liquid 1 Application(s) Topical <User Schedule>  dextrose 5%. 1000 milliLiter(s) (100 mL/Hr) IV Continuous <Continuous>  dextrose 5%. 1000 milliLiter(s) (50 mL/Hr) IV Continuous <Continuous>  dextrose 50% Injectable 12.5 Gram(s) IV Push once  dextrose 50% Injectable 25 Gram(s) IV Push once  dextrose 50% Injectable 25 Gram(s) IV Push once  epoetin isra (EPOGEN) Injectable 75030 Unit(s) IV Push <User Schedule>  glucagon  Injectable 1 milliGRAM(s) IntraMuscular once  hemorrhoidal Ointment 1 Application(s) Rectal two times a day  insulin lispro (ADMELOG) corrective regimen sliding scale   SubCutaneous at bedtime  insulin lispro (ADMELOG) corrective regimen sliding scale   SubCutaneous three times a day before meals  insulin lispro (ADMELOG) corrective regimen sliding scale   SubCutaneous at bedtime  lidocaine   4% Patch 1 Patch Transdermal daily  metoprolol tartrate 25 milliGRAM(s) Oral two times a day  polyethylene glycol 3350 17 Gram(s) Oral daily  sevelamer carbonate 1600 milliGRAM(s) Oral three times a day with meals    MEDICATIONS  (PRN):  dextrose Oral Gel 15 Gram(s) Oral once PRN Blood Glucose LESS THAN 70 milliGRAM(s)/deciliter  sodium chloride 0.9% lock flush 10 milliLiter(s) IV Push every 1 hour PRN Pre/post blood products, medications, blood draw, and to maintain line patency      I&O's Summary    11 Nov 2023 07:01  -  12 Nov 2023 07:00  --------------------------------------------------------  IN: 1200 mL / OUT: 0 mL / NET: 1200 mL    12 Nov 2023 07:01  -  12 Nov 2023 14:11  --------------------------------------------------------  IN: 480 mL / OUT: 0 mL / NET: 480 mL        PHYSICAL EXAM:  Vital Signs Last 24 Hrs  T(C): 37 (12 Nov 2023 09:25), Max: 37.4 (11 Nov 2023 21:21)  T(F): 98.6 (12 Nov 2023 09:25), Max: 99.3 (11 Nov 2023 21:21)  HR: 96 (12 Nov 2023 09:25) (96 - 100)  BP: 158/88 (12 Nov 2023 09:25) (115/61 - 158/88)  BP(mean): --  RR: 18 (12 Nov 2023 09:25) (18 - 18)  SpO2: 98% (12 Nov 2023 09:25) (94% - 99%)    Parameters below as of 12 Nov 2023 09:25  Patient On (Oxygen Delivery Method): room air        CONSTITUTIONAL: NAD  RESPIRATORY: Normal respiratory effort; lungs are clear to auscultation bilaterally  CARDIOVASCULAR: Regular rate and rhythm, normal S1 and S2, no murmur/rub/gallop; No lower extremity edema  ABDOMEN: Nontender to palpation, normoactive bowel sounds, no rebound/guarding  MUSCLOSKELETAL: no clubbing or cyanosis of digits; no joint swelling or tenderness to palpation.  PSYCH: A+O to person, place, and time; affect appropriate    LABS:                        10.1   8.28  )-----------( 281      ( 12 Nov 2023 07:09 )             34.4     11-12    139  |  99  |  40<H>  ----------------------------<  95  4.0   |  22  |  10.74<H>    Ca    10.4      12 Nov 2023 07:09  Phos  4.3     11-11  Mg     2.1     11-11            Urinalysis Basic - ( 12 Nov 2023 07:09 )    Color: x / Appearance: x / SG: x / pH: x  Gluc: 95 mg/dL / Ketone: x  / Bili: x / Urobili: x   Blood: x / Protein: x / Nitrite: x   Leuk Esterase: x / RBC: x / WBC x   Sq Epi: x / Non Sq Epi: x / Bacteria:

## 2023-11-22 NOTE — PROGRESS NOTE ADULT - PROBLEM SELECTOR PROBLEM 4
Sepsis due to Klebsiella pneumoniae
Diabetes mellitus
Diabetes mellitus
ESRD on dialysis
Sepsis due to Klebsiella pneumoniae
Diabetes mellitus
Diabetes mellitus
ESRD on dialysis
Hypertension
Sepsis due to Klebsiella pneumoniae
Diabetes mellitus
ESRD on dialysis
Hypertension
Diabetes mellitus
Diabetes mellitus
ESRD on dialysis
Diabetes mellitus
ESRD on dialysis
Diabetes mellitus
ESRD on dialysis
ESRD on dialysis
Diabetes mellitus
Hypertension
ESRD on dialysis
ESRD on dialysis
Sepsis due to Klebsiella pneumoniae

## 2023-11-22 NOTE — PROGRESS NOTE ADULT - PROBLEM SELECTOR PLAN 3
On HD   nephrology following.
On HD   nephrology following.
Patient w/ sepsis on admission in the setting of bacteremia  - continue abx as above  - repeat cultures NGTD  - continue to monitor WBC
Patient w/ sepsis on admission in the setting of bacteremia, resolved.   - continue abx as above  - repeat cultures NGTD  - continue to monitor WBC
On HD   nephrology following.
Patient w/ lower back pain. MRI w/o concern for osteomyelitis, however shows multilevel endplate degenerative Schmorl nodes, may be source of patient's pain. Additionally, diffuse disc bulge asymmetric to the left   results in moderate narrowing of the left neural foramen with contact upon the exiting left L5 nerve root  - neurosx consulted, no acute sx intervention  - pain control for now  - outpt f/u
Resolves. Initially patient w/ sepsis on admission in the setting of bacteremia, resolved.   - continue abx as above  - repeat cultures NGTD  - continue to monitor WBC
On HD   nephrology following.
Patient w/ ESRD on HD M/W/F presents with dislodged groin HD catheter  -s/p shiley placement w/ IR on 11/6  - plan for HD per nephrology  - ESRD c/b hyperkalemia s/p lokelma, now resolved  - continue to monitor electrolytes  - continue sevelemer TID
On HD   nephrology following.
On HD   nephrology following.
Patient w/ sepsis on admission in the setting of bacteremia  - continue abx as above  - repeat cultures NGTD  - continue to monitor WBC
On HD   nephrology and vascular  following.  Plan for OR Monday for AVF  Acceptable cardiac risk to proceed
Patient w/ lower back pain. MRI w/o concern for osteomyelitis, however shows multilevel endplate degenerative Schmorl nodes, may be source of patient's pain. Additionally, diffuse disc bulge asymmetric to the left   results in moderate narrowing of the left neural foramen with contact upon the exiting left L5 nerve root  - neurosx consulted, no acute sx intervention  - pain control for now  - outpt f/uf
Patient w/ sepsis on admission in the setting of bacteremia  - continue abx as above  - repeat cultures NGTD  - continue to monitor WBC
On HD   nephrology and vascular  following.  Plan for OR Monday for AVF  Acceptable cardiac risk to proceed
Patient w/ ESRD on HD M/W/F presents with dislodged groin HD catheter  -s/p shiley placement w/ IR on 11/6  - plan for HD per nephrology  - ESRD c/b hyperkalemia s/p lokelma, now resolved  - continue to monitor electrolytes  - continue sevelemer TID
Patient w/ sepsis on admission in the setting of bacteremia  - continue abx as above  - repeat cultures NGTD  - continue to monitor WBC
Patient w/ sepsis on admission in the setting of bacteremia, resolved.   - continue abx as above  - repeat cultures NGTD  - continue to monitor WBC
On HD   nephrology and vascular  following.  Plan for OR Monday for AVF  Acceptable cardiac risk to proceed
Patient w/ ESRD on HD M/W/F presents with dislodged groin HD catheter  - IR consulted for shiley placement s/p jose maria on 11/6  - plan for HD per nephrology  - ESRD c/b hyperkalemia s/p lokelma  - continue to monitor electrolytes  - continue sevelemer TID
Resolves. Initially patient w/ sepsis on admission in the setting of bacteremia, resolved.   - continue abx as above  - repeat cultures NGTD  - continue to monitor WBC
On HD   nephrology following.
Patient w/ lower back pain. MRI w/o concern for osteomyelitis, however shows multilevel endplate degenerative Schmorl nodes, may be source of patient's pain. Additionally, diffuse disc bulge asymmetric to the left   results in moderate narrowing of the left neural foramen with contact upon the exiting left L5 nerve root  - neurosx consulted, no acute sx intervention  - pain control for now  - outpt f/u
Resolves. Initially patient w/ sepsis on admission in the setting of bacteremia, resolved.   - continue abx as above  - repeat cultures NGTD  - continue to monitor WBC
Patient w/ lower back pain. MRI w/o concern for osteomyelitis, however shows multilevel endplate degenerative Schmorl nodes, may be source of patient's pain. Additionally, diffuse disc bulge asymmetric to the left   results in moderate narrowing of the left neural foramen with contact upon the exiting left L5 nerve root  - neurosx consulted, no acute sx intervention  - pain control for now  - outpt f/u

## 2023-11-22 NOTE — PROGRESS NOTE ADULT - PROVIDER SPECIALTY LIST ADULT
Infectious Disease
Infectious Disease
Nephrology
Vascular Surgery
Nephrology
Vascular Surgery
Vascular Surgery
Cardiology
Hospitalist
Infectious Disease
Infectious Disease
Nephrology
Vascular Surgery
Cardiology
Nephrology
Vascular Surgery
Cardiology
Vascular Surgery
Cardiology
Internal Medicine
Internal Medicine
Cardiology
Internal Medicine
Cardiology
Internal Medicine
Cardiology
Internal Medicine
Cardiology
Internal Medicine
Internal Medicine
Cardiology
Internal Medicine
Hospitalist
Internal Medicine
Internal Medicine
Hospitalist
Internal Medicine
Internal Medicine

## 2023-11-22 NOTE — PROGRESS NOTE ADULT - PROBLEM SELECTOR PLAN 8
DVT ppx: Eliquis (Afib)  Diet: DASH/CC  Dispo: no skilled needs
DVT ppx: Eliquis (Afib), can resume after IR procedure  Diet: DASH/CC  Dispo: pending clinical improvement
DVT ppx: Eliquis (Afib)  Diet: DASH/CC  Dispo: no skilled needs
DVT ppx: Eliquis (Afib)  Diet: DASH/CC  Dispo: pending clinical improvement
DVT ppx: Eliquis (Afib)  Diet: DASH/CC  Dispo: pending clinical improvement
DVT ppx: Eliquis (Afib)  Diet: DASH/CC  Dispo: no skilled needs
DVT ppx: Eliquis (Afib)  Diet: DASH/CC  Dispo: pending clinical improvement
DVT ppx: Eliquis (Afib)  Diet: DASH/CC  Dispo: no skilled needs

## 2023-11-25 LAB
CULTURE RESULTS: SIGNIFICANT CHANGE UP
SPECIMEN SOURCE: SIGNIFICANT CHANGE UP

## 2024-01-01 NOTE — ED CDU PROVIDER INITIAL DAY NOTE - NS ED ATTENDING STATEMENT MOD
I have personally performed a face to face diagnostic evaluation on this patient. I have reviewed the ACP note and agree with the history, exam and plan of care, except as noted.
infant/stated

## 2024-04-12 NOTE — DISCHARGE NOTE PROVIDER - NSDCHC_MEDRECSTATUS_GEN_ALL_CORE
Patient requests all Lab, Cardiology, and Radiology Results on their Discharge Instructions Admission Reconciliation is Completed  Discharge Reconciliation is Not Complete

## 2024-04-19 NOTE — PATIENT PROFILE ADULT. - NS PRO CONTRA REFUSE FLU INFO
Switch from daily atropine to patching 2 hours per day.   Risks/benefits discussed with patient or patient surrogate

## 2024-05-06 ENCOUNTER — NON-APPOINTMENT (OUTPATIENT)
Age: 59
End: 2024-05-06

## 2024-05-06 ENCOUNTER — INPATIENT (INPATIENT)
Facility: HOSPITAL | Age: 59
LOS: 6 days | Discharge: HOME | DRG: 391 | End: 2024-05-13
Attending: INTERNAL MEDICINE | Admitting: INTERNAL MEDICINE
Payer: MEDICARE

## 2024-05-06 VITALS
HEART RATE: 96 BPM | OXYGEN SATURATION: 98 % | TEMPERATURE: 99 F | DIASTOLIC BLOOD PRESSURE: 78 MMHG | SYSTOLIC BLOOD PRESSURE: 148 MMHG | WEIGHT: 283.96 LBS | RESPIRATION RATE: 20 BRPM | HEIGHT: 70 IN

## 2024-05-06 DIAGNOSIS — I10 ESSENTIAL (PRIMARY) HYPERTENSION: ICD-10-CM

## 2024-05-06 DIAGNOSIS — E11.9 TYPE 2 DIABETES MELLITUS WITHOUT COMPLICATIONS: ICD-10-CM

## 2024-05-06 DIAGNOSIS — S99.919A UNSPECIFIED INJURY OF UNSPECIFIED ANKLE, INITIAL ENCOUNTER: Chronic | ICD-10-CM

## 2024-05-06 DIAGNOSIS — E87.5 HYPERKALEMIA: ICD-10-CM

## 2024-05-06 DIAGNOSIS — Z98.890 OTHER SPECIFIED POSTPROCEDURAL STATES: Chronic | ICD-10-CM

## 2024-05-06 DIAGNOSIS — Z99.2 DEPENDENCE ON RENAL DIALYSIS: Chronic | ICD-10-CM

## 2024-05-06 DIAGNOSIS — R19.7 DIARRHEA, UNSPECIFIED: ICD-10-CM

## 2024-05-06 DIAGNOSIS — N18.6 END STAGE RENAL DISEASE: ICD-10-CM

## 2024-05-06 LAB
ALBUMIN SERPL ELPH-MCNC: 3.9 G/DL — SIGNIFICANT CHANGE UP (ref 3.3–5)
ALP SERPL-CCNC: 72 U/L — SIGNIFICANT CHANGE UP (ref 40–120)
ALT FLD-CCNC: 11 U/L — SIGNIFICANT CHANGE UP (ref 10–45)
ANION GAP SERPL CALC-SCNC: 20 MMOL/L — HIGH (ref 5–17)
ANION GAP SERPL CALC-SCNC: 20 MMOL/L — HIGH (ref 5–17)
APPEARANCE UR: CLEAR — SIGNIFICANT CHANGE UP
APTT BLD: 36.7 SEC — HIGH (ref 24.5–35.6)
AST SERPL-CCNC: 15 U/L — SIGNIFICANT CHANGE UP (ref 10–40)
BACTERIA # UR AUTO: NEGATIVE /HPF — SIGNIFICANT CHANGE UP
BASE EXCESS BLDV CALC-SCNC: -7.9 MMOL/L — LOW (ref -2–3)
BASOPHILS # BLD AUTO: 0.05 K/UL — SIGNIFICANT CHANGE UP (ref 0–0.2)
BASOPHILS NFR BLD AUTO: 0.5 % — SIGNIFICANT CHANGE UP (ref 0–2)
BILIRUB SERPL-MCNC: 0.5 MG/DL — SIGNIFICANT CHANGE UP (ref 0.2–1.2)
BILIRUB UR-MCNC: NEGATIVE — SIGNIFICANT CHANGE UP
BUN SERPL-MCNC: 78 MG/DL — HIGH (ref 7–23)
BUN SERPL-MCNC: 81 MG/DL — HIGH (ref 7–23)
C DIFF GDH STL QL: NEGATIVE — SIGNIFICANT CHANGE UP
C DIFF GDH STL QL: SIGNIFICANT CHANGE UP
CA-I SERPL-SCNC: 1.24 MMOL/L — SIGNIFICANT CHANGE UP (ref 1.15–1.33)
CALCIUM SERPL-MCNC: 9.5 MG/DL — SIGNIFICANT CHANGE UP (ref 8.4–10.5)
CALCIUM SERPL-MCNC: 9.6 MG/DL — SIGNIFICANT CHANGE UP (ref 8.4–10.5)
CAST: 6 /LPF — HIGH (ref 0–4)
CHLORIDE BLDV-SCNC: 107 MMOL/L — SIGNIFICANT CHANGE UP (ref 96–108)
CHLORIDE SERPL-SCNC: 105 MMOL/L — SIGNIFICANT CHANGE UP (ref 96–108)
CHLORIDE SERPL-SCNC: 106 MMOL/L — SIGNIFICANT CHANGE UP (ref 96–108)
CO2 BLDV-SCNC: 20 MMOL/L — LOW (ref 22–26)
CO2 SERPL-SCNC: 14 MMOL/L — LOW (ref 22–31)
CO2 SERPL-SCNC: 16 MMOL/L — LOW (ref 22–31)
COLOR SPEC: YELLOW — SIGNIFICANT CHANGE UP
CREAT SERPL-MCNC: 14.48 MG/DL — HIGH (ref 0.5–1.3)
CREAT SERPL-MCNC: 14.87 MG/DL — HIGH (ref 0.5–1.3)
CULTURE RESULTS: SIGNIFICANT CHANGE UP
DIFF PNL FLD: ABNORMAL
EGFR: 3 ML/MIN/1.73M2 — LOW
EGFR: 3 ML/MIN/1.73M2 — LOW
EOSINOPHIL # BLD AUTO: 0.68 K/UL — HIGH (ref 0–0.5)
EOSINOPHIL NFR BLD AUTO: 6.5 % — HIGH (ref 0–6)
FLUAV AG NPH QL: SIGNIFICANT CHANGE UP
FLUBV AG NPH QL: SIGNIFICANT CHANGE UP
GAS PNL BLDV: 138 MMOL/L — SIGNIFICANT CHANGE UP (ref 136–145)
GAS PNL BLDV: SIGNIFICANT CHANGE UP
GI PCR PANEL: SIGNIFICANT CHANGE UP
GLUCOSE BLDV-MCNC: 89 MG/DL — SIGNIFICANT CHANGE UP (ref 70–99)
GLUCOSE SERPL-MCNC: 92 MG/DL — SIGNIFICANT CHANGE UP (ref 70–99)
GLUCOSE SERPL-MCNC: 97 MG/DL — SIGNIFICANT CHANGE UP (ref 70–99)
GLUCOSE UR QL: NEGATIVE MG/DL — SIGNIFICANT CHANGE UP
HCO3 BLDV-SCNC: 18 MMOL/L — LOW (ref 22–29)
HCT VFR BLD CALC: 26.3 % — LOW (ref 39–50)
HCT VFR BLD CALC: 28.3 % — LOW (ref 39–50)
HCT VFR BLDA CALC: 27 % — LOW (ref 39–51)
HGB BLD CALC-MCNC: 8.9 G/DL — LOW (ref 12.6–17.4)
HGB BLD-MCNC: 7.9 G/DL — LOW (ref 13–17)
HGB BLD-MCNC: 8.5 G/DL — LOW (ref 13–17)
IMM GRANULOCYTES NFR BLD AUTO: 0.7 % — SIGNIFICANT CHANGE UP (ref 0–0.9)
INR BLD: 1.22 RATIO — HIGH (ref 0.85–1.18)
KETONES UR-MCNC: ABNORMAL MG/DL
LACTATE BLDV-MCNC: 1.3 MMOL/L — SIGNIFICANT CHANGE UP (ref 0.5–2)
LEUKOCYTE ESTERASE UR-ACNC: ABNORMAL
LIDOCAIN IGE QN: 51 U/L — SIGNIFICANT CHANGE UP (ref 7–60)
LYMPHOCYTES # BLD AUTO: 1.66 K/UL — SIGNIFICANT CHANGE UP (ref 1–3.3)
LYMPHOCYTES # BLD AUTO: 15.8 % — SIGNIFICANT CHANGE UP (ref 13–44)
MAGNESIUM SERPL-MCNC: 2 MG/DL — SIGNIFICANT CHANGE UP (ref 1.6–2.6)
MCHC RBC-ENTMCNC: 19.4 PG — LOW (ref 27–34)
MCHC RBC-ENTMCNC: 19.5 PG — LOW (ref 27–34)
MCHC RBC-ENTMCNC: 30 GM/DL — LOW (ref 32–36)
MCHC RBC-ENTMCNC: 30 GM/DL — LOW (ref 32–36)
MCV RBC AUTO: 64.6 FL — LOW (ref 80–100)
MCV RBC AUTO: 64.9 FL — LOW (ref 80–100)
MONOCYTES # BLD AUTO: 0.81 K/UL — SIGNIFICANT CHANGE UP (ref 0–0.9)
MONOCYTES NFR BLD AUTO: 7.7 % — SIGNIFICANT CHANGE UP (ref 2–14)
NEUTROPHILS # BLD AUTO: 7.21 K/UL — SIGNIFICANT CHANGE UP (ref 1.8–7.4)
NEUTROPHILS NFR BLD AUTO: 68.8 % — SIGNIFICANT CHANGE UP (ref 43–77)
NITRITE UR-MCNC: NEGATIVE — SIGNIFICANT CHANGE UP
NRBC # BLD: 0 /100 WBCS — SIGNIFICANT CHANGE UP (ref 0–0)
NRBC # BLD: 0 /100 WBCS — SIGNIFICANT CHANGE UP (ref 0–0)
PCO2 BLDV: 40 MMHG — LOW (ref 42–55)
PH BLDV: 7.27 — LOW (ref 7.32–7.43)
PH UR: 6 — SIGNIFICANT CHANGE UP (ref 5–8)
PLATELET # BLD AUTO: 187 K/UL — SIGNIFICANT CHANGE UP (ref 150–400)
PLATELET # BLD AUTO: 214 K/UL — SIGNIFICANT CHANGE UP (ref 150–400)
PO2 BLDV: 29 MMHG — SIGNIFICANT CHANGE UP (ref 25–45)
POTASSIUM BLDV-SCNC: 5.8 MMOL/L — HIGH (ref 3.5–5.1)
POTASSIUM SERPL-MCNC: 5.5 MMOL/L — HIGH (ref 3.5–5.3)
POTASSIUM SERPL-MCNC: 5.6 MMOL/L — HIGH (ref 3.5–5.3)
POTASSIUM SERPL-SCNC: 5.5 MMOL/L — HIGH (ref 3.5–5.3)
POTASSIUM SERPL-SCNC: 5.6 MMOL/L — HIGH (ref 3.5–5.3)
PROT SERPL-MCNC: 7.9 G/DL — SIGNIFICANT CHANGE UP (ref 6–8.3)
PROT UR-MCNC: 100 MG/DL
PROTHROM AB SERPL-ACNC: 13.3 SEC — HIGH (ref 9.5–13)
RBC # BLD: 4.07 M/UL — LOW (ref 4.2–5.8)
RBC # BLD: 4.36 M/UL — SIGNIFICANT CHANGE UP (ref 4.2–5.8)
RBC # FLD: 18.7 % — HIGH (ref 10.3–14.5)
RBC # FLD: 18.9 % — HIGH (ref 10.3–14.5)
RBC CASTS # UR COMP ASSIST: 0 /HPF — SIGNIFICANT CHANGE UP (ref 0–4)
REVIEW: SIGNIFICANT CHANGE UP
RSV RNA NPH QL NAA+NON-PROBE: SIGNIFICANT CHANGE UP
SAO2 % BLDV: 36.9 % — LOW (ref 67–88)
SARS-COV-2 RNA SPEC QL NAA+PROBE: SIGNIFICANT CHANGE UP
SODIUM SERPL-SCNC: 140 MMOL/L — SIGNIFICANT CHANGE UP (ref 135–145)
SODIUM SERPL-SCNC: 141 MMOL/L — SIGNIFICANT CHANGE UP (ref 135–145)
SP GR SPEC: 1.02 — SIGNIFICANT CHANGE UP (ref 1–1.03)
SPECIMEN SOURCE: SIGNIFICANT CHANGE UP
SQUAMOUS # UR AUTO: 1 /HPF — SIGNIFICANT CHANGE UP (ref 0–5)
UROBILINOGEN FLD QL: 1 MG/DL — SIGNIFICANT CHANGE UP (ref 0.2–1)
WBC # BLD: 10.48 K/UL — SIGNIFICANT CHANGE UP (ref 3.8–10.5)
WBC # BLD: 9.55 K/UL — SIGNIFICANT CHANGE UP (ref 3.8–10.5)
WBC # FLD AUTO: 10.48 K/UL — SIGNIFICANT CHANGE UP (ref 3.8–10.5)
WBC # FLD AUTO: 9.55 K/UL — SIGNIFICANT CHANGE UP (ref 3.8–10.5)
WBC UR QL: 19 /HPF — HIGH (ref 0–5)

## 2024-05-06 PROCEDURE — 99222 1ST HOSP IP/OBS MODERATE 55: CPT | Mod: GC

## 2024-05-06 PROCEDURE — 99285 EMERGENCY DEPT VISIT HI MDM: CPT | Mod: GC

## 2024-05-06 RX ORDER — APIXABAN 2.5 MG/1
5 TABLET, FILM COATED ORAL
Refills: 0 | Status: DISCONTINUED | OUTPATIENT
Start: 2024-05-06 | End: 2024-05-13

## 2024-05-06 RX ORDER — TACROLIMUS/VEHICLE BASE NO.238 0.1 %
1 CREAM (GRAM) TOPICAL
Refills: 0 | DISCHARGE

## 2024-05-06 RX ORDER — FUROSEMIDE 40 MG
40 TABLET ORAL ONCE
Refills: 0 | Status: COMPLETED | OUTPATIENT
Start: 2024-05-06 | End: 2024-05-06

## 2024-05-06 RX ORDER — ERYTHROPOIETIN 10000 [IU]/ML
10000 INJECTION, SOLUTION INTRAVENOUS; SUBCUTANEOUS
Refills: 0 | Status: DISCONTINUED | OUTPATIENT
Start: 2024-05-06 | End: 2024-05-13

## 2024-05-06 RX ORDER — ERYTHROPOIETIN 10000 [IU]/ML
10000 INJECTION, SOLUTION INTRAVENOUS; SUBCUTANEOUS ONCE
Refills: 0 | Status: COMPLETED | OUTPATIENT
Start: 2024-05-06 | End: 2024-05-06

## 2024-05-06 RX ORDER — CALCITRIOL 0.5 UG/1
1 CAPSULE ORAL
Refills: 0 | DISCHARGE

## 2024-05-06 RX ORDER — ICOSAPENT ETHYL 500 MG/1
2 CAPSULE, LIQUID FILLED ORAL
Refills: 0 | DISCHARGE

## 2024-05-06 RX ORDER — FEBUXOSTAT 40 MG/1
1 TABLET ORAL
Refills: 0 | DISCHARGE

## 2024-05-06 RX ORDER — SEVELAMER CARBONATE 2400 MG/1
800 POWDER, FOR SUSPENSION ORAL
Refills: 0 | Status: DISCONTINUED | OUTPATIENT
Start: 2024-05-06 | End: 2024-05-13

## 2024-05-06 RX ORDER — METOPROLOL TARTRATE 50 MG
1 TABLET ORAL
Refills: 0 | DISCHARGE

## 2024-05-06 RX ORDER — ATORVASTATIN CALCIUM 80 MG/1
1 TABLET, FILM COATED ORAL
Refills: 0 | DISCHARGE

## 2024-05-06 RX ORDER — CINACALCET 30 MG/1
30 TABLET, FILM COATED ORAL DAILY
Refills: 0 | Status: DISCONTINUED | OUTPATIENT
Start: 2024-05-06 | End: 2024-05-13

## 2024-05-06 RX ORDER — SODIUM ZIRCONIUM CYCLOSILICATE 10 G/10G
10 POWDER, FOR SUSPENSION ORAL ONCE
Refills: 0 | Status: COMPLETED | OUTPATIENT
Start: 2024-05-06 | End: 2024-05-06

## 2024-05-06 RX ADMIN — SEVELAMER CARBONATE 800 MILLIGRAM(S): 2400 POWDER, FOR SUSPENSION ORAL at 17:47

## 2024-05-06 RX ADMIN — Medication 40 MILLIGRAM(S): at 03:23

## 2024-05-06 RX ADMIN — ERYTHROPOIETIN 10000 UNIT(S): 10000 INJECTION, SOLUTION INTRAVENOUS; SUBCUTANEOUS at 12:51

## 2024-05-06 RX ADMIN — SODIUM ZIRCONIUM CYCLOSILICATE 10 GRAM(S): 10 POWDER, FOR SUSPENSION ORAL at 09:02

## 2024-05-06 RX ADMIN — APIXABAN 5 MILLIGRAM(S): 2.5 TABLET, FILM COATED ORAL at 17:48

## 2024-05-06 NOTE — H&P ADULT - ASSESSMENT
59-year-old male, history of ESRD on Monday Wednesday Friday dialysis, hypertension, hyperlipidemia, presenting with a chief complaint of diarrhea.  Onset today.  14 watery bowel movements.  No blood in same.  Diffuse abdominal pain.  Review of systems otherwise negative.  No recent antibiotics, hospitalizations, camping trips or changes to medications.  No recent travel.  No recent suspicious foods.  Desquamating mucosal process signs of allergic reaction to contrast.   59-year-old male, history of ESRD on Monday Wednesday Friday dialysis, hypertension, hyperlipidemia, presenting with a chief complaint of diarrhea.  Onset today.  14 watery bowel movements.  No blood in same.  Diffuse abdominal pain.  Review of systems otherwise negative.  No recent antibiotics, hospitalizations, camping trips or changes to medications.  No recent travel.  No recent suspicious foods.  Desquamating mucosal process signs of allergic reaction to contrast.     Diarrhea  - resolving  - GI PCR ng  - C diff neg  - GI fu appreciated   - imodium PRN  - fu stool cultures  - check strongyloides    ESRD- HD as scheduled  - renal fu     HTN  - cw home meds  - DASH diet    DVT prophylaxis

## 2024-05-06 NOTE — ED PROVIDER NOTE - PHYSICAL EXAMINATION
Gen: NAD, non-toxic appearing  Head: normal appearing  HEENT: normal conjunctiva  Lung: no respiratory distress, speaking in full sentences, ctab      CV: regular rate and rhythm, no murmurs  Abd: soft, non distended, non tender   MSK: no visible deformities  Neuro: alert and grossly oriented, no gross motor deficits  Skin: No ramon rashes

## 2024-05-06 NOTE — CONSULT NOTE ADULT - ASSESSMENT
59-year-old male, history of ESRD on Monday Wednesday Friday dialysis, hypertension, hyperlipidemia, presenting with a chief complaint of diarrhea.  Onset today.  14 watery bowel movements.  No blood in same.  Diffuse abdominal pain.  Review of systems otherwise negative.  No recent antibiotics, hospitalizations, camping trips or changes to medications.        ESRD on HD: [facility]  Dialysis center: [facility]  Nephrologist: Dr. Neftaly Holden  [facility]  Consent signed, witnessed, and placed in pt's chart.  Continue MWF schedule.-HD TODAY  Renal diet.  Monitor BMP and UO.    HTN:  BP controlled.  UF w/ HD.  Monitor BP.    Hyperkalemia:  Likely in setting of ESRD.  HD TODAY   Lokelma 10gm one dose  Low K diet.    Anemia:  Monitor Hgb.  Epogen w/ HD.  Transfuse for Hgb <8.    CKD: MBD:  Check PTH   Monitor Ca and PO4 daily.      59-year-old male, history of ESRD on Monday Wednesday Friday dialysis, hypertension, hyperlipidemia, presenting with a chief complaint of diarrhea.  Onset today.  14 watery bowel movements.  No blood in same.  Diffuse abdominal pain.  Review of systems otherwise negative.  No recent antibiotics, hospitalizations, camping trips or changes to medications.        ESRD on HD: [facility]  Dialysis center: [facility]  Nephrologist: Dr. Neftaly Holden  Access: [facility]  Consent signed, witnessed, and placed in pt's chart.  Continue [facility] schedule.-HD TODAY  Renal diet.  Monitor BMP and UO.    HTN:  BP controlled.  UF w/ HD.  Monitor BP.    Hyperkalemia:  Likely in setting of ESRD.  HD TODAY   Lokelma 10gm one dose  Low K diet.    Anemia:  Monitor Hgb.  Epogen w/ HD.  Transfuse for Hgb <8.    CKD: MBD:  Check PTH   Monitor Ca and PO4 daily.      discussed with HD UNIT

## 2024-05-06 NOTE — ED PROVIDER NOTE - OBJECTIVE STATEMENT
59-year-old male, history of ESRD on Monday Wednesday Friday dialysis, hypertension, hyperlipidemia, presenting with a chief complaint of diarrhea.  Onset today.  14 watery bowel movements.  No blood in same.  Diffuse abdominal pain.  Review of systems otherwise negative.  No recent antibiotics, hospitalizations, camping trips or changes to medications.  No recent travel.  No recent suspicious foods.  Desquamating mucosal process signs of allergic reaction to contrast. Nephrologist is Dr. Holden.

## 2024-05-06 NOTE — ED ADULT NURSE NOTE - CHIEF COMPLAINT QUOTE
abdominal pain and diarrhea that started this morning. Pt is a dialysis patient and missed two dialysis sessions, last session was on 4/29. Hx c-diff

## 2024-05-06 NOTE — ED PROVIDER NOTE - CLINICAL SUMMARY MEDICAL DECISION MAKING FREE TEXT BOX
Domingo Mann MD (PGY-3) Adult male, Monday Wednesday Friday dialysis, presenting with a chief complaint of diarrhea.  Vital signs unremarkable.  Nontoxic-appearing.  Nontender abdomen without other GI symptomology or evidence of obstruction.  Low concern for acute surgical abdomen.  Rule out metabolic derangements, C. difficile.  Will require admission for dialysis given that he missed his last 2 sessions.  Page sent to his nephrologist.  Will assess for metabolic derangements here.

## 2024-05-06 NOTE — H&P ADULT - NSHPLABSRESULTS_GEN_ALL_CORE
Lab Results:  CBC  CBC Full  -  ( 06 May 2024 12:11 )  WBC Count : 9.55 K/uL  RBC Count : 4.07 M/uL  Hemoglobin : 7.9 g/dL  Hematocrit : 26.3 %  Platelet Count - Automated : 187 K/uL  Mean Cell Volume : 64.6 fl  Mean Cell Hemoglobin : 19.4 pg  Mean Cell Hemoglobin Concentration : 30.0 gm/dL  Auto Neutrophil # : x  Auto Lymphocyte # : x  Auto Monocyte # : x  Auto Eosinophil # : x  Auto Basophil # : x  Auto Neutrophil % : x  Auto Lymphocyte % : x  Auto Monocyte % : x  Auto Eosinophil % : x  Auto Basophil % : x    .		Differential:	[] Automated		[] Manual  Chemistry                        7.9    9.55  )-----------( 187      ( 06 May 2024 12:11 )             26.3     05-06    140  |  106  |  81<H>  ----------------------------<  92  5.5<H>   |  14<L>  |  14.87<H>    Ca    9.5      06 May 2024 12:11  Mg     2.0     05-06    TPro  7.9  /  Alb  3.9  /  TBili  0.5  /  DBili  x   /  AST  15  /  ALT  11  /  AlkPhos  72  05-06    LIVER FUNCTIONS - ( 06 May 2024 02:01 )  Alb: 3.9 g/dL / Pro: 7.9 g/dL / ALK PHOS: 72 U/L / ALT: 11 U/L / AST: 15 U/L / GGT: x           PT/INR - ( 06 May 2024 02:01 )   PT: 13.3 sec;   INR: 1.22 ratio         PTT - ( 06 May 2024 02:01 )  PTT:36.7 sec  Urinalysis Basic - ( 06 May 2024 12:11 )    Color: x / Appearance: x / SG: x / pH: x  Gluc: 92 mg/dL / Ketone: x  / Bili: x / Urobili: x   Blood: x / Protein: x / Nitrite: x   Leuk Esterase: x / RBC: x / WBC x   Sq Epi: x / Non Sq Epi: x / Bacteria: x            MICROBIOLOGY/CULTURES:      RADIOLOGY RESULTS: reviewed

## 2024-05-06 NOTE — ED ADULT TRIAGE NOTE - CHIEF COMPLAINT QUOTE
abdominal pain and diarrhea that started this morning abdominal pain and diarrhea that started this morning. Pt is a dialysis patient and missed two dialysis sessions, last session was on 4/29. Hx c-diff

## 2024-05-06 NOTE — H&P ADULT - HISTORY OF PRESENT ILLNESS
59-year-old male, history of ESRD on Monday Wednesday Friday dialysis, hypertension, hyperlipidemia, presenting with a chief complaint of diarrhea.  Onset today.  14 watery bowel movements.  No blood in same.  Diffuse abdominal pain.  Review of systems otherwise negative.  No recent antibiotics, hospitalizations, camping trips or changes to medications.  No recent travel.  No recent suspicious foods.  Desquamating mucosal process signs of allergic reaction to contrast. Nephrologist is Dr. Holden.  59-year-old male, history of ESRD on Monday Wednesday Friday dialysis, hypertension, hyperlipidemia, presenting with a chief complaint of diarrhea.  Onset today.  14 watery bowel movements.  No blood in same.  Diffuse abdominal pain.  Review of systems otherwise negative.  No recent antibiotics, hospitalizations, camping trips or changes to medications.  No recent travel.  No recent suspicious foods.  Desquamating mucosal process signs of allergic reaction to contrast.

## 2024-05-06 NOTE — ED ADULT NURSE REASSESSMENT NOTE - NS ED NURSE REASSESS COMMENT FT1
Report received from RN Marco. Upon assessment, pt is A&Ox4, sleeping in stretcher, reports that he has not had diarrhea this AM. Pt recommended to use urinal and bedside commode due to HR increasing with ambulation to bathroom. Pt verbalizes understanding to use commode and not ambulate at this time. VSS. Safety and comfort measures maintained.

## 2024-05-06 NOTE — ED PROVIDER NOTE - NS ED ROS FT
GENERAL: no fever  EYES: no eye pain  HEENT: no neck pain  CARDIAC: no chest pain  PULMONARY: no SOB  GI: +diarrhea  : no dysuria  SKIN: no rashes  NEURO: no headache  MSK: no new joint pain

## 2024-05-06 NOTE — CONSULT NOTE ADULT - ATTENDING COMMENTS
59-yo M w/ PMH of ESRD on HD, HTN, and Klebsiella bacteremia (11/2023) a/w R CVC, admitted with profuse watery diarrhea. Noted to have WBC 10.48 and 6.5% eosinophilia. Nonspecific diffuse abdominal pain. C diff and GI PCR neg.    #Diarrhea  #ESRD on HD  #Eosinophilia  #Abdominal pain  - Reported improvement in diarrhea while in ED. Not fully resolved. Abdominal pain still persists.  - Send stool culture.  - Send strongyloides study (ordered for tomorrow AM)  - Send HIV screen  - Would hold off antibiotics and monitor.  - F/u WBC and fever curve  - Monitor CBC w/ diff for eosinophilia  - F/u GI    Plan discussed with primary team ACP.  Thank you for this consult. Inpatient ID team will follow.    Adam Noonan MD, PhD  Attending Physician  Division of Infectious Diseases  Department of Medicine    Please contact through MS Teams message.  Office: [phone] (after 5 PM or weekend) 59-yo M w/ PMH of ESRD on HD, HTN, and Klebsiella bacteremia (11/2023) a/w R CVC, admitted with profuse watery diarrhea. Noted to have WBC 10.48 and 6.5% eosinophilia. Nonspecific diffuse abdominal pain. C diff and GI PCR neg.    #Diarrhea  #ESRD on HD  #Eosinophilia  #Abdominal pain  - Reported improvement in diarrhea while in ED. Not fully resolved. Abdominal pain still persists.  - Send stool culture.  - Send strongyloides study (ordered for tomorrow AM)  - Send HIV screen  - Would hold off antibiotics and monitor.  - F/u WBC and fever curve  - Monitor CBC w/ diff for eosinophilia  - Consider CT A/P  - F/u GI    Plan discussed with primary team ACP.  Thank you for this consult. Inpatient ID team will follow.    Adam Noonan MD, PhD  Attending Physician  Division of Infectious Diseases  Department of Medicine    Please contact through MS Teams message.  Office: [phone] (after 5 PM or weekend)
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
## 2024-05-06 NOTE — CONSULT NOTE ADULT - ASSESSMENT
History of Present Illness:    59-year-old male, history of ESRD on Monday Wednesday Friday dialysis, hypertension, hyperlipidemia, presenting with a chief complaint of diarrhea.  Onset today.  14 watery bowel movements.  No blood in same.  Diffuse abdominal pain.  Review of systems otherwise negative.  No recent antibiotics, hospitalizations, camping trips or changes to medications.  No recent travel.  No recent suspicious foods.  Desquamating mucosal process signs of allergic reaction to contrast.

## 2024-05-06 NOTE — ED ADULT NURSE NOTE - OBJECTIVE STATEMENT
59 y.o M BIB self p/w c/o abd pain. A+Ox4. Pt states yesterday morning stated having sudden onset diarrhea a/w diffuse stomach pain, has had about x15 episodes of watery diarrhea, denies bloody stools. States over past x2 days wasn't feeling well so missed dialysis appt, last received dialysis on 4/29. HD M/W/F w/ R chest wall port. Denies any recent abx use, outdoor activities, exposure to sick contacts. States still makes urine, has stage 3 CKD. Upon initial assessment, abd diffusely tender. Reports pain as "cramping". Decreased PO intake x1 day due to mult. bowel movements. Denies any CP, SOB, n/v, f/c. PMH HTN, HLD and DM2. On Eliquis for dialysis purposes. No other complaints at this time, family at bedside, safety maintained.
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
## 2024-05-06 NOTE — CONSULT NOTE ADULT - SUBJECTIVE AND OBJECTIVE BOX
[facility]   MD KAT BHAGAT MD RUORU WONG, PA    TEL:  FROM 9 AM to 5 PM--OFFICE: [phone]  AVAILABLE oN TEAMS   FROM 5 PM- 9 AM PLEASE CALL ANSWERING SERVICE AT [phone]    -- INITIAL RENAL CONSULT NOTE --- Date Of service 24 @ 08:35  --------------------------------------------------------------------------------  HPI:     59-year-old male, history of ESRD on  dialysis, hypertension, hyperlipidemia, presenting with a chief complaint of diarrhea.  Onset today.  14 watery bowel movements.  No blood in same.  Diffuse abdominal pain.  Review of systems otherwise negative.  No recent antibiotics, hospitalizations, camping trips or changes to medications.      PAST HISTORY  --------------------------------------------------------------------------------  PAST MEDICAL & SURGICAL HISTORY:  Renal disease  ESRD      Hypertension      Gout      Diabetes mellitus      HLD (hyperlipidemia)      Obesity      BENNIE on CPAP      Heart rate fast      H/O shoulder surgery      Injury of ankle and foot  surgically repaired, 10 years ago      H/O hernia repair  30 years ago      Peritoneal dialysis catheter in situ  Was inserted, and removed      Arteriovenous graft removed  Now there is a wound suction in left arm        FAMILY HISTORY:  Family history of hypertension  , in  mother      PAST SOCIAL HISTORY:    ALLERGIES & MEDICATIONS  --------------------------------------------------------------------------------  Allergies    IV Contrast (Anaphylaxis)    Intolerances      Standing Inpatient Medications    PRN Inpatient Medications      REVIEW OF SYSTEMS  --------------------------------------------------------------------------------  Gen: No fevers/chills  Skin: No rashes  Head/Eyes/Ears: Normal hearing,  Normal vision   Respiratory: No dyspnea, cough  CV: No chest pain  GI: No abdominal pain, diarrhea, constipation, nausea, vomiting  : No dysuria, hematuria  MSK: No  edema  Heme: No easy bruising or bleeding  Psych: No significant depression    All other systems were reviewed and are negative, except as noted.    VITALS/PHYSICAL EXAM  --------------------------------------------------------------------------------  T(C): 36.5 (24 @ 07:56), Max: 37 (24 @ 00:03)  HR: 75 (24 07:56) (75 - 96)  BP: 134/67 (24 @ 07:56) (134/67 - 148/78)  RR: 18 (24 @ 07:56) (17 - 20)  SpO2: 95% (24 07:56) (95% - 99%)  Wt(kg): --  Height (cm): 177.8 (24 @ 00:03)  Weight (kg): 128.8 (24 @ 00:03)  BMI (kg/m2): 40.7 (24 @ 00:03)  BSA (m2): 2.42 (24 @ 00:03)      Physical Exam:  	Gen: NAD  	HEENT: MMM  	Pulm: CTA B/L  	CV: S1S2  	Abd: Soft, +BS   	Ext: No LE edema B/L  	Neuro: Awake, alert  	Skin: Warm and dry  	Vascular access: No HD catheter           : mary ellen  LABS/STUDIES  --------------------------------------------------------------------------------              8.5    10.48 >-----------<  214      [24 02:01]              28.3     141  |  105  |  78  ----------------------------<  97      [24 02:01]  5.6   |  16  |  14.48        Ca     9.6     [24 02:01]      Mg     2.0     [24 02:01]    TPro  7.9  /  Alb  3.9  /  TBili  0.5  /  DBili  x   /  AST  15  /  ALT  11  /  AlkPhos  72  [24 @ 02:01]    PT/INR: PT 13.3 , INR 1.22       [24 02:01]  PTT: 36.7       [24 02:01]      Creatinine Trend:  SCr 14.48 [ 02:01]    Urinalysis - [24 02:01]      Color Yellow / Appearance Clear / SG 1.019 / pH 6.0      Gluc Negative / Ketone Trace  / Bili Negative / Urobili 1.0       Blood Small / Protein 100 / Leuk Est Trace / Nitrite Negative      RBC 0 / WBC 19 / Hyaline  / Gran  / Sq Epi  / Non Sq Epi 1 / Bacteria Negative      PTH -- (Ca 10.3)      [23 @ 07:12]   397  Vitamin D (25OH) 44.2      [23 07:12]  HbA1c 6.1      [20 @ 05:50]    HBsAg Nonreact      [23 13:38]  HCV 0.19, Nonreact      [23 @ 13:38]     [facility] [place] [facility]   MD KAT BHAGAT MD RUORU WONG, PA    TEL:  FROM 9 AM to 5 PM--OFFICE: [phone]  AVAILABLE oN TEAMS   FROM 5 PM- 9 AM PLEASE CALL ANSWERING SERVICE AT [phone]    -- INITIAL RENAL CONSULT NOTE --- Date Of service 24 @ 08:35  --------------------------------------------------------------------------------  HPI:     59-year-old male, history of ESRD on  dialysis, hypertension, hyperlipidemia, presenting with a chief complaint of diarrhea.  Onset today.  14 watery bowel movements.  No blood in same.  Diffuse abdominal pain.  Review of systems otherwise negative.  No recent antibiotics, hospitalizations, camping trips or changes to medications.    nephrology consulted for esrd      PAST HISTORY  --------------------------------------------------------------------------------  PAST MEDICAL & SURGICAL HISTORY:  Renal disease  ESRD      Hypertension      Gout      Diabetes mellitus      HLD (hyperlipidemia)      Obesity      BENNIE on CPAP      Heart rate fast      H/O shoulder surgery      Injury of ankle and foot  surgically repaired, 10 years ago      H/O hernia repair  30 years ago      Peritoneal dialysis catheter in situ  Was inserted, and removed      Arteriovenous graft removed  Now there is a wound suction in left arm        FAMILY HISTORY:  Family history of hypertension  , in  mother      PAST SOCIAL HISTORY:    ALLERGIES & MEDICATIONS  --------------------------------------------------------------------------------  Allergies    IV Contrast (Anaphylaxis)    Intolerances      Standing Inpatient Medications    PRN Inpatient Medications      REVIEW OF SYSTEMS  --------------------------------------------------------------------------------  Gen: No fevers/chills  Skin: No rashes  Head/Eyes/Ears: Normal hearing,  Normal vision   Respiratory: No dyspnea, cough  CV: No chest pain  GI: + diarrhea,   : No dysuria, hematuria  MSK: No  edema  Heme: No easy bruising or bleeding  Psych: No significant depression    All other systems were reviewed and are negative, except as noted.    VITALS/PHYSICAL EXAM  --------------------------------------------------------------------------------  T(C): 36.5 (24 @ 07:56), Max: 37 (24 @ 00:03)  HR: 75 (24 07:56) (75 - 96)  BP: 134/67 (24 @ 07:56) (134/67 - 148/78)  RR: 18 (24 07:56) (17 - 20)  SpO2: 95% (24 07:56) (95% - 99%)  Wt(kg): --  Height (cm): 177.8 (24 @ 00:03)  Weight (kg): 128.8 (24 @ 00:03)  BMI (kg/m2): 40.7 (24 00:03)  BSA (m2): 2.42 (24 @ 00:03)      Physical Exam:  	Gen: NAD  	HEENT: MMM  	Pulm: CTA B/L  	CV: S1S2  	Abd: Soft, +BS   	Ext: No LE edema B/L  	Neuro: Awake, alert  	Skin: Warm and dry  	Vascular access:  HD catheter           :  no mary ellen  LABS/STUDIES  --------------------------------------------------------------------------------              8.5    10.48 >-----------<  214      [24 02:01]              28.3     141  |  105  |  78  ----------------------------<  97      [24 02:01]  5.6   |  16  |  14.48        Ca     9.6     [24 02:01]      Mg     2.0     [24 02:01]    TPro  7.9  /  Alb  3.9  /  TBili  0.5  /  DBili  x   /  AST  15  /  ALT  11  /  AlkPhos  72  [24 @ 02:01]    PT/INR: PT 13.3 , INR 1.22       [24 02:01]  PTT: 36.7       [24 02:01]      Creatinine Trend:  SCr 14.48 [ 02:01]    Urinalysis - [24 02:01]      Color Yellow / Appearance Clear / SG 1.019 / pH 6.0      Gluc Negative / Ketone Trace  / Bili Negative / Urobili 1.0       Blood Small / Protein 100 / Leuk Est Trace / Nitrite Negative      RBC 0 / WBC 19 / Hyaline  / Gran  / Sq Epi  / Non Sq Epi 1 / Bacteria Negative      PTH -- (Ca 10.3)      [23 @ 07:12]   397  Vitamin D (25OH) 44.2      [23 07:12]  HbA1c 6.1      [20 @ 05:50]    HBsAg Nonreact      [23 @ 13:38]  HCV 0.19, Nonreact      [23 13:38]

## 2024-05-06 NOTE — PATIENT PROFILE ADULT - FALL HARM RISK - HARM RISK INTERVENTIONS

## 2024-05-06 NOTE — ED PROVIDER NOTE - ATTENDING CONTRIBUTION TO CARE
59-year-old male history of diabetes hyperlipidemia hypertension no longer on medications ESRD on dialysis through a tunneled catheter in the chest wall due to failed grafts presenting with complaints of diarrhea watery nonbloody 14+ episodes beginning today with crampy abdominal discomfort.  No fevers.  No vomiting.  No recent travel.  No sick contacts.  No antibiotics.  No recent hospitalizations.  Patient has missed his last 2 dialysis appointments-1 due to back pain and the other because he says there was a problem at the center.  Patient still makes urine.  Vital signs notable for mild elevation in blood pressure no other significant abnormalities EKG nonactionable as documented above.  Physical exam adult male no distress no pallor no increased work of breathing abdomen soft nontender reducible midline ventral hernia extremities warm and well-perfused no significant lower extremity edema.  Given volume of diarrhea C. difficile colitis is a possibility though does not appear to have any risk factors for such.  Other infectious causes for diarrhea are also possible.  Can send stool studies if patient is able to provide a sample.  Would defer CT given nontender.  Will check labs for electrolyte abnormalities given high-volume diarrhea also missed dialysis x 2 sessions.  Likely to be admitted as patient in need of dialysis.

## 2024-05-06 NOTE — CONSULT NOTE ADULT - SUBJECTIVE AND OBJECTIVE BOX
CHIEF COMPLAINT:    HISTORY OF PRESENT ILLNESS:  History of Present Illness:    59-year-old male, history of ESRD on  dialysis, hypertension, hyperlipidemia, presenting with a chief complaint of diarrhea.  Onset today.  14 watery bowel movements.  No blood in same.  Diffuse abdominal pain.  Review of systems otherwise negative.  No recent antibiotics, hospitalizations, camping trips or changes to medications.  No recent travel.  No recent suspicious foods.  Desquamating mucosal process signs of allergic reaction to contrast.     PAST MEDICAL & SURGICAL HISTORY:  Renal disease  ESRD      Hypertension      Gout      Diabetes mellitus      HLD (hyperlipidemia)      Obesity      BENNIE on CPAP      Heart rate fast      H/O shoulder surgery      Injury of ankle and foot  surgically repaired, 10 years ago      H/O hernia repair  30 years ago      Peritoneal dialysis catheter in situ  Was inserted, and removed      Arteriovenous graft removed  Now there is a wound suction in left arm              MEDICATIONS:                  FAMILY HISTORY:  Family history of hypertension  , in  mother        SOCIAL HISTORY:    [ ] Non-smoker  [ ] Smoker  [ ] Alcohol    Allergies    IV Contrast (Anaphylaxis)    Intolerances    	    REVIEW OF SYSTEMS:  CONSTITUTIONAL: No fever, weight loss, or fatigue  EYES: No eye pain, visual disturbances, or discharge  ENMT:  No difficulty hearing, tinnitus, vertigo; No sinus or throat pain  NECK: No pain or stiffness  RESPIRATORY: No cough, wheezing, chills or hemoptysis; No Shortness of Breath  CARDIOVASCULAR: No chest pain, palpitations, passing out, dizziness, or leg swelling  GASTROINTESTINAL: No abdominal or epigastric pain. No nausea, vomiting, or hematemesis; +diarrhea or constipation. No melena or hematochezia.  GENITOURINARY: No dysuria, frequency, hematuria, or incontinence  NEUROLOGICAL: No headaches, memory loss, loss of strength, numbness, or tremors  SKIN: No itching, burning, rashes, or lesions   LYMPH Nodes: No enlarged glands  ENDOCRINE: No heat or cold intolerance; No hair loss  MUSCULOSKELETAL: No joint pain or swelling; No muscle, back, or extremity pain  PSYCHIATRIC: No depression, anxiety, mood swings, or difficulty sleeping  HEME/LYMPH: No easy bruising, or bleeding gums  ALLERY AND IMMUNOLOGIC: No hives or eczema	    [ ] All others negative	  [ ] Unable to obtain    PHYSICAL EXAM:  T(C): 36.5 (24 @ 07:56), Max: 37 (24 @ 00:03)  HR: 75 (24 @ 07:56) (75 - 96)  BP: 134/67 (24 @ 07:56) (134/67 - 148/78)  RR: 18 (24 @ 07:56) (17 - 20)  SpO2: 95% (24 @ 07:56) (95% - 99%)  Wt(kg): --  I&O's Summary      Appearance: NAD  HEENT:  Dry  oral mucosa, PERRL, EOMI	  Lymphatic: No lymphadenopathy  Cardiovascular: Normal S1 S2, No JVD, No murmurs, No edema  Respiratory: Lungs clear to auscultation	  Psychiatry: A & O x 3, Mood & affect appropriate  Gastrointestinal:  Soft, Non-tender, + BS	  Skin: No rashes, No ecchymoses, No cyanosis	  Neurologic: Non-focal  Extremities: Normal range of motion, No clubbing, cyanosis or edema  RUE AVF + thrill   Vascular: Peripheral pulses palpable 2+ bilaterally    TELEMETRY: 	    ECG:  	  RADIOLOGY:  OTHER: 	  	  LABS:	 	    CARDIAC MARKERS:                                  8.5    10.48 )-----------( 214      ( 06 May 2024 02:01 )             28.3     -    141  |  105  |  78<H>  ----------------------------<  97  5.6<H>   |  16<L>  |  14.48<H>    Ca    9.6      06 May 2024 02:01  Mg     2.0     -    TPro  7.9  /  Alb  3.9  /  TBili  0.5  /  DBili  x   /  AST  15  /  ALT  11  /  AlkPhos  72  -    proBNP:   Lipid Profile:   HgA1c:   TSH:

## 2024-05-06 NOTE — ED ADULT NURSE NOTE - NSFALLHARMRISKINTERV_ED_ALL_ED

## 2024-05-07 LAB
ANION GAP SERPL CALC-SCNC: 18 MMOL/L — HIGH (ref 5–17)
BASOPHILS # BLD AUTO: 0.02 K/UL — SIGNIFICANT CHANGE UP (ref 0–0.2)
BASOPHILS NFR BLD AUTO: 0.3 % — SIGNIFICANT CHANGE UP (ref 0–2)
BUN SERPL-MCNC: 44 MG/DL — HIGH (ref 7–23)
CALCIUM SERPL-MCNC: 9.6 MG/DL — SIGNIFICANT CHANGE UP (ref 8.4–10.5)
CHLORIDE SERPL-SCNC: 97 MMOL/L — SIGNIFICANT CHANGE UP (ref 96–108)
CO2 SERPL-SCNC: 22 MMOL/L — SIGNIFICANT CHANGE UP (ref 22–31)
CREAT SERPL-MCNC: 10.02 MG/DL — HIGH (ref 0.5–1.3)
EGFR: 5 ML/MIN/1.73M2 — LOW
EOSINOPHIL # BLD AUTO: 0.62 K/UL — HIGH (ref 0–0.5)
EOSINOPHIL NFR BLD AUTO: 8.1 % — HIGH (ref 0–6)
GLUCOSE SERPL-MCNC: 73 MG/DL — SIGNIFICANT CHANGE UP (ref 70–99)
HBV SURFACE AG SER-ACNC: SIGNIFICANT CHANGE UP
HCT VFR BLD CALC: 28.8 % — LOW (ref 39–50)
HGB BLD-MCNC: 8.9 G/DL — LOW (ref 13–17)
HIV 1+2 AB+HIV1 P24 AG SERPL QL IA: SIGNIFICANT CHANGE UP
IMM GRANULOCYTES NFR BLD AUTO: 0.5 % — SIGNIFICANT CHANGE UP (ref 0–0.9)
LYMPHOCYTES # BLD AUTO: 1.48 K/UL — SIGNIFICANT CHANGE UP (ref 1–3.3)
LYMPHOCYTES # BLD AUTO: 19.2 % — SIGNIFICANT CHANGE UP (ref 13–44)
MCHC RBC-ENTMCNC: 19.8 PG — LOW (ref 27–34)
MCHC RBC-ENTMCNC: 30.9 GM/DL — LOW (ref 32–36)
MCV RBC AUTO: 64 FL — LOW (ref 80–100)
MONOCYTES # BLD AUTO: 0.76 K/UL — SIGNIFICANT CHANGE UP (ref 0–0.9)
MONOCYTES NFR BLD AUTO: 9.9 % — SIGNIFICANT CHANGE UP (ref 2–14)
NEUTROPHILS # BLD AUTO: 4.78 K/UL — SIGNIFICANT CHANGE UP (ref 1.8–7.4)
NEUTROPHILS NFR BLD AUTO: 62 % — SIGNIFICANT CHANGE UP (ref 43–77)
NRBC # BLD: 0 /100 WBCS — SIGNIFICANT CHANGE UP (ref 0–0)
PLATELET # BLD AUTO: 166 K/UL — SIGNIFICANT CHANGE UP (ref 150–400)
POTASSIUM SERPL-MCNC: 4.9 MMOL/L — SIGNIFICANT CHANGE UP (ref 3.5–5.3)
POTASSIUM SERPL-SCNC: 4.9 MMOL/L — SIGNIFICANT CHANGE UP (ref 3.5–5.3)
RBC # BLD: 4.5 M/UL — SIGNIFICANT CHANGE UP (ref 4.2–5.8)
RBC # FLD: 19.7 % — HIGH (ref 10.3–14.5)
SODIUM SERPL-SCNC: 137 MMOL/L — SIGNIFICANT CHANGE UP (ref 135–145)
WBC # BLD: 7.7 K/UL — SIGNIFICANT CHANGE UP (ref 3.8–10.5)
WBC # FLD AUTO: 7.7 K/UL — SIGNIFICANT CHANGE UP (ref 3.8–10.5)

## 2024-05-07 PROCEDURE — 99232 SBSQ HOSP IP/OBS MODERATE 35: CPT

## 2024-05-07 RX ORDER — POLYETHYLENE GLYCOL 3350 17 G/17G
17 POWDER, FOR SOLUTION ORAL ONCE
Refills: 0 | Status: COMPLETED | OUTPATIENT
Start: 2024-05-07 | End: 2024-05-07

## 2024-05-07 RX ORDER — TRAMADOL HYDROCHLORIDE 50 MG/1
25 TABLET ORAL ONCE
Refills: 0 | Status: DISCONTINUED | OUTPATIENT
Start: 2024-05-07 | End: 2024-05-07

## 2024-05-07 RX ADMIN — TRAMADOL HYDROCHLORIDE 25 MILLIGRAM(S): 50 TABLET ORAL at 20:55

## 2024-05-07 RX ADMIN — SEVELAMER CARBONATE 800 MILLIGRAM(S): 2400 POWDER, FOR SUSPENSION ORAL at 09:06

## 2024-05-07 RX ADMIN — CINACALCET 30 MILLIGRAM(S): 30 TABLET, FILM COATED ORAL at 12:54

## 2024-05-07 RX ADMIN — POLYETHYLENE GLYCOL 3350 17 GRAM(S): 17 POWDER, FOR SOLUTION ORAL at 12:55

## 2024-05-07 RX ADMIN — TRAMADOL HYDROCHLORIDE 25 MILLIGRAM(S): 50 TABLET ORAL at 22:34

## 2024-05-07 RX ADMIN — APIXABAN 5 MILLIGRAM(S): 2.5 TABLET, FILM COATED ORAL at 05:19

## 2024-05-07 RX ADMIN — TRAMADOL HYDROCHLORIDE 25 MILLIGRAM(S): 50 TABLET ORAL at 21:40

## 2024-05-07 RX ADMIN — SEVELAMER CARBONATE 800 MILLIGRAM(S): 2400 POWDER, FOR SUSPENSION ORAL at 12:54

## 2024-05-07 RX ADMIN — Medication 1 TABLET(S): at 12:54

## 2024-05-07 RX ADMIN — SEVELAMER CARBONATE 800 MILLIGRAM(S): 2400 POWDER, FOR SUSPENSION ORAL at 17:58

## 2024-05-07 RX ADMIN — TRAMADOL HYDROCHLORIDE 25 MILLIGRAM(S): 50 TABLET ORAL at 23:30

## 2024-05-07 RX ADMIN — APIXABAN 5 MILLIGRAM(S): 2.5 TABLET, FILM COATED ORAL at 17:58

## 2024-05-08 ENCOUNTER — TRANSCRIPTION ENCOUNTER (OUTPATIENT)
Age: 59
End: 2024-05-08

## 2024-05-08 LAB
HBV CORE AB SER-ACNC: SIGNIFICANT CHANGE UP
HBV SURFACE AB SER-ACNC: >1000 MIU/ML — SIGNIFICANT CHANGE UP
HBV SURFACE AB SER-ACNC: REACTIVE
HCV AB S/CO SERPL IA: 0.23 S/CO — SIGNIFICANT CHANGE UP (ref 0–0.99)
HCV AB SERPL-IMP: SIGNIFICANT CHANGE UP

## 2024-05-08 PROCEDURE — 99232 SBSQ HOSP IP/OBS MODERATE 35: CPT

## 2024-05-08 RX ORDER — ACETAMINOPHEN 500 MG
650 TABLET ORAL ONCE
Refills: 0 | Status: COMPLETED | OUTPATIENT
Start: 2024-05-08 | End: 2024-05-08

## 2024-05-08 RX ADMIN — SEVELAMER CARBONATE 800 MILLIGRAM(S): 2400 POWDER, FOR SUSPENSION ORAL at 17:45

## 2024-05-08 RX ADMIN — SEVELAMER CARBONATE 800 MILLIGRAM(S): 2400 POWDER, FOR SUSPENSION ORAL at 08:36

## 2024-05-08 RX ADMIN — APIXABAN 5 MILLIGRAM(S): 2.5 TABLET, FILM COATED ORAL at 17:46

## 2024-05-08 RX ADMIN — Medication 650 MILLIGRAM(S): at 03:50

## 2024-05-08 RX ADMIN — Medication 1 TABLET(S): at 13:38

## 2024-05-08 RX ADMIN — Medication 650 MILLIGRAM(S): at 04:30

## 2024-05-08 RX ADMIN — CINACALCET 30 MILLIGRAM(S): 30 TABLET, FILM COATED ORAL at 13:38

## 2024-05-08 RX ADMIN — SEVELAMER CARBONATE 800 MILLIGRAM(S): 2400 POWDER, FOR SUSPENSION ORAL at 13:37

## 2024-05-08 RX ADMIN — Medication 10 MILLIGRAM(S): at 06:33

## 2024-05-08 RX ADMIN — ERYTHROPOIETIN 10000 UNIT(S): 10000 INJECTION, SOLUTION INTRAVENOUS; SUBCUTANEOUS at 10:12

## 2024-05-08 RX ADMIN — APIXABAN 5 MILLIGRAM(S): 2.5 TABLET, FILM COATED ORAL at 06:33

## 2024-05-08 NOTE — DISCHARGE NOTE PROVIDER - HOSPITAL COURSE
HPI:   59-year-old male, history of ESRD on Monday Wednesday Friday dialysis, hypertension, hyperlipidemia, presenting with a chief complaint of diarrhea.  Onset today.  14 watery bowel movements.  No blood in same.  Diffuse abdominal pain.  Review of systems otherwise negative.  No recent antibiotics, hospitalizations, camping trips or changes to medications.  No recent travel.  No recent suspicious foods.  Desquamating mucosal process signs of allergic reaction to contrast.  (06 May 2024 07:36)    Hospital Course:  Patient admitted for abdominal pain and diarrhea. GI PCR and c diff neg. Monitor off antibiotics per ID. Imaging was deferred given that patient' symptoms resolved and he reports an allergy to contrast.   Hospital course complicated by hyperkalemia. Patient received lokelma during admission and potassium now within normal limits.  Nephrology consulted for ESRD for HD     Important Medication Changes and Reason:    Active or Pending Issues Requiring Follow-up:  >PCP follow up within one week for further outpatient management     Advanced Directives:   [X] Full code  [ ] DNR  [ ] Hospice    Discharge Diagnoses:  >Abdominal pain   >ESRD     Discharge/dispo/med rec discussed with medical attending Dr. Fatima. Patient is medically cleared for discharge with outpatient follow up         HPI:  59-year-old male, history of ESRD on Monday Wednesday Friday dialysis, hypertension, hyperlipidemia, presenting with a chief complaint of diarrhea.  Onset today.  14 watery bowel movements.  No blood in same.  Diffuse abdominal pain.  Review of systems otherwise negative.  No recent antibiotics, hospitalizations, camping trips or changes to medications.  No recent travel.  No recent suspicious foods.  Desquamating mucosal process signs of allergic reaction to contrast.  (06 May 2024 07:36)    Hospital Course:  Patient admitted for abdominal pain and diarrhea. GI PCR and c diff neg. Monitor off antibiotics per ID. Imaging was deferred given that patient' symptoms resolved and he reports an allergy to contrast.   Hospital course complicated by hyperkalemia. Patient received lokelma during admission and potassium now within normal limits.  Nephrology consulted for ESRD for HD     Important Medication Changes and Reason:  >Continue all home medications as prescribed    Active or Pending Issues Requiring Follow-up:  >PCP follow up within one week for further outpatient management     Advanced Directives:   [X] Full code  [ ] DNR  [ ] Hospice    Discharge Diagnoses:  >Abdominal pain   >ESRD     Discharge/dispo/med rec discussed with medical attending Dr. Fatima. Patient is medically cleared for discharge with outpatient follow up         HPI:  59-year-old male, history of ESRD on Monday Wednesday Friday dialysis, hypertension, hyperlipidemia, presenting with a chief complaint of diarrhea.  Onset today.  14 watery bowel movements.  No blood in same.  Diffuse abdominal pain.  Review of systems otherwise negative.  No recent antibiotics, hospitalizations, camping trips or changes to medications.  No recent travel.  No recent suspicious foods.  Desquamating mucosal process signs of allergic reaction to contrast.  (06 May 2024 07:36)    Hospital Course:  Patient admitted for abdominal pain and diarrhea. GI PCR and c diff neg. Monitor off antibiotics per ID. Imaging was deferred given that patient' symptoms resolved and he reports an allergy to contrast.   Hospital course complicated by hyperkalemia. Patient received lokelma during admission and potassium now within normal limits.  Nephrology consulted for ESRD for HD     Important Medication Changes and Reason:  >Continue all home medications as prescribed    Active or Pending Issues Requiring Follow-up:  >PCP follow up within one week for further outpatient management     Advanced Directives:   [X] Full code  [ ] DNR  [ ] Hospice    Discharge Diagnoses:  Abdominal pain   ESRD     Discharge/dispo/med rec discussed with medical attending Dr. Fatima. Patient is medically cleared for discharge with outpatient follow up

## 2024-05-08 NOTE — DISCHARGE NOTE PROVIDER - NSDCMRMEDTOKEN_GEN_ALL_CORE_FT
calcitriol 0.5 mcg oral capsule: 1 cap(s) orally once a day  cholecalciferol 50 mcg (2000 intl units) oral tablet: 1 tab(s) orally once a day  cinacalcet 30 mg oral tablet: 1 tab(s) orally once a day  Eliquis 5 mg oral tablet: 1 tab(s) orally 2 times a day  predniSONE 10 mg oral tablet: 1 tab(s) orally once a day as needed  Ivania-Bud Rx oral tablet: 1 tab(s) orally once a day  sevelamer carbonate 800 mg oral tablet: 1 tab(s) orally 3 times a day (with meals)

## 2024-05-08 NOTE — DISCHARGE NOTE PROVIDER - PROVIDER TOKENS
PROVIDER:[TOKEN:[[id number]],FOLLOWUP:[1 week]] PROVIDER:[TOKEN:[[id number]],FOLLOWUP:[1 week]],PROVIDER:[TOKEN:[[id number]]]

## 2024-05-08 NOTE — DISCHARGE NOTE PROVIDER - CARE PROVIDER_API CALL
Neftaly Holden (MD)  Internal Medicine; Nephrology  [address] [place] [address]  Phone: [phone]  Fax: [phone]  Follow Up Time: 1 week   Neftaly Holden (MD)  Internal Medicine; Nephrology  [address] [address]  Phone: [phone]  Fax: [phone]  Follow Up Time: 1 week    Citlali Kim  Vascular Surgery  [address] [facility], [address]  [place] [address]  Phone: [phone]  Fax: [phone]  Follow Up Time:

## 2024-05-08 NOTE — DISCHARGE NOTE PROVIDER - CARE PROVIDERS DIRECT ADDRESSES
,DirectAddress_Unknown ,DirectAddress_Unknown,[email or web address]

## 2024-05-08 NOTE — DISCHARGE NOTE PROVIDER - NSDCFUADDAPPT_GEN_ALL_CORE_FT
APPTS ARE READY TO BE MADE: [X] YES    Best Family or Patient Contact (if needed):    Additional Information about above appointments (if needed):    1:   2:   3:     Other comments or requests:    APPTS ARE READY TO BE MADE: [X] YES    Best Family or Patient Contact (if needed):    Additional Information about above appointments (if needed):    1:   2:   3:     Other comments or requests:       Patient advises they do not want our assistance,  No information was provided to the patient. APPTS ARE READY TO BE MADE: [X] YES    Best Family or Patient Contact (if needed):    Additional Information about above appointments (if needed):    1: Neftaly Holden  2:   3:     Other comments or requests:       Patient advises they do not want our assistance,  No information was provided to the patient.

## 2024-05-08 NOTE — DISCHARGE NOTE PROVIDER - NSDCCPCAREPLAN_GEN_ALL_CORE_FT
PRINCIPAL DISCHARGE DIAGNOSIS  Diagnosis: Diarrhea  Assessment and Plan of Treatment: You presented with abdominal pain and diarrhea   Stool studies were negative  ID was consulted; you did not require antibiotics   Imaging was deferred given resolution of symptoms and allergy to contrast   Follow up with your PCP within one week for further outpatient management      SECONDARY DISCHARGE DIAGNOSES  Diagnosis: ESRD on dialysis  Assessment and Plan of Treatment: Continue HD as scheduled  Follow up with nephrology for further outpatient management     PRINCIPAL DISCHARGE DIAGNOSIS  Diagnosis: Diarrhea  Assessment and Plan of Treatment: You presented with abdominal pain and diarrhea   Stool studies were negative  ID was consulted; you did not require antibiotics   Imaging was deferred given resolution of symptoms and allergy to contrast   Follow up with your PCP within one week for further outpatient management      SECONDARY DISCHARGE DIAGNOSES  Diagnosis: ESRD on dialysis  Assessment and Plan of Treatment: Continue HD as scheduled  Follow up with nephrology for further outpatient management.  Follow up with Dr. Kim (vascular surgeon) for AV fistula.  Please call to schedule your appointments.

## 2024-05-09 ENCOUNTER — TRANSCRIPTION ENCOUNTER (OUTPATIENT)
Age: 59
End: 2024-05-09

## 2024-05-09 LAB
CALCIUM SERPL-MCNC: 10.2 MG/DL — SIGNIFICANT CHANGE UP (ref 8.4–10.5)
IRON SATN MFR SERPL: 22 % — SIGNIFICANT CHANGE UP (ref 16–55)
IRON SATN MFR SERPL: 47 UG/DL — SIGNIFICANT CHANGE UP (ref 45–165)
PTH-INTACT FLD-MCNC: 524 PG/ML — HIGH (ref 15–65)
STRONGYLOIDES AB SER-ACNC: NEGATIVE — SIGNIFICANT CHANGE UP
TIBC SERPL-MCNC: 218 UG/DL — LOW (ref 220–430)
UIBC SERPL-MCNC: 171 UG/DL — SIGNIFICANT CHANGE UP (ref 110–370)

## 2024-05-09 PROCEDURE — 71046 X-RAY EXAM CHEST 2 VIEWS: CPT | Mod: 26

## 2024-05-09 RX ORDER — ACETAMINOPHEN 500 MG
1000 TABLET ORAL ONCE
Refills: 0 | Status: DISCONTINUED | OUTPATIENT
Start: 2024-05-09 | End: 2024-05-09

## 2024-05-09 RX ORDER — ACETAMINOPHEN 500 MG
650 TABLET ORAL ONCE
Refills: 0 | Status: COMPLETED | OUTPATIENT
Start: 2024-05-09 | End: 2024-05-09

## 2024-05-09 RX ADMIN — Medication 1 TABLET(S): at 11:22

## 2024-05-09 RX ADMIN — SEVELAMER CARBONATE 800 MILLIGRAM(S): 2400 POWDER, FOR SUSPENSION ORAL at 17:19

## 2024-05-09 RX ADMIN — CINACALCET 30 MILLIGRAM(S): 30 TABLET, FILM COATED ORAL at 11:23

## 2024-05-09 RX ADMIN — SEVELAMER CARBONATE 800 MILLIGRAM(S): 2400 POWDER, FOR SUSPENSION ORAL at 08:24

## 2024-05-09 RX ADMIN — SEVELAMER CARBONATE 800 MILLIGRAM(S): 2400 POWDER, FOR SUSPENSION ORAL at 12:55

## 2024-05-09 RX ADMIN — APIXABAN 5 MILLIGRAM(S): 2.5 TABLET, FILM COATED ORAL at 05:26

## 2024-05-09 RX ADMIN — APIXABAN 5 MILLIGRAM(S): 2.5 TABLET, FILM COATED ORAL at 17:19

## 2024-05-09 NOTE — DISCHARGE NOTE NURSING/CASE MANAGEMENT/SOCIAL WORK - PATIENT PORTAL LINK FT
You can access the FollowMyHealth Patient Portal offered by [facility] by registering at the following website: [email or web address]. By joining [facility]’s FollowMyHealth portal, you will also be able to view your health information using other applications (apps) compatible with our system.

## 2024-05-10 LAB
ANION GAP SERPL CALC-SCNC: 19 MMOL/L — HIGH (ref 5–17)
BUN SERPL-MCNC: 50 MG/DL — HIGH (ref 7–23)
CALCIUM SERPL-MCNC: 9.6 MG/DL — SIGNIFICANT CHANGE UP (ref 8.4–10.5)
CHLORIDE SERPL-SCNC: 97 MMOL/L — SIGNIFICANT CHANGE UP (ref 96–108)
CO2 SERPL-SCNC: 21 MMOL/L — LOW (ref 22–31)
CREAT SERPL-MCNC: 13.02 MG/DL — HIGH (ref 0.5–1.3)
EGFR: 4 ML/MIN/1.73M2 — LOW
GAMMA INTERFERON BACKGROUND BLD IA-ACNC: 0.01 IU/ML — SIGNIFICANT CHANGE UP
GLUCOSE SERPL-MCNC: 81 MG/DL — SIGNIFICANT CHANGE UP (ref 70–99)
M TB IFN-G BLD-IMP: ABNORMAL
M TB IFN-G CD4+ BCKGRND COR BLD-ACNC: 0.01 IU/ML — SIGNIFICANT CHANGE UP
M TB IFN-G CD4+CD8+ BCKGRND COR BLD-ACNC: 0 IU/ML — SIGNIFICANT CHANGE UP
POTASSIUM SERPL-MCNC: 4.8 MMOL/L — SIGNIFICANT CHANGE UP (ref 3.5–5.3)
POTASSIUM SERPL-SCNC: 4.8 MMOL/L — SIGNIFICANT CHANGE UP (ref 3.5–5.3)
QUANT TB PLUS MITOGEN MINUS NIL: 0.18 IU/ML — SIGNIFICANT CHANGE UP
SODIUM SERPL-SCNC: 137 MMOL/L — SIGNIFICANT CHANGE UP (ref 135–145)

## 2024-05-10 PROCEDURE — 71045 X-RAY EXAM CHEST 1 VIEW: CPT | Mod: 26

## 2024-05-10 PROCEDURE — 99231 SBSQ HOSP IP/OBS SF/LOW 25: CPT

## 2024-05-10 RX ORDER — ACETAMINOPHEN 500 MG
650 TABLET ORAL ONCE
Refills: 0 | Status: COMPLETED | OUTPATIENT
Start: 2024-05-10 | End: 2024-05-10

## 2024-05-10 RX ORDER — LANOLIN ALCOHOL/MO/W.PET/CERES
3 CREAM (GRAM) TOPICAL AT BEDTIME
Refills: 0 | Status: COMPLETED | OUTPATIENT
Start: 2024-05-10 | End: 2024-05-10

## 2024-05-10 RX ADMIN — SEVELAMER CARBONATE 800 MILLIGRAM(S): 2400 POWDER, FOR SUSPENSION ORAL at 13:43

## 2024-05-10 RX ADMIN — CINACALCET 30 MILLIGRAM(S): 30 TABLET, FILM COATED ORAL at 13:43

## 2024-05-10 RX ADMIN — Medication 650 MILLIGRAM(S): at 17:54

## 2024-05-10 RX ADMIN — Medication 650 MILLIGRAM(S): at 18:24

## 2024-05-10 RX ADMIN — APIXABAN 5 MILLIGRAM(S): 2.5 TABLET, FILM COATED ORAL at 05:49

## 2024-05-10 RX ADMIN — Medication 650 MILLIGRAM(S): at 00:56

## 2024-05-10 RX ADMIN — SEVELAMER CARBONATE 800 MILLIGRAM(S): 2400 POWDER, FOR SUSPENSION ORAL at 17:37

## 2024-05-10 RX ADMIN — Medication 1 TABLET(S): at 13:43

## 2024-05-10 RX ADMIN — ERYTHROPOIETIN 10000 UNIT(S): 10000 INJECTION, SOLUTION INTRAVENOUS; SUBCUTANEOUS at 10:30

## 2024-05-10 RX ADMIN — Medication 3 MILLIGRAM(S): at 00:25

## 2024-05-10 RX ADMIN — Medication 650 MILLIGRAM(S): at 00:26

## 2024-05-10 RX ADMIN — SEVELAMER CARBONATE 800 MILLIGRAM(S): 2400 POWDER, FOR SUSPENSION ORAL at 07:52

## 2024-05-10 RX ADMIN — APIXABAN 5 MILLIGRAM(S): 2.5 TABLET, FILM COATED ORAL at 17:37

## 2024-05-10 NOTE — CONSULT NOTE ADULT - SUBJECTIVE AND OBJECTIVE BOX
Interventional Radiology    Evaluate for Procedure: Permacath evaluation    HPI:   59-year-old male, history of ESRD on Monday Wednesday Friday dialysis, hypertension, hyperlipidemia, presenting with a chief complaint of diarrhea.  Onset today.  14 watery bowel movements.  No blood in same.  Diffuse abdominal pain.  Review of systems otherwise negative.  No recent antibiotics, hospitalizations, camping trips or changes to medications.  No recent travel.  No recent suspicious foods.  Desquamating mucosal process signs of allergic reaction to contrast.  IR being consulted to evaluate permacath due to reports of swelling at permacath site and poor flow rates in dialysis. Per patient the dialysis nurse was changing the dressing and the catheter slipped out about half an inch, but was pushed back in.    ROS  General:  No wt loss, fevers, chills, night sweats  CV:  No pain, palpitations,   Resp:  No dyspnea, cough, tachypnea, wheezing  GI:  No pain, nausea, vomiting, diarrhea, constipation  :  No pain, bleeding, incontinence, nocturia  Heme:  No petechiae, ecchymosis, easy bruisability    PMHx  Renal disease    Hypertension    Gout    Diabetes mellitus    HLD (hyperlipidemia)    Obesity    BENNIE on CPAP    Heart rate fast      Allergies  IV Contrast (Anaphylaxis)    Medications    PHYSICAL EXAM:  T(C): 36.4, Max: 36.9 (05-09-24 @ 21:22)  HR: 96  BP: 114/62  RR: 18  SpO2: 96%    General:  No acute distress, well-appearing  Neuro:  A &O x 3  Respiratory: Non-labored breathing  Abdomen:  Soft, non-tender, non-distended, no peritoneal signs  Extremities:  no swelling, warm, normal color  Right Tunneled HD Cath Site: Site is dry and intact w/o bleeding, there may be a small hematoma formation directly over insertion site and patient is extremely tender to touch and swollen over site. There is no purulent drainage from catheter site, no redness, warmth to touch. Negative pressure was applied to both lumens of the catheter and the clamps were unlocked with brisk flow of blood filling 20cc syringe rapidly. Both lumens flushed well.    LABS:  WBC -- / HgB -- / Hct -- / Plt --  Na 137 / K 4.8 / CO2 21 / Cl 97 / BUN 50 / Cr 13.02 / Glucose 81  ALT -- / -- / Alk Phos -- / TBili --  PTT -- / PT -- / INR --    Radiology:     A/P:  59-year-old male, history of ESRD on Monday Wednesday Friday dialysis, hypertension, hyperlipidemia, presenting with a chief complaint of diarrhea.  Onset today.  14 watery bowel movements.  No blood in same.  Diffuse abdominal pain.  Review of systems otherwise negative.  No recent antibiotics, hospitalizations, camping trips or changes to medications.  No recent travel.  No recent suspicious foods.  Desquamating mucosal process signs of allergic reaction to contrast.  IR being consulted to evaluate permacath due to reports of swelling at permacath site and poor flow rates in dialysis. Per patient the dialysis nurse was changing the dressing and the catheter slipped out about half an inch, but was pushed back in.    - CXR from today with tunneled HD catheter in good position (Catheter originally placed 11/10/2023 at [facility] in IR)  - There does appear to be a small hematoma directly above catheter insertion site and patient is tender to  touch likely related to trauma of catheter being pulled on  - There is no active bleeding at catheter site  - No current s/s infection.  - Maintain current dialysis access.   - Notify IR if there is concern for infection.  - Patient next due for HD Monday, would recommend using current catheter and notify IR if there continue to be flow limiting issues.  - D/w primary team    --  Georgi Zuñiga NP  Interventional Radiology  Available on [employer] TEAMS / [email or web address]    For EMERGENT inquiries/questions:  IR Pager ([facility]): [phone]    For non-emergent consults/questions:   Please place a sunrise order "Consult- Interventional Radiology" with an appropriate callback number    For questions about scheduling during appropriate work hours, call IR :  [facility]: [phone]    For outpatient IR booking:  [facility]: [phone]

## 2024-05-10 NOTE — CHART NOTE - NSCHARTNOTEFT_GEN_A_CORE
CALLED BY Renal attending to  evaluate Pt  who is c/o  pain and swelling at  HD cath site .    Pt seen and examined ,Pt  stated last night he felt like the catheter have slipped out 0.5 inch and he had  to push it back  in   , developed pain  and mild swelling at  the site .  Pt denied any fevers or chills .  Perm cath site no redness or hematoma , small bump noted adjacent to the insertion site.  Dialysis flow is with less pressure in machine as per HD  RN .    will request Xray chest and IR  eval .   Roberto Nevarez NP-C [id number]

## 2024-05-11 LAB
HCT VFR BLD CALC: 33 % — LOW (ref 39–50)
HGB BLD-MCNC: 10.1 G/DL — LOW (ref 13–17)
MCHC RBC-ENTMCNC: 19.6 PG — LOW (ref 27–34)
MCHC RBC-ENTMCNC: 30.6 GM/DL — LOW (ref 32–36)
MCV RBC AUTO: 64 FL — LOW (ref 80–100)
NRBC # BLD: 0 /100 WBCS — SIGNIFICANT CHANGE UP (ref 0–0)
PLATELET # BLD AUTO: 132 K/UL — LOW (ref 150–400)
RBC # BLD: 5.16 M/UL — SIGNIFICANT CHANGE UP (ref 4.2–5.8)
RBC # FLD: 19.5 % — HIGH (ref 10.3–14.5)
WBC # BLD: 8.15 K/UL — SIGNIFICANT CHANGE UP (ref 3.8–10.5)
WBC # FLD AUTO: 8.15 K/UL — SIGNIFICANT CHANGE UP (ref 3.8–10.5)

## 2024-05-11 RX ADMIN — Medication 1 TABLET(S): at 13:14

## 2024-05-11 RX ADMIN — APIXABAN 5 MILLIGRAM(S): 2.5 TABLET, FILM COATED ORAL at 17:26

## 2024-05-11 RX ADMIN — SEVELAMER CARBONATE 800 MILLIGRAM(S): 2400 POWDER, FOR SUSPENSION ORAL at 17:26

## 2024-05-11 RX ADMIN — CINACALCET 30 MILLIGRAM(S): 30 TABLET, FILM COATED ORAL at 13:15

## 2024-05-11 RX ADMIN — SEVELAMER CARBONATE 800 MILLIGRAM(S): 2400 POWDER, FOR SUSPENSION ORAL at 13:15

## 2024-05-11 RX ADMIN — SEVELAMER CARBONATE 800 MILLIGRAM(S): 2400 POWDER, FOR SUSPENSION ORAL at 08:58

## 2024-05-11 RX ADMIN — APIXABAN 5 MILLIGRAM(S): 2.5 TABLET, FILM COATED ORAL at 06:27

## 2024-05-11 NOTE — CONSULT NOTE ADULT - ASSESSMENT
ASSESSMENT:  Jhonny Holley is a 59-year-old man with history of HTN, HLD, DM, BENNIE, and ESRD via R IJ tunneled dialysis catheter. Vascular surgery consultation placed for long-term dialysis access.       PLAN:  - Protect *** arm; remove all IVs  - Vein mapping of bilateral upper extremities  - Please document medicine and cardiology optimization and risk stratification for fistula creation, MAC, and general anesthesia  - No date for OR; Eliquis will need to be held for 48 hours prior and patient transitioned to Heparin  - Plan discussed with Dr. Ar Moise      Vascular Surgery  [id number] ASSESSMENT:  Jhonny Holley is a 59-year-old man with history of HTN, HLD, DM, BENNIE, and ESRD via R IJ tunneled dialysis catheter. Vascular surgery consultation placed for long-term dialysis access.       PLAN:  - Protect left arm; remove all IVs  - Vein mapping of bilateral upper extremities  - CT venogram vs MR venogram; confirm contrast allergy  - Please document medicine and cardiology optimization and risk stratification for fistula creation, MAC, and general anesthesia  - No date for OR; Eliquis will need to be held for 48 hours prior and patient transitioned to Heparin  - Plan discussed with Dr. Ar Moise      Vascular Surgery  [id number] ASSESSMENT:  Jhonny Holley is a 59-year-old man with history of HTN, HLD, DM, BENNIE, and ESRD via R IJ tunneled dialysis catheter. Vascular surgery consultation placed for long-term dialysis access.       PLAN:  - Protect left arm; remove all IVs  - Vein mapping of bilateral upper extremities  - MR venogram chest (allergy to CT contrast, but tolerated MR in 2023)  - Please document medicine and cardiology optimization and risk stratification for fistula creation, MAC, and general anesthesia  - No date for OR; Eliquis will need to be held for 48 hours prior and patient transitioned to Heparin  - Plan discussed with Dr. Ar Moise      Vascular Surgery  [id number]

## 2024-05-11 NOTE — CONSULT NOTE ADULT - CONSULT REASON
diarrhea
Cardiac Management
esrd on HD  with Perm cath, now c/o pain and swelling in perm cath site with low flow as per HD Nurse
ESRD
AVF Creation

## 2024-05-11 NOTE — CONSULT NOTE ADULT - SUBJECTIVE AND OBJECTIVE BOX
Vascular Surgery Consult    Consulting attending: Julio       HPI: Jhonny Holley is a 59-year-old man with history of HTN, HLD, DM, BENNIE, and ESRD via R IJ tunneled dialysis catheter. Vascular surgery consultation placed for long-term dialysis access.       PAST MEDICAL HISTORY:  ESRD  Hypertension  Gout  Diabetes mellitus  HLD (hyperlipidemia)  Obesity  BENNIE on CPAP      PAST SURGICAL HISTORY:  H/O shoulder surgery  Injury of ankle and foot  H/O hernia repair  Peritoneal dialysis catheter in situ  Arteriovenous graft removed      MEDICATIONS:  apixaban 5 milliGRAM(s) Oral two times a day  cinacalcet 30 milliGRAM(s) Oral daily  epoetin isra (EPOGEN) Injectable 50634 Unit(s) IV Push <User Schedule>  multivitamin 1 Tablet(s) Oral daily  predniSONE   Tablet 10 milliGRAM(s) Oral daily  sevelamer carbonate 800 milliGRAM(s) Oral three times a day with meals      ALLERGIES:  IV Contrast (Anaphylaxis)      VITALS & I/Os:  Vital Signs Last 24 Hrs  T(C): 36.7 (11 May 2024 11:54), Max: 37.7 (10 May 2024 21:38)  T(F): 98.1 (11 May 2024 11:54), Max: 99.9 (10 May 2024 21:38)  HR: 107 (11 May 2024 11:54) (86 - 107)  BP: 104/62 (11 May 2024 11:54) (101/68 - 110/66)  BP(mean): --  RR: 18 (11 May 2024 11:54) (18 - 18)  SpO2: 97% (11 May 2024 11:54) (96% - 97%)    Parameters below as of 11 May 2024 11:54  Patient On (Oxygen Delivery Method): room air      I&O's Summary  10 May 2024 07:01  -  11 May 2024 07:00  --------------------------------------------------------  IN: 900 mL / OUT: 2900 mL / NET: -2000 mL    11 May 2024 07:01  -  11 May 2024 15:41  --------------------------------------------------------  IN: 120 mL / OUT: 0 mL / NET: 120 mL      PHYSICAL EXAM:  General: No acute distress  Respiratory: Nonlabored  Cardiovascular: RRR  Abdominal: Soft, nondistended, nontender. No rebound or guarding. No organomegaly, no palpable mass.  Extremities: Warm  Vascular:  - RUE:  - LUE:      LABS:                        10.1   8.15  )-----------( 132      ( 11 May 2024 10:45 )             33.0     05-10    137  |  97  |  50<H>  ----------------------------<  81  4.8   |  21<L>  |  13.02<H>    Ca    9.6      10 May 2024 07:47    Urinalysis Basic - ( 10 May 2024 07:47 )    Color: x / Appearance: x / SG: x / pH: x  Gluc: 81 mg/dL / Ketone: x  / Bili: x / Urobili: x   Blood: x / Protein: x / Nitrite: x   Leuk Esterase: x / RBC: x / WBC x   Sq Epi: x / Non Sq Epi: x / Bacteria: x                                                                                                   Vascular Surgery Consult    Consulting attending: Julio       HPI: Jhonny Holley is a 59-year-old man with history of HTN, HLD, DM, BENNIE, and ESRD via R IJ tunneled dialysis catheter. Vascular surgery consultation placed for long-term dialysis access.     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 Also reports that he had a left-sided HeRO graft.      PAST MEDICAL HISTORY:  ESRD  Hypertension  Gout  Diabetes mellitus  HLD (hyperlipidemia)  Obesity  BENNIE on CPAP      PAST SURGICAL HISTORY:  H/O shoulder surgery  Injury of ankle and foot  H/O hernia repair  Peritoneal dialysis catheter in situ  Arteriovenous graft removed      MEDICATIONS:  apixaban 5 milliGRAM(s) Oral two times a day  cinacalcet 30 milliGRAM(s) Oral daily  epoetin isra (EPOGEN) Injectable 22533 Unit(s) IV Push <User Schedule>  multivitamin 1 Tablet(s) Oral daily  predniSONE   Tablet 10 milliGRAM(s) Oral daily  sevelamer carbonate 800 milliGRAM(s) Oral three times a day with meals      ALLERGIES:  IV Contrast (Anaphylaxis)      VITALS & I/Os:  Vital Signs Last 24 Hrs  T(C): 36.7 (11 May 2024 11:54), Max: 37.7 (10 May 2024 21:38)  T(F): 98.1 (11 May 2024 11:54), Max: 99.9 (10 May 2024 21:38)  HR: 107 (11 May 2024 11:54) (86 - 107)  BP: 104/62 (11 May 2024 11:54) (101/68 - 110/66)  BP(mean): --  RR: 18 (11 May 2024 11:54) (18 - 18)  SpO2: 97% (11 May 2024 11:54) (96% - 97%)    Parameters below as of 11 May 2024 11:54  Patient On (Oxygen Delivery Method): room air      I&O's Summary  10 May 2024 07:01  -  11 May 2024 07:00  --------------------------------------------------------  IN: 900 mL / OUT: 2900 mL / NET: -2000 mL    11 May 2024 07:01  -  11 May 2024 15:41  --------------------------------------------------------  IN: 120 mL / OUT: 0 mL / NET: 120 mL      PHYSICAL EXAM:  General: No acute distress  Respiratory: Nonlabored  Cardiovascular: normotensive, tachycardic  Extremities: Warm, upper arm surgical scars bilaterally, right chest tunnelled dialysis catheter without signs of infection   Vascular:  - RUE: palpable radial  - LUE: palpable radial       LABS:                        10.1   8.15  )-----------( 132      ( 11 May 2024 10:45 )             33.0     05-10    137  |  97  |  50<H>  ----------------------------<  81  4.8   |  21<L>  |  13.02<H>    Ca    9.6      10 May 2024 07:47    Urinalysis Basic - ( 10 May 2024 07:47 )    Color: x / Appearance: x / SG: x / pH: x  Gluc: 81 mg/dL / Ketone: x  / Bili: x / Urobili: x   Blood: x / Protein: x / Nitrite: x   Leuk Esterase: x / RBC: x / WBC x   Sq Epi: x / Non Sq Epi: x / Bacteria: x

## 2024-05-12 LAB
ANION GAP SERPL CALC-SCNC: 21 MMOL/L — HIGH (ref 5–17)
ANION GAP SERPL CALC-SCNC: 22 MMOL/L — HIGH (ref 5–17)
BUN SERPL-MCNC: 52 MG/DL — HIGH (ref 7–23)
BUN SERPL-MCNC: 63 MG/DL — HIGH (ref 7–23)
CALCIUM SERPL-MCNC: 10 MG/DL — SIGNIFICANT CHANGE UP (ref 8.4–10.5)
CALCIUM SERPL-MCNC: 9.6 MG/DL — SIGNIFICANT CHANGE UP (ref 8.4–10.5)
CHLORIDE SERPL-SCNC: 92 MMOL/L — LOW (ref 96–108)
CHLORIDE SERPL-SCNC: 96 MMOL/L — SIGNIFICANT CHANGE UP (ref 96–108)
CO2 SERPL-SCNC: 19 MMOL/L — LOW (ref 22–31)
CO2 SERPL-SCNC: 25 MMOL/L — SIGNIFICANT CHANGE UP (ref 22–31)
CREAT SERPL-MCNC: 13.22 MG/DL — HIGH (ref 0.5–1.3)
CREAT SERPL-MCNC: 15.2 MG/DL — HIGH (ref 0.5–1.3)
EGFR: 3 ML/MIN/1.73M2 — LOW
EGFR: 4 ML/MIN/1.73M2 — LOW
GLUCOSE SERPL-MCNC: 101 MG/DL — HIGH (ref 70–99)
GLUCOSE SERPL-MCNC: 82 MG/DL — SIGNIFICANT CHANGE UP (ref 70–99)
POTASSIUM SERPL-MCNC: 4.3 MMOL/L — SIGNIFICANT CHANGE UP (ref 3.5–5.3)
POTASSIUM SERPL-MCNC: 6 MMOL/L — HIGH (ref 3.5–5.3)
POTASSIUM SERPL-SCNC: 4.3 MMOL/L — SIGNIFICANT CHANGE UP (ref 3.5–5.3)
POTASSIUM SERPL-SCNC: 6 MMOL/L — HIGH (ref 3.5–5.3)
SODIUM SERPL-SCNC: 136 MMOL/L — SIGNIFICANT CHANGE UP (ref 135–145)
SODIUM SERPL-SCNC: 139 MMOL/L — SIGNIFICANT CHANGE UP (ref 135–145)

## 2024-05-12 RX ORDER — SODIUM ZIRCONIUM CYCLOSILICATE 10 G/10G
10 POWDER, FOR SUSPENSION ORAL ONCE
Refills: 0 | Status: COMPLETED | OUTPATIENT
Start: 2024-05-12 | End: 2024-05-12

## 2024-05-12 RX ADMIN — APIXABAN 5 MILLIGRAM(S): 2.5 TABLET, FILM COATED ORAL at 05:28

## 2024-05-12 RX ADMIN — Medication 1 TABLET(S): at 13:01

## 2024-05-12 RX ADMIN — SODIUM ZIRCONIUM CYCLOSILICATE 10 GRAM(S): 10 POWDER, FOR SUSPENSION ORAL at 18:22

## 2024-05-12 RX ADMIN — SODIUM ZIRCONIUM CYCLOSILICATE 10 GRAM(S): 10 POWDER, FOR SUSPENSION ORAL at 15:00

## 2024-05-12 RX ADMIN — CINACALCET 30 MILLIGRAM(S): 30 TABLET, FILM COATED ORAL at 13:02

## 2024-05-12 RX ADMIN — APIXABAN 5 MILLIGRAM(S): 2.5 TABLET, FILM COATED ORAL at 18:20

## 2024-05-12 RX ADMIN — SEVELAMER CARBONATE 800 MILLIGRAM(S): 2400 POWDER, FOR SUSPENSION ORAL at 18:20

## 2024-05-12 RX ADMIN — SEVELAMER CARBONATE 800 MILLIGRAM(S): 2400 POWDER, FOR SUSPENSION ORAL at 08:49

## 2024-05-12 RX ADMIN — SEVELAMER CARBONATE 800 MILLIGRAM(S): 2400 POWDER, FOR SUSPENSION ORAL at 13:01

## 2024-05-13 VITALS — HEART RATE: 83 BPM | TEMPERATURE: 98 F

## 2024-05-13 LAB
ANION GAP SERPL CALC-SCNC: 23 MMOL/L — HIGH (ref 5–17)
BUN SERPL-MCNC: 70 MG/DL — HIGH (ref 7–23)
CALCIUM SERPL-MCNC: 10 MG/DL — SIGNIFICANT CHANGE UP (ref 8.4–10.5)
CHLORIDE SERPL-SCNC: 95 MMOL/L — LOW (ref 96–108)
CO2 SERPL-SCNC: 20 MMOL/L — LOW (ref 22–31)
CREAT SERPL-MCNC: 16.13 MG/DL — HIGH (ref 0.5–1.3)
EGFR: 3 ML/MIN/1.73M2 — LOW
GLUCOSE SERPL-MCNC: 113 MG/DL — HIGH (ref 70–99)
POTASSIUM SERPL-MCNC: 4.2 MMOL/L — SIGNIFICANT CHANGE UP (ref 3.5–5.3)
POTASSIUM SERPL-SCNC: 4.2 MMOL/L — SIGNIFICANT CHANGE UP (ref 3.5–5.3)
SODIUM SERPL-SCNC: 138 MMOL/L — SIGNIFICANT CHANGE UP (ref 135–145)

## 2024-05-13 PROCEDURE — 85014 HEMATOCRIT: CPT

## 2024-05-13 PROCEDURE — 83540 ASSAY OF IRON: CPT

## 2024-05-13 PROCEDURE — 96374 THER/PROPH/DIAG INJ IV PUSH: CPT

## 2024-05-13 PROCEDURE — 99261: CPT

## 2024-05-13 PROCEDURE — 83690 ASSAY OF LIPASE: CPT

## 2024-05-13 PROCEDURE — 84132 ASSAY OF SERUM POTASSIUM: CPT

## 2024-05-13 PROCEDURE — 83550 IRON BINDING TEST: CPT

## 2024-05-13 PROCEDURE — 82803 BLOOD GASES ANY COMBINATION: CPT

## 2024-05-13 PROCEDURE — 87324 CLOSTRIDIUM AG IA: CPT

## 2024-05-13 PROCEDURE — 86706 HEP B SURFACE ANTIBODY: CPT

## 2024-05-13 PROCEDURE — 80048 BASIC METABOLIC PNL TOTAL CA: CPT

## 2024-05-13 PROCEDURE — 87389 HIV-1 AG W/HIV-1&-2 AB AG IA: CPT

## 2024-05-13 PROCEDURE — 86682 HELMINTH ANTIBODY: CPT

## 2024-05-13 PROCEDURE — 36415 COLL VENOUS BLD VENIPUNCTURE: CPT

## 2024-05-13 PROCEDURE — 80053 COMPREHEN METABOLIC PANEL: CPT

## 2024-05-13 PROCEDURE — 87086 URINE CULTURE/COLONY COUNT: CPT

## 2024-05-13 PROCEDURE — 86803 HEPATITIS C AB TEST: CPT

## 2024-05-13 PROCEDURE — 71045 X-RAY EXAM CHEST 1 VIEW: CPT

## 2024-05-13 PROCEDURE — 83970 ASSAY OF PARATHORMONE: CPT

## 2024-05-13 PROCEDURE — 87637 SARSCOV2&INF A&B&RSV AMP PRB: CPT

## 2024-05-13 PROCEDURE — 84295 ASSAY OF SERUM SODIUM: CPT

## 2024-05-13 PROCEDURE — 85730 THROMBOPLASTIN TIME PARTIAL: CPT

## 2024-05-13 PROCEDURE — 87340 HEPATITIS B SURFACE AG IA: CPT

## 2024-05-13 PROCEDURE — 85018 HEMOGLOBIN: CPT

## 2024-05-13 PROCEDURE — 83605 ASSAY OF LACTIC ACID: CPT

## 2024-05-13 PROCEDURE — 85610 PROTHROMBIN TIME: CPT

## 2024-05-13 PROCEDURE — 83735 ASSAY OF MAGNESIUM: CPT

## 2024-05-13 PROCEDURE — 82310 ASSAY OF CALCIUM: CPT

## 2024-05-13 PROCEDURE — 81001 URINALYSIS AUTO W/SCOPE: CPT

## 2024-05-13 PROCEDURE — 85025 COMPLETE CBC W/AUTO DIFF WBC: CPT

## 2024-05-13 PROCEDURE — 82330 ASSAY OF CALCIUM: CPT

## 2024-05-13 PROCEDURE — 82435 ASSAY OF BLOOD CHLORIDE: CPT

## 2024-05-13 PROCEDURE — 71046 X-RAY EXAM CHEST 2 VIEWS: CPT

## 2024-05-13 PROCEDURE — 86704 HEP B CORE ANTIBODY TOTAL: CPT

## 2024-05-13 PROCEDURE — 87507 IADNA-DNA/RNA PROBE TQ 12-25: CPT

## 2024-05-13 PROCEDURE — 85027 COMPLETE CBC AUTOMATED: CPT

## 2024-05-13 PROCEDURE — 99285 EMERGENCY DEPT VISIT HI MDM: CPT

## 2024-05-13 PROCEDURE — 86480 TB TEST CELL IMMUN MEASURE: CPT

## 2024-05-13 PROCEDURE — 96375 TX/PRO/DX INJ NEW DRUG ADDON: CPT

## 2024-05-13 PROCEDURE — 82947 ASSAY GLUCOSE BLOOD QUANT: CPT

## 2024-05-13 RX ADMIN — CINACALCET 30 MILLIGRAM(S): 30 TABLET, FILM COATED ORAL at 14:14

## 2024-05-13 RX ADMIN — APIXABAN 5 MILLIGRAM(S): 2.5 TABLET, FILM COATED ORAL at 08:55

## 2024-05-13 RX ADMIN — SEVELAMER CARBONATE 800 MILLIGRAM(S): 2400 POWDER, FOR SUSPENSION ORAL at 08:55

## 2024-05-13 RX ADMIN — Medication 1 TABLET(S): at 14:14

## 2024-05-13 RX ADMIN — SEVELAMER CARBONATE 800 MILLIGRAM(S): 2400 POWDER, FOR SUSPENSION ORAL at 14:14

## 2024-05-13 NOTE — PROVIDER CONTACT NOTE (OTHER) - ACTION/TREATMENT ORDERED:
Continually monitor Continually monitor any s/s of infection  Reeducate Pt infection control information

## 2024-05-13 NOTE — PROGRESS NOTE ADULT - SUBJECTIVE AND OBJECTIVE BOX
Dr. Phoenix  Office [phone] (9 am to 5 pm)  Service: [phone] (5pm to 9am)  Corrie QUICK      RENAL PROGRESS NOTE: DATE OF SERVICE 05-12-24 @ 13:57    Patient is a 59y old  Male who presents with a chief complaint of diarrhea (12 May 2024 13:06)      Patient seen and examined at bedside. No chest pain/sob    VITALS:  T(F): 98.9 (05-12-24 @ 11:53), Max: 99.1 (05-11-24 @ 20:58)  HR: 104 (05-12-24 @ 11:53)  BP: 108/75 (05-12-24 @ 11:53)  RR: 18 (05-12-24 @ 11:53)  SpO2: 98% (05-12-24 @ 11:53)  Wt(kg): --    05-11 @ 07:01  -  05-12 @ 07:00  --------------------------------------------------------  IN: 470 mL / OUT: 0 mL / NET: 470 mL          PHYSICAL EXAM:  Constitutional: NAD  Neck: No JVD  Respiratory: CTAB, no wheezes, rales or rhonchi  Cardiovascular: S1, S2, RRR  Gastrointestinal: BS+, soft, NT/ND  Extremities: No peripheral edema    Hospital Medications:   MEDICATIONS  (STANDING):  apixaban 5 milliGRAM(s) Oral two times a day  cinacalcet 30 milliGRAM(s) Oral daily  epoetin isra (EPOGEN) Injectable 98368 Unit(s) IV Push <User Schedule>  multivitamin 1 Tablet(s) Oral daily  predniSONE   Tablet 10 milliGRAM(s) Oral daily  sevelamer carbonate 800 milliGRAM(s) Oral three times a day with meals      LABS:  05-12    136  |  96  |  52<H>  ----------------------------<  82  6.0<H>   |  19<L>  |  13.22<H>    Ca    9.6      12 May 2024 06:53      Creatinine Trend: 13.22 <--, 13.02 <--, 10.02 <--, 14.87 <--, 14.48 <--                                10.1   8.15  )-----------( 132      ( 11 May 2024 10:45 )             33.0     Urine Studies:  Urinalysis - [05-12-24 @ 06:53]      Color  / Appearance  / SG  / pH       Gluc 82 / Ketone   / Bili  / Urobili        Blood  / Protein  / Leuk Est  / Nitrite       RBC  / WBC  / Hyaline  / Gran  / Sq Epi  / Non Sq Epi  / Bacteria       Iron 47, TIBC 218, %sat 22      [05-09-24 @ 10:24]  PTH -- (Ca 10.2)      [05-09-24 @ 10:25]   524  PTH -- (Ca 10.3)      [11-05-23 @ 07:12]   397  Vitamin D (25OH) 44.2      [11-05-23 @ 07:12]  HbA1c 6.1      [01-04-20 @ 05:50]    HBsAb >1000.0      [05-07-24 @ 18:40]  HBsAb Reactive      [05-07-24 @ 18:40]  HBsAg Nonreact      [05-07-24 @ 18:40]  HBcAb Nonreact      [05-07-24 @ 18:40]  HCV 0.23, Nonreact      [05-07-24 @ 18:40]  HIV Nonreact      [05-07-24 @ 07:09]      RADIOLOGY & ADDITIONAL STUDIES:  
[facility] [place] [facility]   MD KAT BHAGAT MD RUORU WONG, PA    TEL:  FROM 9 AM to 5 PM ---OFFICE: [phone]  AVAILABLE ON TEAMS    FROM 5 PM - 9 AM PLEASE CALL ANSWERING SERVICE: [phone]    RENAL FOLLOW UP NOTE--Date of Service 05-09-24 @ 09:55  --------------------------------------------------------------------------------  HPI:      Pt seen and examined at bedside.       PAST HISTORY  --------------------------------------------------------------------------------  No significant changes to PMH, PSH, FHx, SHx, unless otherwise noted    ALLERGIES & MEDICATIONS  --------------------------------------------------------------------------------  Allergies    IV Contrast (Anaphylaxis)    Intolerances      Standing Inpatient Medications  apixaban 5 milliGRAM(s) Oral two times a day  cinacalcet 30 milliGRAM(s) Oral daily  epoetin isra (EPOGEN) Injectable 71119 Unit(s) IV Push <User Schedule>  multivitamin 1 Tablet(s) Oral daily  predniSONE   Tablet 10 milliGRAM(s) Oral daily  sevelamer carbonate 800 milliGRAM(s) Oral three times a day with meals    PRN Inpatient Medications      REVIEW OF SYSTEMS  --------------------------------------------------------------------------------  General: no fever  MSK: no edema     VITALS/PHYSICAL EXAM  --------------------------------------------------------------------------------  T(C): 36.7 (05-09-24 @ 05:05), Max: 37.2 (05-08-24 @ 21:22)  HR: 97 (05-09-24 @ 05:05) (97 - 117)  BP: 107/70 (05-09-24 @ 05:05) (98/64 - 145/80)  RR: 18 (05-09-24 @ 05:05) (18 - 18)  SpO2: 100% (05-09-24 @ 05:05) (99% - 100%)  Wt(kg): --        05-08-24 @ 07:01  -  05-09-24 @ 07:00  --------------------------------------------------------  IN: 240 mL / OUT: 1900 mL / NET: -1660 mL      Physical Exam:  	Gen: NAD  	HEENT: MMM  	Pulm: CTA B/L  	CV: S1S2  	Abd: Soft, +BS  	Ext: No LE edema B/L                      Neuro: Awake   	Skin: Warm and Dry   	Vascular access:  HD catheter             no mary ellen  LABS/STUDIES  --------------------------------------------------------------------------------                Creatinine Trend:  SCr 10.02 [05-07 @ 07:07]  SCr 14.87 [05-06 @ 12:11]  SCr 14.48 [05-06 @ 02:01]    Urinalysis - [05-07-24 @ 07:07]      Color  / Appearance  / SG  / pH       Gluc 73 / Ketone   / Bili  / Urobili        Blood  / Protein  / Leuk Est  / Nitrite       RBC  / WBC  / Hyaline  / Gran  / Sq Epi  / Non Sq Epi  / Bacteria       PTH -- (Ca 10.3)      [11-05-23 @ 07:12]   397  Vitamin D (25OH) 44.2      [11-05-23 @ 07:12]  HbA1c 6.1      [01-04-20 @ 05:50]    HBsAb >1000.0      [05-07-24 @ 18:40]  HBsAb Reactive      [05-07-24 @ 18:40]  HBsAg Nonreact      [05-07-24 @ 18:40]  HBcAb Nonreact      [05-07-24 @ 18:40]  HCV 0.23, Nonreact      [05-07-24 @ 18:40]  HIV Nonreact      [05-07-24 @ 07:09]    
Patient is a 59y old  Male who presents with a chief complaint of diarrhea (07 May 2024 11:24)    Date of servie : 05-07-24 @ 13:27  INTERVAL HPI/OVERNIGHT EVENTS:  T(C): 37.1 (05-07-24 @ 12:03), Max: 37.2 (05-06-24 @ 19:06)  HR: 102 (05-07-24 @ 12:03) (91 - 106)  BP: 120/75 (05-07-24 @ 12:03) (90/56 - 120/75)  RR: 18 (05-07-24 @ 12:03) (18 - 18)  SpO2: 95% (05-07-24 @ 12:03) (95% - 100%)  Wt(kg): --  I&O's Summary    06 May 2024 07:01  -  07 May 2024 07:00  --------------------------------------------------------  IN: 0 mL / OUT: 1100 mL / NET: -1100 mL    07 May 2024 07:01  -  07 May 2024 13:27  --------------------------------------------------------  IN: 640 mL / OUT: 0 mL / NET: 640 mL        LABS:                        8.9    7.70  )-----------( 166      ( 07 May 2024 07:07 )             28.8     05-07    137  |  97  |  44<H>  ----------------------------<  73  4.9   |  22  |  10.02<H>    Ca    9.6      07 May 2024 07:07  Mg     2.0     05-06    TPro  7.9  /  Alb  3.9  /  TBili  0.5  /  DBili  x   /  AST  15  /  ALT  11  /  AlkPhos  72  05-06    PT/INR - ( 06 May 2024 02:01 )   PT: 13.3 sec;   INR: 1.22 ratio         PTT - ( 06 May 2024 02:01 )  PTT:36.7 sec  Urinalysis Basic - ( 07 May 2024 07:07 )    Color: x / Appearance: x / SG: x / pH: x  Gluc: 73 mg/dL / Ketone: x  / Bili: x / Urobili: x   Blood: x / Protein: x / Nitrite: x   Leuk Esterase: x / RBC: x / WBC x   Sq Epi: x / Non Sq Epi: x / Bacteria: x      CAPILLARY BLOOD GLUCOSE            Urinalysis Basic - ( 07 May 2024 07:07 )    Color: x / Appearance: x / SG: x / pH: x  Gluc: 73 mg/dL / Ketone: x  / Bili: x / Urobili: x   Blood: x / Protein: x / Nitrite: x   Leuk Esterase: x / RBC: x / WBC x   Sq Epi: x / Non Sq Epi: x / Bacteria: x        MEDICATIONS  (STANDING):  apixaban 5 milliGRAM(s) Oral two times a day  cinacalcet 30 milliGRAM(s) Oral daily  epoetin isra (EPOGEN) Injectable 70443 Unit(s) IV Push <User Schedule>  multivitamin 1 Tablet(s) Oral daily  predniSONE   Tablet 10 milliGRAM(s) Oral daily  sevelamer carbonate 800 milliGRAM(s) Oral three times a day with meals    MEDICATIONS  (PRN):          PHYSICAL EXAM:  GENERAL: NAD, well-groomed, well-developed  HEAD:  Atraumatic, Normocephalic  CHEST/LUNG: Clear to percussion bilaterally; No rales, rhonchi, wheezing, or rubs  HEART: Regular rate and rhythm; No murmurs, rubs, or gallops  ABDOMEN: Soft, Nontender, Nondistended; Bowel sounds present  EXTREMITIES:  2+ Peripheral Pulses, No clubbing, cyanosis, or edema  LYMPH: No lymphadenopathy noted  SKIN: No rashes or lesions    Care Discussed with Consultants/Other Providers [ ] YES  [ ] NO
Patient is a 59y old  Male who presents with a chief complaint of diarrhea (08 May 2024 14:36)    Date of servie : 05-08-24 @ 19:03  INTERVAL HPI/OVERNIGHT EVENTS:  T(C): 36.5 (05-08-24 @ 13:04), Max: 37.2 (05-07-24 @ 20:38)  HR: 100 (05-08-24 @ 13:04) (81 - 100)  BP: 145/80 (05-08-24 @ 13:04) (106/70 - 145/80)  RR: 18 (05-08-24 @ 13:04) (16 - 18)  SpO2: 99% (05-08-24 @ 13:04) (96% - 99%)  Wt(kg): --  I&O's Summary    07 May 2024 07:01  -  08 May 2024 07:00  --------------------------------------------------------  IN: 1550 mL / OUT: 0 mL / NET: 1550 mL    08 May 2024 07:01  -  08 May 2024 19:03  --------------------------------------------------------  IN: 0 mL / OUT: 1900 mL / NET: -1900 mL        LABS:                        8.9    7.70  )-----------( 166      ( 07 May 2024 07:07 )             28.8     05-07    137  |  97  |  44<H>  ----------------------------<  73  4.9   |  22  |  10.02<H>    Ca    9.6      07 May 2024 07:07        Urinalysis Basic - ( 07 May 2024 07:07 )    Color: x / Appearance: x / SG: x / pH: x  Gluc: 73 mg/dL / Ketone: x  / Bili: x / Urobili: x   Blood: x / Protein: x / Nitrite: x   Leuk Esterase: x / RBC: x / WBC x   Sq Epi: x / Non Sq Epi: x / Bacteria: x      CAPILLARY BLOOD GLUCOSE            Urinalysis Basic - ( 07 May 2024 07:07 )    Color: x / Appearance: x / SG: x / pH: x  Gluc: 73 mg/dL / Ketone: x  / Bili: x / Urobili: x   Blood: x / Protein: x / Nitrite: x   Leuk Esterase: x / RBC: x / WBC x   Sq Epi: x / Non Sq Epi: x / Bacteria: x        MEDICATIONS  (STANDING):  apixaban 5 milliGRAM(s) Oral two times a day  cinacalcet 30 milliGRAM(s) Oral daily  epoetin isra (EPOGEN) Injectable 81275 Unit(s) IV Push <User Schedule>  multivitamin 1 Tablet(s) Oral daily  predniSONE   Tablet 10 milliGRAM(s) Oral daily  sevelamer carbonate 800 milliGRAM(s) Oral three times a day with meals    MEDICATIONS  (PRN):          PHYSICAL EXAM:  GENERAL: NAD, well-groomed, well-developed  HEAD:  Atraumatic, Normocephalic  CHEST/LUNG: Clear to percussion bilaterally; No rales, rhonchi, wheezing, or rubs  HEART: Regular rate and rhythm; No murmurs, rubs, or gallops  ABDOMEN: Soft, Nontender, Nondistended; Bowel sounds present  EXTREMITIES:  2+ Peripheral Pulses, No clubbing, cyanosis, or edema  LYMPH: No lymphadenopathy noted  SKIN: No rashes or lesions    Care Discussed with Consultants/Other Providers [ ] YES  [ ] NO
Patient is a 59y old  Male who presents with a chief complaint of diarrhea (10 May 2024 17:14)    Date of servie : 05-11-24 @ 11:09  INTERVAL HPI/OVERNIGHT EVENTS:  T(C): 37.1 (05-11-24 @ 05:30), Max: 37.7 (05-10-24 @ 21:38)  HR: 98 (05-11-24 @ 05:30) (81 - 98)  BP: 101/68 (05-11-24 @ 05:30) (101/68 - 142/87)  RR: 18 (05-11-24 @ 05:30) (18 - 18)  SpO2: 96% (05-11-24 @ 05:30) (96% - 99%)  Wt(kg): --  I&O's Summary    10 May 2024 07:01  -  11 May 2024 07:00  --------------------------------------------------------  IN: 900 mL / OUT: 2900 mL / NET: -2000 mL        LABS:                        10.1   8.15  )-----------( 132      ( 11 May 2024 10:45 )             33.0     05-10    137  |  97  |  50<H>  ----------------------------<  81  4.8   |  21<L>  |  13.02<H>    Ca    9.6      10 May 2024 07:47        Urinalysis Basic - ( 10 May 2024 07:47 )    Color: x / Appearance: x / SG: x / pH: x  Gluc: 81 mg/dL / Ketone: x  / Bili: x / Urobili: x   Blood: x / Protein: x / Nitrite: x   Leuk Esterase: x / RBC: x / WBC x   Sq Epi: x / Non Sq Epi: x / Bacteria: x      CAPILLARY BLOOD GLUCOSE            Urinalysis Basic - ( 10 May 2024 07:47 )    Color: x / Appearance: x / SG: x / pH: x  Gluc: 81 mg/dL / Ketone: x  / Bili: x / Urobili: x   Blood: x / Protein: x / Nitrite: x   Leuk Esterase: x / RBC: x / WBC x   Sq Epi: x / Non Sq Epi: x / Bacteria: x        MEDICATIONS  (STANDING):  apixaban 5 milliGRAM(s) Oral two times a day  cinacalcet 30 milliGRAM(s) Oral daily  epoetin isra (EPOGEN) Injectable 54026 Unit(s) IV Push <User Schedule>  multivitamin 1 Tablet(s) Oral daily  predniSONE   Tablet 10 milliGRAM(s) Oral daily  sevelamer carbonate 800 milliGRAM(s) Oral three times a day with meals    MEDICATIONS  (PRN):          PHYSICAL EXAM:  GENERAL: NAD, well-groomed, well-developed  HEAD:  Atraumatic, Normocephalic  CHEST/LUNG: Clear to percussion bilaterally; No rales, rhonchi, wheezing, or rubs  HEART: Regular rate and rhythm; No murmurs, rubs, or gallops  ABDOMEN: Soft, Nontender, Nondistended; Bowel sounds present  EXTREMITIES:  2+ Peripheral Pulses, No clubbing, cyanosis, or edema  LYMPH: No lymphadenopathy noted  SKIN: No rashes or lesions    Care Discussed with Consultants/Other Providers [ ] YES  [ ] NO
Vascular Surgery Progress Note    Overnight Events: No acute events overnight.  SUBJECTIVE: Patient seen and examined at bedside with surgical team, patient without complaints. Denies fever, chills, CP, SOB nausea, vomiting, dizziness.      OBJECTIVE:    Vital Signs Last 24 Hrs  T(C): 37.1 (13 May 2024 04:22), Max: 37.2 (12 May 2024 11:53)  T(F): 98.7 (13 May 2024 04:22), Max: 98.9 (12 May 2024 11:53)  HR: 100 (13 May 2024 04:22) (100 - 108)  BP: 105/65 (13 May 2024 04:22) (105/65 - 128/72)  BP(mean): --  RR: 18 (13 May 2024 04:22) (18 - 18)  SpO2: 99% (13 May 2024 04:22) (98% - 99%)    Parameters below as of 13 May 2024 04:22  Patient On (Oxygen Delivery Method): room air    I&O's Detail    11 May 2024 07:01  -  12 May 2024 07:00  --------------------------------------------------------  IN:    Oral Fluid: 470 mL  Total IN: 470 mL    OUT:  Total OUT: 0 mL    Total NET: 470 mL      12 May 2024 07:01  -  13 May 2024 06:33  --------------------------------------------------------  IN:    Oral Fluid: 300 mL  Total IN: 300 mL    OUT:  Total OUT: 0 mL    Total NET: 300 mL      MEDICATIONS  (STANDING):  apixaban 5 milliGRAM(s) Oral two times a day  cinacalcet 30 milliGRAM(s) Oral daily  epoetin isra (EPOGEN) Injectable 55112 Unit(s) IV Push <User Schedule>  multivitamin 1 Tablet(s) Oral daily  predniSONE   Tablet 10 milliGRAM(s) Oral daily  sevelamer carbonate 800 milliGRAM(s) Oral three times a day with meals    MEDICATIONS  (PRN):      PHYSICAL EXAM:  Constitutional: A&Ox3, NAD  Respiratory: Unlabored breathing  Extremities: WWP, FULLER spontaneously,     Vascular Pulse Exam:  B/L palp radial and ulnar    LABS:                        10.1   8.15  )-----------( 132      ( 11 May 2024 10:45 )             33.0     05-12    139  |  92<L>  |  63<H>  ----------------------------<  101<H>  4.3   |  25  |  15.20<H>    Ca    10.0      12 May 2024 21:11          Urinalysis Basic - ( 12 May 2024 21:11 )    Color: x / Appearance: x / SG: x / pH: x  Gluc: 101 mg/dL / Ketone: x  / Bili: x / Urobili: x   Blood: x / Protein: x / Nitrite: x   Leuk Esterase: x / RBC: x / WBC x   Sq Epi: x / Non Sq Epi: x / Bacteria: x        IMAGING:
  Follow Up:    Diarrhea    Interval History/ROS:  Tmax 99. Patient was seen and examined at bedside in HD suite. Denies fever, SOB, or diarrhea. No abdominal discomfort.    Allergies  IV Contrast (Anaphylaxis)        ANTIMICROBIALS:      OTHER MEDS:  MEDICATIONS  (STANDING):  apixaban 5 two times a day  cinacalcet 30 daily  epoetin isra (EPOGEN) Injectable 20173 <User Schedule>  predniSONE   Tablet 10 daily      Vital Signs Last 24 Hrs  T(C): 36.5 (08 May 2024 13:04), Max: 37.2 (07 May 2024 20:38)  T(F): 97.7 (08 May 2024 13:04), Max: 99 (07 May 2024 20:38)  HR: 100 (08 May 2024 13:04) (81 - 100)  BP: 145/80 (08 May 2024 13:04) (106/70 - 145/80)  BP(mean): --  RR: 18 (08 May 2024 13:04) (16 - 18)  SpO2: 99% (08 May 2024 13:04) (96% - 99%)    Parameters below as of 08 May 2024 13:04  Patient On (Oxygen Delivery Method): room air          PHYSICAL EXAM:  Constitutional: non-toxic, no distress  Cardiovascular:   normal S1, S2, no murmur, RRR  Respiratory:  clear BS bilaterally, no wheezes, no rales  GI:  soft, nontender  Skin: HD access intact  Psychiatric:  awake, alert, appropriate mood                          8.9    7.70  )-----------( 166      ( 07 May 2024 07:07 )             28.8       05-07    137  |  97  |  44<H>  ----------------------------<  73  4.9   |  22  |  10.02<H>    Ca    9.6      07 May 2024 07:07        Urinalysis Basic - ( 07 May 2024 07:07 )    Color: x / Appearance: x / SG: x / pH: x  Gluc: 73 mg/dL / Ketone: x  / Bili: x / Urobili: x   Blood: x / Protein: x / Nitrite: x   Leuk Esterase: x / RBC: x / WBC x   Sq Epi: x / Non Sq Epi: x / Bacteria: x        MICROBIOLOGY:  v  Clean Catch Clean Catch (Midstream)  05-06-24   <10,000 CFU/mL Normal Urogenital Corrie  --  --            Clostridium difficile GDH Toxins A&amp;B, EIA:   Negative (05-06-24 @ 02:42)  Clostridium difficile GDH Interpretation: Negative for toxigenic C. Difficile.  This specimen is negative for C.  Difficile glutamate dehydrogenase (GDH) antigen and negative for C.  Difficile Toxins A & B, by EIA.  GDH is a highly sensitive screening  marker for C. Difficile that is produced in large amounts by all C.  Difficile strains, both toxigenic and nontoxigenic.  This assay has not  been validated as a test of cure.  Repeat testing during the same episode  of diarrhea is of limited value and is discouraged.  The results of this  assay should always be interpreted in conjunction with patient's clinical  history. (05-06-24 @ 02:42)      RADIOLOGY:  No imaging done this admission.
[facility] [place] [facility]   MD KAT BHAGAT MD RUORU WONG, PA    TEL:  FROM 9 AM to 5 PM ---OFFICE: [phone]  AVAILABLE ON TEAMS    FROM 5 PM - 9 AM PLEASE CALL ANSWERING SERVICE: [phone]    RENAL FOLLOW UP NOTE--Date of Service 05-07-24 @ 09:38  --------------------------------------------------------------------------------  HPI:      Pt seen and examined at bedside.   Babita SOB, chest pain     PAST HISTORY  --------------------------------------------------------------------------------  No significant changes to PMH, PSH, FHx, SHx, unless otherwise noted    ALLERGIES & MEDICATIONS  --------------------------------------------------------------------------------  Allergies    IV Contrast (Anaphylaxis)    Intolerances      Standing Inpatient Medications  apixaban 5 milliGRAM(s) Oral two times a day  cinacalcet 30 milliGRAM(s) Oral daily  epoetin isra (EPOGEN) Injectable 64894 Unit(s) IV Push <User Schedule>  multivitamin 1 Tablet(s) Oral daily  predniSONE   Tablet 10 milliGRAM(s) Oral daily  sevelamer carbonate 800 milliGRAM(s) Oral three times a day with meals    PRN Inpatient Medications      REVIEW OF SYSTEMS  --------------------------------------------------------------------------------  General: no fever  MSK: no edema     VITALS/PHYSICAL EXAM  --------------------------------------------------------------------------------  T(C): 36.9 (05-07-24 @ 04:00), Max: 37.2 (05-06-24 @ 19:06)  HR: 99 (05-07-24 @ 04:00) (91 - 106)  BP: 103/67 (05-07-24 @ 04:00) (90/56 - 110/65)  RR: 18 (05-07-24 @ 04:00) (18 - 18)  SpO2: 95% (05-07-24 @ 04:00) (95% - 100%)  Wt(kg): --  Height (cm): 177.8 (05-06-24 @ 00:03)  Weight (kg): 128.8 (05-06-24 @ 00:03)  BMI (kg/m2): 40.7 (05-06-24 @ 00:03)  BSA (m2): 2.42 (05-06-24 @ 00:03)      05-06-24 @ 07:01  -  05-07-24 @ 07:00  --------------------------------------------------------  IN: 0 mL / OUT: 1100 mL / NET: -1100 mL      Physical Exam:  	Gen: NAD  	HEENT: MMM  	Pulm: CTA B/L  	CV: S1S2  	Abd: Soft, +BS  	Ext: No LE edema B/L                      Neuro: Awake   	Skin: Warm and Dry   	Vascular access:  HD catheter            no  mary ellen  LABS/STUDIES  --------------------------------------------------------------------------------              8.9    7.70  >-----------<  166      [05-07-24 @ 07:07]              28.8     137  |  97  |  44  ----------------------------<  73      [05-07-24 @ 07:07]  4.9   |  22  |  10.02        Ca     9.6     [05-07-24 @ 07:07]      Mg     2.0     [05-06-24 @ 02:01]    TPro  7.9  /  Alb  3.9  /  TBili  0.5  /  DBili  x   /  AST  15  /  ALT  11  /  AlkPhos  72  [05-06-24 @ 02:01]    PT/INR: PT 13.3 , INR 1.22       [05-06-24 @ 02:01]  PTT: 36.7       [05-06-24 @ 02:01]      Creatinine Trend:  SCr 10.02 [05-07 @ 07:07]  SCr 14.87 [05-06 @ 12:11]  SCr 14.48 [05-06 @ 02:01]    Urinalysis - [05-07-24 @ 07:07]      Color  / Appearance  / SG  / pH       Gluc 73 / Ketone   / Bili  / Urobili        Blood  / Protein  / Leuk Est  / Nitrite       RBC  / WBC  / Hyaline  / Gran  / Sq Epi  / Non Sq Epi  / Bacteria       PTH -- (Ca 10.3)      [11-05-23 @ 07:12]   397  Vitamin D (25OH) 44.2      [11-05-23 @ 07:12]  HbA1c 6.1      [01-04-20 @ 05:50]      
[facility]  John Michelle PA-C  [address] [place] [address]  [phone]      INTERVAL HPI/OVERNIGHT EVENTS:    no further diarrhea    MEDICATIONS  (STANDING):  apixaban 5 milliGRAM(s) Oral two times a day  cinacalcet 30 milliGRAM(s) Oral daily  epoetin isra (EPOGEN) Injectable 89177 Unit(s) IV Push <User Schedule>  multivitamin 1 Tablet(s) Oral daily  predniSONE   Tablet 10 milliGRAM(s) Oral daily  sevelamer carbonate 800 milliGRAM(s) Oral three times a day with meals    MEDICATIONS  (PRN):      Allergies    IV Contrast (Anaphylaxis)    Intolerances        ROS:   General:  No  fevers, chills, night sweats, fatigue,   Eyes:  Good vision, no reported pain  ENT:  No sore throat, pain, runny nose, dysphagia  CV:  No pain, palpitations, hypo/hypertension  Resp:  No dyspnea, cough, tachypnea, wheezing  GI:  No pain, No nausea, No vomiting, No diarrhea, No constipation, No weight loss, No fever, No pruritis, No rectal bleeding, No tarry stools, No dysphagia,  :  No pain, bleeding, incontinence, nocturia  Muscle:  No pain, weakness  Neuro:  No weakness, tingling, memory problems  Psych:  No fatigue, insomnia, mood problems, depression  Endocrine:  No polyuria, polydipsia, cold/heat intolerance  Heme:  No petechiae, ecchymosis, easy bruisability  Skin:  No rash, tattoos, scars, edema      PHYSICAL EXAM:   Vital Signs:  Vital Signs Last 24 Hrs  T(C): 37.1 (07 May 2024 12:03), Max: 37.2 (06 May 2024 19:06)  T(F): 98.8 (07 May 2024 12:03), Max: 98.9 (06 May 2024 19:06)  HR: 102 (07 May 2024 12:03) (95 - 106)  BP: 120/75 (07 May 2024 12:03) (90/56 - 120/75)  BP(mean): --  RR: 18 (07 May 2024 12:03) (18 - 18)  SpO2: 95% (07 May 2024 12:03) (95% - 98%)    Parameters below as of 07 May 2024 12:03  Patient On (Oxygen Delivery Method): room air      Daily     Daily     GENERAL:  Appears stated age,   HEENT:  NC/AT,    CHEST:  Full & symmetric excursion,   HEART:  Regular rhythm,  ABDOMEN:  Soft, non-tender, non-distended,  EXTEREMITIES:  no cyanosis  SKIN:  No rash  NEURO:  Alert,       LABS:                        8.9    7.70  )-----------( 166      ( 07 May 2024 07:07 )             28.8     05-07    137  |  97  |  44<H>  ----------------------------<  73  4.9   |  22  |  10.02<H>    Ca    9.6      07 May 2024 07:07  Mg     2.0     05-06    TPro  7.9  /  Alb  3.9  /  TBili  0.5  /  DBili  x   /  AST  15  /  ALT  11  /  AlkPhos  72  05-06    PT/INR - ( 06 May 2024 02:01 )   PT: 13.3 sec;   INR: 1.22 ratio         PTT - ( 06 May 2024 02:01 )  PTT:36.7 sec  Urinalysis Basic - ( 07 May 2024 07:07 )    Color: x / Appearance: x / SG: x / pH: x  Gluc: 73 mg/dL / Ketone: x  / Bili: x / Urobili: x   Blood: x / Protein: x / Nitrite: x   Leuk Esterase: x / RBC: x / WBC x   Sq Epi: x / Non Sq Epi: x / Bacteria: x        RADIOLOGY & ADDITIONAL TESTS:  
[facility] [place] [facility]   MD KAT BHAGAT MD BRIANA PETITO, NP    TEL:  FROM 9 AM to 5 PM ---OFFICE: [phone]  FROM 5 PM - 9 AM PLEASE CALL ANSWERING SERVICE: [phone]     RENAL PROGRESS NOTE: DATE OF SERVICE 05-08-24 @ 13:19  Patient is a 59y old  Male who presents with a chief complaint of diarrhea   Patient seen and examined at bedside during HD. No chest pain/sob    VITALS:  T(F): 98.2 (05-08-24 @ 09:34), Max: 99 (05-07-24 @ 20:38)  HR: 98 (05-08-24 @ 09:34)  BP: 127/71 (05-08-24 @ 09:34)  RR: 18 (05-08-24 @ 09:34)  SpO2: 98% (05-08-24 @ 09:34)  Wt(kg): --    05-07 @ 07:01  -  05-08 @ 07:00  --------------------------------------------------------  IN: 1550 mL / OUT: 0 mL / NET: 1550 mL        PHYSICAL EXAM:  Constitutional: NAD  Neck: No JVD  Respiratory: CTAB, no wheezes, rales or rhonchi  Cardiovascular: S1, S2, RRR  Gastrointestinal: BS+, soft, NT/ND  Extremities: No peripheral edema  Access: R chest wall Permacath, hx of failed AVGs in past    Hospital Medications:   MEDICATIONS  (STANDING):  apixaban 5 milliGRAM(s) Oral two times a day  cinacalcet 30 milliGRAM(s) Oral daily  epoetin isra (EPOGEN) Injectable 10174 Unit(s) IV Push <User Schedule>  multivitamin 1 Tablet(s) Oral daily  predniSONE   Tablet 10 milliGRAM(s) Oral daily  sevelamer carbonate 800 milliGRAM(s) Oral three times a day with meals      LABS:  05-07    137  |  97  |  44<H>  ----------------------------<  73  4.9   |  22  |  10.02<H>    Ca    9.6      07 May 2024 07:07      Creatinine Trend: 10.02 <--, 14.87 <--, 14.48 <--                                8.9    7.70  )-----------( 166      ( 07 May 2024 07:07 )             28.8     Urine Studies:  Urinalysis - [05-07-24 @ 07:07]      Color  / Appearance  / SG  / pH       Gluc 73 / Ketone   / Bili  / Urobili        Blood  / Protein  / Leuk Est  / Nitrite       RBC  / WBC  / Hyaline  / Gran  / Sq Epi  / Non Sq Epi  / Bacteria       PTH -- (Ca 10.3)      [11-05-23 @ 07:12]   397  Vitamin D (25OH) 44.2      [11-05-23 @ 07:12]  HbA1c 6.1      [01-04-20 @ 05:50]    HBsAb >1000.0      [05-07-24 @ 18:40]  HBsAb Reactive      [05-07-24 @ 18:40]  HBsAg Nonreact      [05-07-24 @ 18:40]  HBcAb Nonreact      [05-07-24 @ 18:40]  HCV 0.23, Nonreact      [05-07-24 @ 18:40]  HIV Nonreact      [05-07-24 @ 07:09]      RADIOLOGY & ADDITIONAL STUDIES:  
  Dr. Phoenix  Office [phone] (9 am to 5 pm)  Service: [phone] (5pm to 9am)  Corrie QUICK      RENAL PROGRESS NOTE: DATE OF SERVICE 05-11-24 @ 15:02    Patient is a 59y old  Male who presents with a chief complaint of diarrhea (11 May 2024 11:09)      Patient seen and examined at bedside. No chest pain/sob    VITALS:  T(F): 98.1 (05-11-24 @ 11:54), Max: 99.9 (05-10-24 @ 21:38)  HR: 107 (05-11-24 @ 11:54)  BP: 104/62 (05-11-24 @ 11:54)  RR: 18 (05-11-24 @ 11:54)  SpO2: 97% (05-11-24 @ 11:54)  Wt(kg): --    05-10 @ 07:01  -  05-11 @ 07:00  --------------------------------------------------------  IN: 900 mL / OUT: 2900 mL / NET: -2000 mL    05-11 @ 07:01  -  05-11 @ 15:02  --------------------------------------------------------  IN: 120 mL / OUT: 0 mL / NET: 120 mL          PHYSICAL EXAM:  Constitutional: NAD  Neck: No JVD  Respiratory: CTAB, no wheezes, rales or rhonchi  Cardiovascular: S1, S2, RRR  Gastrointestinal: BS+, soft, NT/ND  Extremities: No peripheral edema    Hospital Medications:   MEDICATIONS  (STANDING):  apixaban 5 milliGRAM(s) Oral two times a day  cinacalcet 30 milliGRAM(s) Oral daily  epoetin isra (EPOGEN) Injectable 87324 Unit(s) IV Push <User Schedule>  multivitamin 1 Tablet(s) Oral daily  predniSONE   Tablet 10 milliGRAM(s) Oral daily  sevelamer carbonate 800 milliGRAM(s) Oral three times a day with meals      LABS:  05-10    137  |  97  |  50<H>  ----------------------------<  81  4.8   |  21<L>  |  13.02<H>    Ca    9.6      10 May 2024 07:47      Creatinine Trend: 13.02 <--, 10.02 <--, 14.87 <--, 14.48 <--                                10.1   8.15  )-----------( 132      ( 11 May 2024 10:45 )             33.0     Urine Studies:  Urinalysis - [05-10-24 @ 07:47]      Color  / Appearance  / SG  / pH       Gluc 81 / Ketone   / Bili  / Urobili        Blood  / Protein  / Leuk Est  / Nitrite       RBC  / WBC  / Hyaline  / Gran  / Sq Epi  / Non Sq Epi  / Bacteria       Iron 47, TIBC 218, %sat 22      [05-09-24 @ 10:24]  PTH -- (Ca 10.2)      [05-09-24 @ 10:25]   524  PTH -- (Ca 10.3)      [11-05-23 @ 07:12]   397  Vitamin D (25OH) 44.2      [11-05-23 @ 07:12]  HbA1c 6.1      [01-04-20 @ 05:50]    HBsAb >1000.0      [05-07-24 @ 18:40]  HBsAb Reactive      [05-07-24 @ 18:40]  HBsAg Nonreact      [05-07-24 @ 18:40]  HBcAb Nonreact      [05-07-24 @ 18:40]  HCV 0.23, Nonreact      [05-07-24 @ 18:40]  HIV Nonreact      [05-07-24 @ 07:09]      RADIOLOGY & ADDITIONAL STUDIES:  
[facility]   MD KAT BHAGAT MD RUORU WONG, PA    TEL:  FROM 9 AM to 5 PM ---OFFICE: [phone]  AVAILABLE ON TEAMS    FROM 5 PM - 9 AM PLEASE CALL ANSWERING SERVICE: [phone]    RENAL FOLLOW UP NOTE--Date of Service 05-13-24 @ 11:01  --------------------------------------------------------------------------------  HPI:      Pt seen and examined at bedside.   Babita SOB, chest pain     PAST HISTORY  --------------------------------------------------------------------------------  No significant changes to PMH, PSH, FHx, SHx, unless otherwise noted    ALLERGIES & MEDICATIONS  --------------------------------------------------------------------------------  Allergies    IV Contrast (Anaphylaxis)    Intolerances      Standing Inpatient Medications  apixaban 5 milliGRAM(s) Oral two times a day  cinacalcet 30 milliGRAM(s) Oral daily  epoetin isra (EPOGEN) Injectable 02891 Unit(s) IV Push <User Schedule>  multivitamin 1 Tablet(s) Oral daily  predniSONE   Tablet 10 milliGRAM(s) Oral daily  sevelamer carbonate 800 milliGRAM(s) Oral three times a day with meals    PRN Inpatient Medications      REVIEW OF SYSTEMS  --------------------------------------------------------------------------------  General: no fever  MSK: no edema     VITALS/PHYSICAL EXAM  --------------------------------------------------------------------------------  T(C): 37.1 (05-13-24 @ 04:22), Max: 37.2 (05-12-24 @ 11:53)  HR: 100 (05-13-24 @ 04:22) (100 - 108)  BP: 105/65 (05-13-24 @ 04:22) (105/65 - 128/72)  RR: 18 (05-13-24 @ 04:22) (18 - 18)  SpO2: 99% (05-13-24 @ 04:22) (98% - 99%)  Wt(kg): --        05-12-24 @ 07:01  -  05-13-24 @ 07:00  --------------------------------------------------------  IN: 300 mL / OUT: 0 mL / NET: 300 mL      Physical Exam:  	Gen: NAD  	HEENT: MMM  	Pulm: CTA B/L  	CV: S1S2  	Abd: Soft, +BS  	Ext: No LE edema B/L                      Neuro: Awake   	Skin: Warm and Dry   	Vascular access:  HD catheter             no mary ellen  LABS/STUDIES  --------------------------------------------------------------------------------    139  |  92  |  63  ----------------------------<  101      [05-12-24 @ 21:11]  4.3   |  25  |  15.20        Ca     10.0     [05-12-24 @ 21:11]            Creatinine Trend:  SCr 15.20 [05-12 @ 21:11]  SCr 13.22 [05-12 @ 06:53]  SCr 13.02 [05-10 @ 07:47]  SCr 10.02 [05-07 @ 07:07]  SCr 14.87 [05-06 @ 12:11]    Urinalysis - [05-12-24 @ 21:11]      Color  / Appearance  / SG  / pH       Gluc 101 / Ketone   / Bili  / Urobili        Blood  / Protein  / Leuk Est  / Nitrite       RBC  / WBC  / Hyaline  / Gran  / Sq Epi  / Non Sq Epi  / Bacteria       Iron 47, TIBC 218, %sat 22      [05-09-24 @ 10:24]  PTH -- (Ca 10.2)      [05-09-24 @ 10:25]   524  PTH -- (Ca 10.3)      [11-05-23 @ 07:12]   397  Vitamin D (25OH) 44.2      [11-05-23 @ 07:12]  HbA1c 6.1      [01-04-20 @ 05:50]    HBsAb >1000.0      [05-07-24 @ 18:40]  HBsAb Reactive      [05-07-24 @ 18:40]  HBsAg Nonreact      [05-07-24 @ 18:40]  HBcAb Nonreact      [05-07-24 @ 18:40]  HCV 0.23, Nonreact      [05-07-24 @ 18:40]  HIV Nonreact      [05-07-24 @ 07:09]    
Patient is a 59y old  Male who presents with a chief complaint of diarrhea (09 May 2024 15:54)    Date of servie : 05-09-24 @ 16:42  INTERVAL HPI/OVERNIGHT EVENTS:  T(C): 37.3 (05-09-24 @ 11:41), Max: 37.3 (05-09-24 @ 11:41)  HR: 95 (05-09-24 @ 11:41) (95 - 117)  BP: 107/68 (05-09-24 @ 11:41) (98/64 - 107/70)  RR: 18 (05-09-24 @ 11:41) (18 - 18)  SpO2: 100% (05-09-24 @ 11:41) (100% - 100%)  Wt(kg): --  I&O's Summary    08 May 2024 07:01  -  09 May 2024 07:00  --------------------------------------------------------  IN: 240 mL / OUT: 1900 mL / NET: -1660 mL    09 May 2024 07:01  -  09 May 2024 16:42  --------------------------------------------------------  IN: 480 mL / OUT: 0 mL / NET: 480 mL        LABS:              CAPILLARY BLOOD GLUCOSE                MEDICATIONS  (STANDING):  apixaban 5 milliGRAM(s) Oral two times a day  cinacalcet 30 milliGRAM(s) Oral daily  epoetin isra (EPOGEN) Injectable 38495 Unit(s) IV Push <User Schedule>  multivitamin 1 Tablet(s) Oral daily  predniSONE   Tablet 10 milliGRAM(s) Oral daily  sevelamer carbonate 800 milliGRAM(s) Oral three times a day with meals    MEDICATIONS  (PRN):          PHYSICAL EXAM:  GENERAL: NAD, well-groomed, well-developed  HEAD:  Atraumatic, Normocephalic  CHEST/LUNG: Clear to percussion bilaterally; No rales, rhonchi, wheezing, or rubs  HEART: Regular rate and rhythm; No murmurs, rubs, or gallops  ABDOMEN: Soft, Nontender, Nondistended; Bowel sounds present  EXTREMITIES:  2+ Peripheral Pulses, No clubbing, cyanosis, or edema  LYMPH: No lymphadenopathy noted  SKIN: No rashes or lesions    Care Discussed with Consultants/Other Providers [ ] YES  [ ] NO
  Follow Up:    Diarrhea    Interval History/ROS:  VSS ON. Patient was seen and examined at bedside. No BM close to ~24h. Denies fever.     Allergies  IV Contrast (Anaphylaxis)        ANTIMICROBIALS:      OTHER MEDS:  MEDICATIONS  (STANDING):  apixaban 5 two times a day  cinacalcet 30 daily  epoetin isra (EPOGEN) Injectable 87048 <User Schedule>  polyethylene glycol 3350 17 once  predniSONE   Tablet 10 daily      Vital Signs Last 24 Hrs  T(C): 36.9 (07 May 2024 04:00), Max: 37.2 (06 May 2024 19:06)  T(F): 98.5 (07 May 2024 04:00), Max: 98.9 (06 May 2024 19:06)  HR: 99 (07 May 2024 04:00) (91 - 106)  BP: 103/67 (07 May 2024 04:00) (90/56 - 103/67)  BP(mean): --  RR: 18 (07 May 2024 04:00) (18 - 18)  SpO2: 95% (07 May 2024 04:00) (95% - 100%)    Parameters below as of 07 May 2024 04:00  Patient On (Oxygen Delivery Method): room air        PHYSICAL EXAM:  Constitutional: non-toxic, no distress  Cardiovascular:   normal S1, S2, no murmur, RRR  Respiratory:  clear BS bilaterally, no wheezes, no rales  GI:  soft, nontender  Psychiatric:  awake, alert, appropriate mood                                8.9    7.70  )-----------( 166      ( 07 May 2024 07:07 )             28.8       05-07    137  |  97  |  44<H>  ----------------------------<  73  4.9   |  22  |  10.02<H>    Ca    9.6      07 May 2024 07:07  Mg     2.0     05-06    TPro  7.9  /  Alb  3.9  /  TBili  0.5  /  DBili  x   /  AST  15  /  ALT  11  /  AlkPhos  72  05-06      Urinalysis Basic - ( 07 May 2024 07:07 )    Color: x / Appearance: x / SG: x / pH: x  Gluc: 73 mg/dL / Ketone: x  / Bili: x / Urobili: x   Blood: x / Protein: x / Nitrite: x   Leuk Esterase: x / RBC: x / WBC x   Sq Epi: x / Non Sq Epi: x / Bacteria: x        MICROBIOLOGY:  v  Clean Catch Clean Catch (Midstream)  05-06-24   <10,000 CFU/mL Normal Urogenital Corrie  --  --            Clostridium difficile GDH Toxins A&amp;B, EIA:   Negative (05-06-24 @ 02:42)  Clostridium difficile GDH Interpretation: Negative for toxigenic C. Difficile.  This specimen is negative for C.  Difficile glutamate dehydrogenase (GDH) antigen and negative for C.  Difficile Toxins A & B, by EIA.  GDH is a highly sensitive screening  marker for C. Difficile that is produced in large amounts by all C.  Difficile strains, both toxigenic and nontoxigenic.  This assay has not  been validated as a test of cure.  Repeat testing during the same episode  of diarrhea is of limited value and is discouraged.  The results of this  assay should always be interpreted in conjunction with patient's clinical  history. (05-06-24 @ 02:42)      RADIOLOGY:  No imaging this admission  
[facility]  John Michelle PA-C  [address] [place] [address]  [phone]      INTERVAL HPI/OVERNIGHT EVENTS:    no new events    MEDICATIONS  (STANDING):  apixaban 5 milliGRAM(s) Oral two times a day  cinacalcet 30 milliGRAM(s) Oral daily  epoetin isra (EPOGEN) Injectable 59872 Unit(s) IV Push <User Schedule>  multivitamin 1 Tablet(s) Oral daily  predniSONE   Tablet 10 milliGRAM(s) Oral daily  sevelamer carbonate 800 milliGRAM(s) Oral three times a day with meals    MEDICATIONS  (PRN):      Allergies    IV Contrast (Anaphylaxis)    Intolerances        ROS:   General:  No  fevers, chills, night sweats, fatigue,   Eyes:  Good vision, no reported pain  ENT:  No sore throat, pain, runny nose, dysphagia  CV:  No pain, palpitations, hypo/hypertension  Resp:  No dyspnea, cough, tachypnea, wheezing  GI:  No pain, No nausea, No vomiting, No diarrhea, No constipation, No weight loss, No fever, No pruritis, No rectal bleeding, No tarry stools, No dysphagia,  :  No pain, bleeding, incontinence, nocturia  Muscle:  No pain, weakness  Neuro:  No weakness, tingling, memory problems  Psych:  No fatigue, insomnia, mood problems, depression  Endocrine:  No polyuria, polydipsia, cold/heat intolerance  Heme:  No petechiae, ecchymosis, easy bruisability  Skin:  No rash, tattoos, scars, edema      PHYSICAL EXAM:   Vital Signs:  Vital Signs Last 24 Hrs  T(C): 37.1 (07 May 2024 12:03), Max: 37.2 (06 May 2024 19:06)  T(F): 98.8 (07 May 2024 12:03), Max: 98.9 (06 May 2024 19:06)  HR: 102 (07 May 2024 12:03) (95 - 106)  BP: 120/75 (07 May 2024 12:03) (90/56 - 120/75)  BP(mean): --  RR: 18 (07 May 2024 12:03) (18 - 18)  SpO2: 95% (07 May 2024 12:03) (95% - 98%)    Parameters below as of 07 May 2024 12:03  Patient On (Oxygen Delivery Method): room air      Daily     Daily     GENERAL:  Appears stated age,   HEENT:  NC/AT,    CHEST:  Full & symmetric excursion,   HEART:  Regular rhythm,  ABDOMEN:  Soft, non-tender, non-distended,  EXTEREMITIES:  no cyanosis  SKIN:  No rash  NEURO:  Alert,       LABS:                        8.9    7.70  )-----------( 166      ( 07 May 2024 07:07 )             28.8     05-07    137  |  97  |  44<H>  ----------------------------<  73  4.9   |  22  |  10.02<H>    Ca    9.6      07 May 2024 07:07  Mg     2.0     05-06    TPro  7.9  /  Alb  3.9  /  TBili  0.5  /  DBili  x   /  AST  15  /  ALT  11  /  AlkPhos  72  05-06    PT/INR - ( 06 May 2024 02:01 )   PT: 13.3 sec;   INR: 1.22 ratio         PTT - ( 06 May 2024 02:01 )  PTT:36.7 sec  Urinalysis Basic - ( 07 May 2024 07:07 )    Color: x / Appearance: x / SG: x / pH: x  Gluc: 73 mg/dL / Ketone: x  / Bili: x / Urobili: x   Blood: x / Protein: x / Nitrite: x   Leuk Esterase: x / RBC: x / WBC x   Sq Epi: x / Non Sq Epi: x / Bacteria: x        RADIOLOGY & ADDITIONAL TESTS:  
[facility] [place] [facility]   MD KAT BHAGAT MD RUORU WONG, PA    TEL:  FROM 9 AM to 5 PM ---OFFICE: [phone]  AVAILABLE ON TEAMS    FROM 5 PM - 9 AM PLEASE CALL ANSWERING SERVICE: [phone]    RENAL FOLLOW UP NOTE--Date of Service 05-10-24 @ 09:31  --------------------------------------------------------------------------------  HPI:      Pt seen and examined at bedside.     PAST HISTORY  --------------------------------------------------------------------------------  No significant changes to PMH, PSH, FHx, SHx, unless otherwise noted    ALLERGIES & MEDICATIONS  --------------------------------------------------------------------------------  Allergies    IV Contrast (Anaphylaxis)    Intolerances      Standing Inpatient Medications  apixaban 5 milliGRAM(s) Oral two times a day  cinacalcet 30 milliGRAM(s) Oral daily  epoetin isra (EPOGEN) Injectable 41297 Unit(s) IV Push <User Schedule>  multivitamin 1 Tablet(s) Oral daily  predniSONE   Tablet 10 milliGRAM(s) Oral daily  sevelamer carbonate 800 milliGRAM(s) Oral three times a day with meals    PRN Inpatient Medications      REVIEW OF SYSTEMS  --------------------------------------------------------------------------------  General: no fever  MSK: no edema     VITALS/PHYSICAL EXAM  --------------------------------------------------------------------------------  T(C): 36.6 (05-10-24 @ 04:00), Max: 37.3 (05-09-24 @ 11:41)  HR: 93 (05-10-24 @ 04:00) (90 - 95)  BP: 113/69 (05-10-24 @ 04:00) (103/62 - 113/69)  RR: 18 (05-10-24 @ 04:00) (18 - 18)  SpO2: 91% (05-10-24 @ 04:00) (91% - 100%)  Wt(kg): --        05-09-24 @ 07:01  -  05-10-24 @ 07:00  --------------------------------------------------------  IN: 1130 mL / OUT: 0 mL / NET: 1130 mL      Physical Exam:  	Gen: NAD  	HEENT: MMM  	Pulm: CTA B/L  	CV: S1S2  	Abd: Soft, +BS  	Ext: No LE edema B/L                      Neuro: Awake   	Skin: Warm and Dry   	Vascular access: NO HD catheter            no mary ellen  LABS/STUDIES  --------------------------------------------------------------------------------    137  |  97  |  50  ----------------------------<  81      [05-10-24 @ 07:47]  4.8   |  21  |  13.02        Ca     9.6     [05-10-24 @ 07:47]            Creatinine Trend:  SCr 13.02 [05-10 @ 07:47]  SCr 10.02 [05-07 @ 07:07]  SCr 14.87 [05-06 @ 12:11]  SCr 14.48 [05-06 @ 02:01]    Urinalysis - [05-10-24 @ 07:47]      Color  / Appearance  / SG  / pH       Gluc 81 / Ketone   / Bili  / Urobili        Blood  / Protein  / Leuk Est  / Nitrite       RBC  / WBC  / Hyaline  / Gran  / Sq Epi  / Non Sq Epi  / Bacteria       Iron 47, TIBC 218, %sat 22      [05-09-24 @ 10:24]  PTH -- (Ca 10.2)      [05-09-24 @ 10:25]   524  PTH -- (Ca 10.3)      [11-05-23 @ 07:12]   397  Vitamin D (25OH) 44.2      [11-05-23 @ 07:12]  HbA1c 6.1      [01-04-20 @ 05:50]    HBsAb >1000.0      [05-07-24 @ 18:40]  HBsAb Reactive      [05-07-24 @ 18:40]  HBsAg Nonreact      [05-07-24 @ 18:40]  HBcAb Nonreact      [05-07-24 @ 18:40]  HCV 0.23, Nonreact      [05-07-24 @ 18:40]  HIV Nonreact      [05-07-24 @ 07:09]    
[facility]  John Michelle PA-C  [address] [place] [address]  [phone]      INTERVAL HPI/OVERNIGHT EVENTS:    no new events    MEDICATIONS  (STANDING):  apixaban 5 milliGRAM(s) Oral two times a day  cinacalcet 30 milliGRAM(s) Oral daily  epoetin isra (EPOGEN) Injectable 17842 Unit(s) IV Push <User Schedule>  multivitamin 1 Tablet(s) Oral daily  predniSONE   Tablet 10 milliGRAM(s) Oral daily  sevelamer carbonate 800 milliGRAM(s) Oral three times a day with meals    MEDICATIONS  (PRN):      Allergies    IV Contrast (Anaphylaxis)    Intolerances        ROS:   General:  No  fevers, chills, night sweats, fatigue,   Eyes:  Good vision, no reported pain  ENT:  No sore throat, pain, runny nose, dysphagia  CV:  No pain, palpitations, hypo/hypertension  Resp:  No dyspnea, cough, tachypnea, wheezing  GI:  No pain, No nausea, No vomiting, No diarrhea, No constipation, No weight loss, No fever, No pruritis, No rectal bleeding, No tarry stools, No dysphagia,  :  No pain, bleeding, incontinence, nocturia  Muscle:  No pain, weakness  Neuro:  No weakness, tingling, memory problems  Psych:  No fatigue, insomnia, mood problems, depression  Endocrine:  No polyuria, polydipsia, cold/heat intolerance  Heme:  No petechiae, ecchymosis, easy bruisability  Skin:  No rash, tattoos, scars, edema      PHYSICAL EXAM:   Vital Signs:  Vital Signs Last 24 Hrs  T(C): 37.1 (07 May 2024 12:03), Max: 37.2 (06 May 2024 19:06)  T(F): 98.8 (07 May 2024 12:03), Max: 98.9 (06 May 2024 19:06)  HR: 102 (07 May 2024 12:03) (95 - 106)  BP: 120/75 (07 May 2024 12:03) (90/56 - 120/75)  BP(mean): --  RR: 18 (07 May 2024 12:03) (18 - 18)  SpO2: 95% (07 May 2024 12:03) (95% - 98%)    Parameters below as of 07 May 2024 12:03  Patient On (Oxygen Delivery Method): room air      Daily     Daily     GENERAL:  Appears stated age,   HEENT:  NC/AT,    CHEST:  Full & symmetric excursion,   HEART:  Regular rhythm,  ABDOMEN:  Soft, non-tender, non-distended,  EXTEREMITIES:  no cyanosis  SKIN:  No rash  NEURO:  Alert,       LABS:                        8.9    7.70  )-----------( 166      ( 07 May 2024 07:07 )             28.8     05-07    137  |  97  |  44<H>  ----------------------------<  73  4.9   |  22  |  10.02<H>    Ca    9.6      07 May 2024 07:07  Mg     2.0     05-06    TPro  7.9  /  Alb  3.9  /  TBili  0.5  /  DBili  x   /  AST  15  /  ALT  11  /  AlkPhos  72  05-06    PT/INR - ( 06 May 2024 02:01 )   PT: 13.3 sec;   INR: 1.22 ratio         PTT - ( 06 May 2024 02:01 )  PTT:36.7 sec  Urinalysis Basic - ( 07 May 2024 07:07 )    Color: x / Appearance: x / SG: x / pH: x  Gluc: 73 mg/dL / Ketone: x  / Bili: x / Urobili: x   Blood: x / Protein: x / Nitrite: x   Leuk Esterase: x / RBC: x / WBC x   Sq Epi: x / Non Sq Epi: x / Bacteria: x        RADIOLOGY & ADDITIONAL TESTS:  
Patient is a 59y old  Male who presents with a chief complaint of diarrhea (10 May 2024 14:56)    Date of servie : 05-10-24 @ 16:33  INTERVAL HPI/OVERNIGHT EVENTS:  T(C): 36.4 (05-10-24 @ 13:25), Max: 36.9 (05-09-24 @ 21:22)  HR: 96 (05-10-24 @ 13:25) (81 - 96)  BP: 114/62 (05-10-24 @ 13:25) (103/62 - 142/87)  RR: 18 (05-10-24 @ 13:25) (18 - 18)  SpO2: 96% (05-10-24 @ 13:25) (91% - 99%)  Wt(kg): --  I&O's Summary    09 May 2024 07:01  -  10 May 2024 07:00  --------------------------------------------------------  IN: 1130 mL / OUT: 0 mL / NET: 1130 mL    10 May 2024 07:01  -  10 May 2024 16:33  --------------------------------------------------------  IN: 900 mL / OUT: 2900 mL / NET: -2000 mL        LABS:    05-10    137  |  97  |  50<H>  ----------------------------<  81  4.8   |  21<L>  |  13.02<H>    Ca    9.6      10 May 2024 07:47        Urinalysis Basic - ( 10 May 2024 07:47 )    Color: x / Appearance: x / SG: x / pH: x  Gluc: 81 mg/dL / Ketone: x  / Bili: x / Urobili: x   Blood: x / Protein: x / Nitrite: x   Leuk Esterase: x / RBC: x / WBC x   Sq Epi: x / Non Sq Epi: x / Bacteria: x      CAPILLARY BLOOD GLUCOSE            Urinalysis Basic - ( 10 May 2024 07:47 )    Color: x / Appearance: x / SG: x / pH: x  Gluc: 81 mg/dL / Ketone: x  / Bili: x / Urobili: x   Blood: x / Protein: x / Nitrite: x   Leuk Esterase: x / RBC: x / WBC x   Sq Epi: x / Non Sq Epi: x / Bacteria: x        MEDICATIONS  (STANDING):  apixaban 5 milliGRAM(s) Oral two times a day  cinacalcet 30 milliGRAM(s) Oral daily  epoetin isra (EPOGEN) Injectable 05406 Unit(s) IV Push <User Schedule>  multivitamin 1 Tablet(s) Oral daily  predniSONE   Tablet 10 milliGRAM(s) Oral daily  sevelamer carbonate 800 milliGRAM(s) Oral three times a day with meals    MEDICATIONS  (PRN):          PHYSICAL EXAM:  GENERAL: NAD, well-groomed, well-developed  HEAD:  Atraumatic, Normocephalic  CHEST/LUNG: Clear to percussion bilaterally; No rales, rhonchi, wheezing, or rubs  HEART: Regular rate and rhythm; No murmurs, rubs, or gallops  ABDOMEN: Soft, Nontender, Nondistended; Bowel sounds present  EXTREMITIES:  2+ Peripheral Pulses, No clubbing, cyanosis, or edema  LYMPH: No lymphadenopathy noted  SKIN: No rashes or lesions    Care Discussed with Consultants/Other Providers [ ] YES  [ ] NO
Patient is a 59y old  Male who presents with a chief complaint of diarrhea (12 May 2024 07:48)    Date of servie : 05-12-24 @ 13:07  INTERVAL HPI/OVERNIGHT EVENTS:  T(C): 37.2 (05-12-24 @ 11:53), Max: 37.3 (05-11-24 @ 20:58)  HR: 104 (05-12-24 @ 11:53) (88 - 104)  BP: 108/75 (05-12-24 @ 11:53) (103/72 - 108/75)  RR: 18 (05-12-24 @ 11:53) (18 - 18)  SpO2: 98% (05-12-24 @ 11:53) (96% - 98%)  Wt(kg): --  I&O's Summary    11 May 2024 07:01  -  12 May 2024 07:00  --------------------------------------------------------  IN: 470 mL / OUT: 0 mL / NET: 470 mL        LABS:                        10.1   8.15  )-----------( 132      ( 11 May 2024 10:45 )             33.0     05-12    136  |  96  |  52<H>  ----------------------------<  82  6.0<H>   |  19<L>  |  13.22<H>    Ca    9.6      12 May 2024 06:53        Urinalysis Basic - ( 12 May 2024 06:53 )    Color: x / Appearance: x / SG: x / pH: x  Gluc: 82 mg/dL / Ketone: x  / Bili: x / Urobili: x   Blood: x / Protein: x / Nitrite: x   Leuk Esterase: x / RBC: x / WBC x   Sq Epi: x / Non Sq Epi: x / Bacteria: x      CAPILLARY BLOOD GLUCOSE            Urinalysis Basic - ( 12 May 2024 06:53 )    Color: x / Appearance: x / SG: x / pH: x  Gluc: 82 mg/dL / Ketone: x  / Bili: x / Urobili: x   Blood: x / Protein: x / Nitrite: x   Leuk Esterase: x / RBC: x / WBC x   Sq Epi: x / Non Sq Epi: x / Bacteria: x        MEDICATIONS  (STANDING):  apixaban 5 milliGRAM(s) Oral two times a day  cinacalcet 30 milliGRAM(s) Oral daily  epoetin isra (EPOGEN) Injectable 00085 Unit(s) IV Push <User Schedule>  multivitamin 1 Tablet(s) Oral daily  predniSONE   Tablet 10 milliGRAM(s) Oral daily  sevelamer carbonate 800 milliGRAM(s) Oral three times a day with meals    MEDICATIONS  (PRN):          PHYSICAL EXAM:  GENERAL: NAD, well-groomed, well-developed  HEAD:  Atraumatic, Normocephalic  CHEST/LUNG: Clear to percussion bilaterally; No rales, rhonchi, wheezing, or rubs  HEART: Regular rate and rhythm; No murmurs, rubs, or gallops  ABDOMEN: Soft, Nontender, Nondistended; Bowel sounds present  EXTREMITIES:  2+ Peripheral Pulses, No clubbing, cyanosis, or edema  LYMPH: No lymphadenopathy noted  SKIN: No rashes or lesions    Care Discussed with Consultants/Other Providers [ ] YES  [ ] NO
[facility]  John Michelle PA-C  [address] [place] [address]  [phone]      INTERVAL HPI/OVERNIGHT EVENTS:    no new events    MEDICATIONS  (STANDING):  apixaban 5 milliGRAM(s) Oral two times a day  cinacalcet 30 milliGRAM(s) Oral daily  epoetin isra (EPOGEN) Injectable 70408 Unit(s) IV Push <User Schedule>  multivitamin 1 Tablet(s) Oral daily  predniSONE   Tablet 10 milliGRAM(s) Oral daily  sevelamer carbonate 800 milliGRAM(s) Oral three times a day with meals    MEDICATIONS  (PRN):      Allergies    IV Contrast (Anaphylaxis)    Intolerances        ROS:   General:  No  fevers, chills, night sweats, fatigue,   Eyes:  Good vision, no reported pain  ENT:  No sore throat, pain, runny nose, dysphagia  CV:  No pain, palpitations, hypo/hypertension  Resp:  No dyspnea, cough, tachypnea, wheezing  GI:  No pain, No nausea, No vomiting, No diarrhea, No constipation, No weight loss, No fever, No pruritis, No rectal bleeding, No tarry stools, No dysphagia,  :  No pain, bleeding, incontinence, nocturia  Muscle:  No pain, weakness  Neuro:  No weakness, tingling, memory problems  Psych:  No fatigue, insomnia, mood problems, depression  Endocrine:  No polyuria, polydipsia, cold/heat intolerance  Heme:  No petechiae, ecchymosis, easy bruisability  Skin:  No rash, tattoos, scars, edema      PHYSICAL EXAM:   Vital Signs:  Vital Signs Last 24 Hrs  T(C): 37.1 (07 May 2024 12:03), Max: 37.2 (06 May 2024 19:06)  T(F): 98.8 (07 May 2024 12:03), Max: 98.9 (06 May 2024 19:06)  HR: 102 (07 May 2024 12:03) (95 - 106)  BP: 120/75 (07 May 2024 12:03) (90/56 - 120/75)  BP(mean): --  RR: 18 (07 May 2024 12:03) (18 - 18)  SpO2: 95% (07 May 2024 12:03) (95% - 98%)    Parameters below as of 07 May 2024 12:03  Patient On (Oxygen Delivery Method): room air      Daily     Daily     GENERAL:  Appears stated age,   HEENT:  NC/AT,    CHEST:  Full & symmetric excursion,   HEART:  Regular rhythm,  ABDOMEN:  Soft, non-tender, non-distended,  EXTEREMITIES:  no cyanosis  SKIN:  No rash  NEURO:  Alert,       LABS:                        8.9    7.70  )-----------( 166      ( 07 May 2024 07:07 )             28.8     05-07    137  |  97  |  44<H>  ----------------------------<  73  4.9   |  22  |  10.02<H>    Ca    9.6      07 May 2024 07:07  Mg     2.0     05-06    TPro  7.9  /  Alb  3.9  /  TBili  0.5  /  DBili  x   /  AST  15  /  ALT  11  /  AlkPhos  72  05-06    PT/INR - ( 06 May 2024 02:01 )   PT: 13.3 sec;   INR: 1.22 ratio         PTT - ( 06 May 2024 02:01 )  PTT:36.7 sec  Urinalysis Basic - ( 07 May 2024 07:07 )    Color: x / Appearance: x / SG: x / pH: x  Gluc: 73 mg/dL / Ketone: x  / Bili: x / Urobili: x   Blood: x / Protein: x / Nitrite: x   Leuk Esterase: x / RBC: x / WBC x   Sq Epi: x / Non Sq Epi: x / Bacteria: x        RADIOLOGY & ADDITIONAL TESTS:  
Subjective: Patient seen and examined. No new events except as noted.     REVIEW OF SYSTEMS:    CONSTITUTIONAL: +weakness, fevers or chills  EYES/ENT: No visual changes;  No vertigo or throat pain   NECK: No pain or stiffness  RESPIRATORY: No cough, wheezing, hemoptysis; No shortness of breath  CARDIOVASCULAR: No chest pain or palpitations  GASTROINTESTINAL: No abdominal or epigastric pain. No nausea, vomiting, or hematemesis; No diarrhea or constipation. No melena or hematochezia.  GENITOURINARY: No dysuria, frequency or hematuria  NEUROLOGICAL: No numbness or weakness  SKIN: No itching, burning, rashes, or lesions   All other review of systems is negative unless indicated above.    MEDICATIONS:  MEDICATIONS  (STANDING):  apixaban 5 milliGRAM(s) Oral two times a day  cinacalcet 30 milliGRAM(s) Oral daily  epoetin isra (EPOGEN) Injectable 75580 Unit(s) IV Push <User Schedule>  multivitamin 1 Tablet(s) Oral daily  predniSONE   Tablet 10 milliGRAM(s) Oral daily  sevelamer carbonate 800 milliGRAM(s) Oral three times a day with meals      PHYSICAL EXAM:  T(C): 36.6 (05-10-24 @ 04:00), Max: 37.3 (05-09-24 @ 11:41)  HR: 93 (05-10-24 @ 04:00) (90 - 95)  BP: 113/69 (05-10-24 @ 04:00) (103/62 - 113/69)  RR: 18 (05-10-24 @ 04:00) (18 - 18)  SpO2: 91% (05-10-24 @ 04:00) (91% - 100%)  Wt(kg): --  I&O's Summary    09 May 2024 07:01  -  10 May 2024 07:00  --------------------------------------------------------  IN: 1130 mL / OUT: 0 mL / NET: 1130 mL        Appearance: NAD	  HEENT:   Dry  oral mucosa, PERRL, EOMI	  Lymphatic: No lymphadenopathy , no edema  Cardiovascular: Normal S1 S2, No JVD, No murmurs , Peripheral pulses palpable 2+ bilaterally  Respiratory: Lungs clear to auscultation, normal effort 	  Gastrointestinal:  Soft, Non-tender, + BS	  Skin: No rashes, No ecchymoses, No cyanosis, warm to touch  Musculoskeletal: Normal range of motion, normal strength  Psychiatry:  Mood & affect appropriate  Ext: No edema        LABS:    CARDIAC MARKERS:        05-10    137  |  97  |  50<H>  ----------------------------<  81  4.8   |  21<L>  |  13.02<H>    Ca    9.6      10 May 2024 07:47          TELEMETRY: 	ST     ECG:  	  RADIOLOGY:   DIAGNOSTIC TESTING:  [ ] Echocardiogram:  [ ]  Catheterization:  [ ] Stress Test:    OTHER: 	          
[facility]    Jus Ryan Deluca NP    [address] [place] [address] [phone]      Chief Complaint:  Patient is a 59y old  Male who presents with a chief complaint of diarrhea (06 May 2024 10:59)      HPI: 59-year-old male, history of ESRD on Monday Wednesday Friday dialysis, hypertension, hyperlipidemia, presenting with a chief complaint of diarrhea.  Onset today.  14 watery bowel movements.  No blood in same.  Diffuse abdominal pain.  Review of systems otherwise negative.  No recent antibiotics, hospitalizations, camping trips or changes to medications.  No recent travel.  No recent suspicious foods.  Desquamating mucosal process signs of allergic reaction to contrast.    Allergies:  IV Contrast (Anaphylaxis)      Medications:  epoetin isra (EPOGEN) Injectable 35405 Unit(s) IV Push <User Schedule>      PMHX/PSHX:  Renal disease    Hypertension    Gout    Diabetes mellitus    HLD (hyperlipidemia)    Obesity    BENNIE on CPAP    Heart rate fast    H/O shoulder surgery    Injury of ankle and foot    H/O hernia repair    Peritoneal dialysis catheter in situ    Arteriovenous graft removed        Family history:  Family history of MI (myocardial infarction)    Family history of hypertension        Social History:     ROS:     General:  No wt loss, fevers, chills, night sweats, fatigue,   Eyes:  Good vision, no reported pain  ENT:  No sore throat, pain, runny nose, dysphagia  CV:  No pain, palpitations, hypo/hypertension  Resp:  No dyspnea, cough, tachypnea, wheezing  GI:  No pain, No nausea, No vomiting, No diarrhea, No constipation, No weight loss, No fever, No pruritis, No rectal bleeding, No tarry stools, No dysphagia,  :  No pain, bleeding, incontinence, nocturia  Muscle:  No pain, weakness  Neuro:  No weakness, tingling, memory problems  Psych:  No fatigue, insomnia, mood problems, depression  Endocrine:  No polyuria, polydipsia, cold/heat intolerance  Heme:  No petechiae, ecchymosis, easy bruisability  Skin:  No rash, tattoos, scars, edema      PHYSICAL EXAM:   Vital Signs:  Vital Signs Last 24 Hrs  T(C): 36.5 (06 May 2024 10:13), Max: 37 (06 May 2024 00:03)  T(F): 97.7 (06 May 2024 10:13), Max: 98.6 (06 May 2024 00:03)  HR: 93 (06 May 2024 10:13) (75 - 96)  BP: 110/65 (06 May 2024 10:13) (110/65 - 148/78)  BP(mean): --  RR: 18 (06 May 2024 10:13) (17 - 20)  SpO2: 98% (06 May 2024 10:13) (95% - 99%)    Parameters below as of 06 May 2024 10:13  Patient On (Oxygen Delivery Method): room air      Daily Height in cm: 177.8 (06 May 2024 00:03)    Daily     GENERAL:  Appears stated age, well-groomed, well-nourished, no distress  HEENT:  NC/AT,  conjunctivae clear and pink, no thyromegaly, nodules, adenopathy, no JVD, sclera -anicteric  CHEST:  Full & symmetric excursion, no increased effort, breath sounds clear  HEART:  Regular rhythm, S1, S2, no murmur/rub/S3/S4, no abdominal bruit, no edema  ABDOMEN:  Soft, non-tender, non-distended, normoactive bowel sounds,  no masses ,no hepato-splenomegaly, no signs of chronic liver disease  EXTEREMITIES:  no cyanosis,clubbing or edema  SKIN:  No rash/erythema/ecchymoses/petechiae/wounds/abscess/warm/dry  NEURO:  Alert, oriented, no asterixis, no tremor, no encephalopathy    LABS:                        7.9    9.55  )-----------( 187      ( 06 May 2024 12:11 )             26.3     05-06    140  |  106  |  81<H>  ----------------------------<  92  5.5<H>   |  14<L>  |  14.87<H>    Ca    9.5      06 May 2024 12:11  Mg     2.0     05-06    TPro  7.9  /  Alb  3.9  /  TBili  0.5  /  DBili  x   /  AST  15  /  ALT  11  /  AlkPhos  72  05-06    LIVER FUNCTIONS - ( 06 May 2024 02:01 )  Alb: 3.9 g/dL / Pro: 7.9 g/dL / ALK PHOS: 72 U/L / ALT: 11 U/L / AST: 15 U/L / GGT: x           PT/INR - ( 06 May 2024 02:01 )   PT: 13.3 sec;   INR: 1.22 ratio         PTT - ( 06 May 2024 02:01 )  PTT:36.7 sec  Urinalysis Basic - ( 06 May 2024 12:11 )    Color: x / Appearance: x / SG: x / pH: x  Gluc: 92 mg/dL / Ketone: x  / Bili: x / Urobili: x   Blood: x / Protein: x / Nitrite: x   Leuk Esterase: x / RBC: x / WBC x   Sq Epi: x / Non Sq Epi: x / Bacteria: x      Amylase Serum--      Lipase serum51       Ammonia--      Imaging:              
Subjective: Patient seen and examined. No new events except as noted.     REVIEW OF SYSTEMS:    CONSTITUTIONAL: +weakness, fevers or chills  EYES/ENT: No visual changes;  No vertigo or throat pain   NECK: No pain or stiffness  RESPIRATORY: No cough, wheezing, hemoptysis; No shortness of breath  CARDIOVASCULAR: No chest pain or palpitations  GASTROINTESTINAL: No abdominal or epigastric pain. No nausea, vomiting, or hematemesis; No diarrhea or constipation. No melena or hematochezia.  GENITOURINARY: No dysuria, frequency or hematuria  NEUROLOGICAL: No numbness or weakness  SKIN: No itching, burning, rashes, or lesions   All other review of systems is negative unless indicated above.    MEDICATIONS:  MEDICATIONS  (STANDING):  apixaban 5 milliGRAM(s) Oral two times a day  cinacalcet 30 milliGRAM(s) Oral daily  epoetin isra (EPOGEN) Injectable 74594 Unit(s) IV Push <User Schedule>  multivitamin 1 Tablet(s) Oral daily  predniSONE   Tablet 10 milliGRAM(s) Oral daily  sevelamer carbonate 800 milliGRAM(s) Oral three times a day with meals      PHYSICAL EXAM:  T(C): 37.3 (05-11-24 @ 20:58), Max: 37.3 (05-11-24 @ 20:58)  HR: 96 (05-11-24 @ 20:58) (96 - 107)  BP: 103/72 (05-11-24 @ 20:58) (101/68 - 104/62)  RR: 18 (05-11-24 @ 20:58) (18 - 18)  SpO2: 96% (05-11-24 @ 20:58) (96% - 97%)  Wt(kg): --  I&O's Summary    10 May 2024 07:01  -  11 May 2024 07:00  --------------------------------------------------------  IN: 900 mL / OUT: 2900 mL / NET: -2000 mL    11 May 2024 07:01  -  11 May 2024 21:38  --------------------------------------------------------  IN: 120 mL / OUT: 0 mL / NET: 120 mL          Appearance: Normal	  HEENT:   Normal oral mucosa, PERRL, EOMI	  Lymphatic: No lymphadenopathy , no edema  Cardiovascular: Normal S1 S2, No JVD, No murmurs , Peripheral pulses palpable 2+ bilaterally  Respiratory: Lungs clear to auscultation, normal effort 	  Gastrointestinal:  Soft, Non-tender, + BS	  Skin: No rashes, No ecchymoses, No cyanosis, warm to touch  Musculoskeletal: Normal range of motion, normal strength  Psychiatry:  Mood & affect appropriate  Ext: No edema      LABS:    CARDIAC MARKERS:                                10.1   8.15  )-----------( 132      ( 11 May 2024 10:45 )             33.0     05-10    137  |  97  |  50<H>  ----------------------------<  81  4.8   |  21<L>  |  13.02<H>    Ca    9.6      10 May 2024 07:47      proBNP:   Lipid Profile:   HgA1c:   TSH:             TELEMETRY: 	  SR ST   ECG:  	  RADIOLOGY:   DIAGNOSTIC TESTING:  [ ] Echocardiogram:  [ ]  Catheterization:  [ ] Stress Test:    OTHER: 	          
Subjective: Patient seen and examined. No new events except as noted.     REVIEW OF SYSTEMS:    CONSTITUTIONAL: + weakness, fevers or chills  EYES/ENT: No visual changes;  No vertigo or throat pain   NECK: No pain or stiffness  RESPIRATORY: No cough, wheezing, hemoptysis; No shortness of breath  CARDIOVASCULAR: No chest pain or palpitations  GASTROINTESTINAL: No abdominal or epigastric pain. No nausea, vomiting, or hematemesis; No diarrhea or constipation. No melena or hematochezia.  GENITOURINARY: No dysuria, frequency or hematuria  NEUROLOGICAL: No numbness or weakness  SKIN: No itching, burning, rashes, or lesions   All other review of systems is negative unless indicated above.    MEDICATIONS:  MEDICATIONS  (STANDING):  apixaban 5 milliGRAM(s) Oral two times a day  cinacalcet 30 milliGRAM(s) Oral daily  epoetin isra (EPOGEN) Injectable 51369 Unit(s) IV Push <User Schedule>  multivitamin 1 Tablet(s) Oral daily  predniSONE   Tablet 10 milliGRAM(s) Oral daily  sevelamer carbonate 800 milliGRAM(s) Oral three times a day with meals      PHYSICAL EXAM:  T(C): 37.1 (05-13-24 @ 04:22), Max: 37.2 (05-12-24 @ 11:53)  HR: 100 (05-13-24 @ 04:22) (100 - 108)  BP: 105/65 (05-13-24 @ 04:22) (105/65 - 128/72)  RR: 18 (05-13-24 @ 04:22) (18 - 18)  SpO2: 99% (05-13-24 @ 04:22) (98% - 99%)  Wt(kg): --  I&O's Summary    12 May 2024 07:01  -  13 May 2024 07:00  --------------------------------------------------------  IN: 300 mL / OUT: 0 mL / NET: 300 mL          Appearance: Normal	  HEENT:   Normal oral mucosa, PERRL, EOMI	  Lymphatic: No lymphadenopathy , no edema  Cardiovascular: Normal S1 S2, No JVD, No murmurs , Peripheral pulses palpable 2+ bilaterally  Respiratory: Lungs clear to auscultation, normal effort 	  Gastrointestinal:  Soft, Non-tender, + BS	  Skin: No rashes, No ecchymoses, No cyanosis, warm to touch  Musculoskeletal: Normal range of motion, normal strength  Psychiatry:  Mood & affect appropriate  Ext: No edema      LABS:    CARDIAC MARKERS:                                10.1   8.15  )-----------( 132      ( 11 May 2024 10:45 )             33.0     05-12    139  |  92<L>  |  63<H>  ----------------------------<  101<H>  4.3   |  25  |  15.20<H>    Ca    10.0      12 May 2024 21:11      proBNP:   Lipid Profile:   HgA1c:   TSH:             TELEMETRY: 	  SR  ECG:  	  RADIOLOGY:   DIAGNOSTIC TESTING:  [ ] Echocardiogram:  [ ]  Catheterization:  [ ] Stress Test:    OTHER: 	          
Subjective: Patient seen and examined. No new events except as noted.     REVIEW OF SYSTEMS:    CONSTITUTIONAL: + weakness, fevers or chills  EYES/ENT: No visual changes;  No vertigo or throat pain   NECK: No pain or stiffness  RESPIRATORY: No cough, wheezing, hemoptysis; No shortness of breath  CARDIOVASCULAR: No chest pain or palpitations  GASTROINTESTINAL: No abdominal or epigastric pain. No nausea, vomiting, or hematemesis; No diarrhea or constipation. No melena or hematochezia.  GENITOURINARY: No dysuria, frequency or hematuria  NEUROLOGICAL: No numbness or weakness  SKIN: No itching, burning, rashes, or lesions   All other review of systems is negative unless indicated above.    MEDICATIONS:  MEDICATIONS  (STANDING):  apixaban 5 milliGRAM(s) Oral two times a day  cinacalcet 30 milliGRAM(s) Oral daily  epoetin isra (EPOGEN) Injectable 42152 Unit(s) IV Push <User Schedule>  multivitamin 1 Tablet(s) Oral daily  predniSONE   Tablet 10 milliGRAM(s) Oral daily  sevelamer carbonate 800 milliGRAM(s) Oral three times a day with meals      PHYSICAL EXAM:  T(C): 37.2 (05-08-24 @ 04:00), Max: 37.2 (05-07-24 @ 20:38)  HR: 86 (05-08-24 @ 04:00) (81 - 102)  BP: 106/70 (05-08-24 @ 04:00) (106/70 - 120/75)  RR: 18 (05-08-24 @ 04:00) (16 - 18)  SpO2: 96% (05-08-24 @ 04:00) (95% - 98%)  Wt(kg): --  I&O's Summary    07 May 2024 07:01  -  08 May 2024 07:00  --------------------------------------------------------  IN: 1550 mL / OUT: 0 mL / NET: 1550 mL          Appearance: Normal	  HEENT:   Normal oral mucosa, PERRL, EOMI	  Lymphatic: No lymphadenopathy , no edema  Cardiovascular: Normal S1 S2, No JVD, No murmurs , Peripheral pulses palpable 2+ bilaterally  Respiratory: Lungs clear to auscultation, normal effort 	  Gastrointestinal:  Soft, Non-tender, + BS	  Skin: No rashes, No ecchymoses, No cyanosis, warm to touch  Musculoskeletal: Normal range of motion, normal strength  Psychiatry:  Mood & affect appropriate  Ext: No edema      LABS:    CARDIAC MARKERS:                                8.9    7.70  )-----------( 166      ( 07 May 2024 07:07 )             28.8     05-07    137  |  97  |  44<H>  ----------------------------<  73  4.9   |  22  |  10.02<H>    Ca    9.6      07 May 2024 07:07            TELEMETRY: 	SR    ECG:  	  RADIOLOGY:   DIAGNOSTIC TESTING:  [ ] Echocardiogram:  [ ]  Catheterization:  [ ] Stress Test:    OTHER: 	          
Subjective: Patient seen and examined. No new events except as noted.     REVIEW OF SYSTEMS:    CONSTITUTIONAL: +weakness, fevers or chills  EYES/ENT: No visual changes;  No vertigo or throat pain   NECK: No pain or stiffness  RESPIRATORY: No cough, wheezing, hemoptysis; No shortness of breath  CARDIOVASCULAR: No chest pain or palpitations  GASTROINTESTINAL: No abdominal or epigastric pain. No nausea, vomiting, or hematemesis; No diarrhea or constipation. No melena or hematochezia.  GENITOURINARY: No dysuria, frequency or hematuria  NEUROLOGICAL: No numbness or weakness  SKIN: No itching, burning, rashes, or lesions   All other review of systems is negative unless indicated above.    MEDICATIONS:  MEDICATIONS  (STANDING):  apixaban 5 milliGRAM(s) Oral two times a day  cinacalcet 30 milliGRAM(s) Oral daily  epoetin isra (EPOGEN) Injectable 84755 Unit(s) IV Push <User Schedule>  multivitamin 1 Tablet(s) Oral daily  predniSONE   Tablet 10 milliGRAM(s) Oral daily  sevelamer carbonate 800 milliGRAM(s) Oral three times a day with meals      PHYSICAL EXAM:  T(C): 36.9 (05-12-24 @ 04:10), Max: 37.3 (05-11-24 @ 20:58)  HR: 88 (05-12-24 @ 04:10) (88 - 107)  BP: 107/71 (05-12-24 @ 04:10) (103/72 - 107/71)  RR: 18 (05-12-24 @ 04:10) (18 - 18)  SpO2: 96% (05-12-24 @ 04:10) (96% - 97%)  Wt(kg): --  I&O's Summary    11 May 2024 07:01  -  12 May 2024 07:00  --------------------------------------------------------  IN: 470 mL / OUT: 0 mL / NET: 470 mL          Appearance: Normal	  HEENT:   Normal oral mucosa, PERRL, EOMI	  Lymphatic: No lymphadenopathy , no edema  Cardiovascular: Normal S1 S2, No JVD, No murmurs , Peripheral pulses palpable 2+ bilaterally  Respiratory: Lungs clear to auscultation, normal effort 	  Gastrointestinal:  Soft, Non-tender, + BS	  Skin: No rashes, No ecchymoses, No cyanosis, warm to touch  Musculoskeletal: Normal range of motion, normal strength  Psychiatry:  Mood & affect appropriate  Ext: No edema      LABS:    CARDIAC MARKERS:                                10.1   8.15  )-----------( 132      ( 11 May 2024 10:45 )             33.0     05-12    136  |  96  |  52<H>  ----------------------------<  82  6.0<H>   |  19<L>  |  13.22<H>    Ca    9.6      12 May 2024 06:53      proBNP:   Lipid Profile:   HgA1c:   TSH:             TELEMETRY: 	SR ST    ECG:  	  RADIOLOGY:   DIAGNOSTIC TESTING:  [ ] Echocardiogram:  [ ]  Catheterization:  [ ] Stress Test:    OTHER: 	          
Subjective: Patient seen and examined. No new events except as noted.     REVIEW OF SYSTEMS:    CONSTITUTIONAL:+ weakness, fevers or chills  EYES/ENT: No visual changes;  No vertigo or throat pain   NECK: No pain or stiffness  RESPIRATORY: No cough, wheezing, hemoptysis; No shortness of breath  CARDIOVASCULAR: No chest pain or palpitations  GASTROINTESTINAL: No abdominal or epigastric pain. No nausea, vomiting, or hematemesis; No diarrhea or constipation. No melena or hematochezia.  GENITOURINARY: No dysuria, frequency or hematuria  NEUROLOGICAL: No numbness or weakness  SKIN: No itching, burning, rashes, or lesions   All other review of systems is negative unless indicated above.    MEDICATIONS:  MEDICATIONS  (STANDING):  apixaban 5 milliGRAM(s) Oral two times a day  cinacalcet 30 milliGRAM(s) Oral daily  epoetin isra (EPOGEN) Injectable 60317 Unit(s) IV Push <User Schedule>  multivitamin 1 Tablet(s) Oral daily  predniSONE   Tablet 10 milliGRAM(s) Oral daily  sevelamer carbonate 800 milliGRAM(s) Oral three times a day with meals      PHYSICAL EXAM:  T(C): 36.9 (05-07-24 @ 04:00), Max: 37.2 (05-06-24 @ 19:06)  HR: 99 (05-07-24 @ 04:00) (91 - 106)  BP: 103/67 (05-07-24 @ 04:00) (90/56 - 110/65)  RR: 18 (05-07-24 @ 04:00) (18 - 18)  SpO2: 95% (05-07-24 @ 04:00) (95% - 100%)  Wt(kg): --  I&O's Summary    06 May 2024 07:01  -  07 May 2024 07:00  --------------------------------------------------------  IN: 0 mL / OUT: 1100 mL / NET: -1100 mL          Appearance: Normal	  HEENT:   Normal oral mucosa, PERRL, EOMI	  Lymphatic: No lymphadenopathy , no edema  Cardiovascular: Normal S1 S2, No JVD, No murmurs , Peripheral pulses palpable 2+ bilaterally  Respiratory: Lungs clear to auscultation, normal effort 	  Gastrointestinal:  Soft, Non-tender, + BS	  Skin: No rashes, No ecchymoses, No cyanosis, warm to touch  Musculoskeletal: Normal range of motion, normal strength  Psychiatry:  Mood & affect appropriate  Ext: No edema      LABS:    CARDIAC MARKERS:                                8.9    7.70  )-----------( 166      ( 07 May 2024 07:07 )             28.8     05-07    137  |  97  |  44<H>  ----------------------------<  73  4.9   |  22  |  10.02<H>    Ca    9.6      07 May 2024 07:07  Mg     2.0     05-06    TPro  7.9  /  Alb  3.9  /  TBili  0.5  /  DBili  x   /  AST  15  /  ALT  11  /  AlkPhos  72  05-06    GI PCR Panel Stool (05.06.24 @ 02:42)   GI PCR Panel: [facility]: GI Panel PCR evaluates for:       TELEMETRY: 	  SR   ECG:  	NSR no acute ischemic stt changes   RADIOLOGY:   DIAGNOSTIC TESTING:  [ ] Echocardiogram:  [ ]  Catheterization:  [ ] Stress Test:    OTHER: 	          
Subjective: Patient seen and examined. No new events except as noted.   tachycardic   no chest pain or palpitations   feels fine     REVIEW OF SYSTEMS:    CONSTITUTIONAL: No weakness, fevers or chills  EYES/ENT: No visual changes;  No vertigo or throat pain   NECK: No pain or stiffness  RESPIRATORY: No cough, wheezing, hemoptysis; No shortness of breath  CARDIOVASCULAR: No chest pain or palpitations  GASTROINTESTINAL: No abdominal or epigastric pain. No nausea, vomiting, or hematemesis; No diarrhea or constipation. No melena or hematochezia.  GENITOURINARY: No dysuria, frequency or hematuria  NEUROLOGICAL: No numbness or weakness  SKIN: No itching, burning, rashes, or lesions   All other review of systems is negative unless indicated above.    MEDICATIONS:  MEDICATIONS  (STANDING):  apixaban 5 milliGRAM(s) Oral two times a day  cinacalcet 30 milliGRAM(s) Oral daily  epoetin isra (EPOGEN) Injectable 50327 Unit(s) IV Push <User Schedule>  multivitamin 1 Tablet(s) Oral daily  predniSONE   Tablet 10 milliGRAM(s) Oral daily  sevelamer carbonate 800 milliGRAM(s) Oral three times a day with meals      PHYSICAL EXAM:  T(C): 36.7 (05-09-24 @ 05:05), Max: 37.2 (05-08-24 @ 21:22)  HR: 97 (05-09-24 @ 05:05) (97 - 117)  BP: 107/70 (05-09-24 @ 05:05) (98/64 - 145/80)  RR: 18 (05-09-24 @ 05:05) (18 - 18)  SpO2: 100% (05-09-24 @ 05:05) (98% - 100%)  Wt(kg): --  I&O's Summary    08 May 2024 07:01  -  09 May 2024 07:00  --------------------------------------------------------  IN: 240 mL / OUT: 1900 mL / NET: -1660 mL          Appearance: Normal	  HEENT:   Normal oral mucosa, PERRL, EOMI	  Lymphatic: No lymphadenopathy , no edema  Cardiovascular: Normal S1 S2, No JVD, No murmurs , Peripheral pulses palpable 2+ bilaterally  Respiratory: Lungs clear to auscultation, normal effort 	  Gastrointestinal:  Soft, Non-tender, + BS	  Skin: No rashes, No ecchymoses, No cyanosis, warm to touch  Musculoskeletal: Normal range of motion, normal strength  Psychiatry:  Mood & affect appropriate  Ext: No edema      LABS:    CARDIAC MARKERS:                  proBNP:   Lipid Profile:   HgA1c:   TSH:             TELEMETRY: 	Sinus tach     ECG:  	  RADIOLOGY:   DIAGNOSTIC TESTING:  [ ] Echocardiogram:  [ ]  Catheterization:  [ ] Stress Test:    OTHER:

## 2024-05-13 NOTE — PROGRESS NOTE ADULT - PROBLEM SELECTOR PLAN 1
cont with home meds for now   low salt diet counseling
cont with home meds for now   low salt diet counseling  chronically tachy and asymptomatic   advised to keep hydrated
cont with home meds for now   low salt diet counseling
cont with home meds for now   low salt diet counseling  chronically tachy and asymptomatic   advised to keep hydrated
cont with home meds for now   low salt diet counseling  chronically tachy and asymptomatic   advised to keep hydrated

## 2024-05-13 NOTE — PROGRESS NOTE ADULT - PROBLEM SELECTOR PLAN 2
RISS  check A1C.

## 2024-05-13 NOTE — PROGRESS NOTE ADULT - PROBLEM SELECTOR PLAN 4
On HD   Nephrology consulted.

## 2024-05-13 NOTE — PROGRESS NOTE ADULT - PROBLEM SELECTOR PLAN 3
Check stool cultures   GI eval.

## 2024-05-13 NOTE — PROVIDER CONTACT NOTE (OTHER) - ASSESSMENT
Exit ist dry and clean, No s/s of infection at this time. VSS Exit site dry and clean, No s/s of infection at this time. VSS

## 2024-05-13 NOTE — PROGRESS NOTE ADULT - PROVIDER SPECIALTY LIST ADULT
Hospitalist
Cardiology
Cardiology
Gastroenterology
Hospitalist
Nephrology
Gastroenterology
Gastroenterology
Hospitalist
Infectious Disease
Nephrology
Hospitalist
Infectious Disease
Nephrology
Vascular Surgery
Cardiology

## 2024-05-13 NOTE — PROGRESS NOTE ADULT - ASSESSMENT
59-year-old male, history of ESRD on Monday Wednesday Friday dialysis, hypertension, hyperlipidemia, presenting with a chief complaint of diarrhea.  Onset today.  14 watery bowel movements.  No blood in same.  Diffuse abdominal pain.  Review of systems otherwise negative.  No recent antibiotics, hospitalizations, camping trips or changes to medications.  No recent travel.  No recent suspicious foods.  Desquamating mucosal process signs of allergic reaction to contrast.     Diarrhea  - resolved  - GI PCR ng  - C diff neg  - GI fu appreciated   - imodium PRN    ESRD  - HD as scheduled  - renal fu     HTN  - cw home meds  - DASH diet    Pain at HD site  - IR evaluation appreciated  - sec to hematoma  - check  catheter flow with next HD     DVT prophylaxis     DC planning if tolerates dialysis with the cather  gabrielle pt   
59-year-old male, history of ESRD on Monday Wednesday Friday dialysis, hypertension, hyperlipidemia, presenting with a chief complaint of diarrhea.  Onset today.  14 watery bowel movements.  No blood in same.  Diffuse abdominal pain.  Review of systems otherwise negative.  No recent antibiotics, hospitalizations, camping trips or changes to medications.      ESRD on HD: MWF  Dialysis center: [facility]  Nephrologist: Dr. Neftaly Holden  Access: R chest wall Permacath  Consent signed, witnessed, and placed in pt's chart  Pt seen and examined during HD today, tolerating tx well. - 1.9L UF today   Continue MWF schedule.  Renal diet  Monitor BMP and UO.    HTN:  BP controlled  Continue current medications   UF w/ HD as tolerated  Monitor BP    Hyperkalemia:  Likely in setting of ESRD.  improved  Low K diet  Monitor     Anemia:  On EPO 23859t TIW with HD  AM Iron Panel ordered   Transfuse for Hgb <8.  Monitor Hgb    CKD: MBD:  PTH level ordered for AM   Monitor Ca and PO4 daily        
59-year-old male, history of ESRD on Monday Wednesday Friday dialysis, hypertension, hyperlipidemia, presenting with a chief complaint of diarrhea.  Onset today.  14 watery bowel movements.  No blood in same.  Diffuse abdominal pain.  Review of systems otherwise negative.  No recent antibiotics, hospitalizations, camping trips or changes to medications.      ESRD on HD: MWF  Dialysis center: [facility]  Nephrologist: Dr. Neftaly Holden---now planned  for discharge to [facility] under Dr Hidalgo  Access: R chest wall Permacath  Consent signed, witnessed, and placed in pt's chart  Continue MWF schedule.  Renal diet  Monitor BMP and UO.  His catheter tunnel is swollen and tender, no discharge. Evaluated by IR/Vascular    HTN:  BP controlled  Continue current medications   UF w/ HD as tolerated  Monitor BP    Hyperkalemia:    Low K diet  Monitor     Anemia:  On EPO 52462c TIW with HD  AM Iron Panel ordered   Transfuse for Hgb <8.  Monitor Hgb    CKD: MBD:  PTH level ordered for AM   Monitor Ca and PO4 daily        
59-year-old male, history of ESRD on Monday Wednesday Friday dialysis, hypertension, hyperlipidemia, presenting with a chief complaint of diarrhea.  Onset today.  14 watery bowel movements.  No blood in same.  Diffuse abdominal pain.  Review of systems otherwise negative.  No recent antibiotics, hospitalizations, camping trips or changes to medications.      ESRD on HD: MWF  Dialysis center: [facility]  Nephrologist: Dr. Neftaly Holden---now planned  for discharge to [facility] under Dr Hidalgo  Access: R chest wall Permacath  Consent signed, witnessed, and placed in pt's chart  Continue MWF schedule.  Renal diet  Monitor BMP and UO.    HTN:  BP controlled  Continue current medications   UF w/ HD as tolerated  Monitor BP    Hyperkalemia:  Likely in setting of ESRD.  improved  Low K diet  Monitor     Anemia:  On EPO 84061t TIW with HD  AM Iron Panel ordered   Transfuse for Hgb <8.  Monitor Hgb    CKD: MBD:  PTH level ordered for AM   Monitor Ca and PO4 daily        
59-yo M w/ PMH of ESRD on HD, HTN, and Klebsiella bacteremia (11/2023) a/w R CVC, admitted with profuse watery diarrhea. Noted to have WBC 10.48 and 6.5% eosinophilia. Nonspecific diffuse abdominal pain. C diff and GI PCR neg. Currently no BM.    #Diarrhea  #ESRD on HD  #Eosinophilia  #Abdominal pain  - Reported improvement in diarrhea while in ED. Currently no BM close to a full day.  - Send stool culture.  - F/u HIV screen and strongyloides study  - Would hold off antibiotics and monitor.  - F/u WBC and fever curve  - Monitor CBC w/ diff for eosinophilia  - Consider CT A/P  - F/u GI    Plan discussed with primary team ACP.  Thank you for this consult. Inpatient ID team will follow.    Adam Noonan MD, PhD  Attending Physician  Division of Infectious Diseases  Department of Medicine    Please contact through MS Teams message.  Office: [phone] (after 5 PM or weekend). 
History of Present Illness:    59-year-old male, history of ESRD on Monday Wednesday Friday dialysis, hypertension, hyperlipidemia, presenting with a chief complaint of diarrhea.  Onset today.  14 watery bowel movements.  No blood in same.  Diffuse abdominal pain.  Review of systems otherwise negative.  No recent antibiotics, hospitalizations, camping trips or changes to medications.  No recent travel.  No recent suspicious foods.  Desquamating mucosal process signs of allergic reaction to contrast. 
diarrhea  esrd/hd    reg diet  hd per renal  c diff negative  gi pcr negative  immodium prn  ID eval noted  strongyloides ab negative  will follow 
 59-year-old male, history of ESRD on Monday Wednesday Friday dialysis, hypertension, hyperlipidemia, presenting with a chief complaint of diarrhea.  Onset today.  14 watery bowel movements.  No blood in same.  Diffuse abdominal pain.  Review of systems otherwise negative.  No recent antibiotics, hospitalizations, camping trips or changes to medications.  No recent travel.  No recent suspicious foods.  Desquamating mucosal process signs of allergic reaction to contrast.     Diarrhea  - resolving  - GI PCR ng  - C diff neg  - GI fu appreciated   - imodium PRN    ESRD- HD as scheduled  - renal fu     HTN  - cw home meds  - DASH diet    DVT prophylaxis     dc planning pending HD setup       
59-year-old male, history of ESRD on Monday Wednesday Friday dialysis, hypertension, hyperlipidemia, presenting with a chief complaint of diarrhea.  Onset today.  14 watery bowel movements.  No blood in same.  Diffuse abdominal pain.  Review of systems otherwise negative.  No recent antibiotics, hospitalizations, camping trips or changes to medications.      ESRD on HD: MWF  Dialysis center: [facility]  Nephrologist: Dr. Neftaly Holden---now planned  for discharge to [facility] under Dr Hidalgo  Access: R chest wall Permacath  Consent signed, witnessed, and placed in pt's chart  Continue MWF schedule.  Renal diet  Monitor BMP and UO.  His catheter tunnel is swollen and tender, no discharge. Evaluated by IR. Will get vascular also to see him    HTN:  BP controlled  Continue current medications   UF w/ HD as tolerated  Monitor BP    Hyperkalemia:  Likely in setting of ESRD.  improved  Low K diet  Monitor     Anemia:  On EPO 92246r TIW with HD  AM Iron Panel ordered   Transfuse for Hgb <8.  Monitor Hgb    CKD: MBD:  PTH level ordered for AM   Monitor Ca and PO4 daily        
59-year-old male, history of ESRD on Monday Wednesday Friday dialysis, hypertension, hyperlipidemia, presenting with a chief complaint of diarrhea.  Onset today.  14 watery bowel movements.  No blood in same.  Diffuse abdominal pain.  Review of systems otherwise negative.  No recent antibiotics, hospitalizations, camping trips or changes to medications.      ESRD on HD: MWF  Dialysis center: [facility]  Nephrologist: Dr. Neftaly Holden---now planned  for discharge to [facility] under Dr Hidalgo  Access: R chest wall Permacath  Consent signed, witnessed, and placed in pt's chart  Continue MWF schedule.  Renal diet  Monitor BMP and UO.  His catheter tunnel is swollen and tender, no discharge. Evaluated by IR/Vascular    HTN:  BP controlled  Continue current medications   UF w/ HD as tolerated  Monitor BP    Hyperkalemia:  specimen hemolyzed  Lokelma 10gm X2  Low K diet  Monitor     Anemia:  On EPO 98118x TIW with HD  AM Iron Panel ordered   Transfuse for Hgb <8.  Monitor Hgb    CKD: MBD:  PTH level ordered for AM   Monitor Ca and PO4 daily        
 59-year-old male, history of ESRD on Monday Wednesday Friday dialysis, hypertension, hyperlipidemia, presenting with a chief complaint of diarrhea.  Onset today.  14 watery bowel movements.  No blood in same.  Diffuse abdominal pain.  Review of systems otherwise negative.  No recent antibiotics, hospitalizations, camping trips or changes to medications.        ESRD on HD: MWF  Dialysis center: [facility]  Nephrologist: Dr. Neftaly Holden  Access: Permcath  Consent signed, witnessed, and placed in pt's chart.  Continue MWF schedule.  Renal diet.  Monitor BMP and UO.    HTN:  BP controlled.  UF w/ HD.  Monitor BP.    Hyperkalemia:  Likely in setting of ESRD.  improved  Low K diet.    Anemia:  Monitor Hgb.  Epogen w/ HD.  Transfuse for Hgb <8.    CKD: MBD:  Check PTH   Monitor Ca and PO4 daily.        
 59-year-old male, history of ESRD on Monday Wednesday Friday dialysis, hypertension, hyperlipidemia, presenting with a chief complaint of diarrhea.  Onset today.  14 watery bowel movements.  No blood in same.  Diffuse abdominal pain.  Review of systems otherwise negative.  No recent antibiotics, hospitalizations, camping trips or changes to medications.  No recent travel.  No recent suspicious foods.  Desquamating mucosal process signs of allergic reaction to contrast.     Diarrhea  - resolved  - GI PCR ng  - C diff neg  - GI fu appreciated   - imodium PRN    ESRD  - HD as scheduled  - renal fu     HTN  - cw home meds  - DASH diet    Pain at HD site  - IR evaluation    DVT prophylaxis     
59-yo M w/ PMH of ESRD on HD, HTN, and Klebsiella bacteremia (11/2023) a/w R CVC, admitted with profuse watery diarrhea. Noted to have WBC 10.48 and 6.5% eosinophilia. Nonspecific diffuse abdominal pain. C diff and GI PCR neg. Currently no BM.    #Diarrhea  #ESRD on HD  #Eosinophilia  #Abdominal pain  - Reported improvement in diarrhea while in ED. Currently no BM close to a full day.  - Send stool culture.  - F/u strongyloides study. Can follow up as outpatient.   - Would hold off antibiotics and monitor.  - F/u WBC and fever curve  - Monitor CBC w/ diff for eosinophilia    Plan discussed with primary team ACP.  Thank you for this consult.     Adam Noonan MD, PhD  Attending Physician  Division of Infectious Diseases  Department of Medicine    Please contact through MS Teams message.  Office: [phone] (after 5 PM or weekend).   
Jhonny Holley is a 59-year-old man with history of HTN, HLD, DM, BENNIE, and ESRD via R IJ tunneled dialysis catheter. Vascular surgery consultation placed for long-term dialysis access.       PLAN:  - Protect left arm; remove all IVs  - Vein mapping of bilateral upper extremities  - MR venogram chest (allergy to CT contrast, but tolerated MR in 2023)  - Please document medicine and cardiology optimization and risk stratification for fistula creation, MAC, and general anesthesia  - No date for OR; Eliquis will need to be held for 48 hours prior and patient transitioned to Heparin      Vascular Surgery  [id number]
diarrhea  esrd/hd    reg diet  hd per renal  c diff negative  gi pcr negative  immodium prn  ID eval noted  strongyloides ab negative  will follow 
diarrhea  esrd/hd    reg diet  hd per renal  c diff negative  gi pcr negative  immodium prn  ID eval noted  strongyloides ab negative  will follow 
diarrhea  esrd/hd    reg diet  hd per renal  c diff negative  gi pcr negative  immodium prn  will follow 
 59-year-old male, history of ESRD on Monday Wednesday Friday dialysis, hypertension, hyperlipidemia, presenting with a chief complaint of diarrhea.  Onset today.  14 watery bowel movements.  No blood in same.  Diffuse abdominal pain.  Review of systems otherwise negative.  No recent antibiotics, hospitalizations, camping trips or changes to medications.  No recent travel.  No recent suspicious foods.  Desquamating mucosal process signs of allergic reaction to contrast.     Diarrhea  - resolved  - GI PCR ng  - C diff neg  - GI fu appreciated   - imodium PRN    ESRD  - HD as scheduled  - renal fu     HTN  - cw home meds  - DASH diet    DVT prophylaxis     dc planning pending HD setup     
 59-year-old male, history of ESRD on Monday Wednesday Friday dialysis, hypertension, hyperlipidemia, presenting with a chief complaint of diarrhea.  Onset today.  14 watery bowel movements.  No blood in same.  Diffuse abdominal pain.  Review of systems otherwise negative.  No recent antibiotics, hospitalizations, camping trips or changes to medications.  No recent travel.  No recent suspicious foods.  Desquamating mucosal process signs of allergic reaction to contrast.     Diarrhea  - resolved  - GI PCR ng  - C diff neg  - GI fu appreciated   - imodium PRN    ESRD  - HD as scheduled  - renal fu     HTN  - cw home meds  - DASH diet    Pain at HD site  - IR evaluation appreciated  - sec to hematoma  - check  catheter flow with next HD     DVT prophylaxis     DC planning if tolerates dialysis with the cather  gabrielle pt   
 59-year-old male, history of ESRD on Monday Wednesday Friday dialysis, hypertension, hyperlipidemia, presenting with a chief complaint of diarrhea.  Onset today.  14 watery bowel movements.  No blood in same.  Diffuse abdominal pain.  Review of systems otherwise negative.  No recent antibiotics, hospitalizations, camping trips or changes to medications.  No recent travel.  No recent suspicious foods.  Desquamating mucosal process signs of allergic reaction to contrast.     Diarrhea  - resolving  - GI PCR ng  - C diff neg  - GI fu appreciated   - imodium PRN  - fu stool cultures  - check strongyloides    ESRD- HD as scheduled  - renal fu     HTN  - cw home meds  - DASH diet    DVT prophylaxis       
59-year-old male, history of ESRD on Monday Wednesday Friday dialysis, hypertension, hyperlipidemia, presenting with a chief complaint of diarrhea.  Onset today.  14 watery bowel movements.  No blood in same.  Diffuse abdominal pain.  Review of systems otherwise negative.  No recent antibiotics, hospitalizations, camping trips or changes to medications.      ESRD on HD: MWF  Dialysis center: [facility]  Nephrologist: Dr. Neftaly Holden  Access: R chest wall Permacath  Consent signed, witnessed, and placed in pt's chart  Continue MWF schedule.  Renal diet  Monitor BMP and UO.    HTN:  BP controlled  Continue current medications   UF w/ HD as tolerated  Monitor BP    Hyperkalemia:  Likely in setting of ESRD.  improved  Low K diet  Monitor     Anemia:  On EPO 04539s TIW with HD  AM Iron Panel ordered   Transfuse for Hgb <8.  Monitor Hgb    CKD: MBD:  PTH level ordered for AM   Monitor Ca and PO4 daily        
diarrhea  esrd/hd    reg diet  hd per renal  c diff negative  gi pcr negative  immodium prn  ID eval noted  strongyloides w/u given eosinophilia  will follow 
History of Present Illness:    59-year-old male, history of ESRD on Monday Wednesday Friday dialysis, hypertension, hyperlipidemia, presenting with a chief complaint of diarrhea.  Onset today.  14 watery bowel movements.  No blood in same.  Diffuse abdominal pain.  Review of systems otherwise negative.  No recent antibiotics, hospitalizations, camping trips or changes to medications.  No recent travel.  No recent suspicious foods.  Desquamating mucosal process signs of allergic reaction to contrast. 

## 2024-05-13 NOTE — PROVIDER CONTACT NOTE (OTHER) - RECOMMENDATIONS
Reeducated Pt no to do so due to the risk of infection.  DsG change Educate Pt no to do because the risk of infection.   Perform DsG change as per protocol. Educate Pt do not do it  because the risk of infection.   Perform DsG change as per protocol.

## 2024-05-13 NOTE — PROVIDER CONTACT NOTE (OTHER) - SITUATION
Right perm catheter DsG covered by [employer].  Pt self clean DsG with alcohol and place [employer] Right perm catheter DsG covered by [employer] noted  Pt cleaned  dressing with alcohol and place [employer] by himself. Right perm  HD catheter DsG covered by [employer] noted  Pt cleaned  dressing with alcohol and place [employer] by himself.

## 2024-05-13 NOTE — PROVIDER CONTACT NOTE (OTHER) - BACKGROUND
Hyperkalemia. ESRD  IN HD for 3 years  DM. Gout. HTN  Pt stated " I do dressing clean and change all the time. I dit it last night. When I need to do it. " Hyperkalemia. ESRD  On HD for 3 years  DM. Gout. HTN  Pt stated " I do my catheter dressing clean and change all the time. I did  it last night." Hyperkalemia. ESRD  On HD for 3 years  DM. Gout. HTN  Pt stated " I do my catheter cleaning & dressing change all the time. I did  it last night."

## 2024-05-13 NOTE — PROGRESS NOTE ADULT - REASON FOR ADMISSION
diarrhea

## 2024-05-25 ENCOUNTER — NON-APPOINTMENT (OUTPATIENT)
Age: 59
End: 2024-05-25

## 2024-05-30 ENCOUNTER — APPOINTMENT (OUTPATIENT)
Dept: VASCULAR SURGERY | Facility: CLINIC | Age: 59
End: 2024-05-30

## 2024-06-04 ENCOUNTER — INPATIENT (INPATIENT)
Facility: HOSPITAL | Age: 59
LOS: 7 days | Discharge: HOME | DRG: 871 | End: 2024-06-12
Attending: INTERNAL MEDICINE | Admitting: INTERNAL MEDICINE
Payer: MEDICARE

## 2024-06-04 VITALS
TEMPERATURE: 103 F | WEIGHT: 276.9 LBS | HEART RATE: 109 BPM | RESPIRATION RATE: 19 BRPM | HEIGHT: 70 IN | OXYGEN SATURATION: 100 % | SYSTOLIC BLOOD PRESSURE: 115 MMHG | DIASTOLIC BLOOD PRESSURE: 72 MMHG

## 2024-06-04 DIAGNOSIS — Z98.890 OTHER SPECIFIED POSTPROCEDURAL STATES: Chronic | ICD-10-CM

## 2024-06-04 DIAGNOSIS — S99.919A UNSPECIFIED INJURY OF UNSPECIFIED ANKLE, INITIAL ENCOUNTER: Chronic | ICD-10-CM

## 2024-06-04 DIAGNOSIS — Z99.2 DEPENDENCE ON RENAL DIALYSIS: Chronic | ICD-10-CM

## 2024-06-04 LAB
ALBUMIN SERPL ELPH-MCNC: 3.7 G/DL — SIGNIFICANT CHANGE UP (ref 3.3–5)
ALP SERPL-CCNC: 54 U/L — SIGNIFICANT CHANGE UP (ref 40–120)
ALT FLD-CCNC: 11 U/L — SIGNIFICANT CHANGE UP (ref 10–45)
ANION GAP SERPL CALC-SCNC: 18 MMOL/L — HIGH (ref 5–17)
ANISOCYTOSIS BLD QL: SLIGHT — SIGNIFICANT CHANGE UP
APTT BLD: 36 SEC — HIGH (ref 24.5–35.6)
AST SERPL-CCNC: 15 U/L — SIGNIFICANT CHANGE UP (ref 10–40)
BASE EXCESS BLDV CALC-SCNC: 3.1 MMOL/L — HIGH (ref -2–3)
BASOPHILS # BLD AUTO: 0 K/UL — SIGNIFICANT CHANGE UP (ref 0–0.2)
BASOPHILS NFR BLD AUTO: 0 % — SIGNIFICANT CHANGE UP (ref 0–2)
BILIRUB SERPL-MCNC: 0.6 MG/DL — SIGNIFICANT CHANGE UP (ref 0.2–1.2)
BUN SERPL-MCNC: 45 MG/DL — HIGH (ref 7–23)
CA-I SERPL-SCNC: 1.21 MMOL/L — SIGNIFICANT CHANGE UP (ref 1.15–1.33)
CALCIUM SERPL-MCNC: 10 MG/DL — SIGNIFICANT CHANGE UP (ref 8.4–10.5)
CHLORIDE BLDV-SCNC: 101 MMOL/L — SIGNIFICANT CHANGE UP (ref 96–108)
CHLORIDE SERPL-SCNC: 98 MMOL/L — SIGNIFICANT CHANGE UP (ref 96–108)
CO2 BLDV-SCNC: 29 MMOL/L — HIGH (ref 22–26)
CO2 SERPL-SCNC: 23 MMOL/L — SIGNIFICANT CHANGE UP (ref 22–31)
CREAT SERPL-MCNC: 10.66 MG/DL — HIGH (ref 0.5–1.3)
EGFR: 5 ML/MIN/1.73M2 — LOW
ELLIPTOCYTES BLD QL SMEAR: SLIGHT — SIGNIFICANT CHANGE UP
EOSINOPHIL # BLD AUTO: 0.67 K/UL — HIGH (ref 0–0.5)
EOSINOPHIL NFR BLD AUTO: 5.2 % — SIGNIFICANT CHANGE UP (ref 0–6)
FLUAV AG NPH QL: SIGNIFICANT CHANGE UP
FLUAV AG NPH QL: SIGNIFICANT CHANGE UP
FLUBV AG NPH QL: SIGNIFICANT CHANGE UP
FLUBV AG NPH QL: SIGNIFICANT CHANGE UP
GAS PNL BLDV: 135 MMOL/L — LOW (ref 136–145)
GAS PNL BLDV: SIGNIFICANT CHANGE UP
GLUCOSE BLDV-MCNC: 94 MG/DL — SIGNIFICANT CHANGE UP (ref 70–99)
GLUCOSE SERPL-MCNC: 103 MG/DL — HIGH (ref 70–99)
HCO3 BLDV-SCNC: 28 MMOL/L — SIGNIFICANT CHANGE UP (ref 22–29)
HCT VFR BLD CALC: 25.5 % — LOW (ref 39–50)
HCT VFR BLDA CALC: 25 % — LOW (ref 39–51)
HGB BLD CALC-MCNC: 8.3 G/DL — LOW (ref 12.6–17.4)
HGB BLD-MCNC: 7.9 G/DL — LOW (ref 13–17)
INR BLD: 1.57 RATIO — HIGH (ref 0.85–1.18)
LACTATE BLDV-MCNC: 2.3 MMOL/L — HIGH (ref 0.5–2)
LYMPHOCYTES # BLD AUTO: 0.78 K/UL — LOW (ref 1–3.3)
LYMPHOCYTES # BLD AUTO: 6.1 % — LOW (ref 13–44)
MANUAL SMEAR VERIFICATION: SIGNIFICANT CHANGE UP
MCHC RBC-ENTMCNC: 19.6 PG — LOW (ref 27–34)
MCHC RBC-ENTMCNC: 31 GM/DL — LOW (ref 32–36)
MCV RBC AUTO: 63.3 FL — LOW (ref 80–100)
MICROCYTES BLD QL: SLIGHT — SIGNIFICANT CHANGE UP
MONOCYTES # BLD AUTO: 0 K/UL — SIGNIFICANT CHANGE UP (ref 0–0.9)
MONOCYTES NFR BLD AUTO: 0 % — LOW (ref 2–14)
MYELOCYTES NFR BLD: 0.9 % — HIGH (ref 0–0)
NEUTROPHILS # BLD AUTO: 11.29 K/UL — HIGH (ref 1.8–7.4)
NEUTROPHILS NFR BLD AUTO: 87.8 % — HIGH (ref 43–77)
NT-PROBNP SERPL-SCNC: 2656 PG/ML — HIGH (ref 0–300)
OVALOCYTES BLD QL SMEAR: SLIGHT — SIGNIFICANT CHANGE UP
PCO2 BLDV: 43 MMHG — SIGNIFICANT CHANGE UP (ref 42–55)
PH BLDV: 7.42 — SIGNIFICANT CHANGE UP (ref 7.32–7.43)
PLAT MORPH BLD: NORMAL — SIGNIFICANT CHANGE UP
PLATELET # BLD AUTO: 210 K/UL — SIGNIFICANT CHANGE UP (ref 150–400)
PO2 BLDV: 21 MMHG — LOW (ref 25–45)
POIKILOCYTOSIS BLD QL AUTO: SIGNIFICANT CHANGE UP
POLYCHROMASIA BLD QL SMEAR: SLIGHT — SIGNIFICANT CHANGE UP
POTASSIUM BLDV-SCNC: 5.4 MMOL/L — HIGH (ref 3.5–5.1)
POTASSIUM SERPL-MCNC: 5.2 MMOL/L — SIGNIFICANT CHANGE UP (ref 3.5–5.3)
POTASSIUM SERPL-SCNC: 5.2 MMOL/L — SIGNIFICANT CHANGE UP (ref 3.5–5.3)
PROT SERPL-MCNC: 7.6 G/DL — SIGNIFICANT CHANGE UP (ref 6–8.3)
PROTHROM AB SERPL-ACNC: 16.3 SEC — HIGH (ref 9.5–13)
RBC # BLD: 4.03 M/UL — LOW (ref 4.2–5.8)
RBC # FLD: 17.4 % — HIGH (ref 10.3–14.5)
RBC BLD AUTO: ABNORMAL
RSV RNA NPH QL NAA+NON-PROBE: SIGNIFICANT CHANGE UP
RSV RNA NPH QL NAA+NON-PROBE: SIGNIFICANT CHANGE UP
SAO2 % BLDV: 28.7 % — LOW (ref 67–88)
SARS-COV-2 RNA SPEC QL NAA+PROBE: SIGNIFICANT CHANGE UP
SARS-COV-2 RNA SPEC QL NAA+PROBE: SIGNIFICANT CHANGE UP
SCHISTOCYTES BLD QL AUTO: SLIGHT — SIGNIFICANT CHANGE UP
SODIUM SERPL-SCNC: 139 MMOL/L — SIGNIFICANT CHANGE UP (ref 135–145)
TARGETS BLD QL SMEAR: SLIGHT — SIGNIFICANT CHANGE UP
TROPONIN T, HIGH SENSITIVITY RESULT: 38 NG/L — SIGNIFICANT CHANGE UP (ref 0–51)
WBC # BLD: 12.86 K/UL — HIGH (ref 3.8–10.5)
WBC # FLD AUTO: 12.86 K/UL — HIGH (ref 3.8–10.5)

## 2024-06-04 PROCEDURE — 76937 US GUIDE VASCULAR ACCESS: CPT | Mod: 26

## 2024-06-04 PROCEDURE — 99285 EMERGENCY DEPT VISIT HI MDM: CPT | Mod: 25

## 2024-06-04 PROCEDURE — 36000 PLACE NEEDLE IN VEIN: CPT

## 2024-06-04 PROCEDURE — 74176 CT ABD & PELVIS W/O CONTRAST: CPT | Mod: 26,MC

## 2024-06-04 PROCEDURE — 71250 CT THORAX DX C-: CPT | Mod: 26,MC

## 2024-06-04 PROCEDURE — 71045 X-RAY EXAM CHEST 1 VIEW: CPT | Mod: 26

## 2024-06-04 RX ORDER — SODIUM CHLORIDE 9 MG/ML
500 INJECTION INTRAMUSCULAR; INTRAVENOUS; SUBCUTANEOUS ONCE
Refills: 0 | Status: COMPLETED | OUTPATIENT
Start: 2024-06-04 | End: 2024-06-04

## 2024-06-04 RX ORDER — PIPERACILLIN AND TAZOBACTAM 4; .5 G/20ML; G/20ML
3.38 INJECTION, POWDER, LYOPHILIZED, FOR SOLUTION INTRAVENOUS ONCE
Refills: 0 | Status: COMPLETED | OUTPATIENT
Start: 2024-06-04 | End: 2024-06-04

## 2024-06-04 RX ORDER — VANCOMYCIN HCL 1 G
2000 VIAL (EA) INTRAVENOUS ONCE
Refills: 0 | Status: COMPLETED | OUTPATIENT
Start: 2024-06-04 | End: 2024-06-04

## 2024-06-04 RX ORDER — PIPERACILLIN AND TAZOBACTAM 4; .5 G/20ML; G/20ML
3.38 INJECTION, POWDER, LYOPHILIZED, FOR SOLUTION INTRAVENOUS ONCE
Refills: 0 | Status: COMPLETED | OUTPATIENT
Start: 2024-06-05 | End: 2024-06-05

## 2024-06-04 RX ORDER — ACETAMINOPHEN 500 MG
1000 TABLET ORAL ONCE
Refills: 0 | Status: COMPLETED | OUTPATIENT
Start: 2024-06-04 | End: 2024-06-04

## 2024-06-04 RX ADMIN — Medication 400 MILLIGRAM(S): at 20:27

## 2024-06-04 RX ADMIN — SODIUM CHLORIDE 500 MILLILITER(S): 9 INJECTION INTRAMUSCULAR; INTRAVENOUS; SUBCUTANEOUS at 22:15

## 2024-06-04 RX ADMIN — PIPERACILLIN AND TAZOBACTAM 200 GRAM(S): 4; .5 INJECTION, POWDER, LYOPHILIZED, FOR SOLUTION INTRAVENOUS at 20:56

## 2024-06-04 RX ADMIN — Medication 250 MILLIGRAM(S): at 21:45

## 2024-06-04 NOTE — ED ADULT NURSE NOTE - OBJECTIVE STATEMENT
PT is a 59 y.o male c.o CP. Pt is A&Ox3 brought in by daughter with elevated heart rate. PT has PMHx of ESRD on MWF HD via a RT chest wall port. PT states he has been feeling poorly with fevers, cough, and chills x4 days.  Of note, daughter at bedside reports the pt family in the same household has been ill with COVID-19. Spontaneously breathing on RA>95%, CM sinus tachycardia, febrile TMAX 103.4, failed fistula attempts to B/L UE, peripheral pulses present, skin dry and intact. PT denies any SOB, N/D, dysuria, or hematuria. Safety and comfort measures in place bed in lowest position, siderails upright and blanket given.

## 2024-06-04 NOTE — ED PROVIDER NOTE - PHYSICAL EXAMINATION
GEN: Pt chronically ill appearing in NAD.  PSYCH: Affect appropriate.  EYES: Sclera white w/o injection.   ENT: Head NCAT. MMM. Neck supple FROM.  RESP: No evidence of respiratory distress, SpO2 % on RA. Faint basilar rales. Otherwise CTA b/l, no wheezes or rhonchi.   CARDIAC: Tachycardiac, regular, clear distinct S1, S2, no appreciable murmurs.  ABD: Abdomen soft, non-tender. palpable ventral hernia which is nontender and easily reducible. No CVAT b/l.  VASC: 2+ radial and dorsalis pedis pulses b/l. No edema or calf tenderness.  SKIN: Scars b/l UE. Chest wall port with catheter in-place. Nontender, no surrounding erythema, no induration or fluctuance.

## 2024-06-04 NOTE — ED PROCEDURE NOTE - PROCEDURE ADDITIONAL DETAILS
Peripheral IV access in the Emergency Department obtained under dynamic ultrasound guidance with dark nonpulsatile blood return.  Catheter was flushed afterwards without any resistance or resulting extravasation.  IV catheter confirmed in compressible vein after insertion. 18 gauge catheter placed in a peripheral vein in the right upper extremity.  additionally, Emergency Department Focused Ultrasound performed at patient's bedside. The study was performed by a credentialed ultrasound provider. -Dwayne Hoover PA-C
Peripheral IV access in the Emergency Department obtained under dynamic ultrasound guidance with dark nonpulsatile blood return.  Catheter was flushed afterwards without any resistance or resulting extravasation.  IV catheter confirmed in compressible vein after insertion. 18 gauge catheter placed in a peripheral vein in the right upper extremity.

## 2024-06-04 NOTE — ED ADULT TRIAGE NOTE - CHIEF COMPLAINT QUOTE
60y/o male presents with chest pressure/pain, fever that began yesterday. Cough x 2weeks. Recent exposure to COVID. Dialysis MWF last session completed yesterday.

## 2024-06-04 NOTE — ED PROVIDER NOTE - ATTENDING CONTRIBUTION TO CARE
PMD last admitted to Dr. RUDY Fatima, Raghu don, [facility]  59-year-old male past medical history ESRD (MWF) hypertension hyperlipidemia, gout, BENNIE on CPAP, DM.  Patient last admitted 5/6/2024 for diarrhea.  Now returns with complaint of cough for the past 2 to 3 weeks worsening over the past few days with a fever today was 103 fatigue weakness nausea vomiting, chest pain with coughing.  Multiple family contacts positive for COVID last week.  Patient tested positive then negative x 1.  Patient is vaccinated against COVID.  Still produces urine there are no urinary symptoms.  Physical exam adult male looking stated age awake and alert, fatigued.  HEENT normocephalic atraumatic, neck supple,  Chest scant crackles left chest posteriorly.  No use of  muscles.  Cardiovascular tachycardic.  Abdomen moderately obese, ventral hernia without tenderness mass rebound guarding.  Neuro GCS 15 speech fluent moves all extremities power 5/5  Raghu Youngblood MD, Facep

## 2024-06-04 NOTE — ED PROVIDER NOTE - OBJECTIVE STATEMENT
59-year-old male with a PMH of ESRD on hemodialysis Monday Wednesday Friday last dose was yesterday Monday, 6/3/2024, via right chest wall port, has prior failed fistulas bilateral upper extremity, HTN, HLD, DM, obesity, "fast heart rate" presents to the ED today complaining of feeling generally unwell, cough, fevers and chills for several days.  Patient daughter at bedside provides supplemental history that the patient's entire household has been sick with COVID.  Patient also having abdominal pain and vomiting, occasional chest pain. Pt denies SOB

## 2024-06-04 NOTE — ED ADULT TRIAGE NOTE - BSA (M2)
RN did not properly titrate down the insulin drip from 2 to 1.5 at proper time. Once RN recognized this upon adjust next hour rate, MD Newell made aware who suggested to continue with therapy and monitoring. Pt also at in goal range at exactly 110 glu. RN completed a verge report.   2.4

## 2024-06-04 NOTE — ED PROVIDER NOTE - CLINICAL SUMMARY MEDICAL DECISION MAKING FREE TEXT BOX
Adult male history as above, ESRD on HD, hypertension, hyperlipidemia, last admitted approximate month ago for diarrhea now presents with cough past 2 to 3 weeks worsening, with pleuritic chest pain and fevers up to 103.  Of note  patient's family all had COVID last week.  Concerns for pneumonia, other infectious agents.  Check labs x-rays, blood cultures urine culture urine, COVID swab, treat antipyretics antibiotics, IV fluids probable admission.  rw Adult male history as above, ESRD on HD, hypertension, hyperlipidemia, last admitted approximate month ago for diarrhea now presents with cough past 2 to 3 weeks worsening, with pleuritic chest pain and fevers up to 103.  Of note  patient's family all had COVID last week.  Concerns for pneumonia, other infectious agents.  Check labs x-rays, blood cultures urine culture urine, COVID swab, treat antipyretics antibiotics, IV fluids probable admission.  Raghu Youngblood MD, Facep

## 2024-06-04 NOTE — ED ADULT NURSE REASSESSMENT NOTE - NS ED NURSE REASSESS COMMENT FT1
[facility] room tele tech contacted regarding continuous pulse ox order. RN to be notified if SpO2 <92%.

## 2024-06-04 NOTE — ED ADULT NURSE NOTE - NSFALLRISKINTERV_ED_ALL_ED

## 2024-06-04 NOTE — ED PROVIDER NOTE - PROGRESS NOTE DETAILS
Patient clinically septic unclear bacterial versus viral etiology, cautious IV fluids are necessary given patient has a history of ESRD.  Borderline hypotension with MAP at 65, bedside POCUS performed showing collapsible IVC and hyperdynamic LV,  ordered for 500 cc of IV fluids and will closely reassess, additionally got approximately 300 cc of IV fluid via Ofirmev, Zosyn, and vancomycin thus far.  Troponin 38 and repeat ordered as well as repeat lactate for an initial lactate of 2.3.  Patient still mentating appropriately persistently febrile denies history of known thyroid disease TSH ordered.  Viral panel negative for COVID flu and RSV.  CXR does not show obvious pneumonia.  Second large-bore IV placed under ultrasound guidance by myself extravasated detention through IV fluid bolus and area was reassessed with no signs or symptoms of compartment syndrome, compartments are soft and neurovascularly intact distally.  Patient pending CT chest abdomen and pelvis to assess for other potential bacterial source of sepsis will require admission. Endorsed to Dr SOBIA Youngblood MD, Facep No acute findings on CT, pt treated for sepsis wo discerned source. BPs have been stable after fluids    Natalia Diaz MD, PGY3

## 2024-06-05 ENCOUNTER — NON-APPOINTMENT (OUTPATIENT)
Age: 59
End: 2024-06-05

## 2024-06-05 DIAGNOSIS — I10 ESSENTIAL (PRIMARY) HYPERTENSION: ICD-10-CM

## 2024-06-05 DIAGNOSIS — N18.6 END STAGE RENAL DISEASE: ICD-10-CM

## 2024-06-05 DIAGNOSIS — E78.5 HYPERLIPIDEMIA, UNSPECIFIED: ICD-10-CM

## 2024-06-05 DIAGNOSIS — E11.9 TYPE 2 DIABETES MELLITUS WITHOUT COMPLICATIONS: ICD-10-CM

## 2024-06-05 DIAGNOSIS — Z29.9 ENCOUNTER FOR PROPHYLACTIC MEASURES, UNSPECIFIED: ICD-10-CM

## 2024-06-05 DIAGNOSIS — A41.9 SEPSIS, UNSPECIFIED ORGANISM: ICD-10-CM

## 2024-06-05 DIAGNOSIS — D64.9 ANEMIA, UNSPECIFIED: ICD-10-CM

## 2024-06-05 LAB
ADD ON TEST-SPECIMEN IN LAB: SIGNIFICANT CHANGE UP
ALBUMIN SERPL ELPH-MCNC: 3.3 G/DL — SIGNIFICANT CHANGE UP (ref 3.3–5)
ALP SERPL-CCNC: 46 U/L — SIGNIFICANT CHANGE UP (ref 40–120)
ALT FLD-CCNC: 9 U/L — LOW (ref 10–45)
ANION GAP SERPL CALC-SCNC: 17 MMOL/L — SIGNIFICANT CHANGE UP (ref 5–17)
APPEARANCE UR: CLEAR — SIGNIFICANT CHANGE UP
AST SERPL-CCNC: 13 U/L — SIGNIFICANT CHANGE UP (ref 10–40)
BACTERIA # UR AUTO: NEGATIVE /HPF — SIGNIFICANT CHANGE UP
BASE EXCESS BLDV CALC-SCNC: 2.8 MMOL/L — SIGNIFICANT CHANGE UP (ref -2–3)
BILIRUB SERPL-MCNC: 0.7 MG/DL — SIGNIFICANT CHANGE UP (ref 0.2–1.2)
BILIRUB UR-MCNC: NEGATIVE — SIGNIFICANT CHANGE UP
BUN SERPL-MCNC: 50 MG/DL — HIGH (ref 7–23)
CA-I SERPL-SCNC: 1.22 MMOL/L — SIGNIFICANT CHANGE UP (ref 1.15–1.33)
CALCIUM SERPL-MCNC: 9.7 MG/DL — SIGNIFICANT CHANGE UP (ref 8.4–10.5)
CAST: 1 /LPF — SIGNIFICANT CHANGE UP (ref 0–4)
CHLORIDE BLDV-SCNC: 101 MMOL/L — SIGNIFICANT CHANGE UP (ref 96–108)
CHLORIDE SERPL-SCNC: 97 MMOL/L — SIGNIFICANT CHANGE UP (ref 96–108)
CO2 BLDV-SCNC: 29 MMOL/L — HIGH (ref 22–26)
CO2 SERPL-SCNC: 20 MMOL/L — LOW (ref 22–31)
COLOR SPEC: YELLOW — SIGNIFICANT CHANGE UP
CREAT SERPL-MCNC: 12.11 MG/DL — HIGH (ref 0.5–1.3)
DIFF PNL FLD: NEGATIVE — SIGNIFICANT CHANGE UP
EGFR: 4 ML/MIN/1.73M2 — LOW
FERRITIN SERPL-MCNC: 1601 NG/ML — HIGH (ref 30–400)
GAS PNL BLDV: 134 MMOL/L — LOW (ref 136–145)
GAS PNL BLDV: SIGNIFICANT CHANGE UP
GAS PNL BLDV: SIGNIFICANT CHANGE UP
GLUCOSE BLDC GLUCOMTR-MCNC: 91 MG/DL — SIGNIFICANT CHANGE UP (ref 70–99)
GLUCOSE BLDV-MCNC: 113 MG/DL — HIGH (ref 70–99)
GLUCOSE SERPL-MCNC: 106 MG/DL — HIGH (ref 70–99)
GLUCOSE UR QL: 100 MG/DL
HCO3 BLDV-SCNC: 28 MMOL/L — SIGNIFICANT CHANGE UP (ref 22–29)
HCT VFR BLD CALC: 23.6 % — LOW (ref 39–50)
HCT VFR BLDA CALC: 23 % — LOW (ref 39–51)
HGB BLD CALC-MCNC: 7.8 G/DL — LOW (ref 12.6–17.4)
HGB BLD-MCNC: 7.4 G/DL — LOW (ref 13–17)
IRON SATN MFR SERPL: 12 UG/DL — LOW (ref 45–165)
IRON SATN MFR SERPL: 7 % — LOW (ref 16–55)
KETONES UR-MCNC: NEGATIVE MG/DL — SIGNIFICANT CHANGE UP
LACTATE BLDV-MCNC: 1.5 MMOL/L — SIGNIFICANT CHANGE UP (ref 0.5–2)
LEUKOCYTE ESTERASE UR-ACNC: NEGATIVE — SIGNIFICANT CHANGE UP
MCHC RBC-ENTMCNC: 19.6 PG — LOW (ref 27–34)
MCHC RBC-ENTMCNC: 31.4 GM/DL — LOW (ref 32–36)
MCV RBC AUTO: 62.4 FL — LOW (ref 80–100)
NITRITE UR-MCNC: NEGATIVE — SIGNIFICANT CHANGE UP
NRBC # BLD: 0 /100 WBCS — SIGNIFICANT CHANGE UP (ref 0–0)
PCO2 BLDV: 45 MMHG — SIGNIFICANT CHANGE UP (ref 42–55)
PH BLDV: 7.4 — SIGNIFICANT CHANGE UP (ref 7.32–7.43)
PH UR: >=9 (ref 5–8)
PLATELET # BLD AUTO: 186 K/UL — SIGNIFICANT CHANGE UP (ref 150–400)
PO2 BLDV: 25 MMHG — SIGNIFICANT CHANGE UP (ref 25–45)
POTASSIUM BLDV-SCNC: 5.4 MMOL/L — HIGH (ref 3.5–5.1)
POTASSIUM SERPL-MCNC: 5.7 MMOL/L — HIGH (ref 3.5–5.3)
POTASSIUM SERPL-SCNC: 5.7 MMOL/L — HIGH (ref 3.5–5.3)
PROCALCITONIN SERPL-MCNC: 1.15 NG/ML — HIGH (ref 0.02–0.1)
PROT SERPL-MCNC: 7 G/DL — SIGNIFICANT CHANGE UP (ref 6–8.3)
PROT UR-MCNC: 300 MG/DL
RAPID RVP RESULT: SIGNIFICANT CHANGE UP
RBC # BLD: 3.78 M/UL — LOW (ref 4.2–5.8)
RBC # FLD: 17.6 % — HIGH (ref 10.3–14.5)
RBC CASTS # UR COMP ASSIST: 0 /HPF — SIGNIFICANT CHANGE UP (ref 0–4)
SAO2 % BLDV: 28.2 % — LOW (ref 67–88)
SARS-COV-2 RNA SPEC QL NAA+PROBE: SIGNIFICANT CHANGE UP
SODIUM SERPL-SCNC: 134 MMOL/L — LOW (ref 135–145)
SP GR SPEC: 1.01 — SIGNIFICANT CHANGE UP (ref 1–1.03)
SQUAMOUS # UR AUTO: 1 /HPF — SIGNIFICANT CHANGE UP (ref 0–5)
TIBC SERPL-MCNC: 175 UG/DL — LOW (ref 220–430)
TRANSFERRIN SERPL-MCNC: 142 MG/DL — LOW (ref 200–360)
TROPONIN T, HIGH SENSITIVITY RESULT: 40 NG/L — SIGNIFICANT CHANGE UP (ref 0–51)
TSH SERPL-MCNC: 1.32 UIU/ML — SIGNIFICANT CHANGE UP (ref 0.27–4.2)
UIBC SERPL-MCNC: 163 UG/DL — SIGNIFICANT CHANGE UP (ref 110–370)
UROBILINOGEN FLD QL: 0.2 MG/DL — SIGNIFICANT CHANGE UP (ref 0.2–1)
WBC # BLD: 13.71 K/UL — HIGH (ref 3.8–10.5)
WBC # FLD AUTO: 13.71 K/UL — HIGH (ref 3.8–10.5)
WBC UR QL: 1 /HPF — SIGNIFICANT CHANGE UP (ref 0–5)

## 2024-06-05 PROCEDURE — 99222 1ST HOSP IP/OBS MODERATE 55: CPT

## 2024-06-05 PROCEDURE — 99223 1ST HOSP IP/OBS HIGH 75: CPT

## 2024-06-05 PROCEDURE — 99232 SBSQ HOSP IP/OBS MODERATE 35: CPT

## 2024-06-05 RX ORDER — APIXABAN 2.5 MG/1
1 TABLET, FILM COATED ORAL
Refills: 0 | DISCHARGE

## 2024-06-05 RX ORDER — ACETAMINOPHEN 500 MG
1000 TABLET ORAL ONCE
Refills: 0 | Status: COMPLETED | OUTPATIENT
Start: 2024-06-05 | End: 2024-06-05

## 2024-06-05 RX ORDER — SODIUM ZIRCONIUM CYCLOSILICATE 10 G/10G
10 POWDER, FOR SUSPENSION ORAL
Refills: 0 | Status: DISCONTINUED | OUTPATIENT
Start: 2024-06-05 | End: 2024-06-12

## 2024-06-05 RX ORDER — CALCITRIOL 0.5 UG/1
0.5 CAPSULE ORAL DAILY
Refills: 0 | Status: DISCONTINUED | OUTPATIENT
Start: 2024-06-05 | End: 2024-06-12

## 2024-06-05 RX ORDER — SEVELAMER CARBONATE 2400 MG/1
800 POWDER, FOR SUSPENSION ORAL
Refills: 0 | Status: DISCONTINUED | OUTPATIENT
Start: 2024-06-05 | End: 2024-06-12

## 2024-06-05 RX ORDER — CINACALCET 30 MG/1
30 TABLET, FILM COATED ORAL DAILY
Refills: 0 | Status: DISCONTINUED | OUTPATIENT
Start: 2024-06-05 | End: 2024-06-12

## 2024-06-05 RX ORDER — PIPERACILLIN AND TAZOBACTAM 4; .5 G/20ML; G/20ML
3.38 INJECTION, POWDER, LYOPHILIZED, FOR SOLUTION INTRAVENOUS EVERY 12 HOURS
Refills: 0 | Status: COMPLETED | OUTPATIENT
Start: 2024-06-05 | End: 2024-06-12

## 2024-06-05 RX ORDER — CHOLECALCIFEROL (VITAMIN D3) 125 MCG
2000 CAPSULE ORAL DAILY
Refills: 0 | Status: DISCONTINUED | OUTPATIENT
Start: 2024-06-05 | End: 2024-06-12

## 2024-06-05 RX ORDER — CHOLECALCIFEROL (VITAMIN D3) 125 MCG
1 CAPSULE ORAL
Refills: 0 | DISCHARGE

## 2024-06-05 RX ORDER — CALCITRIOL 0.5 UG/1
1 CAPSULE ORAL
Refills: 0 | DISCHARGE

## 2024-06-05 RX ORDER — SEVELAMER CARBONATE 2400 MG/1
1 POWDER, FOR SUSPENSION ORAL
Qty: 0 | Refills: 0 | DISCHARGE

## 2024-06-05 RX ORDER — CINACALCET 30 MG/1
1 TABLET, FILM COATED ORAL
Refills: 0 | DISCHARGE

## 2024-06-05 RX ORDER — ERYTHROPOIETIN 10000 [IU]/ML
10000 INJECTION, SOLUTION INTRAVENOUS; SUBCUTANEOUS
Refills: 0 | Status: DISCONTINUED | OUTPATIENT
Start: 2024-06-05 | End: 2024-06-12

## 2024-06-05 RX ORDER — APIXABAN 2.5 MG/1
5 TABLET, FILM COATED ORAL
Refills: 0 | Status: DISCONTINUED | OUTPATIENT
Start: 2024-06-05 | End: 2024-06-10

## 2024-06-05 RX ORDER — SODIUM ZIRCONIUM CYCLOSILICATE 10 G/10G
10 POWDER, FOR SUSPENSION ORAL ONCE
Refills: 0 | Status: COMPLETED | OUTPATIENT
Start: 2024-06-05 | End: 2024-06-05

## 2024-06-05 RX ORDER — IPRATROPIUM/ALBUTEROL SULFATE 18-103MCG
3 AEROSOL WITH ADAPTER (GRAM) INHALATION EVERY 6 HOURS
Refills: 0 | Status: DISCONTINUED | OUTPATIENT
Start: 2024-06-05 | End: 2024-06-12

## 2024-06-05 RX ORDER — ACETAMINOPHEN 500 MG
650 TABLET ORAL EVERY 6 HOURS
Refills: 0 | Status: DISCONTINUED | OUTPATIENT
Start: 2024-06-05 | End: 2024-06-12

## 2024-06-05 RX ADMIN — APIXABAN 5 MILLIGRAM(S): 2.5 TABLET, FILM COATED ORAL at 05:54

## 2024-06-05 RX ADMIN — SEVELAMER CARBONATE 800 MILLIGRAM(S): 2400 POWDER, FOR SUSPENSION ORAL at 12:14

## 2024-06-05 RX ADMIN — SEVELAMER CARBONATE 800 MILLIGRAM(S): 2400 POWDER, FOR SUSPENSION ORAL at 08:39

## 2024-06-05 RX ADMIN — APIXABAN 5 MILLIGRAM(S): 2.5 TABLET, FILM COATED ORAL at 18:15

## 2024-06-05 RX ADMIN — Medication 1 TABLET(S): at 12:14

## 2024-06-05 RX ADMIN — SODIUM CHLORIDE 500 MILLILITER(S): 9 INJECTION INTRAMUSCULAR; INTRAVENOUS; SUBCUTANEOUS at 00:28

## 2024-06-05 RX ADMIN — Medication 1000 MILLIGRAM(S): at 06:24

## 2024-06-05 RX ADMIN — PIPERACILLIN AND TAZOBACTAM 25 GRAM(S): 4; .5 INJECTION, POWDER, LYOPHILIZED, FOR SOLUTION INTRAVENOUS at 23:07

## 2024-06-05 RX ADMIN — Medication 1000 MILLIGRAM(S): at 00:28

## 2024-06-05 RX ADMIN — PIPERACILLIN AND TAZOBACTAM 3.38 GRAM(S): 4; .5 INJECTION, POWDER, LYOPHILIZED, FOR SOLUTION INTRAVENOUS at 00:28

## 2024-06-05 RX ADMIN — SEVELAMER CARBONATE 800 MILLIGRAM(S): 2400 POWDER, FOR SUSPENSION ORAL at 18:15

## 2024-06-05 RX ADMIN — CINACALCET 30 MILLIGRAM(S): 30 TABLET, FILM COATED ORAL at 13:34

## 2024-06-05 RX ADMIN — Medication 600 MILLIGRAM(S): at 18:15

## 2024-06-05 RX ADMIN — SODIUM ZIRCONIUM CYCLOSILICATE 10 GRAM(S): 10 POWDER, FOR SUSPENSION ORAL at 12:14

## 2024-06-05 RX ADMIN — Medication 2000 UNIT(S): at 12:14

## 2024-06-05 RX ADMIN — PIPERACILLIN AND TAZOBACTAM 25 GRAM(S): 4; .5 INJECTION, POWDER, LYOPHILIZED, FOR SOLUTION INTRAVENOUS at 12:13

## 2024-06-05 RX ADMIN — Medication 650 MILLIGRAM(S): at 19:19

## 2024-06-05 RX ADMIN — CALCITRIOL 0.5 MICROGRAM(S): 0.5 CAPSULE ORAL at 13:33

## 2024-06-05 RX ADMIN — Medication 600 MILLIGRAM(S): at 05:54

## 2024-06-05 RX ADMIN — Medication 650 MILLIGRAM(S): at 18:19

## 2024-06-05 RX ADMIN — Medication 650 MILLIGRAM(S): at 15:07

## 2024-06-05 RX ADMIN — PIPERACILLIN AND TAZOBACTAM 25 GRAM(S): 4; .5 INJECTION, POWDER, LYOPHILIZED, FOR SOLUTION INTRAVENOUS at 04:39

## 2024-06-05 RX ADMIN — Medication 650 MILLIGRAM(S): at 13:33

## 2024-06-05 RX ADMIN — Medication 400 MILLIGRAM(S): at 05:54

## 2024-06-05 NOTE — H&P ADULT - HISTORY OF PRESENT ILLNESS
This is a 60 y/o male w/ PMHx ESRD on HD (M/W/F) via chest wall port (has failed fistulas), HTN, HLD, and T2DM who presents for fevers and fatigue. He has been feeling feverish with generalized malaise, chills, and has had a cough for the past few days. Has also been having abdominal pain and vomiting. In addition, his family that has been living with him has been sick with Covid as well.     In the ED, he was febrile to T-max 103.4, tachycardic to 119, hemodynamically stable, saturating well on RA. CBC w/ leukocytosis of 12.86, worsening microcytic anemia w Hb 7.9 (10.1 3 weeks ago), CMP w/ evidence of ESRD. Limited RVP negative for Flu/RSV/Covid. CT chest showing stable 1.2cm RLL calcified mass (likely a hamartoma) and new peripheral bandlike GGO in the RUL and ground-glass attenuation in the LLL. Diverticulosis w/o diverticulitis on CT abdomen. Given Vanc/Zosyn and 500cc IVF in the ED.  This is a 60 y/o male w/ PMHx ESRD on HD (M/W/F) via chest wall port (has failed fistulas), HTN, HLD, and T2DM who presents for fevers and fatigue. He has been feeling feverish with generalized malaise, chills, and has had a cough for the past few days. Has also been having abdominal pain and vomiting. In addition, his family that has been living with him has been sick with Covid as well. Patient     In the ED, he was febrile to T-max 103.4, tachycardic to 119, hemodynamically stable, saturating well on RA. CBC w/ leukocytosis of 12.86, worsening microcytic anemia w Hb 7.9 (10.1 3 weeks ago), CMP w/ evidence of ESRD. Limited RVP negative for Flu/RSV/Covid. CT chest showing stable 1.2cm RLL calcified mass (likely a hamartoma) and new peripheral bandlike GGO in the RUL and ground-glass attenuation in the LLL. Diverticulosis w/o diverticulitis on CT abdomen. Given Vanc/Zosyn and 500cc IVF in the ED.

## 2024-06-05 NOTE — H&P ADULT - PROBLEM SELECTOR PLAN 2
- Gets HD M/W/F  - C/w midodrine 5mg TID  - C/w Calcitriol 0.5mcg daily  - C/w Sevelamer 800mg TID w/ meals  - C/w Nephro-Bud tab  - Patient on Eliquis? - Anemia worsened to 7.9 from 10.1 3 weeks ago  - F/u iron, ferritin, TIBC, transferrin  - May need procrit injection this admission

## 2024-06-05 NOTE — H&P ADULT - NSHPREVIEWOFSYSTEMS_GEN_ALL_CORE
Review of Systems:   CONSTITUTIONAL: No fever, weight loss  EYES: No eye pain, visual disturbances, or discharge  RESPIRATORY: No SOB. No cough, wheezing, chills or hemoptysis  CARDIOVASCULAR: No chest pain, palpitations, dizziness, or leg swelling  GASTROINTESTINAL: No abdominal or epigastric pain. No nausea, vomiting, or hematemesis; No diarrhea or constipation. No melena or hematochezia.  GENITOURINARY: No dysuria, frequency, hematuria, or incontinence  NEUROLOGICAL: No headaches, memory loss, loss of strength, numbness, or tremors  SKIN: No itching, burning, rashes, or lesions   MUSCULOSKELETAL: No joint pain or swelling; No muscle, back pain  PSYCHIATRIC: No depression, anxiety, mood swings, or difficulty sleeping Review of Systems:   CONSTITUTIONAL: +fever, no weight loss  EYES: No eye pain, visual disturbances, or discharge  RESPIRATORY: +SOB and cough, no wheezing, chills or hemoptysis  CARDIOVASCULAR: No chest pain, palpitations, dizziness, or leg swelling  GASTROINTESTINAL: No abdominal or epigastric pain. No nausea, vomiting, or hematemesis; No diarrhea or constipation. No melena or hematochezia.  GENITOURINARY: No dysuria, frequency, hematuria, or incontinence  NEUROLOGICAL: No headaches, memory loss, loss of strength, numbness, or tremors  SKIN: No itching, burning, rashes, or lesions   MUSCULOSKELETAL: No joint pain or swelling; No muscle, back pain  PSYCHIATRIC: No depression, anxiety, mood swings, or difficulty sleeping

## 2024-06-05 NOTE — H&P ADULT - NSHPPHYSICALEXAM_GEN_ALL_CORE
Vital Signs Last 24 Hrs  T(C): 38.3 (05 Jun 2024 00:29), Max: 39.7 (04 Jun 2024 20:15)  T(F): 101 (05 Jun 2024 00:29), Max: 103.4 (04 Jun 2024 20:15)  HR: 108 (05 Jun 2024 00:29) (105 - 119)  BP: 130/61 (05 Jun 2024 00:29) (95/60 - 130/74)  BP(mean): 81 (05 Jun 2024 00:29) (72 - 81)  RR: 16 (05 Jun 2024 00:29) (16 - 21)  SpO2: 100% (05 Jun 2024 00:29) (95% - 100%)    Parameters below as of 05 Jun 2024 00:29  Patient On (Oxygen Delivery Method): room air    CONSTITUTIONAL: Well-groomed, in no apparent distress  EYES: No conjunctival or scleral injection, non-icteric;   NECK: Trachea midline without palpable neck mass  RESPIRATORY: Breathing comfortably; lungs CTA without wheeze/rhonchi/rales  CARDIOVASCULAR: +S1S2, RRR, no M/G/R; no lower extremity edema  GASTROINTESTINAL: No palpable masses or tenderness, +BS throughout, no rebound/guarding  SKIN: No rashes or ulcers noted  NEUROLOGIC: Sensation intact in LEs b/l to light touch  PSYCHIATRIC: A+O x 3; mood and affect appropriate; appropriate insight and judgment Vital Signs Last 24 Hrs  T(C): 38.3 (05 Jun 2024 00:29), Max: 39.7 (04 Jun 2024 20:15)  T(F): 101 (05 Jun 2024 00:29), Max: 103.4 (04 Jun 2024 20:15)  HR: 108 (05 Jun 2024 00:29) (105 - 119)  BP: 130/61 (05 Jun 2024 00:29) (95/60 - 130/74)  BP(mean): 81 (05 Jun 2024 00:29) (72 - 81)  RR: 16 (05 Jun 2024 00:29) (16 - 21)  SpO2: 100% (05 Jun 2024 00:29) (95% - 100%)    Parameters below as of 05 Jun 2024 00:29  Patient On (Oxygen Delivery Method): room air    CONSTITUTIONAL: Well-groomed, in no apparent distress  EYES: No conjunctival or scleral injection, non-icteric;   NECK: Trachea midline without palpable neck mass  RESPIRATORY: Breathing comfortably; lungs CTA without wheeze/rhonchi/rales but limited due to habitus  CARDIOVASCULAR: +S1S2, RRR, no M/G/R; no lower extremity edema  GASTROINTESTINAL: No palpable masses or tenderness, +BS throughout, no rebound/guarding  SKIN: No rashes or ulcers noted; RIJ catheter in place, no erythema or pus by site  NEUROLOGIC: Sensation intact in LEs b/l to light touch  PSYCHIATRIC: A+O x 3

## 2024-06-05 NOTE — PATIENT PROFILE ADULT - FALL HARM RISK - HARM RISK INTERVENTIONS

## 2024-06-05 NOTE — H&P ADULT - PROBLEM SELECTOR PLAN 3
- Not on meds - Gets HD M/W/F  - C/w Calcitriol 0.5mcg daily  - C/w cinacalcet 30mg daily  - C/w Sevelamer 800mg TID w/ meals  - C/w Nephro-Bud tab  - Patient on Eliquis 5mg BID apparently for clot prevention of RIJ catheter  - Nephrology on board for HD

## 2024-06-05 NOTE — PROGRESS NOTE ADULT - ASSESSMENT
60y/o male with PMH of ESRD MWF, HTN, Gout, DM, HLD, obesity, BENNIE on cpap presents to the ED with chest pressure/pain, fever that began yesterday. Also endorses cough x 2weeks with recent exposure to COVID. Nephrology consulted for ESRD.     A/P     ESRD on HD   Center: [facility]  Nephrologist: Dr. Rocky Castillo  Access: Permacath   Consent obtained and placed in chart  Plan to continue MWF schedule while inpatient  Low K/Renal diet  Monitor BMP     Hyperkalemia  s/p Lokelma, awaiting HD today   Pt needs to be on Lokelma 10 g on non Hd Days  Low K diet  Monitor closely      HTN  Controlled   c/w current medications   UF with HD as tolerated   Monitor BP     Anemia  sec to ESRD   Iron saturation low - 7%, however pt with current active infection  Start Venofer when infection resolves  Consider MORALES when Iron improved   Transfuse if Hgb <7    CKD-MBD  Check PTH in AM   Ca is stable  Pt currently on Sevelamer and Cinacalcet   Check Po4, Ca daily   60y/o male with PMH of ESRD MWF, HTN, Gout, DM, HLD, obesity, BENNIE on cpap presents to the ED with chest pressure/pain, fever that began yesterday. Also endorses cough x 2weeks with recent exposure to COVID. Nephrology consulted for ESRD.     A/P     ESRD on HD   Center: [facility] [facility]  Nephrologist: Dr. Rocky Castillo  Access: Permacath   Consent obtained and placed in chart  Plan to continue MWF schedule while inpatient  Low K/Renal diet  Monitor BMP     Hyperkalemia  s/p Lokelma, awaiting HD today   Pt needs to be on Lokelma 10 g on non Hd Days  Low K diet  Monitor closely      HTN  Controlled   c/w current medications   UF with HD as tolerated   Monitor BP     Anemia  sec to ESRD   Iron saturation low - 7%, however pt with current active infection  Start Venofer when infection resolves  MORALES with HD  Transfuse if Hgb <7    CKD-MBD  Check PTH in AM   Ca is stable  Pt currently on Sevelamer and Cinacalcet   Check Po4, Ca daily

## 2024-06-05 NOTE — CHART NOTE - NSCHARTNOTEFT_GEN_A_CORE
MEDICINE NP    Notified by RN patient with temperature 103.5 orally  . Seen and examined patient at bedside. Patient is alert, NAD. Denies HA, CP, SOB, cough, N/V, or abd pain.   Pt is  admitted  for  Sepsis . Patient febrile with leukocytosis, new peripheral bandlike ground-glass opacity right upper lobe and medial ground-glass attenuation in the left lower lobe -      VITAL SIGNS:  T(C): 39.5 (06-05-24 @ 05:17), Max: 39.7 (06-04-24 @ 20:15)  HR: 112 (06-05-24 @ 05:17) (99 - 119)  BP: 103/57 (06-05-24 @ 05:17) (95/60 - 130/74)  RR: 18 (06-05-24 @ 05:17) (16 - 21)  SpO2: 99% (06-05-24 @ 05:17) (95% - 100%)  Wt(kg): --      LABORATORY:                          7.9    12.86 )-----------( 210      ( 04 Jun 2024 20:26 )             25.5       06-04    139  |  98  |  45<H>  ----------------------------<  103<H>  5.2   |  23  |  10.66<H>    Ca    10.0      04 Jun 2024 20:26    TPro  7.6  /  Alb  3.7  /  TBili  0.6  /  DBili  x   /  AST  15  /  ALT  11  /  AlkPhos  54  06-04          MICROBIOLOGY:           RADIOLOGY:          PHYSICAL EXAM:    Constitutional: AOx3. NAD.    Respiratory: clear lungs bilaterally. No wheezing, rhonchi, or crackles.    Cardiovascular: S1 S2. No murmurs.    Gastrointestinal: BS X4 active. soft. nontender.    Extremities/Vascular: +2 pulses bilaterally. No BLE edema.      ASSESSMENT/PLAN:   HPI:  This is a 60 y/o male w/ PMHx ESRD on HD (M/W/F) via chest wall port (has failed fistulas), HTN, HLD, and T2DM who presents for fevers and fatigue. He has been feeling feverish with generalized malaise, chills, and has had a cough for the past few days. Has also been having abdominal pain and vomiting. In addition, his family that has been living with him has been sick with Covid as well.   Now  with  fever  of  103.2        1)  Fever  >cooling measures prn for pyrexia  >c/w  Zosyn  IV  for  now   > Ofirmev 1  gm  IVPB  X1  dose  >  Hypothermia [place]

## 2024-06-05 NOTE — H&P ADULT - ASSESSMENT
60 y/o male w/ PMHx ESRD on HD (M/W/F) via chest wall port (has failed fistulas), HTN, HLD, and T2DM who presents for fevers at home. Febrile with leukocytosis here, some new opacities seen on CT chest. Admitted for sepsis possibly 2/2 pneumonia.

## 2024-06-05 NOTE — H&P ADULT - NSHPLABSRESULTS_GEN_ALL_CORE
7.9    12.86 )-----------( 210      ( 04 Jun 2024 20:26 )             25.5     06-04    139  |  98  |  45<H>  ----------------------------<  103<H>  5.2   |  23  |  10.66<H>    Ca    10.0      04 Jun 2024 20:26    TPro  7.6  /  Alb  3.7  /  TBili  0.6  /  DBili  x   /  AST  15  /  ALT  11  /  AlkPhos  54  06-04          LIVER FUNCTIONS - ( 04 Jun 2024 20:26 )  Alb: 3.7 g/dL / Pro: 7.6 g/dL / ALK PHOS: 54 U/L / ALT: 11 U/L / AST: 15 U/L / GGT: x           PT/INR - ( 04 Jun 2024 20:26 )   PT: 16.3 sec;   INR: 1.57 ratio         PTT - ( 04 Jun 2024 20:26 )  PTT:36.0 sec  Urinalysis Basic - ( 04 Jun 2024 20:26 )    Color: x / Appearance: x / SG: x / pH: x  Gluc: 103 mg/dL / Ketone: x  / Bili: x / Urobili: x   Blood: x / Protein: x / Nitrite: x   Leuk Esterase: x / RBC: x / WBC x   Sq Epi: x / Non Sq Epi: x / Bacteria: x

## 2024-06-05 NOTE — H&P ADULT - PROBLEM SELECTOR PLAN 1
- Patient febrile with leukocytosis, new peripheral bandlike ground-glass opacity right upper lobe and medial ground-glass attenuation in the left lower lobe - given patient's household sick with Covid, could be URI as cause of symptoms  - S/p Vanc/Zosyn in the ED  - C/w Zosyn for now, Vanc level to be checked prior to HD sessions and dosed accordingly  - F/u blood cultures  - F/u procalcitonin  - Lab called to add-on full RVP

## 2024-06-05 NOTE — PROVIDER CONTACT NOTE (OTHER) - BACKGROUND
pt has hx of ESRD on HD, BENNIE, obesity, HTN, DM, who was admitted for sepsis workout due to cough, fevers, chills

## 2024-06-06 LAB
ALBUMIN SERPL ELPH-MCNC: 3.6 G/DL — SIGNIFICANT CHANGE UP (ref 3.3–5)
ALP SERPL-CCNC: 48 U/L — SIGNIFICANT CHANGE UP (ref 40–120)
ALT FLD-CCNC: 12 U/L — SIGNIFICANT CHANGE UP (ref 10–45)
ANION GAP SERPL CALC-SCNC: 18 MMOL/L — HIGH (ref 5–17)
AST SERPL-CCNC: 20 U/L — SIGNIFICANT CHANGE UP (ref 10–40)
BASOPHILS # BLD AUTO: 0.04 K/UL — SIGNIFICANT CHANGE UP (ref 0–0.2)
BASOPHILS NFR BLD AUTO: 0.4 % — SIGNIFICANT CHANGE UP (ref 0–2)
BILIRUB SERPL-MCNC: 0.7 MG/DL — SIGNIFICANT CHANGE UP (ref 0.2–1.2)
BUN SERPL-MCNC: 41 MG/DL — HIGH (ref 7–23)
CALCIUM SERPL-MCNC: 10 MG/DL — SIGNIFICANT CHANGE UP (ref 8.4–10.5)
CALCIUM SERPL-MCNC: 9.4 MG/DL — SIGNIFICANT CHANGE UP (ref 8.4–10.5)
CHLORIDE SERPL-SCNC: 95 MMOL/L — LOW (ref 96–108)
CO2 SERPL-SCNC: 23 MMOL/L — SIGNIFICANT CHANGE UP (ref 22–31)
CREAT SERPL-MCNC: 9.97 MG/DL — HIGH (ref 0.5–1.3)
CULTURE RESULTS: SIGNIFICANT CHANGE UP
EGFR: 5 ML/MIN/1.73M2 — LOW
EOSINOPHIL # BLD AUTO: 0.93 K/UL — HIGH (ref 0–0.5)
EOSINOPHIL NFR BLD AUTO: 8.9 % — HIGH (ref 0–6)
GLUCOSE BLDC GLUCOMTR-MCNC: 87 MG/DL — SIGNIFICANT CHANGE UP (ref 70–99)
GLUCOSE SERPL-MCNC: 79 MG/DL — SIGNIFICANT CHANGE UP (ref 70–99)
HCT VFR BLD CALC: 25.4 % — LOW (ref 39–50)
HGB BLD-MCNC: 7.8 G/DL — LOW (ref 13–17)
IMM GRANULOCYTES NFR BLD AUTO: 0.9 % — SIGNIFICANT CHANGE UP (ref 0–0.9)
LYMPHOCYTES # BLD AUTO: 0.99 K/UL — LOW (ref 1–3.3)
LYMPHOCYTES # BLD AUTO: 9.5 % — LOW (ref 13–44)
MCHC RBC-ENTMCNC: 19.6 PG — LOW (ref 27–34)
MCHC RBC-ENTMCNC: 30.7 GM/DL — LOW (ref 32–36)
MCV RBC AUTO: 64 FL — LOW (ref 80–100)
MONOCYTES # BLD AUTO: 0.68 K/UL — SIGNIFICANT CHANGE UP (ref 0–0.9)
MONOCYTES NFR BLD AUTO: 6.5 % — SIGNIFICANT CHANGE UP (ref 2–14)
NEUTROPHILS # BLD AUTO: 7.74 K/UL — HIGH (ref 1.8–7.4)
NEUTROPHILS NFR BLD AUTO: 73.8 % — SIGNIFICANT CHANGE UP (ref 43–77)
NRBC # BLD: 0 /100 WBCS — SIGNIFICANT CHANGE UP (ref 0–0)
PHOSPHATE SERPL-MCNC: 5.5 MG/DL — HIGH (ref 2.5–4.5)
PLATELET # BLD AUTO: 199 K/UL — SIGNIFICANT CHANGE UP (ref 150–400)
POTASSIUM SERPL-MCNC: 4.8 MMOL/L — SIGNIFICANT CHANGE UP (ref 3.5–5.3)
POTASSIUM SERPL-SCNC: 4.8 MMOL/L — SIGNIFICANT CHANGE UP (ref 3.5–5.3)
PROT SERPL-MCNC: 7.8 G/DL — SIGNIFICANT CHANGE UP (ref 6–8.3)
PTH-INTACT FLD-MCNC: 341 PG/ML — HIGH (ref 15–65)
RBC # BLD: 3.97 M/UL — LOW (ref 4.2–5.8)
RBC # FLD: 17.5 % — HIGH (ref 10.3–14.5)
SODIUM SERPL-SCNC: 136 MMOL/L — SIGNIFICANT CHANGE UP (ref 135–145)
SPECIMEN SOURCE: SIGNIFICANT CHANGE UP
WBC # BLD: 10.47 K/UL — SIGNIFICANT CHANGE UP (ref 3.8–10.5)
WBC # FLD AUTO: 10.47 K/UL — SIGNIFICANT CHANGE UP (ref 3.8–10.5)

## 2024-06-06 PROCEDURE — 99232 SBSQ HOSP IP/OBS MODERATE 35: CPT

## 2024-06-06 RX ORDER — CHLORHEXIDINE GLUCONATE 213 G/1000ML
1 SOLUTION TOPICAL DAILY
Refills: 0 | Status: DISCONTINUED | OUTPATIENT
Start: 2024-06-06 | End: 2024-06-12

## 2024-06-06 RX ORDER — ACETAMINOPHEN 500 MG
325 TABLET ORAL ONCE
Refills: 0 | Status: COMPLETED | OUTPATIENT
Start: 2024-06-06 | End: 2024-06-06

## 2024-06-06 RX ORDER — ACETAMINOPHEN 500 MG
1000 TABLET ORAL ONCE
Refills: 0 | Status: COMPLETED | OUTPATIENT
Start: 2024-06-06 | End: 2024-06-06

## 2024-06-06 RX ADMIN — SEVELAMER CARBONATE 800 MILLIGRAM(S): 2400 POWDER, FOR SUSPENSION ORAL at 08:54

## 2024-06-06 RX ADMIN — Medication 650 MILLIGRAM(S): at 00:13

## 2024-06-06 RX ADMIN — PIPERACILLIN AND TAZOBACTAM 25 GRAM(S): 4; .5 INJECTION, POWDER, LYOPHILIZED, FOR SOLUTION INTRAVENOUS at 23:24

## 2024-06-06 RX ADMIN — CINACALCET 30 MILLIGRAM(S): 30 TABLET, FILM COATED ORAL at 12:04

## 2024-06-06 RX ADMIN — APIXABAN 5 MILLIGRAM(S): 2.5 TABLET, FILM COATED ORAL at 05:11

## 2024-06-06 RX ADMIN — CHLORHEXIDINE GLUCONATE 1 APPLICATION(S): 213 SOLUTION TOPICAL at 17:06

## 2024-06-06 RX ADMIN — Medication 1 TABLET(S): at 12:04

## 2024-06-06 RX ADMIN — APIXABAN 5 MILLIGRAM(S): 2.5 TABLET, FILM COATED ORAL at 17:06

## 2024-06-06 RX ADMIN — Medication 325 MILLIGRAM(S): at 05:23

## 2024-06-06 RX ADMIN — Medication 2000 UNIT(S): at 12:04

## 2024-06-06 RX ADMIN — Medication 600 MILLIGRAM(S): at 17:06

## 2024-06-06 RX ADMIN — SEVELAMER CARBONATE 800 MILLIGRAM(S): 2400 POWDER, FOR SUSPENSION ORAL at 12:04

## 2024-06-06 RX ADMIN — SODIUM ZIRCONIUM CYCLOSILICATE 10 GRAM(S): 10 POWDER, FOR SUSPENSION ORAL at 08:54

## 2024-06-06 RX ADMIN — Medication 400 MILLIGRAM(S): at 18:25

## 2024-06-06 RX ADMIN — PIPERACILLIN AND TAZOBACTAM 25 GRAM(S): 4; .5 INJECTION, POWDER, LYOPHILIZED, FOR SOLUTION INTRAVENOUS at 12:03

## 2024-06-06 RX ADMIN — CALCITRIOL 0.5 MICROGRAM(S): 0.5 CAPSULE ORAL at 17:06

## 2024-06-06 RX ADMIN — Medication 600 MILLIGRAM(S): at 05:11

## 2024-06-06 RX ADMIN — Medication 1000 MILLIGRAM(S): at 18:55

## 2024-06-06 NOTE — PROGRESS NOTE ADULT - ASSESSMENT
{\rtf1\zwastq37567\ansi\mpgqrsh1987\ftnbj\uc1\deff0  {\fonttbl{\f0 \fnil Segoe UI;}{\f1 \fnil \fcharset0 Segoe UI;}{\f2 \fnil Times New Anmol;}}  {\colortbl ;\ykx846\pmjtr018\rorz599 ;\red0\green0\blue0 ;\red0\green0\asnm146 ;\red0\green0\blue0 ;}  {\stylesheet{\f0\fs20 Normal;}{\cs1 Default Paragraph Font;}{\cs2\f0\fs16 Line Number;}{\cs3\f2\fs24\ul\cf3 Hyperlink;}}  {\*\revtbl{Unknown;}}  \lpniga93972\szmwvl57002\euxpw0501\zdxef9650\kapyf2246\fiqav1330\jjuwwyw193\euvipvf020\nogrowautofit\owekvy388\formshade\nofeaturethrottle1\dntblnsbdb\fet4\aendnotes\aftnnrlc\pgbrdrhead\pgbrdrfoot  \sectd\ubbwnw65788\pdeugl04535\guttersxn0\kqyehxtd7492\lkyvsixi2936\gphltiwv5298\hqqpjaro3878\aynqfcp999\tgyrnkp808\sbkpage\pgncont\pgndec  \plain\plain\f0\fs24\ql\plain\f0\fs24\plain\f1\fs16\amhv6463\hich\f1\dbch\f1\loch\f1\cf2\fs16 58 y/o male w/ PMHx ESRD on HD (M/W/F) via chest wall port (has failed fistulas), HTN, HLD, and T2DM who presents for fevers at home. Febrile with leukocytosis   here, some new opacities seen on CT chest. Admitted for sepsis possibly 2/2 pneumonia. \par  \par  \par  \plain\f1\fs16\ihtn2385\hich\f1\dbch\f1\loch\f1\cf2\fs16\b\ul{\field{\*\fldinst HYPERLINK [id number] }{\fldrslt Problem/Plan - 1:}}\plain\f1\fs16\mpml9059\hich\f1\dbch\f1\loch\f1\cf2\fs16\ql\par  \'b7  {\*\bkmkstart [id number]}{\*\bkmkend wy60246926111}Problem: {\*\bkmkstart bz34037667788}{\*\bkmkend uo35167891091}Sepsis. \par  \'b7  {\*\bkmkstart ps55448446604}{\*\bkmkend cl86697207650}Plan: {\*\bkmkstart kh65003170204}{\*\bkmkend ca00666031560}- Patient febrile with leukocytosis, new peripheral bandlike ground-glass opacity right upper lobe and medial ground-glass attenuation   in the left lower lobe - given patient's household sick with Covid, could be URI as cause of symptoms\par  - ABX as per ID \par  \par  \plain\f1\fs16\qvpf8622\hich\f1\dbch\f1\loch\f1\cf2\fs16\b\ul{\field{\*\fldinst HYPERLINK [id number] }{\fldrslt Problem/Plan - 2:}}\plain\f1\fs16\ylhw5827\hich\f1\dbch\f1\loch\f1\cf2\fs16\ql\par  \'b7  {\*\bkmkstart sp24334473563}{\*\bkmkend vf09263345971}Problem: {\*\bkmkstart wb33277715118}{\*\bkmkend ap32849426281}Normocytic anemia.\plain\f1\fs16\kikv2463\hich\f1\dbch\f1\loch\f1\cf2\fs16\strike\plain\f1\fs16\yeay2286\hich\f1\dbch\f1\loch\f1\cf2\fs16\par  \'b7  {\*\bkmkstart fd21585818730}{\*\bkmkend ng82402728807}Plan: {\*\bkmkstart gt22545948317}{\*\bkmkend nv56640909998}- Anemia worsened to 7.9 from 10.1 3 weeks ago\par  - F/u iron, ferritin, TIBC, transferrin\plain\f1\fs16\dnws4055\hich\f1\dbch\f1\loch\f1\cf2\fs16\strike\plain\f1\fs16\ybop2058\hich\f1\dbch\f1\loch\f1\cf2\fs16\par  \par  \plain\f1\fs16\cwfc9880\hich\f1\dbch\f1\loch\f1\cf2\fs16\b\ul{\field{\*\fldinst HYPERLINK [id number] }{\fldrslt Problem/Plan - 3:}}\plain\f1\fs16\rcxr4856\hich\f1\dbch\f1\loch\f1\cf2\fs16\ql\par  \'b7  {\*\bkmkstart qv43391883943}{\*\bkmkend nw61465800357}Problem: {\*\bkmkstart np97521273719}{\*\bkmkend cr76993221897}ESRD on hemodialysis.\plain\f1\fs16\uyzt8393\hich\f1\dbch\f1\loch\f1\cf2\fs16\strike\plain\f1\fs16\dwpd3202\hich\f1\dbch\f1\loch\f1\cf2\fs16\par  \'b7  {\*\bkmkstart fx86574318210}{\*\bkmkend jx90544956362}Plan: {\*\bkmkstart ug02766736691}{\*\bkmkend en57684574647}- Gets HD M/W/F\par  - C/w Calcitriol 0.5mcg daily\par  - C/w cinacalcet 30mg daily\par  - C/w Sevelamer 800mg TID w/ meals\par  - C/w Nephro-Bud tab\par  - Patient on Eliquis 5mg BID apparently for clot prevention of RIJ catheter\par  - Nephrology on board for HD.\plain\f1\fs16\mxot9184\hich\f1\dbch\f1\loch\f1\cf2\fs16\strike\plain\f1\fs16\vful3897\hich\f1\dbch\f1\loch\f1\cf2\fs16\par  \par  \plain\f1\fs16\prqv2486\hich\f1\dbch\f1\loch\f1\cf2\fs16\b\ul{\field{\*\fldinst HYPERLINK [id number] }{\fldrslt Problem/Plan - 4:}}\plain\f1\fs16\ulzb6533\hich\f1\dbch\f1\loch\f1\cf2\fs16\ql\par  \'b7  {\*\bkmkstart yb42571598129}{\*\bkmkend xr13969267766}Problem: {\*\bkmkstart fd10951272300}{\*\bkmkend pw38042785255}T2DM (type 2 diabetes mellitus).\plain\f1\fs16\vnfp7355\hich\f1\dbch\f1\loch\f1\cf2\fs16\strike\plain\f1\fs16\dsob2881\hich\f1\dbch\f1\loch\f1\cf2\fs16\par  \'b7  {\*\bkmkstart pw86966162543}{\*\bkmkend qi76338788408}Plan: {\*\bkmkstart ld97686143541}{\*\bkmkend tp73004976315}- Not on meds.\par  \par  \plain\f1\fs16\cuwa8345\hich\f1\dbch\f1\loch\f1\cf2\fs16\b\ul{\field{\*\fldinst HYPERLINK [id number] }{\fldrslt Problem/Plan - 5:}}\plain\f1\fs16\xmrm9889\hich\f1\dbch\f1\loch\f1\cf2\fs16\ql\par  \'b7  {\*\bkmkstart wk01316043315}{\*\bkmkend ub72493784353}Problem: {\*\bkmkstart kp83682822874}{\*\bkmkend rm15514054032}HTN (hypertension).\plain\f1\fs16\yevd9236\hich\f1\dbch\f1\loch\f1\cf2\fs16\strike\plain\f1\fs16\baby2410\hich\f1\dbch\f1\loch\f1\cf2\fs16\par  \'b7  {\*\bkmkstart yb40651811126}{\*\bkmkend tr82756200708}Plan: {\*\bkmkstart rt51619991415}{\*\bkmkend uw03085136200}- Not on meds.\plain\f1\fs16\xwwz7587\hich\f1\dbch\f1\loch\f1\cf2\fs16\strike\plain\f1\fs16\npyd5863\hich\f1\dbch\f1\loch\f1\cf2\fs16\par  \par  \plain\f1\fs16\zvkf9072\hich\f1\dbch\f1\loch\f1\cf2\fs16\b\ul{\field{\*\fldinst HYPERLINK [id number] }{\fldrslt Problem/Plan - 6:}}\plain\f1\fs16\zofe1184\hich\f1\dbch\f1\loch\f1\cf2\fs16\ql\par  \'b7  {\*\bkmkstart jm65748619823}{\*\bkmkend rk67586560096}Problem: {\*\bkmkstart by16071697041}{\*\bkmkend hy60422282807}Prophylactic measure. \par  \'b7  {\*\bkmkstart fi61122837031}{\*\bkmkend fi82429550548}Plan: {\*\bkmkstart aw41693949576}{\*\bkmkend yl74162404798}DVT PPx: Eliquis\par  Code Status: CPR.\par  \plain\f0\fs20\dzbb1319\hich\f0\dbch\f0\loch\f0\fs20\par  }

## 2024-06-06 NOTE — PROGRESS NOTE ADULT - ASSESSMENT
60y/o male with PMH of ESRD MWF, HTN, Gout, DM, HLD, obesity, BENNIE on cpap presents to the ED with chest pressure/pain, fever that began yesterday. Also endorses cough x 2weeks with recent exposure to COVID. Nephrology consulted for ESRD.     A/P     ESRD on HD   Center: [facility]  Nephrologist: Dr. Rocky Castillo  Access: Permacath   Consent obtained and placed in chart  Plan to continue MWF schedule while inpatient  Low K/Renal diet  Monitor BMP     Hyperkalemia  s/p Lokelma  Pt needs to be on Lokelma 10 g on non Hd Days.  K improved.  Low K diet  C/W HD schedule.  Monitor closely    HTN  Marginal.  Off antihypertensives  UF with HD as tolerated   Monitor BP     Anemia  sec to ESRD   Iron saturation low - 7%, however pt with current active infection  Start Venofer when infection resolves  MORALES with HD  Transfuse if Hgb <7    CKD-MBD   - acceptable.  C/W Cinacalcet 30mg qd.  Ca is stable  PO4 suboptimal.  Increase sevelamer to 1600mg TID if PO4 worsens.  Low PO4 diet.  Check Po4, Ca daily    Sepsis:  Remains febrile today.  CT Chest neg for PNA.  CT A/P - diverticulosis w/o diverticulitis.  UA neg for UTI.  Has PC.  BC neg to date.  ID following.  C/W Zosyn.

## 2024-06-06 NOTE — CHART NOTE - NSCHARTNOTEFT_GEN_A_CORE
istop 6/6/24  	  Jhonny Holley	1965	Male	[address]	[place]	[address]    Prescription Information      PDI Filter:    PDI	Current Rx	Drug Type	Rx Written	Rx Dispensed	Drug	Quantity	Days Supply	Prescriber Name	Prescriber TANA #	Payment Method	Dispenser  A	N	O	09/30/2023	10/01/2023	oxycodone hcl (ir) 5 mg tablet	30	7	[facility]	[id number]	Insurance	[facility] #[id number]

## 2024-06-06 NOTE — PROGRESS NOTE ADULT - ASSESSMENT
58 yo M ESRD HD, DM2 fatigue  Fever, leukocytosis  Fatigue, fever, URI symptoms, N/V  Permacath  RVP neg  UA neg  CT consolidative PNA, GGO bandlike, no intra-abd source found  Uncertain source fever, most dangerous possibility would be occult bacteremia 2/2 permacath; could be missed viral  Overall, Fever, leukocytosis, URI, nausea/vomiting  - Zosyn 3.375g q 12  - DC Vanco  - F/U BCXs  - Monitor for focal signs infection/symptoms  - Next steps/workup depending on improving/worsening/BCXs  - If N/V/D check GI PCR  - Monitor for alternate sources    Bienvenido Diallo MD  Contact on TEAMS messaging from 9am - 5pm  From 5pm-9am, on weekends, or if no response call [phone]

## 2024-06-07 LAB
BLD GP AB SCN SERPL QL: NEGATIVE — SIGNIFICANT CHANGE UP
HCT VFR BLD CALC: 21.8 % — LOW (ref 39–50)
HCT VFR BLD CALC: 24.1 % — LOW (ref 39–50)
HGB BLD-MCNC: 6.8 G/DL — CRITICAL LOW (ref 13–17)
HGB BLD-MCNC: 7.6 G/DL — LOW (ref 13–17)
MCHC RBC-ENTMCNC: 19.6 PG — LOW (ref 27–34)
MCHC RBC-ENTMCNC: 19.8 PG — LOW (ref 27–34)
MCHC RBC-ENTMCNC: 31.2 GM/DL — LOW (ref 32–36)
MCHC RBC-ENTMCNC: 31.5 GM/DL — LOW (ref 32–36)
MCV RBC AUTO: 62.1 FL — LOW (ref 80–100)
MCV RBC AUTO: 63.6 FL — LOW (ref 80–100)
MRSA PCR RESULT.: SIGNIFICANT CHANGE UP
NRBC # BLD: 0 /100 WBCS — SIGNIFICANT CHANGE UP (ref 0–0)
NRBC # BLD: 0 /100 WBCS — SIGNIFICANT CHANGE UP (ref 0–0)
PLATELET # BLD AUTO: 200 K/UL — SIGNIFICANT CHANGE UP (ref 150–400)
PLATELET # BLD AUTO: 217 K/UL — SIGNIFICANT CHANGE UP (ref 150–400)
RBC # BLD: 3.43 M/UL — LOW (ref 4.2–5.8)
RBC # BLD: 3.88 M/UL — LOW (ref 4.2–5.8)
RBC # FLD: 17 % — HIGH (ref 10.3–14.5)
RBC # FLD: 17.3 % — HIGH (ref 10.3–14.5)
RH IG SCN BLD-IMP: POSITIVE — SIGNIFICANT CHANGE UP
S AUREUS DNA NOSE QL NAA+PROBE: SIGNIFICANT CHANGE UP
WBC # BLD: 6.41 K/UL — SIGNIFICANT CHANGE UP (ref 3.8–10.5)
WBC # BLD: 6.44 K/UL — SIGNIFICANT CHANGE UP (ref 3.8–10.5)
WBC # FLD AUTO: 6.41 K/UL — SIGNIFICANT CHANGE UP (ref 3.8–10.5)
WBC # FLD AUTO: 6.44 K/UL — SIGNIFICANT CHANGE UP (ref 3.8–10.5)

## 2024-06-07 PROCEDURE — 99232 SBSQ HOSP IP/OBS MODERATE 35: CPT

## 2024-06-07 PROCEDURE — 99223 1ST HOSP IP/OBS HIGH 75: CPT

## 2024-06-07 RX ADMIN — APIXABAN 5 MILLIGRAM(S): 2.5 TABLET, FILM COATED ORAL at 17:17

## 2024-06-07 RX ADMIN — CHLORHEXIDINE GLUCONATE 1 APPLICATION(S): 213 SOLUTION TOPICAL at 13:01

## 2024-06-07 RX ADMIN — Medication 600 MILLIGRAM(S): at 17:17

## 2024-06-07 RX ADMIN — CALCITRIOL 0.5 MICROGRAM(S): 0.5 CAPSULE ORAL at 13:00

## 2024-06-07 RX ADMIN — APIXABAN 5 MILLIGRAM(S): 2.5 TABLET, FILM COATED ORAL at 05:49

## 2024-06-07 RX ADMIN — Medication 2000 UNIT(S): at 13:00

## 2024-06-07 RX ADMIN — SEVELAMER CARBONATE 800 MILLIGRAM(S): 2400 POWDER, FOR SUSPENSION ORAL at 07:44

## 2024-06-07 RX ADMIN — PIPERACILLIN AND TAZOBACTAM 25 GRAM(S): 4; .5 INJECTION, POWDER, LYOPHILIZED, FOR SOLUTION INTRAVENOUS at 12:59

## 2024-06-07 RX ADMIN — CINACALCET 30 MILLIGRAM(S): 30 TABLET, FILM COATED ORAL at 13:00

## 2024-06-07 RX ADMIN — ERYTHROPOIETIN 10000 UNIT(S): 10000 INJECTION, SOLUTION INTRAVENOUS; SUBCUTANEOUS at 10:20

## 2024-06-07 RX ADMIN — Medication 600 MILLIGRAM(S): at 05:50

## 2024-06-07 RX ADMIN — Medication 1 TABLET(S): at 13:00

## 2024-06-07 NOTE — PROGRESS NOTE ADULT - ASSESSMENT
58y/o male with PMH of ESRD MWF, HTN, Gout, DM, HLD, obesity, BENNIE on cpap presents to the ED with chest pressure/pain, fever that began yesterday. Also endorses cough x 2weeks with recent exposure to COVID. Nephrology consulted for ESRD.     A/P     ESRD on HD   Center: [facility]  Nephrologist: Dr. Rocky Castillo  Access: Permacath   Seen on HD today; UF goal 2L.  Consent obtained and placed in chart  Plan to continue MWF schedule while inpatient  Vascular consulted for vein mapping.  Pending CT venogram of chest.  On zosyn for sepsis.  Low K/Renal diet  Monitor BMP - no labs today.    Hyperkalemia  s/p Lokelma  Pt needs to be on Lokelma 10 g on non Hd Days.  K improved.  Low K diet  C/W HD schedule.  Monitor closely - no labs today.    HTN  Marginal / controlled.  Off antihypertensives  UF with HD as tolerated   Monitor BP     Anemia  sec to ESRD   Iron saturation low - 7%, however pt with current active infection  Start Venofer when infection resolves  MORALES with HD  Transfuse if Hgb <7    CKD-MBD   - acceptable.  C/W Cinacalcet 30mg qd.  Ca is stable  PO4 suboptimal.  Increase sevelamer to 1600mg TID if PO4 worsens.  Low PO4 diet.  Check PO4, Ca daily    Sepsis:  Afebrile and WBC improving today.  CT Chest neg for PNA.  CT A/P - diverticulosis w/o diverticulitis.  UA neg for UTI.  Has PC.  BC neg to date.  ID following.  C/W Zosyn.

## 2024-06-07 NOTE — CONSULT NOTE ADULT - ASSESSMENT
60y/o male with PMH of ESRD MWF, HTN, Gout, DM, HLD, obesity, BENNIE on cpap presents to the ED with chest pressure/pain, fever that began yesterday. Also endorses cough x 2weeks with recent exposure to COVID. Nephrology consulted for ESRD.     A/P   ESRD  Center: [facility]  Nephrologist: Dr. Rocky Castillo  Access: Permacath   MWF schedule  Consent obtained and placed in chart  Last HD in center 6/3  Renal diet  Monitor BMP     HTN  Fluctuating; acceptable  Resume home meds  UF with HD  Monitor BP     Anemia  Obtain hb  MORALES with HD if applicable  Transfuse if hb <7  Monitor hb    CKD-MBD  Get PTH  On sevelamer and cinacalcet at home  Monitor Ca, PO4 daily   
59M complicated hx of multiple failed AV access and L-sided HeRo graft  Here for ?PNA - p/w fevers, SOB, cough, lethargy    Recommend B/L UE VM   Will protect arm as needed depending on VM  Please obtain CT venogram of the chest - pt has contrast allergy (anaphylaxis) and will need premedication  Plan for long term access creation pending studies  Obtain BCx given fevers on admission and presence of catheter
58 yo M ESRD HD, DM2 fatigue  Fever, leukocytosis  Fatigue, fever, URI symptoms, N/V  Permacath  RVP neg  UA neg  CT consolidative PNA, GGO bandlike, no intra-abd source found  Uncertain source fever, most dangerous possibility would be occult bacteremia 2/2 permacath; could be missed viral  Overall, Fever, leukocytosis, URI, nausea/vomiting  - Zosyn 3.375g q 12  - Vanco, redose 1g post HD sessions  - F/U BCXs  - Monitor for focal signs infection/symptoms  - Next steps/workup depending on improving/worsening/BCXs  - If N/V/D check GI PCR  - Monitor for alternate sources    Bienvenido Diallo MD  Contact on TEAMS messaging from 9am - 5pm  From 5pm-9am, on weekends, or if no response call [phone]

## 2024-06-07 NOTE — PROGRESS NOTE ADULT - ASSESSMENT
60 y/o male w/ PMHx ESRD on HD (M/W/F) via chest wall port (has failed fistulas), HTN, HLD, and T2DM who presents for fevers at home. Febrile with leukocytosis here, some new opacities seen on CT chest. Admitted for sepsis possibly 2/2 pneumonia.       Problem/Plan - 1:  ·  Problem: Sepsis.   ·  Plan: - Patient febrile with leukocytosis, new peripheral bandlike ground-glass opacity right upper lobe and medial ground-glass attenuation in the left lower lobe - given patient's household sick with Covid, could be URI as cause of symptoms  - ABX as per ID     Problem/Plan - 2:  ·  Problem: Normocytic anemia.  ·  Plan: - Anemia worsened to 7.9 from 10.1 3 weeks ago  - F/u iron, ferritin, TIBC, transferrin    Problem/Plan - 3:  ·  Problem: ESRD on hemodialysis.  ·  Plan: - Gets HD M/W/F  - C/w Calcitriol 0.5mcg daily  - C/w cinacalcet 30mg daily  - C/w Sevelamer 800mg TID w/ meals  - C/w Nephro-Bud tab  - Patient on Eliquis 5mg BID apparently for clot prevention of RIJ catheter  - Nephrology on board for HD.    Problem/Plan - 4:  ·  Problem: T2DM (type 2 diabetes mellitus).  ·  Plan: - Not on meds.    Problem/Plan - 5:  ·  Problem: HTN (hypertension).  ·  Plan: - Not on meds.    Problem/Plan - 6:  ·  Problem: Prophylactic measure.   ·  Plan: DVT PPx: Eliquis  Code Status: CPR.

## 2024-06-07 NOTE — CONSULT NOTE ADULT - SUBJECTIVE AND OBJECTIVE BOX
[facility]   MD KAT BHAGAT MD MARIA SANTIAGO, NP        TEL:  OFFICE: [phone]  From 5pm-7am answering service [phone]    --- INITIAL RENAL CONSULT NOTE ---date of service 24 @ 19:00    HPI:  60y/o male with PMH of ESRD MWF, HTN, Gout, DM, HLD, obesity, BENNIE on cpap presents to the ED with chest pressure/pain, fever that began yesterday. Also endorses cough x 2weeks with recent exposure to COVID. Nephrology consulted for ESRD.       Allergies:  IV Contrast (Anaphylaxis)      PAST MEDICAL & SURGICAL HISTORY:  Renal disease  ESRD      Hypertension      Gout      Diabetes mellitus      HLD (hyperlipidemia)      Obesity      BENNIE on CPAP      Heart rate fast      H/O shoulder surgery      Injury of ankle and foot  surgically repaired, 10 years ago      H/O hernia repair  30 years ago      Peritoneal dialysis catheter in situ  Was inserted, and removed      Arteriovenous graft removed  Now there is a wound suction in left arm          Home Medications Reviewed    Hospital Medications:   MEDICATIONS  (STANDING):      SOCIAL HISTORY:  Denies ETOh, Smoking,     FAMILY HISTORY:  Family history of hypertension  , in  mother        REVIEW OF SYSTEMS:  CONSTITUTIONAL: as per HPI   EYES/ENT: No visual changes;  No vertigo or throat pain   NECK: No pain or stiffness  RESPIRATORY: as per HPI   CARDIOVASCULAR: as per HPI   GASTROINTESTINAL: No abdominal or epigastric pain. No nausea, vomiting, or hematemesis; No diarrhea or constipation. No melena or hematochezia.  GENITOURINARY: No dysuria, frequency, foamy urine, urinary urgency, incontinence or hematuria  NEUROLOGICAL: No numbness or weakness  SKIN: No itching, burning, rashes, or lesions   VASCULAR: No bilateral lower extremity edema.   All other review of systems is negative unless indicated above.    VITALS:  T(F): 103 (24 @ 18:46), Max: 103 (24 @ 18:07)  HR: 108 (24 @ 18:46)  BP: 130/74 (24 @ 18:46)  RR: 18 (24 @ 18:46)  SpO2: 98% (24 @ 18:46)  Wt(kg): --    Height (cm): 177.8 ( @ 18:07)  Weight (kg): 125.6 ( @ 18:07)  BMI (kg/m2): 39.7 ( @ 18:07)  BSA (m2): 2.4 ( @ 18:07)    PHYSICAL EXAM:  General: NAD  HEENT: anicteric sclera, oropharynx clear, MMM  Neck: No JVD  Respiratory: CTAB, no wheezes, rales or rhonchi, +cough  Cardiovascular: S1, S2, RRR  Gastrointestinal: BS+, soft, NT/ND  Extremities: No cyanosis or clubbing. No peripheral edema  Neurological: A/O x 3, no focal deficits  Psychiatric: Normal mood, normal affect  : No CVA tenderness. No hyde.   Skin: No rashes  Vascular Access: permacath     LABS:        Creatinine Trend:     Urine Studies:        RADIOLOGY & ADDITIONAL STUDIES:                
59M with ESRD and complicated hx of HD access with multiple failed AV grafts and L sided HeRo graft presents   with fevers, cough, SOB, and malaise with CT non con chest concerning for PNA  Vascular previously consulted for long-term HD access  Currently obtaining HD via R chest permcath  Seen in HD today, no fevers today, no SOB, and improved cough    PAST MEDICAL & SURGICAL HISTORY:  Renal disease  ESRD  Hypertension  Gout  Diabetes mellitus  HLD (hyperlipidemia)  Obesity  BENNIE on CPAP  H/O shoulder surgery  Injury of ankle and foot  surgically repaired, 10 years ago  H/O hernia repair  30 years ago  Peritoneal dialysis catheter in situ  Was inserted, and removed  Arteriovenous graft removed      MEDICATIONS  (STANDING):  apixaban 5 milliGRAM(s) Oral two times a day  calcitriol   Capsule 0.5 MICROGram(s) Oral daily  chlorhexidine 2% Cloths 1 Application(s) Topical daily  cholecalciferol 2000 Unit(s) Oral daily  cinacalcet 30 milliGRAM(s) Oral daily  epoetin isra (EPOGEN) Injectable 86282 Unit(s) IV Push <User Schedule>  guaiFENesin  milliGRAM(s) Oral every 12 hours  Nephro-bart 1 Tablet(s) Oral daily  piperacillin/tazobactam IVPB.. 3.375 Gram(s) IV Intermittent every 12 hours  sevelamer carbonate 800 milliGRAM(s) Oral three times a day with meals  sodium zirconium cyclosilicate 10 Gram(s) Oral <User Schedule>    MEDICATIONS  (PRN):  acetaminophen     Tablet .. 650 milliGRAM(s) Oral every 6 hours PRN Temp greater or equal to 38C (100.4F), Mild Pain (1 - 3)  albuterol/ipratropium for Nebulization 3 milliLiter(s) Nebulizer every 6 hours PRN Shortness of Breath and/or Wheezing      Allergies  IV Contrast (Anaphylaxis)  Intolerances    FAMILY HISTORY:  Family history of hypertension  , in  mother    Physical Exam:  General: NAD, resting comfortably  No arm swelling, no prominent chest veins noted  R permcath clean without erythema or drainage around site  Papl radial/ulnar 2+ B/L  LUE incisions well-healed  Abdominal: soft, ND/NT, no organomegaly    Vital Signs Last 24 Hrs  T(C): 36.7 (2024 08:50), Max: 37.8 (2024 16:14)  T(F): 98.1 (2024 08:50), Max: 100.1 (2024 16:14)  HR: 98 (2024 08:50) (88 - 98)  BP: 113/66 (2024 08:50) (102/66 - 134/81)  BP(mean): --  RR: 17 (2024 08:50) (17 - 18)  SpO2: 96% (2024 08:50) (95% - 97%)    Parameters below as of 2024 08:50  Patient On (Oxygen Delivery Method): room air        I&O's Summary    2024 07:01  -  2024 07:00  --------------------------------------------------------  IN: 100 mL / OUT: 0 mL / NET: 100 mL            LABS:                        7.8    10.47 )-----------( 199      ( 2024 09:55 )             25.4     06-06    136  |  95<L>  |  41<H>  ----------------------------<  79  4.8   |  23  |  9.97<H>    Ca    10.0      2024 09:52  Phos  5.5     06-06    TPro  7.8  /  Alb  3.6  /  TBili  0.7  /  DBili  x   /  AST  20  /  ALT  12  /  AlkPhos  48  06-06      Urinalysis Basic - ( 2024 09:52 )    Color: x / Appearance: x / SG: x / pH: x  Gluc: 79 mg/dL / Ketone: x  / Bili: x / Urobili: x   Blood: x / Protein: x / Nitrite: x   Leuk Esterase: x / RBC: x / WBC x   Sq Epi: x / Non Sq Epi: x / Bacteria: x      CAPILLARY BLOOD GLUCOSE        LIVER FUNCTIONS - ( 2024 09:52 )  Alb: 3.6 g/dL / Pro: 7.8 g/dL / ALK PHOS: 48 U/L / ALT: 12 U/L / AST: 20 U/L / GGT: x             Cultures:      RADIOLOGY & ADDITIONAL STUDIES:      
"HPI:  This is a 58 y/o male w/ PMHx ESRD on HD (M/W/F) via chest wall port (has failed fistulas), HTN, HLD, and T2DM who presents for fevers and fatigue. He has been feeling feverish with generalized malaise, chills, and has had a cough for the past few days. Has also been having abdominal pain and vomiting. In addition, his family that has been living with him has been sick with Covid as well. Patient     In the ED, he was febrile to T-max 103.4, tachycardic to 119, hemodynamically stable, saturating well on RA. CBC w/ leukocytosis of 12.86, worsening microcytic anemia w Hb 7.9 (10.1 3 weeks ago), CMP w/ evidence of ESRD. Limited RVP negative for Flu/RSV/Covid. CT chest showing stable 1.2cm RLL calcified mass (likely a hamartoma) and new peripheral bandlike GGO in the RUL and ground-glass attenuation in the LLL. Diverticulosis w/o diverticulitis on CT abdomen. Given Vanc/Zosyn and 500cc IVF in the ED.  (2024 03:51)"    Above reviewed. 58 yo M ESRD HD, DM2 fatigue  Patient with fever/malaise  N/V, abd pain  Recent contacts with COVID  Patient with R chest wall part which has been stable without changes recently  No other focal complaints    PAST MEDICAL & SURGICAL HISTORY:  Renal disease  ESRD      Hypertension      Gout      Diabetes mellitus      HLD (hyperlipidemia)      Obesity      BENNIE on CPAP      Heart rate fast      H/O shoulder surgery      Injury of ankle and foot  surgically repaired, 10 years ago      H/O hernia repair  30 years ago      Peritoneal dialysis catheter in situ  Was inserted, and removed      Arteriovenous graft removed  Now there is a wound suction in left arm    Allergies    IV Contrast (Anaphylaxis)    Intolerances    ANTIMICROBIALS:  piperacillin/tazobactam IVPB.. 3.375 every 12 hours    OTHER MEDS:  acetaminophen     Tablet .. 650 milliGRAM(s) Oral every 6 hours PRN  albuterol/ipratropium for Nebulization 3 milliLiter(s) Nebulizer every 6 hours PRN  apixaban 5 milliGRAM(s) Oral two times a day  calcitriol   Capsule 0.5 MICROGram(s) Oral daily  cholecalciferol 2000 Unit(s) Oral daily  cinacalcet 30 milliGRAM(s) Oral daily  guaiFENesin  milliGRAM(s) Oral every 12 hours  Nephro-bart 1 Tablet(s) Oral daily  sevelamer carbonate 800 milliGRAM(s) Oral three times a day with meals    SOCIAL HISTORY: No tobacco, no alcohol, no illicit drugs    FAMILY HISTORY:  Family history of hypertension  , in  mother    Drug Dosing Weight  Height (cm): 177.8 (2024 18:07)  Weight (kg): 125.6 (2024 18:07)  BMI (kg/m2): 39.7 (2024 18:07)  BSA (m2): 2.4 (2024 18:07)    PE:    Vital Signs Last 24 Hrs  T(C): 38.8 (2024 07:00), Max: 39.7 (2024 20:15)  T(F): 101.8 (2024 07:00), Max: 103.4 (2024 20:15)  HR: 112 (2024 05:17) (99 - 119)  BP: 103/57 (2024 05:17) (95/60 - 130/74)  BP(mean): 81 (2024 00:29) (72 - 81)  RR: 18 (2024 05:17) (16 - 21)  SpO2: 99% (2024 05:17) (95% - 100%)    Gen: AOx3, NAD, non-toxic, pleasant  CV: S1+S2 normal, nontachycardic  Resp: Clear bilat, no resp distress, no crackles/wheezes  Abd: Soft, nontender, +BS  Ext: No LE edema, no wounds  : No Figueroa  IV/Skin: R side chest wall port  Msk: No low back pain, no arthralgias, no joint swelling  Neuro: No sensory deficits, no motor deficits    LABS:                        7.4    13.71 )-----------( 186      ( 2024 07:07 )             23.6     06-05    134<L>  |  97  |  50<H>  ----------------------------<  106<H>  5.7<H>   |  20<L>  |  12.11<H>    Ca    9.7      2024 07:07    TPro  7.0  /  Alb  3.3  /  TBili  0.7  /  DBili  x   /  AST  13  /  ALT  9<L>  /  AlkPhos  46      Urinalysis Basic - ( 2024 07:07 )    Color: x / Appearance: x / SG: x / pH: x  Gluc: 106 mg/dL / Ketone: x  / Bili: x / Urobili: x   Blood: x / Protein: x / Nitrite: x   Leuk Esterase: x / RBC: x / WBC x   Sq Epi: x / Non Sq Epi: x / Bacteria: x    MICROBIOLOGY:    Rapid RVP Result: NotDetec ( @ 22:37)    RADIOLOGY:     CT    IMPRESSION:  Chest CT: No evidence of consolidative pneumonia. Nonspecific peripheral   bandlike groundglass opacity and medial left lower lobe groundglass   attenuation.    CT abdomen pelvis: Motion degraded. Diverticulosis without evidence of   diverticulitis. No acute finding allowing for motion artifact.

## 2024-06-07 NOTE — CONSULT NOTE ADULT - ATTENDING COMMENTS
ESRD on HD via R chest wall PC now with fevers  pt. had multiple failed HD access in bl UE  obtain Bcx to r/o bacteremia in a setting of fevers and long term catheter  CTV to evaluate for central venous stenosis  bl UE vein mappings    when pt is afebrile and medically optimized will plan for possible new HD access creation ESRD on HD via R chest wall PC now with fevers  pt. had multiple failed HD access in bl UE    CTV to evaluate for central venous stenosis  bl UE vein mappings    when pt is afebrile and medically optimized will plan for possible new HD access creation

## 2024-06-07 NOTE — PROGRESS NOTE ADULT - ASSESSMENT
58 yo M ESRD HD, DM2 fatigue  Fever, leukocytosis  Fatigue, fever, URI symptoms, N/V  Permacath  RVP neg  UA neg  CT consolidative PNA, GGO bandlike, no intra-abd source found  Uncertain source fever, missed viral vs bacterial superimposed on viral pna?  Overall, Fever, leukocytosis, URI, nausea/vomiting  - Zosyn 3.375g q 12, 5-7 days then discontinue  - F/U BCXs  - Monitor for focal signs infection/symptoms  - If N/V/D check GI PCR  - Monitor for alternate sources    Bienvenido Diallo MD  Contact on TEAMS messaging from 9am - 5pm  From 5pm-9am, on weekends, or if no response call [phone]

## 2024-06-08 LAB
ANION GAP SERPL CALC-SCNC: 15 MMOL/L — SIGNIFICANT CHANGE UP (ref 5–17)
BUN SERPL-MCNC: 38 MG/DL — HIGH (ref 7–23)
CALCIUM SERPL-MCNC: 9.9 MG/DL — SIGNIFICANT CHANGE UP (ref 8.4–10.5)
CHLORIDE SERPL-SCNC: 97 MMOL/L — SIGNIFICANT CHANGE UP (ref 96–108)
CO2 SERPL-SCNC: 26 MMOL/L — SIGNIFICANT CHANGE UP (ref 22–31)
CREAT SERPL-MCNC: 10.03 MG/DL — HIGH (ref 0.5–1.3)
EGFR: 5 ML/MIN/1.73M2 — LOW
GLUCOSE SERPL-MCNC: 111 MG/DL — HIGH (ref 70–99)
HCT VFR BLD CALC: 24.9 % — LOW (ref 39–50)
HGB BLD-MCNC: 7.7 G/DL — LOW (ref 13–17)
MCHC RBC-ENTMCNC: 19.7 PG — LOW (ref 27–34)
MCHC RBC-ENTMCNC: 30.9 GM/DL — LOW (ref 32–36)
MCV RBC AUTO: 63.8 FL — LOW (ref 80–100)
NRBC # BLD: 0 /100 WBCS — SIGNIFICANT CHANGE UP (ref 0–0)
PLATELET # BLD AUTO: 231 K/UL — SIGNIFICANT CHANGE UP (ref 150–400)
POTASSIUM SERPL-MCNC: 4 MMOL/L — SIGNIFICANT CHANGE UP (ref 3.5–5.3)
POTASSIUM SERPL-SCNC: 4 MMOL/L — SIGNIFICANT CHANGE UP (ref 3.5–5.3)
RBC # BLD: 3.9 M/UL — LOW (ref 4.2–5.8)
RBC # FLD: 17.2 % — HIGH (ref 10.3–14.5)
SODIUM SERPL-SCNC: 138 MMOL/L — SIGNIFICANT CHANGE UP (ref 135–145)
WBC # BLD: 6.12 K/UL — SIGNIFICANT CHANGE UP (ref 3.8–10.5)
WBC # FLD AUTO: 6.12 K/UL — SIGNIFICANT CHANGE UP (ref 3.8–10.5)

## 2024-06-08 RX ADMIN — Medication 1 TABLET(S): at 12:12

## 2024-06-08 RX ADMIN — APIXABAN 5 MILLIGRAM(S): 2.5 TABLET, FILM COATED ORAL at 05:51

## 2024-06-08 RX ADMIN — CHLORHEXIDINE GLUCONATE 1 APPLICATION(S): 213 SOLUTION TOPICAL at 12:16

## 2024-06-08 RX ADMIN — SODIUM ZIRCONIUM CYCLOSILICATE 10 GRAM(S): 10 POWDER, FOR SUSPENSION ORAL at 12:11

## 2024-06-08 RX ADMIN — CINACALCET 30 MILLIGRAM(S): 30 TABLET, FILM COATED ORAL at 12:12

## 2024-06-08 RX ADMIN — APIXABAN 5 MILLIGRAM(S): 2.5 TABLET, FILM COATED ORAL at 17:51

## 2024-06-08 RX ADMIN — Medication 600 MILLIGRAM(S): at 05:51

## 2024-06-08 RX ADMIN — Medication 600 MILLIGRAM(S): at 17:51

## 2024-06-08 RX ADMIN — CALCITRIOL 0.5 MICROGRAM(S): 0.5 CAPSULE ORAL at 12:12

## 2024-06-08 RX ADMIN — PIPERACILLIN AND TAZOBACTAM 25 GRAM(S): 4; .5 INJECTION, POWDER, LYOPHILIZED, FOR SOLUTION INTRAVENOUS at 12:11

## 2024-06-08 RX ADMIN — Medication 2000 UNIT(S): at 12:12

## 2024-06-08 RX ADMIN — SEVELAMER CARBONATE 800 MILLIGRAM(S): 2400 POWDER, FOR SUSPENSION ORAL at 12:13

## 2024-06-08 RX ADMIN — PIPERACILLIN AND TAZOBACTAM 25 GRAM(S): 4; .5 INJECTION, POWDER, LYOPHILIZED, FOR SOLUTION INTRAVENOUS at 00:05

## 2024-06-08 NOTE — PROGRESS NOTE ADULT - ASSESSMENT
58y/o male with PMH of ESRD MWF, HTN, Gout, DM, HLD, obesity, BENNIE on cpap presents to the ED with chest pressure/pain, fever that began yesterday. Also endorses cough x 2weeks with recent exposure to COVID. Nephrology consulted for ESRD.     A/P     ESRD on HD   Center: [facility]  Nephrologist: Dr. Rocky Castillo  Access: Permacath   Consent obtained and placed in chart  Plan to continue MWF schedule while inpatient  Vascular consulted for vein mapping.  Pending CT venogram of chest to rule out central venous stenosis   Low K/Renal diet  Monitor BMP - no labs today.    Hyperkalemia  s/p Lokelma  Pt needs to be on Lokelma 10 g on non Hd Days.  Low K diet  C/W HD schedule.  Monitor closely -    HTN  Marginal / controlled.  Off antihypertensives  UF with HD as tolerated   Monitor BP     Anemia  sec to ESRD   Iron saturation low - 7%, however pt with current active infection  Start Venofer when infection resolves  MORALES with HD  Transfuse if Hgb <7    CKD-MBD   - acceptable.  C/W Cinacalcet 30mg qd.  Increase sevelamer to 1600mg TID if PO4 worsens.  Low PO4 diet.  Check PO4, Ca daily    Sepsis:  Afebrile and WBC improving today.  CT Chest neg for PNA.  CT A/P - diverticulosis w/o diverticulitis.  UA neg for UTI.  Has PC.  BC neg to date.  ID following.  C/W Zosyn.

## 2024-06-08 NOTE — CHART NOTE - NSCHARTNOTEFT_GEN_A_CORE
ESRD on HD via R chest wall PC now with fevers  pt. had multiple failed HD access in bl UE  CTV to evaluate for central venous stenosis  CT Venogram orderded pt with iv contrast allergy will need 13hr pre-med prep spole with Cammie from ct ordered scan for 6/9 will need to know time and pre-med accordingly and PT will need HD on Monday post   pt explain and agreeable   Dr Fatima made aware       Department of Medicine  [facility] [id number] ESRD on HD via R chest wall PC now with fevers  pt. had multiple failed HD access in bl UE  CTV to evaluate for central venous stenosis  CT Venogram ordered pt with iv contrast allergy will need 13hr pre-med prep spoke with Ct personnel from ct ordered scan for 6/9 will need to know time and pre-med accordingly and PT will need HD on Monday post   pt explain and agreeable   Dr Fatima made aware       Department of Medicine  [facility] [id number]

## 2024-06-09 LAB
ANION GAP SERPL CALC-SCNC: 20 MMOL/L — HIGH (ref 5–17)
BUN SERPL-MCNC: 45 MG/DL — HIGH (ref 7–23)
CALCIUM SERPL-MCNC: 9.8 MG/DL — SIGNIFICANT CHANGE UP (ref 8.4–10.5)
CHLORIDE SERPL-SCNC: 95 MMOL/L — LOW (ref 96–108)
CO2 SERPL-SCNC: 26 MMOL/L — SIGNIFICANT CHANGE UP (ref 22–31)
CREAT SERPL-MCNC: 11.95 MG/DL — HIGH (ref 0.5–1.3)
EGFR: 4 ML/MIN/1.73M2 — LOW
GLUCOSE SERPL-MCNC: 105 MG/DL — HIGH (ref 70–99)
HCT VFR BLD CALC: 24.4 % — LOW (ref 39–50)
HGB BLD-MCNC: 7.5 G/DL — LOW (ref 13–17)
MCHC RBC-ENTMCNC: 19.8 PG — LOW (ref 27–34)
MCHC RBC-ENTMCNC: 30.7 GM/DL — LOW (ref 32–36)
MCV RBC AUTO: 64.4 FL — LOW (ref 80–100)
NRBC # BLD: 0 /100 WBCS — SIGNIFICANT CHANGE UP (ref 0–0)
PLATELET # BLD AUTO: 256 K/UL — SIGNIFICANT CHANGE UP (ref 150–400)
POTASSIUM SERPL-MCNC: 3.5 MMOL/L — SIGNIFICANT CHANGE UP (ref 3.5–5.3)
POTASSIUM SERPL-SCNC: 3.5 MMOL/L — SIGNIFICANT CHANGE UP (ref 3.5–5.3)
RBC # BLD: 3.79 M/UL — LOW (ref 4.2–5.8)
RBC # FLD: 17.2 % — HIGH (ref 10.3–14.5)
SODIUM SERPL-SCNC: 141 MMOL/L — SIGNIFICANT CHANGE UP (ref 135–145)
WBC # BLD: 7.21 K/UL — SIGNIFICANT CHANGE UP (ref 3.8–10.5)
WBC # FLD AUTO: 7.21 K/UL — SIGNIFICANT CHANGE UP (ref 3.8–10.5)

## 2024-06-09 RX ORDER — ACETAMINOPHEN 500 MG
1000 TABLET ORAL ONCE
Refills: 0 | Status: COMPLETED | OUTPATIENT
Start: 2024-06-09 | End: 2024-06-09

## 2024-06-09 RX ORDER — DIPHENHYDRAMINE HCL 50 MG
50 CAPSULE ORAL
Refills: 0 | Status: COMPLETED | OUTPATIENT
Start: 2024-06-09 | End: 2024-06-10

## 2024-06-09 RX ORDER — BENZOCAINE AND MENTHOL 5; 1 G/100ML; G/100ML
1 LIQUID ORAL THREE TIMES A DAY
Refills: 0 | Status: DISCONTINUED | OUTPATIENT
Start: 2024-06-09 | End: 2024-06-12

## 2024-06-09 RX ADMIN — Medication 600 MILLIGRAM(S): at 05:40

## 2024-06-09 RX ADMIN — CHLORHEXIDINE GLUCONATE 1 APPLICATION(S): 213 SOLUTION TOPICAL at 12:02

## 2024-06-09 RX ADMIN — Medication 600 MILLIGRAM(S): at 17:11

## 2024-06-09 RX ADMIN — CINACALCET 30 MILLIGRAM(S): 30 TABLET, FILM COATED ORAL at 12:04

## 2024-06-09 RX ADMIN — Medication 400 MILLIGRAM(S): at 18:39

## 2024-06-09 RX ADMIN — SEVELAMER CARBONATE 800 MILLIGRAM(S): 2400 POWDER, FOR SUSPENSION ORAL at 08:18

## 2024-06-09 RX ADMIN — APIXABAN 5 MILLIGRAM(S): 2.5 TABLET, FILM COATED ORAL at 05:40

## 2024-06-09 RX ADMIN — PIPERACILLIN AND TAZOBACTAM 25 GRAM(S): 4; .5 INJECTION, POWDER, LYOPHILIZED, FOR SOLUTION INTRAVENOUS at 11:59

## 2024-06-09 RX ADMIN — Medication 1 TABLET(S): at 11:59

## 2024-06-09 RX ADMIN — PIPERACILLIN AND TAZOBACTAM 25 GRAM(S): 4; .5 INJECTION, POWDER, LYOPHILIZED, FOR SOLUTION INTRAVENOUS at 00:17

## 2024-06-09 RX ADMIN — APIXABAN 5 MILLIGRAM(S): 2.5 TABLET, FILM COATED ORAL at 17:12

## 2024-06-09 RX ADMIN — SEVELAMER CARBONATE 800 MILLIGRAM(S): 2400 POWDER, FOR SUSPENSION ORAL at 12:01

## 2024-06-09 RX ADMIN — Medication 32 MILLIGRAM(S): at 20:57

## 2024-06-09 RX ADMIN — Medication 1000 MILLIGRAM(S): at 19:39

## 2024-06-09 RX ADMIN — BENZOCAINE AND MENTHOL 1 LOZENGE: 5; 1 LIQUID ORAL at 00:17

## 2024-06-09 RX ADMIN — CALCITRIOL 0.5 MICROGRAM(S): 0.5 CAPSULE ORAL at 11:59

## 2024-06-09 RX ADMIN — Medication 2000 UNIT(S): at 12:00

## 2024-06-09 RX ADMIN — SEVELAMER CARBONATE 800 MILLIGRAM(S): 2400 POWDER, FOR SUSPENSION ORAL at 17:11

## 2024-06-09 RX ADMIN — SODIUM ZIRCONIUM CYCLOSILICATE 10 GRAM(S): 10 POWDER, FOR SUSPENSION ORAL at 14:13

## 2024-06-09 NOTE — PROGRESS NOTE ADULT - ASSESSMENT
58y/o male with PMH of ESRD MWF, HTN, Gout, DM, HLD, obesity, BENNIE on cpap presents to the ED with chest pressure/pain, fever that began yesterday. Also endorses cough x 2weeks with recent exposure to COVID. Nephrology consulted for ESRD.     A/P     ESRD on HD   Center: [facility]  Nephrologist: Dr. Rocky Castillo  Access: Permacath   Consent obtained and placed in chart  Plan to continue MWF schedule while inpatient  Vascular consulted for vein mapping.  Pending CT venogram of chest to rule out central venous stenosis   Low K/Renal diet  Monitor BMP -     Hyperkalemia  s/p Lokelma  Pt needs to be on Lokelma 10 g on non Hd Days.  Low K diet  C/W HD schedule.  Monitor closely -    HTN  Marginal / controlled.  Off antihypertensives  UF with HD as tolerated   Monitor BP     Anemia  sec to ESRD   Iron saturation low - 7%, however pt with current active infection  Start Venofer when infection resolves  MORALES with HD  Transfuse if Hgb <7    CKD-MBD   - acceptable.  C/W Cinacalcet 30mg qd.  Increase sevelamer to 1600mg TID if PO4 worsens.  Low PO4 diet.  Check PO4, Ca daily    Sepsis:  Afebrile and WBC improving today.  CT Chest neg for PNA.  CT A/P - diverticulosis w/o diverticulitis.  UA neg for UTI.  Has PC.  BC neg to date.  ID following.  C/W Zosyn.

## 2024-06-09 NOTE — PROGRESS NOTE ADULT - ASSESSMENT
60 y/o male w/ PMHx ESRD on HD (M/W/F) via chest wall port (has failed fistulas), HTN, HLD, and T2DM who presents for fevers at home. Febrile with leukocytosis here, some new opacities seen on CT chest. Admitted for sepsis possibly 2/2 pneumonia.       Problem/Plan - 1:  ·  Problem: Sepsis.   ·  Plan: - Patient febrile with leukocytosis, new peripheral bandlike ground-glass opacity right upper lobe and medial ground-glass attenuation in the left lower lobe - given patient's household sick with Covid, could be URI as cause of symptoms  - ABX as per ID     Problem/Plan - 2:  ·  Problem: Normocytic anemia.  ·  Plan: - Anemia worsened to 7.9 from 10.1 3 weeks ago  - F/u iron, ferritin, TIBC, transferrin    Problem/Plan - 3:  ·  Problem: ESRD on hemodialysis.  ·  Plan: - Gets HD M/W/F  - C/w Calcitriol 0.5mcg daily  - C/w cinacalcet 30mg daily  - C/w Sevelamer 800mg TID w/ meals  - C/w Nephro-Bud tab  - Patient on Eliquis 5mg BID apparently for clot prevention of RIJ catheter  - Nephrology fu     Problem/Plan - 4:  ·  Problem: T2DM (type 2 diabetes mellitus).  ·  Plan: - Not on meds.    Problem/Plan - 5:  ·  Problem: HTN (hypertension).  ·  Plan: - Not on meds.    Problem/Plan - 6:  ·  Problem: Prophylactic measure.   ·  Plan: DVT PPx: Eliquis  Code Status: CPR.       60 y/o male w/ PMHx ESRD on HD (M/W/F) via chest wall port (has failed fistulas), HTN, HLD, and T2DM who presents for fevers at home. Febrile with leukocytosis here, some new opacities seen on CT chest. Admitted for sepsis possibly 2/2 pneumonia.       Problem/Plan - 1:  ·  Problem: Sepsis.   ·  Plan: - Patient febrile with leukocytosis, new peripheral bandlike ground-glass opacity right upper lobe and medial ground-glass attenuation in the left lower lobe - given patient's household sick with Covid, could be URI as cause of symptoms  - ABX as per ID     Problem/Plan - 2:  ·  Problem: Normocytic anemia.  ·  Plan: - monitor cbc   - F/u iron, ferritin, TIBC, transferrin    Problem/Plan - 3:  ·  Problem: ESRD on hemodialysis.  ·  Plan: - Gets HD M/W/F  - C/w Calcitriol 0.5mcg daily  - C/w cinacalcet 30mg daily  - C/w Sevelamer 800mg TID w/ meals  - C/w Nephro-Bud tab  - Patient on Eliquis 5mg BID apparently for clot prevention of RIJ catheter  - Nephrology fu     Problem/Plan - 4:  ·  Problem: T2DM (type 2 diabetes mellitus).  ·  Plan: - Not on meds.    Problem/Plan - 5:  ·  Problem: HTN (hypertension).  ·  Plan: - Not on meds.    Problem/Plan - 6:  ·  Problem: Prophylactic measure.   ·  Plan: DVT PPx: Eliquis  Code Status: CPR.

## 2024-06-10 LAB
APTT BLD: 144.2 SEC — CRITICAL HIGH (ref 24.5–35.6)
APTT BLD: 35.4 SEC — SIGNIFICANT CHANGE UP (ref 24.5–35.6)
CULTURE RESULTS: SIGNIFICANT CHANGE UP
CULTURE RESULTS: SIGNIFICANT CHANGE UP
HCT VFR BLD CALC: 27 % — LOW (ref 39–50)
HGB BLD-MCNC: 8.3 G/DL — LOW (ref 13–17)
INR BLD: 1.32 RATIO — HIGH (ref 0.85–1.18)
MCHC RBC-ENTMCNC: 19.7 PG — LOW (ref 27–34)
MCHC RBC-ENTMCNC: 30.7 GM/DL — LOW (ref 32–36)
MCV RBC AUTO: 64.1 FL — LOW (ref 80–100)
NRBC # BLD: 0 /100 WBCS — SIGNIFICANT CHANGE UP (ref 0–0)
PLATELET # BLD AUTO: 280 K/UL — SIGNIFICANT CHANGE UP (ref 150–400)
PROTHROM AB SERPL-ACNC: 13.7 SEC — HIGH (ref 9.5–13)
RBC # BLD: 4.21 M/UL — SIGNIFICANT CHANGE UP (ref 4.2–5.8)
RBC # FLD: 18.4 % — HIGH (ref 10.3–14.5)
SPECIMEN SOURCE: SIGNIFICANT CHANGE UP
SPECIMEN SOURCE: SIGNIFICANT CHANGE UP
WBC # BLD: 14.84 K/UL — HIGH (ref 3.8–10.5)
WBC # FLD AUTO: 14.84 K/UL — HIGH (ref 3.8–10.5)

## 2024-06-10 PROCEDURE — 93970 EXTREMITY STUDY: CPT | Mod: 26

## 2024-06-10 PROCEDURE — 99232 SBSQ HOSP IP/OBS MODERATE 35: CPT

## 2024-06-10 RX ORDER — HEPARIN SODIUM 5000 [USP'U]/ML
10000 INJECTION INTRAVENOUS; SUBCUTANEOUS EVERY 6 HOURS
Refills: 0 | Status: DISCONTINUED | OUTPATIENT
Start: 2024-06-10 | End: 2024-06-12

## 2024-06-10 RX ORDER — HEPARIN SODIUM 5000 [USP'U]/ML
5000 INJECTION INTRAVENOUS; SUBCUTANEOUS EVERY 6 HOURS
Refills: 0 | Status: DISCONTINUED | OUTPATIENT
Start: 2024-06-10 | End: 2024-06-12

## 2024-06-10 RX ORDER — HEPARIN SODIUM 5000 [USP'U]/ML
INJECTION INTRAVENOUS; SUBCUTANEOUS
Qty: 25000 | Refills: 0 | Status: DISCONTINUED | OUTPATIENT
Start: 2024-06-10 | End: 2024-06-12

## 2024-06-10 RX ADMIN — APIXABAN 5 MILLIGRAM(S): 2.5 TABLET, FILM COATED ORAL at 05:59

## 2024-06-10 RX ADMIN — Medication 1 TABLET(S): at 13:48

## 2024-06-10 RX ADMIN — PIPERACILLIN AND TAZOBACTAM 25 GRAM(S): 4; .5 INJECTION, POWDER, LYOPHILIZED, FOR SOLUTION INTRAVENOUS at 18:15

## 2024-06-10 RX ADMIN — HEPARIN SODIUM 1900 UNIT(S)/HR: 5000 INJECTION INTRAVENOUS; SUBCUTANEOUS at 23:22

## 2024-06-10 RX ADMIN — CALCITRIOL 0.5 MICROGRAM(S): 0.5 CAPSULE ORAL at 13:47

## 2024-06-10 RX ADMIN — Medication 50 MILLIGRAM(S): at 06:56

## 2024-06-10 RX ADMIN — HEPARIN SODIUM 2300 UNIT(S)/HR: 5000 INJECTION INTRAVENOUS; SUBCUTANEOUS at 15:31

## 2024-06-10 RX ADMIN — SEVELAMER CARBONATE 800 MILLIGRAM(S): 2400 POWDER, FOR SUSPENSION ORAL at 17:53

## 2024-06-10 RX ADMIN — HEPARIN SODIUM 2300 UNIT(S)/HR: 5000 INJECTION INTRAVENOUS; SUBCUTANEOUS at 19:03

## 2024-06-10 RX ADMIN — PIPERACILLIN AND TAZOBACTAM 25 GRAM(S): 4; .5 INJECTION, POWDER, LYOPHILIZED, FOR SOLUTION INTRAVENOUS at 00:02

## 2024-06-10 RX ADMIN — CHLORHEXIDINE GLUCONATE 1 APPLICATION(S): 213 SOLUTION TOPICAL at 13:47

## 2024-06-10 RX ADMIN — Medication 2000 UNIT(S): at 13:48

## 2024-06-10 RX ADMIN — Medication 32 MILLIGRAM(S): at 05:59

## 2024-06-10 RX ADMIN — Medication 600 MILLIGRAM(S): at 05:59

## 2024-06-10 RX ADMIN — SEVELAMER CARBONATE 800 MILLIGRAM(S): 2400 POWDER, FOR SUSPENSION ORAL at 08:53

## 2024-06-10 RX ADMIN — HEPARIN SODIUM 0 UNIT(S)/HR: 5000 INJECTION INTRAVENOUS; SUBCUTANEOUS at 22:19

## 2024-06-10 RX ADMIN — ERYTHROPOIETIN 10000 UNIT(S): 10000 INJECTION, SOLUTION INTRAVENOUS; SUBCUTANEOUS at 10:37

## 2024-06-10 RX ADMIN — HEPARIN SODIUM 10000 UNIT(S): 5000 INJECTION INTRAVENOUS; SUBCUTANEOUS at 15:31

## 2024-06-10 RX ADMIN — SEVELAMER CARBONATE 800 MILLIGRAM(S): 2400 POWDER, FOR SUSPENSION ORAL at 13:49

## 2024-06-10 RX ADMIN — CINACALCET 30 MILLIGRAM(S): 30 TABLET, FILM COATED ORAL at 13:48

## 2024-06-10 RX ADMIN — Medication 600 MILLIGRAM(S): at 17:55

## 2024-06-10 NOTE — PROGRESS NOTE ADULT - ASSESSMENT
60 y/o male w/ PMHx ESRD on HD (M/W/F) via chest wall port (has failed fistulas), HTN, HLD, and T2DM who presents for fevers at home. Febrile with leukocytosis here, some new opacities seen on CT chest. Admitted for sepsis possibly 2/2 pneumonia.       Problem/Plan - 1:  ·  Problem: Sepsis.   ·  Plan: - Patient febrile with leukocytosis, new peripheral bandlike ground-glass opacity right upper lobe and medial ground-glass attenuation in the left lower lobe - given patient's household sick with Covid, could be URI as cause of symptoms  - ABX as per ID     Problem/Plan - 2:  ·  Problem: Normocytic anemia.  ·  Plan: - monitor cbc   - transfuse PRN    Problem/Plan - 3:  ·  Problem: ESRD on hemodialysis.  ·  Plan: - Gets HD M/W/F  - C/w Calcitriol 0.5mcg daily  - C/w cinacalcet 30mg daily  - C/w Sevelamer 800mg TID w/ meals  - C/w Nephro-Bud tab  - Patient on Eliquis 5mg BID apparently for clot prevention of RIJ catheter  - Nephrology fu   -  MRV with IV contrast of chest        Problem/Plan - 4:  ·  Problem: T2DM (type 2 diabetes mellitus).  ·  Plan: - Not on meds.    Problem/Plan - 5:  ·  Problem: HTN (hypertension).  ·  Plan: - Not on meds.    Problem/Plan - 6:  ·  Problem: Prophylactic measure.   ·  Plan: DVT PPx: Eliquis  Code Status: CPR.

## 2024-06-10 NOTE — PROGRESS NOTE ADULT - ASSESSMENT
59M complicated hx of multiple failed AV access and L-sided HeRo graft  Here for ?PNA - p/w fevers, SOB, cough, lethargy    Recommend:     1. MRV with IV contrast of chest  2. Protect RUE, can have IV on left  3. Please obtain medical clearances  4. D/C and follow up outpatient after workup is done     59M complicated hx of multiple failed AV access and L-sided HeRo graft  Here for ?PNA - p/w fevers, SOB, cough, lethargy    Recommend:     1. MRV with IV contrast of chest  2. Protect RUE, can have IV on left  3. Please obtain medical clearances  4. D/C and follow up outpatient after workup is done  5. Vein mapping completed

## 2024-06-10 NOTE — PROGRESS NOTE ADULT - ASSESSMENT
58y/o male with PMH of ESRD MWF, HTN, Gout, DM, HLD, obesity, BENNIE on cpap presents to the ED with chest pressure/pain, fever that began yesterday. Also endorses cough x 2weeks with recent exposure to COVID. Nephrology consulted for ESRD.     A/P     ESRD on HD   Center: [facility]  Nephrologist: Dr. Rocky Castillo  Access: Permacath   Consent obtained and placed in chart  Plan to continue MWF schedule while inpatient  Vascular consulted for vein mapping.  Pending CT venogram of chest to rule out central venous stenosis   Pending MR chest  Low K/Renal diet  Monitor BMP -     Hyperkalemia  s/p Lokelma  Pt needs to be on Lokelma 10 g on non Hd Days.  Low K diet  C/W HD schedule.  Monitor closely -    HTN  Marginal / controlled.  Off antihypertensives  UF with HD as tolerated   Monitor BP     Anemia  sec to ESRD   Iron saturation low - 7%, however pt with current active infection  Start Venofer when infection resolves  MORALES with HD  Transfuse if Hgb <7    CKD-MBD   - acceptable.  C/W Cinacalcet 30mg qd.  Increase sevelamer to 1600mg TID if PO4 worsens.  Low PO4 diet.  Check PO4, Ca daily    Sepsis:  Afebrile and WBC improving  CT Chest neg for PNA.  CT A/P - diverticulosis w/o diverticulitis.  UA neg for UTI.  Has PC.  BC neg to date.  ID following.  C/W Zosyn.

## 2024-06-11 LAB
ALBUMIN SERPL ELPH-MCNC: 3.7 G/DL — SIGNIFICANT CHANGE UP (ref 3.3–5)
ALP SERPL-CCNC: 53 U/L — SIGNIFICANT CHANGE UP (ref 40–120)
ALT FLD-CCNC: 13 U/L — SIGNIFICANT CHANGE UP (ref 10–45)
ANION GAP SERPL CALC-SCNC: 21 MMOL/L — HIGH (ref 5–17)
APTT BLD: 52.6 SEC — HIGH (ref 24.5–35.6)
APTT BLD: 63.5 SEC — HIGH (ref 24.5–35.6)
AST SERPL-CCNC: 19 U/L — SIGNIFICANT CHANGE UP (ref 10–40)
BASOPHILS # BLD AUTO: 0.06 K/UL — SIGNIFICANT CHANGE UP (ref 0–0.2)
BASOPHILS NFR BLD AUTO: 0.5 % — SIGNIFICANT CHANGE UP (ref 0–2)
BILIRUB SERPL-MCNC: 0.6 MG/DL — SIGNIFICANT CHANGE UP (ref 0.2–1.2)
BUN SERPL-MCNC: 39 MG/DL — HIGH (ref 7–23)
CALCIUM SERPL-MCNC: 10.8 MG/DL — HIGH (ref 8.4–10.5)
CHLORIDE SERPL-SCNC: 92 MMOL/L — LOW (ref 96–108)
CO2 SERPL-SCNC: 24 MMOL/L — SIGNIFICANT CHANGE UP (ref 22–31)
CREAT SERPL-MCNC: 10.71 MG/DL — HIGH (ref 0.5–1.3)
EGFR: 5 ML/MIN/1.73M2 — LOW
EOSINOPHIL # BLD AUTO: 0.66 K/UL — HIGH (ref 0–0.5)
EOSINOPHIL NFR BLD AUTO: 5.1 % — SIGNIFICANT CHANGE UP (ref 0–6)
GLUCOSE SERPL-MCNC: 88 MG/DL — SIGNIFICANT CHANGE UP (ref 70–99)
HCT VFR BLD CALC: 28 % — LOW (ref 39–50)
HGB BLD-MCNC: 8.8 G/DL — LOW (ref 13–17)
IMM GRANULOCYTES NFR BLD AUTO: 1.5 % — HIGH (ref 0–0.9)
LYMPHOCYTES # BLD AUTO: 26.8 % — SIGNIFICANT CHANGE UP (ref 13–44)
LYMPHOCYTES # BLD AUTO: 3.49 K/UL — HIGH (ref 1–3.3)
MCHC RBC-ENTMCNC: 20.1 PG — LOW (ref 27–34)
MCHC RBC-ENTMCNC: 31.4 GM/DL — LOW (ref 32–36)
MCV RBC AUTO: 63.9 FL — LOW (ref 80–100)
MONOCYTES # BLD AUTO: 0.61 K/UL — SIGNIFICANT CHANGE UP (ref 0–0.9)
MONOCYTES NFR BLD AUTO: 4.7 % — SIGNIFICANT CHANGE UP (ref 2–14)
NEUTROPHILS # BLD AUTO: 7.98 K/UL — HIGH (ref 1.8–7.4)
NEUTROPHILS NFR BLD AUTO: 61.4 % — SIGNIFICANT CHANGE UP (ref 43–77)
NRBC # BLD: 0 /100 WBCS — SIGNIFICANT CHANGE UP (ref 0–0)
PLATELET # BLD AUTO: 308 K/UL — SIGNIFICANT CHANGE UP (ref 150–400)
POTASSIUM SERPL-MCNC: 3.9 MMOL/L — SIGNIFICANT CHANGE UP (ref 3.5–5.3)
POTASSIUM SERPL-SCNC: 3.9 MMOL/L — SIGNIFICANT CHANGE UP (ref 3.5–5.3)
PROT SERPL-MCNC: 8 G/DL — SIGNIFICANT CHANGE UP (ref 6–8.3)
RBC # BLD: 4.38 M/UL — SIGNIFICANT CHANGE UP (ref 4.2–5.8)
RBC # FLD: 18.9 % — HIGH (ref 10.3–14.5)
SODIUM SERPL-SCNC: 137 MMOL/L — SIGNIFICANT CHANGE UP (ref 135–145)
WBC # BLD: 13 K/UL — HIGH (ref 3.8–10.5)
WBC # FLD AUTO: 13 K/UL — HIGH (ref 3.8–10.5)

## 2024-06-11 PROCEDURE — 71552 MRI CHEST W/O & W/DYE: CPT | Mod: 26

## 2024-06-11 PROCEDURE — 99232 SBSQ HOSP IP/OBS MODERATE 35: CPT

## 2024-06-11 RX ADMIN — SEVELAMER CARBONATE 800 MILLIGRAM(S): 2400 POWDER, FOR SUSPENSION ORAL at 13:50

## 2024-06-11 RX ADMIN — HEPARIN SODIUM 1900 UNIT(S)/HR: 5000 INJECTION INTRAVENOUS; SUBCUTANEOUS at 07:28

## 2024-06-11 RX ADMIN — Medication 1 TABLET(S): at 13:50

## 2024-06-11 RX ADMIN — SODIUM ZIRCONIUM CYCLOSILICATE 10 GRAM(S): 10 POWDER, FOR SUSPENSION ORAL at 08:38

## 2024-06-11 RX ADMIN — Medication 600 MILLIGRAM(S): at 18:17

## 2024-06-11 RX ADMIN — CALCITRIOL 0.5 MICROGRAM(S): 0.5 CAPSULE ORAL at 13:50

## 2024-06-11 RX ADMIN — SEVELAMER CARBONATE 800 MILLIGRAM(S): 2400 POWDER, FOR SUSPENSION ORAL at 08:36

## 2024-06-11 RX ADMIN — PIPERACILLIN AND TAZOBACTAM 25 GRAM(S): 4; .5 INJECTION, POWDER, LYOPHILIZED, FOR SOLUTION INTRAVENOUS at 05:14

## 2024-06-11 RX ADMIN — Medication 0.5 MILLIGRAM(S): at 10:04

## 2024-06-11 RX ADMIN — CHLORHEXIDINE GLUCONATE 1 APPLICATION(S): 213 SOLUTION TOPICAL at 13:51

## 2024-06-11 RX ADMIN — CINACALCET 30 MILLIGRAM(S): 30 TABLET, FILM COATED ORAL at 13:51

## 2024-06-11 RX ADMIN — SEVELAMER CARBONATE 800 MILLIGRAM(S): 2400 POWDER, FOR SUSPENSION ORAL at 18:17

## 2024-06-11 RX ADMIN — Medication 0.5 MILLIGRAM(S): at 12:13

## 2024-06-11 RX ADMIN — HEPARIN SODIUM 1900 UNIT(S)/HR: 5000 INJECTION INTRAVENOUS; SUBCUTANEOUS at 10:35

## 2024-06-11 RX ADMIN — PIPERACILLIN AND TAZOBACTAM 25 GRAM(S): 4; .5 INJECTION, POWDER, LYOPHILIZED, FOR SOLUTION INTRAVENOUS at 18:17

## 2024-06-11 RX ADMIN — HEPARIN SODIUM 2200 UNIT(S)/HR: 5000 INJECTION INTRAVENOUS; SUBCUTANEOUS at 18:18

## 2024-06-11 RX ADMIN — Medication 2000 UNIT(S): at 13:50

## 2024-06-11 RX ADMIN — Medication 600 MILLIGRAM(S): at 05:16

## 2024-06-11 NOTE — PROGRESS NOTE ADULT - ASSESSMENT
58 y/o male w/ PMHx ESRD on HD (M/W/F) via chest wall port (has failed fistulas), HTN, HLD, and T2DM who presents for fevers at home. Febrile with leukocytosis here, some new opacities seen on CT chest. Admitted for sepsis possibly 2/2 pneumonia.       Problem/Plan - 1:  ·  Problem: Sepsis.   ·  Plan: - Patient febrile with leukocytosis, new peripheral bandlike ground-glass opacity right upper lobe and medial ground-glass attenuation in the left lower lobe - given patient's household sick with Covid, could be URI as cause of symptoms  - ABX as per ID  -  CT consolidative PNA, GGO bandlike, no intra-abd source found  - Uncertain source fever, missed viral vs bacterial superimposed on viral pna?  - Zosyn 3.375g q 12, 7 days then discontinue      Problem/Plan - 2:  ·  Problem: Normocytic anemia.  ·  Plan: - monitor cbc   - transfuse PRN    Problem/Plan - 3:  ·  Problem: ESRD on hemodialysis.  ·  Plan: - Gets HD M/W/F  - C/w Calcitriol 0.5mcg daily  - C/w cinacalcet 30mg daily  - C/w Sevelamer 800mg TID w/ meals  - C/w Nephro-Bud tab  - Patient on Eliquis 5mg BID apparently for clot prevention of RIJ catheter  - Nephrology fu   -  MRV with IV contrast of chest    Problem/Plan - 4:  ·  Problem: T2DM (type 2 diabetes mellitus).  ·  Plan: - Not on meds.    Problem/Plan - 5:  ·  Problem: HTN (hypertension).  ·  Plan: - Not on meds.    Problem/Plan - 6:  ·  Problem: Prophylactic measure.   ·  Plan: DVT PPx: Eliquis  Code Status: CPR.

## 2024-06-11 NOTE — PROGRESS NOTE ADULT - ASSESSMENT
58y/o male with PMH of ESRD MWF, HTN, Gout, DM, HLD, obesity, BENNIE on cpap presents to the ED with chest pressure/pain, fever that began yesterday. Also endorses cough x 2weeks with recent exposure to COVID. Nephrology consulted for ESRD.     A/P     ESRD on HD   Center: [facility]  Nephrologist: Dr. Rocky Castillo  Access: Permacath   Consent obtained and placed in chart  Plan to continue MWF schedule while inpatient  s/p vein mapping today, vascular f/u  Protect RUE   Pending MR chest w/ and w/o contrast  Low K/Renal diet  Monitor BMP     Hyperkalemia  s/p Lokelma  Pt needs to be on Lokelma 10 g on non Hd Days.  Low K diet  C/W HD schedule.  Monitor closely -    HTN  Marginal / controlled.  Off antihypertensives  UF with HD as tolerated   Monitor BP     Anemia  sec to ESRD   Iron saturation low - 7%, however pt with current active infection  Start Venofer when infection resolves  MORALES with HD  Transfuse if Hgb <7    CKD-MBD   - acceptable.  C/W Cinacalcet 30mg qd.  Increase sevelamer to 1600mg TID if PO4 worsens.  Low PO4 diet.  Check PO4, Ca daily    Sepsis:  Afebrile and WBC improving  CT Chest neg for PNA.  CT A/P - diverticulosis w/o diverticulitis.  UA neg for UTI.  Has PC.  BC neg to date.  ID following.  C/W Zosyn. 60y/o male with PMH of ESRD MWF, HTN, Gout, DM, HLD, obesity, BENNIE on cpap presents to the ED with chest pressure/pain, fever that began yesterday. Also endorses cough x 2weeks with recent exposure to COVID. Nephrology consulted for ESRD.     A/P     ESRD on HD   Center: [facility]  Nephrologist: Dr. Rocky Castillo  Access: Permacath   Consent obtained and placed in chart  Plan to continue MWF schedule while inpatient  s/p vein mapping today, vascular f/u  Protect RUE   s/p MR chest w/ and w/o contrast -> to be dialyzed today after contrast; patient refused   HD tomorrow as scheduled  Low K/Renal diet  Monitor BMP     Hyperkalemia  s/p Lokelma  Pt needs to be on Lokelma 10 g on non Hd Days.  Low K diet  C/W HD schedule.  Monitor closely -    HTN  Marginal / controlled.  Off antihypertensives  UF with HD as tolerated   Monitor BP     Anemia  sec to ESRD   Iron saturation low - 7%, however pt with current active infection  Start Venofer when infection resolves  MORALES with HD  Transfuse if Hgb <7    CKD-MBD   - acceptable.  C/W Cinacalcet 30mg qd.  Increase sevelamer to 1600mg TID if PO4 worsens.  Low PO4 diet.  Check PO4, Ca daily    Sepsis:  Afebrile and WBC improving  CT Chest neg for PNA.  CT A/P - diverticulosis w/o diverticulitis.  UA neg for UTI.  Has PC.  BC neg to date.  ID following.  C/W Zosyn.

## 2024-06-11 NOTE — PROGRESS NOTE ADULT - ASSESSMENT
60 yo M ESRD HD, DM2 fatigue  Fever, leukocytosis  Fatigue, fever, URI symptoms, N/V  Permacath  RVP neg  UA neg  CT consolidative PNA, GGO bandlike, no intra-abd source found  Uncertain source fever, missed viral vs bacterial superimposed on viral pna?  Slight rise WBC, uncertain cause--trend WBC, monitor for sources, patient well appearing  Overall, Fever, leukocytosis, URI, nausea/vomiting  - Zosyn 3.375g q 12, 7 days then discontinue  - F/U BCXs  - Monitor for focal signs infection/symptoms  - Monitor for alternate sources such as C diff/diarrhea  - Trend WBC to normal    Bienvenido Diallo MD  Contact on TEAMS messaging from 9am - 5pm  From 5pm-9am, on weekends, or if no response call [phone]

## 2024-06-11 NOTE — PROGRESS NOTE ADULT - ASSESSMENT
ASSESSMENT: 59M complicated hx of multiple failed AV access and L-sided HeRo graft. Vascular consulted for evaluation of HD access.     Recommend:     -MRV with IV contrast of chest- Ordered   -Protect RUE, can have IV on left  -Please obtain medical clearances  -Vein mapping completed   -No plans for operative intervention while inpatient, D/C per primary team     Vascular Surgery

## 2024-06-12 ENCOUNTER — TRANSCRIPTION ENCOUNTER (OUTPATIENT)
Age: 59
End: 2024-06-12

## 2024-06-12 VITALS
DIASTOLIC BLOOD PRESSURE: 66 MMHG | OXYGEN SATURATION: 97 % | HEART RATE: 104 BPM | RESPIRATION RATE: 18 BRPM | SYSTOLIC BLOOD PRESSURE: 104 MMHG | TEMPERATURE: 98 F

## 2024-06-12 LAB
APTT BLD: 129.6 SEC — CRITICAL HIGH (ref 24.5–35.6)
APTT BLD: 73.4 SEC — HIGH (ref 24.5–35.6)
CULTURE RESULTS: SIGNIFICANT CHANGE UP
CULTURE RESULTS: SIGNIFICANT CHANGE UP
HCT VFR BLD CALC: 25.6 % — LOW (ref 39–50)
HGB BLD-MCNC: 7.9 G/DL — LOW (ref 13–17)
MCHC RBC-ENTMCNC: 19.9 PG — LOW (ref 27–34)
MCHC RBC-ENTMCNC: 30.9 GM/DL — LOW (ref 32–36)
MCV RBC AUTO: 64.5 FL — LOW (ref 80–100)
NRBC # BLD: 0 /100 WBCS — SIGNIFICANT CHANGE UP (ref 0–0)
PLATELET # BLD AUTO: 279 K/UL — SIGNIFICANT CHANGE UP (ref 150–400)
RBC # BLD: 3.97 M/UL — LOW (ref 4.2–5.8)
RBC # FLD: 18.7 % — HIGH (ref 10.3–14.5)
SPECIMEN SOURCE: SIGNIFICANT CHANGE UP
SPECIMEN SOURCE: SIGNIFICANT CHANGE UP
WBC # BLD: 11.53 K/UL — HIGH (ref 3.8–10.5)
WBC # FLD AUTO: 11.53 K/UL — HIGH (ref 3.8–10.5)

## 2024-06-12 PROCEDURE — 74176 CT ABD & PELVIS W/O CONTRAST: CPT | Mod: MC

## 2024-06-12 PROCEDURE — 85027 COMPLETE CBC AUTOMATED: CPT

## 2024-06-12 PROCEDURE — 83550 IRON BINDING TEST: CPT

## 2024-06-12 PROCEDURE — 85018 HEMOGLOBIN: CPT

## 2024-06-12 PROCEDURE — 82947 ASSAY GLUCOSE BLOOD QUANT: CPT

## 2024-06-12 PROCEDURE — 86850 RBC ANTIBODY SCREEN: CPT

## 2024-06-12 PROCEDURE — 87040 BLOOD CULTURE FOR BACTERIA: CPT

## 2024-06-12 PROCEDURE — 80053 COMPREHEN METABOLIC PANEL: CPT

## 2024-06-12 PROCEDURE — 86901 BLOOD TYPING SEROLOGIC RH(D): CPT

## 2024-06-12 PROCEDURE — 82728 ASSAY OF FERRITIN: CPT

## 2024-06-12 PROCEDURE — 96368 THER/DIAG CONCURRENT INF: CPT

## 2024-06-12 PROCEDURE — 86900 BLOOD TYPING SEROLOGIC ABO: CPT

## 2024-06-12 PROCEDURE — 71552 MRI CHEST W/O & W/DYE: CPT

## 2024-06-12 PROCEDURE — 85730 THROMBOPLASTIN TIME PARTIAL: CPT

## 2024-06-12 PROCEDURE — 85610 PROTHROMBIN TIME: CPT

## 2024-06-12 PROCEDURE — 84484 ASSAY OF TROPONIN QUANT: CPT

## 2024-06-12 PROCEDURE — 96365 THER/PROPH/DIAG IV INF INIT: CPT

## 2024-06-12 PROCEDURE — 99261: CPT

## 2024-06-12 PROCEDURE — 87086 URINE CULTURE/COLONY COUNT: CPT

## 2024-06-12 PROCEDURE — 0225U NFCT DS DNA&RNA 21 SARSCOV2: CPT

## 2024-06-12 PROCEDURE — 82803 BLOOD GASES ANY COMBINATION: CPT

## 2024-06-12 PROCEDURE — 99232 SBSQ HOSP IP/OBS MODERATE 35: CPT

## 2024-06-12 PROCEDURE — 84443 ASSAY THYROID STIM HORMONE: CPT

## 2024-06-12 PROCEDURE — 83880 ASSAY OF NATRIURETIC PEPTIDE: CPT

## 2024-06-12 PROCEDURE — 87641 MR-STAPH DNA AMP PROBE: CPT

## 2024-06-12 PROCEDURE — 83605 ASSAY OF LACTIC ACID: CPT

## 2024-06-12 PROCEDURE — 82435 ASSAY OF BLOOD CHLORIDE: CPT

## 2024-06-12 PROCEDURE — 87640 STAPH A DNA AMP PROBE: CPT

## 2024-06-12 PROCEDURE — 36415 COLL VENOUS BLD VENIPUNCTURE: CPT

## 2024-06-12 PROCEDURE — 76937 US GUIDE VASCULAR ACCESS: CPT

## 2024-06-12 PROCEDURE — 84100 ASSAY OF PHOSPHORUS: CPT

## 2024-06-12 PROCEDURE — 84466 ASSAY OF TRANSFERRIN: CPT

## 2024-06-12 PROCEDURE — 82330 ASSAY OF CALCIUM: CPT

## 2024-06-12 PROCEDURE — 99285 EMERGENCY DEPT VISIT HI MDM: CPT | Mod: 25

## 2024-06-12 PROCEDURE — A9585: CPT

## 2024-06-12 PROCEDURE — 84295 ASSAY OF SERUM SODIUM: CPT

## 2024-06-12 PROCEDURE — 71045 X-RAY EXAM CHEST 1 VIEW: CPT | Mod: 26

## 2024-06-12 PROCEDURE — 82310 ASSAY OF CALCIUM: CPT

## 2024-06-12 PROCEDURE — 84132 ASSAY OF SERUM POTASSIUM: CPT

## 2024-06-12 PROCEDURE — 83540 ASSAY OF IRON: CPT

## 2024-06-12 PROCEDURE — 96375 TX/PRO/DX INJ NEW DRUG ADDON: CPT

## 2024-06-12 PROCEDURE — 93005 ELECTROCARDIOGRAM TRACING: CPT

## 2024-06-12 PROCEDURE — 84145 PROCALCITONIN (PCT): CPT

## 2024-06-12 PROCEDURE — 71250 CT THORAX DX C-: CPT | Mod: MC

## 2024-06-12 PROCEDURE — 85014 HEMATOCRIT: CPT

## 2024-06-12 PROCEDURE — 96366 THER/PROPH/DIAG IV INF ADDON: CPT

## 2024-06-12 PROCEDURE — 83970 ASSAY OF PARATHORMONE: CPT

## 2024-06-12 PROCEDURE — 87637 SARSCOV2&INF A&B&RSV AMP PRB: CPT

## 2024-06-12 PROCEDURE — 81001 URINALYSIS AUTO W/SCOPE: CPT

## 2024-06-12 PROCEDURE — 83690 ASSAY OF LIPASE: CPT

## 2024-06-12 PROCEDURE — 80048 BASIC METABOLIC PNL TOTAL CA: CPT

## 2024-06-12 PROCEDURE — 93970 EXTREMITY STUDY: CPT

## 2024-06-12 PROCEDURE — 71045 X-RAY EXAM CHEST 1 VIEW: CPT

## 2024-06-12 PROCEDURE — 82962 GLUCOSE BLOOD TEST: CPT

## 2024-06-12 PROCEDURE — 85025 COMPLETE CBC W/AUTO DIFF WBC: CPT

## 2024-06-12 PROCEDURE — 36410 VNPNXR 3YR/> PHY/QHP DX/THER: CPT

## 2024-06-12 RX ORDER — SODIUM ZIRCONIUM CYCLOSILICATE 10 G/10G
10 POWDER, FOR SUSPENSION ORAL
Qty: 171 | Refills: 0
Start: 2024-06-12 | End: 2024-07-11

## 2024-06-12 RX ORDER — APIXABAN 2.5 MG/1
5 TABLET, FILM COATED ORAL ONCE
Refills: 0 | Status: DISCONTINUED | OUTPATIENT
Start: 2024-06-12 | End: 2024-06-12

## 2024-06-12 RX ADMIN — SEVELAMER CARBONATE 800 MILLIGRAM(S): 2400 POWDER, FOR SUSPENSION ORAL at 13:03

## 2024-06-12 RX ADMIN — CINACALCET 30 MILLIGRAM(S): 30 TABLET, FILM COATED ORAL at 13:02

## 2024-06-12 RX ADMIN — BENZOCAINE AND MENTHOL 1 LOZENGE: 5; 1 LIQUID ORAL at 07:42

## 2024-06-12 RX ADMIN — HEPARIN SODIUM 2200 UNIT(S)/HR: 5000 INJECTION INTRAVENOUS; SUBCUTANEOUS at 01:49

## 2024-06-12 RX ADMIN — SEVELAMER CARBONATE 800 MILLIGRAM(S): 2400 POWDER, FOR SUSPENSION ORAL at 07:36

## 2024-06-12 RX ADMIN — ERYTHROPOIETIN 10000 UNIT(S): 10000 INJECTION, SOLUTION INTRAVENOUS; SUBCUTANEOUS at 10:38

## 2024-06-12 RX ADMIN — Medication 600 MILLIGRAM(S): at 05:35

## 2024-06-12 RX ADMIN — PIPERACILLIN AND TAZOBACTAM 25 GRAM(S): 4; .5 INJECTION, POWDER, LYOPHILIZED, FOR SOLUTION INTRAVENOUS at 05:35

## 2024-06-12 RX ADMIN — HEPARIN SODIUM 2200 UNIT(S)/HR: 5000 INJECTION INTRAVENOUS; SUBCUTANEOUS at 07:53

## 2024-06-12 RX ADMIN — CHLORHEXIDINE GLUCONATE 1 APPLICATION(S): 213 SOLUTION TOPICAL at 13:03

## 2024-06-12 RX ADMIN — Medication 1 TABLET(S): at 13:02

## 2024-06-12 RX ADMIN — Medication 600 MILLIGRAM(S): at 16:51

## 2024-06-12 RX ADMIN — SEVELAMER CARBONATE 800 MILLIGRAM(S): 2400 POWDER, FOR SUSPENSION ORAL at 16:50

## 2024-06-12 RX ADMIN — HEPARIN SODIUM 0 UNIT(S)/HR: 5000 INJECTION INTRAVENOUS; SUBCUTANEOUS at 11:24

## 2024-06-12 RX ADMIN — Medication 2000 UNIT(S): at 13:01

## 2024-06-12 RX ADMIN — CALCITRIOL 0.5 MICROGRAM(S): 0.5 CAPSULE ORAL at 13:02

## 2024-06-12 RX ADMIN — HEPARIN SODIUM 2200 UNIT(S)/HR: 5000 INJECTION INTRAVENOUS; SUBCUTANEOUS at 07:19

## 2024-06-12 RX ADMIN — HEPARIN SODIUM 1800 UNIT(S)/HR: 5000 INJECTION INTRAVENOUS; SUBCUTANEOUS at 12:29

## 2024-06-12 NOTE — DISCHARGE NOTE PROVIDER - NSDCMRMEDTOKEN_GEN_ALL_CORE_FT
calcitriol 0.5 mcg oral capsule: 1 cap(s) orally once a day  cholecalciferol 50 mcg (2000 intl units) oral tablet: 1 tab(s) orally once a day  cinacalcet 30 mg oral tablet: 1 tab(s) orally once a day  Eliquis 5 mg oral tablet: 1 tab(s) orally 2 times a day  [place]-Bud Rx oral tablet: 1 tab(s) orally once a day  sevelamer carbonate 800 mg oral tablet: 1 tab(s) orally 3 times a day (with meals)   calcitriol 0.5 mcg oral capsule: 1 cap(s) orally once a day  cholecalciferol 50 mcg (2000 intl units) oral tablet: 1 tab(s) orally once a day  cinacalcet 30 mg oral tablet: 1 tab(s) orally once a day  Eliquis 5 mg oral tablet: 1 tab(s) orally 2 times a day  guaiFENesin 600 mg oral tablet, extended release: 1 tab(s) orally every 12 hours as needed for  cough  Lokelma 10 g oral powder for reconstitution: 10 gram(s) orally Tuesday, Thursday, Saturday, Sunday take on NON-HD days  [place]-Bud Rx oral tablet: 1 tab(s) orally once a day  sevelamer carbonate 800 mg oral tablet: 1 tab(s) orally 3 times a day (with meals)

## 2024-06-12 NOTE — DISCHARGE NOTE PROVIDER - PROVIDER TOKENS
PROVIDER:[TOKEN:[[id number]]],PROVIDER:[TOKEN:[[id number]]] PROVIDER:[TOKEN:[[id number]]],PROVIDER:[TOKEN:[[id number]]],PROVIDER:[TOKEN:[[id number]]],PROVIDER:[TOKEN:[[id number]]]

## 2024-06-12 NOTE — PROGRESS NOTE ADULT - PROVIDER SPECIALTY LIST ADULT
Hospitalist
Hospitalist
Nephrology
Vascular Surgery
Hospitalist
Infectious Disease
Infectious Disease
Nephrology
Vascular Surgery
Vascular Surgery
Hospitalist
Hospitalist
Infectious Disease
Nephrology
Hospitalist

## 2024-06-12 NOTE — PROGRESS NOTE ADULT - ASSESSMENT
ASSESSMENT: 59M complicated hx of multiple failed AV access and L-sided HeRo graft. Vascular consulted for evaluation of HD access.     Recommend:   -MRV completed- patient found to have bilateral brachiocephalic narrowing   -Protect RUE, can have IV on left  -Please obtain medical clearances  -Vein mapping completed   -Patient will follow up with Dr. Kim outpatient for operative planning   -Plan d/w primary team     Vascular Surgery

## 2024-06-12 NOTE — PROGRESS NOTE ADULT - ASSESSMENT
58 yo M ESRD HD, DM2 fatigue  Fever, leukocytosis  Fatigue, fever, URI symptoms, N/V  Permacath  RVP neg  UA neg  CT consolidative PNA, GGO bandlike, no intra-abd source found  Uncertain source fever, missed viral vs bacterial superimposed on viral pna?  Slight rise WBC, uncertain cause--trend WBC, monitor for sources, patient well appearing  Overall, Fever, leukocytosis, URI, nausea/vomiting  - S/p course Zosyn  - F/U BCXs  - Monitor for focal signs infection/symptoms  - Monitor for alternate sources such as C diff/diarrhea  - Trend WBC to normal    Bienvenido Diallo MD  Contact on TEAMS messaging from 9am - 5pm  From 5pm-9am, on weekends, or if no response call [phone]

## 2024-06-12 NOTE — DISCHARGE NOTE PROVIDER - HOSPITAL COURSE
History of Present Illness





- History of Present Illness


History of Present Illness: 





called by ER for icu eval





75 y/o male with PMHx of A-fib and DM sent from Nursing Home by Dr. Mccarthy with 

complaints of generalized weakness and lethargy. 





Patient had labs 2 days ago and hemoglobin was 9, today hemoglobin is 7.5 and 

he was sent for further evaluation. 


pt c/o weakness for 1 week


he is mostly bedridden


not walking


he goes to  on bed using bedpan


c/o severe constipation


IN er evaluated and MI done showing dark stool and +ve guiac





he has no abd pain


no nausea 


no vomiting


poorly eating





multiple medical problems





PMH:Anxiety, Bipolar Disorder, CAD, Cardia Arrhythmia (A FIB), CHF, COPD, 

Depression, HTN, Hypercholesterolemia, Osteoporosis, Sleep Apnea





no surgical problems





no recent echo





pt is being seen bu multple specialists in the [place]





medications noted


pt was on eliquis 5mg bid


also takes naprosyn prn





no h/o gi bleed in the past





ROS noted





/e:


pt is comfortable


no distress


 











Temp Pulse Resp BP Pulse Ox


 


 97.8 F   105 H  29 H  101/66   100 


 


 10/18/17 17:48  10/18/17 20:35  10/18/17 20:35  10/18/17 20:35  10/18/17 21:19





vitals stable


chest good air entry


afib


abd soft


1+ edema


alert and oriented





CXR non specific no acute changes noted





 





 10/18/17 18:49 





 10/18/17 18:49 





a/p:


pt with afib and multiple medical problems


Anxiety, Bipolar Disorder, CAD, Cardia Arrhythmia (A FIB), CHF, COPD, Depression

, HTN, Hypercholesterolemia, Osteoporosis, Sleep Apnea


and anticoagulation


PT and ptt pending


no active bleeding now


start PPI IV


GI eval


give 2 units of blood at standard rate


recheck H and H


GI eval


hold anticoagulation and antiplatlets


can be managed in the tele.


if any decompensation please call ICU reeval











Past Patient History





- Past Medical History & Family History


Past Medical History?: Yes





- Past Social History


Smoking Status: Never Smoked





- CARDIAC


Hx Cardia Arrhythmia: Yes (A FIB)


Hx Congestive Heart Failure: Yes


Hx Hypercholesterolemia: Yes


Hx Hypertension: Yes





- PULMONARY


Hx Chronic Obstructive Pulmonary Disease (COPD): Yes


Hx Sleep Apnea: Yes





- NEUROLOGICAL


HX Cerebrovascular Accident: No


Hx Transient Ischemic Attacks (TIA): No





- HEENT


Hx HEENT Problems: Yes (JEMIMA.)





- HEMATOLOGICAL/ONCOLOGICAL


Hx Blood Transfusions: No





- INTEGUMENTARY


Hx Dermatological Problems: Yes


Hx Cellulitis: Yes





- MUSCULOSKELETAL/RHEUMATOLOGICAL


Hx Osteoporosis: Yes





- GASTROINTESTINAL


Hx Gastrointestinal Disorders: Yes


Hx Hemorrhoids: Yes





- GENITOURINARY/GYNECOLOGICAL


Hx Genitourinary Disorders: Yes


Hx Prostate Problems: Yes (BENIGN PROSTATIC HYPERPLASIA)





- PSYCHIATRIC


Hx Anxiety: Yes


Hx Bipolar Disorder: Yes


Hx Depression: Yes


Hx Substance Use: No





- SURGICAL HISTORY


Hx Surgeries: No





- ANESTHESIA


Hx Anesthesia: No (IV SEDATION ONLY)


Hx Anesthesia Reactions: No


Hx Malignant Hyperthermia: No





Meds


Allergies/Adverse Reactions: 


 Allergies











Allergy/AdvReac Type Severity Reaction Status Date / Time


 


No Known Allergies Allergy   Verified 03/22/17 09:25














- Medications


Medications: 


 Current Medications





Pantoprazole Sodium (Protonix Ec Tab)  40 mg PO BID CYRIL











Results





- Vital Signs


Recent Vital Signs: 


 Last Vital Signs











Temp  97.8 F   10/18/17 17:48


 


Pulse  105 H  10/18/17 20:35


 


Resp  29 H  10/18/17 20:35


 


BP  101/66   10/18/17 20:35


 


Pulse Ox  100   10/18/17 21:19














- Labs


Result Diagrams: 


 10/18/17 18:49





 10/18/17 18:49


Labs: 


 Laboratory Results - last 24 hr











  10/18/17 10/18/17 10/18/17





  18:01 18:49 18:49


 


WBC    7.6


 


RBC    2.31 L


 


Hgb    7.3 L


 


Hct    22.2 L


 


MCV    96.0 H


 


MCH    31.7 H


 


MCHC    33.0


 


RDW    15.7 H


 


Plt Count    115 L


 


MPV    13.3 H


 


Neut % (Auto)    78.6 H


 


Lymph % (Auto)    13.7 L


 


Mono % (Auto)    5.3


 


Eos % (Auto)    1.8


 


Baso % (Auto)    0.6


 


Neut #    6.0


 


Lymph #    1.0


 


Mono #    0.4


 


Eos #    0.1


 


Baso #    0.0


 


Differential Comment    


 


pO2   


 


VBG pH   


 


VBG pCO2   


 


VBG HCO3   


 


VBG Total CO2   


 


VBG O2 Sat (Calc)   


 


VBG Base Excess   


 


VBG Potassium   


 


Glucose   


 


Lactate   


 


Sodium   137 


 


Potassium   4.4 


 


Chloride   104 


 


Carbon Dioxide   24 


 


Anion Gap   13 


 


BUN   34 H 


 


Creatinine   0.9 


 


Est GFR ( Amer)   > 60 


 


Est GFR (Non-Af Amer)   > 60 


 


POC Glucose (mg/dL)  119 H  


 


Random Glucose   88 


 


Calcium   8.4 L 


 


Total Bilirubin   0.7 


 


AST   46 


 


ALT   78 H 


 


Alkaline Phosphatase   49 


 


Total Protein   5.6 L 


 


Albumin   2.8 L 


 


Globulin   2.8 


 


Albumin/Globulin Ratio   1.0 


 


Venous Blood Potassium   


 


Urine Color   


 


Urine Clarity   


 


Urine pH   


 


Ur Specific Gravity   


 


Urine Protein   


 


Urine Glucose (UA)   


 


Urine Ketones   


 


Urine Blood   


 


Urine Nitrate   


 


Urine Bilirubin   


 


Urine Urobilinogen   


 


Ur Leukocyte Esterase   


 


Urine WBC (Auto)   


 


Urine RBC (Auto)   


 


Stool Occult Blood   


 


Blood Type   


 


Blood Type Confirm   


 


Antibody Screen   














  10/18/17 10/18/17 10/18/17





  18:49 18:49 18:55


 


WBC   


 


RBC   


 


Hgb   


 


Hct   


 


MCV   


 


MCH   


 


MCHC   


 


RDW   


 


Plt Count   


 


MPV   


 


Neut % (Auto)   


 


Lymph % (Auto)   


 


Mono % (Auto)   


 


Eos % (Auto)   


 


Baso % (Auto)   


 


Neut #   


 


Lymph #   


 


Mono #   


 


Eos #   


 


Baso #   


 


Differential Comment   


 


pO2    42


 


VBG pH    7.43


 


VBG pCO2    42


 


VBG HCO3    26.9


 


VBG Total CO2    29.2 H


 


VBG O2 Sat (Calc)    83.7 H


 


VBG Base Excess    3.2 H


 


VBG Potassium    5.9 H


 


Glucose    98


 


Lactate    2.6 H


 


Sodium    140.0


 


Potassium   


 


Chloride    113.0 H


 


Carbon Dioxide   


 


Anion Gap   


 


BUN   


 


Creatinine   


 


Est GFR ( Amer)   


 


Est GFR (Non-Af Amer)   


 


POC Glucose (mg/dL)   


 


Random Glucose   


 


Calcium   


 


Total Bilirubin   


 


AST   


 


ALT   


 


Alkaline Phosphatase   


 


Total Protein   


 


Albumin   


 


Globulin   


 


Albumin/Globulin Ratio   


 


Venous Blood Potassium    5.9 H


 


Urine Color  Yellow  


 


Urine Clarity  Clear  


 


Urine pH  5.0  


 


Ur Specific Gravity  1.026  


 


Urine Protein  Negative  


 


Urine Glucose (UA)  Normal  


 


Urine Ketones  Negative  


 


Urine Blood  Negative  


 


Urine Nitrate  Negative  


 


Urine Bilirubin  Negative  


 


Urine Urobilinogen  2.0  


 


Ur Leukocyte Esterase  Neg  


 


Urine WBC (Auto)  3  


 


Urine RBC (Auto)  < 1  


 


Stool Occult Blood   


 


Blood Type   A POSITIVE 


 


Blood Type Confirm   A POSITIVE 


 


Antibody Screen   Negative 














  10/18/17





  19:43


 


WBC 


 


RBC 


 


Hgb 


 


Hct 


 


MCV 


 


MCH 


 


MCHC 


 


RDW 


 


Plt Count 


 


MPV 


 


Neut % (Auto) 


 


Lymph % (Auto) 


 


Mono % (Auto) 


 


Eos % (Auto) 


 


Baso % (Auto) 


 


Neut # 


 


Lymph # 


 


Mono # 


 


Eos # 


 


Baso # 


 


Differential Comment 


 


pO2 


 


VBG pH 


 


VBG pCO2 


 


VBG HCO3 


 


VBG Total CO2 


 


VBG O2 Sat (Calc) 


 


VBG Base Excess 


 


VBG Potassium 


 


Glucose 


 


Lactate 


 


Sodium 


 


Potassium 


 


Chloride 


 


Carbon Dioxide 


 


Anion Gap 


 


BUN 


 


Creatinine 


 


Est GFR ( Amer) 


 


Est GFR (Non-Af Amer) 


 


POC Glucose (mg/dL) 


 


Random Glucose 


 


Calcium 


 


Total Bilirubin 


 


AST 


 


ALT 


 


Alkaline Phosphatase 


 


Total Protein 


 


Albumin 


 


Globulin 


 


Albumin/Globulin Ratio 


 


Venous Blood Potassium 


 


Urine Color 


 


Urine Clarity 


 


Urine pH 


 


Ur Specific Gravity 


 


Urine Protein 


 


Urine Glucose (UA) 


 


Urine Ketones 


 


Urine Blood 


 


Urine Nitrate 


 


Urine Bilirubin 


 


Urine Urobilinogen 


 


Ur Leukocyte Esterase 


 


Urine WBC (Auto) 


 


Urine RBC (Auto) 


 


Stool Occult Blood  Positive H


 


Blood Type 


 


Blood Type Confirm 


 


Antibody Screen HPI:  This is a 58 y/o male w/ PMHx ESRD on HD (M/W/F) via chest wall port (has failed fistulas), HTN, HLD, and T2DM who presents for fevers and fatigue. He has been feeling feverish with generalized malaise, chills, and has had a cough for the past few days. Has also been having abdominal pain and vomiting. In addition, his family that has been living with him has been sick with Covid as well. Patient     In the ED, he was febrile to T-max 103.4, tachycardic to 119, hemodynamically stable, saturating well on RA. CBC w/ leukocytosis of 12.86, worsening microcytic anemia w Hb 7.9 (10.1 3 weeks ago), CMP w/ evidence of ESRD. Limited RVP negative for Flu/RSV/Covid. CT chest showing stable 1.2cm RLL calcified mass (likely a hamartoma) and new peripheral bandlike GGO in the RUL and ground-glass attenuation in the LLL. Diverticulosis w/o diverticulitis on CT abdomen. Given Vanc/Zosyn and 500cc IVF in the ED.  (05 Jun 2024 03:51)    Hospital Course: 58 y/o male w/ PMHx ESRD on HD (M/W/F) via chest wall port (has failed fistulas), HTN, HLD, and T2DM who presents for fevers at home. Febrile with leukocytosis here, some new opacities seen on CT chest. Admitted for sepsis possibly 2/2 pneumonia. ID consulted -> CT consolidative PNA, GGO bandlike, no intra-abd source found  Uncertain source fever, missed viral vs bacterial superimposed on viral pna. Noted with slight rise WBC, uncertain cause--started on Zosyn 3.375g q 12, 7 days then discontinue  blood cx - NGTD. Of note, vascular consulted  for evaluation of HD access. MRV completed- patient found to have bilateral brachiocephalic narrowing. Vein mapping completed Patient will follow up with Dr. Kim outpatient for operative planning       Important Medication Changes and Reason:    Active or Pending Issues Requiring Follow-up:    Advanced Directives:   [ ] Full code  [ ] DNR  [ ] Hospice    Discharge Diagnoses:  Sepsis  ESRD       HPI:  This is a 58 y/o male w/ PMHx ESRD on HD (M/W/F) via chest wall port (has failed fistulas), HTN, HLD, and T2DM who presents for fevers and fatigue. He has been feeling feverish with generalized malaise, chills, and has had a cough for the past few days. Has also been having abdominal pain and vomiting. In addition, his family that has been living with him has been sick with Covid as well. Patient   In the ED, he was febrile to T-max 103.4, tachycardic to 119, hemodynamically stable, saturating well on RA. CBC w/ leukocytosis of 12.86, worsening microcytic anemia w Hb 7.9 (10.1 3 weeks ago), CMP w/ evidence of ESRD. Limited RVP negative for Flu/RSV/Covid. CT chest showing stable 1.2cm RLL calcified mass (likely a hamartoma) and new peripheral bandlike GGO in the RUL and ground-glass attenuation in the LLL. Diverticulosis w/o diverticulitis on CT abdomen. Given Vanc/Zosyn and 500cc IVF in the ED.      Hospital Course:   58 y/o male w/ PMHx ESRD on HD (M/W/F) via chest wall port (has failed fistulas), HTN, HLD, and T2DM who presents for fevers at home. Febrile with leukocytosis here, some new opacities seen on CT chest. Admitted for sepsis possibly 2/2 pneumonia. ID consulted -> CT consolidative PNA, GGO bandlike, no intra-abd source found  Uncertain source fever, missed viral vs bacterial superimposed on viral pna. Noted with slight rise WBC, uncertain cause-completed course of zosyn, blood cx - NGTD. Of note, vascular consulted  for evaluation of HD access. MRV completed- patient found to have bilateral brachiocephalic narrowing. Vein mapping completed. Patient will follow up with Dr. Kim outpatient for operative planning     Important Medication Changes and Reason:  - ordered for lokelma on NON HD days     Active or Pending Issues Requiring Follow-up:  - Follow up with PCP  - Follow up with nephrology  - Follow up with vascular surgery Dr. Kim for outpatient operative planning    Advanced Directives:   [X] Full code  [ ] DNR  [ ] Hospice    Discharge Diagnoses:  - PNA   - ESRD      Discharge/Dispo/Med rec discussed with attending Dr. Fatima. Patient medically cleared for discharge Home with outpatient follow up with PCP and nephrology      HPI:  This is a 60 y/o male w/ PMHx ESRD on HD (M/W/F) via chest wall port (has failed fistulas), HTN, HLD, and T2DM who presents for fevers and fatigue. He has been feeling feverish with generalized malaise, chills, and has had a cough for the past few days. Has also been having abdominal pain and vomiting. In addition, his family that has been living with him has been sick with Covid as well. Patient   In the ED, he was febrile to T-max 103.4, tachycardic to 119, hemodynamically stable, saturating well on RA. CBC w/ leukocytosis of 12.86, worsening microcytic anemia w Hb 7.9 (10.1 3 weeks ago), CMP w/ evidence of ESRD. Limited RVP negative for Flu/RSV/Covid. CT chest showing stable 1.2cm RLL calcified mass (likely a hamartoma) and new peripheral bandlike GGO in the RUL and ground-glass attenuation in the LLL. Diverticulosis w/o diverticulitis on CT abdomen. Given Vanc/Zosyn and 500cc IVF in the ED.      Hospital Course:   60 y/o male w/ PMHx ESRD on HD (M/W/F) via chest wall port (has failed fistulas), HTN, HLD, and T2DM who presents for fevers at home. Febrile with leukocytosis here, some new opacities seen on CT chest. Admitted for sepsis possibly 2/2 pneumonia. ID consulted -> CT consolidative PNA, GGO bandlike, no intra-abd source found  Uncertain source fever, missed viral vs bacterial superimposed on viral pna. Noted with slight rise WBC, uncertain cause-completed course of zosyn, blood cx - NGTD. Pt c/o cough on 6/12, s/p repeat CXR noted for "no evidence of acute cardiopulmonary disease". Pt ordered for prn guaifenesin for discharge. Pt to follow up with PCP for routine follow up. Of note, vascular consulted  for evaluation of HD access. MRV completed- patient found to have bilateral brachiocephalic narrowing. Vein mapping completed. Patient will follow up with vascular sx Dr. Kim outpatient for further operative planning.     Important Medication Changes and Reason:  - ordered for lokelma on NON HD days   - Ordered for prn guaifenesin    Active or Pending Issues Requiring Follow-up:  - Follow up with PCP  - Follow up with nephrology  - Follow up with vascular surgery Dr. Kim for outpatient operative planning    Advanced Directives:   [X] Full code  [ ] DNR  [ ] Hospice    Discharge Diagnoses:  - PNA   - ESRD      Discharge/Dispo/Med rec discussed with attending Dr. Fatima. Patient medically cleared for discharge Home with outpatient follow up with PCP, vascular sx and nephrology

## 2024-06-12 NOTE — PROVIDER CONTACT NOTE (CRITICAL VALUE NOTIFICATION) - ACTION/TREATMENT ORDERED:
Continuously monitor s/s of anemia
Titrate according to heparin nomogram
Titrate according to nomogram

## 2024-06-12 NOTE — DISCHARGE NOTE PROVIDER - CARE PROVIDERS DIRECT ADDRESSES
,DirectAddress_Unknown,[email or web address] ,DirectAddress_Unknown,[email or web address] Yes

## 2024-06-12 NOTE — DISCHARGE NOTE NURSING/CASE MANAGEMENT/SOCIAL WORK - PATIENT PORTAL LINK FT
You can access the FollowMyHealth Patient Portal offered by [facility] by registering at the following website: [email or web address]. By joining [email or web address]’s FollowMyHealth portal, you will also be able to view your health information using other applications (apps) compatible with our system.

## 2024-06-12 NOTE — DISCHARGE NOTE PROVIDER - NSDCCPCAREPLAN_GEN_ALL_CORE_FT
PRINCIPAL DISCHARGE DIAGNOSIS  Diagnosis: Sepsis  Assessment and Plan of Treatment: resolved   Patient febrile with leukocytosis, new peripheral bandlike ground-glass opacity right upper lobe and medial ground-glass attenuation in the left lower lobe -   -  CT consolidative PNA, GGO bandlike, no intra-abd source found  - Uncertain source fever, missed viral vs bacterial superimposed on viral pna?  - ID consulted and recommend Zosyn 3.375g q 12, 7 days then discontinue        SECONDARY DISCHARGE DIAGNOSES  Diagnosis: ESRD on hemodialysis  Assessment and Plan of Treatment: vascular consulted  for evaluation of HD access. MRV completed- patient found to have bilateral brachiocephalic narrowing. Vein mapping completed Patient will follow up with Dr. Kim outpatient for operative planning     PRINCIPAL DISCHARGE DIAGNOSIS  Diagnosis: Sepsis  Assessment and Plan of Treatment: resolved   Patient febrile with leukocytosis, new peripheral bandlike ground-glass opacity right upper lobe and medial ground-glass attenuation in the left lower lobe -   -  CT consolidative PNA, GGO bandlike, no intra-abd source found  - completed zosyn  if symptoms return or worsen, contact provider         SECONDARY DISCHARGE DIAGNOSES  Diagnosis: ESRD on hemodialysis  Assessment and Plan of Treatment: vascular consulted  for evaluation of HD access. MRV completed- patient found to have bilateral brachiocephalic narrowing. Vein mapping completed Patient will follow up with Dr. Kim outpatient for operative planning  continue with HD per schedule

## 2024-06-12 NOTE — DISCHARGE NOTE PROVIDER - CARE PROVIDER_API CALL
Neftaly Holden (MD)  Internal Medicine; Nephrology  [address] [address]  Phone: [phone]  Fax: [phone]  Follow Up Time:     Citlali Kim  Vascular Surgery  [address] [facility], [address]  [place] [address]  Phone: [phone]  Fax: [phone]  Follow Up Time:    Neftaly Holden)  Internal Medicine; Nephrology  [address] [facility] [address] [place] [address]  Phone: [phone]  Fax: [phone]  Follow Up Time:     Citlali Kim  Vascular Surgery  [address] [facility], [address]  [place] [address]  Phone: [phone]  Fax: [phone]  Follow Up Time:     Nick Phoenix  Nephrology  [address]  [place] [address]  Phone: [phone]  Fax: [phone]  Follow Up Time:     Bassam Peck  Nephrology  [address] [place] [address]  Phone: [phone]  Fax: [phone]  Follow Up Time:

## 2024-06-12 NOTE — PROGRESS NOTE ADULT - SUBJECTIVE AND OBJECTIVE BOX
[facility] NEPHROLOGY PRACTICE   MD KAT BHAGAT MD ANGELA WONG, YNES LINDER, NP    TEL:  OFFICE: [phone]  From 5pm-7am Answering Service [phone]    -- RENAL FOLLOW UP NOTE ---Date of Service 06-06-24 @ 15:28    Patient is a 59y old  Male who presents with a chief complaint of Sepsis.    Patient seen and examined at bedside. No chest pain/SOB.    VITALS:  T(F): 99.7 (06-06-24 @ 07:50), Max: 101.7 (06-06-24 @ 00:56)  HR: 91 (06-06-24 @ 07:50)  BP: 93/56 (06-06-24 @ 07:50)  RR: 18 (06-06-24 @ 07:50)  SpO2: 99% (06-06-24 @ 07:50)  Wt(kg): --    06-05 @ 07:01  -  06-06 @ 07:00  --------------------------------------------------------  IN: 0 mL / OUT: 1300 mL / NET: -1300 mL    06-06 @ 07:01  -  06-06 @ 15:28  --------------------------------------------------------  IN: 100 mL / OUT: 0 mL / NET: 100 mL      Height (cm): 177.8 (06-05 @ 17:59)  Weight (kg): 126.8 (06-05 @ 17:59)  BMI (kg/m2): 40.1 (06-05 @ 17:59)  BSA (m2): 2.41 (06-05 @ 17:59)    PHYSICAL EXAM:  General: NAD  Neck: No JVD  Respiratory: CTAB, no wheezes, rales or rhonchi  Cardiovascular: S1, S2, RRR  Gastrointestinal: BS+, soft, NT/ND  Extremities: No peripheral edema    Hospital Medications:   MEDICATIONS  (STANDING):  apixaban 5 milliGRAM(s) Oral two times a day  calcitriol   Capsule 0.5 MICROGram(s) Oral daily  chlorhexidine 2% Cloths 1 Application(s) Topical daily  cholecalciferol 2000 Unit(s) Oral daily  cinacalcet 30 milliGRAM(s) Oral daily  epoetin isra (EPOGEN) Injectable 76240 Unit(s) IV Push <User Schedule>  guaiFENesin  milliGRAM(s) Oral every 12 hours  Nephro-bart 1 Tablet(s) Oral daily  piperacillin/tazobactam IVPB.. 3.375 Gram(s) IV Intermittent every 12 hours  sevelamer carbonate 800 milliGRAM(s) Oral three times a day with meals  sodium zirconium cyclosilicate 10 Gram(s) Oral <User Schedule>      LABS:  06-06    136  |  95<L>  |  41<H>  ----------------------------<  79  4.8   |  23  |  9.97<H>    Ca    10.0      06 Jun 2024 09:52  Phos  5.5     06-06    TPro  7.8  /  Alb  3.6  /  TBili  0.7  /  DBili      /  AST  20  /  ALT  12  /  AlkPhos  48  06-06    Creatinine Trend: 9.97 <--, 12.11 <--, 10.66 <--    Phosphorus: 5.5 mg/dL (06-06 @ 09:52)  Albumin: 3.6 g/dL (06-06 @ 09:52)    calcium9.4  intact [id number]  parathyroid hormone intact, serum--                        7.8    10.47 )-----------( 199      ( 06 Jun 2024 09:55 )             25.4     Urine Studies:  Urinalysis - [06-06-24 @ 09:52]      Color  / Appearance  / SG  / pH       Gluc 79 / Ketone   / Bili  / Urobili        Blood  / Protein  / Leuk Est  / Nitrite       RBC  / WBC  / Hyaline  / Gran  / Sq Epi  / Non Sq Epi  / Bacteria       Iron 12, TIBC 175, %sat 7      [06-05-24 @ 07:07]  Ferritin 1601      [06-05-24 @ 07:07]  PTH -- (Ca 9.4)      [06-06-24 @ 10:20]   341  PTH -- (Ca 10.2)      [05-09-24 @ 10:25]   524  PTH -- (Ca 10.3)      [11-05-23 @ 07:12]   397  Vitamin D (25OH) 44.2      [11-05-23 @ 07:12]  HbA1c 6.1      [01-04-20 @ 05:50]  TSH 1.32      [06-05-24 @ 00:30]    HBsAb >1000.0      [05-07-24 @ 18:40]  HBsAb Reactive      [05-07-24 @ 18:40]  HBsAg Nonreact      [05-07-24 @ 18:40]  HBcAb Nonreact      [05-07-24 @ 18:40]  HCV 0.23, Nonreact      [05-07-24 @ 18:40]      
CC: F/U for Fever    Saw/spoke to patient. Intermittent fevers, no chills. No new complaints.    Allergies  IV Contrast (Anaphylaxis)    ANTIMICROBIALS:  piperacillin/tazobactam IVPB.. 3.375 every 12 hours    PE:    Vital Signs Last 24 Hrs  T(C): 37.8 (06 Jun 2024 16:14), Max: 38.7 (06 Jun 2024 00:56)  T(F): 100.1 (06 Jun 2024 16:14), Max: 101.7 (06 Jun 2024 00:56)  HR: 95 (06 Jun 2024 16:14) (91 - 111)  BP: 116/73 (06 Jun 2024 16:14) (93/56 - 116/73)  RR: 18 (06 Jun 2024 16:14) (16 - 18)  SpO2: 95% (06 Jun 2024 16:14) (95% - 99%)    Gen: AOx3, NAD, non-toxic  CV: Nontachycardic  Resp: Breathing comfortably, RA  Abd: Soft, nontender  IV/Skin: No thrombophlebitis    LABS:                        7.8    10.47 )-----------( 199      ( 06 Jun 2024 09:55 )             25.4     06-06    136  |  95<L>  |  41<H>  ----------------------------<  79  4.8   |  23  |  9.97<H>    Ca    10.0      06 Jun 2024 09:52  Phos  5.5     06-06    TPro  7.8  /  Alb  3.6  /  TBili  0.7  /  DBili  x   /  AST  20  /  ALT  12  /  AlkPhos  48  06-06    Urinalysis Basic - ( 06 Jun 2024 09:52 )    Color: x / Appearance: x / SG: x / pH: x  Gluc: 79 mg/dL / Ketone: x  / Bili: x / Urobili: x   Blood: x / Protein: x / Nitrite: x   Leuk Esterase: x / RBC: x / WBC x   Sq Epi: x / Non Sq Epi: x / Bacteria: x    MICROBIOLOGY:    Clean Catch Clean Catch (Midstream)  06-05-24   <10,000 CFU/mL Normal Urogenital Corrie  --  --    .Blood Blood-Peripheral  06-04-24   No growth at 24 hours  --  --    .Blood Blood-Peripheral  06-04-24   No growth at 24 hours  --  --    Rapid RVP Result: NotDetec (06-04 @ 22:37)    RADIOLOGY:    6/4 CT    IMPRESSION:  Chest CT: No evidence of consolidative pneumonia. Nonspecific peripheral   bandlike groundglass opacity and medial left lower lobe groundglass   attenuation.    CT abdomen pelvis: Motion degraded. Diverticulosis without evidence of   diverticulitis. No acute finding allowing for motion artifact.
CC: F/U for Fever    Saw/spoke to patient. No fevers, no chills. No new complaints.    Allergies  IV Contrast (Anaphylaxis)    ANTIMICROBIALS:  piperacillin/tazobactam IVPB.. 3.375 every 12 hours    PE:    Vital Signs Last 24 Hrs  T(C): 37.1 (11 Jun 2024 09:14), Max: 37.1 (11 Jun 2024 09:14)  T(F): 98.8 (11 Jun 2024 09:14), Max: 98.8 (11 Jun 2024 09:14)  HR: 75 (11 Jun 2024 09:14) (75 - 109)  BP: 132/79 (11 Jun 2024 09:14) (100/61 - 132/79)  RR: 18 (11 Jun 2024 09:14) (18 - 18)  SpO2: 99% (11 Jun 2024 09:14) (93% - 100%)    Gen: AOx3, NAD, non-toxic  CV: Nontachycardic  Resp: Breathing comfortably, RA  Abd: Soft, nontender  IV/Skin: No thrombophlebitis    LABS:                        8.8    13.00 )-----------( 308      ( 11 Jun 2024 09:43 )             28.0     06-11    137  |  92<L>  |  39<H>  ----------------------------<  88  3.9   |  24  |  10.71<H>    Ca    10.8<H>      11 Jun 2024 09:43    TPro  8.0  /  Alb  3.7  /  TBili  0.6  /  DBili  x   /  AST  19  /  ALT  13  /  AlkPhos  53  06-11    Urinalysis Basic - ( 11 Jun 2024 09:43 )    Color: x / Appearance: x / SG: x / pH: x  Gluc: 88 mg/dL / Ketone: x  / Bili: x / Urobili: x   Blood: x / Protein: x / Nitrite: x   Leuk Esterase: x / RBC: x / WBC x   Sq Epi: x / Non Sq Epi: x / Bacteria: x    MICROBIOLOGY:    .Blood Blood  06-07-24   No growth at 72 Hours  --  --    .Blood Blood  06-07-24   No growth at 72 Hours  --  --    Clean Catch Clean Catch (Midstream)  06-05-24   <10,000 CFU/mL Normal Urogenital Corrie  --  --    .Blood Blood-Peripheral  06-04-24   No growth at 5 days  --  --    .Blood Blood-Peripheral  06-04-24   No growth at 5 days  --  --    Rapid RVP Result: NotDetec (06-04 @ 22:37)    RADIOLOGY:    6/4 CT    IMPRESSION:  Chest CT: No evidence of consolidative pneumonia. Nonspecific peripheral   bandlike groundglass opacity and medial left lower lobe groundglass   attenuation.    CT abdomen pelvis: Motion degraded. Diverticulosis without evidence of   diverticulitis. No acute finding allowing for motion artifact.
CC: F/U for PNA    Saw/spoke to patient. no fevers, no chills. No new complaints.    Allergies  IV Contrast (Anaphylaxis)    ANTIMICROBIALS:  piperacillin/tazobactam IVPB.. 3.375 every 12 hours    PE:    Vital Signs Last 24 Hrs  T(C): 36.8 (10 Jamshid 2024 13:10), Max: 36.9 (10 Jamshid 2024 08:17)  T(F): 98.2 (10 Jamshid 2024 13:10), Max: 98.5 (10 Jamshid 2024 08:17)  HR: 75 (10 Jamshid 2024 13:10) (66 - 79)  BP: 137/75 (10 Jamshid 2024 13:10) (122/71 - 137/75)  RR: 18 (10 Jamshid 2024 13:10) (18 - 18)  SpO2: 99% (10 Jamshid 2024 13:10) (96% - 99%)    Gen: AOx3, NAD, non-toxic  CV: Nontachycardic  Resp: Breathing comfortably, RA  Abd: Soft, nontender  IV/Skin: No thrombophlebitis    LABS:                        7.5    7.21  )-----------( 256      ( 09 Jun 2024 10:41 )             24.4     06-09    141  |  95<L>  |  45<H>  ----------------------------<  105<H>  3.5   |  26  |  11.95<H>    Ca    9.8      09 Jun 2024 10:41    Urinalysis Basic - ( 09 Jun 2024 10:41 )    Color: x / Appearance: x / SG: x / pH: x  Gluc: 105 mg/dL / Ketone: x  / Bili: x / Urobili: x   Blood: x / Protein: x / Nitrite: x   Leuk Esterase: x / RBC: x / WBC x   Sq Epi: x / Non Sq Epi: x / Bacteria: x    MICROBIOLOGY:    .Blood Blood  06-07-24   No growth at 48 Hours  --  --    .Blood Blood  06-07-24   No growth at 48 Hours  --  --    Clean Catch Clean Catch (Midstream)  06-05-24   <10,000 CFU/mL Normal Urogenital Corrie  --  --    .Blood Blood-Peripheral  06-04-24   No growth at 5 days  --  --    .Blood Blood-Peripheral  06-04-24   No growth at 5 days  --  --    Rapid RVP Result: NotDetec (06-04 @ 22:37)    RADIOLOGY:    6/4    CT        IMPRESSION:  Chest CT: No evidence of consolidative pneumonia. Nonspecific peripheral   bandlike groundglass opacity and medial left lower lobe groundglass   attenuation.    CT abdomen pelvis: Motion degraded. Diverticulosis without evidence of   diverticulitis. No acute finding allowing for motion artifact.  
Patient is a 59y old  Male who presents with a chief complaint of Sepsis (06 Jun 2024 15:27)    Date of servie : 06-06-24 @ 16:26  INTERVAL HPI/OVERNIGHT EVENTS:  T(C): 37.8 (06-06-24 @ 16:14), Max: 38.7 (06-06-24 @ 00:56)  HR: 95 (06-06-24 @ 16:14) (91 - 111)  BP: 116/73 (06-06-24 @ 16:14) (93/56 - 116/73)  RR: 18 (06-06-24 @ 16:14) (16 - 18)  SpO2: 95% (06-06-24 @ 16:14) (95% - 99%)  Wt(kg): --  I&O's Summary    05 Jun 2024 07:01  -  06 Jun 2024 07:00  --------------------------------------------------------  IN: 0 mL / OUT: 1300 mL / NET: -1300 mL    06 Jun 2024 07:01  -  06 Jun 2024 16:26  --------------------------------------------------------  IN: 100 mL / OUT: 0 mL / NET: 100 mL        LABS:                        7.8    10.47 )-----------( 199      ( 06 Jun 2024 09:55 )             25.4     06-06    136  |  95<L>  |  41<H>  ----------------------------<  79  4.8   |  23  |  9.97<H>    Ca    10.0      06 Jun 2024 09:52  Phos  5.5     06-06    TPro  7.8  /  Alb  3.6  /  TBili  0.7  /  DBili  x   /  AST  20  /  ALT  12  /  AlkPhos  48  06-06    PT/INR - ( 04 Jun 2024 20:26 )   PT: 16.3 sec;   INR: 1.57 ratio         PTT - ( 04 Jun 2024 20:26 )  PTT:36.0 sec  Urinalysis Basic - ( 06 Jun 2024 09:52 )    Color: x / Appearance: x / SG: x / pH: x  Gluc: 79 mg/dL / Ketone: x  / Bili: x / Urobili: x   Blood: x / Protein: x / Nitrite: x   Leuk Esterase: x / RBC: x / WBC x   Sq Epi: x / Non Sq Epi: x / Bacteria: x      CAPILLARY BLOOD GLUCOSE      POCT Blood Glucose.: 87 mg/dL (06 Jun 2024 08:16)        Urinalysis Basic - ( 06 Jun 2024 09:52 )    Color: x / Appearance: x / SG: x / pH: x  Gluc: 79 mg/dL / Ketone: x  / Bili: x / Urobili: x   Blood: x / Protein: x / Nitrite: x   Leuk Esterase: x / RBC: x / WBC x   Sq Epi: x / Non Sq Epi: x / Bacteria: x        MEDICATIONS  (STANDING):  apixaban 5 milliGRAM(s) Oral two times a day  calcitriol   Capsule 0.5 MICROGram(s) Oral daily  chlorhexidine 2% Cloths 1 Application(s) Topical daily  cholecalciferol 2000 Unit(s) Oral daily  cinacalcet 30 milliGRAM(s) Oral daily  epoetin isra (EPOGEN) Injectable 30472 Unit(s) IV Push <User Schedule>  guaiFENesin  milliGRAM(s) Oral every 12 hours  Nephro-bart 1 Tablet(s) Oral daily  piperacillin/tazobactam IVPB.. 3.375 Gram(s) IV Intermittent every 12 hours  sevelamer carbonate 800 milliGRAM(s) Oral three times a day with meals  sodium zirconium cyclosilicate 10 Gram(s) Oral <User Schedule>    MEDICATIONS  (PRN):  acetaminophen     Tablet .. 650 milliGRAM(s) Oral every 6 hours PRN Temp greater or equal to 38C (100.4F), Mild Pain (1 - 3)  albuterol/ipratropium for Nebulization 3 milliLiter(s) Nebulizer every 6 hours PRN Shortness of Breath and/or Wheezing          PHYSICAL EXAM:  GENERAL: NAD, well-groomed, well-developed  HEAD:  Atraumatic, Normocephalic  CHEST/LUNG: Clear to percussion bilaterally; No rales, rhonchi, wheezing, or rubs  HEART: Regular rate and rhythm; No murmurs, rubs, or gallops  ABDOMEN: Soft, Nontender, Nondistended; Bowel sounds present  EXTREMITIES:  2+ Peripheral Pulses, No clubbing, cyanosis, or edema  LYMPH: No lymphadenopathy noted  SKIN: No rashes or lesions    Care Discussed with Consultants/Other Providers [ ] YES  [ ] NO
Patient is a 59y old  Male who presents with a chief complaint of Sepsis (07 Jun 2024 11:18)    Date of servie : 06-07-24 @ 18:56  INTERVAL HPI/OVERNIGHT EVENTS:  T(C): 37 (06-07-24 @ 16:24), Max: 37.2 (06-07-24 @ 12:10)  HR: 92 (06-07-24 @ 16:24) (88 - 98)  BP: 104/65 (06-07-24 @ 16:24) (102/66 - 134/81)  RR: 18 (06-07-24 @ 16:24) (17 - 18)  SpO2: 96% (06-07-24 @ 16:24) (95% - 100%)  Wt(kg): --  I&O's Summary    06 Jun 2024 07:01  -  07 Jun 2024 07:00  --------------------------------------------------------  IN: 100 mL / OUT: 0 mL / NET: 100 mL    07 Jun 2024 07:01  -  07 Jun 2024 18:56  --------------------------------------------------------  IN: 320 mL / OUT: 2200 mL / NET: -1880 mL        LABS:                        7.6    6.41  )-----------( 217      ( 07 Jun 2024 15:57 )             24.1     06-06    136  |  95<L>  |  41<H>  ----------------------------<  79  4.8   |  23  |  9.97<H>    Ca    10.0      06 Jun 2024 09:52  Phos  5.5     06-06    TPro  7.8  /  Alb  3.6  /  TBili  0.7  /  DBili  x   /  AST  20  /  ALT  12  /  AlkPhos  48  06-06      Urinalysis Basic - ( 06 Jun 2024 09:52 )    Color: x / Appearance: x / SG: x / pH: x  Gluc: 79 mg/dL / Ketone: x  / Bili: x / Urobili: x   Blood: x / Protein: x / Nitrite: x   Leuk Esterase: x / RBC: x / WBC x   Sq Epi: x / Non Sq Epi: x / Bacteria: x      CAPILLARY BLOOD GLUCOSE            Urinalysis Basic - ( 06 Jun 2024 09:52 )    Color: x / Appearance: x / SG: x / pH: x  Gluc: 79 mg/dL / Ketone: x  / Bili: x / Urobili: x   Blood: x / Protein: x / Nitrite: x   Leuk Esterase: x / RBC: x / WBC x   Sq Epi: x / Non Sq Epi: x / Bacteria: x        MEDICATIONS  (STANDING):  apixaban 5 milliGRAM(s) Oral two times a day  calcitriol   Capsule 0.5 MICROGram(s) Oral daily  chlorhexidine 2% Cloths 1 Application(s) Topical daily  cholecalciferol 2000 Unit(s) Oral daily  cinacalcet 30 milliGRAM(s) Oral daily  epoetin isra (EPOGEN) Injectable 38080 Unit(s) IV Push <User Schedule>  guaiFENesin  milliGRAM(s) Oral every 12 hours  Nephro-bart 1 Tablet(s) Oral daily  piperacillin/tazobactam IVPB.. 3.375 Gram(s) IV Intermittent every 12 hours  sevelamer carbonate 800 milliGRAM(s) Oral three times a day with meals  sodium zirconium cyclosilicate 10 Gram(s) Oral <User Schedule>    MEDICATIONS  (PRN):  acetaminophen     Tablet .. 650 milliGRAM(s) Oral every 6 hours PRN Temp greater or equal to 38C (100.4F), Mild Pain (1 - 3)  albuterol/ipratropium for Nebulization 3 milliLiter(s) Nebulizer every 6 hours PRN Shortness of Breath and/or Wheezing          PHYSICAL EXAM:  GENERAL: NAD, well-groomed, well-developed  HEAD:  Atraumatic, Normocephalic  CHEST/LUNG: Clear to percussion bilaterally; No rales, rhonchi, wheezing, or rubs  HEART: Regular rate and rhythm; No murmurs, rubs, or gallops  ABDOMEN: Soft, Nontender, Nondistended; Bowel sounds present  EXTREMITIES:  2+ Peripheral Pulses, No clubbing, cyanosis, or edema  LYMPH: No lymphadenopathy noted  SKIN: No rashes or lesions    Care Discussed with Consultants/Other Providers [ ] YES  [ ] NO
[facility] Daily Progress Note  =====================================================    SUBJECTIVE: Patient seen and examined at bedside on AM rounds. Patient reports that they're feeling well. Denies fever, chills, N/V, chest pain, SOB    ALLERGIES:  IV Contrast (Anaphylaxis)      --------------------------------------------------------------------------------------    MEDICATIONS:    Neurologic Medications  acetaminophen     Tablet .. 650 milliGRAM(s) Oral every 6 hours PRN Temp greater or equal to 38C (100.4F), Mild Pain (1 - 3)  LORazepam   Injectable 0.5 milliGRAM(s) IV Push once    Respiratory Medications  albuterol/ipratropium for Nebulization 3 milliLiter(s) Nebulizer every 6 hours PRN Shortness of Breath and/or Wheezing  guaiFENesin  milliGRAM(s) Oral every 12 hours    Cardiovascular Medications    Gastrointestinal Medications  calcitriol   Capsule 0.5 MICROGram(s) Oral daily  cholecalciferol 2000 Unit(s) Oral daily  Nephro-bart 1 Tablet(s) Oral daily    Genitourinary Medications    Hematologic/Oncologic Medications  epoetin isra (EPOGEN) Injectable 47623 Unit(s) IV Push <User Schedule>    Antimicrobial/Immunologic Medications  piperacillin/tazobactam IVPB.. 3.375 Gram(s) IV Intermittent every 12 hours    Endocrine/Metabolic Medications  cinacalcet 30 milliGRAM(s) Oral daily    Topical/Other Medications  benzocaine/menthol Lozenge 1 Lozenge Oral three times a day PRN Sore Throat  chlorhexidine 2% Cloths 1 Application(s) Topical daily  sevelamer carbonate 800 milliGRAM(s) Oral three times a day with meals  sodium zirconium cyclosilicate 10 Gram(s) Oral <User Schedule>    --------------------------------------------------------------------------------------    VITAL SIGNS:  T(C): 36.9 (06-10-24 @ 08:17), Max: 36.9 (06-10-24 @ 08:17)  HR: 66 (06-10-24 @ 08:17) (66 - 79)  BP: 135/87 (06-10-24 @ 08:17) (122/71 - 135/87)  RR: 18 (06-10-24 @ 08:17) (18 - 18)  SpO2: 98% (06-10-24 @ 08:17) (96% - 98%)  --------------------------------------------------------------------------------------    INS AND OUTS:    06-09-24 @ 07:01  -  06-10-24 @ 07:00  --------------------------------------------------------  IN: 820 mL / OUT: 400 mL / NET: 420 mL      --------------------------------------------------------------------------------------      EXAM    General: NAD, resting comfortably  No arm swelling, no prominent chest veins noted  R permcath clean without erythema or drainage around site  Papl radial/ulnar 2+ B/L  LUE incisions well-healed  Abdominal: soft, ND/NT, no organomegaly    --------------------------------------------------------------------------------------    LABS                          7.5    7.21  )-----------( 256      ( 09 Jun 2024 10:41 )             24.4     06-09    141  |  95<L>  |  45<H>  ----------------------------<  105<H>  3.5   |  26  |  11.95<H>    Ca    9.8      09 Jun 2024 10:41        Urinalysis Basic - ( 09 Jun 2024 10:41 )    Color: x / Appearance: x / SG: x / pH: x  Gluc: 105 mg/dL / Ketone: x  / Bili: x / Urobili: x   Blood: x / Protein: x / Nitrite: x   Leuk Esterase: x / RBC: x / WBC x   Sq Epi: x / Non Sq Epi: x / Bacteria: x        
[facility] [place] NEPHROLOGY PRACTICE   MD KAT BHAGAT MD MARIA SANTIAGO, NP    TEL:  OFFICE: [phone]  From 5pm-7am Answering Service [phone]    -- RENAL FOLLOW UP NOTE ---Date of Service 06-10-24 @ 13:04    Patient is a 59y old  Male who presents with a chief complaint of Sepsis       Patient seen and examined at bedside. No chest pain/sob    VITALS:  T(F): 98.1 (06-10-24 @ 10:10), Max: 98.5 (06-10-24 @ 08:17)  HR: 67 (06-10-24 @ 10:10)  BP: 127/76 (06-10-24 @ 10:10)  RR: 18 (06-10-24 @ 10:10)  SpO2: 96% (06-10-24 @ 10:10)  Wt(kg): --    06-09 @ 07:01  -  06-10 @ 07:00  --------------------------------------------------------  IN: 820 mL / OUT: 400 mL / NET: 420 mL          PHYSICAL EXAM:  General: NAD  Neck: No JVD  Respiratory: CTAB, no wheezes, rales or rhonchi  Cardiovascular: S1, S2, RRR  Gastrointestinal: BS+, soft, NT/ND  Extremities: No peripheral edema    Hospital Medications:   MEDICATIONS  (STANDING):  calcitriol   Capsule 0.5 MICROGram(s) Oral daily  chlorhexidine 2% Cloths 1 Application(s) Topical daily  cholecalciferol 2000 Unit(s) Oral daily  cinacalcet 30 milliGRAM(s) Oral daily  epoetin isra (EPOGEN) Injectable 45266 Unit(s) IV Push <User Schedule>  guaiFENesin  milliGRAM(s) Oral every 12 hours  heparin  Infusion.  Unit(s)/Hr (23 mL/Hr) IV Continuous <Continuous>  LORazepam   Injectable 0.5 milliGRAM(s) IV Push once  Nephro-bart 1 Tablet(s) Oral daily  piperacillin/tazobactam IVPB.. 3.375 Gram(s) IV Intermittent every 12 hours  sevelamer carbonate 800 milliGRAM(s) Oral three times a day with meals  sodium zirconium cyclosilicate 10 Gram(s) Oral <User Schedule>      LABS:  06-09    141  |  95<L>  |  45<H>  ----------------------------<  105<H>  3.5   |  26  |  11.95<H>    Ca    9.8      09 Jun 2024 10:41      Creatinine Trend: 11.95 <--, 10.03 <--, 9.97 <--, 12.11 <--, 10.66 <--                                7.5    7.21  )-----------( 256      ( 09 Jun 2024 10:41 )             24.4     Urine Studies:  Urinalysis - [06-09-24 @ 10:41]      Color  / Appearance  / SG  / pH       Gluc 105 / Ketone   / Bili  / Urobili        Blood  / Protein  / Leuk Est  / Nitrite       RBC  / WBC  / Hyaline  / Gran  / Sq Epi  / Non Sq Epi  / Bacteria       Iron 12, TIBC 175, %sat 7      [06-05-24 @ 07:07]  Ferritin 1601      [06-05-24 @ 07:07]  PTH -- (Ca 9.4)      [06-06-24 @ 10:20]   341  PTH -- (Ca 10.2)      [05-09-24 @ 10:25]   524  PTH -- (Ca 10.3)      [11-05-23 @ 07:12]   397  Vitamin D (25OH) 44.2      [11-05-23 @ 07:12]  HbA1c 6.1      [01-04-20 @ 05:50]  TSH 1.32      [06-05-24 @ 00:30]        RADIOLOGY & ADDITIONAL STUDIES:  
CC: F/U for Fever    Saw/spoke to patient. No fevers, no chills. No new complaints.    Allergies  IV Contrast (Anaphylaxis)    ANTIMICROBIALS:  piperacillin/tazobactam IVPB.. 3.375 every 12 hours    PE:    Vital Signs Last 24 Hrs  T(C): 37.2 (07 Jun 2024 12:10), Max: 37.8 (06 Jun 2024 16:14)  T(F): 98.9 (07 Jun 2024 12:10), Max: 100.1 (06 Jun 2024 16:14)  HR: 90 (07 Jun 2024 12:10) (88 - 98)  BP: 114/70 (07 Jun 2024 12:10) (102/66 - 134/81)  RR: 18 (07 Jun 2024 12:10) (17 - 18)  SpO2: 100% (07 Jun 2024 12:10) (95% - 100%)    Gen: AOx3, NAD, non-toxic  CV: Nontachycardic  Resp: Breathing comfortably, RA  Abd: Soft, nontender  IV/Skin: No thrombophlebitis    LABS:                        6.8    6.44  )-----------( 200      ( 07 Jun 2024 11:33 )             21.8     06-06    136  |  95<L>  |  41<H>  ----------------------------<  79  4.8   |  23  |  9.97<H>    Ca    10.0      06 Jun 2024 09:52  Phos  5.5     06-06    TPro  7.8  /  Alb  3.6  /  TBili  0.7  /  DBili  x   /  AST  20  /  ALT  12  /  AlkPhos  48  06-06    Urinalysis Basic - ( 06 Jun 2024 09:52 )    Color: x / Appearance: x / SG: x / pH: x  Gluc: 79 mg/dL / Ketone: x  / Bili: x / Urobili: x   Blood: x / Protein: x / Nitrite: x   Leuk Esterase: x / RBC: x / WBC x   Sq Epi: x / Non Sq Epi: x / Bacteria: x    MICROBIOLOGY:    Clean Catch Clean Catch (Midstream)  06-05-24   <10,000 CFU/mL Normal Urogenital Corrie  --  --    .Blood Blood-Peripheral  06-04-24   No growth at 48 Hours  --  --    .Blood Blood-Peripheral  06-04-24   No growth at 48 Hours  --  --    Rapid RVP Result: NotDetec (06-04 @ 22:37)    RADIOLOGY:    6/4 CT    IMPRESSION:  Chest CT: No evidence of consolidative pneumonia. Nonspecific peripheral   bandlike groundglass opacity and medial left lower lobe groundglass   attenuation.    CT abdomen pelvis: Motion degraded. Diverticulosis without evidence of   diverticulitis. No acute finding allowing for motion artifact.  
Patient is a 59y old  Male who presents with a chief complaint of Sepsis (08 Jun 2024 09:55)    Date of servie : 06-08-24 @ 10:43  INTERVAL HPI/OVERNIGHT EVENTS:  T(C): 36.9 (06-08-24 @ 07:48), Max: 37.2 (06-07-24 @ 12:10)  HR: 98 (06-08-24 @ 07:48) (84 - 98)  BP: 114/70 (06-08-24 @ 07:48) (104/65 - 122/75)  RR: 18 (06-08-24 @ 07:48) (18 - 18)  SpO2: 98% (06-08-24 @ 07:48) (96% - 100%)  Wt(kg): --  I&O's Summary    07 Jun 2024 07:01  -  08 Jun 2024 07:00  --------------------------------------------------------  IN: 320 mL / OUT: 2200 mL / NET: -1880 mL        LABS:                        7.7    6.12  )-----------( 231      ( 08 Jun 2024 09:33 )             24.9     06-08    138  |  97  |  38<H>  ----------------------------<  111<H>  4.0   |  26  |  10.03<H>    Ca    9.9      08 Jun 2024 09:33        Urinalysis Basic - ( 08 Jun 2024 09:33 )    Color: x / Appearance: x / SG: x / pH: x  Gluc: 111 mg/dL / Ketone: x  / Bili: x / Urobili: x   Blood: x / Protein: x / Nitrite: x   Leuk Esterase: x / RBC: x / WBC x   Sq Epi: x / Non Sq Epi: x / Bacteria: x      CAPILLARY BLOOD GLUCOSE            Urinalysis Basic - ( 08 Jun 2024 09:33 )    Color: x / Appearance: x / SG: x / pH: x  Gluc: 111 mg/dL / Ketone: x  / Bili: x / Urobili: x   Blood: x / Protein: x / Nitrite: x   Leuk Esterase: x / RBC: x / WBC x   Sq Epi: x / Non Sq Epi: x / Bacteria: x        MEDICATIONS  (STANDING):  apixaban 5 milliGRAM(s) Oral two times a day  calcitriol   Capsule 0.5 MICROGram(s) Oral daily  chlorhexidine 2% Cloths 1 Application(s) Topical daily  cholecalciferol 2000 Unit(s) Oral daily  cinacalcet 30 milliGRAM(s) Oral daily  epoetin isra (EPOGEN) Injectable 02614 Unit(s) IV Push <User Schedule>  guaiFENesin  milliGRAM(s) Oral every 12 hours  Nephro-bart 1 Tablet(s) Oral daily  piperacillin/tazobactam IVPB.. 3.375 Gram(s) IV Intermittent every 12 hours  sevelamer carbonate 800 milliGRAM(s) Oral three times a day with meals  sodium zirconium cyclosilicate 10 Gram(s) Oral <User Schedule>    MEDICATIONS  (PRN):  acetaminophen     Tablet .. 650 milliGRAM(s) Oral every 6 hours PRN Temp greater or equal to 38C (100.4F), Mild Pain (1 - 3)  albuterol/ipratropium for Nebulization 3 milliLiter(s) Nebulizer every 6 hours PRN Shortness of Breath and/or Wheezing          PHYSICAL EXAM:  GENERAL: NAD, well-groomed, well-developed  HEAD:  Atraumatic, Normocephalic  CHEST/LUNG: Clear to percussion bilaterally; No rales, rhonchi, wheezing, or rubs  HEART: Regular rate and rhythm; No murmurs, rubs, or gallops  ABDOMEN: Soft, Nontender, Nondistended; Bowel sounds present  EXTREMITIES:  2+ Peripheral Pulses, No clubbing, cyanosis, or edema  LYMPH: No lymphadenopathy noted  SKIN: No rashes or lesions    Care Discussed with Consultants/Other Providers [ ] YES  [ ] NO
[facility] [place] [facility]   MD KAT BHAGAT MD RUORU WONG, PA    TEL:  FROM 9 AM to 5 PM ---OFFICE: [phone]  AVAILABLE ON TEAMS    FROM 5 PM - 9 AM PLEASE CALL ANSWERING SERVICE: [phone]    RENAL FOLLOW UP NOTE--Date of Service 06-08-24 @ 09:56  --------------------------------------------------------------------------------  HPI:      Pt seen and examined at bedside.   Babita SOB, chest pain     PAST HISTORY  --------------------------------------------------------------------------------  No significant changes to PMH, PSH, FHx, SHx, unless otherwise noted    ALLERGIES & MEDICATIONS  --------------------------------------------------------------------------------  Allergies    IV Contrast (Anaphylaxis)    Intolerances      Standing Inpatient Medications  apixaban 5 milliGRAM(s) Oral two times a day  calcitriol   Capsule 0.5 MICROGram(s) Oral daily  chlorhexidine 2% Cloths 1 Application(s) Topical daily  cholecalciferol 2000 Unit(s) Oral daily  cinacalcet 30 milliGRAM(s) Oral daily  epoetin isra (EPOGEN) Injectable 66577 Unit(s) IV Push <User Schedule>  guaiFENesin  milliGRAM(s) Oral every 12 hours  Nephro-bart 1 Tablet(s) Oral daily  piperacillin/tazobactam IVPB.. 3.375 Gram(s) IV Intermittent every 12 hours  sevelamer carbonate 800 milliGRAM(s) Oral three times a day with meals  sodium zirconium cyclosilicate 10 Gram(s) Oral <User Schedule>    PRN Inpatient Medications  acetaminophen     Tablet .. 650 milliGRAM(s) Oral every 6 hours PRN  albuterol/ipratropium for Nebulization 3 milliLiter(s) Nebulizer every 6 hours PRN      REVIEW OF SYSTEMS  --------------------------------------------------------------------------------  General: no fever    MSK: no edema     VITALS/PHYSICAL EXAM  --------------------------------------------------------------------------------  T(C): 36.9 (06-08-24 @ 07:48), Max: 37.2 (06-07-24 @ 12:10)  HR: 98 (06-08-24 @ 07:48) (84 - 98)  BP: 114/70 (06-08-24 @ 07:48) (104/65 - 122/75)  RR: 18 (06-08-24 @ 07:48) (18 - 18)  SpO2: 98% (06-08-24 @ 07:48) (96% - 100%)  Wt(kg): --        06-07-24 @ 07:01  -  06-08-24 @ 07:00  --------------------------------------------------------  IN: 320 mL / OUT: 2200 mL / NET: -1880 mL      Physical Exam:  	Gen: NAD  	HEENT: MMM  	Pulm: CTA B/L  	CV: S1S2  	Abd: Soft, +BS  	Ext: No LE edema B/L                      Neuro: Awake   	Skin: Warm and Dry   	Vascular access:  HD catheter             no mary ellen  LABS/STUDIES  --------------------------------------------------------------------------------              7.7    6.12  >-----------<  231      [06-08-24 @ 09:33]              24.9                 Creatinine Trend:  SCr 9.97 [06-06 @ 09:52]  SCr 12.11 [06-05 @ 07:07]  SCr 10.66 [06-04 @ 20:26]  SCr 16.13 [05-13 @ 11:54]  SCr 15.20 [05-12 @ 21:11]    Urinalysis - [06-06-24 @ 09:52]      Color  / Appearance  / SG  / pH       Gluc 79 / Ketone   / Bili  / Urobili        Blood  / Protein  / Leuk Est  / Nitrite       RBC  / WBC  / Hyaline  / Gran  / Sq Epi  / Non Sq Epi  / Bacteria       Iron 12, TIBC 175, %sat 7      [06-05-24 @ 07:07]  Ferritin 1601      [06-05-24 @ 07:07]  PTH -- (Ca 9.4)      [06-06-24 @ 10:20]   341  PTH -- (Ca 10.2)      [05-09-24 @ 10:25]   524  PTH -- (Ca 10.3)      [11-05-23 @ 07:12]   397  Vitamin D (25OH) 44.2      [11-05-23 @ 07:12]  HbA1c 6.1      [01-04-20 @ 05:50]  TSH 1.32      [06-05-24 @ 00:30]      
[facility] – [place] [facility]   MD KAT BHAGAT MD ANGELA WONG, PA QIAN CHEN, NP    TEL:  OFFICE: [phone]  From 5pm-7am Answering Service [phone]    -- RENAL FOLLOW UP NOTE ---Date of Service 06-07-24 @ 11:19    Patient is a 59y old  Male who presents with a chief complaint of Sepsis.    Patient seen and examined at bedside. No chest pain/SOB.    VITALS:  T(F): 98.1 (06-07-24 @ 08:50), Max: 100.1 (06-06-24 @ 16:14)  HR: 98 (06-07-24 @ 08:50)  BP: 113/66 (06-07-24 @ 08:50)  RR: 17 (06-07-24 @ 08:50)  SpO2: 96% (06-07-24 @ 08:50)  Wt(kg): --    06-06 @ 07:01  -  06-07 @ 07:00  --------------------------------------------------------  IN: 100 mL / OUT: 0 mL / NET: 100 mL      PHYSICAL EXAM:  General: NAD  Neck: No JVD  Respiratory: CTAB, no wheezes, rales or rhonchi  Cardiovascular: S1, S2, RRR  Gastrointestinal: BS+, soft, NT/ND  Extremities: No peripheral edema    Hospital Medications:   MEDICATIONS  (STANDING):  apixaban 5 milliGRAM(s) Oral two times a day  calcitriol   Capsule 0.5 MICROGram(s) Oral daily  chlorhexidine 2% Cloths 1 Application(s) Topical daily  cholecalciferol 2000 Unit(s) Oral daily  cinacalcet 30 milliGRAM(s) Oral daily  epoetin isra (EPOGEN) Injectable 72137 Unit(s) IV Push <User Schedule>  guaiFENesin  milliGRAM(s) Oral every 12 hours  Nephro-bart 1 Tablet(s) Oral daily  piperacillin/tazobactam IVPB.. 3.375 Gram(s) IV Intermittent every 12 hours  sevelamer carbonate 800 milliGRAM(s) Oral three times a day with meals  sodium zirconium cyclosilicate 10 Gram(s) Oral <User Schedule>      LABS:  06-06    136  |  95<L>  |  41<H>  ----------------------------<  79  4.8   |  23  |  9.97<H>    Ca    10.0      06 Jun 2024 09:52  Phos  5.5     06-06    TPro  7.8  /  Alb  3.6  /  TBili  0.7  /  DBili      /  AST  20  /  ALT  12  /  AlkPhos  48  06-06    Creatinine Trend: 9.97 <--, 12.11 <--, 10.66 <--                 7.8    10.47 )-----------( 199      ( 06 Jun 2024 09:55 )             25.4     Urine Studies:  Urinalysis - [06-06-24 @ 09:52]      Color  / Appearance  / SG  / pH       Gluc 79 / Ketone   / Bili  / Urobili        Blood  / Protein  / Leuk Est  / Nitrite       RBC  / WBC  / Hyaline  / Gran  / Sq Epi  / Non Sq Epi  / Bacteria       Iron 12, TIBC 175, %sat 7      [06-05-24 @ 07:07]  Ferritin 1601      [06-05-24 @ 07:07]  PTH -- (Ca 9.4)      [06-06-24 @ 10:20]   341  PTH -- (Ca 10.2)      [05-09-24 @ 10:25]   524  PTH -- (Ca 10.3)      [11-05-23 @ 07:12]   397  Vitamin D (25OH) 44.2      [11-05-23 @ 07:12]  HbA1c 6.1      [01-04-20 @ 05:50]  TSH 1.32      [06-05-24 @ 00:30]        
CC: F/U for PNA    Saw/spoke to patient. No fevers, no chills. Slightly worse coughing today.    Allergies  IV Contrast (Anaphylaxis)    ANTIMICROBIALS:      PE:    Vital Signs Last 24 Hrs  T(C): 36.3 (12 Jun 2024 11:52), Max: 37.1 (11 Jun 2024 16:09)  T(F): 97.3 (12 Jun 2024 11:52), Max: 98.7 (11 Jun 2024 16:09)  HR: 86 (12 Jun 2024 11:52) (81 - 96)  BP: 129/66 (12 Jun 2024 11:52) (101/64 - 129/66)  RR: 18 (12 Jun 2024 11:52) (18 - 18)  SpO2: 97% (12 Jun 2024 11:52) (97% - 99%)    Gen: AOx3, NAD, non-toxic  CV: Nontachycardic  Resp: Breathing comfortably, RA  Abd: Soft, nontender  IV/Skin: No thrombophlebitis    LABS:                        7.9    11.53 )-----------( 279      ( 12 Jun 2024 08:52 )             25.6     06-11    137  |  92<L>  |  39<H>  ----------------------------<  88  3.9   |  24  |  10.71<H>    Ca    10.8<H>      11 Jun 2024 09:43    TPro  8.0  /  Alb  3.7  /  TBili  0.6  /  DBili  x   /  AST  19  /  ALT  13  /  AlkPhos  53  06-11    Urinalysis Basic - ( 11 Jun 2024 09:43 )    Color: x / Appearance: x / SG: x / pH: x  Gluc: 88 mg/dL / Ketone: x  / Bili: x / Urobili: x   Blood: x / Protein: x / Nitrite: x   Leuk Esterase: x / RBC: x / WBC x   Sq Epi: x / Non Sq Epi: x / Bacteria: x    MICROBIOLOGY:    .Blood Blood  06-07-24   No growth at 4 days  --  --    .Blood Blood  06-07-24   No growth at 4 days  --  --    Clean Catch Clean Catch (Midstream)  06-05-24   <10,000 CFU/mL Normal Urogenital Corrie  --  --    .Blood Blood-Peripheral  06-04-24   No growth at 5 days  --  --    .Blood Blood-Peripheral  06-04-24   No growth at 5 days  --  --    RADIOLOGY:    6/4 CT    IMPRESSION:  Chest CT: No evidence of consolidative pneumonia. Nonspecific peripheral   bandlike groundglass opacity and medial left lower lobe groundglass   attenuation.    CT abdomen pelvis: Motion degraded. Diverticulosis without evidence of   diverticulitis. No acute finding allowing for motion artifact.  
[facility] [place] [facility]   MD KAT BHAGAT MD MARIA SANTIAGO, NP    TEL:  OFFICE: [phone]  From 5pm-7am Answering Service [phone]    -- RENAL FOLLOW UP NOTE ---Date of Service 06-11-24 @ 13:40    Patient is a 59y old  Male who presents with a chief complaint of Sepsis (11 Jun 2024 10:44)      Patient seen and examined at bedside. No chest pain/sob    VITALS:  T(F): 98.8 (06-11-24 @ 09:14), Max: 98.8 (06-11-24 @ 09:14)  HR: 75 (06-11-24 @ 09:14)  BP: 132/79 (06-11-24 @ 09:14)  RR: 18 (06-11-24 @ 09:14)  SpO2: 99% (06-11-24 @ 09:14)  Wt(kg): --    06-10 @ 07:01  -  06-11 @ 07:00  --------------------------------------------------------  IN: 1701 mL / OUT: 2800 mL / NET: -1099 mL          PHYSICAL EXAM:  General: NAD  Neck: No JVD  Respiratory: CTAB, no wheezes, rales or rhonchi  Cardiovascular: S1, S2, RRR  Gastrointestinal: BS+, soft, NT/ND  Extremities: No peripheral edema    Hospital Medications:   MEDICATIONS  (STANDING):  calcitriol   Capsule 0.5 MICROGram(s) Oral daily  chlorhexidine 2% Cloths 1 Application(s) Topical daily  cholecalciferol 2000 Unit(s) Oral daily  cinacalcet 30 milliGRAM(s) Oral daily  epoetin isra (EPOGEN) Injectable 80592 Unit(s) IV Push <User Schedule>  guaiFENesin  milliGRAM(s) Oral every 12 hours  heparin  Infusion.  Unit(s)/Hr (23 mL/Hr) IV Continuous <Continuous>  Nephro-bart 1 Tablet(s) Oral daily  piperacillin/tazobactam IVPB.. 3.375 Gram(s) IV Intermittent every 12 hours  sevelamer carbonate 800 milliGRAM(s) Oral three times a day with meals  sodium zirconium cyclosilicate 10 Gram(s) Oral <User Schedule>      LABS:  06-11    137  |  92<L>  |  39<H>  ----------------------------<  88  3.9   |  24  |  10.71<H>    Ca    10.8<H>      11 Jun 2024 09:43    TPro  8.0  /  Alb  3.7  /  TBili  0.6  /  DBili      /  AST  19  /  ALT  13  /  AlkPhos  53  06-11    Creatinine Trend: 10.71 <--, 11.95 <--, 10.03 <--, 9.97 <--, 12.11 <--, 10.66 <--    Albumin: 3.7 g/dL (06-11 @ 09:43)                              8.8    13.00 )-----------( 308      ( 11 Jun 2024 09:43 )             28.0     Urine Studies:  Urinalysis - [06-11-24 @ 09:43]      Color  / Appearance  / SG  / pH       Gluc 88 / Ketone   / Bili  / Urobili        Blood  / Protein  / Leuk Est  / Nitrite       RBC  / WBC  / Hyaline  / Gran  / Sq Epi  / Non Sq Epi  / Bacteria       Iron 12, TIBC 175, %sat 7      [06-05-24 @ 07:07]  Ferritin 1601      [06-05-24 @ 07:07]  PTH -- (Ca 9.4)      [06-06-24 @ 10:20]   341  PTH -- (Ca 10.2)      [05-09-24 @ 10:25]   524  PTH -- (Ca 10.3)      [11-05-23 @ 07:12]   397  Vitamin D (25OH) 44.2      [11-05-23 @ 07:12]  HbA1c 6.1      [01-04-20 @ 05:50]  TSH 1.32      [06-05-24 @ 00:30]        RADIOLOGY & ADDITIONAL STUDIES:  
[facility] [place] [facility]   MD KAT BHAGAT MD RUORU WONG, PA    TEL:  FROM 9 AM to 5 PM ---OFFICE: [phone]  AVAILABLE ON TEAMS    FROM 5 PM - 9 AM PLEASE CALL ANSWERING SERVICE: [phone]    RENAL FOLLOW UP NOTE--Date of Service 06-09-24 @ 08:49  --------------------------------------------------------------------------------  HPI:      Pt seen and examined at bedside.   Babita SOB, chest pain     PAST HISTORY  --------------------------------------------------------------------------------  No significant changes to PMH, PSH, FHx, SHx, unless otherwise noted    ALLERGIES & MEDICATIONS  --------------------------------------------------------------------------------  Allergies    IV Contrast (Anaphylaxis)    Intolerances      Standing Inpatient Medications  apixaban 5 milliGRAM(s) Oral two times a day  calcitriol   Capsule 0.5 MICROGram(s) Oral daily  chlorhexidine 2% Cloths 1 Application(s) Topical daily  cholecalciferol 2000 Unit(s) Oral daily  cinacalcet 30 milliGRAM(s) Oral daily  epoetin isra (EPOGEN) Injectable 04245 Unit(s) IV Push <User Schedule>  guaiFENesin  milliGRAM(s) Oral every 12 hours  Nephro-bart 1 Tablet(s) Oral daily  piperacillin/tazobactam IVPB.. 3.375 Gram(s) IV Intermittent every 12 hours  sevelamer carbonate 800 milliGRAM(s) Oral three times a day with meals  sodium zirconium cyclosilicate 10 Gram(s) Oral <User Schedule>    PRN Inpatient Medications  acetaminophen     Tablet .. 650 milliGRAM(s) Oral every 6 hours PRN  albuterol/ipratropium for Nebulization 3 milliLiter(s) Nebulizer every 6 hours PRN  benzocaine/menthol Lozenge 1 Lozenge Oral three times a day PRN      REVIEW OF SYSTEMS  --------------------------------------------------------------------------------  General: no fever  CVS: no chest pain    MSK: no edema     VITALS/PHYSICAL EXAM  --------------------------------------------------------------------------------  T(C): 36.9 (06-09-24 @ 08:35), Max: 36.9 (06-09-24 @ 08:35)  HR: 84 (06-09-24 @ 08:35) (73 - 84)  BP: 114/72 (06-09-24 @ 08:35) (95/56 - 135/79)  RR: 18 (06-09-24 @ 08:35) (18 - 18)  SpO2: 97% (06-09-24 @ 08:35) (96% - 98%)  Wt(kg): --        06-08-24 @ 07:01  -  06-09-24 @ 07:00  --------------------------------------------------------  IN: 100 mL / OUT: 0 mL / NET: 100 mL      Physical Exam:  	Gen: NAD  	HEENT: MMM  	Pulm: CTA B/L  	CV: S1S2  	Abd: Soft, +BS  	Ext: No LE edema B/L                      Neuro: Awake   	Skin: Warm and Dry   	Vascular access: HD catheter           SHARI hyde  LABS/STUDIES  --------------------------------------------------------------------------------              7.7    6.12  >-----------<  231      [06-08-24 @ 09:33]              24.9     138  |  97  |  38  ----------------------------<  111      [06-08-24 @ 09:33]  4.0   |  26  |  10.03        Ca     9.9     [06-08-24 @ 09:33]            Creatinine Trend:  SCr 10.03 [06-08 @ 09:33]  SCr 9.97 [06-06 @ 09:52]  SCr 12.11 [06-05 @ 07:07]  SCr 10.66 [06-04 @ 20:26]  SCr 16.13 [05-13 @ 11:54]    Urinalysis - [06-08-24 @ 09:33]      Color  / Appearance  / SG  / pH       Gluc 111 / Ketone   / Bili  / Urobili        Blood  / Protein  / Leuk Est  / Nitrite       RBC  / WBC  / Hyaline  / Gran  / Sq Epi  / Non Sq Epi  / Bacteria       Iron 12, TIBC 175, %sat 7      [06-05-24 @ 07:07]  Ferritin 1601      [06-05-24 @ 07:07]  PTH -- (Ca 9.4)      [06-06-24 @ 10:20]   341  PTH -- (Ca 10.2)      [05-09-24 @ 10:25]   524  PTH -- (Ca 10.3)      [11-05-23 @ 07:12]   397  Vitamin D (25OH) 44.2      [11-05-23 @ 07:12]  HbA1c 6.1      [01-04-20 @ 05:50]  TSH 1.32      [06-05-24 @ 00:30]      
Patient is a 59y old  Male who presents with a chief complaint of Sepsis (09 Jun 2024 08:49)    Date of servie : 06-09-24 @ 12:58  INTERVAL HPI/OVERNIGHT EVENTS:  T(C): 36.9 (06-09-24 @ 08:35), Max: 36.9 (06-09-24 @ 08:35)  HR: 84 (06-09-24 @ 08:35) (73 - 84)  BP: 114/72 (06-09-24 @ 08:35) (95/56 - 135/79)  RR: 18 (06-09-24 @ 08:35) (18 - 18)  SpO2: 97% (06-09-24 @ 08:35) (96% - 98%)  Wt(kg): --  I&O's Summary    08 Jun 2024 07:01  -  09 Jun 2024 07:00  --------------------------------------------------------  IN: 100 mL / OUT: 0 mL / NET: 100 mL    09 Jun 2024 07:01  -  09 Jun 2024 12:58  --------------------------------------------------------  IN: 100 mL / OUT: 0 mL / NET: 100 mL        LABS:                        7.5    7.21  )-----------( 256      ( 09 Jun 2024 10:41 )             24.4     06-09    141  |  95<L>  |  45<H>  ----------------------------<  105<H>  3.5   |  26  |  11.95<H>    Ca    9.8      09 Jun 2024 10:41        Urinalysis Basic - ( 09 Jun 2024 10:41 )    Color: x / Appearance: x / SG: x / pH: x  Gluc: 105 mg/dL / Ketone: x  / Bili: x / Urobili: x   Blood: x / Protein: x / Nitrite: x   Leuk Esterase: x / RBC: x / WBC x   Sq Epi: x / Non Sq Epi: x / Bacteria: x      CAPILLARY BLOOD GLUCOSE            Urinalysis Basic - ( 09 Jun 2024 10:41 )    Color: x / Appearance: x / SG: x / pH: x  Gluc: 105 mg/dL / Ketone: x  / Bili: x / Urobili: x   Blood: x / Protein: x / Nitrite: x   Leuk Esterase: x / RBC: x / WBC x   Sq Epi: x / Non Sq Epi: x / Bacteria: x        MEDICATIONS  (STANDING):  apixaban 5 milliGRAM(s) Oral two times a day  calcitriol   Capsule 0.5 MICROGram(s) Oral daily  chlorhexidine 2% Cloths 1 Application(s) Topical daily  cholecalciferol 2000 Unit(s) Oral daily  cinacalcet 30 milliGRAM(s) Oral daily  diphenhydrAMINE 50 milliGRAM(s) Oral <User Schedule>  epoetin isra (EPOGEN) Injectable 33227 Unit(s) IV Push <User Schedule>  guaiFENesin  milliGRAM(s) Oral every 12 hours  methylPREDNISolone 32 milliGRAM(s) Oral <User Schedule>  methylPREDNISolone 32 milliGRAM(s) Oral <User Schedule>  Nephro-bart 1 Tablet(s) Oral daily  piperacillin/tazobactam IVPB.. 3.375 Gram(s) IV Intermittent every 12 hours  sevelamer carbonate 800 milliGRAM(s) Oral three times a day with meals  sodium zirconium cyclosilicate 10 Gram(s) Oral <User Schedule>    MEDICATIONS  (PRN):  acetaminophen     Tablet .. 650 milliGRAM(s) Oral every 6 hours PRN Temp greater or equal to 38C (100.4F), Mild Pain (1 - 3)  albuterol/ipratropium for Nebulization 3 milliLiter(s) Nebulizer every 6 hours PRN Shortness of Breath and/or Wheezing  benzocaine/menthol Lozenge 1 Lozenge Oral three times a day PRN Sore Throat          PHYSICAL EXAM:  GENERAL: NAD, well-groomed, well-developed  HEAD:  Atraumatic, Normocephalic  CHEST/LUNG: Clear to percussion bilaterally; No rales, rhonchi, wheezing, or rubs  HEART: Regular rate and rhythm; No murmurs, rubs, or gallops  ABDOMEN: Soft, Nontender, Nondistended; Bowel sounds present  EXTREMITIES:  2+ Peripheral Pulses, No clubbing, cyanosis, or edema  LYMPH: No lymphadenopathy noted  SKIN: No rashes or lesions    Care Discussed with Consultants/Other Providers [ ] YES  [ ] NO
Patient is a 59y old  Male who presents with a chief complaint of Sepsis (11 Jun 2024 13:40)    Date of servie : 06-11-24 @ 15:14  INTERVAL HPI/OVERNIGHT EVENTS:  T(C): 37.1 (06-11-24 @ 09:14), Max: 37.1 (06-11-24 @ 09:14)  HR: 75 (06-11-24 @ 09:14) (75 - 103)  BP: 132/79 (06-11-24 @ 09:14) (100/61 - 132/79)  RR: 18 (06-11-24 @ 09:14) (18 - 18)  SpO2: 99% (06-11-24 @ 09:14) (94% - 100%)  Wt(kg): --  I&O's Summary    10 Jamshid 2024 07:01  -  11 Jun 2024 07:00  --------------------------------------------------------  IN: 1701 mL / OUT: 2800 mL / NET: -1099 mL        LABS:                        8.8    13.00 )-----------( 308      ( 11 Jun 2024 09:43 )             28.0     06-11    137  |  92<L>  |  39<H>  ----------------------------<  88  3.9   |  24  |  10.71<H>    Ca    10.8<H>      11 Jun 2024 09:43    TPro  8.0  /  Alb  3.7  /  TBili  0.6  /  DBili  x   /  AST  19  /  ALT  13  /  AlkPhos  53  06-11    PT/INR - ( 10 Jamshid 2024 10:48 )   PT: 13.7 sec;   INR: 1.32 ratio         PTT - ( 11 Jun 2024 09:44 )  PTT:63.5 sec  Urinalysis Basic - ( 11 Jun 2024 09:43 )    Color: x / Appearance: x / SG: x / pH: x  Gluc: 88 mg/dL / Ketone: x  / Bili: x / Urobili: x   Blood: x / Protein: x / Nitrite: x   Leuk Esterase: x / RBC: x / WBC x   Sq Epi: x / Non Sq Epi: x / Bacteria: x      CAPILLARY BLOOD GLUCOSE            Urinalysis Basic - ( 11 Jun 2024 09:43 )    Color: x / Appearance: x / SG: x / pH: x  Gluc: 88 mg/dL / Ketone: x  / Bili: x / Urobili: x   Blood: x / Protein: x / Nitrite: x   Leuk Esterase: x / RBC: x / WBC x   Sq Epi: x / Non Sq Epi: x / Bacteria: x        MEDICATIONS  (STANDING):  calcitriol   Capsule 0.5 MICROGram(s) Oral daily  chlorhexidine 2% Cloths 1 Application(s) Topical daily  cholecalciferol 2000 Unit(s) Oral daily  cinacalcet 30 milliGRAM(s) Oral daily  epoetin isra (EPOGEN) Injectable 84442 Unit(s) IV Push <User Schedule>  guaiFENesin  milliGRAM(s) Oral every 12 hours  heparin  Infusion.  Unit(s)/Hr (23 mL/Hr) IV Continuous <Continuous>  Nephro-bart 1 Tablet(s) Oral daily  piperacillin/tazobactam IVPB.. 3.375 Gram(s) IV Intermittent every 12 hours  sevelamer carbonate 800 milliGRAM(s) Oral three times a day with meals  sodium zirconium cyclosilicate 10 Gram(s) Oral <User Schedule>    MEDICATIONS  (PRN):  acetaminophen     Tablet .. 650 milliGRAM(s) Oral every 6 hours PRN Temp greater or equal to 38C (100.4F), Mild Pain (1 - 3)  albuterol/ipratropium for Nebulization 3 milliLiter(s) Nebulizer every 6 hours PRN Shortness of Breath and/or Wheezing  benzocaine/menthol Lozenge 1 Lozenge Oral three times a day PRN Sore Throat  heparin   Injectable 01884 Unit(s) IV Push every 6 hours PRN For aPTT less than 40  heparin   Injectable 5000 Unit(s) IV Push every 6 hours PRN For aPTT between 40 - 57          PHYSICAL EXAM:  GENERAL: obese  CHEST/LUNG: Clear to percussion bilaterally; No rales, rhonchi, wheezing, or rubs  HEART: Regular rate and rhythm; No murmurs, rubs, or gallops  ABDOMEN: Soft, Nontender, Nondistended; Bowel sounds present  EXTREMITIES:  edema+    Care Discussed with Consultants/Other Providers [ ] YES  [ ] NO
Vascular Surgery Progress Note    SUBJECTIVE: Pt seen and examined at bedside. Patient comfortable and in no-apparent distress.     Vital Signs Last 24 Hrs  T(C): 36.7 (12 Jun 2024 04:04), Max: 37.1 (11 Jun 2024 09:14)  T(F): 98 (12 Jun 2024 04:04), Max: 98.8 (11 Jun 2024 09:14)  HR: 82 (12 Jun 2024 04:04) (75 - 93)  BP: 108/69 (12 Jun 2024 04:04) (101/64 - 132/79)  BP(mean): --  RR: 18 (12 Jun 2024 04:04) (18 - 18)  SpO2: 99% (12 Jun 2024 04:04) (99% - 99%)    Parameters below as of 12 Jun 2024 04:04  Patient On (Oxygen Delivery Method): room air    Physical Exam:  General Appearance: Appears well, NAD  Respiratory: No labored breathing  CV: Pulse regularly present  Vascular: B/l UEs warm, well perfused. RUE protected     LABS:                        8.8    13.00 )-----------( 308      ( 11 Jun 2024 09:43 )             28.0     06-11    137  |  92<L>  |  39<H>  ----------------------------<  88  3.9   |  24  |  10.71<H>    Ca    10.8<H>      11 Jun 2024 09:43    TPro  8.0  /  Alb  3.7  /  TBili  0.6  /  DBili  x   /  AST  19  /  ALT  13  /  AlkPhos  53  06-11    PT/INR - ( 10 Jamshid 2024 10:48 )   PT: 13.7 sec;   INR: 1.32 ratio         PTT - ( 12 Jun 2024 00:54 )  PTT:73.4 sec  Urinalysis Basic - ( 11 Jun 2024 09:43 )    Color: x / Appearance: x / SG: x / pH: x  Gluc: 88 mg/dL / Ketone: x  / Bili: x / Urobili: x   Blood: x / Protein: x / Nitrite: x   Leuk Esterase: x / RBC: x / WBC x   Sq Epi: x / Non Sq Epi: x / Bacteria: x        INs and OUTs:    06-11-24 @ 07:01  -  06-12-24 @ 07:00  --------------------------------------------------------  IN: 0 mL / OUT: 200 mL / NET: -200 mL    
[facility]   MD KAT BHAGAT MD ANGELA WONG, PA Venitha Krishnan, NP    TEL:  OFFICE: [phone]  From 5pm-7am Answering Service [phone]    -- RENAL FOLLOW UP NOTE ---Date of Service 06-12-24 @ 10:04    Patient is a 59y old  Male who presents with a chief complaint of Sepsis (12 Jun 2024 08:31)      Patient seen and examined in HD. No chest pain/sob    VITALS:  T(F): 97.3 (06-12-24 @ 08:52), Max: 98.7 (06-11-24 @ 16:09)  HR: 93 (06-12-24 @ 08:52)  BP: 117/80 (06-12-24 @ 08:52)  RR: 18 (06-12-24 @ 08:52)  SpO2: 98% (06-12-24 @ 08:52)  Wt(kg): --    06-11 @ 07:01  -  06-12 @ 07:00  --------------------------------------------------------  IN: 0 mL / OUT: 200 mL / NET: -200 mL          PHYSICAL EXAM:  General: NAD  Neck: No JVD  Respiratory: CTAB, no wheezes, rales or rhonchi  Cardiovascular: S1, S2, RRR  Gastrointestinal: BS+, soft, NT/ND  Extremities: No peripheral edema    Hospital Medications:   MEDICATIONS  (STANDING):  calcitriol   Capsule 0.5 MICROGram(s) Oral daily  chlorhexidine 2% Cloths 1 Application(s) Topical daily  cholecalciferol 2000 Unit(s) Oral daily  cinacalcet 30 milliGRAM(s) Oral daily  epoetin isra (EPOGEN) Injectable 82861 Unit(s) IV Push <User Schedule>  guaiFENesin  milliGRAM(s) Oral every 12 hours  heparin  Infusion.  Unit(s)/Hr (23 mL/Hr) IV Continuous <Continuous>  Nephro-bart 1 Tablet(s) Oral daily  sevelamer carbonate 800 milliGRAM(s) Oral three times a day with meals  sodium zirconium cyclosilicate 10 Gram(s) Oral <User Schedule>      LABS:  06-11    137  |  92<L>  |  39<H>  ----------------------------<  88  3.9   |  24  |  10.71<H>    Ca    10.8<H>      11 Jun 2024 09:43    TPro  8.0  /  Alb  3.7  /  TBili  0.6  /  DBili      /  AST  19  /  ALT  13  /  AlkPhos  53  06-11    Creatinine Trend: 10.71 <--, 11.95 <--, 10.03 <--, 9.97 <--                                7.9    11.53 )-----------( 279      ( 12 Jun 2024 08:52 )             25.6     Urine Studies:  Urinalysis - [06-11-24 @ 09:43]      Color  / Appearance  / SG  / pH       Gluc 88 / Ketone   / Bili  / Urobili        Blood  / Protein  / Leuk Est  / Nitrite       RBC  / WBC  / Hyaline  / Gran  / Sq Epi  / Non Sq Epi  / Bacteria       Iron 12, TIBC 175, %sat 7      [06-05-24 @ 07:07]  Ferritin 1601      [06-05-24 @ 07:07]  PTH -- (Ca 9.4)      [06-06-24 @ 10:20]   341  PTH -- (Ca 10.2)      [05-09-24 @ 10:25]   524  PTH -- (Ca 10.3)      [11-05-23 @ 07:12]   397  Vitamin D (25OH) 44.2      [11-05-23 @ 07:12]  HbA1c 6.1      [01-04-20 @ 05:50]  TSH 1.32      [06-05-24 @ 00:30]        RADIOLOGY & ADDITIONAL STUDIES:  
[facility]   MD KAT BHAGAT MD BRIANA PETITO, NP    TEL:  FROM 9 AM to 5 PM ---OFFICE: [phone]  FROM 5 PM - 9 AM PLEASE CALL ANSWERING SERVICE: [phone]     RENAL PROGRESS NOTE: DATE OF SERVICE 06-05-24 @ 15:51  Patient is a 59y old  Male who presents with a chief complaint of Sepsis   Patient seen and examined at bedside. No chest pain/sob, c/o cough     VITALS:  T(F): 99.4 (06-05-24 @ 15:32), Max: 103.4 (06-04-24 @ 20:15)  HR: 104 (06-05-24 @ 14:10)  BP: 112/56 (06-05-24 @ 14:10)  RR: 17 (06-05-24 @ 14:10)  SpO2: 97% (06-05-24 @ 14:10)  Wt(kg): --    Height (cm): 177.8 (06-04 @ 18:07)  Weight (kg): 125.6 (06-04 @ 18:07)  BMI (kg/m2): 39.7 (06-04 @ 18:07)  BSA (m2): 2.4 (06-04 @ 18:07)    PHYSICAL EXAM:  Constitutional: NAD  Neck: No JVD  Respiratory: CTAB, no wheezes, rales or rhonchi  Cardiovascular: S1, S2, RRR  Gastrointestinal: BS+, soft, NT/ND  Extremities: No peripheral edema  Access: [facility]     Hospital Medications:   MEDICATIONS  (STANDING):  apixaban 5 milliGRAM(s) Oral two times a day  calcitriol   Capsule 0.5 MICROGram(s) Oral daily  cholecalciferol 2000 Unit(s) Oral daily  cinacalcet 30 milliGRAM(s) Oral daily  guaiFENesin  milliGRAM(s) Oral every 12 hours  Nephro-bart 1 Tablet(s) Oral daily  piperacillin/tazobactam IVPB.. 3.375 Gram(s) IV Intermittent every 12 hours  sevelamer carbonate 800 milliGRAM(s) Oral three times a day with meals      LABS:  06-05    134<L>  |  97  |  50<H>  ----------------------------<  106<H>  5.7<H>   |  20<L>  |  12.11<H>    Ca    9.7      05 Jun 2024 07:07    TPro  7.0  /  Alb  3.3  /  TBili  0.7  /  DBili      /  AST  13  /  ALT  9<L>  /  AlkPhos  46  06-05    Creatinine Trend: 12.11 <--, 10.66 <--    Albumin: 3.3 g/dL (06-05 @ 07:07)  Iron - Total Binding Capacity.: 175 ug/dL (06-05 @ 07:07)  Iron Total: 12 ug/dL (06-05 @ 07:07)  Ferritin: 1601 ng/mL (06-05 @ 07:07)  Albumin: 3.7 g/dL (06-04 @ 20:26)                              7.4    13.71 )-----------( 186      ( 05 Jun 2024 07:07 )             23.6     Urine Studies:  Urinalysis - [06-05-24 @ 07:07]      Color  / Appearance  / SG  / pH       Gluc 106 / Ketone   / Bili  / Urobili        Blood  / Protein  / Leuk Est  / Nitrite       RBC  / WBC  / Hyaline  / Gran  / Sq Epi  / Non Sq Epi  / Bacteria       Iron 12, TIBC 175, %sat 7      [06-05-24 @ 07:07]  Ferritin 1601      [06-05-24 @ 07:07]  PTH -- (Ca 10.2)      [05-09-24 @ 10:25]   524  PTH -- (Ca 10.3)      [11-05-23 @ 07:12]   397  Vitamin D (25OH) 44.2      [11-05-23 @ 07:12]  HbA1c 6.1      [01-04-20 @ 05:50]  TSH 1.32      [06-05-24 @ 00:30]    HBsAb >1000.0      [05-07-24 @ 18:40]  HBsAb Reactive      [05-07-24 @ 18:40]  HBsAg Nonreact      [05-07-24 @ 18:40]  HBcAb Nonreact      [05-07-24 @ 18:40]  HCV 0.23, Nonreact      [05-07-24 @ 18:40]  HIV Nonreact      [05-07-24 @ 07:09]      RADIOLOGY & ADDITIONAL STUDIES:  
Patient is a 59y old  Male who presents with a chief complaint of Sepsis (10 Jamshid 2024 13:04)    Date of servie : 06-10-24 @ 13:31  INTERVAL HPI/OVERNIGHT EVENTS:  T(C): 36.8 (06-10-24 @ 13:10), Max: 36.9 (06-10-24 @ 08:17)  HR: 75 (06-10-24 @ 13:10) (66 - 79)  BP: 137/75 (06-10-24 @ 13:10) (122/71 - 137/75)  RR: 18 (06-10-24 @ 13:10) (18 - 18)  SpO2: 99% (06-10-24 @ 13:10) (96% - 99%)  Wt(kg): --  I&O's Summary    09 Jun 2024 07:01  -  10 Jamshid 2024 07:00  --------------------------------------------------------  IN: 820 mL / OUT: 400 mL / NET: 420 mL    10 Jamshid 2024 07:01  -  10 Jamshid 2024 13:31  --------------------------------------------------------  IN: 800 mL / OUT: 2800 mL / NET: -2000 mL        LABS:                        7.5    7.21  )-----------( 256      ( 09 Jun 2024 10:41 )             24.4     06-09    141  |  95<L>  |  45<H>  ----------------------------<  105<H>  3.5   |  26  |  11.95<H>    Ca    9.8      09 Jun 2024 10:41      PT/INR - ( 10 Jamshid 2024 10:48 )   PT: 13.7 sec;   INR: 1.32 ratio         PTT - ( 10 Jamshid 2024 10:48 )  PTT:35.4 sec  Urinalysis Basic - ( 09 Jun 2024 10:41 )    Color: x / Appearance: x / SG: x / pH: x  Gluc: 105 mg/dL / Ketone: x  / Bili: x / Urobili: x   Blood: x / Protein: x / Nitrite: x   Leuk Esterase: x / RBC: x / WBC x   Sq Epi: x / Non Sq Epi: x / Bacteria: x      CAPILLARY BLOOD GLUCOSE            Urinalysis Basic - ( 09 Jun 2024 10:41 )    Color: x / Appearance: x / SG: x / pH: x  Gluc: 105 mg/dL / Ketone: x  / Bili: x / Urobili: x   Blood: x / Protein: x / Nitrite: x   Leuk Esterase: x / RBC: x / WBC x   Sq Epi: x / Non Sq Epi: x / Bacteria: x        MEDICATIONS  (STANDING):  calcitriol   Capsule 0.5 MICROGram(s) Oral daily  chlorhexidine 2% Cloths 1 Application(s) Topical daily  cholecalciferol 2000 Unit(s) Oral daily  cinacalcet 30 milliGRAM(s) Oral daily  epoetin isra (EPOGEN) Injectable 25278 Unit(s) IV Push <User Schedule>  guaiFENesin  milliGRAM(s) Oral every 12 hours  heparin  Infusion.  Unit(s)/Hr (23 mL/Hr) IV Continuous <Continuous>  LORazepam   Injectable 0.5 milliGRAM(s) IV Push once  Nephro-bart 1 Tablet(s) Oral daily  piperacillin/tazobactam IVPB.. 3.375 Gram(s) IV Intermittent every 12 hours  sevelamer carbonate 800 milliGRAM(s) Oral three times a day with meals  sodium zirconium cyclosilicate 10 Gram(s) Oral <User Schedule>    MEDICATIONS  (PRN):  acetaminophen     Tablet .. 650 milliGRAM(s) Oral every 6 hours PRN Temp greater or equal to 38C (100.4F), Mild Pain (1 - 3)  albuterol/ipratropium for Nebulization 3 milliLiter(s) Nebulizer every 6 hours PRN Shortness of Breath and/or Wheezing  benzocaine/menthol Lozenge 1 Lozenge Oral three times a day PRN Sore Throat  heparin   Injectable 31468 Unit(s) IV Push every 6 hours PRN For aPTT less than 40  heparin   Injectable 5000 Unit(s) IV Push every 6 hours PRN For aPTT between 40 - 57          PHYSICAL EXAM:  GENERAL: NAD, well-groomed, well-developed  HEAD:  Atraumatic, Normocephalic  CHEST/LUNG: Clear to percussion bilaterally; No rales, rhonchi, wheezing, or rubs  HEART: Regular rate and rhythm; No murmurs, rubs, or gallops  ABDOMEN: Soft, Nontender, Nondistended; Bowel sounds present  EXTREMITIES:  2+ Peripheral Pulses, No clubbing, cyanosis, or edema  LYMPH: No lymphadenopathy noted  SKIN: No rashes or lesions    Care Discussed with Consultants/Other Providers [ ] YES  [ ] NO
Vascular Surgery Progress Note    SUBJECTIVE: Pt seen and examined at bedside. Patient comfortable and in no-apparent distress.     Vital Signs Last 24 Hrs  T(C): 37.1 (11 Jun 2024 09:14), Max: 37.1 (11 Jun 2024 09:14)  T(F): 98.8 (11 Jun 2024 09:14), Max: 98.8 (11 Jun 2024 09:14)  HR: 75 (11 Jun 2024 09:14) (75 - 109)  BP: 132/79 (11 Jun 2024 09:14) (100/61 - 137/75)  BP(mean): --  RR: 18 (11 Jun 2024 09:14) (18 - 18)  SpO2: 99% (11 Jun 2024 09:14) (93% - 100%)    Parameters below as of 11 Jun 2024 09:14  Patient On (Oxygen Delivery Method): room air    General: NAD, resting comfortably  No arm swelling, no prominent chest veins noted  R permcath clean without erythema or drainage around site  Papl radial/ulnar 2+ B/L  LUE incisions well-healed  Abdominal: soft, ND/NT, no organomegaly      LABS:                        8.8    13.00 )-----------( 308      ( 11 Jun 2024 09:43 )             28.0     06-11    137  |  92<L>  |  39<H>  ----------------------------<  88  3.9   |  24  |  10.71<H>    Ca    10.8<H>      11 Jun 2024 09:43    TPro  8.0  /  Alb  3.7  /  TBili  0.6  /  DBili  x   /  AST  19  /  ALT  13  /  AlkPhos  53  06-11    PT/INR - ( 10 Jamshid 2024 10:48 )   PT: 13.7 sec;   INR: 1.32 ratio         PTT - ( 11 Jun 2024 09:44 )  PTT:63.5 sec  Urinalysis Basic - ( 11 Jun 2024 09:43 )    Color: x / Appearance: x / SG: x / pH: x  Gluc: 88 mg/dL / Ketone: x  / Bili: x / Urobili: x   Blood: x / Protein: x / Nitrite: x   Leuk Esterase: x / RBC: x / WBC x   Sq Epi: x / Non Sq Epi: x / Bacteria: x        INs and OUTs:    06-10-24 @ 07:01  -  06-11-24 @ 07:00  --------------------------------------------------------  IN: 1701 mL / OUT: 2800 mL / NET: -1099 mL

## 2024-06-12 NOTE — DISCHARGE NOTE PROVIDER - NSDCFUADDAPPT_GEN_ALL_CORE_FT
APPTS ARE READY TO BE MADE: [X] YES    Best Family or Patient Contact (if needed):    Additional Information about above appointments (if needed):    1: Follow up with PCP Dr. Holden   2: Follow up with vascular sx Dr. Kim  3: Follow up with nephrology     Other comments or requests:

## 2024-06-12 NOTE — PROVIDER CONTACT NOTE (CRITICAL VALUE NOTIFICATION) - BACKGROUND
ESRD on HD.   Admitted : Sepsis   HTN  Completed HD treatment today
Pt is on a heparin drip according to anticoagulation nomogram
Pt on heparin drip according to anticoagulation nomogram

## 2024-07-15 ENCOUNTER — APPOINTMENT (OUTPATIENT)
Dept: VASCULAR SURGERY | Facility: CLINIC | Age: 59
End: 2024-07-15
Payer: MEDICARE

## 2024-07-15 PROCEDURE — 99442: CPT | Mod: 93

## 2024-08-09 NOTE — PRE-ANESTHESIA EVALUATION ADULT - NSATTENDATTESTRD_GEN_ALL_CORE
----- Message from Emmanuel Celaya MD sent at 7/30/2024 10:33 AM CDT -----  PFTs shows mild abnormalities. See Pulmonology as planned.   ----- Message -----  From: Wilfrido Burkett MD  Sent: 7/28/2024   3:09 PM CDT  To: Emmanuel Celaya MD      
The patient has been re-examined and I agree with the above assessment or I updated with my findings.

## 2024-12-09 ENCOUNTER — EMERGENCY (EMERGENCY)
Facility: HOSPITAL | Age: 59
LOS: 1 days | End: 2024-12-09
Admitting: EMERGENCY MEDICINE
Payer: MEDICARE

## 2024-12-09 VITALS
OXYGEN SATURATION: 99 % | WEIGHT: 274.92 LBS | TEMPERATURE: 98 F | HEIGHT: 70 IN | HEART RATE: 105 BPM | SYSTOLIC BLOOD PRESSURE: 100 MMHG | DIASTOLIC BLOOD PRESSURE: 60 MMHG | RESPIRATION RATE: 22 BRPM

## 2024-12-09 DIAGNOSIS — S99.919A UNSPECIFIED INJURY OF UNSPECIFIED ANKLE, INITIAL ENCOUNTER: Chronic | ICD-10-CM

## 2024-12-09 DIAGNOSIS — Z98.890 OTHER SPECIFIED POSTPROCEDURAL STATES: Chronic | ICD-10-CM

## 2024-12-09 DIAGNOSIS — Z99.2 DEPENDENCE ON RENAL DIALYSIS: Chronic | ICD-10-CM

## 2024-12-09 PROCEDURE — L9991: CPT

## 2024-12-09 NOTE — ED ADULT TRIAGE NOTE - CHIEF COMPLAINT QUOTE
coughing up blood, SOB, weak, +eliquis - ESRD, R chest shiley in place, HD: MWF, completed routine session earlier today

## 2024-12-12 ENCOUNTER — RESULT REVIEW (OUTPATIENT)
Age: 59
End: 2024-12-12

## 2024-12-13 ENCOUNTER — TRANSCRIPTION ENCOUNTER (OUTPATIENT)
Age: 59
End: 2024-12-13

## 2025-01-31 NOTE — CONSULT NOTE ADULT - SUBJECTIVE AND OBJECTIVE BOX
Interventional Radiology    Evaluate for Procedure: Non-tunneled CVC placement    HPI: 58y Male with dislodged right groin tunneled HD catheter and sepsis/bacteremia. IR consulted for evaluation.       Allergies: IV Contrast (Anaphylaxis)    Medications (Abx/Cardiac/Anticoagulation/Blood Products)    apixaban: 5 milliGRAM(s) Oral (11-06 @ 06:18)  cefTRIAXone   IVPB: 100 mL/Hr IV Intermittent (11-05 @ 15:12)  metoprolol tartrate: 25 milliGRAM(s) Oral (11-06 @ 06:18)  piperacillin/tazobactam IVPB..: 25 mL/Hr IV Intermittent (11-05 @ 09:53)  vancomycin  IVPB.: 250 mL/Hr IV Intermittent (11-04 @ 14:31)    Data:    T(C): 39.4  HR: 91  BP: 138/73  RR: 19  SpO2: 98%    -WBC 7.56 / HgB 11.0 / Hct 37.9 / Plt 143  -Na 132 / Cl 96 / BUN 47 / Glucose 75  -K 5.7 / CO2 15 / Cr 12.59  -ALT -- / Alk Phos -- / T.Bili --  -INR 1.53 / PTT 34.7    Radiology:     Assessment/Plan:   -58y Male with dislodged right groin tunneled HD catheter and sepsis/bacteremia. IR consulted for evaluation.       - case reviewed and approved for today 11/6/23  - please place IR procedure order under Dr. Lacy  - [facility]  - d/w primary team Interventional Radiology    Evaluate for Procedure: Non-tunneled HD placement    HPI: 58y Male with dislodged right groin tunneled HD catheter and sepsis/bacteremia. IR consulted for evaluation.       Allergies: IV Contrast (Anaphylaxis)    Medications (Abx/Cardiac/Anticoagulation/Blood Products)    apixaban: 5 milliGRAM(s) Oral (11-06 @ 06:18)  cefTRIAXone   IVPB: 100 mL/Hr IV Intermittent (11-05 @ 15:12)  metoprolol tartrate: 25 milliGRAM(s) Oral (11-06 @ 06:18)  piperacillin/tazobactam IVPB..: 25 mL/Hr IV Intermittent (11-05 @ 09:53)  vancomycin  IVPB.: 250 mL/Hr IV Intermittent (11-04 @ 14:31)    Data:    T(C): 39.4  HR: 91  BP: 138/73  RR: 19  SpO2: 98%    -WBC 7.56 / HgB 11.0 / Hct 37.9 / Plt 143  -Na 132 / Cl 96 / BUN 47 / Glucose 75  -K 5.7 / CO2 15 / Cr 12.59  -ALT -- / Alk Phos -- / T.Bili --  -INR 1.53 / PTT 34.7    Radiology:     Assessment/Plan:   -58y Male with dislodged right groin tunneled HD catheter and sepsis/bacteremia. IR consulted for evaluation.       - case reviewed and approved for today 11/6/23  - please place IR procedure order under Dr. Lacy  - [facility]  - d/w primary team Interventional Radiology    Evaluate for Procedure: Non-tunneled HD placement    HPI: 58y Male with dislodged right groin tunneled HD catheter and sepsis/bacteremia. IR consulted for evaluation.       Allergies: IV Contrast (Anaphylaxis)    Medications (Abx/Cardiac/Anticoagulation/Blood Products)    apixaban: 5 milliGRAM(s) Oral (11-06 @ 06:18)  cefTRIAXone   IVPB: 100 mL/Hr IV Intermittent (11-05 @ 15:12)  metoprolol tartrate: 25 milliGRAM(s) Oral (11-06 @ 06:18)  piperacillin/tazobactam IVPB..: 25 mL/Hr IV Intermittent (11-05 @ 09:53)  vancomycin  IVPB.: 250 mL/Hr IV Intermittent (11-04 @ 14:31)    Data:    T(C): 39.4  HR: 91  BP: 138/73  RR: 19  SpO2: 98%    -WBC 7.56 / HgB 11.0 / Hct 37.9 / Plt 143  -Na 132 / Cl 96 / BUN 47 / Glucose 75  -K 5.7 / CO2 15 / Cr 12.59  -ALT -- / Alk Phos -- / T.Bili --  -INR 1.53 / PTT 34.7      Assessment/Plan:   -58y Male with dislodged right groin tunneled HD catheter and sepsis/bacteremia. IR consulted for evaluation.       - case reviewed and approved for today 11/6/23  - please place IR procedure order under Dr. Lacy  - [facility]  - d/w primary team The patient is a 65y Male complaining of shortness of breath.

## 2025-03-27 NOTE — PROGRESS NOTE ADULT - ASSESSMENT
03/26/25 2159   Seizure Episode   Seizure Activity witnessed;reported   Seizure Presentation drooling;repetitive movement;stiffening;repetitive activity;other (see comments)  (muscle spasm in jaw per patient and c/o ear pain, unable to speak)   Seizure Duration 5-6 minutes   Seizure Areas Involved generalized;right side;head;face   Seizure Movement rapid;other (see comments)  (muscle spasm in jaw and c/o of ear pain R, unable to speak)   Seizure Interventions airway patency maintained;cardiac monitoring initiated;IV access established;pulse oximetry initiated;safe environment provided   Seizure Postictal Care assessed for injury;emotional support provided;reoriented;safe environment provided               EMU SEIZURE EVENT NOTE    Event button activated at 2159 for event presentation. See above flowsheet for details.  Upon entering patient's room, patient sitting at edge of bed holding the R side of her face. Patient unable to verbalize discomfort at that time but could appropriately nod yes/no when asked questions. R jaw spasm and R ear pain noted per patient's responses. Patient able to follow simple commands during event, such as: Can you give me a thumbs up? Patient gestured the middle finger at that time.  Seizure precautions maintained, no signs of injury noted. Neurological assessment completed and VS monitored per protocol. VSS through event, see flowsheets for details. Epilepsy provider on call SAPNA Pérez MD notified of event @ 2210. Neurologically, patient is back to baseline and able to follow commands. Patient and significant other instructed to call staff for assistance, verbalized understanding. Instructed patient/ significant other to press event button if patient has any further aura or event presentations, verbalized understanding. No acute signs of distress noted at this time.            60y/o male with PMH of ESRD MWF, HTN, Gout, DM, HLD, obesity, BENNIE on cpap presents to the ED with chest pressure/pain, fever that began yesterday. Also endorses cough x 2weeks with recent exposure to COVID. Nephrology consulted for ESRD.     A/P     ESRD on HD   Center: [facility]  Nephrologist: Dr. Rocky Castillo  Access: Permacath   Consent obtained and placed in chart  Plan to continue MWF schedule while inpatient  s/p vein mapping today, vascular f/u  Protect RUE   s/p MR chest w/ and w/o contrast -> to be dialyzed 6/11 after contrast; patient refused   HD today --> d/w pt possibility of nephrogenic systemic fibrosis s.p MRI contrast and need for 3 consecutive sessions of HD; pt refused  Low K/Renal diet  Monitor BMP     Hyperkalemia  s/p Lokelma  Pt needs to be on Lokelma 10 g on non Hd Days.  Low K diet  C/W HD schedule.  Monitor closely -    HTN  Marginal / controlled.  Off antihypertensives  UF with HD as tolerated   Monitor BP     Anemia  sec to ESRD   Iron saturation low - 7%, however pt with current active infection  Start Venofer when infection resolves  MORALES with HD  Transfuse if Hgb <7    CKD-MBD   - acceptable.  C/W Cinacalcet 30mg qd.  Increase sevelamer to 1600mg TID if PO4 worsens.  Low PO4 diet.  Check PO4, Ca daily    Sepsis:  Afebrile and WBC improving  CT Chest neg for PNA.  CT A/P - diverticulosis w/o diverticulitis.  UA neg for UTI.  Has PC.  BC neg to date.  ID following.  C/W Zosyn. 58y/o male with PMH of ESRD MWF, HTN, Gout, DM, HLD, obesity, BENNIE on cpap presents to the ED with chest pressure/pain, fever that began yesterday. Also endorses cough x 2weeks with recent exposure to COVID. Nephrology consulted for ESRD.     A/P     ESRD on HD   Center: [facility]  Nephrologist: Dr. Rocky Castillo  Access: Permacath   Consent obtained and placed in chart  Plan to continue MWF schedule while inpatient  s/p vein mapping today, vascular f/u  Protect RUE   s/p MR chest w/ and w/o contrast -> to be dialyzed 6/11 after contrast; patient refused   HD today --> d/w pt possibility of NSF  s.p MRI contrast and need for 3 consecutive sessions of HD; pt refused  Low K/Renal diet  Monitor BMP     Hyperkalemia  s/p Lokelma  Pt needs to be on Lokelma 10 g on non Hd Days.  Low K diet  C/W HD schedule.  Monitor closely -    HTN  Marginal / controlled.  Off antihypertensives  UF with HD as tolerated   Monitor BP     Anemia  sec to ESRD   Iron saturation low - 7%, however pt with current active infection  Start Venofer when infection resolves  MORALES with HD  Transfuse if Hgb <7    CKD-MBD   - acceptable.  C/W Cinacalcet 30mg qd.  Increase sevelamer to 1600mg TID if PO4 worsens.  Low PO4 diet.  Check PO4, Ca daily    Sepsis:  Afebrile and WBC improving  CT Chest neg for PNA.  CT A/P - diverticulosis w/o diverticulitis.  UA neg for UTI.  Has PC.  BC neg to date.  ID following.  C/W Zosyn.